# Patient Record
Sex: FEMALE | Race: WHITE | NOT HISPANIC OR LATINO | URBAN - METROPOLITAN AREA
[De-identification: names, ages, dates, MRNs, and addresses within clinical notes are randomized per-mention and may not be internally consistent; named-entity substitution may affect disease eponyms.]

---

## 2021-10-11 ENCOUNTER — INPATIENT (INPATIENT)
Facility: HOSPITAL | Age: 86
LOS: 7 days | Discharge: HOME | DRG: 299 | End: 2021-10-19
Attending: INTERNAL MEDICINE | Admitting: INTERNAL MEDICINE
Payer: MEDICARE

## 2021-10-11 VITALS
TEMPERATURE: 99 F | DIASTOLIC BLOOD PRESSURE: 72 MMHG | RESPIRATION RATE: 16 BRPM | WEIGHT: 154.98 LBS | HEART RATE: 92 BPM | OXYGEN SATURATION: 96 % | SYSTOLIC BLOOD PRESSURE: 153 MMHG

## 2021-10-11 DIAGNOSIS — L03.90 CELLULITIS, UNSPECIFIED: ICD-10-CM

## 2021-10-11 DIAGNOSIS — E03.9 HYPOTHYROIDISM, UNSPECIFIED: ICD-10-CM

## 2021-10-11 DIAGNOSIS — E87.6 HYPOKALEMIA: ICD-10-CM

## 2021-10-11 DIAGNOSIS — R60.0 LOCALIZED EDEMA: ICD-10-CM

## 2021-10-11 DIAGNOSIS — Z29.9 ENCOUNTER FOR PROPHYLACTIC MEASURES, UNSPECIFIED: ICD-10-CM

## 2021-10-11 DIAGNOSIS — I10 ESSENTIAL (PRIMARY) HYPERTENSION: ICD-10-CM

## 2021-10-11 LAB
ALBUMIN SERPL ELPH-MCNC: 2.8 G/DL — LOW (ref 3.3–5)
ALP SERPL-CCNC: 105 U/L — SIGNIFICANT CHANGE UP (ref 40–120)
ALT FLD-CCNC: 37 U/L — SIGNIFICANT CHANGE UP (ref 12–78)
ANION GAP SERPL CALC-SCNC: 3 MMOL/L — LOW (ref 5–17)
APTT BLD: 48 SEC — HIGH (ref 27.5–35.5)
AST SERPL-CCNC: 57 U/L — HIGH (ref 15–37)
BASOPHILS # BLD AUTO: 0.02 K/UL — SIGNIFICANT CHANGE UP (ref 0–0.2)
BASOPHILS NFR BLD AUTO: 0.4 % — SIGNIFICANT CHANGE UP (ref 0–2)
BILIRUB SERPL-MCNC: 0.9 MG/DL — SIGNIFICANT CHANGE UP (ref 0.2–1.2)
BUN SERPL-MCNC: 11 MG/DL — SIGNIFICANT CHANGE UP (ref 7–23)
CALCIUM SERPL-MCNC: 8.3 MG/DL — LOW (ref 8.5–10.1)
CHLORIDE SERPL-SCNC: 101 MMOL/L — SIGNIFICANT CHANGE UP (ref 96–108)
CO2 SERPL-SCNC: 32 MMOL/L — HIGH (ref 22–31)
CREAT SERPL-MCNC: 1.1 MG/DL — SIGNIFICANT CHANGE UP (ref 0.5–1.3)
EOSINOPHIL # BLD AUTO: 0.08 K/UL — SIGNIFICANT CHANGE UP (ref 0–0.5)
EOSINOPHIL NFR BLD AUTO: 1.4 % — SIGNIFICANT CHANGE UP (ref 0–6)
GLUCOSE SERPL-MCNC: 94 MG/DL — SIGNIFICANT CHANGE UP (ref 70–99)
HCT VFR BLD CALC: 35.9 % — SIGNIFICANT CHANGE UP (ref 34.5–45)
HGB BLD-MCNC: 11.6 G/DL — SIGNIFICANT CHANGE UP (ref 11.5–15.5)
IMM GRANULOCYTES NFR BLD AUTO: 0.4 % — SIGNIFICANT CHANGE UP (ref 0–1.5)
INR BLD: 2.87 RATIO — HIGH (ref 0.88–1.16)
LACTATE SERPL-SCNC: 1.5 MMOL/L — SIGNIFICANT CHANGE UP (ref 0.7–2)
LYMPHOCYTES # BLD AUTO: 1.55 K/UL — SIGNIFICANT CHANGE UP (ref 1–3.3)
LYMPHOCYTES # BLD AUTO: 27.4 % — SIGNIFICANT CHANGE UP (ref 13–44)
MCHC RBC-ENTMCNC: 28.2 PG — SIGNIFICANT CHANGE UP (ref 27–34)
MCHC RBC-ENTMCNC: 32.3 GM/DL — SIGNIFICANT CHANGE UP (ref 32–36)
MCV RBC AUTO: 87.3 FL — SIGNIFICANT CHANGE UP (ref 80–100)
MONOCYTES # BLD AUTO: 0.6 K/UL — SIGNIFICANT CHANGE UP (ref 0–0.9)
MONOCYTES NFR BLD AUTO: 10.6 % — SIGNIFICANT CHANGE UP (ref 2–14)
NEUTROPHILS # BLD AUTO: 3.39 K/UL — SIGNIFICANT CHANGE UP (ref 1.8–7.4)
NEUTROPHILS NFR BLD AUTO: 59.8 % — SIGNIFICANT CHANGE UP (ref 43–77)
NRBC # BLD: 0 /100 WBCS — SIGNIFICANT CHANGE UP (ref 0–0)
NT-PROBNP SERPL-SCNC: 2458 PG/ML — HIGH (ref 0–450)
PLATELET # BLD AUTO: 266 K/UL — SIGNIFICANT CHANGE UP (ref 150–400)
POTASSIUM SERPL-MCNC: 3.4 MMOL/L — LOW (ref 3.5–5.3)
POTASSIUM SERPL-SCNC: 3.4 MMOL/L — LOW (ref 3.5–5.3)
PROT SERPL-MCNC: 8.1 G/DL — SIGNIFICANT CHANGE UP (ref 6–8.3)
PROTHROM AB SERPL-ACNC: 31.9 SEC — HIGH (ref 10.6–13.6)
RBC # BLD: 4.11 M/UL — SIGNIFICANT CHANGE UP (ref 3.8–5.2)
RBC # FLD: 17.3 % — HIGH (ref 10.3–14.5)
SARS-COV-2 RNA SPEC QL NAA+PROBE: SIGNIFICANT CHANGE UP
SODIUM SERPL-SCNC: 136 MMOL/L — SIGNIFICANT CHANGE UP (ref 135–145)
WBC # BLD: 5.66 K/UL — SIGNIFICANT CHANGE UP (ref 3.8–10.5)
WBC # FLD AUTO: 5.66 K/UL — SIGNIFICANT CHANGE UP (ref 3.8–10.5)

## 2021-10-11 PROCEDURE — 71045 X-RAY EXAM CHEST 1 VIEW: CPT | Mod: 26

## 2021-10-11 PROCEDURE — 99285 EMERGENCY DEPT VISIT HI MDM: CPT

## 2021-10-11 PROCEDURE — 93306 TTE W/DOPPLER COMPLETE: CPT | Mod: 26

## 2021-10-11 PROCEDURE — 93010 ELECTROCARDIOGRAM REPORT: CPT

## 2021-10-11 PROCEDURE — 93970 EXTREMITY STUDY: CPT | Mod: 26

## 2021-10-11 RX ORDER — PIPERACILLIN AND TAZOBACTAM 4; .5 G/20ML; G/20ML
3.38 INJECTION, POWDER, LYOPHILIZED, FOR SOLUTION INTRAVENOUS EVERY 8 HOURS
Refills: 0 | Status: DISCONTINUED | OUTPATIENT
Start: 2021-10-12 | End: 2021-10-12

## 2021-10-11 RX ORDER — LEVOTHYROXINE SODIUM 125 MCG
50 TABLET ORAL DAILY
Refills: 0 | Status: DISCONTINUED | OUTPATIENT
Start: 2021-10-11 | End: 2021-10-19

## 2021-10-11 RX ORDER — FUROSEMIDE 40 MG
20 TABLET ORAL
Refills: 0 | Status: DISCONTINUED | OUTPATIENT
Start: 2021-10-12 | End: 2021-10-14

## 2021-10-11 RX ORDER — WARFARIN SODIUM 2.5 MG/1
4 TABLET ORAL ONCE
Refills: 0 | Status: COMPLETED | OUTPATIENT
Start: 2021-10-11 | End: 2021-10-11

## 2021-10-11 RX ORDER — METOPROLOL TARTRATE 50 MG
50 TABLET ORAL DAILY
Refills: 0 | Status: DISCONTINUED | OUTPATIENT
Start: 2021-10-11 | End: 2021-10-19

## 2021-10-11 RX ORDER — PIPERACILLIN AND TAZOBACTAM 4; .5 G/20ML; G/20ML
3.38 INJECTION, POWDER, LYOPHILIZED, FOR SOLUTION INTRAVENOUS ONCE
Refills: 0 | Status: COMPLETED | OUTPATIENT
Start: 2021-10-11 | End: 2021-10-11

## 2021-10-11 RX ORDER — POTASSIUM CHLORIDE 20 MEQ
20 PACKET (EA) ORAL DAILY
Refills: 0 | Status: DISCONTINUED | OUTPATIENT
Start: 2021-10-12 | End: 2021-10-19

## 2021-10-11 RX ORDER — SODIUM CHLORIDE 9 MG/ML
2200 INJECTION INTRAMUSCULAR; INTRAVENOUS; SUBCUTANEOUS ONCE
Refills: 0 | Status: DISCONTINUED | OUTPATIENT
Start: 2021-10-11 | End: 2021-10-11

## 2021-10-11 RX ORDER — GABAPENTIN 400 MG/1
100 CAPSULE ORAL THREE TIMES A DAY
Refills: 0 | Status: DISCONTINUED | OUTPATIENT
Start: 2021-10-11 | End: 2021-10-19

## 2021-10-11 RX ORDER — VANCOMYCIN HCL 1 G
1000 VIAL (EA) INTRAVENOUS ONCE
Refills: 0 | Status: COMPLETED | OUTPATIENT
Start: 2021-10-11 | End: 2021-10-11

## 2021-10-11 RX ORDER — FUROSEMIDE 40 MG
40 TABLET ORAL ONCE
Refills: 0 | Status: COMPLETED | OUTPATIENT
Start: 2021-10-11 | End: 2021-10-11

## 2021-10-11 RX ORDER — METOPROLOL TARTRATE 50 MG
50 TABLET ORAL DAILY
Refills: 0 | Status: DISCONTINUED | OUTPATIENT
Start: 2021-10-11 | End: 2021-10-11

## 2021-10-11 RX ORDER — POTASSIUM CHLORIDE 20 MEQ
40 PACKET (EA) ORAL EVERY 4 HOURS
Refills: 0 | Status: COMPLETED | OUTPATIENT
Start: 2021-10-11 | End: 2021-10-12

## 2021-10-11 RX ADMIN — Medication 40 MILLIGRAM(S): at 17:25

## 2021-10-11 RX ADMIN — Medication 1000 MILLIGRAM(S): at 18:25

## 2021-10-11 RX ADMIN — PIPERACILLIN AND TAZOBACTAM 3.38 GRAM(S): 4; .5 INJECTION, POWDER, LYOPHILIZED, FOR SOLUTION INTRAVENOUS at 17:18

## 2021-10-11 RX ADMIN — GABAPENTIN 100 MILLIGRAM(S): 400 CAPSULE ORAL at 23:27

## 2021-10-11 RX ADMIN — Medication 40 MILLIEQUIVALENT(S): at 20:51

## 2021-10-11 RX ADMIN — Medication 250 MILLIGRAM(S): at 17:25

## 2021-10-11 RX ADMIN — PIPERACILLIN AND TAZOBACTAM 200 GRAM(S): 4; .5 INJECTION, POWDER, LYOPHILIZED, FOR SOLUTION INTRAVENOUS at 16:58

## 2021-10-11 NOTE — H&P ADULT - PROBLEM SELECTOR PLAN 3
- continue home medications  - f/u TSH - /72 on admission  - continue home medications - /72 on admission  - continue home Lopressor 50mg qd - K 3.4 on admission  - replete with potassium chloride 40 mg po x 2 doses  - AM BMP, replete prn

## 2021-10-11 NOTE — H&P ADULT - NEUROLOGICAL DETAILS
alert and oriented x 3/sensation intact/normal strength alert and oriented x 3/sensation intact/cranial nerves intact/normal strength

## 2021-10-11 NOTE — H&P ADULT - GASTROINTESTINAL DETAILS
normal/soft/nontender/no distention normal/soft/nontender/no distention/no masses palpable/bowel sounds normal/no bruit/no rebound tenderness/no guarding/no rigidity/no organomegaly

## 2021-10-11 NOTE — CONSULT NOTE ADULT - ASSESSMENT
[facility]  Chart Reviewed-Full Consult to follow for any immediate concerns please fell free to contact us directly at  [phone] and have us paged or text my cell # [phone]  Diony Larios MD PhD

## 2021-10-11 NOTE — H&P ADULT - PROBLEM SELECTOR PLAN 2
- /72 on admission  - continue home medications - K 3.4 on admission  - replete with potassium chloride 40 mg po x 2 doses - K 3.4 on admission  - replete with potassium chloride 40 mg po x 2 doses  - AM BMP, replete prn - hx of LE edema, off Lasix for past 2 weeks - Volume overload  2/2 non compliance with meds , Non compliance   - s/p Zosyn 3.375g x1 and Vanco 1g in ED. s/p Lasix 40mg IV x1 in ER  - continue Lasix 20 mg IV bid   - blood cultures, f/u  - Doppler Venous - to follow

## 2021-10-11 NOTE — H&P ADULT - PROBLEM SELECTOR PLAN 5
- continue jantoven (warfarin) - continue warfarin - continue synthroid 50mg qd - /72 on admission  - continue home Lopressor 50mg qd  Lasix  20 mg q 12 hrs

## 2021-10-11 NOTE — H&P ADULT - EXTREMITIES COMMENTS
b/l LE edema and erythema, painful to palpation, b/l LE edema and erythema extending from mid foot to below the knees, nontender to palpation, excoriations present, no drainage

## 2021-10-11 NOTE — H&P ADULT - NSHPREVIEWOFSYSTEMS_GEN_ALL_CORE
REVIEW OF SYSTEMS:  CONSTITUTIONAL: No fever, No chills, No fatigue, No myalgia, No body ache, No weakness  EYES: No eye pain,  No visual disturbances, No discharge, No Redness.  ENMT:  No ear pain, No nose bleed, No vertigo; No sinus pain, No throat pain, No congestion.  NECK: No pain, No stiffness.  RESPIRATORY: No cough, No wheezing, No  hemoptysis, No shortness of breath.  CARDIOVASCULAR: No chest pain, palpitations.  GASTROINTESTINAL: No abdominal pain, No epigastric pain. No nausea, No vomiting; No diarrhea, No constipation. [  ] BM  GENITOURINARY: No dysuria, No frequency, No urgency, No hematuria, No incontinence  NEUROLOGICAL: No headaches, No dizziness, No numbness, No tingling, No tremors, No weakness  EXT: ENDORSES Swelling, ENDORSES pain, ENDORSES b/l edema, erythema.  SKIN:  [  ] No itching, burning, rashes, or lesions [ X ] ENDORSES b/l erythema with edema in LE  MUSCULOSKELETAL: No joint pain or swelling; No muscle pain, No back pain, ENDORSES extremity pain.  PSYCHIATRIC: No depression,  No anxiety,  No mood swings, No difficulty sleeping at night.  PAIN SCALE: [  ] None  [ X ] Other- REVIEW OF SYSTEMS:  CONSTITUTIONAL: No fever, No chills, No fatigue, No myalgia, No body ache, No weakness  EYES: No eye pain,  No visual disturbances, No discharge, No Redness.  ENMT:  No ear pain, No nose bleed, No vertigo; No sinus pain, No throat pain, No congestion.  NECK: No pain, No stiffness.  RESPIRATORY: No cough, No wheezing, No  hemoptysis, No shortness of breath.  CARDIOVASCULAR: No chest pain, palpitations.  GASTROINTESTINAL: No abdominal pain, No epigastric pain. No nausea, No vomiting; No diarrhea, No constipation. [  ] BM  GENITOURINARY: No dysuria, No frequency, No urgency, No hematuria, No incontinence  NEUROLOGICAL: No headaches, No dizziness, No numbness, No tingling, No tremors, No weakness  EXT: ENDORSES Swelling, denies pain, ENDORSES b/l edema, erythema.  SKIN:  [  ] No itching, burning, rashes, or lesions [ X ] ENDORSES b/l erythema with edema in LE  MUSCULOSKELETAL: No joint pain or swelling; No muscle pain, No back pain, ENDORSES extremity pain.  PSYCHIATRIC: No depression,  No anxiety,  No mood swings, No difficulty sleeping at night.  PAIN SCALE: [  ] None  [ X ] Other- REVIEW OF SYSTEMS:  CONSTITUTIONAL: No fever, No chills, No fatigue, No myalgia, No body ache, No weakness  EYES: No eye pain,  No visual disturbances, No discharge, No Redness.  ENMT:  No ear pain, No nose bleed, No vertigo; No sinus pain, No throat pain, No congestion.  NECK: No pain, No stiffness.  RESPIRATORY: No cough, No wheezing, No  hemoptysis, No shortness of breath.  CARDIOVASCULAR: No chest pain, palpitations.  GASTROINTESTINAL: No abdominal pain, No epigastric pain. No nausea, No vomiting; No diarrhea, No constipation. [  ] BM  GENITOURINARY: No dysuria, No frequency, No urgency, No hematuria, No incontinence  NEUROLOGICAL: No headaches, No dizziness, No numbness, No tingling, No tremors, No weakness  EXT: ENDORSES Swelling, denies pain, ENDORSES b/l edema, erythema.  SKIN:  [  ] No itching, burning, rashes, or lesions [ X ] ENDORSES b/l erythema with edema in LE  MUSCULOSKELETAL: No joint pain or swelling; No muscle pain, No back pain, ENDORSES extremity pain.  PSYCHIATRIC: No depression,  No anxiety,  No mood swings, No difficulty sleeping at night.  PAIN SCALE: [ X ] None  [  ] Other-

## 2021-10-11 NOTE — H&P ADULT - NSHPPHYSICALEXAM_GEN_ALL_CORE
T(C): 37.1 (10-11-21 @ 14:45), Max: 37.1 (10-11-21 @ 14:45)  HR: 92 (10-11-21 @ 14:45) (92 - 92)  BP: 153/72 (10-11-21 @ 14:45) (153/72 - 153/72)  RR: 16 (10-11-21 @ 14:45) (16 - 16)  SpO2: 96% (10-11-21 @ 14:45) (96% - 96%)  Wt(kg): --    Physical Exam:   GENERAL: well-groomed, well-developed, NAD  HEENT: head NC/AT; EOM intact, PERRLA, conjunctiva & sclera clear; hearing grossly intact, moist mucous membranes  NECK: supple, no JVD  RESPIRATORY: CTA B/L, no wheezing, rales, rhonchi or rubs  CARDIOVASCULAR: S1&S2, RRR, no murmurs or gallops  ABDOMEN: soft, non-tender, non-distended, + Bowel sounds x4 quadrants, no guarding, rebound or rigidity  MUSCULOSKELETAL:  b/l LE edema, erythema extending up to  LYMPH: no cervical lymphadenopathy  VASCULAR: Radial pulses 2+ bilaterally, no varicose veins   SKIN:    NEUROLOGIC: AA&O X3, CN2-12 intact w/ no focal deficits, no sensory loss, motor Strength 5/5 in UE & LE B/L  Psych: Normal mood and affect, normal behavior

## 2021-10-11 NOTE — H&P ADULT - ASSESSMENT
Patient is a 89 y/o F with a pmhx b/l LE edema, HTN, hypothyroidism, who presents with 1mo hx of worsening b/l LE edema. Endorses pain, numbness, erythema and swelling b/l.  Patient is a 89 y/o F with a pmhx b/l LE edema, HTN, hypothyroidism, who presents with 1mo hx of worsening b/l LE edema. Endorses erythema and swelling of b/l LE from below the knee down to midfeet. Patient is a 89 y/o F with a pmhx b/l LE edema, HTN, hypothyroidism, who presents with 1mo hx of worsening b/l LE edema. Endorses erythema and swelling of b/l LE  edema ,oozing wound with Volume overload, with possible cellulitis.

## 2021-10-11 NOTE — H&P ADULT - NSHPSOCIALHISTORY_GEN_ALL_CORE
Patient lives ... Denies tobacco use. Patient lives alone. Denies tobacco use. Patient lives alone. Denies tobacco use or etoh use.

## 2021-10-11 NOTE — H&P ADULT - HISTORY OF PRESENT ILLNESS
Patient is a 89 y/o F with a pmhx b/l LE edema, HTN, hypothyroidism, who presents with 1mo hx of worsening b/l LE edema. Endorses pain, numbness, erythema and swelling b/l. Per patient, her PCP told her to stop Lasix recently so pt has not been taking. States pain patches, water pills and antibiotics as an outpatient were unhelpful in relieving these symptoms. Denies chest pain, shortness of breath, palpitations, fevers, chills, nausea, vomiting. Patient is a 89 y/o F with a pmhx b/l LE edema, HTN, hypothyroidism, who presents with 1mo hx of worsening b/l LE edema. Endorses pain, numbness, erythema and swelling b/l. Per patient, her PCP told her to stop Lasix recently so pt has not been taking. States pain patches, water pills and antibiotics as an outpatient were unhelpful in relieving these symptoms. Denies chest pain, shortness of breath, palpitations, fevers, chills, nausea, vomiting.    IN THE ED:  Temp 98.8 F, HR 92, /72, RR 16, SpO2 96%  S/P Zosyn 3.375g x1, Vanco 1g x1   EKG: left axis deviation, LBBB, ?unchanged from prior?  Labs significant for  Imaging:   Patient is a 89 y/o F with a pmhx b/l LE edema, HTN, hypothyroidism, who presents with 1mo hx of worsening b/l LE edema. Endorses pain, numbness, erythema and swelling b/l. Per patient, her PCP told her to stop Lasix recently so pt has not been taking. States pain patches, water pills and antibiotics as an outpatient were unhelpful in relieving these symptoms. Denies chest pain, shortness of breath, palpitations, fevers, chills, nausea, vomiting.    IN THE ED:  Temp 98.8 F, HR 92, /72, RR 16, SpO2 96%  S/P Zosyn 3.375g x1, Vanco 1g x1, lasix 40 mg IV x1   EKG: left axis deviation, LBBB, ?unchanged from prior?  Labs significant for WBC 5.66, BNP 2458, PT 31.9, INR 2.87, PTT 48, K 3.4, CO 32, anion gap 3, albumin 2.8, AST 57, eGFR 43  Imaging: CXR with minimal blunting of R base laterally    Patient is a 89 y/o F with a pmhx b/l LE edema, HTN, hypothyroidism, who presents with 1mo hx of worsening b/l LE edema. Endorses erythema and swelling b/l. Per patient, her PCP told her to stop Lasix recently so pt has not been taking. States pain patches, water pills and antibiotics as an outpatient were unhelpful in relieving these symptoms. States she has had symptoms for 6 months but recently worsened. Denies chest pain, shortness of breath, palpitations, fevers, chills, nausea, vomiting. Denies having in past.    IN THE ED:  Temp 98.8 F, HR 92, /72, RR 16, SpO2 96%  S/P Zosyn 3.375g x1, Vanco 1g x1, lasix 40 mg IV x1   EKG: left axis deviation, LBBB   Labs significant for WBC 5.66, BNP 2458, PT 31.9, INR 2.87, PTT 48, K 3.4, CO 32, anion gap 3, albumin 2.8, AST 57, eGFR 43  Imaging: CXR with minimal blunting of R base laterally    Patient is a 89 y/o F with a pmhx b/l LE edema, HTN, hypothyroidism, who presents with 1mo hx of worsening b/l LE edema. Endorses erythema and swelling b/l. Per patient, her doctor ?neurologist, told her to stop Lasix recently so pt has not been taking. States pain patches, water pills and antibiotics as an outpatient were unhelpful in relieving these symptoms. States she has had symptoms for 6 months but recently worsened. Denies chest pain, shortness of breath, palpitations, fevers, chills, nausea, vomiting. Denies having in past.    IN THE ED:  Temp 98.8 F, HR 92, /72, RR 16, SpO2 96%  S/P Zosyn 3.375g x1, Vanco 1g x1, lasix 40 mg IV x1   EKG: left axis deviation, LBBB   Labs significant for WBC 5.66, BNP 2458, PT 31.9, INR 2.87, PTT 48, K 3.4, CO 32, anion gap 3, albumin 2.8, AST 57, eGFR 43  Imaging: CXR with minimal blunting of R base laterally    Patient is a 89 y/o F with a pmhx b/l LE edema, HTN,P Afib on AC , hypothyroidism, who presents with 1mo hx of worsening b/l LE edema. Endorses erythema and swelling b/l. Per patient, her doctor ?neurologist, told her to stop Lasix recently so pt has not been taking. States pain patches, water pills and antibiotics as an outpatient were unhelpful in relieving these symptoms. States she has had symptoms for 6 months but recently worsened. Denies chest pain, shortness of breath, palpitations, fevers, chills, nausea, vomiting. Denies having in past.    IN THE ED:  Temp 98.8 F, HR 92, /72, RR 16, SpO2 96%  S/P Zosyn 3.375g x1, Vanco 1g x1, lasix 40 mg IV x1   EKG: left axis deviation, LBBB   Labs significant for WBC 5.66, BNP 2458, PT 31.9, INR 2.87, PTT 48, K 3.4, CO 32, anion gap 3, albumin 2.8, AST 57, eGFR 43  Imaging: CXR with minimal blunting of R base laterally

## 2021-10-11 NOTE — H&P ADULT - SWELLING LOCATION
lower extremities lower extremities, severe erythema B/L lower Ext with blister ,oozing & draining serous fluids , warm, redness, edema

## 2021-10-11 NOTE — ED PROVIDER NOTE - CLINICAL SUMMARY MEDICAL DECISION MAKING FREE TEXT BOX
b/l LE edema/erythema x months worse over the past 2 weeks off of her lasix.  septic workup, BNP, Lasix

## 2021-10-11 NOTE — ED PROVIDER NOTE - CARE PLAN
1 Principal Discharge DX:	Leg edema  Secondary Diagnosis:	Fluid overload  Secondary Diagnosis:	Cellulitis

## 2021-10-11 NOTE — H&P ADULT - PROBLEM SELECTOR PLAN 1
- hx of LE edema, off Lasix for past 2 weeks   - WBC 5.66, afebrile, does not meet sepsis criteria, b/l ?cellulitis   - s/p Zosyn 3.375g x1 and Vanco 1g in ED   - blood cultures, f/u  - ID consult, f/u recs - hx of LE edema, off Lasix for past 2 weeks   - WBC 5.66, afebrile, does not meet sepsis criteria, b/l ?cellulitis   - s/p Zosyn 3.375g x1 and Vanco 1g in ED. s/p Lasix 40mg IV x1  - continue Lasix 20 mg IV bid  - blood cultures, f/u  - ID consult, f/u recs - hx of LE edema, off Lasix for past 2 weeks   - WBC 5.66, afebrile, does not meet sepsis criteria, b/l ?cellulitis   - s/p Zosyn 3.375g x1 and Vanco 1g in ED. s/p Lasix 40mg IV x1  - continue Lasix 20 mg IV bid and gabapentin 100mg tid  - blood cultures, f/u  - ID consult, f/u recs -Chronic Venous stasis dermatitis now with blisters oozing & draining with likely infection  - WBC 5.66, afebrile, does not meet sepsis criteria, b/l ?cellulitis  -IV Diuresis Lasix 20 mg IV q 12 hrs   -IV Zosyn q 8 hrs   Blood c/s x 2   -ID DR Larios d/w  -On  gabapentin 100mg tid - Likely Chronic Venous stasis dermatitis now with blisters oozing & draining with likely infection  - WBC 5.66, afebrile, does not meet sepsis criteria, b/l ?cellulitis  -IV Diuresis Lasix 20 mg IV q 12 hrs   -IV Zosyn q 8 hrs   Blood c/s x 2   -ID DR Larios d/w  -On  gabapentin 100mg tid

## 2021-10-11 NOTE — H&P ADULT - PROBLEM SELECTOR PLAN 4
- continue home medications  - f/u TSH - continue synthyroid 50mg qd  - f/u TSH - continue synthyroid 50mg qd - /72 on admission  - continue home Lopressor 50mg qd H/O P A Fib HR stable,   On Coumadin daily 4 mg, INR in AM   On Metroprolol T 50 mg 1 tab daily

## 2021-10-11 NOTE — ED PROVIDER NOTE - OBJECTIVE STATEMENT
89 y/o F sent in by PCP for suspected b/l LE cellulitis.  pt c/o numbness, swelling and redness to b/l LE x few months, now worse over the past 2 weeks.  pt has had pain patches, water pills and abx prescribed with little to no relief of symptoms.  Pt was seen by PCP and was told to go to the ER for IV antibiotics.    PCP: Dr. PRATEEK Mccord

## 2021-10-11 NOTE — H&P ADULT - RS GEN PE MLT RESP DETAILS PC
normal/clear to auscultation bilaterally/no rales/no rhonchi/no wheezes normal/breath sounds equal/good air movement/respirations non-labored/clear to auscultation bilaterally/no rales/no rhonchi/no wheezes/diminished breath sounds, L/diminished breath sounds, R

## 2021-10-11 NOTE — H&P ADULT - ATTENDING COMMENTS
Patient is a 89 y/o F with a pmhx b/l LE edema, HTN, hypothyroidism, who presents with 1mo hx of worsening b/l LE edema. Endorses erythema and swelling of b/l LE  edema ,oozing wound with Volume overload, with possible cellulitis.   Pt seen, examined, case & care plan d/w pt, residents at detail.  D/W ID-Dr Larios -Abx   D/W PMD- DR Barbara Mccord at detail.  AM Labs   PO diet   PT Eval

## 2021-10-12 DIAGNOSIS — I48.0 PAROXYSMAL ATRIAL FIBRILLATION: ICD-10-CM

## 2021-10-12 DIAGNOSIS — L03.90 CELLULITIS, UNSPECIFIED: ICD-10-CM

## 2021-10-12 LAB
ALBUMIN SERPL ELPH-MCNC: 2.4 G/DL — LOW (ref 3.3–5)
ALP SERPL-CCNC: 86 U/L — SIGNIFICANT CHANGE UP (ref 40–120)
ALT FLD-CCNC: 31 U/L — SIGNIFICANT CHANGE UP (ref 12–78)
ANION GAP SERPL CALC-SCNC: 4 MMOL/L — LOW (ref 5–17)
AST SERPL-CCNC: 47 U/L — HIGH (ref 15–37)
BILIRUB SERPL-MCNC: 1.4 MG/DL — HIGH (ref 0.2–1.2)
BUN SERPL-MCNC: 11 MG/DL — SIGNIFICANT CHANGE UP (ref 7–23)
CALCIUM SERPL-MCNC: 7.9 MG/DL — LOW (ref 8.5–10.1)
CHLORIDE SERPL-SCNC: 102 MMOL/L — SIGNIFICANT CHANGE UP (ref 96–108)
CO2 SERPL-SCNC: 32 MMOL/L — HIGH (ref 22–31)
COVID-19 SPIKE DOMAIN AB INTERP: NEGATIVE — SIGNIFICANT CHANGE UP
COVID-19 SPIKE DOMAIN ANTIBODY RESULT: 0.4 U/ML — SIGNIFICANT CHANGE UP
CREAT SERPL-MCNC: 1.1 MG/DL — SIGNIFICANT CHANGE UP (ref 0.5–1.3)
GLUCOSE SERPL-MCNC: 77 MG/DL — SIGNIFICANT CHANGE UP (ref 70–99)
HCT VFR BLD CALC: 33.9 % — LOW (ref 34.5–45)
HGB BLD-MCNC: 10.8 G/DL — LOW (ref 11.5–15.5)
INR BLD: 3.46 RATIO — HIGH (ref 0.88–1.16)
MCHC RBC-ENTMCNC: 27.9 PG — SIGNIFICANT CHANGE UP (ref 27–34)
MCHC RBC-ENTMCNC: 31.9 GM/DL — LOW (ref 32–36)
MCV RBC AUTO: 87.6 FL — SIGNIFICANT CHANGE UP (ref 80–100)
NRBC # BLD: 0 /100 WBCS — SIGNIFICANT CHANGE UP (ref 0–0)
PLATELET # BLD AUTO: 192 K/UL — SIGNIFICANT CHANGE UP (ref 150–400)
POTASSIUM SERPL-MCNC: 3.1 MMOL/L — LOW (ref 3.5–5.3)
POTASSIUM SERPL-SCNC: 3.1 MMOL/L — LOW (ref 3.5–5.3)
PROT SERPL-MCNC: 7 G/DL — SIGNIFICANT CHANGE UP (ref 6–8.3)
PROTHROM AB SERPL-ACNC: 38.2 SEC — HIGH (ref 10.6–13.6)
RBC # BLD: 3.87 M/UL — SIGNIFICANT CHANGE UP (ref 3.8–5.2)
RBC # FLD: 17.5 % — HIGH (ref 10.3–14.5)
SARS-COV-2 IGG+IGM SERPL QL IA: 0.4 U/ML — SIGNIFICANT CHANGE UP
SARS-COV-2 IGG+IGM SERPL QL IA: NEGATIVE — SIGNIFICANT CHANGE UP
SODIUM SERPL-SCNC: 138 MMOL/L — SIGNIFICANT CHANGE UP (ref 135–145)
WBC # BLD: 4.09 K/UL — SIGNIFICANT CHANGE UP (ref 3.8–10.5)
WBC # FLD AUTO: 4.09 K/UL — SIGNIFICANT CHANGE UP (ref 3.8–10.5)

## 2021-10-12 PROCEDURE — 99497 ADVNCD CARE PLAN 30 MIN: CPT

## 2021-10-12 RX ORDER — POTASSIUM CHLORIDE 20 MEQ
40 PACKET (EA) ORAL ONCE
Refills: 0 | Status: COMPLETED | OUTPATIENT
Start: 2021-10-12 | End: 2021-10-12

## 2021-10-12 RX ADMIN — GABAPENTIN 100 MILLIGRAM(S): 400 CAPSULE ORAL at 21:38

## 2021-10-12 RX ADMIN — PIPERACILLIN AND TAZOBACTAM 25 GRAM(S): 4; .5 INJECTION, POWDER, LYOPHILIZED, FOR SOLUTION INTRAVENOUS at 00:46

## 2021-10-12 RX ADMIN — Medication 40 MILLIEQUIVALENT(S): at 11:06

## 2021-10-12 RX ADMIN — PIPERACILLIN AND TAZOBACTAM 25 GRAM(S): 4; .5 INJECTION, POWDER, LYOPHILIZED, FOR SOLUTION INTRAVENOUS at 09:25

## 2021-10-12 RX ADMIN — GABAPENTIN 100 MILLIGRAM(S): 400 CAPSULE ORAL at 13:19

## 2021-10-12 RX ADMIN — Medication 40 MILLIEQUIVALENT(S): at 00:46

## 2021-10-12 RX ADMIN — Medication 50 MILLIGRAM(S): at 05:31

## 2021-10-12 RX ADMIN — Medication 50 MICROGRAM(S): at 05:31

## 2021-10-12 RX ADMIN — Medication 20 MILLIEQUIVALENT(S): at 13:19

## 2021-10-12 RX ADMIN — Medication 20 MILLIGRAM(S): at 05:31

## 2021-10-12 RX ADMIN — GABAPENTIN 100 MILLIGRAM(S): 400 CAPSULE ORAL at 05:31

## 2021-10-12 RX ADMIN — Medication 20 MILLIGRAM(S): at 17:13

## 2021-10-12 RX ADMIN — Medication 40 MILLIEQUIVALENT(S): at 17:13

## 2021-10-12 NOTE — PROGRESS NOTE ADULT - PROBLEM SELECTOR PLAN 1
- Chronic Venous stasis dermatitis now with blisters oozing likely 2/2 fluid overload. unlikely 2/2 cellulitis at this time   - hx of LE edema, off Lasix for past 2 weeks - Volume overload  2/2 non compliance with meds , Non compliance   - continue Lasix 20 mg IV bid   - blood cultures, f/u -> no Abx at this time  - f/up venous dopplers  - c/w gabapentin 100mg tid  - ID DR Larios d/w  - consult cardiology, will appreciate recs - Chronic Venous stasis dermatitis now with blisters oozing likely 2/2 fluid overload. unlikely 2/2 cellulitis at this time   - hx of LE edema, off Lasix for past 2 weeks - Volume overload  2/2 non compliance with meds , Non compliance   - continue Lasix 20 mg IV bid   - blood cultures, f/u -> no Abx at this time  - f/up venous dopplers-NEG  - c/w gabapentin 100mg tid  - ID DR Larios d/w- observe off IV Abx    -ECHO - Chronic Venous stasis dermatitis now with blisters oozing likely 2/2 fluid overload. unlikely 2/2 cellulitis at this time   - hx of LE edema, off Lasix for past 2 weeks - Volume overload  2/2 non compliance with meds , Non compliance   - continue Lasix 20 mg IV bid   - blood cultures, f/u -> no Abx at this time  - f/up venous dopplers-NEG  - c/w gabapentin 100mg tid  - ID DR Larios d/w- observe off IV Abx    -ECHO done

## 2021-10-12 NOTE — GOALS OF CARE CONVERSATION - ADVANCED CARE PLANNING - CONVERSATION DETAILS
Writer met with pt at bedside. Reviewed patient's medical and social history as well as events leading to patient's hospitalization. Writer discussed patient's current diagnosis (cellulitis,bilateral LE edema,HTN,hypothyroid),  medical condition and management,  prognosis, and life expectancy. Inquired about patient's wishes regarding extent of medical care to be provided including escalation of medical care into the ICU and use of vasopressor support. In addition, the writer inquired about thoughts regarding cardiopulmnary resuscitation, artificial nutrition and hydration including use of feeding tubes and IVF, antibiotics, and further investigative studies such as blood draws and radiology. Pt. showed good  insight into medical condition. All questions answered. Writer recommended pt review her wishes with her dtr/HCP. Patient wants resuscitation at this time. Psychosocial support provided.

## 2021-10-12 NOTE — PROGRESS NOTE ADULT - ATTENDING COMMENTS
Patient is a 89 y/o F with a pmhx b/l LE edema, HTN, hypothyroidism, who presents with 1mo hx of worsening b/l LE edema. Endorses erythema and swelling of b/l LE  edema ,oozing wound with Volume overload, with possible cellulitis.   Pt seen, examined, case & care plan d/w pt, residents at detail.  D/W ID-Dr Larios  observe off Abx   D/W PMD- DR Barbara Mccord at detail.  AM Labs   PO diet   PT Eval. --->SINAN Patient is a 89 y/o F with a pmhx b/l LE edema, HTN, hypothyroidism, who presents with 1mo hx of worsening b/l LE edema. Endorses erythema and swelling of b/l LE  edema ,oozing wound with Volume overload, with possible cellulitis.   Pt seen, examined, case & care plan d/w pt, residents at detail.  D/W ID-Dr Larios  observe off Abx   D/W PMD- DR Barbara Mccord at detail.  AM Labs   PO diet   PT Eval. --->SINAN.  Total care time 45 minutes.  D/W Social service for SINAN, Unable to reach dtr on phone.

## 2021-10-12 NOTE — PHYSICAL THERAPY INITIAL EVALUATION ADULT - ADDITIONAL COMMENTS
Pt reports that she lives alone in a home in [place]. Is independent will all household ADL's, driving and ambulation. States that her daughter does live close enough to help if needed. Has 2 stairs to get into the home with double railings, no stairs inside. Has flat shower and seat in shower. Wants to go home after hospital care.

## 2021-10-12 NOTE — PROGRESS NOTE ADULT - ASSESSMENT
Patient is a 89 y/o F with a pmhx b/l LE edema, HTN, hypothyroidism, who presents with 1mo hx of worsening b/l LE edema. Endorses erythema and swelling of b/l LE  edema ,oozing wound with Volume overload, with possible cellulitis.

## 2021-10-12 NOTE — PROGRESS NOTE ADULT - PROBLEM SELECTOR PLAN 4
- continue synthroid 50mg qd - chronic, stable  - continue home Lopressor 50mg qd  Lasix 20 mg IV q 12 hrs

## 2021-10-12 NOTE — PROGRESS NOTE ADULT - PROBLEM SELECTOR PLAN 2
- repleted with 40 Meq tab  - start 20 meq K PO qd H/O P A Fib  INR elevated,   HOLD Coumadin   -HR stable-On Metoprolol  T 50 mg once a Day  -Daily INR H/O P A Fib  INR elevated,   HOLD Coumadin as INR > 3  -HR stable-On Metoprolol  T 50 mg once a Day  -Daily INR

## 2021-10-12 NOTE — PROGRESS NOTE ADULT - SUBJECTIVE AND OBJECTIVE BOX
Patient is a 89y old  Female who presents with a chief complaint of b/l LE cellulitis (12 Oct 2021 10:17)    HPI:  Patient is a 89 y/o F with a pmhx b/l LE edema, HTN, hypothyroidism, who presents with 1mo hx of worsening b/l LE edema. Endorses erythema and swelling b/l. Per patient, her doctor ?neurologist, told her to stop Lasix recently so pt has not been taking. States pain patches, water pills and antibiotics as an outpatient were unhelpful in relieving these symptoms. States she has had symptoms for 6 months but recently worsened. Denies chest pain, shortness of breath, palpitations, fevers, chills, nausea, vomiting. Denies having in past.    IN THE ED:  Temp 98.8 F, HR 92, /72, RR 16, SpO2 96%  S/P Zosyn 3.375g x1, Vanco 1g x1, lasix 40 mg IV x1   EKG: left axis deviation, LBBB   Labs significant for WBC 5.66, BNP 2458, PT 31.9, INR 2.87, PTT 48, K 3.4, CO 32, anion gap 3, albumin 2.8, AST 57, eGFR 43  Imaging: CXR with minimal blunting of R base laterally    (11 Oct 2021 18:04)    INTERVAL HPI:  10/12/21 - Patient was seen and examined at bedside. No acute events overnight. Patient endorses improvement in the rubor/erythema of her LE b/l but endorses continued pain in LE b/l. Patient endorses 1 year hx of weight loss (35 pounds) due to decreased appetite. Patient denies headache, fever, blurry/double vision, tinnitus, dysphagia, cough, SoB, abdominal pain, CP, palpitations, n/v/c/d.    OVERNIGHT EVENTS:    Home Medications:  gabapentin 100 mg oral capsule: 1 cap(s) orally 3 times a day (11 Oct 2021 19:58)  Jantoven 1 mg oral tablet: 1 tab(s) orally once a day (11 Oct 2021 19:58)  Jantoven 1 mg oral tablet: 2 tab(s) orally Monday, Wednesday, and Friday (11 Oct 2021 19:58)  Jantoven 4 mg oral tablet: 1 tab(s) orally once a day (11 Oct 2021 19:58)  Klor-Con 10 mEq oral tablet, extended release: 1 tab(s) orally once a day (11 Oct 2021 19:58)  Lasix 40 mg oral tablet: 1 tab(s) orally once a day (11 Oct 2021 19:58)  lidocaine 5% patch: Apply to affected areas (11 Oct 2021 19:58)  Lopressor 50 mg oral tablet: 1 tab(s) orally once a day (11 Oct 2021 19:58)  Synthroid 50 mcg (0.05 mg) oral tablet: 1 tab(s) orally once a day (11 Oct 2021 19:58)  Vitamin B6 50 mg oral tablet: 1 tab(s) orally once a day (11 Oct 2021 19:58)      MEDICATIONS  (STANDING):  furosemide   Injectable 20 milliGRAM(s) IV Push two times a day  gabapentin 100 milliGRAM(s) Oral three times a day  levothyroxine 50 MICROGram(s) Oral daily  metoprolol tartrate 50 milliGRAM(s) Oral daily  potassium chloride    Tablet ER 20 milliEquivalent(s) Oral daily    MEDICATIONS  (PRN):      No Known Allergies      Social History:  Patient lives alone. Denies tobacco use or etoh use. (11 Oct 2021 18:04)      REVIEW OF SYSTEMS:  CONSTITUTIONAL: No fever, No chills, No fatigue  EYES:  No visual disturbances, No discharge  ENMT: No tinnitus, no blurry/double vision  RESPIRATORY: No cough, No wheezing, No hemoptysis, No shortness of breath  CARDIOVASCULAR: No chest pain, No palpitations  GASTROINTESTINAL: No abdominal pain, No epigastric pain. No nausea, No vomiting, No diarrhea, No constipation; [ x ] BM  GENITOURINARY: No dysuria, Endorses increased frequency, No urgency, No hematuria, No incontinence  NEUROLOGICAL: No headaches, No dizziness, No numbness, No tingling, No tremors, No weakness  EXTREMITIES: Endorses b/l LE Swelling, Endorses Pain, Endorses b/l LE Edema  SKIN: b/l sloughing of skin of LE with erythema/rubor on shins  MUSCULOSKELETAL: No joint pain, No joint swelling; No muscle pain, No back pain, Endorses extremity pain  PSYCHIATRIC: No depression, No anxiety, No mood swings, No difficulty sleeping at night  PAIN SCALE: [ x ] None  [  ] Other-  ROS Unable to obtain due to: [  ] Dementia  [  ] Lethargy  [  ] Sedated  [  ] Non verbal  REST OF REVIEW OF SYSTEMS: [ x ] Normal     Vital Signs Last 24 Hrs  T(C): 36.6 (12 Oct 2021 05:10), Max: 37.1 (11 Oct 2021 14:45)  T(F): 97.9 (12 Oct 2021 05:10), Max: 98.8 (11 Oct 2021 14:45)  HR: 56 (12 Oct 2021 11:48) (56 - 92)  BP: 143/76 (12 Oct 2021 11:48) (119/63 - 159/72)  BP(mean): --  RR: 18 (12 Oct 2021 05:10) (16 - 18)  SpO2: 92% (12 Oct 2021 11:48) (91% - 96%)    CAPILLARY BLOOD GLUCOSE    I&O's Summary    11 Oct 2021 07:01  -  12 Oct 2021 07:00  --------------------------------------------------------  IN: 0 mL / OUT: 2200 mL / NET: -2200 mL      PHYSICAL EXAM:  GENERAL:  [ x ] NAD, [ x ] Well appearing, [  ] Agitated, [  ] Drowsy, [  ] Lethargy, [  ] Confused   HEAD:  [ x ] Normal, [  ] Other  EYES:  [ x ] EOMI, [  ] PERRLA, [ x ] Conjunctiva and sclera clear normal, [  ] Other, [  ] Pallor, [  ] Discharge  ENMT:  [ x ] Normal, [  ] Moist mucous membranes, [ x] Good dentition, [  ] No thrush  NECK:  [ x ] Supple, [  ] No JVD, [  ] Normal thyroid, [  ] Lymphadenopathy, [  ] Other  CHEST/LUNG:  [  ] Clear to auscultation bilaterally, [ x ] Breath Sounds equal B/L, [  ] Poor effort, [ x ] No rales, [ x ] No rhonchi, [ x ] No wheezing  HEART:  [ x ] Regular rate and rhythm, [  ] Tachycardia, [  ] Bradycardia, [  ] Irregular, [ x ] No murmurs, No rubs, No gallops, [  ] PPM in place (Mfr:  )  ABDOMEN:  [ x ] Soft, [ x ] Nontender, [ x ] Nondistended, [  ] No mass, [ x ] Bowel sounds present, [  ] Obese  NERVOUS SYSTEM:  [ x ] Alert & Oriented x3, [  ] Nonfocal, [  ] Confusion, [  ] Encephalopathic, [  ] Sedated, [  ] Unable to assess, [  ] Dementia, [  ] Other-  EXTREMITIES:  [ x ] 2+ Peripheral Pulses, No clubbing, No cyanosis,  [ x ] Edema B/L lower EXT, [ x ] PVD stasis skin changes B/L lower EXT, [  ] Wound  LYMPH:  No lymphadenopathy noted  SKIN:  b/l sloughing of skin of LE with erythema/rubor on shins    DIET: Diet, Regular:   DASH/TLC Sodium & Cholesterol Restricted  1200mL Fluid Restriction ([id number]) (10-11-21 @ 19:28)      LABS:                        10.8   4.09  )-----------( 192      ( 12 Oct 2021 09:17 )             33.9     12 Oct 2021 09:17    138    |  102    |  11     ----------------------------<  77     3.1     |  32     |  1.10     Ca    7.9        12 Oct 2021 09:17    TPro  7.0    /  Alb  2.4    /  TBili  1.4    /  DBili  x      /  AST  47     /  ALT  31     /  AlkPhos  86     12 Oct 2021 09:17    PT/INR - ( 12 Oct 2021 09:17 )   PT: 38.2 sec;   INR: 3.46 ratio         PTT - ( 11 Oct 2021 16:40 )  PTT:48.0 sec    Anemia Panel:      Thyroid Panel:      Serum Pro-Brain Natriuretic Peptide: 2458 pg/mL (10-11-21 @ 16:40)      RADIOLOGY & ADDITIONAL TESTS:  < from: US Duplex Venous Lower Ext Complete, Bilateral (10.11.21 @ 19:00) >  No evidence of deep venous thrombosis in either lower extremity.    < end of copied text >      HEALTH ISSUES - PROBLEM Dx:  Lower extremity edema    Hypertension    Hypothyroid    Hypokalemia    DVT prophylaxis    Cellulitis          Consultant(s) Notes Reviewed:  [ x ] YES     Care Discussed with [ x ] Consultants, [ x ] Patient, [  ] Family, [  ] HCP, [  ] , [  ] Social Service, [  ] RN, [  ] Physical Therapy, [  ] Palliative Care Team  DVT PPX: [  ] Lovenox, [  ] SC Heparin, [ x ] Coumadin, [  ] Xarelto, [  ] Eliquis, [  ] Pradaxa, [  ] IV Heparin drip, [  ] SCD, [  ] Ambulation, [  ] Contraindicated 2/2 GI Bleed, [  ] Contraindicated 2/2  Bleed, [  ] Contraindicated 2/2 Brain Bleed  Advanced Directive: [ x ] None, [  ] DNR/DNI Patient is a 89y old  Female who presents with a chief complaint of b/l LE cellulitis (12 Oct 2021 10:17)    HPI:  Patient is a 89 y/o F with a pmhx b/l LE edema, HTN, hypothyroidism, who presents with 1mo hx of worsening b/l LE edema. Endorses erythema and swelling b/l. Per patient, her doctor ?neurologist, told her to stop Lasix recently so pt has not been taking. States pain patches, water pills and antibiotics as an outpatient were unhelpful in relieving these symptoms. States she has had symptoms for 6 months but recently worsened. Denies chest pain, shortness of breath, palpitations, fevers, chills, nausea, vomiting. Denies having in past.    IN THE ED:  Temp 98.8 F, HR 92, /72, RR 16, SpO2 96%  S/P Zosyn 3.375g x1, Vanco 1g x1, lasix 40 mg IV x1   EKG: left axis deviation, LBBB   Labs significant for WBC 5.66, BNP 2458, PT 31.9, INR 2.87, PTT 48, K 3.4, CO 32, anion gap 3, albumin 2.8, AST 57, eGFR 43  Imaging: CXR with minimal blunting of R base laterally    (11 Oct 2021 18:04)    INTERVAL HPI:  10/12/21 - Patient was seen and examined at bedside. No acute events overnight. Patient endorses improvement in the rubor/erythema of her LE b/l but endorses continued pain in LE b/l. Patient endorses 1 year hx of weight loss (35 pounds) due to decreased appetite. Patient denies headache, fever, blurry/double vision, tinnitus, dysphagia, cough, SoB, abdominal pain, CP, palpitations, n/v/c/d. Off Abx as per ID    OVERNIGHT EVENTS: none    Home Medications:  gabapentin 100 mg oral capsule: 1 cap(s) orally 3 times a day (11 Oct 2021 19:58)  Jantoven 1 mg oral tablet: 1 tab(s) orally once a day (11 Oct 2021 19:58)  Jantoven 1 mg oral tablet: 2 tab(s) orally Monday, Wednesday, and Friday (11 Oct 2021 19:58)  Jantoven 4 mg oral tablet: 1 tab(s) orally once a day (11 Oct 2021 19:58)  Klor-Con 10 mEq oral tablet, extended release: 1 tab(s) orally once a day (11 Oct 2021 19:58)  Lasix 40 mg oral tablet: 1 tab(s) orally once a day (11 Oct 2021 19:58)  lidocaine 5% patch: Apply to affected areas (11 Oct 2021 19:58)  Lopressor 50 mg oral tablet: 1 tab(s) orally once a day (11 Oct 2021 19:58)  Synthroid 50 mcg (0.05 mg) oral tablet: 1 tab(s) orally once a day (11 Oct 2021 19:58)  Vitamin B6 50 mg oral tablet: 1 tab(s) orally once a day (11 Oct 2021 19:58)      MEDICATIONS  (STANDING):  furosemide   Injectable 20 milliGRAM(s) IV Push two times a day  gabapentin 100 milliGRAM(s) Oral three times a day  levothyroxine 50 MICROGram(s) Oral daily  metoprolol tartrate 50 milliGRAM(s) Oral daily  potassium chloride    Tablet ER 20 milliEquivalent(s) Oral daily    MEDICATIONS  (PRN):      No Known Allergies      Social History:  Patient lives alone. Denies tobacco use or etoh use. (11 Oct 2021 18:04)      REVIEW OF SYSTEMS: i am ok  CONSTITUTIONAL: No fever, No chills, No fatigue  EYES:  No visual disturbances, No discharge  ENMT: No tinnitus, no blurry/double vision  RESPIRATORY: No cough, No wheezing, No hemoptysis, No shortness of breath  CARDIOVASCULAR: No chest pain, No palpitations  GASTROINTESTINAL: No abdominal pain, No epigastric pain. No nausea, No vomiting, No diarrhea, No constipation; [ x ] BM  GENITOURINARY: No dysuria, Endorses increased frequency, No urgency, No hematuria, No incontinence  NEUROLOGICAL: No headaches, No dizziness, No numbness, No tingling, No tremors, No weakness  EXTREMITIES: Endorses b/l LE Swelling, Endorses Pain, Endorses b/l LE Edema  SKIN: b/l sloughing of skin of LE with erythema/rubor on shins  MUSCULOSKELETAL: No joint pain, No joint swelling; No muscle pain, No back pain, Endorses extremity pain  PSYCHIATRIC: No depression, No anxiety, No mood swings, No difficulty sleeping at night  PAIN SCALE: [ x ] None  [  ] Other-  ROS Unable to obtain due to: [  ] Dementia  [  ] Lethargy  [  ] Sedated  [  ] Non verbal  REST OF REVIEW OF SYSTEMS: [ x ] Normal     Vital Signs Last 24 Hrs  T(C): 36.6 (12 Oct 2021 05:10), Max: 37.1 (11 Oct 2021 14:45)  T(F): 97.9 (12 Oct 2021 05:10), Max: 98.8 (11 Oct 2021 14:45)  HR: 56 (12 Oct 2021 11:48) (56 - 92)  BP: 143/76 (12 Oct 2021 11:48) (119/63 - 159/72)  BP(mean): --  RR: 18 (12 Oct 2021 05:10) (16 - 18)  SpO2: 92% (12 Oct 2021 11:48) (91% - 96%)    CAPILLARY BLOOD GLUCOSE    I&O's Summary    11 Oct 2021 07:01  -  12 Oct 2021 07:00  --------------------------------------------------------  IN: 0 mL / OUT: 2200 mL / NET: -2200 mL      PHYSICAL EXAM:  GENERAL:  [ x ] NAD, [ x ] Well appearing, [  ] Agitated, [  ] Drowsy, [  ] Lethargy, [  ] Confused   HEAD:  [ x ] Normal, [  ] Other  EYES:  [ x ] EOMI, [  ] PERRLA, [ x ] Conjunctiva and sclera clear normal, [  ] Other, [  ] Pallor, [  ] Discharge  ENMT:  [ x ] Normal, [  ] Moist mucous membranes, [ x] Good dentition, [x  ] No thrush  NECK:  [ x ] Supple, [  ] No JVD, [  ] Normal thyroid, [  ] Lymphadenopathy, [  ] Other  CHEST/LUNG:  [  ] Clear to auscultation bilaterally, [ x ] Breath Sounds equal B/L, [  ] Poor effort, [ x ] No rales, [ x ] No rhonchi, [ x ] No wheezing  HEART:  [ x ] Regular rate and rhythm, [  ] Tachycardia, [  ] Bradycardia, [  ] Irregular, [ x ] No murmurs, No rubs, No gallops, [  ] PPM in place (Mfr:  )  ABDOMEN:  [ x ] Soft, [ x ] Nontender, [ x ] Nondistended, [  ] No mass, [ x ] Bowel sounds present, [  ] Obese  NERVOUS SYSTEM:  [ x ] Alert & Oriented x3, [  x] Nonfocal, [  ] Confusion, [  ] Encephalopathic, [  ] Sedated, [  ] Unable to assess, [  ] Dementia, [  ] Other-  EXTREMITIES:  [ x ] 2+ Peripheral Pulses, No clubbing, No cyanosis,  [ x ] 2+ Edema B/L lower EXT, [ x ] severe PVD stasis skin changes B/L lower EXT, severe erythema B/L Lower Ext , Oozing blister [  ] Wound  LYMPH:  No lymphadenopathy noted  SKIN:  b/l sloughing of skin of LE with erythema/rubor on shins    DIET: Diet, Regular:   DASH/TLC Sodium & Cholesterol Restricted  1200mL Fluid Restriction ([id number]) (10-11-21 @ 19:28)      LABS:                        10.8   4.09  )-----------( 192      ( 12 Oct 2021 09:17 )             33.9     12 Oct 2021 09:17    138    |  102    |  11     ----------------------------<  77     3.1     |  32     |  1.10     Ca    7.9        12 Oct 2021 09:17    TPro  7.0    /  Alb  2.4    /  TBili  1.4    /  DBili  x      /  AST  47     /  ALT  31     /  AlkPhos  86     12 Oct 2021 09:17    PT/INR - ( 12 Oct 2021 09:17 )   PT: 38.2 sec;   INR: 3.46 ratio         PTT - ( 11 Oct 2021 16:40 )  PTT:48.0 sec    Anemia Panel:      Thyroid Panel:      Serum Pro-Brain Natriuretic Peptide: 2458 pg/mL (10-11-21 @ 16:40)      RADIOLOGY & ADDITIONAL TESTS:  < from: US Duplex Venous Lower Ext Complete, Bilateral (10.11.21 @ 19:00) >  No evidence of deep venous thrombosis in either lower extremity.    < end of copied text >      HEALTH ISSUES - PROBLEM Dx:  Lower extremity edema    Hypertension    Hypothyroid    Hypokalemia    DVT prophylaxis    Cellulitis          Consultant(s) Notes Reviewed:  [ x ] YES     Care Discussed with [ x ] Consultants, [ x ] Patient, [  ] Family, [  ] HCP, [  ] , [  ] Social Service, [x  ] RN, [  ] Physical Therapy, [  ] Palliative Care Team  DVT PPX: [  ] Lovenox, [  ] SC Heparin, [ x ] Coumadin, [  ] Xarelto, [  ] Eliquis, [  ] Pradaxa, [  ] IV Heparin drip, [  ] SCD, [  ] Ambulation, [  ] Contraindicated 2/2 GI Bleed, [  ] Contraindicated 2/2  Bleed, [  ] Contraindicated 2/2 Brain Bleed  Advanced Directive: [ x ] None, [  ] DNR/DNI Patient is a 89y old  Female who presents with a chief complaint of b/l LE cellulitis (12 Oct 2021 10:17)    HPI:  Patient is a 89 y/o F with a pmhx b/l LE edema, HTN, hypothyroidism, who presents with 1mo hx of worsening b/l LE edema. Endorses erythema and swelling b/l. Per patient, her doctor ?neurologist, told her to stop Lasix recently so pt has not been taking. States pain patches, water pills and antibiotics as an outpatient were unhelpful in relieving these symptoms. States she has had symptoms for 6 months but recently worsened. Denies chest pain, shortness of breath, palpitations, fevers, chills, nausea, vomiting. Denies having in past.    IN THE ED:  Temp 98.8 F, HR 92, /72, RR 16, SpO2 96%  S/P Zosyn 3.375g x1, Vanco 1g x1, lasix 40 mg IV x1   EKG: left axis deviation, LBBB   Labs significant for WBC 5.66, BNP 2458, PT 31.9, INR 2.87, PTT 48, K 3.4, CO 32, anion gap 3, albumin 2.8, AST 57, eGFR 43  Imaging: CXR with minimal blunting of R base laterally    (11 Oct 2021 18:04)    INTERVAL HPI:  10/12/21 - Patient was seen and examined at bedside. No acute events overnight. Patient endorses improvement in the rubor/erythema of her LE b/l but endorses continued pain in LE b/l. Patient endorses 1 year hx of weight loss (35 pounds) due to decreased appetite. Patient denies headache, fever, blurry/double vision, tinnitus, dysphagia, cough, SoB, abdominal pain, CP, palpitations, n/v/c/d. Off Abx as per ID, INR -elevated     OVERNIGHT EVENTS: none    Home Medications:  gabapentin 100 mg oral capsule: 1 cap(s) orally 3 times a day (11 Oct 2021 19:58)  Jantoven 1 mg oral tablet: 1 tab(s) orally once a day (11 Oct 2021 19:58)  Jantoven 1 mg oral tablet: 2 tab(s) orally Monday, Wednesday, and Friday (11 Oct 2021 19:58)  Jantoven 4 mg oral tablet: 1 tab(s) orally once a day (11 Oct 2021 19:58)  Klor-Con 10 mEq oral tablet, extended release: 1 tab(s) orally once a day (11 Oct 2021 19:58)  Lasix 40 mg oral tablet: 1 tab(s) orally once a day (11 Oct 2021 19:58)  lidocaine 5% patch: Apply to affected areas (11 Oct 2021 19:58)  Lopressor 50 mg oral tablet: 1 tab(s) orally once a day (11 Oct 2021 19:58)  Synthroid 50 mcg (0.05 mg) oral tablet: 1 tab(s) orally once a day (11 Oct 2021 19:58)  Vitamin B6 50 mg oral tablet: 1 tab(s) orally once a day (11 Oct 2021 19:58)      MEDICATIONS  (STANDING):  furosemide   Injectable 20 milliGRAM(s) IV Push two times a day  gabapentin 100 milliGRAM(s) Oral three times a day  levothyroxine 50 MICROGram(s) Oral daily  metoprolol tartrate 50 milliGRAM(s) Oral daily  potassium chloride    Tablet ER 20 milliEquivalent(s) Oral daily    MEDICATIONS  (PRN):      No Known Allergies      Social History:  Patient lives alone. Denies tobacco use or etoh use. (11 Oct 2021 18:04)      REVIEW OF SYSTEMS: i am ok  CONSTITUTIONAL: No fever, No chills, No fatigue  EYES:  No visual disturbances, No discharge  ENMT: No tinnitus, no blurry/double vision  RESPIRATORY: No cough, No wheezing, No hemoptysis, No shortness of breath  CARDIOVASCULAR: No chest pain, No palpitations  GASTROINTESTINAL: No abdominal pain, No epigastric pain. No nausea, No vomiting, No diarrhea, No constipation; [ x ] BM  GENITOURINARY: No dysuria, Endorses increased frequency, No urgency, No hematuria, No incontinence  NEUROLOGICAL: No headaches, No dizziness, No numbness, No tingling, No tremors, No weakness  EXTREMITIES: Endorses b/l LE Swelling, Endorses Pain, Endorses b/l LE Edema  SKIN: b/l sloughing of skin of LE with erythema/rubor on shins  MUSCULOSKELETAL: No joint pain, No joint swelling; No muscle pain, No back pain, Endorses extremity pain  PSYCHIATRIC: No depression, No anxiety, No mood swings, No difficulty sleeping at night  PAIN SCALE: [ x ] None  [  ] Other-  ROS Unable to obtain due to: [  ] Dementia  [  ] Lethargy  [  ] Sedated  [  ] Non verbal  REST OF REVIEW OF SYSTEMS: [ x ] Normal     Vital Signs Last 24 Hrs  T(C): 36.6 (12 Oct 2021 05:10), Max: 37.1 (11 Oct 2021 14:45)  T(F): 97.9 (12 Oct 2021 05:10), Max: 98.8 (11 Oct 2021 14:45)  HR: 56 (12 Oct 2021 11:48) (56 - 92)  BP: 143/76 (12 Oct 2021 11:48) (119/63 - 159/72)  BP(mean): --  RR: 18 (12 Oct 2021 05:10) (16 - 18)  SpO2: 92% (12 Oct 2021 11:48) (91% - 96%)    CAPILLARY BLOOD GLUCOSE    I&O's Summary    11 Oct 2021 07:01  -  12 Oct 2021 07:00  --------------------------------------------------------  IN: 0 mL / OUT: 2200 mL / NET: -2200 mL      PHYSICAL EXAM: OOB to chair   GENERAL:  [ x ] NAD, [ x ] Well appearing, [  ] Agitated, [  ] Drowsy, [  ] Lethargy, [  ] Confused   HEAD:  [ x ] Normal, [  ] Other  EYES:  [ x ] EOMI, [  ] PERRLA, [ x ] Conjunctiva and sclera clear normal, [  ] Other, [  ] Pallor, [  ] Discharge  ENMT:  [ x ] Normal, [x  ] Moist mucous membranes, [ x] Good dentition, [x  ] No thrush  NECK:  [ x ] Supple, [ x ] No JVD, [  ] Normal thyroid, [  ] Lymphadenopathy, [  ] Other  CHEST/LUNG:  [x  ] Clear to auscultation bilaterally, [ x ] Breath Sounds equal B/L, [  ] Poor effort, [ x ] No rales, [ x ] No rhonchi, [ x ] No wheezing  HEART:  [ x ] Regular rate and rhythm, [  ] Tachycardia, [  ] Bradycardia, [  ] Irregular, [ x ] No murmurs, No rubs, No gallops, [  ] PPM in place (Mfr:  )  ABDOMEN:  [ x ] Soft, [ x ] Nontender, [ x ] Nondistended, [ x ] No mass, [ x ] Bowel sounds present, [  ] Obese  NERVOUS SYSTEM:  [ x ] Alert & Oriented x3, [  x] Nonfocal, [  ] Confusion, [  ] Encephalopathic, [  ] Sedated, [  ] Unable to assess, [  ] Dementia, [  ] Other-  EXTREMITIES:  [ x ] 2+ Peripheral Pulses, No clubbing, No cyanosis,  [ x ] 2+ Edema B/L lower EXT, [ x ] severe PVD stasis skin changes B/L lower EXT, severe erythema B/L Lower Ext , Oozing blister improved , Dry skin [  ] Wound  LYMPH:  No lymphadenopathy noted  SKIN:  b/l sloughing of skin of LE with erythema/rubor on shins    DIET: Diet, Regular:   DASH/TLC Sodium & Cholesterol Restricted  1200mL Fluid Restriction ([id number]) (10-11-21 @ 19:28)      LABS:                        10.8   4.09  )-----------( 192      ( 12 Oct 2021 09:17 )             33.9     12 Oct 2021 09:17    138    |  102    |  11     ----------------------------<  77     3.1     |  32     |  1.10     Ca    7.9        12 Oct 2021 09:17    TPro  7.0    /  Alb  2.4    /  TBili  1.4    /  DBili  x      /  AST  47     /  ALT  31     /  AlkPhos  86     12 Oct 2021 09:17    PT/INR - ( 12 Oct 2021 09:17 )   PT: 38.2 sec;   INR: 3.46 ratio         PTT - ( 11 Oct 2021 16:40 )  PTT:48.0 sec    Anemia Panel:      Thyroid Panel:      Serum Pro-Brain Natriuretic Peptide: 2458 pg/mL (10-11-21 @ 16:40)      RADIOLOGY & ADDITIONAL TESTS:  < from: US Duplex Venous Lower Ext Complete, Bilateral (10.11.21 @ 19:00) >  No evidence of deep venous thrombosis in either lower extremity.    < end of copied text >      HEALTH ISSUES - PROBLEM Dx:  Lower extremity edema    Hypertension    Hypothyroid    Hypokalemia    DVT prophylaxis    Cellulitis      Consultant(s) Notes Reviewed:  [ x ] YES     Care Discussed with [ x ] Consultants, [ x ] Patient, [  ] Family, [  ] HCP, [  ] , [ x ] Social Service, [x  ] RN, [ x ] Physical Therapy, [  ] Palliative Care Team  DVT PPX: [  ] Lovenox, [  ] SC Heparin, [ x ] Coumadin, [  ] Xarelto, [  ] Eliquis, [  ] Pradaxa, [  ] IV Heparin drip, [  ] SCD, [  ] Ambulation, [  ] Contraindicated 2/2 GI Bleed, [  ] Contraindicated 2/2  Bleed, [  ] Contraindicated 2/2 Brain Bleed  Advanced Directive: [ x ] None, [  ] DNR/DNI Patient is a 89y old  Female who presents with a chief complaint of b/l LE cellulitis (12 Oct 2021 10:17)    HPI:  Patient is a 89 y/o F with a pmhx b/l LE edema, HTN,  P. A Fib on AC ,hypothyroidism, who presents with 1mo hx of worsening b/l LE edema. Endorses erythema and swelling b/l. Per patient, her doctor ?neurologist, told her to stop Lasix recently so pt has not been taking. States pain patches, water pills and antibiotics as an outpatient were unhelpful in relieving these symptoms. States she has had symptoms for 6 months but recently worsened. Denies chest pain, shortness of breath, palpitations, fevers, chills, nausea, vomiting. Denies having in past.    IN THE ED:  Temp 98.8 F, HR 92, /72, RR 16, SpO2 96%  S/P Zosyn 3.375g x1, Vanco 1g x1, lasix 40 mg IV x1   EKG: left axis deviation, LBBB   Labs significant for WBC 5.66, BNP 2458, PT 31.9, INR 2.87, PTT 48, K 3.4, CO 32, anion gap 3, albumin 2.8, AST 57, eGFR 43  Imaging: CXR with minimal blunting of R base laterally    (11 Oct 2021 18:04)    INTERVAL HPI:  10/12/21 - Patient was seen and examined at bedside. No acute events overnight. Patient endorses improvement in the rubor/erythema of her LE b/l but endorses continued pain in LE b/l. Patient endorses 1 year hx of weight loss (35 pounds) due to decreased appetite. Patient denies headache, fever, blurry/double vision, tinnitus, dysphagia, cough, SoB, abdominal pain, CP, palpitations, n/v/c/d. Off Abx as per ID, INR -elevated     OVERNIGHT EVENTS: none    Home Medications:  gabapentin 100 mg oral capsule: 1 cap(s) orally 3 times a day (11 Oct 2021 19:58)  Jantoven 1 mg oral tablet: 1 tab(s) orally once a day (11 Oct 2021 19:58)  Jantoven 1 mg oral tablet: 2 tab(s) orally Monday, Wednesday, and Friday (11 Oct 2021 19:58)  Jantoven 4 mg oral tablet: 1 tab(s) orally once a day (11 Oct 2021 19:58)  Klor-Con 10 mEq oral tablet, extended release: 1 tab(s) orally once a day (11 Oct 2021 19:58)  Lasix 40 mg oral tablet: 1 tab(s) orally once a day (11 Oct 2021 19:58)  lidocaine 5% patch: Apply to affected areas (11 Oct 2021 19:58)  Lopressor 50 mg oral tablet: 1 tab(s) orally once a day (11 Oct 2021 19:58)  Synthroid 50 mcg (0.05 mg) oral tablet: 1 tab(s) orally once a day (11 Oct 2021 19:58)  Vitamin B6 50 mg oral tablet: 1 tab(s) orally once a day (11 Oct 2021 19:58)      MEDICATIONS  (STANDING):  furosemide   Injectable 20 milliGRAM(s) IV Push two times a day  gabapentin 100 milliGRAM(s) Oral three times a day  levothyroxine 50 MICROGram(s) Oral daily  metoprolol tartrate 50 milliGRAM(s) Oral daily  potassium chloride    Tablet ER 20 milliEquivalent(s) Oral daily    MEDICATIONS  (PRN):      No Known Allergies      Social History:  Patient lives alone. Denies tobacco use or etoh use. (11 Oct 2021 18:04)      REVIEW OF SYSTEMS: i am ok  CONSTITUTIONAL: No fever, No chills, No fatigue  EYES:  No visual disturbances, No discharge  ENMT: No tinnitus, no blurry/double vision  RESPIRATORY: No cough, No wheezing, No hemoptysis, No shortness of breath  CARDIOVASCULAR: No chest pain, No palpitations  GASTROINTESTINAL: No abdominal pain, No epigastric pain. No nausea, No vomiting, No diarrhea, No constipation; [ x ] BM  GENITOURINARY: No dysuria, Endorses increased frequency, No urgency, No hematuria, No incontinence  NEUROLOGICAL: No headaches, No dizziness, No numbness, No tingling, No tremors, No weakness  EXTREMITIES: Endorses b/l LE Swelling, Endorses Pain, Endorses b/l LE Edema  SKIN: b/l sloughing of skin of LE with erythema/rubor on shins  MUSCULOSKELETAL: No joint pain, No joint swelling; No muscle pain, No back pain, Endorses extremity pain  PSYCHIATRIC: No depression, No anxiety, No mood swings, No difficulty sleeping at night  PAIN SCALE: [ x ] None  [  ] Other-  ROS Unable to obtain due to: [  ] Dementia  [  ] Lethargy  [  ] Sedated  [  ] Non verbal  REST OF REVIEW OF SYSTEMS: [ x ] Normal     Vital Signs Last 24 Hrs  T(C): 36.6 (12 Oct 2021 05:10), Max: 37.1 (11 Oct 2021 14:45)  T(F): 97.9 (12 Oct 2021 05:10), Max: 98.8 (11 Oct 2021 14:45)  HR: 56 (12 Oct 2021 11:48) (56 - 92)  BP: 143/76 (12 Oct 2021 11:48) (119/63 - 159/72)  BP(mean): --  RR: 18 (12 Oct 2021 05:10) (16 - 18)  SpO2: 92% (12 Oct 2021 11:48) (91% - 96%)    CAPILLARY BLOOD GLUCOSE    I&O's Summary    11 Oct 2021 07:01  -  12 Oct 2021 07:00  --------------------------------------------------------  IN: 0 mL / OUT: 2200 mL / NET: -2200 mL      PHYSICAL EXAM: OOB to chair   GENERAL:  [ x ] NAD, [ x ] Well appearing, [  ] Agitated, [  ] Drowsy, [  ] Lethargy, [  ] Confused   HEAD:  [ x ] Normal, [  ] Other  EYES:  [ x ] EOMI, [  ] PERRLA, [ x ] Conjunctiva and sclera clear normal, [  ] Other, [  ] Pallor, [  ] Discharge  ENMT:  [ x ] Normal, [x  ] Moist mucous membranes, [ x] Good dentition, [x  ] No thrush  NECK:  [ x ] Supple, [ x ] No JVD, [  ] Normal thyroid, [  ] Lymphadenopathy, [  ] Other  CHEST/LUNG:  [x  ] Clear to auscultation bilaterally, [ x ] Breath Sounds equal B/L, [  ] Poor effort, [ x ] No rales, [ x ] No rhonchi, [ x ] No wheezing  HEART:  [ x ] Regular rate and rhythm, [  ] Tachycardia, [  ] Bradycardia, [  ] Irregular, [ x ] No murmurs, No rubs, No gallops, [  ] PPM in place (Mfr:  )  ABDOMEN:  [ x ] Soft, [ x ] Nontender, [ x ] Nondistended, [ x ] No mass, [ x ] Bowel sounds present, [  ] Obese  NERVOUS SYSTEM:  [ x ] Alert & Oriented x3, [  x] Nonfocal, [  ] Confusion, [  ] Encephalopathic, [  ] Sedated, [  ] Unable to assess, [  ] Dementia, [  ] Other-  EXTREMITIES:  [ x ] 2+ Peripheral Pulses, No clubbing, No cyanosis,  [ x ] 2+ Edema B/L lower EXT, [ x ] severe PVD stasis skin changes B/L lower EXT, severe erythema B/L Lower Ext , Oozing blister improved , Dry skin [  ] Wound  LYMPH:  No lymphadenopathy noted  SKIN:  b/l sloughing of skin of LE with erythema/rubor on shins    DIET: Diet, Regular:   DASH/TLC Sodium & Cholesterol Restricted  1200mL Fluid Restriction ([id number]) (10-11-21 @ 19:28)      LABS:                        10.8   4.09  )-----------( 192      ( 12 Oct 2021 09:17 )             33.9     12 Oct 2021 09:17    138    |  102    |  11     ----------------------------<  77     3.1     |  32     |  1.10     Ca    7.9        12 Oct 2021 09:17    TPro  7.0    /  Alb  2.4    /  TBili  1.4    /  DBili  x      /  AST  47     /  ALT  31     /  AlkPhos  86     12 Oct 2021 09:17    PT/INR - ( 12 Oct 2021 09:17 )   PT: 38.2 sec;   INR: 3.46 ratio         PTT - ( 11 Oct 2021 16:40 )  PTT:48.0 sec    Anemia Panel:      Thyroid Panel:      Serum Pro-Brain Natriuretic Peptide: 2458 pg/mL (10-11-21 @ 16:40)      RADIOLOGY & ADDITIONAL TESTS:  < from: US Duplex Venous Lower Ext Complete, Bilateral (10.11.21 @ 19:00) >  No evidence of deep venous thrombosis in either lower extremity.    < end of copied text >      HEALTH ISSUES - PROBLEM Dx:  Lower extremity edema    Hypertension    Hypothyroid    Hypokalemia    DVT prophylaxis    Cellulitis      Consultant(s) Notes Reviewed:  [ x ] YES     Care Discussed with [ x ] Consultants, [ x ] Patient, [  ] Family, [  ] HCP, [  ] , [ x ] Social Service, [x  ] RN, [ x ] Physical Therapy, [  ] Palliative Care Team  DVT PPX: [  ] Lovenox, [  ] SC Heparin, [ x ] Coumadin, [  ] Xarelto, [  ] Eliquis, [  ] Pradaxa, [  ] IV Heparin drip, [  ] SCD, [  ] Ambulation, [  ] Contraindicated 2/2 GI Bleed, [  ] Contraindicated 2/2  Bleed, [  ] Contraindicated 2/2 Brain Bleed  Advanced Directive: [ x ] None, [  ] DNR/DNI

## 2021-10-12 NOTE — CONSULT NOTE ADULT - ASSESSMENT
89 y/o F with a pmhx b/l LE edema, HTN, hypothyroidism, who presents with 1mo hx of worsening b/l LE edema, erythema and swelling b/l.     RECOMMENDATIONS    No fever, no leukocytosis, 1 mo chronicity with symmetrical nature is very atypical for an infectious process. Low suspicion for this being due to an infectious process so will c abx at this point and recommend observation off abx. Would involve cardiology for fluid management and agree with skin care.    Thank you for consulting us and involving us in the management of this most interesting and challenging case.  We will follow along in the care of this patient. Please call us at [phone] or text me directly on my cell# at [phone] with any concerns.     89 y/o F with a pmhx b/l LE edema, HTN, hypothyroidism, who presents with 1mo hx of worsening b/l LE edema, erythema and swelling b/l.     RECOMMENDATIONS    No fever, no leukocytosis, 1 mo chronicity with symmetrical nature is very atypical for an infectious process. Low suspicion for this being due to an infectious process so will dc abx at this point and recommend observation off abx. Would involve cardiology for fluid management and agree with skin care.    Thank you for consulting us and involving us in the management of this most interesting and challenging case.  We will follow along in the care of this patient. Please call us at [phone] or text me directly on my cell# at [phone] with any concerns.

## 2021-10-12 NOTE — PHYSICAL THERAPY INITIAL EVALUATION ADULT - GENERAL OBSERVATIONS, REHAB EVAL
Pt lying in bed with external cath in place and feet elevated with pillows, High swelling and redness in legs

## 2021-10-12 NOTE — CONSULT NOTE ADULT - SUBJECTIVE AND OBJECTIVE BOX
[facility] DIVISION of INFECTIOUS DISEASE  Diony Larios MD PhD, Tricia Cornell MD, Delmy Villegas MD, Juan Alberto Mccord MD, Juan Vieyra MD  and providing coverage with An Mckay MD and Gabriella Bell MD  Providing Infectious Disease Consultations at [facility], [facility]'s    Office# [phone] to schedule follow up appointments  Answering Service for urgent calls or New Consults [phone]  Cell# to text for urgent issues Diony Larios [phone]     HPI:  Patient is a 89 y/o F with a pmhx b/l LE edema, HTN, hypothyroidism, who presents with 1mo hx of worsening b/l LE edema. Endorses erythema and swelling b/l. Per patient, her doctor , told her to stop Lasix recently so pt has not been taking. States pain patches, water pills and antibiotics as an outpatient were unhelpful in relieving these symptoms. States she has had symptoms for 6 months but recently worsened. Denies chest pain, shortness of breath, palpitations, fevers, chills, nausea, vomiting. Denies having in past.    IN THE ED:  Temp 98.8 F, HR 92, /72, RR 16, SpO2 96%  S/P Zosyn 3.375g x1, Vanco 1g x1, lasix 40 mg IV x1   EKG: left axis deviation, LBBB   Labs significant for WBC 5.66, BNP 2458, PT 31.9, INR 2.87, PTT 48, K 3.4, CO 32, anion gap 3, albumin 2.8, AST 57, eGFR 43  Imaging: CXR with minimal blunting of R base laterally    (11 Oct 2021 18:04)      PAST MEDICAL & SURGICAL HISTORY:  Hypothyroid  Hypertension  Lower extremity edema    Antimicrobials  piperacillin/tazobactam IVPB.. 3.375 Gram(s) IV Intermittent every 8 hours      Immunological      Other  furosemide   Injectable 20 milliGRAM(s) IV Push two times a day  gabapentin 100 milliGRAM(s) Oral three times a day  levothyroxine 50 MICROGram(s) Oral daily  metoprolol tartrate 50 milliGRAM(s) Oral daily  potassium chloride    Tablet ER 40 milliEquivalent(s) Oral once  potassium chloride    Tablet ER 20 milliEquivalent(s) Oral daily      Allergies    No Known Allergies    Intolerances    SOCIAL HISTORY:  Patient lives alone. Denies tobacco use or etoh use. (11 Oct 2021 18:04)      FAMILY HISTORY:      ROS:    EYES:  Negative  blurry vision or double vision  GASTROINTESTINAL:  Negative for nausea, vomiting, diarrhea  -otherwise negative except for subjective    Vital Signs Last 24 Hrs  T(C): 36.6 (12 Oct 2021 05:10), Max: 37.1 (11 Oct 2021 14:45)  T(F): 97.9 (12 Oct 2021 05:10), Max: 98.8 (11 Oct 2021 14:45)  HR: 69 (12 Oct 2021 05:10) (69 - 92)  BP: 119/63 (12 Oct 2021 05:10) (119/63 - 159/72)  BP(mean): --  RR: 18 (12 Oct 2021 05:10) (16 - 18)  SpO2: 92% (12 Oct 2021 05:10) (91% - 96%)    PE:  WDWN in no distress  HEENT:  NC, PERRL, sclerae anicteric, conjunctivae clear, EOMI.  Sinuses nontender, no nasal exudate.  No buccal or pharyngeal lesions, erythema or exudate  Neck:  Supple, no adenopathy, noted JVD and HJReflux  Lungs:  No accessory muscle use, bilaterally clear to auscultation  Cor:  murmur appreciated  Abd:  Symmetric, normoactive BS.  Soft, nontender, no masses, guarding or rebound.  Liver and spleen not enlarged  Extrem:  No cyanosis, bilateral LE erythema, swelling, very much symmetrical, no sig increase in temp  Neuro: grossly intact  Musc: moving all limbs freely, no focal deficits        LABS:                        10.8   4.09  )-----------( 192      ( 12 Oct 2021 09:17 )             33.9       WBC Count: 4.09 K/uL (10-12-21 @ 09:17)  WBC Count: 5.66 K/uL (10-11-21 @ 16:40)      10-12    138  |  102  |  11  ----------------------------<  77  3.1<L>   |  32<H>  |  1.10    Ca    7.9<L>      12 Oct 2021 09:17    TPro  7.0  /  Alb  2.4<L>  /  TBili  1.4<H>  /  DBili  x   /  AST  47<H>  /  ALT  31  /  AlkPhos  86  10-12      Creatinine, Serum: 1.10 mg/dL (10-12-21 @ 09:17)  Creatinine, Serum: 1.10 mg/dL (10-11-21 @ 16:40)      MICROBIOLOGY:      RADIOLOGY & ADDITIONAL STUDIES:    --< from: US Duplex Venous Lower Ext Complete, Bilateral (10.11.21 @ 19:00) >    EXAM:  US DPLX LWR EXT VEINS COMPL BI                            PROCEDURE DATE:  10/11/2021          INTERPRETATION:  CLINICAL INFORMATION: Bilateral lower extremity edema and pain    COMPARISON: None available.    TECHNIQUE: Duplex sonography of the BILATERAL LOWER extremity veins with color and spectral Doppler, with and without compression.    FINDINGS:    RIGHT:  Normal compressibility of the RIGHT common femoral, femoral and popliteal veins.  Doppler examination shows normal spontaneous and phasic flow.  No RIGHT calf vein thrombosis is detected.    LEFT:  Normal compressibility of the LEFT common femoral, femoral and popliteal veins.  Doppler examination shows normal spontaneous and phasic flow.  No LEFT calf vein thrombosis is detected.    IMPRESSION:  No evidence of deep venous thrombosis in either lower extremity.    < from: Xray Chest 1 View- PORTABLE-Urgent (10.11.21 @ 16:19) >  EXAM:  XR CHEST PORTABLE URGENT 1V                            PROCEDURE DATE:  10/11/2021          INTERPRETATION:  AP semierect chest on October 11, 2021 at 4:09 PM. Patient has sepsis.    Heart magnified by technique and elevated right hemidiaphragm again noted.    Above findings are similar to December 11, 2012.    Present film shows minimal blunting of right base laterally.    IMPRESSION: Minimal blunting of right base laterally at this time. Otherwise stable findings as above.    < end of copied text >

## 2021-10-12 NOTE — PROGRESS NOTE ADULT - PROBLEM SELECTOR PLAN 3
- chronic, stable  - continue home Lopressor 50mg qd - repleted with 40 Meq tab  - start 20 meq K PO qd

## 2021-10-13 ENCOUNTER — TRANSCRIPTION ENCOUNTER (OUTPATIENT)
Age: 86
End: 2021-10-13

## 2021-10-13 DIAGNOSIS — I87.2 VENOUS INSUFFICIENCY (CHRONIC) (PERIPHERAL): ICD-10-CM

## 2021-10-13 PROBLEM — Z00.00 ENCOUNTER FOR PREVENTIVE HEALTH EXAMINATION: Status: ACTIVE | Noted: 2021-10-13

## 2021-10-13 LAB
ALBUMIN SERPL ELPH-MCNC: 2.2 G/DL — LOW (ref 3.3–5)
ALP SERPL-CCNC: 77 U/L — SIGNIFICANT CHANGE UP (ref 40–120)
ALT FLD-CCNC: 29 U/L — SIGNIFICANT CHANGE UP (ref 12–78)
ANION GAP SERPL CALC-SCNC: 3 MMOL/L — LOW (ref 5–17)
APTT BLD: 45.4 SEC — HIGH (ref 27.5–35.5)
AST SERPL-CCNC: 47 U/L — HIGH (ref 15–37)
BASOPHILS # BLD AUTO: 0.02 K/UL — SIGNIFICANT CHANGE UP (ref 0–0.2)
BASOPHILS NFR BLD AUTO: 0.4 % — SIGNIFICANT CHANGE UP (ref 0–2)
BILIRUB SERPL-MCNC: 0.8 MG/DL — SIGNIFICANT CHANGE UP (ref 0.2–1.2)
BUN SERPL-MCNC: 17 MG/DL — SIGNIFICANT CHANGE UP (ref 7–23)
CALCIUM SERPL-MCNC: 7.8 MG/DL — LOW (ref 8.5–10.1)
CHLORIDE SERPL-SCNC: 105 MMOL/L — SIGNIFICANT CHANGE UP (ref 96–108)
CO2 SERPL-SCNC: 31 MMOL/L — SIGNIFICANT CHANGE UP (ref 22–31)
CREAT SERPL-MCNC: 1.2 MG/DL — SIGNIFICANT CHANGE UP (ref 0.5–1.3)
EOSINOPHIL # BLD AUTO: 0.18 K/UL — SIGNIFICANT CHANGE UP (ref 0–0.5)
EOSINOPHIL NFR BLD AUTO: 3.9 % — SIGNIFICANT CHANGE UP (ref 0–6)
GLUCOSE SERPL-MCNC: 79 MG/DL — SIGNIFICANT CHANGE UP (ref 70–99)
HCT VFR BLD CALC: 30.7 % — LOW (ref 34.5–45)
HCT VFR BLD CALC: 33.6 % — LOW (ref 34.5–45)
HGB BLD-MCNC: 10.6 G/DL — LOW (ref 11.5–15.5)
HGB BLD-MCNC: 9.7 G/DL — LOW (ref 11.5–15.5)
IMM GRANULOCYTES NFR BLD AUTO: 0.2 % — SIGNIFICANT CHANGE UP (ref 0–1.5)
INR BLD: 3.03 RATIO — HIGH (ref 0.88–1.16)
LYMPHOCYTES # BLD AUTO: 1.69 K/UL — SIGNIFICANT CHANGE UP (ref 1–3.3)
LYMPHOCYTES # BLD AUTO: 36.9 % — SIGNIFICANT CHANGE UP (ref 13–44)
MCHC RBC-ENTMCNC: 27.7 PG — SIGNIFICANT CHANGE UP (ref 27–34)
MCHC RBC-ENTMCNC: 31.6 GM/DL — LOW (ref 32–36)
MCV RBC AUTO: 87.7 FL — SIGNIFICANT CHANGE UP (ref 80–100)
MONOCYTES # BLD AUTO: 0.53 K/UL — SIGNIFICANT CHANGE UP (ref 0–0.9)
MONOCYTES NFR BLD AUTO: 11.6 % — SIGNIFICANT CHANGE UP (ref 2–14)
NEUTROPHILS # BLD AUTO: 2.15 K/UL — SIGNIFICANT CHANGE UP (ref 1.8–7.4)
NEUTROPHILS NFR BLD AUTO: 47 % — SIGNIFICANT CHANGE UP (ref 43–77)
NRBC # BLD: 0 /100 WBCS — SIGNIFICANT CHANGE UP (ref 0–0)
PLATELET # BLD AUTO: 192 K/UL — SIGNIFICANT CHANGE UP (ref 150–400)
POTASSIUM SERPL-MCNC: 3.7 MMOL/L — SIGNIFICANT CHANGE UP (ref 3.5–5.3)
POTASSIUM SERPL-SCNC: 3.7 MMOL/L — SIGNIFICANT CHANGE UP (ref 3.5–5.3)
PROT SERPL-MCNC: 6.6 G/DL — SIGNIFICANT CHANGE UP (ref 6–8.3)
PROTHROM AB SERPL-ACNC: 33.6 SEC — HIGH (ref 10.6–13.6)
RBC # BLD: 3.5 M/UL — LOW (ref 3.8–5.2)
RBC # FLD: 17.6 % — HIGH (ref 10.3–14.5)
SODIUM SERPL-SCNC: 139 MMOL/L — SIGNIFICANT CHANGE UP (ref 135–145)
WBC # BLD: 4.58 K/UL — SIGNIFICANT CHANGE UP (ref 3.8–10.5)
WBC # FLD AUTO: 4.58 K/UL — SIGNIFICANT CHANGE UP (ref 3.8–10.5)

## 2021-10-13 PROCEDURE — 99221 1ST HOSP IP/OBS SF/LOW 40: CPT

## 2021-10-13 PROCEDURE — 99223 1ST HOSP IP/OBS HIGH 75: CPT

## 2021-10-13 RX ORDER — FAMOTIDINE 10 MG/ML
20 INJECTION INTRAVENOUS DAILY
Refills: 0 | Status: DISCONTINUED | OUTPATIENT
Start: 2021-10-13 | End: 2021-10-19

## 2021-10-13 RX ORDER — CEFTRIAXONE 500 MG/1
1000 INJECTION, POWDER, FOR SOLUTION INTRAMUSCULAR; INTRAVENOUS EVERY 24 HOURS
Refills: 0 | Status: DISCONTINUED | OUTPATIENT
Start: 2021-10-13 | End: 2021-10-17

## 2021-10-13 RX ORDER — WARFARIN SODIUM 2.5 MG/1
1 TABLET ORAL
Qty: 0 | Refills: 0 | DISCHARGE

## 2021-10-13 RX ADMIN — CEFTRIAXONE 100 MILLIGRAM(S): 500 INJECTION, POWDER, FOR SOLUTION INTRAMUSCULAR; INTRAVENOUS at 12:17

## 2021-10-13 RX ADMIN — GABAPENTIN 100 MILLIGRAM(S): 400 CAPSULE ORAL at 21:49

## 2021-10-13 RX ADMIN — Medication 20 MILLIGRAM(S): at 18:13

## 2021-10-13 RX ADMIN — GABAPENTIN 100 MILLIGRAM(S): 400 CAPSULE ORAL at 06:04

## 2021-10-13 RX ADMIN — Medication 50 MICROGRAM(S): at 06:05

## 2021-10-13 RX ADMIN — Medication 20 MILLIGRAM(S): at 12:17

## 2021-10-13 RX ADMIN — GABAPENTIN 100 MILLIGRAM(S): 400 CAPSULE ORAL at 13:50

## 2021-10-13 RX ADMIN — Medication 20 MILLIGRAM(S): at 06:05

## 2021-10-13 RX ADMIN — FAMOTIDINE 20 MILLIGRAM(S): 10 INJECTION INTRAVENOUS at 18:13

## 2021-10-13 RX ADMIN — Medication 20 MILLIEQUIVALENT(S): at 12:17

## 2021-10-13 RX ADMIN — Medication 50 MILLIGRAM(S): at 06:05

## 2021-10-13 NOTE — PROGRESS NOTE ADULT - PROBLEM SELECTOR PLAN 5
- continue synthroid 50mg qd - chronic, stable  - continue home Lopressor 50mg qd  - c/w Lasix 20 mg IV q 12 hrs

## 2021-10-13 NOTE — DISCHARGE NOTE PROVIDER - HOSPITAL COURSE
HPI:  Patient is a 89 y/o F with a pmhx b/l LE edema, HTN,P Afib on AC , hypothyroidism, who presents with 1mo hx of worsening b/l LE edema. Endorses erythema and swelling b/l. Per patient, her doctor ?neurologist, told her to stop Lasix recently so pt has not been taking. States pain patches, water pills and antibiotics as an outpatient were unhelpful in relieving these symptoms. States she has had symptoms for 6 months but recently worsened. Denies chest pain, shortness of breath, palpitations, fevers, chills, nausea, vomiting. Denies having in past.    IN THE ED:  Temp 98.8 F, HR 92, /72, RR 16, SpO2 96%  S/P Zosyn 3.375g x1, Vanco 1g x1, lasix 40 mg IV x1   EKG: left axis deviation, LBBB   Labs significant for WBC 5.66, BNP 2458, PT 31.9, INR 2.87, PTT 48, K 3.4, CO 32, anion gap 3, albumin 2.8, AST 57, eGFR 43  Imaging: CXR with minimal blunting of R base laterally    (11 Oct 2021 18:04)      ---  HOSPITAL COURSE:   Patient was admitted for b/l LE swelling and r/o of cellulitis. In ED, VS stable with normal WBC count and elevated BNP with therapeutic INR. CXR showed minimal blunting of the R base laterally. EKG showed L axis deviation with LBBB. Patient was given IV vanc/zosyn in ED and was continued on IV zosyn for 2 days total. Per ID, b/l presentation unlikely to be cellulitis and abx were discontinued. US doppler performed showing no evidence of DVT and TTE showed LVEF 55-60% with moderate MR/TR and RV/RA enlargement. PT reccomended placement to rehab. Patient was stable and examined at bedside on day of discharge.    ---  CONSULTANTS:   ID - Ric Marquez - [facility]  PT  Wound care      ---  TIME SPENT:  I, the attending physician, was physically present for the key portions of the evaluation and management (E/M) service provided. The total amount of time spent reviewing the hospital notes, laboratory values, imaging findings, assessing/counseling the patient, discussing with consultant physicians, social work, nursing staff was -- minutes    ---  Primary care provider was made aware of plan for discharge:      [  ] NO     [  ] YES   HPI:  Patient is a 89 y/o F with a pmhx b/l LE edema, HTN,P Afib on AC , hypothyroidism, who presents with 1mo hx of worsening b/l LE edema. Endorses erythema and swelling b/l. Per patient, her doctor ?neurologist, told her to stop Lasix recently so pt has not been taking. States pain patches, water pills and antibiotics as an outpatient were unhelpful in relieving these symptoms. States she has had symptoms for 6 months but recently worsened. Denies chest pain, shortness of breath, palpitations, fevers, chills, nausea, vomiting. Denies having in past.    IN THE ED:  Temp 98.8 F, HR 92, /72, RR 16, SpO2 96%  S/P Zosyn 3.375g x1, Vanco 1g x1, lasix 40 mg IV x1   EKG: left axis deviation, LBBB   Labs significant for WBC 5.66, BNP 2458, PT 31.9, INR 2.87, PTT 48, K 3.4, CO 32, anion gap 3, albumin 2.8, AST 57, eGFR 43  Imaging: CXR with minimal blunting of R base laterally    (11 Oct 2021 18:04)      ---  HOSPITAL COURSE:   Patient was admitted for b/l LE swelling and r/o of cellulitis. In ED, VS stable with normal WBC count and elevated BNP with therapeutic INR. CXR showed minimal blunting of the R base laterally. EKG showed L axis deviation with LBBB. Patient was given IV vanc/zosyn in ED and was continued on IV zosyn for 2 days total. Per ID, b/l presentation unlikely to be cellulitis and abx were discontinued, but later started on prednisone and ceftriaxone. Patient was started on IV lasix 20 BID which was later increased to IV lasix 40 BID per cardio rec. US doppler performed showing no evidence of DVT and TTE showed LVEF 55-60% with moderate MR/TR and RV/RA enlargement. Patient's b/l LE swelling and erythema improved throughout hospital course. While at hospital, patient was exposed to an individual who tested positive for COVID-19 and patient received one dose of regeneron ppx and tested negative via PCR after exposure event. Patient's warfarin was held in early hospital course for having supra-therapeutic INR which was resumed once therapeutic. PT recommended placement to rehab. Patient was stable and examined at bedside on day of discharge.    ---  CONSULTANTS:   PINEDA Larios  Cardio - [facility]  PT  Wound care      ---  TIME SPENT:  I, the attending physician, was physically present for the key portions of the evaluation and management (E/M) service provided. The total amount of time spent reviewing the hospital notes, laboratory values, imaging findings, assessing/counseling the patient, discussing with consultant physicians, social work, nursing staff was -- minutes    ---  Primary care provider was made aware of plan for discharge:      [  ] NO     [  ] YES   HPI:  Patient is a 89 y/o F with a pmhx b/l LE edema, HTN,P Afib on AC , hypothyroidism, who presents with 1mo hx of worsening b/l LE edema. Endorses erythema and swelling b/l. Per patient, her doctor ?neurologist, told her to stop Lasix recently so pt has not been taking. States pain patches, water pills and antibiotics as an outpatient were unhelpful in relieving these symptoms. States she has had symptoms for 6 months but recently worsened. Denies chest pain, shortness of breath, palpitations, fevers, chills, nausea, vomiting. Denies having in past.    IN THE ED:  Temp 98.8 F, HR 92, /72, RR 16, SpO2 96%  S/P Zosyn 3.375g x1, Vanco 1g x1, lasix 40 mg IV x1   EKG: left axis deviation, LBBB   Labs significant for WBC 5.66, BNP 2458, PT 31.9, INR 2.87, PTT 48, K 3.4, CO 32, anion gap 3, albumin 2.8, AST 57, eGFR 43  Imaging: CXR with minimal blunting of R base laterally    (11 Oct 2021 18:04)      ---  HOSPITAL COURSE:   Patient was admitted for b/l LE swelling and r/o of cellulitis. In ED, VS stable with normal WBC count and elevated BNP with therapeutic INR. CXR showed minimal blunting of the R base laterally. EKG showed L axis deviation with LBBB. Patient was given IV vanc/zosyn in ED and was continued on IV zosyn for 2 days total. Per ID, b/l presentation unlikely to be cellulitis and abx were discontinued, but later started on prednisone and ceftriaxone. Patient was started on IV lasix 20 BID which was later increased to IV lasix 40 BID per cardio rec. US doppler performed showing no evidence of DVT and TTE showed LVEF 55-60% with moderate MR/TR and RV/RA enlargement. Patient's b/l LE swelling and erythema improved throughout hospital course. While at hospital, patient was exposed to an individual who tested positive for COVID-19 and patient received one dose of regeneron ppx and tested negative via PCR after exposure event. Patient's warfarin was held in early hospital course for having supra-therapeutic INR which was resumed once therapeutic. PT recommended home with home PT. Patient was stable and examined at bedside on day of discharge.    ---  CONSULTANTS:   ID - Ric  Cardio - Norah  PT  Wound care      ---        T(C): 36.4 (10-19-21 @ 03:52), Max: 36.8 (10-18-21 @ 20:15)  HR: 83 (10-19-21 @ 03:52) (74 - 83)  BP: 152/74 (10-19-21 @ 03:52) (131/61 - 153/82)  RR: 17 (10-19-21 @ 03:52) (17 - 17)  SpO2: 93% (10-19-21 @ 03:52) (93% - 96%)    PHYSICAL EXAM:  GENERAL:  [ x ] NAD , [ x ] well appearing, [  ] Agitated, [  ] Drowsy,  [  ] Lethargy, [  ] confused   HEAD:  [ x ] Normal, [  ] Other  EYES:  [ x ] EOMI, [  ] PERRLA, [ x ] conjunctiva and sclera clear normal, [  ] Other,  [  ] Pallor,[  ] Discharge  ENMT:  [ x ] Normal, [ x ] Moist mucous membranes, [ x ] Good dentition, [ x ] No Thrush  NECK:  [ x ] Supple, [x ] No JVD, [ x  ] Normal thyroid, [  ] Lymphadenopathy [  ] Other  CHEST/LUNG:  [x  ] Clear to auscultation bilaterally, [x  ] Breath Sounds equal B/L, [  ] poor effort  [x  ] No rales, [x  ] No rhonchi  [ x ]  No wheezing,   HEART:  [ x  ] Regular rate [  ] tachycardia, [  ] Bradycardia,  [ x ] irregular  [ x ] 2/6 systolic murmer appreciated at upper sternal border [  ] PPM in place (Mfr:  )  ABDOMEN:  [ x ] Soft, [ x ] Nontender, [ x ] Nondistended, [ x ] No mass, [ x ] Bowel sounds present, [  ] obese  NERVOUS SYSTEM:  [ x] Alert & Oriented X3, [ x ] Nonfocal  [  ] Confusion  [  ] Encephalopathic [  ] Sedated [  ] Unable to assess, [  ] Dementia [  ] Other-  EXTREMITIES: [x  ] 2+ Peripheral Pulses, No clubbing, No cyanosis,  [ x ] 2 +pitting  edema B/L lower EXT. [ x ] no erythema/rubor in legs b/l  Ext  [  ] wound, stasis skin   LYMPH: No lymphadenopathy noted  SKIN:  [ x ] No rashes or lesions, [  ] Pressure Ulcers, [ x ] ecchymosis, [  ] Skin Tears, [ x ] Other- dry skin lower Ext        TIME SPENT:  I, the attending physician, was physically present for the key portions of the evaluation and management (E/M) service provided. The total amount of time spent reviewing the hospital notes, laboratory values, imaging findings, assessing/counseling the patient, discussing with consultant physicians, social work, nursing staff was -- minutes    ---  Primary care provider was made aware of plan for discharge:      [  ] NO     [  ] YES   HPI:  Patient is a 89 y/o F with a pmhx b/l LE edema, HTN,P Afib on AC , hypothyroidism, who presents with 1mo hx of worsening b/l LE edema. Endorses erythema and swelling b/l. Per patient, her doctor ?neurologist, told her to stop Lasix recently so pt has not been taking. States pain patches, water pills and antibiotics as an outpatient were unhelpful in relieving these symptoms. States she has had symptoms for 6 months but recently worsened. Denies chest pain, shortness of breath, palpitations, fevers, chills, nausea, vomiting. Denies having in past.    IN THE ED:  Temp 98.8 F, HR 92, /72, RR 16, SpO2 96%  S/P Zosyn 3.375g x1, Vanco 1g x1, lasix 40 mg IV x1   EKG: left axis deviation, LBBB   Labs significant for WBC 5.66, BNP 2458, PT 31.9, INR 2.87, PTT 48, K 3.4, CO 32, anion gap 3, albumin 2.8, AST 57, eGFR 43  Imaging: CXR with minimal blunting of R base laterally    (11 Oct 2021 18:04)      ---  HOSPITAL COURSE:   Patient was admitted for b/l LE swelling and r/o of cellulitis. In ED, VS stable with normal WBC count and elevated BNP with therapeutic INR. CXR showed minimal blunting of the R base laterally. EKG showed L axis deviation with LBBB. Patient was given IV vanc/zosyn in ED and was continued on IV zosyn for 2 days total. Per ID, b/l presentation unlikely to be cellulitis and abx were discontinued, but later started on prednisone and ceftriaxone. Patient was started on IV lasix 20 BID which was later increased to IV lasix 40 BID per cardio rec. US doppler performed showing no evidence of DVT and TTE showed LVEF 55-60% with moderate MR/TR and RV/RA enlargement. Patient's b/l LE swelling and erythema improved throughout hospital course. While at hospital, patient was exposed to an individual who tested positive for COVID-19 and patient received one dose of regeneron ppx and tested negative via PCR after exposure event. Patient's warfarin was held in early hospital course for having supra-therapeutic INR which was resumed once therapeutic. PT recommended home with home PT. Patient was stable and examined at bedside on day of discharge.    T(C): 36.8 (10-19-21 @ 13:00), Max: 36.8 (10-18-21 @ 20:15)  HR: 77 (10-19-21 @ 13:00) (75 - 83)  BP: 136/68 (10-19-21 @ 13:00) (136/68 - 153/82)  RR: 18 (10-19-21 @ 13:00) (17 - 18)  SpO2: 92% (10-19-21 @ 13:00) (92% - 96%)  PHYSICAL EXAM:  GENERAL:  [ x ] NAD , [ x ] well appearing, [  ] Agitated, [  ] Drowsy,  [  ] Lethargy, [  ] confused   HEAD:  [ x ] Normal, [  ] Other  EYES:  [ x ] EOMI, [  ] PERRLA, [ x ] conjunctiva and sclera clear normal, [  ] Other,  [  ] Pallor,[  ] Discharge  ENMT:  [ x ] Normal, [ x ] Moist mucous membranes, [ x ] Good dentition, [ x ] No Thrush  NECK:  [ x ] Supple, [x ] No JVD, [ x  ] Normal thyroid, [  ] Lymphadenopathy [  ] Other  CHEST/LUNG:  [x  ] Clear to auscultation bilaterally, [x  ] Breath Sounds equal B/L, [  ] poor effort  [x  ] No rales, [x  ] No rhonchi  [ x ]  No wheezing,   HEART:  [ x  ] Regular rate [  ] tachycardia, [  ] Bradycardia,  [ x ] irregular  [ x ] 2/6 systolic murmer appreciated at upper sternal border [  ] PPM in place (Mfr:  )  ABDOMEN:  [ x ] Soft, [ x ] Nontender, [ x ] Nondistended, [ x ] No mass, [ x ] Bowel sounds present, [  ] obese  NERVOUS SYSTEM:  [ x] Alert & Oriented X3, [ x ] Nonfocal  [  ] Confusion  [  ] Encephalopathic [  ] Sedated [  ] Unable to assess, [  ] Dementia [  ] Other-  EXTREMITIES: [x  ] 2+ Peripheral Pulses, No clubbing, No cyanosis,  [ x ] 2 +pitting  edema B/L lower EXT. [ x ] no erythema/rubor in legs b/l  Ext  [  ] wound, stasis skin   LYMPH: No lymphadenopathy noted  SKIN:  [ x ] No rashes or lesions, [  ] Pressure Ulcers, [ x ] ecchymosis, [  ] Skin Tears, [ x ] Other- dry skin lower Ext  ---  CONSULTANTS:   PINEDA Marquez - Norah  PT  Wound care      ---        T(C): 36.4 (10-19-21 @ 03:52), Max: 36.8 (10-18-21 @ 20:15)  HR: 83 (10-19-21 @ 03:52) (74 - 83)  BP: 152/74 (10-19-21 @ 03:52) (131/61 - 153/82)  RR: 17 (10-19-21 @ 03:52) (17 - 17)  SpO2: 93% (10-19-21 @ 03:52) (93% - 96%)    PHYSICAL EXAM:  GENERAL:  [ x ] NAD , [ x ] well appearing, [  ] Agitated, [  ] Drowsy,  [  ] Lethargy, [  ] confused   HEAD:  [ x ] Normal, [  ] Other  EYES:  [ x ] EOMI, [  ] PERRLA, [ x ] conjunctiva and sclera clear normal, [  ] Other,  [  ] Pallor,[  ] Discharge  ENMT:  [ x ] Normal, [ x ] Moist mucous membranes, [ x ] Good dentition, [ x ] No Thrush  NECK:  [ x ] Supple, [x ] No JVD, [ x  ] Normal thyroid, [  ] Lymphadenopathy [  ] Other  CHEST/LUNG:  [x  ] Clear to auscultation bilaterally, [x  ] Breath Sounds equal B/L, [  ] poor effort  [x  ] No rales, [x  ] No rhonchi  [ x ]  No wheezing,   HEART:  [ x  ] Regular rate [  ] tachycardia, [  ] Bradycardia,  [ x ] irregular  [ x ] 2/6 systolic murmer appreciated at upper sternal border [  ] PPM in place (Mfr:  )  ABDOMEN:  [ x ] Soft, [ x ] Nontender, [ x ] Nondistended, [ x ] No mass, [ x ] Bowel sounds present, [  ] obese  NERVOUS SYSTEM:  [ x] Alert & Oriented X3, [ x ] Nonfocal  [  ] Confusion  [  ] Encephalopathic [  ] Sedated [  ] Unable to assess, [  ] Dementia [  ] Other-  EXTREMITIES: [x  ] 2+ Peripheral Pulses, No clubbing, No cyanosis,  [ x ] 2 +pitting  edema B/L lower EXT. [ x ] no erythema/rubor in legs b/l  Ext  [  ] wound, stasis skin   LYMPH: No lymphadenopathy noted  SKIN:  [ x ] No rashes or lesions, [  ] Pressure Ulcers, [ x ] ecchymosis, [  ] Skin Tears, [ x ] Other- dry skin lower Ext        TIME SPENT:  I, the attending physician, was physically present for the key portions of the evaluation and management (E/M) service provided. The total amount of time spent reviewing the hospital notes, laboratory values, imaging findings, assessing/counseling the patient, discussing with consultant physicians, social work, nursing staff was -- minutes    ---  Primary care provider was made aware of plan for discharge:      [  ] NO     [  ] YES   HPI:  Patient is a 89 y/o F with a pmhx b/l LE edema, HTN,P Afib on AC , hypothyroidism, who presents with 1mo hx of worsening b/l LE edema. Endorses erythema and swelling b/l. Per patient, her doctor ?neurologist, told her to stop Lasix recently so pt has not been taking. States pain patches, water pills and antibiotics as an outpatient were unhelpful in relieving these symptoms. States she has had symptoms for 6 months but recently worsened. Denies chest pain, shortness of breath, palpitations, fevers, chills, nausea, vomiting. Denies having in past.    IN THE ED:  Temp 98.8 F, HR 92, /72, RR 16, SpO2 96%  S/P Zosyn 3.375g x1, Vanco 1g x1, lasix 40 mg IV x1   EKG: left axis deviation, LBBB   Labs significant for WBC 5.66, BNP 2458, PT 31.9, INR 2.87, PTT 48, K 3.4, CO 32, anion gap 3, albumin 2.8, AST 57, eGFR 43  Imaging: CXR with minimal blunting of R base laterally    (11 Oct 2021 18:04)      ---  HOSPITAL COURSE:   Patient was admitted for b/l LE swelling , erythema & oozing Blisters with r/o of cellulitis. In ED, VS stable with normal WBC count and elevated BNP with therapeutic INR. CXR showed minimal blunting of the R base laterally. EKG showed L axis deviation with LBBB. Patient was given IV vanc/zosyn in ED and was continued on IV zosyn for 2 days total. Per ID-Dr Larios- b/l presentation unlikely to be cellulitis and abx were discontinued, but later started on Oral prednisone and IV ceftriaxone for inflammatory Dermatitis along with B/L Lower ext edema . Patient was started on IV lasix 20 BID which was later increased to IV lasix 40 BID per cardio rec. US doppler performed showing no evidence of DVT and TTE showed LVEF 55-60% with moderate MR/TR and RV/RA enlargement. Patient's b/l LE swelling and erythema improved throughout hospital course. While at hospital, patient was exposed to an individual who tested positive for COVID-19 and patient received one dose of regeneron ppx and tested negative via PCR  x 2 after exposure event. Patient's warfarin was held in early hospital course for having supra-therapeutic INR which was resumed once therapeutic INR range  PT recommended home with home  Care PT. Patient was stable and examined at bedside on day of discharge. Case has been d/w Family Dtr & Son at detail. Pt to continue Lasix 40 mg 1 tab daily at home along with Fluid restriction 1 Lit/ 24 hrs. pt has been educated well about it..     ---  CONSULTANTS:   ID - Ric Marquez - [facility]  PT  Wound care      --  TIME SPENT:  I, the attending physician, was physically present for the key portions of the evaluation and management (E/M) service provided. The total amount of time spent reviewing the hospital notes, laboratory values, imaging findings, assessing/counseling the patient, discussing with consultant physicians, social work, nursing staff was  minutes    ---  Primary care provider was made aware of plan for discharge:      [  ] NO     [  x] YES   HPI:  Patient is a 89 y/o F with a pmhx b/l LE edema, HTN,P Afib on AC , hypothyroidism, who presents with 1mo hx of worsening b/l LE edema. Endorses erythema and swelling b/l. Per patient, her doctor ?neurologist, told her to stop Lasix recently so pt has not been taking. States pain patches, water pills and antibiotics as an outpatient were unhelpful in relieving these symptoms. States she has had symptoms for 6 months but recently worsened. Denies chest pain, shortness of breath, palpitations, fevers, chills, nausea, vomiting. Denies having in past.    IN THE ED:  Temp 98.8 F, HR 92, /72, RR 16, SpO2 96%  S/P Zosyn 3.375g x1, Vanco 1g x1, lasix 40 mg IV x1   EKG: left axis deviation, LBBB   Labs significant for WBC 5.66, BNP 2458, PT 31.9, INR 2.87, PTT 48, K 3.4, CO 32, anion gap 3, albumin 2.8, AST 57, eGFR 43  Imaging: CXR with minimal blunting of R base laterally    (11 Oct 2021 18:04)      ---  HOSPITAL COURSE:   Patient was admitted for b/l LE swelling , erythema & oozing Blisters with r/o of cellulitis. In ED, VS stable with normal WBC count and elevated BNP with therapeutic INR. CXR showed minimal blunting of the R base laterally. EKG showed L axis deviation with LBBB. Patient was given IV vanc/zosyn in ED and was continued on IV zosyn for 2 days total. Per ID-Dr Larios- b/l presentation unlikely to be cellulitis and abx were discontinued, but later started on Oral prednisone and IV ceftriaxone for inflammatory Dermatitis along with B/L Lower ext edema . Patient was started on IV lasix 20 BID which was later increased to IV lasix 40 BID per cardio rec. US doppler performed showing no evidence of DVT and TTE showed LVEF 55-60% with moderate MR/TR and RV/RA enlargement. Patient's b/l LE swelling and erythema improved throughout hospital course. While at hospital, patient was exposed to an individual who tested positive for COVID-19 and patient received one dose of regeneron ppx and tested negative via PCR  x 2 after exposure event. Patient's warfarin was held in early hospital course for having supra-therapeutic INR which was resumed once therapeutic INR range  PT recommended home with home  Care PT. Patient was stable and examined at bedside on day of discharge. Case has been d/w Family Dtr & Son at detail. Pt to continue Lasix 40 mg 1 tab daily at home along with Fluid restriction 1 Lit/ 24 hrs. pt has been educated well about it..     ---  CONSULTANTS:   ID - Ric Marquez - Norah  PT  Wound care      --  TIME SPENT:  I, the attending physician, was physically present for the key portions of the evaluation and management (E/M) service provided. The total amount of time spent reviewing the hospital notes, laboratory values, imaging findings, assessing/counseling the patient, discussing with consultant physicians, social work, nursing staff was 60 minutes    ---  Primary care provider was made aware of plan for discharge:      [  ] NO     [  x] YES

## 2021-10-13 NOTE — CONSULT NOTE ADULT - SUBJECTIVE AND OBJECTIVE BOX
Patient is a 89y old  Female who presents with a chief complaint of b/l LE cellulitis (13 Oct 2021 08:54)      HPI:  Patient is a 89 y/o F with a pmhx b/l LE edema, HTN,P Afib on AC , hypothyroidism, who presents with 1mo hx of worsening b/l LE edema. Endorses erythema and swelling b/l. Per patient, her doctor ?neurologist, told her to stop Lasix recently so pt has not been taking. States pain patches, water pills and antibiotics as an outpatient were unhelpful in relieving these symptoms. States she has had symptoms for 6 months but recently worsened. Denies chest pain, shortness of breath, palpitations, fevers, chills, nausea, vomiting. Denies having in past.    IN THE ED:  Temp 98.8 F, HR 92, /72, RR 16, SpO2 96%  S/P Zosyn 3.375g x1, Vanco 1g x1, lasix 40 mg IV x1   EKG: left axis deviation, LBBB   Labs significant for WBC 5.66, BNP 2458, PT 31.9, INR 2.87, PTT 48, K 3.4, CO 32, anion gap 3, albumin 2.8, AST 57, eGFR 43  Imaging: CXR with minimal blunting of R base laterally    (11 Oct 2021 18:04)      PAST MEDICAL & SURGICAL HISTORY:  Hypothyroid    Hypertension    Lower extremity edema        ECHO FINDINGS:    MEDICATIONS  (STANDING):  furosemide   Injectable 20 milliGRAM(s) IV Push two times a day  gabapentin 100 milliGRAM(s) Oral three times a day  levothyroxine 50 MICROGram(s) Oral daily  metoprolol tartrate 50 milliGRAM(s) Oral daily  potassium chloride    Tablet ER 20 milliEquivalent(s) Oral daily    MEDICATIONS  (PRN):      FAMILY HISTORY:  Denies Family history of CAD or early MI    Constitutional: denies fever, chills  HEENT: denies blurry vision, difficulty hearing  Respiratory: denies SOB, MAZARIEGOS, cough  Cardiovascular: denies CP, palpitations, orthopnea, PND, LE edema  Gastrointestinal: denies nausea, vomiting, abdominal pain  Genitourinary: denies urinary changes  Skin: Denies rashes, itching  Neurologic: denies headache, weakness, dizziness  Hematology/Oncology: denies bleeding, easy bruising    SOCIAL HISTORY: No tobacco, Alcohol or Drug use    Vital Signs Last 24 Hrs  T(C): 36.7 (13 Oct 2021 05:30), Max: 36.8 (12 Oct 2021 21:40)  T(F): 98 (13 Oct 2021 05:30), Max: 98.3 (12 Oct 2021 21:40)  HR: 72 (13 Oct 2021 05:30) (56 - 73)  BP: 118/64 (13 Oct 2021 05:30) (110/58 - 143/76)  BP(mean): --  RR: 17 (13 Oct 2021 05:30) (17 - 18)  SpO2: 93% (13 Oct 2021 05:30) (91% - 93%)    Physical Exam:  General: Well developed, well nourished, NAD  HEENT: NCAT, EOMI bl, moist mucous membranes   Neck: Supple, nontender, no mass  Neurology: A&Ox3, nonfocal, sensation intact   Respiratory: CTA B/L, No W/R/R  CV: RRR, +S1/S2, no murmurs, rubs or gallops  Abdominal: Soft, NT, ND +BSx4, no palpable masses  Extremities: +2 edema below knees, erythema in symmetrical pattern from b/l dorsal feet to below knees, warm, yellow crusting above ankles, non-weeping  MSK: Normal ROM, no joint erythema or warmth, no joint swelling   Heme: No obvious ecchymosis or petechiae   Skin: warm, dry, normal color    ECG:     I&O's Detail    12 Oct 2021 07:01  -  13 Oct 2021 07:00  --------------------------------------------------------  IN:    Oral Fluid: 120 mL  Total IN: 120 mL    OUT:    Voided (mL): 650 mL  Total OUT: 650 mL    Total NET: -530 mL          LABS:                        9.7    4.58  )-----------( 192      ( 13 Oct 2021 09:21 )             30.7     10-13    139  |  105  |  17  ----------------------------<  79  3.7   |  31  |  1.20    Ca    7.8<L>      13 Oct 2021 09:21    TPro  6.6  /  Alb  2.2<L>  /  TBili  0.8  /  DBili  x   /  AST  47<H>  /  ALT  29  /  AlkPhos  77  10-13        PT/INR - ( 13 Oct 2021 09:21 )   PT: 33.6 sec;   INR: 3.03 ratio         PTT - ( 13 Oct 2021 09:21 )  PTT:45.4 sec    I&O's Summary    12 Oct 2021 07:01  -  13 Oct 2021 07:00  --------------------------------------------------------  IN: 120 mL / OUT: 650 mL / NET: -530 mL      BNP  RADIOLOGY & ADDITIONAL STUDIES: Patient is a 89y old  Female who presents with a chief complaint of b/l LE cellulitis (13 Oct 2021 08:54)      HPI:  Patient is a 89 y/o F with a pmhx b/l LE edema, HTN,P Afib on AC , hypothyroidism, who presents with 1mo hx of worsening b/l LE edema. Endorses erythema and swelling b/l. Per patient, her doctor ?neurologist, told her to stop Lasix recently so pt has not been taking. States pain patches, water pills and antibiotics as an outpatient were unhelpful in relieving these symptoms. States she has had symptoms for 6 months but recently worsened. Denies chest pain, shortness of breath, palpitations, fevers, chills, nausea, vomiting. Denies having in past.    IN THE ED:  Temp 98.8 F, HR 92, /72, RR 16, SpO2 96%  S/P Zosyn 3.375g x1, Vanco 1g x1, lasix 40 mg IV x1   EKG: left axis deviation, LBBB   Labs significant for WBC 5.66, BNP 2458, PT 31.9, INR 2.87, PTT 48, K 3.4, CO 32, anion gap 3, albumin 2.8, AST 57, eGFR 43  Imaging: CXR with minimal blunting of R base laterally    (11 Oct 2021 18:04)      PAST MEDICAL & SURGICAL HISTORY:  Hypothyroid    Hypertension    Lower extremity edema        ECHO FINDINGS: < from: TTE Echo Complete w/o Contrast w/ Doppler (10.11.21 @ 22:40) >  Normal left ventricular internal dimensions and systolic function, estimated LVEF of 55-60%.  Right ventricular enlargement with grossly normal systolic function  Calcified trileaflet aortic valve with moderate aortic stenosis, without AI.  Moderate MR and TR.  Right atrial enlargement  No significant pericardial effusion.    < end of copied text >      MEDICATIONS  (STANDING):  furosemide   Injectable 20 milliGRAM(s) IV Push two times a day  gabapentin 100 milliGRAM(s) Oral three times a day  levothyroxine 50 MICROGram(s) Oral daily  metoprolol tartrate 50 milliGRAM(s) Oral daily  potassium chloride    Tablet ER 20 milliEquivalent(s) Oral daily    MEDICATIONS  (PRN):      FAMILY HISTORY:  Denies Family history of CAD or early MI    Constitutional: denies fever, chills  HEENT: denies blurry vision, difficulty hearing  Respiratory: denies SOB, MAZARIEGOS, cough  Cardiovascular: denies CP, palpitations, orthopnea, PND, LE edema  Gastrointestinal: denies nausea, vomiting, abdominal pain  Genitourinary: denies urinary changes  Skin: Denies rashes, itching  Neurologic: denies headache, weakness, dizziness  Hematology/Oncology: denies bleeding, easy bruising    SOCIAL HISTORY: No tobacco, Alcohol or Drug use    Vital Signs Last 24 Hrs  T(C): 36.7 (13 Oct 2021 05:30), Max: 36.8 (12 Oct 2021 21:40)  T(F): 98 (13 Oct 2021 05:30), Max: 98.3 (12 Oct 2021 21:40)  HR: 72 (13 Oct 2021 05:30) (56 - 73)  BP: 118/64 (13 Oct 2021 05:30) (110/58 - 143/76)  BP(mean): --  RR: 17 (13 Oct 2021 05:30) (17 - 18)  SpO2: 93% (13 Oct 2021 05:30) (91% - 93%)    Physical Exam:  General: Well developed, well nourished, NAD  HEENT: NCAT, EOMI bl, moist mucous membranes   Neck: Supple, nontender, no mass  Neurology: A&Ox3, nonfocal, sensation intact   Respiratory: CTA B/L, No W/R/R  CV: RRR, +S1/S2, no murmurs, rubs or gallops  Abdominal: Soft, NT, ND +BSx4, no palpable masses  Extremities: +2 edema below knees, erythema in symmetrical pattern from b/l dorsal feet to below knees, warm, yellow crusting above ankles, non-weeping  MSK: Normal ROM, no joint erythema or warmth, no joint swelling   Heme: No obvious ecchymosis or petechiae   Skin: warm, dry, normal color    ECG: Afib, LBBB    I&O's Detail    12 Oct 2021 07:01  -  13 Oct 2021 07:00  --------------------------------------------------------  IN:    Oral Fluid: 120 mL  Total IN: 120 mL    OUT:    Voided (mL): 650 mL  Total OUT: 650 mL    Total NET: -530 mL          LABS:                        9.7    4.58  )-----------( 192      ( 13 Oct 2021 09:21 )             30.7     10-13    139  |  105  |  17  ----------------------------<  79  3.7   |  31  |  1.20    Ca    7.8<L>      13 Oct 2021 09:21    TPro  6.6  /  Alb  2.2<L>  /  TBili  0.8  /  DBili  x   /  AST  47<H>  /  ALT  29  /  AlkPhos  77  10-13        PT/INR - ( 13 Oct 2021 09:21 )   PT: 33.6 sec;   INR: 3.03 ratio         PTT - ( 13 Oct 2021 09:21 )  PTT:45.4 sec    I&O's Summary    12 Oct 2021 07:01  -  13 Oct 2021 07:00  --------------------------------------------------------  IN: 120 mL / OUT: 650 mL / NET: -530 mL      BNP  RADIOLOGY & ADDITIONAL STUDIES:

## 2021-10-13 NOTE — CONSULT NOTE ADULT - ASSESSMENT
Patient presents with bilateral cellulitis: no blistering skin integrity intact    Recommendation: Continue prescribed abx                              Continue with moisturizing lotion to skin    Continue  Nutrition (as tolerated)  Continue  Offloading   Continue Pericare  Care as per medicine will follow w/ you  Thank you for this consult  Blanca Barrera NP, [facility] [phone]

## 2021-10-13 NOTE — PROGRESS NOTE ADULT - PROBLEM SELECTOR PLAN 2
H/O P A Fib, stable  INR elevated -> HOLD Coumadin as INR > 3  - c/w Metoprolol  T 50 mg qd  - Daily INR Chronic Venous stasis dermatitis b/.l lower EXT.  -Worse 2/2 leg edema -On IV Lasix 2x day  - ID DR Larios d/w- being due to an infectious process and suspect inflammatory stasis dermatitis. .but with chronicity and failure to improve pt started on combination of Ceftriaxone/steroid to see if this impacts LE  -

## 2021-10-13 NOTE — PROGRESS NOTE ADULT - PROBLEM SELECTOR PLAN 3
- c/w 20 meq K PO qd  - trend in AM CMP H/O P A Fib, stable  INR elevated -> HOLD Coumadin as INR > 3  - c/w Metoprolol  T 50 mg qd  - Daily INR

## 2021-10-13 NOTE — DISCHARGE NOTE PROVIDER - NSDCACTIVITY_GEN_ALL_CORE
No heavy lifting/straining Bathing allowed/Do not drive or operate machinery/Showering allowed/Walking - Indoors allowed/No heavy lifting/straining

## 2021-10-13 NOTE — DISCHARGE NOTE PROVIDER - DETAILS OF MALNUTRITION DIAGNOSIS/DIAGNOSES
This patient has been assessed with a concern for Malnutrition and was treated during this hospitalization for the following Nutrition diagnosis/diagnoses:     -  10/18/2021: Severe protein-calorie malnutrition

## 2021-10-13 NOTE — PROGRESS NOTE ADULT - SUBJECTIVE AND OBJECTIVE BOX
Patient is a 89y old  Female who presents with a chief complaint of b/l LE cellulitis (13 Oct 2021 08:54)      HPI:  Patient is a 89 y/o F with a pmhx b/l LE edema, HTN,P Afib on AC , hypothyroidism, who presents with 1mo hx of worsening b/l LE edema. Endorses erythema and swelling b/l. Per patient, her doctor ?neurologist, told her to stop Lasix recently so pt has not been taking. States pain patches, water pills and antibiotics as an outpatient were unhelpful in relieving these symptoms. States she has had symptoms for 6 months but recently worsened. Denies chest pain, shortness of breath, palpitations, fevers, chills, nausea, vomiting. Denies having in past.    IN THE ED:  Temp 98.8 F, HR 92, /72, RR 16, SpO2 96%  S/P Zosyn 3.375g x1, Vanco 1g x1, lasix 40 mg IV x1   EKG: left axis deviation, LBBB   Labs significant for WBC 5.66, BNP 2458, PT 31.9, INR 2.87, PTT 48, K 3.4, CO 32, anion gap 3, albumin 2.8, AST 57, eGFR 43  Imaging: CXR with minimal blunting of R base laterally    (11 Oct 2021 18:04)      INTERVAL HPI:  10/12/21 - Patient was seen and examined at bedside. No acute events overnight. Patient endorses improvement in the rubor/erythema of her LE b/l but endorses continued pain in LE b/l. Patient endorses 1 year hx of weight loss (35 pounds) due to decreased appetite. Patient denies headache, fever, blurry/double vision, tinnitus, dysphagia, cough, SoB, abdominal pain, CP, palpitations, n/v/c/d. Off Abx as per ID, INR -elevated   10/13/21 - Patient was seen and examined overnight. Patient endorses no improvement in the swelling and erythema of LE b/l. Patient endorses a pressure sensation in both LE and has an "odd" sensation in her left heel. Patient endorses eating well without dysphagia. Patient denies BM the past 2 days and declines bowel regimen for now. Patient producing clear urine in vac. Endorses worsening of hearing in her left ear, worse since yesterday. Denies headache, fever, blurry/double vision, tinnitus, cough, SoB, abdominal pain, CP, palpitations, n/v/d.    OVERNIGHT EVENTS:  none    Home Medications:  gabapentin 100 mg oral capsule: 1 cap(s) orally 3 times a day (11 Oct 2021 19:58)  Jantoven 1 mg oral tablet: 1 tab(s) orally once a day (11 Oct 2021 19:58)  Jantoven 1 mg oral tablet: 2 tab(s) orally Monday, Wednesday, and Friday (11 Oct 2021 19:58)  Jantoven 4 mg oral tablet: 1 tab(s) orally once a day (11 Oct 2021 19:58)  Klor-Con 10 mEq oral tablet, extended release: 1 tab(s) orally once a day (11 Oct 2021 19:58)  Lasix 40 mg oral tablet: 1 tab(s) orally once a day (11 Oct 2021 19:58)  lidocaine 5% patch: Apply to affected areas (11 Oct 2021 19:58)  Lopressor 50 mg oral tablet: 1 tab(s) orally once a day (11 Oct 2021 19:58)  Synthroid 50 mcg (0.05 mg) oral tablet: 1 tab(s) orally once a day (11 Oct 2021 19:58)  Vitamin B6 50 mg oral tablet: 1 tab(s) orally once a day (11 Oct 2021 19:58)      MEDICATIONS  (STANDING):  furosemide   Injectable 20 milliGRAM(s) IV Push two times a day  gabapentin 100 milliGRAM(s) Oral three times a day  levothyroxine 50 MICROGram(s) Oral daily  metoprolol tartrate 50 milliGRAM(s) Oral daily  potassium chloride    Tablet ER 20 milliEquivalent(s) Oral daily    MEDICATIONS  (PRN):      No Known Allergies      Social History:  Patient lives alone. Denies tobacco use or etoh use. (11 Oct 2021 18:04)      REVIEW OF SYSTEMS:  CONSTITUTIONAL: No fever, No chills, No fatigue  EYES:  No blurry vision/double vision  ENMT: No tinnitus, diminished hearing in L ear changed from yesterday  RESPIRATORY: No cough, No wheezing, No hemoptysis, No shortness of breath  CARDIOVASCULAR: No chest pain, No palpitations  GASTROINTESTINAL: No abdominal pain, No epigastric pain. No nausea, No vomiting, No diarrhea, Endorses constipation; [  ] BM  GENITOURINARY: No dysuria, Endorses increased frequency, No urgency, No hematuria, No incontinence  NEUROLOGICAL: No headaches, No dizziness, No numbness, No tingling, No tremors, No weakness  EXTREMITIES: Endorses b/l LE Swelling, Endorses pressure in b/l LE with an "odd" sensation in L heel, Endorses b/l LE Edema  SKIN: erythema/rubor on shins b/l  MUSCULOSKELETAL: No joint pain, No joint swelling; No muscle pain, No back pain, Endorses extremity discomfort  PSYCHIATRIC: No depression, No anxiety, No mood swings, No difficulty sleeping at night  PAIN SCALE: [ x ] None  [  ] Other-  ROS Unable to obtain due to: [  ] Dementia  [  ] Lethargy  [  ] Sedated  [  ] Non verbal  REST OF REVIEW OF SYSTEMS: [ x ] Normal     Vital Signs Last 24 Hrs  T(C): 36.7 (13 Oct 2021 05:30), Max: 36.8 (12 Oct 2021 21:40)  T(F): 98 (13 Oct 2021 05:30), Max: 98.3 (12 Oct 2021 21:40)  HR: 72 (13 Oct 2021 05:30) (56 - 73)  BP: 118/64 (13 Oct 2021 05:30) (110/58 - 143/76)  BP(mean): --  RR: 17 (13 Oct 2021 05:30) (17 - 18)  SpO2: 93% (13 Oct 2021 05:30) (91% - 93%)    CAPILLARY BLOOD GLUCOSE          I&O's Summary    12 Oct 2021 07:01  -  13 Oct 2021 07:00  --------------------------------------------------------  IN: 120 mL / OUT: 650 mL / NET: -530 mL      PHYSICAL EXAM:  GENERAL:  [ x ] NAD, [ x ] Well appearing, [  ] Agitated, [  ] Drowsy, [  ] Lethargy, [  ] Confused   HEAD:  [ x ] Normal, [  ] Other  EYES:  [ x ] EOMI, [  ] PERRLA, [ x ] Conjunctiva and sclera clear normal, [  ] Other, [  ] Pallor, [  ] Discharge  ENMT:  [ x ] Normal, [ x ] Moist mucous membranes, [ x ] Good dentition, [ x ] No thrush  NECK:  [ x ] Supple, [ x ] No JVD, [  ] Normal thyroid, [  ] Lymphadenopathy, [  ] Other  CHEST/LUNG:  [ x ] Clear to auscultation bilaterally, [ x ] Breath Sounds equal B/L, [  ] Poor effort, [ x ] No rales, [ x ] No rhonchi, [ x ] No wheezing  HEART:  [ x ] Regular rate, [  ] Tachycardia, [  ] Bradycardia, [ x ] Irregular, [ x ] systolic murmur [  ] PPM in place (Mfr:  )  ABDOMEN:  [ x ] Soft, [ x ] Nontender, [ x ] Nondistended, [ x ] No mass, [ x ] Bowel sounds present, [  ] Obese  NERVOUS SYSTEM:  [ x ] Alert & Oriented x3, [ x ] Nonfocal, [  ] Confusion, [  ] Encephalopathic, [  ] Sedated, [  ] Unable to assess, [  ] Dementia, [  ] Other-  EXTREMITIES:  [ x ] 2+ Peripheral Pulses, No clubbing, No cyanosis,  [ x ] 2+ Edema B/L lower EXT, [ x ] severe PVD stasis skin changes B/L lower EXT, severe erythema B/L Lower Ext edema worsening with improvement in Oozing blister, Dry skin [  ] Wound  LYMPH:  No lymphadenopathy noted  SKIN: b/l LE with erythema/rubor/edema on shins. No wound/ulcer on L heel    DIET: Diet, Regular:   DASH/TLC Sodium & Cholesterol Restricted  1200mL Fluid Restriction ([id number]) (10-11-21 @ 19:28)      LABS:      Ca    7.9        12 Oct 2021 09:17      PT/INR - ( 12 Oct 2021 09:17 )   PT: 38.2 sec;   INR: 3.46 ratio         PTT - ( 11 Oct 2021 16:40 )  PTT:48.0 sec    Culture Results:   No growth to date. (10-11 @ 21:52)  Culture Results:   No growth to date. (10-11 @ 21:52)    culture blood  -- .Blood Blood-Peripheral 10-11 @ 21:52    culture urine  --  10-11 @ 21:52          Culture - Blood (collected 11 Oct 2021 21:52)  Source: .Blood Blood-Peripheral  Preliminary Report (12 Oct 2021 22:01):    No growth to date.    Culture - Blood (collected 11 Oct 2021 21:52)  Source: .Blood Blood-Peripheral  Preliminary Report (12 Oct 2021 22:01):    No growth to date.       Anemia Panel:      Thyroid Panel:            Serum Pro-Brain Natriuretic Peptide: 2458 pg/mL (10-11-21 @ 16:40)      RADIOLOGY & ADDITIONAL TESTS:      HEALTH ISSUES - PROBLEM Dx:  Lower extremity edema    Hypokalemia    Hypertension    Hypothyroid    DVT prophylaxis    Paroxysmal atrial fibrillation    Cellulitis          Consultant(s) Notes Reviewed:  [ x ] YES     Care Discussed with [ x ] Consultants, [ x ] Patient, [  ] Family, [  ] HCP, [  ] , [  ] Social Service, [ x ] RN, [  ] Physical Therapy, [  ] Palliative Care Team  DVT PPX: [  ] Lovenox, [  ] SC Heparin, [ x ] Coumadin, [  ] Xarelto, [  ] Eliquis, [  ] Pradaxa, [  ] IV Heparin drip, [  ] SCD, [  ] Ambulation, [  ] Contraindicated 2/2 GI Bleed, [  ] Contraindicated 2/2  Bleed, [  ] Contraindicated 2/2 Brain Bleed  Advanced Directive: [ x ] None, [  ] DNR/DNI Patient is a 89y old  Female who presents with a chief complaint of b/l LE cellulitis (13 Oct 2021 08:54)      HPI:  Patient is a 89 y/o F with a pmhx b/l LE edema, HTN,P Afib on AC , hypothyroidism, who presents with 1mo hx of worsening b/l LE edema. Endorses erythema and swelling b/l. Per patient, her doctor ?neurologist, told her to stop Lasix recently so pt has not been taking. States pain patches, water pills and antibiotics as an outpatient were unhelpful in relieving these symptoms. States she has had symptoms for 6 months but recently worsened. Denies chest pain, shortness of breath, palpitations, fevers, chills, nausea, vomiting. Denies having in past.    IN THE ED:  Temp 98.8 F, HR 92, /72, RR 16, SpO2 96%  S/P Zosyn 3.375g x1, Vanco 1g x1, lasix 40 mg IV x1   EKG: left axis deviation, LBBB   Labs significant for WBC 5.66, BNP 2458, PT 31.9, INR 2.87, PTT 48, K 3.4, CO 32, anion gap 3, albumin 2.8, AST 57, eGFR 43  Imaging: CXR with minimal blunting of R base laterally    (11 Oct 2021 18:04)      INTERVAL HPI:  10/12/21 - Patient was seen and examined at bedside. No acute events overnight. Patient endorses improvement in the rubor/erythema of her LE b/l but endorses continued pain in LE b/l. Patient endorses 1 year hx of weight loss (35 pounds) due to decreased appetite. Patient denies headache, fever, blurry/double vision, tinnitus, dysphagia, cough, SoB, abdominal pain, CP, palpitations, n/v/c/d. Off Abx as per ID, INR -elevated   10/13/21 - Patient was seen and examined overnight. Patient endorses no improvement in the swelling and erythema of LE b/l. Patient endorses a pressure sensation in both LE and has an "odd" sensation in her left heel. Patient endorses eating well without dysphagia. Patient denies BM the past 2 days and declines bowel regimen for now. Patient producing clear urine in vac. Endorses worsening of hearing in her left ear, worse since yesterday. Denies headache, fever, blurry/double vision, tinnitus, cough, SoB, abdominal pain, CP, palpitations, n/v/d.  Dtr Cell- [phone], On IV Lasix & IV Rocephin with Po steroids.    OVERNIGHT EVENTS:  none    Home Medications:  gabapentin 100 mg oral capsule: 1 cap(s) orally 3 times a day (11 Oct 2021 19:58)  Jantoven 1 mg oral tablet: 1 tab(s) orally once a day (11 Oct 2021 19:58)  Jantoven 1 mg oral tablet: 2 tab(s) orally Monday, Wednesday, and Friday (11 Oct 2021 19:58)  Jantoven 4 mg oral tablet: 1 tab(s) orally once a day (11 Oct 2021 19:58)  Klor-Con 10 mEq oral tablet, extended release: 1 tab(s) orally once a day (11 Oct 2021 19:58)  Lasix 40 mg oral tablet: 1 tab(s) orally once a day (11 Oct 2021 19:58)  lidocaine 5% patch: Apply to affected areas (11 Oct 2021 19:58)  Lopressor 50 mg oral tablet: 1 tab(s) orally once a day (11 Oct 2021 19:58)  Synthroid 50 mcg (0.05 mg) oral tablet: 1 tab(s) orally once a day (11 Oct 2021 19:58)  Vitamin B6 50 mg oral tablet: 1 tab(s) orally once a day (11 Oct 2021 19:58)      MEDICATIONS  (STANDING):  furosemide   Injectable 20 milliGRAM(s) IV Push two times a day  gabapentin 100 milliGRAM(s) Oral three times a day  levothyroxine 50 MICROGram(s) Oral daily  metoprolol tartrate 50 milliGRAM(s) Oral daily  potassium chloride    Tablet ER 20 milliEquivalent(s) Oral daily    MEDICATIONS  (PRN):      No Known Allergies      Social History:  Patient lives alone. Denies tobacco use or etoh use. (11 Oct 2021 18:04)      REVIEW OF SYSTEMS: i am OK, c/o tightness of legs.  CONSTITUTIONAL: No fever, No chills, No fatigue  EYES:  No blurry vision/double vision  ENMT: No tinnitus, diminished hearing in L ear changed from yesterday  RESPIRATORY: No cough, No wheezing, No hemoptysis, No shortness of breath  CARDIOVASCULAR: No chest pain, No palpitations  GASTROINTESTINAL: No abdominal pain, No epigastric pain. No nausea, No vomiting, No diarrhea, Endorses constipation; [  ] BM  GENITOURINARY: No dysuria, Endorses increased frequency, No urgency, No hematuria, No incontinence  NEUROLOGICAL: No headaches, No dizziness, No numbness, No tingling, No tremors, No weakness  EXTREMITIES: Endorses b/l LE Swelling, Endorses pressure in b/l LE with an "odd" sensation in L heel, Endorses b/l LE Edema  SKIN: erythema/rubor on shins b/l  MUSCULOSKELETAL: No joint pain, No joint swelling; No muscle pain, No back pain, Endorses extremity discomfort  PSYCHIATRIC: No depression, No anxiety, No mood swings, No difficulty sleeping at night  PAIN SCALE: [ x ] None  [  ] Other-  ROS Unable to obtain due to: [  ] Dementia  [  ] Lethargy  [  ] Sedated  [  ] Non verbal  REST OF REVIEW OF SYSTEMS: [ x ] Normal     Vital Signs Last 24 Hrs  T(C): 36.7 (13 Oct 2021 05:30), Max: 36.8 (12 Oct 2021 21:40)  T(F): 98 (13 Oct 2021 05:30), Max: 98.3 (12 Oct 2021 21:40)  HR: 72 (13 Oct 2021 05:30) (56 - 73)  BP: 118/64 (13 Oct 2021 05:30) (110/58 - 143/76)  BP(mean): --  RR: 17 (13 Oct 2021 05:30) (17 - 18)  SpO2: 93% (13 Oct 2021 05:30) (91% - 93%)    CAPILLARY BLOOD GLUCOSE          I&O's Summary    12 Oct 2021 07:01  -  13 Oct 2021 07:00  --------------------------------------------------------  IN: 120 mL / OUT: 650 mL / NET: -530 mL      PHYSICAL EXAM: OOB to chair  GENERAL:  [ x ] NAD, [ x ] Well appearing, [  ] Agitated, [  ] Drowsy, [  ] Lethargy, [  ] Confused   HEAD:  [ x ] Normal, [  ] Other  EYES:  [ x ] EOMI, [  ] PERRLA, [ x ] Conjunctiva and sclera clear normal, [  ] Other, [  ] Pallor, [  ] Discharge  ENMT:  [ x ] Normal, [ x ] Moist mucous membranes, [ x ] Good dentition, [ x ] No thrush  NECK:  [ x ] Supple, [ x ] No JVD, [ x ] Normal thyroid, [  ] Lymphadenopathy, [  ] Other  CHEST/LUNG:  [ x ] Clear to auscultation bilaterally, [ x ] Breath Sounds equal B/L, [  ] Poor effort, [ x ] No rales, [ x ] No rhonchi, [ x ] No wheezing  HEART:  [ x ] Regular rate, [  ] Tachycardia, [  ] Bradycardia, [ x ] Irregular, [ x ] systolic murmur [  ] PPM in place (Mfr:  )  ABDOMEN:  [ x ] Soft, [ x ] Nontender, [ x ] Nondistended, [ x ] No mass, [ x ] Bowel sounds present, [  ] Obese  NERVOUS SYSTEM:  [ x ] Alert & Oriented x3, [ x ] Nonfocal, [  ] Confusion, [  ] Encephalopathic, [  ] Sedated, [  ] Unable to assess, [  ] Dementia, [  ] Other-  EXTREMITIES:  [ x ] 2+ Peripheral Pulses, No clubbing, No cyanosis,  [ x ] 2+ Edema B/L lower EXT, [ x ] severe PVD stasis skin changes B/L lower EXT, severe erythema B/L Lower Ext edema worsening with improvement in Oozing blister, Dry skin [  ] Wound  LYMPH:  No lymphadenopathy noted  SKIN: b/l LE with erythema/rubor/edema on shins. No wound/ulcer on L heel    DIET: Diet, Regular:   DASH/TLC Sodium & Cholesterol Restricted  1200mL Fluid Restriction ([id number]) (10-11-21 @ 19:28)      LABS:      Ca    7.9        12 Oct 2021 09:17      PT/INR - ( 12 Oct 2021 09:17 )   PT: 38.2 sec;   INR: 3.46 ratio         PTT - ( 11 Oct 2021 16:40 )  PTT:48.0 sec    Culture Results:   No growth to date. (10-11 @ 21:52)  Culture Results:   No growth to date. (10-11 @ 21:52)    culture blood  -- .Blood Blood-Peripheral 10-11 @ 21:52    culture urine  --  10-11 @ 21:52          Culture - Blood (collected 11 Oct 2021 21:52)  Source: .Blood Blood-Peripheral  Preliminary Report (12 Oct 2021 22:01):    No growth to date.    Culture - Blood (collected 11 Oct 2021 21:52)  Source: .Blood Blood-Peripheral  Preliminary Report (12 Oct 2021 22:01):    No growth to date.           Serum Pro-Brain Natriuretic Peptide: 2458 pg/mL (10-11-21 @ 16:40)      RADIOLOGY & ADDITIONAL TESTS: NONE  < from: TTE Echo Complete w/o Contrast w/ Doppler (10.11.21 @ 22:40) >     EXAM:  ECHO TTE WO CON COMP W DOPP         PROCEDURE DATE:  10/11/2021        INTERPRETATION:  INDICATION: Edema  Sonographer AS    Blood Pressure 153/72    Height unavailable     Weight 70.3 kg    Dimensions:  LA 3.6       Normal Values: 2.0 - 4.0 cm  Ao 3.4        Normal Values: 2.0 - 3.8 cm  SEPTUM 1.3       Normal Values: 0.6 - 1.2 cm  PWT 1.1       Normal Values: 0.6 - 1.1 cm  LVIDd 4.3         Normal Values: 3.0 - 5.6 cm  LVIDs 2.9         Normal Values: 1.8 - 4.0 cm      OBSERVATIONS:  Technically difficult study  Mitral Valve: Mitral annular calcification, moderate MR.  Aortic Valve/Aorta: Calcified trileaflet aortic valve with decreased opening. Peak transaortic valve gradient 26.5 mmHg with a mean transaortic valve gradient 17.8 mmHg. The aortic valve area is calculated to be 1.34 sq cm by continuity equation. This is consistent with moderate aortic stenosis.  Tricuspid Valve: Moderate TR.  Pulmonic Valve: Trace PI  Left Atrium: normal  Right Atrium: Enlarged  Left Ventricle:normal LV size and systolic function, estimated LVEF of 55-60%.  Right Ventricle: Right ventricular enlargement with grossly normal systolic function  Pericardium: no significant pericardial effusion.  Pulmonary/RV Pressure: estimated PA systolic pressure of 46.3 mmHg assuming an RA pressure of 3 mmHg.  IVC measures 1.3 cm          IMPRESSION:  Normal left ventricular internal dimensions and systolic function, estimated LVEF of 55-60%.  Right ventricular enlargement with grossly normal systolic function  Calcified trileaflet aortic valve with moderate aortic stenosis, without AI.  Moderate MR and TR.  Right atrial enlargement  No significant pericardial effusion.    < end of copied text >        HEALTH ISSUES - PROBLEM Dx:  Lower extremity edema    Hypokalemia    Hypertension    Hypothyroid    DVT prophylaxis    Paroxysmal atrial fibrillation    Cellulitis          Consultant(s) Notes Reviewed:  [ x ] YES     Care Discussed with [ x ] Consultants, [ x ] Patient, [ x ] Family- , [  ] HCP, [  ] , [  ] Social Service, [ x ] RN, [  ] Physical Therapy, [  ] Palliative Care Team  DVT PPX: [  ] Lovenox, [  ] SC Heparin, [ x ] Coumadin, [  ] Xarelto, [  ] Eliquis, [  ] Pradaxa, [  ] IV Heparin drip, [  ] SCD, [  ] Ambulation, [  ] Contraindicated 2/2 GI Bleed, [  ] Contraindicated 2/2  Bleed, [  ] Contraindicated 2/2 Brain Bleed  Advanced Directive: [ x ] None, [  ] DNR/DNI

## 2021-10-13 NOTE — PROGRESS NOTE ADULT - ASSESSMENT
Patient is a 89 y/o F with a pmhx b/l LE edema, HTN, hypothyroidism, who presents with 1mo hx of worsening b/l LE edema. Endorses erythema and swelling of b/l LE  edema ,oozing wound with Volume overload.

## 2021-10-13 NOTE — PROGRESS NOTE ADULT - ATTENDING COMMENTS
Patient is a 89 y/o F with a pmhx b/l LE edema, HTN, hypothyroidism, who presents with 1mo hx of worsening b/l LE edema. Endorses erythema and swelling of b/l LE  edema ,oozing wound with Volume overload, with possible cellulitis.   Pt seen, examined, case & care plan d/w pt, residents at detail.  D/W ID-Dr Larios -follow up  D/W PMD- DR Barbara Mccord at detail.  AM Labs   PO diet   PT Eval. --->SINAN., D/W Social service , -pt does NOT want SINAN  -D/W Dtr at detail, about care plan.  Total care time 45 minutes.

## 2021-10-13 NOTE — PROGRESS NOTE ADULT - PROBLEM SELECTOR PLAN 4
- chronic, stable  - continue home Lopressor 50mg qd  - c/w Lasix 20 mg IV q 12 hrs - c/w 20 meq K PO qd  - trend in AM CMP

## 2021-10-13 NOTE — DISCHARGE NOTE PROVIDER - NSDCMRMEDTOKEN_GEN_ALL_CORE_FT
gabapentin 100 mg oral capsule: 1 cap(s) orally 3 times a day  Jantoven 1 mg oral tablet: 1 tab(s) orally once a day  Jantoven 1 mg oral tablet: 2 tab(s) orally Monday, Wednesday, and Friday Jantoven 4 mg oral tablet: 1 tab(s) orally once a day  Klor-Con 10 mEq oral tablet, extended release: 1 tab(s) orally once a day  Lasix 40 mg oral tablet: 1 tab(s) orally once a day  lidocaine 5% patch: Apply to affected areas  Lopressor 50 mg oral tablet: 1 tab(s) orally once a day  Synthroid 50 mcg (0.05 mg) oral tablet: 1 tab(s) orally once a day  Vitamin B6 50 mg oral tablet: 1 tab(s) orally once a day   gabapentin 100 mg oral capsule: 1 cap(s) orally 3 times a day  Jantoven 1 mg oral tablet: 2 tab(s) orally Monday, Wednesday, and Friday  Jantoven 4 mg oral tablet: 1 tab(s) orally once a day  Klor-Con 10 mEq oral tablet, extended release: 1 tab(s) orally once a day  Lasix 40 mg oral tablet: 1 tab(s) orally once a day  lidocaine 5% patch: Apply to affected areas  Lopressor 50 mg oral tablet: 1 tab(s) orally once a day  Synthroid 50 mcg (0.05 mg) oral tablet: 1 tab(s) orally once a day  Vitamin B6 50 mg oral tablet: 1 tab(s) orally once a day   gabapentin 100 mg oral capsule: 1 cap(s) orally 3 times a day  Jantoven 1 mg oral tablet: 2 tab(s) orally Monday, Wednesday, and Friday  Jantoven 4 mg oral tablet: 1 tab(s) orally once a day  Klor-Con 10 mEq oral tablet, extended release: 1 tab(s) orally once a day  Lasix 40 mg oral tablet: 1 tab(s) orally once a day  lidocaine 5% patch: Apply to affected areas  Lopressor 50 mg oral tablet: 1 tab(s) orally once a day  Rolling Walker :   Synthroid 50 mcg (0.05 mg) oral tablet: 1 tab(s) orally once a day  Vitamin B6 50 mg oral tablet: 1 tab(s) orally once a day   gabapentin 100 mg oral capsule: 1 cap(s) orally 3 times a day  Jantoven 1 mg oral tablet: 2 tab(s) orally Monday, Wednesday, and Friday  Jantoven 4 mg oral tablet: 1 tab(s) orally once a day  Klor-Con 10 mEq oral tablet, extended release: 1 tab(s) orally once a day  Lasix 40 mg oral tablet: 1 tab(s) orally once a day  lidocaine 5% patch: Apply to affected areas  Lopressor 50 mg oral tablet: 1 tab(s) orally once a day  Rolling Walker :   rolling walker:   Synthroid 50 mcg (0.05 mg) oral tablet: 1 tab(s) orally once a day  Vitamin B6 50 mg oral tablet: 1 tab(s) orally once a day   ammonium lactate 12% topical lotion: 1 application topically 2 times a day  gabapentin 100 mg oral capsule: 1 cap(s) orally 3 times a day  Jantoven 1 mg oral tablet: 2 tab(s) orally Monday, Wednesday, and Friday Jantoven 4 mg oral tablet: 1 tab(s) orally once a day  Lasix 40 mg oral tablet: 1 tab(s) orally once a day  lidocaine 5% patch: Apply to affected areas  Lopressor 50 mg oral tablet: 1 tab(s) orally once a day  predniSONE 20 mg oral tablet: 1 tab(s) orally once a day  Rolling Walker :   Synthroid 50 mcg (0.05 mg) oral tablet: 1 tab(s) orally once a day  Vitamin B6 50 mg oral tablet: 1 tab(s) orally once a day   ammonium lactate 12% topical lotion: 1 application topically 2 times a day  gabapentin 100 mg oral capsule: 1 cap(s) orally 3 times a day  Jantoven 1 mg oral tablet: 1 tab(s)  EXTRA dose along with  4 mg daily orally Monday, Wednesday, and Friday Jantoven 4 mg oral tablet: 1 tab(s) orally once a day  Klor-Con 10 oral tablet, extended release: 2 tab(s) orally once a day  Lasix 40 mg oral tablet: 1 tab(s) orally once a day  lidocaine 5% patch: Apply to affected areas  Lopressor 50 mg oral tablet: 1 tab(s) orally once a day  predniSONE 20 mg oral tablet: 1 tab(s) orally once a day  Rolling Walker :   senna oral tablet: 2 tab(s) orally once a day (at bedtime)  Synthroid 50 mcg (0.05 mg) oral tablet: 1 tab(s) orally once a day  Vitamin B6 50 mg oral tablet: 1 tab(s) orally once a day

## 2021-10-13 NOTE — CONSULT NOTE ADULT - SUBJECTIVE AND OBJECTIVE BOX
HPI:  Patient is a 89 y/o F with a pmhx b/l LE edema, HTN,P Afib on AC , hypothyroidism, who presents with 1mo hx of worsening b/l LE edema. Endorses erythema and swelling b/l. Per patient, her doctor ?neurologist, told her to stop Lasix recently so pt has not been taking. States pain patches, water pills and antibiotics as an outpatient were unhelpful in relieving these symptoms. States she has had symptoms for 6 months but recently worsened. Denies chest pain, shortness of breath, palpitations, fevers, chills, nausea, vomiting. Denies having in past.      PAST MEDICAL & SURGICAL HISTORY:  Hypothyroid    Hypertension    Lower extremity edema      MEDICATIONS  (STANDING):  furosemide   Injectable 20 milliGRAM(s) IV Push two times a day  gabapentin 100 milliGRAM(s) Oral three times a day  levothyroxine 50 MICROGram(s) Oral daily  metoprolol tartrate 50 milliGRAM(s) Oral daily  potassium chloride    Tablet ER 20 milliEquivalent(s) Oral daily    MEDICATIONS  (PRN):      Allergies    No Known Allergies    Intolerances        SOCIAL HISTORY:      FAMILY HISTORY:      Vital Signs Last 24 Hrs  T(C): 36.7 (13 Oct 2021 05:30), Max: 36.8 (12 Oct 2021 21:40)  T(F): 98 (13 Oct 2021 05:30), Max: 98.3 (12 Oct 2021 21:40)  HR: 72 (13 Oct 2021 05:30) (56 - 73)  BP: 118/64 (13 Oct 2021 05:30) (110/58 - 143/76)  BP(mean): --  RR: 17 (13 Oct 2021 05:30) (17 - 18)  SpO2: 93% (13 Oct 2021 05:30) (91% - 93%)    NAD /   A&Ox3/ Alert  WD/ WN/ WG  Total Care Sport/ Versa Care P500 bed                            10.8   4.09  )-----------( 192      ( 12 Oct 2021 09:17 )             33.9     10-12    138  |  102  |  11  ----------------------------<  77  3.1<L>   |  32<H>  |  1.10    Ca    7.9<L>      12 Oct 2021 09:17    TPro  7.0  /  Alb  2.4<L>  /  TBili  1.4<H>  /  DBili  x   /  AST  47<H>  /  ALT  31  /  AlkPhos  86  10-12      Neurology  weakened strength & sensation grossly intact  verbal, follows commands    Musculoskeletal/Vascular:  ROM intact  edema  DP/PT obsured with edema  + popliteal cap refill <3    no deformities/ contractures    Skin: frail,

## 2021-10-13 NOTE — PROGRESS NOTE ADULT - ASSESSMENT
89 y/o F with a pmhx b/l LE edema, HTN, hypothyroidism, who presents with 1mo hx of worsening b/l LE edema, erythema and swelling b/l.     RECOMMENDATIONS    No fever, no leukocytosis, 1 mo chronicity with symmetrical nature is very atypical for an infectious process. Low suspicion for this being due to an infectious process and suspect inflammatory stasis dermatitis..but with chronicity and failure to improve  will try combination of Ceftriaxone/steroid to see if this impacts LE    We will follow along in the care of this patient. Please call us at [phone] or text me directly on my cell# at [phone] with any concerns.

## 2021-10-13 NOTE — CONSULT NOTE ADULT - ATTENDING COMMENTS
Chart reviewed    Patient seen and examined    Agree with plan as outlined above    Patient is a 89 y/o F with a pmhx b/l LE edema, HTN,P Afib on AC , hypothyroidism, who presents with 1mo hx of worsening b/l LE edema. Cardiology consulted for LE edema b/l.     Edema  - Past month of b/l LE edema, worsening past 2 weeks. Unlikely infectious process at this time, monitoring off abx. ID on board.   - Cardiac etiology unlikely given echo on admission with normal LV function: EF 55-60%, RV and atrial enlargement, moderate AS, MR and TR.   - Continue with lasix 20 IVP BID however heart function not contributing to fluid overload   - Patient appears to have chronic venous stasis changes, would likely benefit from compression stockings    AFib  - rate controlled at present  - Supratheraputic INR today, would restart coumadin when appropriate   - Goal INR 2-3

## 2021-10-13 NOTE — DISCHARGE NOTE PROVIDER - NSDCCPCAREPLAN_GEN_ALL_CORE_FT
PRINCIPAL DISCHARGE DIAGNOSIS  Diagnosis: Leg edema  Assessment and Plan of Treatment: You were admitted for worsening lower extremity edema. You were given antibiotics which were discontinued per ID believing the redness was due to cellulitis. An echocardiogram was performed showing effective systolic function with some mitral and tricuspid regurgitation (leaky valve) with enlarged right heart. You were given a water pill called Lasix to remove water form your body and your swelling in your legs improved through hospital stay. Cardiology followed reccomending___.      SECONDARY DISCHARGE DIAGNOSES  Diagnosis: Fluid overload  Assessment and Plan of Treatment:     Diagnosis: Hypokalemia  Assessment and Plan of Treatment: You had low potassium while in the hospital which was subsequently repleted. Please continue with good dieting and follow up with PCP for further reccomendations.    Diagnosis: Paroxysmal atrial fibrillation  Assessment and Plan of Treatment: Atrial fibrillation is a condition where the different parts of the heart do not beat in time with each other. These changes can lead to serious issues, including clots that form in the heart and changes in the heart rate and rhythm where your heart might beat too fast. It is important that you take your prescribed medications as instructed to avoid clots forming and to make sure your heart does not beat too fast. It is also very important that you follow up with your outpatient cardiologist after discharge within a week to make sure your medications are working effectively.    Diagnosis: Hypertension  Assessment and Plan of Treatment: Hypertension, or high blood pressure, is a condition where your blood pressures are too high. Over time, this can lead to problems with your heart, kidney, blood vessels, and other organs. Uncontrolled hypertension can also lead to major adverse events like strokes and bleeds. During your hospitalization, your blood pressures were constantly monitored and treated with medication. It is very important that you continue your medications as prescribed and make a follow up appointment with your primary care physician to address changes made to your regimen as soon as you leave the hospital.    Diagnosis: Hypothyroid  Assessment and Plan of Treatment: You were previously diagnosed with hypothyroidism. Please continue to take your synthroid as prescribed and follow up with your PCP for further monitoring of your thyroid levels.     PRINCIPAL DISCHARGE DIAGNOSIS  Diagnosis: Leg edema  Assessment and Plan of Treatment: You were admitted for worsening lower extremity edema. You were given antibiotics which were discontinued per ID believing the redness was due to cellulitis. An echocardiogram was performed showing effective systolic function with some mitral and tricuspid regurgitation (leaky valve) with enlarged right heart. You were given a water pill called Lasix to remove water form your body and your swelling in your legs improved through hospital stay. Cardiology followed reccomending___.      SECONDARY DISCHARGE DIAGNOSES  Diagnosis: Fluid overload  Assessment and Plan of Treatment:     Diagnosis: Hypertension  Assessment and Plan of Treatment: Hypertension, or high blood pressure, is a condition where your blood pressures are too high. Over time, this can lead to problems with your heart, kidney, blood vessels, and other organs. Uncontrolled hypertension can also lead to major adverse events like strokes and bleeds. During your hospitalization, your blood pressures were constantly monitored and treated with medication. It is very important that you continue your medications as prescribed and make a follow up appointment with your primary care physician to address changes made to your regimen as soon as you leave the hospital.    Diagnosis: Hypothyroid  Assessment and Plan of Treatment: You were previously diagnosed with hypothyroidism. Please continue to take your synthroid as prescribed and follow up with your PCP for further monitoring of your thyroid levels.    Diagnosis: Paroxysmal atrial fibrillation  Assessment and Plan of Treatment: Atrial fibrillation is a condition where the different parts of the heart do not beat in time with each other. These changes can lead to serious issues, including clots that form in the heart and changes in the heart rate and rhythm where your heart might beat too fast. It is important that you take your prescribed medications as instructed to avoid clots forming and to make sure your heart does not beat too fast. It is also very important that you follow up with your outpatient cardiologist after discharge within a week to make sure your medications are working effectively.    Diagnosis: Hypokalemia  Assessment and Plan of Treatment: You had low potassium while in the hospital which was subsequently repleted. Please continue with good dieting and follow up with PCP for further reccomendations.     PRINCIPAL DISCHARGE DIAGNOSIS  Diagnosis: Leg edema  Assessment and Plan of Treatment: You were admitted for worsening lower extremity edema. You were given antibiotics which were discontinued per ID believing the redness was due to cellulitis. An echocardiogram was performed showing effective systolic function with some mitral and tricuspid regurgitation (leaky valve) with enlarged right heart. You were given a water pill called Lasix to remove water form your body and your swelling in your legs improved through hospital stay. Cardiology followed reccomending lasix 40 once per day by mouth. Please continue taking medication as prescribed and follow up with PCP for further reccomendations.      SECONDARY DISCHARGE DIAGNOSES  Diagnosis: Hypertension  Assessment and Plan of Treatment: Hypertension, or high blood pressure, is a condition where your blood pressures are too high. Over time, this can lead to problems with your heart, kidney, blood vessels, and other organs. Uncontrolled hypertension can also lead to major adverse events like strokes and bleeds. During your hospitalization, your blood pressures were constantly monitored and treated with medication. It is very important that you continue your medications as prescribed and make a follow up appointment with your primary care physician to address changes made to your regimen as soon as you leave the hospital.    Diagnosis: Hypothyroid  Assessment and Plan of Treatment: You were previously diagnosed with hypothyroidism. Please continue to take your synthroid as prescribed and follow up with your PCP for further monitoring of your thyroid levels.    Diagnosis: Paroxysmal atrial fibrillation  Assessment and Plan of Treatment: Atrial fibrillation is a condition where the different parts of the heart do not beat in time with each other. These changes can lead to serious issues, including clots that form in the heart and changes in the heart rate and rhythm where your heart might beat too fast. It is important that you take your prescribed medications as instructed to avoid clots forming and to make sure your heart does not beat too fast. It is also very important that you follow up with your outpatient cardiologist after discharge within a week to make sure your medications are working effectively.    Diagnosis: Hypokalemia  Assessment and Plan of Treatment: You had low potassium while in the hospital which was subsequently repleted. Please continue with good dieting and follow up with PCP for further reccomendations.    Diagnosis: Constipation  Assessment and Plan of Treatment: You were constipated while in the hospital and were started on miralax and senna which assisted with you having bowel movements.     PRINCIPAL DISCHARGE DIAGNOSIS  Diagnosis: Leg edema  Assessment and Plan of Treatment: You were admitted for worsening lower extremity edema with redness & oozing blisters  2/2 NON compliance with medications.  - You were given  IV Lasix & IV antibiotics which helped improve redness & swelling  , Completed IV antibiotics,    -Continue Steroids -Prednisone 1 tab daily x 3 more days   -An echocardiogram was performed showing effective systolic function with some mitral and tricuspid regurgitation (leaky valve) with enlarged right heart.  - You were given a IV  Lasix to remove water form your body and your swelling in your legs improved through hospital stay.  - Cardiology followed you in hospital   -Continue  lasix 40 once per day by mouth., Keep your leg elevated when you are sitting down.  -Follow up with Fluid restriction 1 lit/24 hrs. DO NOT drink Extra water/ Liquids to avoid leg swelling.  - Please continue taking medication as prescribed and follow up with PCP for further reccomendations.  -On Gabapentin 100 mg 1 tab 3x day      SECONDARY DISCHARGE DIAGNOSES  Diagnosis: Hypertension  Assessment and Plan of Treatment: -Continue Metoprolol 50 mg 1 tab daily-  Continue Lasix 40 mg 1 tab daily    Diagnosis: Paroxysmal atrial fibrillation  Assessment and Plan of Treatment: Atrial fibrillation   -HR stable on Metoprolol 50 mg daily  -On Coumadin daily 4 mg , Take Extra 1 mg coumadin with 4 mg on Monday/Wednesday/Friday to  Keep INR 2-3   -Follow up with Flex LUCAS in 1 week.    Diagnosis: Hypothyroid  Assessment and Plan of Treatment: -. Please continue to take your synthroid as prescribed and follow up with your PCP for further monitoring of your thyroid levels.    Diagnosis: Venous stasis dermatitis of lower extremity  Assessment and Plan of Treatment: Continue Lasix 40 mg daily  -Keep lower Ext elevated when sitting down  -Apply LAC- Hydrine lotions to both lower Ext 2x day    Diagnosis: Hypokalemia  Assessment and Plan of Treatment: You had low potassium 2/2  Lasix .   Please continue  Klor Con 10 meq 2 tab daily with Lasix   -  follow up with PCP for further reccomendations.    Diagnosis: Constipation  Assessment and Plan of Treatment: -Continue senna at bed time.

## 2021-10-13 NOTE — CONSULT NOTE ADULT - ASSESSMENT
Patient is a 89 y/o F with a pmhx b/l LE edema, HTN,P Afib on AC , hypothyroidism, who presents with 1mo hx of worsening b/l LE edema. Cardiology consulted for LE edema b/l.     Edema  - Past month of b/l LE edema, worsening past 2 weeks.  - Echo on admission: EF 55-60%, RV and atrial enlargement, moderate AS, MR and TR.   - Continue with lasix 20 IVP BID   -  Patient is a 89 y/o F with a pmhx b/l LE edema, HTN,P Afib on AC , hypothyroidism, who presents with 1mo hx of worsening b/l LE edema. Cardiology consulted for LE edema b/l.       *****CHARTING IN PROGRESS*******       Edema  - Past month of b/l LE edema, worsening past 2 weeks. Unlikely infectious process at this time, monitoring off abx. ID on board.   - Echo on admission: EF 55-60%, RV and atrial enlargement, moderate AS, MR and TR.   - Continue with lasix 20 IVP BID   -     AFib  - rate controlled at present  - Supratheraputic INR today, would restart coumadin when appropriate   - Goal INR 2-3     Patient is a 89 y/o F with a pmhx b/l LE edema, HTN,P Afib on AC , hypothyroidism, who presents with 1mo hx of worsening b/l LE edema. Cardiology consulted for LE edema b/l.     Edema  - Past month of b/l LE edema, worsening past 2 weeks. Unlikely infectious process at this time, monitoring off abx. ID on board.   - Cardiac etiology unlikely given echo on admission with normal LV function: EF 55-60%, RV and atrial enlargement, moderate AS, MR and TR.   - Continue with lasix 20 IVP BID however heart function not contributing to fluid overload   - Patient appears to have chronic venous stasis changes, would likely benefit from compression stockings    AFib  - rate controlled at present  - Supratheraputic INR today, would restart coumadin when appropriate   - Goal INR 2-3    Aortic Stenosis  - appears stable given echo report, pt denies symptoms of heart failure, syncope, angina.   - Would recommend outpatient followup for further management  - No acute cardiac intervention warranted at this time

## 2021-10-13 NOTE — PROGRESS NOTE ADULT - PROBLEM SELECTOR PLAN 1
- Chronic Venous stasis dermatitis now with blisters oozing likely 2/2 fluid overload from med non-compliance. unlikely 2/2 cellulitis at this time   - continue Lasix 20 mg IV bid   - blood cultures NGTD -> no Abx at this time  - US doppler (-)  - TTE: LVEF 55-60 | moderate TR/MR | RV/RA enlargement  - c/w gabapentin 100mg tid  - ID DR Larios d/w- observe off IV Abx   - Cardiology consulted, will appreciate recs - Chronic Venous stasis dermatitis now with blisters oozing likely 2/2 fluid overload from med non-compliance. unlikely 2/2 cellulitis at this time   - continue Lasix 20 mg IV bid   - blood cultures NGTD -> no Abx at this time  - US doppler (-)  - TTE: LVEF 55-60 | moderate TR/MR | RV/RA enlargement  - c/w gabapentin 100mg tid  - ID DR Larios d/w- -being due to an infectious process and suspect inflammatory stasis dermatitis. .but with chronicity and failure to improve will try combination of Ceftriaxone/steroid to see if this impacts LE    - Cardiology consulted, will appreciate recs

## 2021-10-14 DIAGNOSIS — K59.00 CONSTIPATION, UNSPECIFIED: ICD-10-CM

## 2021-10-14 DIAGNOSIS — Z20.822 CONTACT WITH AND (SUSPECTED) EXPOSURE TO COVID-19: ICD-10-CM

## 2021-10-14 LAB
ANION GAP SERPL CALC-SCNC: 5 MMOL/L — SIGNIFICANT CHANGE UP (ref 5–17)
APTT BLD: 41.1 SEC — HIGH (ref 27.5–35.5)
BASOPHILS # BLD AUTO: 0 K/UL — SIGNIFICANT CHANGE UP (ref 0–0.2)
BASOPHILS NFR BLD AUTO: 0 % — SIGNIFICANT CHANGE UP (ref 0–2)
BUN SERPL-MCNC: 19 MG/DL — SIGNIFICANT CHANGE UP (ref 7–23)
CALCIUM SERPL-MCNC: 8.1 MG/DL — LOW (ref 8.5–10.1)
CHLORIDE SERPL-SCNC: 104 MMOL/L — SIGNIFICANT CHANGE UP (ref 96–108)
CO2 SERPL-SCNC: 30 MMOL/L — SIGNIFICANT CHANGE UP (ref 22–31)
CREAT SERPL-MCNC: 1.1 MG/DL — SIGNIFICANT CHANGE UP (ref 0.5–1.3)
EOSINOPHIL # BLD AUTO: 0 K/UL — SIGNIFICANT CHANGE UP (ref 0–0.5)
EOSINOPHIL NFR BLD AUTO: 0 % — SIGNIFICANT CHANGE UP (ref 0–6)
GLUCOSE SERPL-MCNC: 110 MG/DL — HIGH (ref 70–99)
HCT VFR BLD CALC: 33 % — LOW (ref 34.5–45)
HGB BLD-MCNC: 10.3 G/DL — LOW (ref 11.5–15.5)
IMM GRANULOCYTES NFR BLD AUTO: 0.5 % — SIGNIFICANT CHANGE UP (ref 0–1.5)
INR BLD: 2.25 RATIO — HIGH (ref 0.88–1.16)
LYMPHOCYTES # BLD AUTO: 0.84 K/UL — LOW (ref 1–3.3)
LYMPHOCYTES # BLD AUTO: 19.4 % — SIGNIFICANT CHANGE UP (ref 13–44)
MCHC RBC-ENTMCNC: 27.7 PG — SIGNIFICANT CHANGE UP (ref 27–34)
MCHC RBC-ENTMCNC: 31.2 GM/DL — LOW (ref 32–36)
MCV RBC AUTO: 88.7 FL — SIGNIFICANT CHANGE UP (ref 80–100)
MONOCYTES # BLD AUTO: 0.41 K/UL — SIGNIFICANT CHANGE UP (ref 0–0.9)
MONOCYTES NFR BLD AUTO: 9.5 % — SIGNIFICANT CHANGE UP (ref 2–14)
NEUTROPHILS # BLD AUTO: 3.06 K/UL — SIGNIFICANT CHANGE UP (ref 1.8–7.4)
NEUTROPHILS NFR BLD AUTO: 70.6 % — SIGNIFICANT CHANGE UP (ref 43–77)
NRBC # BLD: 0 /100 WBCS — SIGNIFICANT CHANGE UP (ref 0–0)
PLATELET # BLD AUTO: 203 K/UL — SIGNIFICANT CHANGE UP (ref 150–400)
POTASSIUM SERPL-MCNC: 3.5 MMOL/L — SIGNIFICANT CHANGE UP (ref 3.5–5.3)
POTASSIUM SERPL-SCNC: 3.5 MMOL/L — SIGNIFICANT CHANGE UP (ref 3.5–5.3)
PROTHROM AB SERPL-ACNC: 25.3 SEC — HIGH (ref 10.6–13.6)
RBC # BLD: 3.72 M/UL — LOW (ref 3.8–5.2)
RBC # FLD: 17.4 % — HIGH (ref 10.3–14.5)
SARS-COV-2 RNA SPEC QL NAA+PROBE: SIGNIFICANT CHANGE UP
SODIUM SERPL-SCNC: 139 MMOL/L — SIGNIFICANT CHANGE UP (ref 135–145)
WBC # BLD: 4.33 K/UL — SIGNIFICANT CHANGE UP (ref 3.8–10.5)
WBC # FLD AUTO: 4.33 K/UL — SIGNIFICANT CHANGE UP (ref 3.8–10.5)

## 2021-10-14 PROCEDURE — 99232 SBSQ HOSP IP/OBS MODERATE 35: CPT

## 2021-10-14 RX ORDER — POLYETHYLENE GLYCOL 3350 17 G/17G
17 POWDER, FOR SOLUTION ORAL
Refills: 0 | Status: DISCONTINUED | OUTPATIENT
Start: 2021-10-14 | End: 2021-10-16

## 2021-10-14 RX ORDER — SENNA PLUS 8.6 MG/1
2 TABLET ORAL AT BEDTIME
Refills: 0 | Status: DISCONTINUED | OUTPATIENT
Start: 2021-10-14 | End: 2021-10-19

## 2021-10-14 RX ORDER — WARFARIN SODIUM 2.5 MG/1
3 TABLET ORAL ONCE
Refills: 0 | Status: COMPLETED | OUTPATIENT
Start: 2021-10-14 | End: 2021-10-14

## 2021-10-14 RX ORDER — FUROSEMIDE 40 MG
20 TABLET ORAL ONCE
Refills: 0 | Status: COMPLETED | OUTPATIENT
Start: 2021-10-14 | End: 2021-10-14

## 2021-10-14 RX ORDER — FUROSEMIDE 40 MG
40 TABLET ORAL
Refills: 0 | Status: DISCONTINUED | OUTPATIENT
Start: 2021-10-15 | End: 2021-10-15

## 2021-10-14 RX ORDER — WARFARIN SODIUM 2.5 MG/1
4 TABLET ORAL ONCE
Refills: 0 | Status: DISCONTINUED | OUTPATIENT
Start: 2021-10-14 | End: 2021-10-14

## 2021-10-14 RX ORDER — SODIUM CHLORIDE 9 MG/ML
250 INJECTION INTRAMUSCULAR; INTRAVENOUS; SUBCUTANEOUS
Refills: 0 | Status: DISCONTINUED | OUTPATIENT
Start: 2021-10-14 | End: 2021-10-14

## 2021-10-14 RX ADMIN — GABAPENTIN 100 MILLIGRAM(S): 400 CAPSULE ORAL at 05:23

## 2021-10-14 RX ADMIN — FAMOTIDINE 20 MILLIGRAM(S): 10 INJECTION INTRAVENOUS at 12:08

## 2021-10-14 RX ADMIN — SENNA PLUS 2 TABLET(S): 8.6 TABLET ORAL at 21:34

## 2021-10-14 RX ADMIN — Medication 20 MILLIGRAM(S): at 05:23

## 2021-10-14 RX ADMIN — Medication 20 MILLIGRAM(S): at 17:42

## 2021-10-14 RX ADMIN — WARFARIN SODIUM 3 MILLIGRAM(S): 2.5 TABLET ORAL at 21:34

## 2021-10-14 RX ADMIN — CEFTRIAXONE 100 MILLIGRAM(S): 500 INJECTION, POWDER, FOR SOLUTION INTRAMUSCULAR; INTRAVENOUS at 10:50

## 2021-10-14 RX ADMIN — Medication 20 MILLIGRAM(S): at 05:24

## 2021-10-14 RX ADMIN — Medication 20 MILLIEQUIVALENT(S): at 12:07

## 2021-10-14 RX ADMIN — GABAPENTIN 100 MILLIGRAM(S): 400 CAPSULE ORAL at 21:38

## 2021-10-14 RX ADMIN — Medication 50 MILLIGRAM(S): at 05:23

## 2021-10-14 RX ADMIN — POLYETHYLENE GLYCOL 3350 17 GRAM(S): 17 POWDER, FOR SOLUTION ORAL at 17:41

## 2021-10-14 RX ADMIN — GABAPENTIN 100 MILLIGRAM(S): 400 CAPSULE ORAL at 14:56

## 2021-10-14 RX ADMIN — Medication 50 MICROGRAM(S): at 05:23

## 2021-10-14 NOTE — PROGRESS NOTE ADULT - PROBLEM SELECTOR PLAN 1
- Chronic Venous stasis dermatitis now with blisters oozing likely 2/2 fluid overload from med non-compliance. unlikely 2/2 cellulitis at this time   - continue Lasix 20 mg IV bid   - blood cultures NGTD -> no Abx at this time  - US doppler (-)  - TTE: LVEF 55-60 | moderate TR/MR | RV/RA enlargement  - c/w gabapentin 100mg tid  - c/w ceftriaxone/prednisone for now, improved over last 24 hrs -> can transition to Cefuroxime 500mg PO BID with last day 10/17 and prednisone 20mg PO daily with last day 10/22  - ID DR Larios, recs appreciated  - Cardiology following, recs appreciated - Chronic Venous stasis dermatitis now with blisters oozing likely 2/2 fluid overload from med non-compliance. unlikely 2/2 cellulitis at this time   - continue Lasix 20 mg IV bid today -> transition to Lasix 40 mg PO BID tomorrow (10/15)  - blood cultures NGTD -> no Abx at this time  - US doppler (-)  - TTE: LVEF 55-60 | moderate TR/MR | RV/RA enlargement  - c/w gabapentin 100mg tid  - c/w ceftriaxone/prednisone for now, improved over last 24 hrs -> can transition to Cefuroxime 500mg PO BID with last day 10/17 and prednisone 20mg PO daily with last day 10/22  - ID DR Larios, recs appreciated  - Cardiology following, recs appreciated - Chronic Venous stasis dermatitis now with blisters oozing likely 2/2 fluid overload from med non-compliance. unlikely 2/2 cellulitis at this time   - continue Lasix 20 mg IV bid today -> transition to Lasix 40 mg PO BID tomorrow (10/15)  - blood cultures NGTD -> no Abx at this time  - US doppler (-)  - TTE: LVEF 55-60 | moderate TR/MR | RV/RA enlargement  - c/w gabapentin 100mg tid  - c/w ceftriaxone/prednisone for now, improved over last 24 hrs -> can transition to Cefuroxime 500mg PO BID with last day 10/17 and prednisone 20mg PO daily with last day 10/22  - ID DR Larios, recs appreciated  - Cardiology following, recs appreciated- Can change to PO Lasix on D/C

## 2021-10-14 NOTE — PHARMACOTHERAPY INTERVENTION NOTE - COMMENTS
Patient is taking warfarin for afib and upon admission INR was supratherapeutic (INR: 3.36 & 3.03) and dose was held.  Discussed with Dr. Keagan Villegas that due to age and previous INR, would like to restart with lower dose of 2.5-3 mg.  MD agrees and would like to restart on 3 mg x1 tonight.

## 2021-10-14 NOTE — PROGRESS NOTE ADULT - ASSESSMENT
89 y/o F with a pmhx b/l LE edema, HTN, hypothyroidism, who presents with 1mo hx of worsening b/l LE edema, erythema and swelling b/l.     RECOMMENDATIONS    No fever, no leukocytosis, 1 mo chronicity with symmetrical nature is very atypical for an infectious process. Low suspicion for this being due to an infectious process and suspect inflammatory stasis dermatitis..but with chronicity and failure to improve  started abx/steroids with rapid response   for now rec continued Ceftriaxone/steroid   but anticipate we will be able to transition to finish course with  Cefuroxime 500mg PO BID with last day 10/17  prednisone 20mg PO daily with last day 10/22    We will follow along in the care of this patient. Please call us at [phone] or text me directly on my cell# at [phone] with any concerns.

## 2021-10-14 NOTE — PROGRESS NOTE ADULT - PROBLEM SELECTOR PLAN 3
H/O P A Fib, stable  INR therapeutic 2-3 -> c/w warfarin  - c/w Metoprolol  T 50 mg qd  - Daily INR H/O P A Fib, stable  INR therapeutic 2-3 -> c/w warfarin  - c/w Metoprolol XL  T 50 mg qd  - Daily INR H/O P A Fib, stable  INR therapeutic 2-3 -> c/w warfarin  - c/w Metoprolol   T 50 mg qd  - Daily INR

## 2021-10-14 NOTE — PROGRESS NOTE ADULT - SUBJECTIVE AND OBJECTIVE BOX
[facility] DIVISION of INFECTIOUS DISEASE  Diony Larios MD PhD, Tricia Cornell MD, Delmy Villegas MD, Juan Alberto Mccord MD, Juan Vieyra MD  and providing coverage with An Mckay MD and Gabriella Bell MD  Providing Infectious Disease Consultations at [facility], [facility]'s    Office# [phone] to schedule follow up appointments  Answering Service for urgent calls or New Consults [phone]  Cell# to text for urgent issues Diony Larios [phone]     infectious diseases progress note:    KENIA REGALADO is a 89y y. o. Female patient    No concerning overnight events, pt reports legs are much better    Allergies    No Known Allergies    Intolerances        ANTIBIOTICS/RELEVANT:  antimicrobials  cefTRIAXone   IVPB 1000 milliGRAM(s) IV Intermittent every 24 hours    immunologic:    OTHER:  famotidine    Tablet 20 milliGRAM(s) Oral daily  furosemide   Injectable 20 milliGRAM(s) IV Push two times a day  gabapentin 100 milliGRAM(s) Oral three times a day  levothyroxine 50 MICROGram(s) Oral daily  metoprolol tartrate 50 milliGRAM(s) Oral daily  potassium chloride    Tablet ER 20 milliEquivalent(s) Oral daily  predniSONE   Tablet 20 milliGRAM(s) Oral daily      Objective:  Vital Signs Last 24 Hrs  T(C): 36.3 (14 Oct 2021 05:00), Max: 36.7 (13 Oct 2021 13:18)  T(F): 97.4 (14 Oct 2021 05:00), Max: 98.1 (13 Oct 2021 13:18)  HR: 73 (14 Oct 2021 05:00) (70 - 73)  BP: 163/66 (14 Oct 2021 05:00) (117/61 - 163/66)  BP(mean): --  RR: 17 (14 Oct 2021 05:00) (17 - 17)  SpO2: 92% (14 Oct 2021 05:00) (92% - 93%)    T(C): 36.3 (10-14-21 @ 05:00), Max: 36.8 (10-12-21 @ 21:40)  T(C): 36.3 (10-14-21 @ 05:00), Max: 37.1 (10-11-21 @ 14:45)  T(C): 36.3 (10-14-21 @ 05:00), Max: 37.1 (10-11-21 @ 14:45)    PHYSICAL EXAM:  HEENT: NC atraumatic  Neck: supple  Respiratory: no accessory muscle use, breathing comfortably  Cardiovascular: distant  Gastrointestinal: normal appearing, nondistended  Extremities: no clubbing, no cyanosis, bilateral reduction in erythema and etnderness        LABS:                          10.3   4.33  )-----------( 203      ( 14 Oct 2021 09:06 )             33.0       4.33 10-14 @ 09:06  4.58 10-13 @ 09:21  4.09 10-12 @ 09:17  5.66 10-11 @ 16:40      10-13    139  |  105  |  17  ----------------------------<  79  3.7   |  31  |  1.20    Ca    7.8<L>      13 Oct 2021 09:21    TPro  6.6  /  Alb  2.2<L>  /  TBili  0.8  /  DBili  x   /  AST  47<H>  /  ALT  29  /  AlkPhos  77  10-13      Creatinine, Serum: 1.20 mg/dL (10-13-21 @ 09:21)  Creatinine, Serum: 1.10 mg/dL (10-12-21 @ 09:17)  Creatinine, Serum: 1.10 mg/dL (10-11-21 @ 16:40)      PT/INR - ( 14 Oct 2021 09:06 )   PT: 25.3 sec;   INR: 2.25 ratio         PTT - ( 14 Oct 2021 09:06 )  PTT:41.1 sec          INFLAMMATORY MARKERS  Auto Neutrophil #: 3.06 K/uL (10-14-21 @ 09:06)  Auto Lymphocyte #: 0.84 K/uL (10-14-21 @ 09:06)  Auto Neutrophil #: 2.15 K/uL (10-13-21 @ 09:21)  Auto Lymphocyte #: 1.69 K/uL (10-13-21 @ 09:21)  Auto Neutrophil #: 3.39 K/uL (10-11-21 @ 16:40)  Auto Lymphocyte #: 1.55 K/uL (10-11-21 @ 16:40)    Lactate, Blood: 1.5 mmol/L (10-11-21 @ 16:40)    Auto Eosinophil #: 0.00 K/uL (10-14-21 @ 09:06)  Auto Eosinophil #: 0.18 K/uL (10-13-21 @ 09:21)  Auto Eosinophil #: 0.08 K/uL (10-11-21 @ 16:40)                        INR: 2.25 ratio (10-14-21 @ 09:06)  Activated Partial Thromboplastin Time: 41.1 sec (10-14-21 @ 09:06)  INR: 3.03 ratio (10-13-21 @ 09:21)  Activated Partial Thromboplastin Time: 45.4 sec (10-13-21 @ 09:21)  INR: 3.46 ratio (10-12-21 @ 09:17)  INR: 2.87 ratio (10-11-21 @ 16:40)  Activated Partial Thromboplastin Time: 48.0 sec (10-11-21 @ 16:40)          MICROBIOLOGY:              RADIOLOGY & ADDITIONAL STUDIES:

## 2021-10-14 NOTE — PROGRESS NOTE ADULT - ASSESSMENT
Patient is a 89 y/o F with a pmhx b/l LE edema, HTN,P Afib on AC , hypothyroidism, who presents with 1mo hx of worsening b/l LE edema. Cardiology consulted for LE edema b/l.     Edema  - Past month of b/l LE edema, worsening past 2 weeks. Unlikely infectious process at this time, monitoring off abx. ID on board.   - Cardiac etiology unlikely given echo on admission with normal LV function: EF 55-60%, RV and atrial enlargement, moderate AS, MR and TR.   - Continue with lasix 20 IVP BID however heart function not contributing to fluid overload,   - Patient appears to have chronic venous stasis changes, would likely benefit from compression stockings    AFib  - rate controlled at present  - Supratheraputic INR today, would restart coumadin when appropriate   - Goal INR 2-3    Aortic Stenosis  - appears stable given echo report, pt denies symptoms of heart failure, syncope, angina.   - Would recommend outpatient followup for further management  - No acute cardiac intervention warranted at this time     Patient is a 89 y/o F with a pmhx b/l LE edema, HTN,P Afib on AC , hypothyroidism, who presents with 1mo hx of worsening b/l LE edema. Cardiology consulted for LE edema b/l.     Edema  - Past month of b/l LE edema, worsening past 2 weeks. Unlikely infectious process at this time, monitoring off abx. ID on board.   - Cardiac etiology unlikely given echo on admission with normal LV function: EF 55-60%, RV and atrial enlargement, moderate AS, MR and TR.   - Continue with lasix 20 IVP BID however heart function not contributing to fluid overload, can switch to PO   - appears euvolemic on exam, currently net neg 500cc   - Patient appears to have chronic venous stasis changes, would likely benefit from compression stockings    AFib  - rate controlled at present  - Supratheraputic INR today, would restart coumadin when appropriate   - Goal INR 2-3  - Monitor and replete Lytes. Keep K > 4 and Mg > 2    Aortic Stenosis  - appears stable given echo report, pt denies symptoms of heart failure, syncope, angina.   - Would recommend outpatient followup for further management  - No acute cardiac intervention warranted at this time    - All other medical needs as per primary team.  - Other cardiovascular workup will depend on clinical course.  - Will continue to follow.    Melany Howe [facility]  Nurse Pracitioner - Cardiology   Spectra #[phone]/ [phone]

## 2021-10-14 NOTE — PROGRESS NOTE ADULT - PROBLEM SELECTOR PLAN 2
Chronic Venous stasis dermatitis b/.l lower EXT, likely non-cardiogenic  - improvement in b/l LE swelling today -> c/w Lasix BID, ceftriaxone, and prednisone  - c/w leg elevation, would benefit from copmression stockings  - ID DR Larios following, recs appreciated  - Cardiology following, recs appreciated Chronic Venous stasis dermatitis b/.l lower EXT, likely non-cardiogenic  - improvement in b/l LE swelling today -> c/w Lasix BID, ceftriaxone, and prednisone  - c/w leg elevation, would benefit from compression stockings  - ID DR Larios following, recs appreciated  - Cardiology following, recs appreciated Chronic Venous stasis dermatitis b/.l lower EXT, likely non-cardiogenic  - improvement in b/l LE swelling today -> c/w Lasix BID, ceftriaxone, and prednisone  - c/w leg elevation, would benefit from compression stockings  - ID DR Larios D/W OK to continue steroids & Abx

## 2021-10-14 NOTE — PROGRESS NOTE ADULT - ATTENDING COMMENTS
Improving LE edema on IV Lasix.  Change to PO today.  Continue to monitor I, O, K, creatinine.  To follow.

## 2021-10-14 NOTE — PROGRESS NOTE ADULT - SUBJECTIVE AND OBJECTIVE BOX
[facility] -- Reid Astorga, Norah, Raheem, Flex Calvert, Jacob Blair: Office # [phone]    Follow Up:  B/L LE edema,    Subjective/Observations: Patient seen and examined, awake, alert, resting comfortably in bed, no complaints of chest pain, dyspnea, palpitations or dizziness, orthopnea and PND. Tolerating room air.     REVIEW OF SYSTEMS: All review of systems is negative for eye, ENT, GI, , allergic, dermatologic, musculoskeletal and neurologic except as described above    PAST MEDICAL & SURGICAL HISTORY:  Hypothyroid  Hypertension  Lower extremity edema      MEDICATIONS  (STANDING):  cefTRIAXone   IVPB 1000 milliGRAM(s) IV Intermittent every 24 hours  famotidine    Tablet 20 milliGRAM(s) Oral daily  furosemide   Injectable 20 milliGRAM(s) IV Push two times a day  gabapentin 100 milliGRAM(s) Oral three times a day  levothyroxine 50 MICROGram(s) Oral daily  metoprolol tartrate 50 milliGRAM(s) Oral daily  potassium chloride    Tablet ER 20 milliEquivalent(s) Oral daily  predniSONE   Tablet 20 milliGRAM(s) Oral daily    MEDICATIONS  (PRN):    Allergies    No Known Allergies    Intolerances      Vital Signs Last 24 Hrs  T(C): 36.3 (14 Oct 2021 05:00), Max: 36.7 (13 Oct 2021 13:18)  T(F): 97.4 (14 Oct 2021 05:00), Max: 98.1 (13 Oct 2021 13:18)  HR: 73 (14 Oct 2021 05:00) (70 - 73)  BP: 163/66 (14 Oct 2021 05:00) (117/61 - 163/66)  RR: 17 (14 Oct 2021 05:00) (17 - 17)  SpO2: 92% (14 Oct 2021 05:00) (92% - 93%)  I&O's Summary    13 Oct 2021 07:01  -  14 Oct 2021 07:00  --------------------------------------------------------  IN: 290 mL / OUT: 850 mL / NET: -560 mL       TELE: Not on telemetry   PHYSICAL EXAM:  Appearance: NAD, no distress, alert,  HEENT: Moist Mucous Membranes, Anicteric  Cardiovascular: Regular rate and rhythm, Normal S1 S2, No JVD, + murmurs, No rubs, gallops or clicks  Respiratory: Non-labored, Clear to auscultation, No rales, No rhonchi, No wheezing.   Gastrointestinal:  Soft, Non-tender, + BS  Neurologic: Non-focal  Skin: Warm and dry, No visible rashes or ulcers, No ecchymosis, No cyanosis  Musculoskeletal: No clubbing, No cyanosis, No joint swelling/tenderness  Psychiatry: Mood & affect appropriate  Lymph: +1 B/L LE  edema.     LABS: All Labs Reviewed:                        10.6   x     )-----------( x        ( 13 Oct 2021 16:20 )             33.6                         9.7    4.58  )-----------( 192      ( 13 Oct 2021 09:21 )             30.7                         10.8   4.09  )-----------( 192      ( 12 Oct 2021 09:17 )             33.9     13 Oct 2021 09:21    139    |  105    |  17     ----------------------------<  79     3.7     |  31     |  1.20   12 Oct 2021 09:17    138    |  102    |  11     ----------------------------<  77     3.1     |  32     |  1.10   11 Oct 2021 16:40    136    |  101    |  11     ----------------------------<  94     3.4     |  32     |  1.10     Ca    7.8        13 Oct 2021 09:21  Ca    7.9        12 Oct 2021 09:17  Ca    8.3        11 Oct 2021 16:40    TPro  6.6    /  Alb  2.2    /  TBili  0.8    /  DBili  x      /  AST  47     /  ALT  29     /  AlkPhos  77     13 Oct 2021 09:21  TPro  7.0    /  Alb  2.4    /  TBili  1.4    /  DBili  x      /  AST  47     /  ALT  31     /  AlkPhos  86     12 Oct 2021 09:17  TPro  8.1    /  Alb  2.8    /  TBili  0.9    /  DBili  x      /  AST  57     /  ALT  37     /  AlkPhos  105    11 Oct 2021 16:40    PT/INR - ( 13 Oct 2021 09:21 )   PT: 33.6 sec;   INR: 3.03 ratio         PTT - ( 13 Oct 2021 09:21 )  PTT:45.4 sec    Lactate, Blood: 1.5 mmol/L (10-11-21 @ 16:40)    12 Lead ECG:   Ventricular Rate 76 BPM  Atrial Rate 55 BPM  QRS Duration 158 ms  Q-T Interval 442 ms  QTC Calculation(Bazett) 497 ms  R Axis -49 degrees  T Axis 138 degrees    Diagnosis Line Atrial fibrillation  Left axis deviation  Left bundle branchblock  Abnormal ECG  When compared with ECG of 11-DEC-2012 14:13,  Current undetermined rhythm precludes rhythm comparison, needs review  Left bundle branch block is now present  Criteria for Anteroseptal infarct are no longer present  Confirmed by Diony Maciel MD ([id number]) on 10/12/2021 1:51:19 PM (10-11-21 @ 17:07)    rad< from: US Duplex Venous Lower Ext Complete, Bilateral (10.11.21 @ 19:00) >    EXAM:  US DPLX LWR EXT VEINS COMPL BI                            PROCEDURE DATE:  10/11/2021          INTERPRETATION:  CLINICAL INFORMATION: Bilateral lower extremity edema and pain    COMPARISON: None available.    TECHNIQUE: Duplex sonography of the BILATERAL LOWER extremity veins with color and spectral Doppler, with and without compression.    FINDINGS:    RIGHT:  Normal compressibility of the RIGHT common femoral, femoral and popliteal veins.  Doppler examination shows normal spontaneous and phasic flow.  No RIGHT calf vein thrombosis is detected.    LEFT:  Normal compressibility of the LEFT common femoral, femoral and popliteal veins.  Doppler examination shows normal spontaneous and phasic flow.  No LEFT calf vein thrombosis is detected.    IMPRESSION:  No evidence of deep venous thrombosis in either lower extremity.    --- End of Report ---            JOSAFAT SOL MD; Attending Radiologist  This document has been electronically signed. Oct 11 2021  7:10PM    < end of copied text >  < from: TTE Echo Complete w/o Contrast w/ Doppler (10.11.21 @ 22:40) >     EXAM:  ECHO TTE WO CON COMP W DOPP         PROCEDURE DATE:  10/11/2021        INTERPRETATION:  INDICATION: Edema  Sonographer AS    Blood Pressure 153/72    Height unavailable     Weight 70.3 kg    Dimensions:  LA 3.6       Normal Values: 2.0 - 4.0 cm  Ao 3.4        Normal Values: 2.0 - 3.8 cm  SEPTUM 1.3       Normal Values: 0.6 - 1.2 cm  PWT 1.1       Normal Values: 0.6 - 1.1 cm  LVIDd 4.3         Normal Values: 3.0 - 5.6 cm  LVIDs 2.9         Normal Values: 1.8 - 4.0 cm      OBSERVATIONS:  Technically difficult study  Mitral Valve: Mitral annular calcification, moderate MR.  Aortic Valve/Aorta: Calcified trileaflet aortic valve with decreased opening. Peak transaortic valve gradient 26.5 mmHg with a mean transaortic valve gradient 17.8 mmHg. The aortic valve area is calculated to be 1.34 sq cm by continuity equation. This is consistent with moderate aortic stenosis.  Tricuspid Valve: Moderate TR.  Pulmonic Valve: Trace PI  Left Atrium: normal  Right Atrium: Enlarged  Left Ventricle:normal LV size and systolic function, estimated LVEF of 55-60%.  Right Ventricle: Right ventricular enlargement with grossly normal systolic function  Pericardium: no significant pericardial effusion.  Pulmonary/RV Pressure: estimated PA systolic pressure of 46.3 mmHg assuming an RA pressure of 3 mmHg.  IVC measures 1.3 cm          IMPRESSION:  Normal left ventricular internal dimensions and systolic function, estimated LVEF of 55-60%.  Right ventricular enlargement with grossly normal systolic function  Calcified trileaflet aortic valve with moderate aortic stenosis, without AI.  Moderate MR and TR.  Right atrial enlargement  No significant pericardial effusion.    --- End of Report ---              JEAN CARLOS HENRY MD; Attending Cardiologist  This document has been electronically signed. Oct 12 2021  5:46PM    < end of copied text >

## 2021-10-14 NOTE — PROGRESS NOTE ADULT - ATTENDING COMMENTS
Patient is a 89 y/o F with a pmhx b/l LE edema, HTN, hypothyroidism, who presents with 1mo hx of worsening b/l LE edema. Endorses erythema and swelling of b/l LE  edema ,oozing wound with Volume overload. Patient is a 89 y/o F with a pmhx b/l LE edema, HTN, hypothyroidism, who presents with 1mo hx of worsening b/l LE edema. Endorses erythema and swelling of b/l LE  edema ,oozing wound with Volume overload.  Pt seen, examined, case & care plan d/w pt, residents at detail.  D/W ID-Dr Larios -Rx for MAB infusion Rx x 1 dose   AM Labs   PO diet   PT Eval. --->SINAN., D/W Social service , -pt does NOT want SINAN  -D/W Dtr at detail, about care plan.  Total care time 45 minutes.

## 2021-10-14 NOTE — PROGRESS NOTE ADULT - SUBJECTIVE AND OBJECTIVE BOX
Patient is a 89y old  Female who presents with a chief complaint of b/l LE cellulitis (14 Oct 2021 09:38)      HPI:  Patient is a 89 y/o F with a pmhx b/l LE edema, HTN,P Afib on AC , hypothyroidism, who presents with 1mo hx of worsening b/l LE edema. Endorses erythema and swelling b/l. Per patient, her doctor ?neurologist, told her to stop Lasix recently so pt has not been taking. States pain patches, water pills and antibiotics as an outpatient were unhelpful in relieving these symptoms. States she has had symptoms for 6 months but recently worsened. Denies chest pain, shortness of breath, palpitations, fevers, chills, nausea, vomiting. Denies having in past.    IN THE ED:  Temp 98.8 F, HR 92, /72, RR 16, SpO2 96%  S/P Zosyn 3.375g x1, Vanco 1g x1, lasix 40 mg IV x1   EKG: left axis deviation, LBBB   Labs significant for WBC 5.66, BNP 2458, PT 31.9, INR 2.87, PTT 48, K 3.4, CO 32, anion gap 3, albumin 2.8, AST 57, eGFR 43  Imaging: CXR with minimal blunting of R base laterally    (11 Oct 2021 18:04)      INTERVAL HPI:  10/12/21 - Patient was seen and examined at bedside. No acute events overnight. Patient endorses improvement in the rubor/erythema of her LE b/l but endorses continued pain in LE b/l. Patient endorses 1 year hx of weight loss (35 pounds) due to decreased appetite. Patient denies headache, fever, blurry/double vision, tinnitus, dysphagia, cough, SoB, abdominal pain, CP, palpitations, n/v/c/d. Off Abx as per ID, INR -elevated   10/13/21 - Patient was seen and examined at bedside. Patient endorses no improvement in the swelling and erythema of LE b/l. Patient endorses a pressure sensation in both LE and has an "odd" sensation in her left heel. Patient endorses eating well without dysphagia. Patient denies BM the past 2 days and declines bowel regimen for now. Patient producing clear urine in vac. Endorses worsening of hearing in her left ear, worse since yesterday. Denies headache, fever, blurry/double vision, tinnitus, cough, SoB, abdominal pain, CP, palpitations, n/v/d.  Dtr Cell- [phone], On IV Lasix & IV Rocephin with Po steroids.  10/14/21 - Patient was seen and examined at bedside. Patient endorses significant improvement in swelling and erythema of LE b/l. Patient endorses b/l tingling sensation in LE. Patient denies BM last 3 days and is urinating well in vac. Endorses continued difficulty hearing in left ear. Patient endorses desire to receive vaccine for COVID-19. Discussed with patient to f/up hx of weight loss and difficulty hearing in L ear with PCP outpatient. Patient denies headache, fever, blurry/double vision, tinnitus, cough, dysphagia, SoB, CP, palpitations, abdominal pain, n/v/c/d, melena/hematochezia, dysuria, pyuria/hematuria, change in urinary frequency, confusion, dizziness, or weakness.     OVERNIGHT EVENTS:    Home Medications:  gabapentin 100 mg oral capsule: 1 cap(s) orally 3 times a day (13 Oct 2021 11:58)  Jantoven 1 mg oral tablet: 2 tab(s) orally Monday, Wednesday, and Friday (13 Oct 2021 11:58)  Jantoven 4 mg oral tablet: 1 tab(s) orally once a day (13 Oct 2021 11:58)  Klor-Con 10 mEq oral tablet, extended release: 1 tab(s) orally once a day (13 Oct 2021 11:58)  Lasix 40 mg oral tablet: 1 tab(s) orally once a day (13 Oct 2021 11:58)  lidocaine 5% patch: Apply to affected areas (13 Oct 2021 11:58)  Lopressor 50 mg oral tablet: 1 tab(s) orally once a day (13 Oct 2021 11:58)  Synthroid 50 mcg (0.05 mg) oral tablet: 1 tab(s) orally once a day (13 Oct 2021 11:58)  Vitamin B6 50 mg oral tablet: 1 tab(s) orally once a day (13 Oct 2021 11:58)      MEDICATIONS  (STANDING):  cefTRIAXone   IVPB 1000 milliGRAM(s) IV Intermittent every 24 hours  famotidine    Tablet 20 milliGRAM(s) Oral daily  furosemide   Injectable 20 milliGRAM(s) IV Push two times a day  gabapentin 100 milliGRAM(s) Oral three times a day  levothyroxine 50 MICROGram(s) Oral daily  metoprolol tartrate 50 milliGRAM(s) Oral daily  potassium chloride    Tablet ER 20 milliEquivalent(s) Oral daily  predniSONE   Tablet 20 milliGRAM(s) Oral daily  warfarin 4 milliGRAM(s) Oral once    MEDICATIONS  (PRN):      No Known Allergies      Social History:  Patient lives alone. Denies tobacco use or etoh use. (11 Oct 2021 18:04)      REVIEW OF SYSTEMS:   CONSTITUTIONAL: No fever, No chills, No fatigue  EYES:  No blurry vision/double vision  ENMT: No tinnitus, diminished hearing in L ear unchanged from yesterday  RESPIRATORY: No cough, No wheezing, No hemoptysis, No shortness of breath  CARDIOVASCULAR: No chest pain, No palpitations  GASTROINTESTINAL: No abdominal pain, No epigastric pain. No nausea, No vomiting, No diarrhea, Endorses constipation; No BM last 3 days  GENITOURINARY: No dysuria, Endorses increased frequency, No urgency, No hematuria, No incontinence  NEUROLOGICAL: No headaches, No dizziness, No numbness, No tingling, No tremors, No weakness  EXTREMITIES: Endorses improvement in b/l LE Swelling, Endorses pressure in b/l LE, but improved, Endorses b/l LE Edema, but improved  SKIN: erythema/rubor on shins b/l  MUSCULOSKELETAL: No joint pain, No joint swelling; No muscle pain, No back pain, Endorses extremity discomfort  PSYCHIATRIC: No depression, No anxiety, No mood swings, No difficulty sleeping at night  PAIN SCALE: [ x ] None  [  ] Other-  ROS Unable to obtain due to: [  ] Dementia  [  ] Lethargy  [  ] Sedated  [  ] Non verbal  REST OF REVIEW OF SYSTEMS: [ x ] Normal     Vital Signs Last 24 Hrs  T(C): 36.3 (14 Oct 2021 05:00), Max: 36.7 (13 Oct 2021 13:18)  T(F): 97.4 (14 Oct 2021 05:00), Max: 98.1 (13 Oct 2021 13:18)  HR: 73 (14 Oct 2021 05:00) (70 - 73)  BP: 163/66 (14 Oct 2021 05:00) (117/61 - 163/66)  BP(mean): --  RR: 17 (14 Oct 2021 05:00) (17 - 17)  SpO2: 92% (14 Oct 2021 05:00) (92% - 93%)    CAPILLARY BLOOD GLUCOSE          I&O's Summary    13 Oct 2021 07:01  -  14 Oct 2021 07:00  --------------------------------------------------------  IN: 290 mL / OUT: 850 mL / NET: -560 mL    14 Oct 2021 07:01  -  14 Oct 2021 10:53  --------------------------------------------------------  IN: 50 mL / OUT: 0 mL / NET: 50 mL      PHYSICAL EXAM: OOB to chair  GENERAL:  [ x ] NAD, [ x ] Well appearing, [  ] Agitated, [  ] Drowsy, [  ] Lethargy, [  ] Confused   HEAD:  [ x ] Normal, [  ] Other  EYES:  [ x ] EOMI, [  ] PERRLA, [ x ] Conjunctiva and sclera clear normal, [  ] Other, [  ] Pallor, [  ] Discharge  ENMT:  [ x ] Normal, [ x ] Moist mucous membranes, [ x ] Good dentition, [ x ] No thrush  NECK:  [ x ] Supple, [ x ] No JVD, [ x ] Normal thyroid, [  ] Lymphadenopathy, [  ] Other  CHEST/LUNG:  [ x ] Clear to auscultation bilaterally, [ x ] Breath Sounds equal B/L, [  ] Poor effort, [ x ] No rales, [ x ] No rhonchi, [ x ] No wheezing  HEART:  [ x ] Regular rate, [  ] Tachycardia, [  ] Bradycardia, [ x ] Irregular, [ x ] systolic murmur [  ] PPM in place (Mfr:  )  ABDOMEN:  [ x ] Soft, [ x ] Nontender, [ x ] Nondistended, [ x ] No mass, [ x ] Bowel sounds present, [  ] Obese  NERVOUS SYSTEM:  [ x ] Alert & Oriented x3, [ x ] Nonfocal, [  ] Confusion, [  ] Encephalopathic, [  ] Sedated, [  ] Unable to assess, [  ] Dementia, [  ] Other-  EXTREMITIES:  [ x ] 2+ Peripheral Pulses, No clubbing, No cyanosis,  [ x ] 2+ Edema B/L lower EXT, [ x ] severe PVD stasis skin changes B/L lower EXT, severe erythema B/L Lower Ext edema worsening with improvement in Oozing blister, Dry skin [  ] Wound  LYMPH:  No lymphadenopathy noted  SKIN: b/l LE with erythema/rubor/edema on shins.    DIET: Diet, Regular:   DASH/TLC Sodium & Cholesterol Restricted  1200mL Fluid Restriction ([id number]) (10-11-21 @ 19:28)      LABS:                        10.3   4.33  )-----------( 203      ( 14 Oct 2021 09:06 )             33.0     14 Oct 2021 09:06    139    |  104    |  19     ----------------------------<  110    3.5     |  30     |  1.10     Ca    8.1        14 Oct 2021 09:06      PT/INR - ( 14 Oct 2021 09:06 )   PT: 25.3 sec;   INR: 2.25 ratio         PTT - ( 14 Oct 2021 09:06 )  PTT:41.1 sec    Culture Results:   No growth to date. (10-11 @ 21:52)  Culture Results:   No growth to date. (10-11 @ 21:52)            Culture - Blood (collected 11 Oct 2021 21:52)  Source: .Blood Blood-Peripheral  Preliminary Report (12 Oct 2021 22:01):    No growth to date.    Culture - Blood (collected 11 Oct 2021 21:52)  Source: .Blood Blood-Peripheral  Preliminary Report (12 Oct 2021 22:01):    No growth to date.       Anemia Panel:      Thyroid Panel:            Serum Pro-Brain Natriuretic Peptide: 2458 pg/mL (10-11-21 @ 16:40)      RADIOLOGY & ADDITIONAL TESTS:      HEALTH ISSUES - PROBLEM Dx:  Lower extremity edema    DVT prophylaxis    Cellulitis    Hypertension    Hypothyroid    Paroxysmal atrial fibrillation    Hypokalemia    Venous stasis dermatitis of lower extremity          Consultant(s) Notes Reviewed:  [ x ] YES     Care Discussed with [ x ] Consultants, [ x ] Patient, [  ] Family, [  ] HCP, [  ] , [  ] Social Service, [  ] RN, [  ] Physical Therapy, [  ] Palliative Care Team  DVT PPX: [  ] Lovenox, [  ] SC Heparin, [ x ] Coumadin, [  ] Xarelto, [  ] Eliquis, [  ] Pradaxa, [  ] IV Heparin drip, [  ] SCD, [  ] Ambulation, [  ] Contraindicated 2/2 GI Bleed, [  ] Contraindicated 2/2  Bleed, [  ] Contraindicated 2/2 Brain Bleed  Advanced Directive: [ x ] None, [  ] DNR/DNI Patient is a 89y old  Female who presents with a chief complaint of b/l LE cellulitis (14 Oct 2021 09:38)      HPI:  Patient is a 89 y/o F with a pmhx b/l LE edema, HTN,P Afib on AC , hypothyroidism, who presents with 1mo hx of worsening b/l LE edema. Endorses erythema and swelling b/l. Per patient, her doctor ?neurologist, told her to stop Lasix recently so pt has not been taking. States pain patches, water pills and antibiotics as an outpatient were unhelpful in relieving these symptoms. States she has had symptoms for 6 months but recently worsened. Denies chest pain, shortness of breath, palpitations, fevers, chills, nausea, vomiting. Denies having in past.    IN THE ED:  Temp 98.8 F, HR 92, /72, RR 16, SpO2 96%  S/P Zosyn 3.375g x1, Vanco 1g x1, lasix 40 mg IV x1   EKG: left axis deviation, LBBB   Labs significant for WBC 5.66, BNP 2458, PT 31.9, INR 2.87, PTT 48, K 3.4, CO 32, anion gap 3, albumin 2.8, AST 57, eGFR 43  Imaging: CXR with minimal blunting of R base laterally    (11 Oct 2021 18:04)      INTERVAL HPI:  10/12/21 - Patient was seen and examined at bedside. No acute events overnight. Patient endorses improvement in the rubor/erythema of her LE b/l but endorses continued pain in LE b/l. Patient endorses 1 year hx of weight loss (35 pounds) due to decreased appetite. Patient denies headache, fever, blurry/double vision, tinnitus, dysphagia, cough, SoB, abdominal pain, CP, palpitations, n/v/c/d. Off Abx as per ID, INR -elevated   10/13/21 - Patient was seen and examined at bedside. Patient endorses no improvement in the swelling and erythema of LE b/l. Patient endorses a pressure sensation in both LE and has an "odd" sensation in her left heel. Patient endorses eating well without dysphagia. Patient denies BM the past 2 days and declines bowel regimen for now. Patient producing clear urine in vac. Endorses worsening of hearing in her left ear, worse since yesterday. Denies headache, fever, blurry/double vision, tinnitus, cough, SoB, abdominal pain, CP, palpitations, n/v/d.  Dtr Cell- [phone], On IV Lasix & IV Rocephin with Po steroids.  10/14/21 - Patient was seen and examined at bedside. Patient endorses significant improvement in swelling and erythema of LE b/l. Patient endorses b/l tingling sensation in LE. Patient denies BM last 3 days and is urinating well in vac. Endorses continued difficulty hearing in left ear. Patient endorses desire to receive vaccine for COVID-19. Discussed with patient to f/up hx of weight loss and difficulty hearing in L ear with PCP outpatient. Patient denies headache, fever, blurry/double vision, tinnitus, cough, dysphagia, SoB, CP, palpitations, abdominal pain, n/v/c/d, melena/hematochezia, dysuria, pyuria/hematuria, change in urinary frequency, confusion, dizziness, or weakness. On IV Lasix  Pt had exposure to  COVID + person at hospital, d/w Dr Larios , pt & Dtr, plan for MAB infusions.    OVERNIGHT EVENTS: NONE    Home Medications:  gabapentin 100 mg oral capsule: 1 cap(s) orally 3 times a day (13 Oct 2021 11:58)  Jantoven 1 mg oral tablet: 2 tab(s) orally Monday, Wednesday, and Friday (13 Oct 2021 11:58)  Jantoven 4 mg oral tablet: 1 tab(s) orally once a day (13 Oct 2021 11:58)  Klor-Con 10 mEq oral tablet, extended release: 1 tab(s) orally once a day (13 Oct 2021 11:58)  Lasix 40 mg oral tablet: 1 tab(s) orally once a day (13 Oct 2021 11:58)  lidocaine 5% patch: Apply to affected areas (13 Oct 2021 11:58)  Lopressor 50 mg oral tablet: 1 tab(s) orally once a day (13 Oct 2021 11:58)  Synthroid 50 mcg (0.05 mg) oral tablet: 1 tab(s) orally once a day (13 Oct 2021 11:58)  Vitamin B6 50 mg oral tablet: 1 tab(s) orally once a day (13 Oct 2021 11:58)      MEDICATIONS  (STANDING):  cefTRIAXone   IVPB 1000 milliGRAM(s) IV Intermittent every 24 hours  famotidine    Tablet 20 milliGRAM(s) Oral daily  furosemide   Injectable 20 milliGRAM(s) IV Push two times a day  gabapentin 100 milliGRAM(s) Oral three times a day  levothyroxine 50 MICROGram(s) Oral daily  metoprolol tartrate 50 milliGRAM(s) Oral daily  potassium chloride    Tablet ER 20 milliEquivalent(s) Oral daily  predniSONE   Tablet 20 milliGRAM(s) Oral daily  warfarin 4 milliGRAM(s) Oral once    MEDICATIONS  (PRN):      No Known Allergies      Social History:  Patient lives alone. Denies tobacco use or etoh use. (11 Oct 2021 18:04)      REVIEW OF SYSTEMS:  i am OK   CONSTITUTIONAL: No fever, No chills, No fatigue  EYES:  No blurry vision/double vision  ENMT: No tinnitus, diminished hearing in L ear unchanged from yesterday  RESPIRATORY: No cough, No wheezing, No hemoptysis, No shortness of breath  CARDIOVASCULAR: No chest pain, No palpitations  GASTROINTESTINAL: No abdominal pain, No epigastric pain. No nausea, No vomiting, No diarrhea, Endorses constipation; No BM last 3 days  GENITOURINARY: No dysuria, Endorses increased frequency, No urgency, No hematuria, No incontinence  NEUROLOGICAL: No headaches, No dizziness, No numbness, No tingling, No tremors, No weakness  EXTREMITIES: Endorses improvement in b/l LE Swelling, Endorses pressure in b/l LE, but improved, Endorses b/l LE Edema, & swelling  but improved  SKIN: erythema/rubor on shins b/l  MUSCULOSKELETAL: No joint pain, No joint swelling; No muscle pain, No back pain, Endorses extremity discomfort  PSYCHIATRIC: No depression, No anxiety, No mood swings, No difficulty sleeping at night  PAIN SCALE: [ x ] None  [ x ] Other- feet & legs tightness  ROS Unable to obtain due to: [  ] Dementia  [  ] Lethargy  [  ] Sedated  [  ] Non verbal  REST OF REVIEW OF SYSTEMS: [ x ] Normal     Vital Signs Last 24 Hrs  T(C): 36.3 (14 Oct 2021 05:00), Max: 36.7 (13 Oct 2021 13:18)  T(F): 97.4 (14 Oct 2021 05:00), Max: 98.1 (13 Oct 2021 13:18)  HR: 73 (14 Oct 2021 05:00) (70 - 73)  BP: 163/66 (14 Oct 2021 05:00) (117/61 - 163/66)  BP(mean): --  RR: 17 (14 Oct 2021 05:00) (17 - 17)  SpO2: 92% (14 Oct 2021 05:00) (92% - 93%)    CAPILLARY BLOOD GLUCOSE          I&O's Summary    13 Oct 2021 07:01  -  14 Oct 2021 07:00  --------------------------------------------------------  IN: 290 mL / OUT: 850 mL / NET: -560 mL    14 Oct 2021 07:01  -  14 Oct 2021 10:53  --------------------------------------------------------  IN: 50 mL / OUT: 0 mL / NET: 50 mL      PHYSICAL EXAM: OOB to chair  GENERAL:  [ x ] NAD, [ x ] Well appearing, [  ] Agitated, [  ] Drowsy, [  ] Lethargy, [  ] Confused   HEAD:  [ x ] Normal, [  ] Other  EYES:  [ x ] EOMI, [  ] PERRLA, [ x ] Conjunctiva and sclera clear normal, [  ] Other, [  ] Pallor, [  ] Discharge  ENMT:  [ x ] Normal, [ x ] Moist mucous membranes, [ x ] Good dentition, [ x ] No thrush  NECK:  [ x ] Supple, [ x ] No JVD, [ x ] Normal thyroid, [  ] Lymphadenopathy, [  ] Other  CHEST/LUNG:  [ x ] Clear to auscultation bilaterally, [ x ] Breath Sounds equal B/L, [  ] Poor effort, [ x ] No rales, [ x ] No rhonchi, [ x ] No wheezing  HEART:  [ x ] Regular rate, [  ] Tachycardia, [  ] Bradycardia, [ x ] Irregular, [ x ] systolic murmur [  ] PPM in place (Mfr:  )  ABDOMEN:  [ x ] Soft, [ x ] Nontender, [ x ] Nondistended, [ x ] No mass, [ x ] Bowel sounds present, [  ] Obese  NERVOUS SYSTEM:  [ x ] Alert & Oriented x3, [ x ] Nonfocal, [  ] Confusion, [  ] Encephalopathic, [  ] Sedated, [  ] Unable to assess, [  ] Dementia, [  ] Other-  EXTREMITIES:  [ x ] 2+ Peripheral Pulses, No clubbing, No cyanosis,  [ x ] 2+ pitting Edema B/L lower EXT, [ x ] severe PVD stasis skin changes B/L lower EXT, severe erythema B/L Lower Ext edema worsening with improvement in Oozing blister, Dry skin [  ] Wound  LYMPH:  No lymphadenopathy noted  SKIN: b/l LE with erythema/rubor/edema on shins.    DIET: Diet, Regular:   DASH/TLC Sodium & Cholesterol Restricted  1200mL Fluid Restriction ([id number]) (10-11-21 @ 19:28)      LABS:                        10.3   4.33  )-----------( 203      ( 14 Oct 2021 09:06 )             33.0     14 Oct 2021 09:06    139    |  104    |  19     ----------------------------<  110    3.5     |  30     |  1.10     Ca    8.1        14 Oct 2021 09:06      PT/INR - ( 14 Oct 2021 09:06 )   PT: 25.3 sec;   INR: 2.25 ratio         PTT - ( 14 Oct 2021 09:06 )  PTT:41.1 sec    Culture Results:   No growth to date. (10-11 @ 21:52)  Culture Results:   No growth to date. (10-11 @ 21:52)        Culture - Blood (collected 11 Oct 2021 21:52)  Source: .Blood Blood-Peripheral  Preliminary Report (12 Oct 2021 22:01):    No growth to date.    Culture - Blood (collected 11 Oct 2021 21:52)  Source: .Blood Blood-Peripheral  Preliminary Report (12 Oct 2021 22:01):    No growth to date.      Serum Pro-Brain Natriuretic Peptide: 2458 pg/mL (10-11-21 @ 16:40)      RADIOLOGY & ADDITIONAL TESTS: NONE      HEALTH ISSUES - PROBLEM Dx:  Lower extremity edema    DVT prophylaxis    Cellulitis    Hypertension    Hypothyroid    Paroxysmal atrial fibrillation    Hypokalemia    Venous stasis dermatitis of lower extremity      Consultant(s) Notes Reviewed:  [ x ] YES     Care Discussed with [ x ] Consultants, [ x ] Patient, [ x ] Family,- Dtr [  ] HCP, [  ] , [  ] Social Service, [ x ] RN, [  ] Physical Therapy, [  ] Palliative Care Team  DVT PPX: [  ] Lovenox, [  ] SC Heparin, [ x ] Coumadin, [  ] Xarelto, [  ] Eliquis, [  ] Pradaxa, [  ] IV Heparin drip, [  ] SCD, [  ] Ambulation, [  ] Contraindicated 2/2 GI Bleed, [  ] Contraindicated 2/2  Bleed, [  ] Contraindicated 2/2 Brain Bleed  Advanced Directive: [ x ] None, [  ] DNR/DNI

## 2021-10-14 NOTE — PROGRESS NOTE ADULT - PROBLEM SELECTOR PLAN 4
- c/w 20 meq K PO qd  - trend in AM CMP D/W Floor RN Manager of floor & Director of Nursing   -Repeat COVID PCR  -D/W ID Dr Larios at detail D/W pt & Dtr , they both decided to go for post COVID Exposure  MAB infusion today  On Isolation

## 2021-10-14 NOTE — PROGRESS NOTE ADULT - PROBLEM SELECTOR PLAN 7
- continue warfarin, Daily PT/INR - No BM in 3 days - no abdominal pain/tenderness at this time  - start miralax and senna - No BM in 3 days - no abdominal pain/tenderness at this time  - start Miralax and senna

## 2021-10-15 LAB
ANION GAP SERPL CALC-SCNC: 5 MMOL/L — SIGNIFICANT CHANGE UP (ref 5–17)
APTT BLD: 36.8 SEC — HIGH (ref 27.5–35.5)
BUN SERPL-MCNC: 23 MG/DL — SIGNIFICANT CHANGE UP (ref 7–23)
CALCIUM SERPL-MCNC: 8.3 MG/DL — LOW (ref 8.5–10.1)
CHLORIDE SERPL-SCNC: 107 MMOL/L — SIGNIFICANT CHANGE UP (ref 96–108)
CO2 SERPL-SCNC: 30 MMOL/L — SIGNIFICANT CHANGE UP (ref 22–31)
CREAT SERPL-MCNC: 0.95 MG/DL — SIGNIFICANT CHANGE UP (ref 0.5–1.3)
GLUCOSE SERPL-MCNC: 80 MG/DL — SIGNIFICANT CHANGE UP (ref 70–99)
HCT VFR BLD CALC: 29.1 % — LOW (ref 34.5–45)
HGB BLD-MCNC: 9.4 G/DL — LOW (ref 11.5–15.5)
INR BLD: 1.73 RATIO — HIGH (ref 0.88–1.16)
MAGNESIUM SERPL-MCNC: 2.3 MG/DL — SIGNIFICANT CHANGE UP (ref 1.6–2.6)
MCHC RBC-ENTMCNC: 28.2 PG — SIGNIFICANT CHANGE UP (ref 27–34)
MCHC RBC-ENTMCNC: 32.3 GM/DL — SIGNIFICANT CHANGE UP (ref 32–36)
MCV RBC AUTO: 87.4 FL — SIGNIFICANT CHANGE UP (ref 80–100)
NRBC # BLD: 0 /100 WBCS — SIGNIFICANT CHANGE UP (ref 0–0)
PLATELET # BLD AUTO: 205 K/UL — SIGNIFICANT CHANGE UP (ref 150–400)
POTASSIUM SERPL-MCNC: 3.3 MMOL/L — LOW (ref 3.5–5.3)
POTASSIUM SERPL-SCNC: 3.3 MMOL/L — LOW (ref 3.5–5.3)
PROTHROM AB SERPL-ACNC: 19.7 SEC — HIGH (ref 10.6–13.6)
RBC # BLD: 3.33 M/UL — LOW (ref 3.8–5.2)
RBC # FLD: 17.5 % — HIGH (ref 10.3–14.5)
SODIUM SERPL-SCNC: 142 MMOL/L — SIGNIFICANT CHANGE UP (ref 135–145)
WBC # BLD: 4.86 K/UL — SIGNIFICANT CHANGE UP (ref 3.8–10.5)
WBC # FLD AUTO: 4.86 K/UL — SIGNIFICANT CHANGE UP (ref 3.8–10.5)

## 2021-10-15 PROCEDURE — 99232 SBSQ HOSP IP/OBS MODERATE 35: CPT

## 2021-10-15 RX ORDER — WARFARIN SODIUM 2.5 MG/1
4 TABLET ORAL ONCE
Refills: 0 | Status: COMPLETED | OUTPATIENT
Start: 2021-10-15 | End: 2021-10-15

## 2021-10-15 RX ORDER — FUROSEMIDE 40 MG
20 TABLET ORAL ONCE
Refills: 0 | Status: COMPLETED | OUTPATIENT
Start: 2021-10-15 | End: 2021-10-15

## 2021-10-15 RX ORDER — POTASSIUM CHLORIDE 20 MEQ
40 PACKET (EA) ORAL ONCE
Refills: 0 | Status: COMPLETED | OUTPATIENT
Start: 2021-10-15 | End: 2021-10-15

## 2021-10-15 RX ORDER — POTASSIUM CHLORIDE 20 MEQ
40 PACKET (EA) ORAL ONCE
Refills: 0 | Status: DISCONTINUED | OUTPATIENT
Start: 2021-10-15 | End: 2021-10-15

## 2021-10-15 RX ORDER — FUROSEMIDE 40 MG
40 TABLET ORAL EVERY 12 HOURS
Refills: 0 | Status: DISCONTINUED | OUTPATIENT
Start: 2021-10-15 | End: 2021-10-18

## 2021-10-15 RX ADMIN — Medication 40 MILLIEQUIVALENT(S): at 14:18

## 2021-10-15 RX ADMIN — Medication 20 MILLIEQUIVALENT(S): at 12:35

## 2021-10-15 RX ADMIN — WARFARIN SODIUM 4 MILLIGRAM(S): 2.5 TABLET ORAL at 22:21

## 2021-10-15 RX ADMIN — SENNA PLUS 2 TABLET(S): 8.6 TABLET ORAL at 22:21

## 2021-10-15 RX ADMIN — Medication 40 MILLIGRAM(S): at 05:40

## 2021-10-15 RX ADMIN — CEFTRIAXONE 100 MILLIGRAM(S): 500 INJECTION, POWDER, FOR SOLUTION INTRAMUSCULAR; INTRAVENOUS at 10:55

## 2021-10-15 RX ADMIN — Medication 40 MILLIEQUIVALENT(S): at 10:55

## 2021-10-15 RX ADMIN — Medication 20 MILLIGRAM(S): at 12:40

## 2021-10-15 RX ADMIN — Medication 20 MILLIGRAM(S): at 05:40

## 2021-10-15 RX ADMIN — POLYETHYLENE GLYCOL 3350 17 GRAM(S): 17 POWDER, FOR SOLUTION ORAL at 05:40

## 2021-10-15 RX ADMIN — GABAPENTIN 100 MILLIGRAM(S): 400 CAPSULE ORAL at 22:21

## 2021-10-15 RX ADMIN — GABAPENTIN 100 MILLIGRAM(S): 400 CAPSULE ORAL at 05:40

## 2021-10-15 RX ADMIN — Medication 50 MICROGRAM(S): at 05:40

## 2021-10-15 RX ADMIN — Medication 40 MILLIEQUIVALENT(S): at 18:44

## 2021-10-15 RX ADMIN — FAMOTIDINE 20 MILLIGRAM(S): 10 INJECTION INTRAVENOUS at 12:35

## 2021-10-15 RX ADMIN — Medication 40 MILLIGRAM(S): at 18:44

## 2021-10-15 RX ADMIN — GABAPENTIN 100 MILLIGRAM(S): 400 CAPSULE ORAL at 14:18

## 2021-10-15 RX ADMIN — Medication 50 MILLIGRAM(S): at 05:40

## 2021-10-15 NOTE — PROGRESS NOTE ADULT - PROBLEM SELECTOR PLAN 3
H/O P A Fib, stable  INR therapeutic 2-3 -> c/w warfarin  - c/w Metoprolol   T 50 mg qd  - Daily INR

## 2021-10-15 NOTE — PROGRESS NOTE ADULT - PROBLEM SELECTOR PLAN 2
Chronic Venous stasis dermatitis b/.l lower EXT, likely non-cardiogenic  - improvement in b/l LE swelling today -> c/w Lasix BID, ceftriaxone, and prednisone  - c/w leg elevation, would benefit from compression stockings  - ID DR Larios D/W OK to continue steroids & Abx

## 2021-10-15 NOTE — PROGRESS NOTE ADULT - PROBLEM SELECTOR PLAN 1
- Chronic Venous stasis dermatitis now with blisters oozing likely 2/2 fluid overload from med non-compliance. unlikely 2/2 cellulitis at this time   - continue Lasix 20 mg IV bid today -> transition to Lasix 40 mg PO BID tomorrow (10/15)  - blood cultures NGTD -> no Abx at this time  - US doppler (-)  - TTE: LVEF 55-60 | moderate TR/MR | RV/RA enlargement  - c/w gabapentin 100mg tid  - c/w ceftriaxone/prednisone for now, improved over last 24 hrs -> can transition to Cefuroxime 500mg PO BID with last day 10/17 and prednisone 20mg PO daily with last day 10/22  - ID DR Larios, recs appreciated  - Cardiology following, recs appreciated- Can change to PO Lasix on D/C - Chronic Venous stasis dermatitis now with blisters oozing likely 2/2 fluid overload from med non-compliance. unlikely 2/2 cellulitis at this time   -  will give additional lasix 20 IVP today and switch to Lasix 40 mg IVP BID  - blood cultures NGTD -> no Abx at this time  - US doppler (-)  - TTE: LVEF 55-60 | moderate TR/MR | RV/RA enlargement  - c/w gabapentin 100mg tid  - c/w ceftriaxone/prednisone for now, improved over last 24 hrs -> upon dc can transition to Cefuroxime 500mg PO BID with last day 10/17 and prednisone 20mg PO daily with last day 10/22  - ID DR Larios, recs appreciated  - Cardiology following, recs appreciated- Would diurese with Lasix 40 mg IVP BID for now given LE edema. - Chronic Venous stasis dermatitis now with blisters oozing likely 2/2 fluid overload from med non-compliance. unlikely 2/2 cellulitis at this time   -  will give additional lasix 20 IVP today and switch to Lasix 40 mg IVP BID  - blood cultures NGTD -> no Abx at this time  - US doppler (-)  - TTE: LVEF 55-60 | moderate TR/MR | RV/RA enlargement  - c/w gabapentin 100mg tid  - c/w ceftriaxone/prednisone for now, improved over last 24 hrs -> upon dc can transition to Cefuroxime 500mg PO BID with last day 10/17 and prednisone 20mg PO daily with last day 10/22  - ID DR Larios, d/w -stable, improving  - Cardiology following, recs appreciated- Would increase with Lasix 40 mg IVP BID for now given LE edema.

## 2021-10-15 NOTE — PROGRESS NOTE ADULT - SUBJECTIVE AND OBJECTIVE BOX
Patient is a 89y old  Female who presents with a chief complaint of b/l LE cellulitis (15 Oct 2021 11:52)    HPI:  Patient is a 89 y/o F with a pmhx b/l LE edema, HTN,P Afib on AC , hypothyroidism, who presents with 1mo hx of worsening b/l LE edema. Endorses erythema and swelling b/l. Per patient, her doctor ?neurologist, told her to stop Lasix recently so pt has not been taking. States pain patches, water pills and antibiotics as an outpatient were unhelpful in relieving these symptoms. States she has had symptoms for 6 months but recently worsened. Denies chest pain, shortness of breath, palpitations, fevers, chills, nausea, vomiting. Denies having in past.    IN THE ED:  Temp 98.8 F, HR 92, /72, RR 16, SpO2 96%  S/P Zosyn 3.375g x1, Vanco 1g x1, lasix 40 mg IV x1   EKG: left axis deviation, LBBB   Labs significant for WBC 5.66, BNP 2458, PT 31.9, INR 2.87, PTT 48, K 3.4, CO 32, anion gap 3, albumin 2.8, AST 57, eGFR 43  Imaging: CXR with minimal blunting of R base laterally    (11 Oct 2021 18:04)    INTERVAL HPI:  10/12/21 - Patient was seen and examined at bedside. No acute events overnight. Patient endorses improvement in the rubor/erythema of her LE b/l but endorses continued pain in LE b/l. Patient endorses 1 year hx of weight loss (35 pounds) due to decreased appetite. Patient denies headache, fever, blurry/double vision, tinnitus, dysphagia, cough, SoB, abdominal pain, CP, palpitations, n/v/c/d. Off Abx as per ID, INR -elevated   10/13/21 - Patient was seen and examined at bedside. Patient endorses no improvement in the swelling and erythema of LE b/l. Patient endorses a pressure sensation in both LE and has an "odd" sensation in her left heel. Patient endorses eating well without dysphagia. Patient denies BM the past 2 days and declines bowel regimen for now. Patient producing clear urine in vac. Endorses worsening of hearing in her left ear, worse since yesterday. Denies headache, fever, blurry/double vision, tinnitus, cough, SoB, abdominal pain, CP, palpitations, n/v/d.  Dtr Cell- [phone], On IV Lasix & IV Rocephin with Po steroids.  10/14/21 - Patient was seen and examined at bedside. Patient endorses significant improvement in swelling and erythema of LE b/l. Patient endorses b/l tingling sensation in LE. Patient denies BM last 3 days and is urinating well in vac. Endorses continued difficulty hearing in left ear. Patient endorses desire to receive vaccine for COVID-19. Discussed with patient to f/up hx of weight loss and difficulty hearing in L ear with PCP outpatient. Patient denies headache, fever, blurry/double vision, tinnitus, cough, dysphagia, SoB, CP, palpitations, abdominal pain, n/v/c/d, melena/hematochezia, dysuria, pyuria/hematuria, change in urinary frequency, confusion, dizziness, or weakness. On IV Lasix  Pt had exposure to  COVID + person at hospital, d/w Dr Larios , pt & Dtr, plan for MAB infusions.    OVERNIGHT EVENTS:    Home Medications:  gabapentin 100 mg oral capsule: 1 cap(s) orally 3 times a day (13 Oct 2021 11:58)  Jantoven 1 mg oral tablet: 2 tab(s) orally Monday, Wednesday, and Friday (13 Oct 2021 11:58)  Jantoven 4 mg oral tablet: 1 tab(s) orally once a day (13 Oct 2021 11:58)  Klor-Con 10 mEq oral tablet, extended release: 1 tab(s) orally once a day (13 Oct 2021 11:58)  Lasix 40 mg oral tablet: 1 tab(s) orally once a day (13 Oct 2021 11:58)  lidocaine 5% patch: Apply to affected areas (13 Oct 2021 11:58)  Lopressor 50 mg oral tablet: 1 tab(s) orally once a day (13 Oct 2021 11:58)  Synthroid 50 mcg (0.05 mg) oral tablet: 1 tab(s) orally once a day (13 Oct 2021 11:58)  Vitamin B6 50 mg oral tablet: 1 tab(s) orally once a day (13 Oct 2021 11:58)      MEDICATIONS  (STANDING):  cefTRIAXone   IVPB 1000 milliGRAM(s) IV Intermittent every 24 hours  famotidine    Tablet 20 milliGRAM(s) Oral daily  furosemide    Tablet 40 milliGRAM(s) Oral two times a day  furosemide   Injectable 40 milliGRAM(s) IV Push every 12 hours  gabapentin 100 milliGRAM(s) Oral three times a day  levothyroxine 50 MICROGram(s) Oral daily  metoprolol tartrate 50 milliGRAM(s) Oral daily  polyethylene glycol 3350 17 Gram(s) Oral two times a day  potassium chloride    Tablet ER 20 milliEquivalent(s) Oral daily  predniSONE   Tablet 20 milliGRAM(s) Oral daily  senna 2 Tablet(s) Oral at bedtime  warfarin 4 milliGRAM(s) Oral once    MEDICATIONS  (PRN):      Allergies    No Known Allergies    Intolerances        Social History:  Patient lives alone. Denies tobacco use or etoh use. (11 Oct 2021 18:04)      REVIEW OF SYSTEMS:  CONSTITUTIONAL: No fever, No chills, No fatigue, No myalgia, No Body ache, No Weakness  EYES: No eye pain,  No visual disturbances, No discharge, NO Redness  ENMT:  No ear pain, No nose bleed, No vertigo; No sinus pain, NO throat pain, No Congestion  NECK: No pain, No stiffness  RESPIRATORY: No cough, NO wheezing, No  hemoptysis, NO  shortness of breath  CARDIOVASCULAR: No chest pain, palpitations  GASTROINTESTINAL: No abdominal pain, NO epigastric pain. No nausea, No vomiting; No diarrhea, No constipation. [  ] BM  GENITOURINARY: No dysuria, No frequency, No urgency, No hematuria, NO incontinence  NEUROLOGICAL: No headaches, No dizziness, No numbness, No tingling, No tremors, No weakness  EXT: No Swelling, No Pain, No Edema  SKIN:  [  ] No itching, burning, rashes, or lesions   MUSCULOSKELETAL: No joint pain ,No Jt swelling; No muscle pain, No back pain, No extremity pain  PSYCHIATRIC: No depression,  No anxiety,  No mood swings ,No difficulty sleeping at night  PAIN SCALE: [  ] None  [  ] Other-  ROS Unable to obtain due to - [  ] Dementia  [  ] Lethargy [  ] Drowsy [  ] Sedated [  ] non verbal  REST OF REVIEW Of SYSTEM - [  ] Normal     Vital Signs Last 24 Hrs  T(C): 36.7 (15 Oct 2021 12:33), Max: 37 (14 Oct 2021 20:58)  T(F): 98 (15 Oct 2021 12:33), Max: 98.6 (14 Oct 2021 20:58)  HR: 65 (15 Oct 2021 12:33) (65 - 95)  BP: 129/65 (15 Oct 2021 12:33) (129/65 - 163/80)  BP(mean): --  RR: 18 (15 Oct 2021 12:33) (17 - 18)  SpO2: 92% (15 Oct 2021 12:33) (92% - 96%)  Finger Stick        10-14 @ 07:01  -  10-15 @ 07:00  --------------------------------------------------------  IN: 50 mL / OUT: 0 mL / NET: 50 mL        PHYSICAL EXAM:  GENERAL:  [  ] NAD , [  ] well appearing, [  ] Agitated, [  ] Drowsy,  [  ] Lethargy, [  ] confused   HEAD:  [  ] Normal, [  ] Other  EYES:  [  ] EOMI, [  ] PERRLA, [  ] conjunctiva and sclera clear normal, [  ] Other,  [  ] Pallor,[  ] Discharge  ENMT:  [  ] Normal, [  ] Moist mucous membranes, [  ] Good dentition, [  ] No Thrush  NECK:  [  ] Supple, [  ] No JVD, [  ] Normal thyroid, [  ] Lymphadenopathy [  ] Other  CHEST/LUNG:  [  ] Clear to auscultation bilaterally, [  ] Breath Sounds equal B/L / Decrease, [  ] poor effort  [  ] No rales, [  ] No rhonchi  [  ]  No wheezing,   HEART:  [  ] Regular rate and rhythm, [  ] tachycardia, [  ] Bradycardia,  [  ] irregular  [  ] No murmurs, No rubs, No gallops, [  ] PPM in place (Mfr:  )  ABDOMEN:  [  ] Soft, [  ] Nontender, [  ] Nondistended, [  ] No mass, [  ] Bowel sounds present, [  ] obese  NERVOUS SYSTEM:  [  ] Alert & Oriented X3, [  ] Nonfocal  [  ] Confusion  [  ] Encephalopathic [  ] Sedated [  ] Unable to assess, [  ] Dementia [  ] Other-  EXTREMITIES: [  ] 2+ Peripheral Pulses, No clubbing, No cyanosis,  [  ] edema B/L lower EXT. [  ] PVD stasis skin changes B/L Lower EXT, [  ] wound  LYMPH: No lymphadenopathy noted  SKIN:  [  ] No rashes or lesions, [  ] Pressure Ulcers, [  ] ecchymosis, [  ] Skin Tears, [  ] Other    DIET: Diet, Regular:   DASH/TLC Sodium & Cholesterol Restricted  1200mL Fluid Restriction ([id number]) (10-11-21 @ 19:28)      LABS:                        9.4    4.86  )-----------( 205      ( 15 Oct 2021 09:04 )             29.1     15 Oct 2021 09:04    142    |  107    |  23     ----------------------------<  80     3.3     |  30     |  0.95     Ca    8.3        15 Oct 2021 09:04  Mg     2.3       15 Oct 2021 14:01      PT/INR - ( 15 Oct 2021 09:04 )   PT: 19.7 sec;   INR: 1.73 ratio         PTT - ( 15 Oct 2021 09:04 )  PTT:36.8 sec      Culture Results:   No growth to date. (10-11 @ 21:52)  Culture Results:   No growth to date. (10-11 @ 21:52)                  Culture - Blood (collected 11 Oct 2021 21:52)  Source: .Blood Blood-Peripheral  Preliminary Report (12 Oct 2021 22:01):    No growth to date.    Culture - Blood (collected 11 Oct 2021 21:52)  Source: .Blood Blood-Peripheral  Preliminary Report (12 Oct 2021 22:01):    No growth to date.         Anemia Panel:      Thyroid Panel:            Serum Pro-Brain Natriuretic Peptide: 2458 pg/mL (10-11-21 @ 16:40)      RADIOLOGY & ADDITIONAL TESTS:      HEALTH ISSUES - PROBLEM Dx:  Lower extremity edema    Hypertension    Hypothyroid    DVT prophylaxis    Paroxysmal atrial fibrillation    Cellulitis    Venous stasis dermatitis of lower extremity    Constipation    Exposure to confirmed case of COVID-19            Consultant(s) Notes Reviewed:  [  ] YES     Care Discussed with [X] Consultants  [  ] Patient  [  ] Family [  ] HCP [  ]   [  ] Social Service  [  ] RN, [  ] Physical Therapy,[  ] Palliative care team  DVT PPX: [  ] Lovenox, [  ] S C Heparin, [  ] Coumadin, [  ] Xarelto, [  ] Eliquis, [  ] Pradaxa, [  ] IV Heparin drip, [  ] SCD [  ] Contraindication 2 to GI Bleed,[  ] Ambulation [  ] Contraindicated 2 to  bleed [  ] Contraindicated 2 to Brain Bleed  Advanced directive: [  ] None, [  ] DNR/DNI Patient is a 89y old  Female who presents with a chief complaint of b/l LE cellulitis (15 Oct 2021 11:52)    HPI:  Patient is a 89 y/o F with a pmhx b/l LE edema, HTN,P Afib on AC , hypothyroidism, who presents with 1mo hx of worsening b/l LE edema. Endorses erythema and swelling b/l. Per patient, her doctor ?neurologist, told her to stop Lasix recently so pt has not been taking. States pain patches, water pills and antibiotics as an outpatient were unhelpful in relieving these symptoms. States she has had symptoms for 6 months but recently worsened. Denies chest pain, shortness of breath, palpitations, fevers, chills, nausea, vomiting. Denies having in past.    IN THE ED:  Temp 98.8 F, HR 92, /72, RR 16, SpO2 96%  S/P Zosyn 3.375g x1, Vanco 1g x1, lasix 40 mg IV x1   EKG: left axis deviation, LBBB   Labs significant for WBC 5.66, BNP 2458, PT 31.9, INR 2.87, PTT 48, K 3.4, CO 32, anion gap 3, albumin 2.8, AST 57, eGFR 43  Imaging: CXR with minimal blunting of R base laterally    (11 Oct 2021 18:04)    INTERVAL HPI:  10/12/21 - Patient was seen and examined at bedside. No acute events overnight. Patient endorses improvement in the rubor/erythema of her LE b/l but endorses continued pain in LE b/l. Patient endorses 1 year hx of weight loss (35 pounds) due to decreased appetite. Patient denies headache, fever, blurry/double vision, tinnitus, dysphagia, cough, SoB, abdominal pain, CP, palpitations, n/v/c/d. Off Abx as per ID, INR -elevated   10/13/21 - Patient was seen and examined at bedside. Patient endorses no improvement in the swelling and erythema of LE b/l. Patient endorses a pressure sensation in both LE and has an "odd" sensation in her left heel. Patient endorses eating well without dysphagia. Patient denies BM the past 2 days and declines bowel regimen for now. Patient producing clear urine in vac. Endorses worsening of hearing in her left ear, worse since yesterday. Denies headache, fever, blurry/double vision, tinnitus, cough, SoB, abdominal pain, CP, palpitations, n/v/d.  Dtr Cell- [phone], On IV Lasix & IV Rocephin with Po steroids.  10/14/21 - Patient was seen and examined at bedside. Patient endorses significant improvement in swelling and erythema of LE b/l. Patient endorses b/l tingling sensation in LE. Patient denies BM last 3 days and is urinating well in vac. Endorses continued difficulty hearing in left ear. Patient endorses desire to receive vaccine for COVID-19. Discussed with patient to f/up hx of weight loss and difficulty hearing in L ear with PCP outpatient. Patient denies headache, fever, blurry/double vision, tinnitus, cough, dysphagia, SoB, CP, palpitations, abdominal pain, n/v/c/d, melena/hematochezia, dysuria, pyuria/hematuria, change in urinary frequency, confusion, dizziness, or weakness. On IV Lasix  Pt had exposure to  COVID + person at hospital, d/w Dr Larios , pt & Dtr, plan for MAB infusions.  10/15/21 - Patient seen and examined at bedside. Patient still with persistent +2 pitting edema and erythema, although overall improving. Additional lasix 20 IVP ordered. Will increase to lasix 40mg IV BID starting later today. Denies any fevers, chills, cp, sob, abdominal pain, diarrhea, constipation     OVERNIGHT EVENTS: No overnight events.     Home Medications:  gabapentin 100 mg oral capsule: 1 cap(s) orally 3 times a day (13 Oct 2021 11:58)  Jantoven 1 mg oral tablet: 2 tab(s) orally Monday, Wednesday, and Friday (13 Oct 2021 11:58)  Jantoven 4 mg oral tablet: 1 tab(s) orally once a day (13 Oct 2021 11:58)  Klor-Con 10 mEq oral tablet, extended release: 1 tab(s) orally once a day (13 Oct 2021 11:58)  Lasix 40 mg oral tablet: 1 tab(s) orally once a day (13 Oct 2021 11:58)  lidocaine 5% patch: Apply to affected areas (13 Oct 2021 11:58)  Lopressor 50 mg oral tablet: 1 tab(s) orally once a day (13 Oct 2021 11:58)  Synthroid 50 mcg (0.05 mg) oral tablet: 1 tab(s) orally once a day (13 Oct 2021 11:58)  Vitamin B6 50 mg oral tablet: 1 tab(s) orally once a day (13 Oct 2021 11:58)      MEDICATIONS  (STANDING):  cefTRIAXone   IVPB 1000 milliGRAM(s) IV Intermittent every 24 hours  famotidine    Tablet 20 milliGRAM(s) Oral daily  furosemide    Tablet 40 milliGRAM(s) Oral two times a day  furosemide   Injectable 40 milliGRAM(s) IV Push every 12 hours  gabapentin 100 milliGRAM(s) Oral three times a day  levothyroxine 50 MICROGram(s) Oral daily  metoprolol tartrate 50 milliGRAM(s) Oral daily  polyethylene glycol 3350 17 Gram(s) Oral two times a day  potassium chloride    Tablet ER 20 milliEquivalent(s) Oral daily  predniSONE   Tablet 20 milliGRAM(s) Oral daily  senna 2 Tablet(s) Oral at bedtime  warfarin 4 milliGRAM(s) Oral once    MEDICATIONS  (PRN):      Allergies    No Known Allergies    Intolerances        Social History:  Patient lives alone. Denies tobacco use or etoh use. (11 Oct 2021 18:04)      REVIEW OF SYSTEMS:  CONSTITUTIONAL: No fever, No chills, No fatigue, No myalgia, No Body ache, No Weakness  EYES: No eye pain,  No visual disturbances, No discharge, NO Redness  ENMT:  No ear pain, No nose bleed, No vertigo; No sinus pain, NO throat pain, No Congestion  NECK: No pain, No stiffness  RESPIRATORY: No cough, NO wheezing, No  hemoptysis, NO  shortness of breath  CARDIOVASCULAR: No chest pain, palpitations  GASTROINTESTINAL: No abdominal pain, NO epigastric pain. No nausea, No vomiting; No diarrhea, No constipation. [  ] BM  GENITOURINARY: No dysuria, No frequency, No urgency, No hematuria, NO incontinence  NEUROLOGICAL: No headaches, No dizziness, No numbness, No tingling, No tremors, No weakness  EXT: No Swelling, No Pain, No Edema  SKIN:  [  ] No itching, burning, rashes, or lesions   MUSCULOSKELETAL: No joint pain ,No Jt swelling; No muscle pain, No back pain, No extremity pain  PSYCHIATRIC: No depression,  No anxiety,  No mood swings ,No difficulty sleeping at night  PAIN SCALE: [  ] None  [  ] Other-  ROS Unable to obtain due to - [  ] Dementia  [  ] Lethargy [  ] Drowsy [  ] Sedated [  ] non verbal  REST OF REVIEW Of SYSTEM - [  ] Normal     Vital Signs Last 24 Hrs  T(C): 36.7 (15 Oct 2021 12:33), Max: 37 (14 Oct 2021 20:58)  T(F): 98 (15 Oct 2021 12:33), Max: 98.6 (14 Oct 2021 20:58)  HR: 65 (15 Oct 2021 12:33) (65 - 95)  BP: 129/65 (15 Oct 2021 12:33) (129/65 - 163/80)  BP(mean): --  RR: 18 (15 Oct 2021 12:33) (17 - 18)  SpO2: 92% (15 Oct 2021 12:33) (92% - 96%)  Finger Stick        10-14 @ 07:01  -  10-15 @ 07:00  --------------------------------------------------------  IN: 50 mL / OUT: 0 mL / NET: 50 mL        PHYSICAL EXAM:  GENERAL:  [  ] NAD , [  ] well appearing, [  ] Agitated, [  ] Drowsy,  [  ] Lethargy, [  ] confused   HEAD:  [  ] Normal, [  ] Other  EYES:  [  ] EOMI, [  ] PERRLA, [  ] conjunctiva and sclera clear normal, [  ] Other,  [  ] Pallor,[  ] Discharge  ENMT:  [  ] Normal, [  ] Moist mucous membranes, [  ] Good dentition, [  ] No Thrush  NECK:  [  ] Supple, [  ] No JVD, [  ] Normal thyroid, [  ] Lymphadenopathy [  ] Other  CHEST/LUNG:  [  ] Clear to auscultation bilaterally, [  ] Breath Sounds equal B/L / Decrease, [  ] poor effort  [  ] No rales, [  ] No rhonchi  [  ]  No wheezing,   HEART:  [  ] Regular rate and rhythm, [  ] tachycardia, [  ] Bradycardia,  [  ] irregular  [  ] No murmurs, No rubs, No gallops, [  ] PPM in place (Mfr:  )  ABDOMEN:  [  ] Soft, [  ] Nontender, [  ] Nondistended, [  ] No mass, [  ] Bowel sounds present, [  ] obese  NERVOUS SYSTEM:  [  ] Alert & Oriented X3, [  ] Nonfocal  [  ] Confusion  [  ] Encephalopathic [  ] Sedated [  ] Unable to assess, [  ] Dementia [  ] Other-  EXTREMITIES: [  ] 2+ Peripheral Pulses, No clubbing, No cyanosis,  [  ] edema B/L lower EXT. [  ] PVD stasis skin changes B/L Lower EXT, [  ] wound  LYMPH: No lymphadenopathy noted  SKIN:  [  ] No rashes or lesions, [  ] Pressure Ulcers, [  ] ecchymosis, [  ] Skin Tears, [  ] Other        DIET: Diet, Regular:   DASH/TLC Sodium & Cholesterol Restricted  1200mL Fluid Restriction ([id number]) (10-11-21 @ 19:28)      LABS:                        9.4    4.86  )-----------( 205      ( 15 Oct 2021 09:04 )             29.1     15 Oct 2021 09:04    142    |  107    |  23     ----------------------------<  80     3.3     |  30     |  0.95     Ca    8.3        15 Oct 2021 09:04  Mg     2.3       15 Oct 2021 14:01      PT/INR - ( 15 Oct 2021 09:04 )   PT: 19.7 sec;   INR: 1.73 ratio         PTT - ( 15 Oct 2021 09:04 )  PTT:36.8 sec      Culture Results:   No growth to date. (10-11 @ 21:52)  Culture Results:   No growth to date. (10-11 @ 21:52)                  Culture - Blood (collected 11 Oct 2021 21:52)  Source: .Blood Blood-Peripheral  Preliminary Report (12 Oct 2021 22:01):    No growth to date.    Culture - Blood (collected 11 Oct 2021 21:52)  Source: .Blood Blood-Peripheral  Preliminary Report (12 Oct 2021 22:01):    No growth to date.         Anemia Panel:      Thyroid Panel:            Serum Pro-Brain Natriuretic Peptide: 2458 pg/mL (10-11-21 @ 16:40)      RADIOLOGY & ADDITIONAL TESTS:      HEALTH ISSUES - PROBLEM Dx:  Lower extremity edema    Hypertension    Hypothyroid    DVT prophylaxis    Paroxysmal atrial fibrillation    Cellulitis    Venous stasis dermatitis of lower extremity    Constipation    Exposure to confirmed case of COVID-19            Consultant(s) Notes Reviewed:  [ x ] YES     Care Discussed with [X] Consultants  [x  ] Patient  [  ] Family [  ] HCP [  ]   [  ] Social Service  [  ] RN, [  ] Physical Therapy,[  ] Palliative care team  DVT PPX: [  ] Lovenox, [  ] S C Heparin, [x  ] Coumadin, [  ] Xarelto, [  ] Eliquis, [  ] Pradaxa, [  ] IV Heparin drip, [  ] SCD [  ] Contraindication 2 to GI Bleed,[  ] Ambulation [  ] Contraindicated 2 to  bleed [  ] Contraindicated 2 to Brain Bleed  Advanced directive: [  ] None, [  ] DNR/DNI Patient is a 89y old  Female who presents with a chief complaint of b/l LE cellulitis (15 Oct 2021 11:52)    HPI:  Patient is a 89 y/o F with a pmhx b/l LE edema, HTN,P Afib on AC , hypothyroidism, who presents with 1mo hx of worsening b/l LE edema. Endorses erythema and swelling b/l. Per patient, her doctor ?neurologist, told her to stop Lasix recently so pt has not been taking. States pain patches, water pills and antibiotics as an outpatient were unhelpful in relieving these symptoms. States she has had symptoms for 6 months but recently worsened. Denies chest pain, shortness of breath, palpitations, fevers, chills, nausea, vomiting. Denies having in past.    IN THE ED:  Temp 98.8 F, HR 92, /72, RR 16, SpO2 96%  S/P Zosyn 3.375g x1, Vanco 1g x1, lasix 40 mg IV x1   EKG: left axis deviation, LBBB   Labs significant for WBC 5.66, BNP 2458, PT 31.9, INR 2.87, PTT 48, K 3.4, CO 32, anion gap 3, albumin 2.8, AST 57, eGFR 43  Imaging: CXR with minimal blunting of R base laterally    (11 Oct 2021 18:04)    INTERVAL HPI:  10/12/21 - Patient was seen and examined at bedside. No acute events overnight. Patient endorses improvement in the rubor/erythema of her LE b/l but endorses continued pain in LE b/l. Patient endorses 1 year hx of weight loss (35 pounds) due to decreased appetite. Patient denies headache, fever, blurry/double vision, tinnitus, dysphagia, cough, SoB, abdominal pain, CP, palpitations, n/v/c/d. Off Abx as per ID, INR -elevated   10/13/21 - Patient was seen and examined at bedside. Patient endorses no improvement in the swelling and erythema of LE b/l. Patient endorses a pressure sensation in both LE and has an "odd" sensation in her left heel. Patient endorses eating well without dysphagia. Patient denies BM the past 2 days and declines bowel regimen for now. Patient producing clear urine in vac. Endorses worsening of hearing in her left ear, worse since yesterday. Denies headache, fever, blurry/double vision, tinnitus, cough, SoB, abdominal pain, CP, palpitations, n/v/d.  Dtr Cell- [phone], On IV Lasix & IV Rocephin with Po steroids.  10/14/21 - Patient was seen and examined at bedside. Patient endorses significant improvement in swelling and erythema of LE b/l. Patient endorses b/l tingling sensation in LE. Patient denies BM last 3 days and is urinating well in vac. Endorses continued difficulty hearing in left ear. Patient endorses desire to receive vaccine for COVID-19. Discussed with patient to f/up hx of weight loss and difficulty hearing in L ear with PCP outpatient. Patient denies headache, fever, blurry/double vision, tinnitus, cough, dysphagia, SoB, CP, palpitations, abdominal pain, n/v/c/d, melena/hematochezia, dysuria, pyuria/hematuria, change in urinary frequency, confusion, dizziness, or weakness. On IV Lasix  Pt had exposure to  COVID + person at hospital, d/w Dr Larios , pt & Dtr, plan for MAB infusions.  10/15/21 - Patient seen and examined at bedside, S/P MAB Rx for COVID Exposure  over all improving. Additional Lasix 20 IVP ordered for lower Ext Edema . Will increase to Lasix 40mg IV BID starting later today. Denies any fevers, chills, cp, sob, abdominal pain, diarrhea, constipation     OVERNIGHT EVENTS: No overnight events.     Home Medications:  gabapentin 100 mg oral capsule: 1 cap(s) orally 3 times a day (13 Oct 2021 11:58)  Jantoven 1 mg oral tablet: 2 tab(s) orally Monday, Wednesday, and Friday (13 Oct 2021 11:58)  Jantoven 4 mg oral tablet: 1 tab(s) orally once a day (13 Oct 2021 11:58)  Klor-Con 10 mEq oral tablet, extended release: 1 tab(s) orally once a day (13 Oct 2021 11:58)  Lasix 40 mg oral tablet: 1 tab(s) orally once a day (13 Oct 2021 11:58)  lidocaine 5% patch: Apply to affected areas (13 Oct 2021 11:58)  Lopressor 50 mg oral tablet: 1 tab(s) orally once a day (13 Oct 2021 11:58)  Synthroid 50 mcg (0.05 mg) oral tablet: 1 tab(s) orally once a day (13 Oct 2021 11:58)  Vitamin B6 50 mg oral tablet: 1 tab(s) orally once a day (13 Oct 2021 11:58)      MEDICATIONS  (STANDING):  cefTRIAXone   IVPB 1000 milliGRAM(s) IV Intermittent every 24 hours  famotidine    Tablet 20 milliGRAM(s) Oral daily  furosemide    Tablet 40 milliGRAM(s) Oral two times a day  furosemide   Injectable 40 milliGRAM(s) IV Push every 12 hours  gabapentin 100 milliGRAM(s) Oral three times a day  levothyroxine 50 MICROGram(s) Oral daily  metoprolol tartrate 50 milliGRAM(s) Oral daily  polyethylene glycol 3350 17 Gram(s) Oral two times a day  potassium chloride    Tablet ER 20 milliEquivalent(s) Oral daily  predniSONE   Tablet 20 milliGRAM(s) Oral daily  senna 2 Tablet(s) Oral at bedtime  warfarin 4 milliGRAM(s) Oral once    MEDICATIONS  (PRN):      Allergies    No Known Allergies    Intolerances        Social History:  Patient lives alone. Denies tobacco use or etoh use. (11 Oct 2021 18:04)      REVIEW OF SYSTEMS: i am OK  CONSTITUTIONAL: No fever, No chills, No fatigue, No myalgia, No Body ache, No Weakness  EYES: No eye pain,  No visual disturbances, No discharge, NO Redness  ENMT:  No ear pain, No nose bleed, No vertigo; No sinus pain, NO throat pain, No Congestion  NECK: No pain, No stiffness  RESPIRATORY: No cough, NO wheezing, No  hemoptysis, NO  shortness of breath  CARDIOVASCULAR: No chest pain, palpitations  GASTROINTESTINAL: No abdominal pain, NO epigastric pain. No nausea, No vomiting; No diarrhea, No constipation. [  ] BM  GENITOURINARY: No dysuria, No frequency, No urgency, No hematuria, NO incontinence  NEUROLOGICAL: No headaches, No dizziness, No numbness, No tingling, No tremors, No weakness  EXT: No Swelling, No Pain, No Edema  SKIN:  [x  ] No itching, burning, rashes, or lesions   MUSCULOSKELETAL: No joint pain ,No Jt swelling; No muscle pain, No back pain, No extremity pain  PSYCHIATRIC: No depression,  No anxiety,  No mood swings ,No difficulty sleeping at night  PAIN SCALE: [ x ] None  [  ] Other-  ROS Unable to obtain due to - [  ] Dementia  [  ] Lethargy [  ] Drowsy [  ] Sedated [  ] non verbal  REST OF REVIEW Of SYSTEM - [x  ] Normal     Vital Signs Last 24 Hrs  T(C): 36.7 (15 Oct 2021 12:33), Max: 37 (14 Oct 2021 20:58)  T(F): 98 (15 Oct 2021 12:33), Max: 98.6 (14 Oct 2021 20:58)  HR: 65 (15 Oct 2021 12:33) (65 - 95)  BP: 129/65 (15 Oct 2021 12:33) (129/65 - 163/80)  BP(mean): --  RR: 18 (15 Oct 2021 12:33) (17 - 18)  SpO2: 92% (15 Oct 2021 12:33) (92% - 96%)  Finger Stick        10-14 @ 07:01  -  10-15 @ 07:00  --------------------------------------------------------  IN: 50 mL / OUT: 0 mL / NET: 50 mL        PHYSICAL EXAM: [facility]  GENERAL:  [ x ] NAD , [ x ] well appearing, [  ] Agitated, [  ] Drowsy,  [  ] Lethargy, [  ] confused   HEAD:  [ x ] Normal, [  ] Other  EYES:  [ x ] EOMI, [ x ] PERRLA, [ x ] conjunctiva and sclera clear normal, [  ] Other,  [  ] Pallor,[  ] Discharge  ENMT:  [ x ] Normal, [ x ] Moist mucous membranes, [ x ] Good dentition, [ x ] No Thrush  NECK:  [ x ] Supple, [x  ] No JVD, [x  ] Normal thyroid, [  ] Lymphadenopathy [  ] Other  CHEST/LUNG:  [x  ] Clear to auscultation bilaterally, [x  ] Breath Sounds equal B/L / Decrease, [  ] poor effort  [x  ] No rales, [x  ] No rhonchi  [ x ]  No wheezing,   HEART:  [  ] Regular rate and rhythm, [  ] tachycardia, [  ] Bradycardia,  [ x ] irregular  [ x ] No murmurs, No rubs, No gallops, [  ] PPM in place (Mfr:  )  ABDOMEN:  [ x ] Soft, [ x ] Nontender, [ x ] Nondistended, [ x ] No mass, [ x ] Bowel sounds present, [  ] obese  NERVOUS SYSTEM:  [ x] Alert & Oriented X3, [ x ] Nonfocal  [  ] Confusion  [  ] Encephalopathic [  ] Sedated [  ] Unable to assess, [  ] Dementia [  ] Other-  EXTREMITIES: [x  ] 2+ Peripheral Pulses, No clubbing, No cyanosis,  [ x ] 2 +pitting  edema B/L lower EXT. [x  ] severe PVD stasis skin changes B/L Lower EXT, Much improved erythema B/L lower Ext  [  ] wound, stasis skin   LYMPH: No lymphadenopathy noted  SKIN:  [ x ] No rashes or lesions, [  ] Pressure Ulcers, [  ] ecchymosis, [  ] Skin Tears, [  ] Other        DIET: Diet, Regular:   DASH/TLC Sodium & Cholesterol Restricted  1200mL Fluid Restriction ([id number]) (10-11-21 @ 19:28)      LABS:                        9.4    4.86  )-----------( 205      ( 15 Oct 2021 09:04 )             29.1     15 Oct 2021 09:04    142    |  107    |  23     ----------------------------<  80     3.3     |  30     |  0.95     Ca    8.3        15 Oct 2021 09:04  Mg     2.3       15 Oct 2021 14:01      PT/INR - ( 15 Oct 2021 09:04 )   PT: 19.7 sec;   INR: 1.73 ratio         PTT - ( 15 Oct 2021 09:04 )  PTT:36.8 sec      Culture Results:   No growth to date. (10-11 @ 21:52)  Culture Results:   No growth to date. (10-11 @ 21:52)    Culture - Blood (collected 11 Oct 2021 21:52)  Source: .Blood Blood-Peripheral  Preliminary Report (12 Oct 2021 22:01):    No growth to date.    Culture - Blood (collected 11 Oct 2021 21:52)  Source: .Blood Blood-Peripheral  Preliminary Report (12 Oct 2021 22:01):    No growth to date.      Serum Pro-Brain Natriuretic Peptide: 2458 pg/mL (10-11-21 @ 16:40)      RADIOLOGY & ADDITIONAL TESTS: NONE      HEALTH ISSUES - PROBLEM Dx:  Lower extremity edema    Hypertension    Hypothyroid    DVT prophylaxis    Paroxysmal atrial fibrillation    Cellulitis    Venous stasis dermatitis of lower extremity    Constipation    Exposure to confirmed case of COVID-19    Consultant(s) Notes Reviewed:  [ x ] YES     Care Discussed with [X] Consultants  [x  ] Patient  [ x ] Family- Dtr  [  ] HCP [ x ]   [  ] Social Service  [ x ] RN, [  ] Physical Therapy,[  ] Palliative care team  DVT PPX: [  ] Lovenox, [  ] S C Heparin, [x  ] Coumadin, [  ] Xarelto, [  ] Eliquis, [  ] Pradaxa, [  ] IV Heparin drip, [  ] SCD [  ] Contraindication 2 to GI Bleed,[  ] Ambulation [  ] Contraindicated 2 to  bleed [  ] Contraindicated 2 to Brain Bleed  Advanced directive: [x  ] None, [  ] DNR/DNI

## 2021-10-15 NOTE — PROGRESS NOTE ADULT - PROBLEM SELECTOR PLAN 4
D/W Floor RN Manager of floor & Director of Nursing   -Repeat COVID PCR  -D/W ID Dr Larios at detail D/W pt & Dtr , they both decided to go for post COVID Exposure  MAB infusion today  On Isolation D/W Floor RN Manager of floor & Director of Nursing   -Repeat COVID PCR negative  -s/p MAB infusion  - On Isolation D/W Floor RN Manager of floor & Director of Nursing   -Repeat COVID PCR negative  -s/p MAB infusion x 1 given on 10/14/21  - On Isolation

## 2021-10-15 NOTE — PROGRESS NOTE ADULT - PROBLEM SELECTOR PLAN 2
Chronic Venous stasis dermatitis b/.l lower EXT, likely non-cardiogenic  - improvement in b/l LE swelling today -> c/w Lasix BID, ceftriaxone, and prednisone  - c/w leg elevation, would benefit from compression stockings  - ID DR Larios D/W OK to continue steroids & Abx Chronic Venous stasis dermatitis b/.l lower EXT, likely non-cardiogenic  - overall there is improvement in b/l LE swelling -> start Lasix 40mg IVP BID, ceftriaxone, and prednisone  - c/w leg elevation, would benefit from compression stockings  - ID DR Larios D/W OK to continue steroids & Abx Chronic Venous stasis dermatitis b/.l lower EXT, likely non-cardiogenic  - overall there is improvement in b/l LE swelling -> start Lasix 40mg IVP BID,  IV ceftriaxone, and prednisone for inflammatory stasis dermatitis.  - c/w leg elevation, would benefit from compression stockings  - ID DR Larios D/W OK to continue steroids & Abx

## 2021-10-15 NOTE — PROGRESS NOTE ADULT - SUBJECTIVE AND OBJECTIVE BOX
[facility] -- Reid Astorga, Norah, Raheem, Flex Calvert, Jacob Blair: Office # [phone]    Follow Up:  LE edema, HTN     Subjective/Observations: Patient seen and examined. Patient awake, alert, resting in bed. No complaints of chest pain, dyspnea, palpitations or dizziness. No signs of orthopnea or PND. Continues with LE edema 2-3+ with redness.    REVIEW OF SYSTEMS: All review of systems is negative for eye, ENT, GI, , allergic, dermatologic, musculoskeletal and neurologic except as described above    PAST MEDICAL & SURGICAL HISTORY:  Hypothyroid  Hypertension  Hypertension  Lower extremity edema    MEDICATIONS  (STANDING):  cefTRIAXone   IVPB 1000 milliGRAM(s) IV Intermittent every 24 hours  famotidine    Tablet 20 milliGRAM(s) Oral daily  furosemide    Tablet 40 milliGRAM(s) Oral two times a day  furosemide   Injectable 40 milliGRAM(s) IV Push every 12 hours  furosemide   Injectable 20 milliGRAM(s) IV Push once  gabapentin 100 milliGRAM(s) Oral three times a day  levothyroxine 50 MICROGram(s) Oral daily  metoprolol tartrate 50 milliGRAM(s) Oral daily  polyethylene glycol 3350 17 Gram(s) Oral two times a day  potassium chloride    Tablet ER 40 milliEquivalent(s) Oral once  potassium chloride    Tablet ER 20 milliEquivalent(s) Oral daily  predniSONE   Tablet 20 milliGRAM(s) Oral daily  senna 2 Tablet(s) Oral at bedtime    MEDICATIONS  (PRN):    Allergies  No Known Allergies    Vital Signs Last 24 Hrs  T(C): 36.6 (15 Oct 2021 05:13), Max: 37 (14 Oct 2021 20:58)  T(F): 97.9 (15 Oct 2021 05:13), Max: 98.6 (14 Oct 2021 20:58)  HR: 75 (15 Oct 2021 05:13) (65 - 95)  BP: 142/79 (15 Oct 2021 05:13) (129/55 - 163/80)  BP(mean): --  RR: 18 (15 Oct 2021 05:13) (17 - 18)  SpO2: 96% (15 Oct 2021 05:13) (91% - 96%)  I&O's Summary    14 Oct 2021 07:01  -  15 Oct 2021 07:00  --------------------------------------------------------  IN: 50 mL / OUT: 0 mL / NET: 50 mL    TELE: Not on telemetry   PHYSICAL EXAM:  Appearance: NAD, no distress, alert, Well developed   HEENT: Moist Mucous Membranes, Anicteric  Cardiovascular: Regular rate and rhythm, Normal S1 S2, No JVD, + murmurs, No rubs, gallops or clicks  Respiratory: Non-labored, Clear to auscultation, No rales, No rhonchi, No wheezing.   Gastrointestinal:  Soft, Non-tender, + BS  Neurologic: Non-focal  Skin: Warm and dry, No visible rashes or ulcers, No ecchymosis, No cyanosis  Musculoskeletal: No clubbing, No cyanosis, No joint swelling/tenderness  Psychiatry: Mood & affect appropriate  Lymph: +2 peripheral edema with B/L LE redness     LABS: All Labs Reviewed:                        9.4    4.86  )-----------( 205      ( 15 Oct 2021 09:04 )             29.1                         10.3   4.33  )-----------( 203      ( 14 Oct 2021 09:06 )             33.0                         10.6   x     )-----------( x        ( 13 Oct 2021 16:20 )             33.6     15 Oct 2021 09:04    142    |  107    |  23     ----------------------------<  80     3.3     |  30     |  0.95   14 Oct 2021 09:06    139    |  104    |  19     ----------------------------<  110    3.5     |  30     |  1.10   13 Oct 2021 09:21    139    |  105    |  17     ----------------------------<  79     3.7     |  31     |  1.20     Ca    8.3        15 Oct 2021 09:04  Ca    8.1        14 Oct 2021 09:06  Ca    7.8        13 Oct 2021 09:21    TPro  6.6    /  Alb  2.2    /  TBili  0.8    /  DBili  x      /  AST  47     /  ALT  29     /  AlkPhos  77     13 Oct 2021 09:21    PT/INR - ( 15 Oct 2021 09:04 )   PT: 19.7 sec;   INR: 1.73 ratio         PTT - ( 15 Oct 2021 09:04 )  PTT:36.8 sec12 Lead ECG:   Ventricular Rate 76 BPM    Atrial Rate 55 BPM    QRS Duration 158 ms    Q-T Interval 442 ms    QTC Calculation(Bazett) 497 ms    R Axis -49 degrees    T Axis 138 degrees    Diagnosis Line Atrial fibrillation  Left axis deviation  Left bundle branchblock  Abnormal ECG  When compared with ECG of 11-DEC-2012 14:13,  Current undetermined rhythm precludes rhythm comparison, needs review  Left bundle branch block is now present  Criteria for Anteroseptal infarct are no longer present  Confirmed by Diony Maciel MD ([id number]) on 10/12/2021 1:51:19 PM (10-11-21 @ 17:07)     EXAM:  ECHO TTE WO CON COMP W DOPP    PROCEDURE DATE:  10/11/2021   INTERPRETATION:  INDICATION: Edema  Sonographer AS  Blood Pressure 153/72    Height unavailable     Weight 70.3 kg    Dimensions:  LA 3.6       Normal Values: 2.0 - 4.0 cm  Ao 3.4        Normal Values: 2.0 - 3.8 cm  SEPTUM 1.3       Normal Values: 0.6 - 1.2 cm  PWT 1.1       Normal Values: 0.6 - 1.1 cm  LVIDd 4.3         Normal Values: 3.0 - 5.6 cm  LVIDs 2.9         Normal Values: 1.8 - 4.0 cm      OBSERVATIONS:  Technically difficult study  Mitral Valve: Mitral annular calcification, moderate MR.  Aortic Valve/Aorta: Calcified trileaflet aortic valve with decreased opening. Peak transaortic valve gradient 26.5 mmHg with a mean transaortic valve gradient 17.8 mmHg. The aortic valve area is calculated to be 1.34 sq cm by continuity equation. This is consistent with moderate aortic stenosis.  Tricuspid Valve: Moderate TR.  Pulmonic Valve: Trace PI  Left Atrium: normal  Right Atrium: Enlarged  Left Ventricle:normal LV size and systolic function, estimated LVEF of 55-60%.  Right Ventricle: Right ventricular enlargement with grossly normal systolic function  Pericardium: no significant pericardial effusion.  Pulmonary/RV Pressure: estimated PA systolic pressure of 46.3 mmHg assuming an RA pressure of 3 mmHg.  IVC measures 1.3 cm    IMPRESSION:  Normal left ventricular internal dimensions and systolic function, estimated LVEF of 55-60%.  Right ventricular enlargement with grossly normal systolic function  Calcified trileaflet aortic valve with moderate aortic stenosis, without AI.  Moderate MR and TR.  Right atrial enlargement  No significant pericardial effusion.  --- End of Report ---   JEAN CARLOS HENRY MD; Attending Cardiologist  This document has been electronically signed. Oct 12 2021  5:46PM    Xray Chest 1 View- PORTABLE-Urgent:   EXAM:  XR CHEST PORTABLE URGENT 1V                        PROCEDURE DATE:  10/11/2021    INTERPRETATION:  AP semierect chest on October 11, 2021 at 4:09 PM. Patient has sepsis.  Heart magnified by technique and elevated right hemidiaphragm again noted.  Above findings are similar to December 11, 2012.  Present film shows minimal blunting of right base laterally.    IMPRESSION: Minimal blunting of right base laterally at this time. Otherwise stable findings as above.  --- End of Report ---  RUSSELL MONTES MD; Attending Radiologist  This document has been electronically signed. Oct 11 2021  5:15PM (10-11-21 @ 16:19)

## 2021-10-15 NOTE — PHARMACY COMMUNICATION NOTE - COMMENTS
s/w  and received telephone order to d/c one time stat dose, pt already received one time dose at 10:55, and re-order one dose at 15:00.  Selected Dr.R. Mi (previous telephone order selected by default) in error to disontinue one time stat order instead of .

## 2021-10-15 NOTE — PROGRESS NOTE ADULT - ASSESSMENT
89 y/o F with a PMH b/l LE edema, HTN, P Afib on AC , hypothyroidism, who presents with 1mo hx of worsening b/l LE edema. Cardiology consulted for LE edema b/l.     Edema/ Acute Diastolic CHF/ Valvular heart disease   - ECHO 10/11/21 showed mod MR, mod AS, mod TR, EF 55-60%, RV enlargement, MARY, PA systolic pressure of 46.3 mmHg   - Serum Pro-Brain Natriuretic Peptide: 2458 pg/mL (10-11-21 @ 16:40)  - Would diurese with Lasix 40 mg IVP BID for now given LE edema.   - Deescalate therapy if creatinine up trends   - Unlikely infectious process at this time, monitoring off abx. ID on board.   - Elevated LE and compression stockings if possible.   - Monitor strict I/Os, daily weight.     AFib  - HR: 75 (10-15 @ 05:13) (65 - 95)  - INR: 1.73 (10-15-21 @ 09:04). Dose coumadin for goal INR 2-3  - Monitor and replete Lytes. Keep K > 4 and Mg > 2    Hypertension  - BP: 142/79 (10-15-21 @ 05:13) (129/55 - 163/80)  - Continue BB   - Monitor routine hemodynamics     - Monitor and replete lytes, keep K>4, Mg>2.  - All other medical needs as per primary team.  - Other cardiovascular workup will depend on clinical course.  - Will continue to follow.    Raul Nelson, MS FNP, [facility]  Nurse Practitioner- Cardiology   Spectra #[phone]

## 2021-10-15 NOTE — PROGRESS NOTE ADULT - PROBLEM SELECTOR PLAN 4
D/W Floor RN Manager of floor & Director of Nursing   -Repeat COVID PCR  -D/W ID Dr Larios at detail D/W pt & Dtr , they both decided to go for post COVID Exposure  MAB infusion today  On Isolation

## 2021-10-15 NOTE — PROGRESS NOTE ADULT - ATTENDING COMMENTS
at this point appears more vol ol and likely with more of a component of ADHF. will give lasix 40mg IV q12. Please continue to maintain strict I/Os, monitor daily weights, Cr, and K. Further cardiac workup will depend on clinical course.

## 2021-10-15 NOTE — PROGRESS NOTE ADULT - ASSESSMENT
89 y/o F with a pmhx b/l LE edema, HTN, hypothyroidism, who presents with 1mo hx of worsening b/l LE edema, erythema and swelling b/l.     RECOMMENDATIONS    LE swelling/erythema - No fever, no leukocytosis, 1 mo chronicity with symmetrical nature is very atypical for an infectious process. Low suspicion for this being due to an infectious process and suspect inflammatory stasis dermatitis..but with chronicity and failure to improve  started abx/steroids with rapid response   rec as pt improves anticipate transition to finish course with  Cefuroxime 500mg PO BID with last day 10/17  prednisone 20mg PO daily with last day 10/22    COVID exposure - sp REGEN-COV Mab Rx 10/14    We will follow along in the care of this patient. Please call us at [phone] or text me directly on my cell# at [phone] with any concerns.

## 2021-10-15 NOTE — PROGRESS NOTE ADULT - PROBLEM SELECTOR PLAN 7
- No BM in 3 days - no abdominal pain/tenderness at this time  - start Miralax and senna - No BM in 3 days - no abdominal pain/tenderness at this time  - continue Miralax and senna

## 2021-10-15 NOTE — PROGRESS NOTE ADULT - SUBJECTIVE AND OBJECTIVE BOX
Patient is a 89y old  Female who presents with a chief complaint of b/l LE cellulitis (15 Oct 2021 10:19)    HPI:  Patient is a 89 y/o F with a pmhx b/l LE edema, HTN,P Afib on AC , hypothyroidism, who presents with 1mo hx of worsening b/l LE edema. Endorses erythema and swelling b/l. Per patient, her doctor ?neurologist, told her to stop Lasix recently so pt has not been taking. States pain patches, water pills and antibiotics as an outpatient were unhelpful in relieving these symptoms. States she has had symptoms for 6 months but recently worsened. Denies chest pain, shortness of breath, palpitations, fevers, chills, nausea, vomiting. Denies having in past.    IN THE ED:  Temp 98.8 F, HR 92, /72, RR 16, SpO2 96%  S/P Zosyn 3.375g x1, Vanco 1g x1, lasix 40 mg IV x1   EKG: left axis deviation, LBBB   Labs significant for WBC 5.66, BNP 2458, PT 31.9, INR 2.87, PTT 48, K 3.4, CO 32, anion gap 3, albumin 2.8, AST 57, eGFR 43  Imaging: CXR with minimal blunting of R base laterally    (11 Oct 2021 18:04)    INTERVAL HPI:  10/12/21 - Patient was seen and examined at bedside. No acute events overnight. Patient endorses improvement in the rubor/erythema of her LE b/l but endorses continued pain in LE b/l. Patient endorses 1 year hx of weight loss (35 pounds) due to decreased appetite. Patient denies headache, fever, blurry/double vision, tinnitus, dysphagia, cough, SoB, abdominal pain, CP, palpitations, n/v/c/d. Off Abx as per ID, INR -elevated   10/13/21 - Patient was seen and examined at bedside. Patient endorses no improvement in the swelling and erythema of LE b/l. Patient endorses a pressure sensation in both LE and has an "odd" sensation in her left heel. Patient endorses eating well without dysphagia. Patient denies BM the past 2 days and declines bowel regimen for now. Patient producing clear urine in vac. Endorses worsening of hearing in her left ear, worse since yesterday. Denies headache, fever, blurry/double vision, tinnitus, cough, SoB, abdominal pain, CP, palpitations, n/v/d.  Dtr Cell- [phone], On IV Lasix & IV Rocephin with Po steroids.  10/14/21 - Patient was seen and examined at bedside. Patient endorses significant improvement in swelling and erythema of LE b/l. Patient endorses b/l tingling sensation in LE. Patient denies BM last 3 days and is urinating well in vac. Endorses continued difficulty hearing in left ear. Patient endorses desire to receive vaccine for COVID-19. Discussed with patient to f/up hx of weight loss and difficulty hearing in L ear with PCP outpatient. Patient denies headache, fever, blurry/double vision, tinnitus, cough, dysphagia, SoB, CP, palpitations, abdominal pain, n/v/c/d, melena/hematochezia, dysuria, pyuria/hematuria, change in urinary frequency, confusion, dizziness, or weakness. On IV Lasix  Pt had exposure to  COVID + person at hospital, d/w Dr Larios , pt & Dtr, plan for MAB infusions.  10/15/21 -          OVERNIGHT EVENTS: No acute events    Home Medications:  gabapentin 100 mg oral capsule: 1 cap(s) orally 3 times a day (13 Oct 2021 11:58)  Jantoven 1 mg oral tablet: 2 tab(s) orally Monday, Wednesday, and Friday (13 Oct 2021 11:58)  Jantoven 4 mg oral tablet: 1 tab(s) orally once a day (13 Oct 2021 11:58)  Klor-Con 10 mEq oral tablet, extended release: 1 tab(s) orally once a day (13 Oct 2021 11:58)  Lasix 40 mg oral tablet: 1 tab(s) orally once a day (13 Oct 2021 11:58)  lidocaine 5% patch: Apply to affected areas (13 Oct 2021 11:58)  Lopressor 50 mg oral tablet: 1 tab(s) orally once a day (13 Oct 2021 11:58)  Synthroid 50 mcg (0.05 mg) oral tablet: 1 tab(s) orally once a day (13 Oct 2021 11:58)  Vitamin B6 50 mg oral tablet: 1 tab(s) orally once a day (13 Oct 2021 11:58)      MEDICATIONS  (STANDING):  cefTRIAXone   IVPB 1000 milliGRAM(s) IV Intermittent every 24 hours  famotidine    Tablet 20 milliGRAM(s) Oral daily  furosemide    Tablet 40 milliGRAM(s) Oral two times a day  gabapentin 100 milliGRAM(s) Oral three times a day  levothyroxine 50 MICROGram(s) Oral daily  metoprolol tartrate 50 milliGRAM(s) Oral daily  polyethylene glycol 3350 17 Gram(s) Oral two times a day  potassium chloride    Tablet ER 20 milliEquivalent(s) Oral daily  predniSONE   Tablet 20 milliGRAM(s) Oral daily  senna 2 Tablet(s) Oral at bedtime    MEDICATIONS  (PRN):      Allergies    No Known Allergies    Intolerances        Social History:  Patient lives alone. Denies tobacco use or etoh use. (11 Oct 2021 18:04)      REVIEW OF SYSTEMS:  CONSTITUTIONAL: No fever, No chills, No fatigue, No myalgia, No Body ache, No Weakness  EYES: No eye pain,  No visual disturbances, No discharge, NO Redness  ENMT:  No ear pain, No nose bleed, No vertigo; No sinus pain, NO throat pain, No Congestion  NECK: No pain, No stiffness  RESPIRATORY: No cough, NO wheezing, No  hemoptysis, NO  shortness of breath  CARDIOVASCULAR: No chest pain, palpitations  GASTROINTESTINAL: No abdominal pain, NO epigastric pain. No nausea, No vomiting; No diarrhea, No constipation. [  ] BM  GENITOURINARY: No dysuria, No frequency, No urgency, No hematuria, NO incontinence  NEUROLOGICAL: No headaches, No dizziness, No numbness, No tingling, No tremors, No weakness  EXT: No Swelling, No Pain, No Edema  SKIN:  [  ] No itching, burning, rashes, or lesions   MUSCULOSKELETAL: No joint pain ,No Jt swelling; No muscle pain, No back pain, No extremity pain  PSYCHIATRIC: No depression,  No anxiety,  No mood swings ,No difficulty sleeping at night  PAIN SCALE: [  ] None  [  ] Other-  ROS Unable to obtain due to - [  ] Dementia  [  ] Lethargy [  ] Drowsy [  ] Sedated [  ] non verbal  REST OF REVIEW Of SYSTEM - [  ] Normal     Vital Signs Last 24 Hrs  T(C): 36.6 (15 Oct 2021 05:13), Max: 37 (14 Oct 2021 20:58)  T(F): 97.9 (15 Oct 2021 05:13), Max: 98.6 (14 Oct 2021 20:58)  HR: 75 (15 Oct 2021 05:13) (65 - 95)  BP: 142/79 (15 Oct 2021 05:13) (129/55 - 163/80)  BP(mean): --  RR: 18 (15 Oct 2021 05:13) (17 - 18)  SpO2: 96% (15 Oct 2021 05:13) (91% - 96%)  Finger Stick        10-14 @ 07:01  -  10-15 @ 07:00  --------------------------------------------------------  IN: 50 mL / OUT: 0 mL / NET: 50 mL        PHYSICAL EXAM:  GENERAL:  [  ] NAD , [  ] well appearing, [  ] Agitated, [  ] Drowsy,  [  ] Lethargy, [  ] confused   HEAD:  [  ] Normal, [  ] Other  EYES:  [  ] EOMI, [  ] PERRLA, [  ] conjunctiva and sclera clear normal, [  ] Other,  [  ] Pallor,[  ] Discharge  ENMT:  [  ] Normal, [  ] Moist mucous membranes, [  ] Good dentition, [  ] No Thrush  NECK:  [  ] Supple, [  ] No JVD, [  ] Normal thyroid, [  ] Lymphadenopathy [  ] Other  CHEST/LUNG:  [  ] Clear to auscultation bilaterally, [  ] Breath Sounds equal B/L / Decrease, [  ] poor effort  [  ] No rales, [  ] No rhonchi  [  ]  No wheezing,   HEART:  [  ] Regular rate and rhythm, [  ] tachycardia, [  ] Bradycardia,  [  ] irregular  [  ] No murmurs, No rubs, No gallops, [  ] PPM in place (Mfr:  )  ABDOMEN:  [  ] Soft, [  ] Nontender, [  ] Nondistended, [  ] No mass, [  ] Bowel sounds present, [  ] obese  NERVOUS SYSTEM:  [  ] Alert & Oriented X3, [  ] Nonfocal  [  ] Confusion  [  ] Encephalopathic [  ] Sedated [  ] Unable to assess, [  ] Dementia [  ] Other-  EXTREMITIES: [  ] 2+ Peripheral Pulses, No clubbing, No cyanosis,  [  ] edema B/L lower EXT. [  ] PVD stasis skin changes B/L Lower EXT, [  ] wound  LYMPH: No lymphadenopathy noted  SKIN:  [  ] No rashes or lesions, [  ] Pressure Ulcers, [  ] ecchymosis, [  ] Skin Tears, [  ] Other    DIET: Diet, Regular:   DASH/TLC Sodium & Cholesterol Restricted  1200mL Fluid Restriction ([id number]) (10-11-21 @ 19:28)      LABS:                        9.4    4.86  )-----------( 205      ( 15 Oct 2021 09:04 )             29.1     15 Oct 2021 09:04    142    |  107    |  23     ----------------------------<  80     3.3     |  30     |  0.95     Ca    8.3        15 Oct 2021 09:04      PT/INR - ( 15 Oct 2021 09:04 )   PT: 19.7 sec;   INR: 1.73 ratio         PTT - ( 15 Oct 2021 09:04 )  PTT:36.8 sec      Culture Results:   No growth to date. (10-11 @ 21:52)  Culture Results:   No growth to date. (10-11 @ 21:52)                  Culture - Blood (collected 11 Oct 2021 21:52)  Source: .Blood Blood-Peripheral  Preliminary Report (12 Oct 2021 22:01):    No growth to date.    Culture - Blood (collected 11 Oct 2021 21:52)  Source: .Blood Blood-Peripheral  Preliminary Report (12 Oct 2021 22:01):    No growth to date.         Anemia Panel:      Thyroid Panel:            Serum Pro-Brain Natriuretic Peptide: 2458 pg/mL (10-11-21 @ 16:40)      RADIOLOGY & ADDITIONAL TESTS:      HEALTH ISSUES - PROBLEM Dx:  Lower extremity edema    Hypertension    Hypothyroid    DVT prophylaxis    Paroxysmal atrial fibrillation    Cellulitis    Venous stasis dermatitis of lower extremity    Constipation    Exposure to confirmed case of COVID-19            Consultant(s) Notes Reviewed:  [  ] YES     Care Discussed with [X] Consultants  [  ] Patient  [  ] Family [  ] HCP [  ]   [  ] Social Service  [  ] RN, [  ] Physical Therapy,[  ] Palliative care team  DVT PPX: [  ] Lovenox, [  ] S C Heparin, [  ] Coumadin, [  ] Xarelto, [  ] Eliquis, [  ] Pradaxa, [  ] IV Heparin drip, [  ] SCD [  ] Contraindication 2 to GI Bleed,[  ] Ambulation [  ] Contraindicated 2 to  bleed [  ] Contraindicated 2 to Brain Bleed  Advanced directive: [  ] None, [  ] DNR/DNI

## 2021-10-15 NOTE — PROGRESS NOTE ADULT - SUBJECTIVE AND OBJECTIVE BOX
[facility] DIVISION of INFECTIOUS DISEASE  Diony Larios MD PhD, Tricia Cornell MD, Delmy Villegas MD, Juan Alberto Mccord MD, Juan Vieyra MD  and providing coverage with An Mckay MD and Gabriella Bell MD  Providing Infectious Disease Consultations at [facility], [facility]'s    Office# [phone] to schedule follow up appointments  Answering Service for urgent calls or New Consults [phone]  Cell# to text for urgent issues Diony Larios [phone]     infectious diseases progress note:    KENIA REGALADO is a 89y y. o. Female patient    No concerning overnight events    Allergies    No Known Allergies    Intolerances        ANTIBIOTICS/RELEVANT:  antimicrobials  cefTRIAXone   IVPB 1000 milliGRAM(s) IV Intermittent every 24 hours    immunologic:    OTHER:  famotidine    Tablet 20 milliGRAM(s) Oral daily  furosemide    Tablet 40 milliGRAM(s) Oral two times a day  gabapentin 100 milliGRAM(s) Oral three times a day  levothyroxine 50 MICROGram(s) Oral daily  metoprolol tartrate 50 milliGRAM(s) Oral daily  polyethylene glycol 3350 17 Gram(s) Oral two times a day  potassium chloride    Tablet ER 40 milliEquivalent(s) Oral once  potassium chloride    Tablet ER 20 milliEquivalent(s) Oral daily  predniSONE   Tablet 20 milliGRAM(s) Oral daily  senna 2 Tablet(s) Oral at bedtime      Objective:  Vital Signs Last 24 Hrs  T(C): 36.6 (15 Oct 2021 05:13), Max: 37 (14 Oct 2021 20:58)  T(F): 97.9 (15 Oct 2021 05:13), Max: 98.6 (14 Oct 2021 20:58)  HR: 75 (15 Oct 2021 05:13) (65 - 95)  BP: 142/79 (15 Oct 2021 05:13) (129/55 - 163/80)  BP(mean): --  RR: 18 (15 Oct 2021 05:13) (17 - 18)  SpO2: 96% (15 Oct 2021 05:13) (91% - 96%)    T(C): 36.6 (10-15-21 @ 05:13), Max: 37 (10-14-21 @ 20:58)  T(C): 36.6 (10-15-21 @ 05:13), Max: 37 (10-14-21 @ 20:58)  T(C): 36.6 (10-15-21 @ 05:13), Max: 37.1 (10-11-21 @ 14:45)    PHYSICAL EXAM:  HEENT: NC atraumatic  Neck: supple  Respiratory: no accessory muscle use, breathing comfortably  Cardiovascular: distant  Gastrointestinal: normal appearing, nondistended  Extremities: no clubbing, no cyanosis, bilat LE erythema and swelling        LABS:                          9.4    4.86  )-----------( 205      ( 15 Oct 2021 09:04 )             29.1       4.86 10-15 @ 09:04  4.33 10-14 @ 09:06  4.58 10-13 @ 09:21  4.09 10-12 @ 09:17  5.66 10-11 @ 16:40      10-15    142  |  107  |  23  ----------------------------<  80  3.3<L>   |  30  |  0.95    Ca    8.3<L>      15 Oct 2021 09:04        Creatinine, Serum: 0.95 mg/dL (10-15-21 @ 09:04)  Creatinine, Serum: 1.10 mg/dL (10-14-21 @ 09:06)  Creatinine, Serum: 1.20 mg/dL (10-13-21 @ 09:21)  Creatinine, Serum: 1.10 mg/dL (10-12-21 @ 09:17)  Creatinine, Serum: 1.10 mg/dL (10-11-21 @ 16:40)      PT/INR - ( 15 Oct 2021 09:04 )   PT: 19.7 sec;   INR: 1.73 ratio         PTT - ( 15 Oct 2021 09:04 )  PTT:36.8 sec          INFLAMMATORY MARKERS  Auto Neutrophil #: 3.06 K/uL (10-14-21 @ 09:06)  Auto Lymphocyte #: 0.84 K/uL (10-14-21 @ 09:06)  Auto Neutrophil #: 2.15 K/uL (10-13-21 @ 09:21)  Auto Lymphocyte #: 1.69 K/uL (10-13-21 @ 09:21)  Auto Neutrophil #: 3.39 K/uL (10-11-21 @ 16:40)  Auto Lymphocyte #: 1.55 K/uL (10-11-21 @ 16:40)    Lactate, Blood: 1.5 mmol/L (10-11-21 @ 16:40)    Auto Eosinophil #: 0.00 K/uL (10-14-21 @ 09:06)  Auto Eosinophil #: 0.18 K/uL (10-13-21 @ 09:21)  Auto Eosinophil #: 0.08 K/uL (10-11-21 @ 16:40)        INR: 1.73 ratio (10-15-21 @ 09:04)  Activated Partial Thromboplastin Time: 36.8 sec (10-15-21 @ 09:04)  INR: 2.25 ratio (10-14-21 @ 09:06)  Activated Partial Thromboplastin Time: 41.1 sec (10-14-21 @ 09:06)  INR: 3.03 ratio (10-13-21 @ 09:21)  Activated Partial Thromboplastin Time: 45.4 sec (10-13-21 @ 09:21)  INR: 3.46 ratio (10-12-21 @ 09:17)  INR: 2.87 ratio (10-11-21 @ 16:40)  Activated Partial Thromboplastin Time: 48.0 sec (10-11-21 @ 16:40)          MICROBIOLOGY:    COVID-19 PCR: NotDetec (14 Oct 2021 14:24)  COVID-19 PCR: NotDetec (11 Oct 2021 16:40)      RADIOLOGY & ADDITIONAL STUDIES:

## 2021-10-15 NOTE — PROGRESS NOTE ADULT - ATTENDING COMMENTS
Patient is a 89 y/o F with a pmhx b/l LE edema, HTN, hypothyroidism, who presents with 1mo hx of worsening b/l LE edema. Endorses erythema and swelling of b/l LE  edema ,oozing wound with Volume overload, improving with IV diuresis.  Pt seen, examined, case & care plan d/w pt, residents at detail.  D/W ID-Dr Larios -continue current care , On Isolation.  AM Labs   PO diet   PT Eval. --->HCPT, D/W Case management.  -D/W Dtr at detail, about  D/C  plan.  Total care time 45 minutes.

## 2021-10-15 NOTE — PROGRESS NOTE ADULT - ASSESSMENT
Patient is a 89 y/o F with a pmhx b/l LE edema, HTN, hypothyroidism, who presents with 1mo hx of worsening b/l LE edema. Endorses erythema and swelling of b/l LE  edema ,oozing wound with Volume overload.  Patient is a 89 y/o F with a pmhx b/l LE edema, HTN, hypothyroidism, who presents with 1mo hx of worsening b/l LE edema. Endorses erythema and swelling of b/l LE  edema ,oozing wound with Volume overload, improving with IV diuresis.

## 2021-10-15 NOTE — PROGRESS NOTE ADULT - PROBLEM SELECTOR PLAN 1
- Chronic Venous stasis dermatitis now with blisters oozing likely 2/2 fluid overload from med non-compliance. unlikely 2/2 cellulitis at this time   - continue Lasix 20 mg IV bid today -> transition to Lasix 40 mg PO BID tomorrow (10/15)  - blood cultures NGTD -> no Abx at this time  - US doppler (-)  - TTE: LVEF 55-60 | moderate TR/MR | RV/RA enlargement  - c/w gabapentin 100mg tid  - c/w ceftriaxone/prednisone for now, improved over last 24 hrs -> can transition to Cefuroxime 500mg PO BID with last day 10/17 and prednisone 20mg PO daily with last day 10/22  - ID DR Larios, recs appreciated  - Cardiology following, recs appreciated- Can change to PO Lasix on D/C

## 2021-10-16 DIAGNOSIS — D64.9 ANEMIA, UNSPECIFIED: ICD-10-CM

## 2021-10-16 LAB
ALBUMIN SERPL ELPH-MCNC: 2.4 G/DL — LOW (ref 3.3–5)
ALP SERPL-CCNC: 82 U/L — SIGNIFICANT CHANGE UP (ref 40–120)
ALT FLD-CCNC: 34 U/L — SIGNIFICANT CHANGE UP (ref 12–78)
ANION GAP SERPL CALC-SCNC: 4 MMOL/L — LOW (ref 5–17)
AST SERPL-CCNC: 38 U/L — HIGH (ref 15–37)
BASOPHILS # BLD AUTO: 0.02 K/UL — SIGNIFICANT CHANGE UP (ref 0–0.2)
BASOPHILS NFR BLD AUTO: 0.4 % — SIGNIFICANT CHANGE UP (ref 0–2)
BILIRUB SERPL-MCNC: 0.5 MG/DL — SIGNIFICANT CHANGE UP (ref 0.2–1.2)
BUN SERPL-MCNC: 27 MG/DL — HIGH (ref 7–23)
CALCIUM SERPL-MCNC: 8.3 MG/DL — LOW (ref 8.5–10.1)
CHLORIDE SERPL-SCNC: 106 MMOL/L — SIGNIFICANT CHANGE UP (ref 96–108)
CO2 SERPL-SCNC: 32 MMOL/L — HIGH (ref 22–31)
CREAT SERPL-MCNC: 1.1 MG/DL — SIGNIFICANT CHANGE UP (ref 0.5–1.3)
CULTURE RESULTS: SIGNIFICANT CHANGE UP
CULTURE RESULTS: SIGNIFICANT CHANGE UP
EOSINOPHIL # BLD AUTO: 0.12 K/UL — SIGNIFICANT CHANGE UP (ref 0–0.5)
EOSINOPHIL NFR BLD AUTO: 2.1 % — SIGNIFICANT CHANGE UP (ref 0–6)
GLUCOSE SERPL-MCNC: 95 MG/DL — SIGNIFICANT CHANGE UP (ref 70–99)
HCT VFR BLD CALC: 34.1 % — LOW (ref 34.5–45)
HGB BLD-MCNC: 10.8 G/DL — LOW (ref 11.5–15.5)
IMM GRANULOCYTES NFR BLD AUTO: 0.2 % — SIGNIFICANT CHANGE UP (ref 0–1.5)
INR BLD: 1.56 RATIO — HIGH (ref 0.88–1.16)
LYMPHOCYTES # BLD AUTO: 1.24 K/UL — SIGNIFICANT CHANGE UP (ref 1–3.3)
LYMPHOCYTES # BLD AUTO: 22.2 % — SIGNIFICANT CHANGE UP (ref 13–44)
MCHC RBC-ENTMCNC: 28.1 PG — SIGNIFICANT CHANGE UP (ref 27–34)
MCHC RBC-ENTMCNC: 31.7 GM/DL — LOW (ref 32–36)
MCV RBC AUTO: 88.6 FL — SIGNIFICANT CHANGE UP (ref 80–100)
MONOCYTES # BLD AUTO: 0.71 K/UL — SIGNIFICANT CHANGE UP (ref 0–0.9)
MONOCYTES NFR BLD AUTO: 12.7 % — SIGNIFICANT CHANGE UP (ref 2–14)
NEUTROPHILS # BLD AUTO: 3.49 K/UL — SIGNIFICANT CHANGE UP (ref 1.8–7.4)
NEUTROPHILS NFR BLD AUTO: 62.4 % — SIGNIFICANT CHANGE UP (ref 43–77)
NRBC # BLD: 0 /100 WBCS — SIGNIFICANT CHANGE UP (ref 0–0)
PLATELET # BLD AUTO: 248 K/UL — SIGNIFICANT CHANGE UP (ref 150–400)
POTASSIUM SERPL-MCNC: 3.6 MMOL/L — SIGNIFICANT CHANGE UP (ref 3.5–5.3)
POTASSIUM SERPL-SCNC: 3.6 MMOL/L — SIGNIFICANT CHANGE UP (ref 3.5–5.3)
PROT SERPL-MCNC: 7.4 G/DL — SIGNIFICANT CHANGE UP (ref 6–8.3)
PROTHROM AB SERPL-ACNC: 17.9 SEC — HIGH (ref 10.6–13.6)
RBC # BLD: 3.85 M/UL — SIGNIFICANT CHANGE UP (ref 3.8–5.2)
RBC # FLD: 18.2 % — HIGH (ref 10.3–14.5)
SODIUM SERPL-SCNC: 142 MMOL/L — SIGNIFICANT CHANGE UP (ref 135–145)
SPECIMEN SOURCE: SIGNIFICANT CHANGE UP
SPECIMEN SOURCE: SIGNIFICANT CHANGE UP
WBC # BLD: 5.59 K/UL — SIGNIFICANT CHANGE UP (ref 3.8–10.5)
WBC # FLD AUTO: 5.59 K/UL — SIGNIFICANT CHANGE UP (ref 3.8–10.5)

## 2021-10-16 PROCEDURE — 99232 SBSQ HOSP IP/OBS MODERATE 35: CPT

## 2021-10-16 RX ORDER — SENNA PLUS 8.6 MG/1
2 TABLET ORAL AT BEDTIME
Refills: 0 | Status: DISCONTINUED | OUTPATIENT
Start: 2021-10-16 | End: 2021-10-16

## 2021-10-16 RX ORDER — POTASSIUM CHLORIDE 20 MEQ
40 PACKET (EA) ORAL EVERY 4 HOURS
Refills: 0 | Status: COMPLETED | OUTPATIENT
Start: 2021-10-16 | End: 2021-10-16

## 2021-10-16 RX ORDER — POLYETHYLENE GLYCOL 3350 17 G/17G
17 POWDER, FOR SOLUTION ORAL
Refills: 0 | Status: DISCONTINUED | OUTPATIENT
Start: 2021-10-16 | End: 2021-10-19

## 2021-10-16 RX ORDER — WARFARIN SODIUM 2.5 MG/1
4 TABLET ORAL ONCE
Refills: 0 | Status: COMPLETED | OUTPATIENT
Start: 2021-10-16 | End: 2021-10-16

## 2021-10-16 RX ADMIN — Medication 40 MILLIEQUIVALENT(S): at 13:08

## 2021-10-16 RX ADMIN — Medication 40 MILLIEQUIVALENT(S): at 17:59

## 2021-10-16 RX ADMIN — FAMOTIDINE 20 MILLIGRAM(S): 10 INJECTION INTRAVENOUS at 13:08

## 2021-10-16 RX ADMIN — POLYETHYLENE GLYCOL 3350 17 GRAM(S): 17 POWDER, FOR SOLUTION ORAL at 05:53

## 2021-10-16 RX ADMIN — Medication 50 MILLIGRAM(S): at 05:52

## 2021-10-16 RX ADMIN — GABAPENTIN 100 MILLIGRAM(S): 400 CAPSULE ORAL at 13:08

## 2021-10-16 RX ADMIN — WARFARIN SODIUM 4 MILLIGRAM(S): 2.5 TABLET ORAL at 21:29

## 2021-10-16 RX ADMIN — Medication 40 MILLIGRAM(S): at 05:53

## 2021-10-16 RX ADMIN — CEFTRIAXONE 100 MILLIGRAM(S): 500 INJECTION, POWDER, FOR SOLUTION INTRAMUSCULAR; INTRAVENOUS at 11:08

## 2021-10-16 RX ADMIN — Medication 40 MILLIGRAM(S): at 17:59

## 2021-10-16 RX ADMIN — Medication 50 MICROGRAM(S): at 05:52

## 2021-10-16 RX ADMIN — SENNA PLUS 2 TABLET(S): 8.6 TABLET ORAL at 21:29

## 2021-10-16 RX ADMIN — POLYETHYLENE GLYCOL 3350 17 GRAM(S): 17 POWDER, FOR SOLUTION ORAL at 17:59

## 2021-10-16 RX ADMIN — Medication 20 MILLIEQUIVALENT(S): at 13:08

## 2021-10-16 RX ADMIN — GABAPENTIN 100 MILLIGRAM(S): 400 CAPSULE ORAL at 05:52

## 2021-10-16 RX ADMIN — GABAPENTIN 100 MILLIGRAM(S): 400 CAPSULE ORAL at 21:29

## 2021-10-16 RX ADMIN — Medication 20 MILLIGRAM(S): at 05:52

## 2021-10-16 NOTE — PROGRESS NOTE ADULT - PROBLEM SELECTOR PLAN 7
- No BM in 3 days - no abdominal pain/tenderness at this time  - continue Miralax and senna - continue Miralax and senna - continue synthroid 50mg qd

## 2021-10-16 NOTE — PROGRESS NOTE ADULT - ATTENDING COMMENTS
Patient is a 89 y/o F with a pmhx b/l LE edema, HTN, hypothyroidism, who presents with 1mo hx of worsening b/l LE edema. Endorses erythema and swelling of b/l LE  edema ,oozing wound with Volume overload, improving with IV diuresis.  Pt seen, examined, case & care plan d/w pt, residents at detail.   ID-Dr Larios -continue current care , On Isolation.  AM Labs   PO diet   PT Eval. --->HCPT, D/W Case management.  -D/W Dtr at detail, about  D/C  plan.  Total care time 45 minutes.

## 2021-10-16 NOTE — PROGRESS NOTE ADULT - PROBLEM SELECTOR PLAN 6
- continue synthroid 50mg qd - chronic, stable  - continue home Lopressor 50mg qd  - c/w Lasix 40mg IVP BID

## 2021-10-16 NOTE — PROGRESS NOTE ADULT - PROBLEM SELECTOR PLAN 5
- chronic, stable  - continue home Lopressor 50mg qd  - c/w Lasix 40mg IVP BID D/W Floor RN Manager of floor & Director of Nursing   - Repeat COVID PCR negative  - s/p MAB infusion x 1 given on 10/14/21  - On Isolation

## 2021-10-16 NOTE — PROGRESS NOTE ADULT - ASSESSMENT
Patient is a 89 y/o F with a pmhx b/l LE edema, HTN, hypothyroidism, who presents with 1mo hx of worsening b/l LE edema. Endorses erythema and swelling of b/l LE  edema ,oozing wound with Volume overload, improving with IV diuresis.

## 2021-10-16 NOTE — PROGRESS NOTE ADULT - SUBJECTIVE AND OBJECTIVE BOX
[facility] -- Reid Astorga, Norah, Raheem, Flex Calvert Savella  Office # [phone]      Follow Up:    LE edema, HTN  Subjective/Observations:   No events overnight resting comfortably in bed.  No complaints of chest pain, dyspnea, or palpitations reported. No signs of orthopnea or PND.     REVIEW OF SYSTEMS: All other review of systems is negative unless indicated above    PAST MEDICAL & SURGICAL HISTORY:  Hypothyroid    Hypertension    Lower extremity edema        MEDICATIONS  (STANDING):  cefTRIAXone   IVPB 1000 milliGRAM(s) IV Intermittent every 24 hours  famotidine    Tablet 20 milliGRAM(s) Oral daily  furosemide   Injectable 40 milliGRAM(s) IV Push every 12 hours  gabapentin 100 milliGRAM(s) Oral three times a day  levothyroxine 50 MICROGram(s) Oral daily  metoprolol tartrate 50 milliGRAM(s) Oral daily  polyethylene glycol 3350 17 Gram(s) Oral two times a day  potassium chloride    Tablet ER 40 milliEquivalent(s) Oral every 4 hours  potassium chloride    Tablet ER 20 milliEquivalent(s) Oral daily  predniSONE   Tablet 20 milliGRAM(s) Oral daily  senna 2 Tablet(s) Oral at bedtime  warfarin 4 milliGRAM(s) Oral once    MEDICATIONS  (PRN):      Allergies    No Known Allergies    Intolerances        Vital Signs Last 24 Hrs  T(C): 36.6 (16 Oct 2021 05:39), Max: 36.8 (15 Oct 2021 21:24)  T(F): 97.8 (16 Oct 2021 05:39), Max: 98.3 (15 Oct 2021 21:24)  HR: 74 (16 Oct 2021 05:39) (71 - 74)  BP: 133/69 (16 Oct 2021 05:39) (128/56 - 133/69)  BP(mean): --  RR: 18 (16 Oct 2021 05:39) (18 - 18)  SpO2: 91% (16 Oct 2021 05:39) (91% - 92%)    I&O's Summary    15 Oct 2021 07:01  -  16 Oct 2021 07:00  --------------------------------------------------------  IN: 0 mL / OUT: 1750 mL / NET: -1750 mL          PHYSICAL EXAM:  TELE: Off tele   Constitutional: NAD, awake and alert, well-developed  HEENT: Moist Mucous Membranes, Anicteric  Pulmonary: Non-labored, breath sounds are clear bilaterally, No wheezing, crackles or rhonchi  Cardiovascular: Regular, S1 and S2 nl, + murmur No rubs, gallops or clicks   Gastrointestinal: Bowel Sounds present, soft, nontender.   Lymph: No lymphadenopathy. + Bilat LE peripheral edema shin high  Skin: No visible rashes or ulcers.  Psych:  Mood & affect appropriate    LABS: All Labs Reviewed:                        10.8   5.59  )-----------( 248      ( 16 Oct 2021 09:18 )             34.1                         9.4    4.86  )-----------( 205      ( 15 Oct 2021 09:04 )             29.1                         10.3   4.33  )-----------( 203      ( 14 Oct 2021 09:06 )             33.0     16 Oct 2021 09:18    142    |  106    |  27     ----------------------------<  95     3.6     |  32     |  1.10   15 Oct 2021 09:04    142    |  107    |  23     ----------------------------<  80     3.3     |  30     |  0.95   14 Oct 2021 09:06    139    |  104    |  19     ----------------------------<  110    3.5     |  30     |  1.10     Ca    8.3        16 Oct 2021 09:18  Ca    8.3        15 Oct 2021 09:04  Ca    8.1        14 Oct 2021 09:06  Mg     2.3       15 Oct 2021 14:01    TPro  7.4    /  Alb  2.4    /  TBili  0.5    /  DBili  x      /  AST  38     /  ALT  34     /  AlkPhos  82     16 Oct 2021 09:18    PT/INR - ( 16 Oct 2021 09:18 )   PT: 17.9 sec;   INR: 1.56 ratio         PTT - ( 15 Oct 2021 09:04 )  PTT:36.8 sec    < from: TTE Echo Complete w/o Contrast w/ Doppler (10.11.21 @ 22:40) >  EXAM:  ECHO TTE WO CON COMP W DOPP         PROCEDURE DATE:  10/11/2021        INTERPRETATION:  INDICATION: Edema  Sonographer AS    Blood Pressure 153/72    Height unavailable     Weight 70.3 kg    Dimensions:  LA 3.6       Normal Values: 2.0 - 4.0 cm  Ao 3.4        Normal Values: 2.0 - 3.8 cm  SEPTUM 1.3       Normal Values: 0.6 - 1.2 cm  PWT 1.1       Normal Values: 0.6 - 1.1 cm  LVIDd 4.3         Normal Values: 3.0 - 5.6 cm  LVIDs 2.9         Normal Values: 1.8 - 4.0 cm      OBSERVATIONS:  Technically difficult study  Mitral Valve: Mitral annular calcification, moderate MR.  Aortic Valve/Aorta: Calcified trileaflet aortic valve with decreased opening. Peak transaortic valve gradient 26.5 mmHg with a mean transaortic valve gradient 17.8 mmHg. The aortic valve area is calculated to be 1.34 sq cm by continuity equation. This is consistent with moderate aortic stenosis.  Tricuspid Valve: Moderate TR.  Pulmonic Valve: Trace PI  Left Atrium: normal  Right Atrium: Enlarged  Left Ventricle:normal LV size and systolic function, estimated LVEF of 55-60%.  Right Ventricle: Right ventricular enlargement with grossly normal systolic function  Pericardium: no significant pericardial effusion.  Pulmonary/RV Pressure: estimated PA systolic pressure of 46.3 mmHg assuming an RA pressure of 3 mmHg.  IVC measures 1.3 cm          IMPRESSION:  Normal left ventricular internal dimensions and systolic function, estimated LVEF of 55-60%.  Right ventricular enlargement with grossly normal systolic function  Calcified trileaflet aortic valve with moderate aortic stenosis, without AI.  Moderate MR and TR.  Right atrial enlargement  No significant pericardial effusion.    --- End of Report ---              JEAN CARLOS HENRY MD; Attending Cardiologist  This document has been electronically signed. Oct 12 2021  5:46PM    < end of copied text >

## 2021-10-16 NOTE — PROGRESS NOTE ADULT - PROBLEM SELECTOR PLAN 3
H/O P A Fib, stable  INR subtherapeutic -> increase dose of warfarin today to 8 mg?. goal INR therapeutic 2-3  - c/w Metoprolol T 50 mg qd  - Daily INR H/O P A Fib, stable HR stable  INR subtherapeutic -> warfarin 4 mg daily . goal INR therapeutic 2-3  - c/w Metoprolol T 50 mg qd  - Daily INR

## 2021-10-16 NOTE — PROGRESS NOTE ADULT - PROBLEM SELECTOR PLAN 1
- Chronic Venous stasis dermatitis now with blisters oozing likely 2/2 fluid overload from med non-compliance. unlikely 2/2 cellulitis at this time   - TTE: LVEF 55-60 | moderate TR/MR | RV/RA enlargement  - c/w Lasix 40 mg IVP BID  - blood cultures NGTD  - US doppler (-)  - c/w gabapentin 100mg tid  - c/w ceftriaxone/prednisone for now, improved over last 48 hrs -> upon dc can transition to Cefuroxime 500mg PO BID with last day 10/17 and prednisone 20mg PO daily with last day 10/22  - consider compression stockings in future  - ID DR Larios, d/w -stable, improving  - Cardiology following, recs appreciated - Chronic Venous stasis dermatitis now with blisters oozing likely 2/2 fluid overload from med non-compliance. unlikely 2/2 cellulitis at this time   -IMPROVING erythema & swelling  - TTE: LVEF 55-60 | moderate TR/MR | RV/RA enlargement  - c/w Lasix 40 mg IVP BID  - blood cultures NGTD  - US doppler (-)  - c/w gabapentin 100mg tid  - c/w ceftriaxone/prednisone for now, improved over last 48 hrs -> upon dc can transition to Cefuroxime 500mg PO BID with last day 10/17 and prednisone 20mg PO daily with last day 10/22  - consider compression stockings in future  - ID DR Larios, d/w -stable, improving  - Cardiology following, recs appreciated

## 2021-10-16 NOTE — PROGRESS NOTE ADULT - PROBLEM SELECTOR PLAN 2
Chronic Venous stasis dermatitis b/.l lower EXT, likely non-cardiogenic  - c/w Lasix 40mg IVP BID,  IV ceftriaxone, and prednisone for inflammatory stasis dermatitis.  - c/w leg elevation, would benefit from compression stockings  - ID DR Larios D/W OK to continue steroids & Abx

## 2021-10-16 NOTE — PROGRESS NOTE ADULT - ASSESSMENT
89 y/o F with a PMH b/l LE edema, HTN, P Afib on AC , hypothyroidism, who presents with 1mo hx of worsening b/l LE edema. Cardiology consulted for LE edema b/l.     Edema/ Acute Diastolic CHF/ Valvular heart disease   - ECHO 10/11/21 showed mod MR, mod AS, mod TR, EF 55-60%, RV enlargement, MARY, PA systolic pressure of 46.3 mmHg   - Serum Pro-Brain Natriuretic Peptide: 2458 pg/mL (10-11-21 @ 16:40)  - CW Lasix 40 mg IVP BID for now given LE edema.   - Deescalate therapy if creatinine up trends   -  ID on board.   - Elevated LE and compression stockings if possible.   - Monitor strict I/Os, daily weight.     AFib  - HR: 74 (10-16-21 @ 05:39) (71 - 74)  - INR: 1.56 ratio (10-16-21 @ 09:18)  - Dose coumadin for goal INR 2-3  - Monitor and replete Lytes. Keep K > 4 and Mg > 2    Hypertension  - BP: 133/69 (10-16-21 @ 05:39) (128/56 - 133/69)  - Continue BB   - Monitor routine hemodynamics     - Monitor and replete lytes, keep K>4, Mg>2.  - All other medical needs as per primary team.  - Other cardiovascular workup will depend on clinical course.  - Will continue to follow.    Jhonny Mann DNP, ANP-c  Cardiology   Spectra #[id number]  [phone]

## 2021-10-16 NOTE — PROGRESS NOTE ADULT - ATTENDING COMMENTS
89 y/o F with a PMH b/l LE edema, HTN, P Afib on AC , hypothyroidism, who presents with 1mo hx of worsening b/l LE edema. Cardiology consulted for LE edema b/l.     Edema/ Acute Diastolic CHF/ Valvular heart disease   - ECHO 10/11/21 showed mod MR, mod AS, mod TR, EF 55-60%, RV enlargement, MARY, PA systolic pressure of 46.3 mmHg   - CW Lasix 40 mg IVP BID for now given LE edema.     AFib  - HR: 74 (10-16-21 @ 05:39) (71 - 74)  - Dose coumadin for goal INR 2-3  - Monitor and replete Lytes. Keep K > 4 and Mg > 2    Hypertension  - BP: 133/69 (10-16-21 @ 05:39) (128/56 - 133/69)  - Continue BB

## 2021-10-17 LAB
ANION GAP SERPL CALC-SCNC: 4 MMOL/L — LOW (ref 5–17)
APTT BLD: 33.6 SEC — SIGNIFICANT CHANGE UP (ref 27.5–35.5)
BUN SERPL-MCNC: 29 MG/DL — HIGH (ref 7–23)
CALCIUM SERPL-MCNC: 8.5 MG/DL — SIGNIFICANT CHANGE UP (ref 8.5–10.1)
CHLORIDE SERPL-SCNC: 105 MMOL/L — SIGNIFICANT CHANGE UP (ref 96–108)
CO2 SERPL-SCNC: 34 MMOL/L — HIGH (ref 22–31)
CREAT SERPL-MCNC: 1.1 MG/DL — SIGNIFICANT CHANGE UP (ref 0.5–1.3)
FERRITIN SERPL-MCNC: 25 NG/ML — SIGNIFICANT CHANGE UP (ref 15–150)
FOLATE SERPL-MCNC: 10.1 NG/ML — SIGNIFICANT CHANGE UP
GLUCOSE SERPL-MCNC: 88 MG/DL — SIGNIFICANT CHANGE UP (ref 70–99)
HCT VFR BLD CALC: 34.5 % — SIGNIFICANT CHANGE UP (ref 34.5–45)
HGB BLD-MCNC: 11 G/DL — LOW (ref 11.5–15.5)
INR BLD: 1.59 RATIO — HIGH (ref 0.88–1.16)
IRON SATN MFR SERPL: 27 UG/DL — LOW (ref 30–160)
IRON SATN MFR SERPL: 7 % — LOW (ref 14–50)
MCHC RBC-ENTMCNC: 27.8 PG — SIGNIFICANT CHANGE UP (ref 27–34)
MCHC RBC-ENTMCNC: 31.9 GM/DL — LOW (ref 32–36)
MCV RBC AUTO: 87.3 FL — SIGNIFICANT CHANGE UP (ref 80–100)
NRBC # BLD: 0 /100 WBCS — SIGNIFICANT CHANGE UP (ref 0–0)
PLATELET # BLD AUTO: 247 K/UL — SIGNIFICANT CHANGE UP (ref 150–400)
POTASSIUM SERPL-MCNC: 3.6 MMOL/L — SIGNIFICANT CHANGE UP (ref 3.5–5.3)
POTASSIUM SERPL-SCNC: 3.6 MMOL/L — SIGNIFICANT CHANGE UP (ref 3.5–5.3)
PROTHROM AB SERPL-ACNC: 18.2 SEC — HIGH (ref 10.6–13.6)
RBC # BLD: 3.95 M/UL — SIGNIFICANT CHANGE UP (ref 3.8–5.2)
RBC # FLD: 17.8 % — HIGH (ref 10.3–14.5)
SARS-COV-2 RNA SPEC QL NAA+PROBE: SIGNIFICANT CHANGE UP
SODIUM SERPL-SCNC: 143 MMOL/L — SIGNIFICANT CHANGE UP (ref 135–145)
TIBC SERPL-MCNC: 400 UG/DL — SIGNIFICANT CHANGE UP (ref 220–430)
UIBC SERPL-MCNC: 372 UG/DL — HIGH (ref 110–370)
VIT B12 SERPL-MCNC: 509 PG/ML — SIGNIFICANT CHANGE UP (ref 232–1245)
WBC # BLD: 5.49 K/UL — SIGNIFICANT CHANGE UP (ref 3.8–10.5)
WBC # FLD AUTO: 5.49 K/UL — SIGNIFICANT CHANGE UP (ref 3.8–10.5)

## 2021-10-17 PROCEDURE — 99232 SBSQ HOSP IP/OBS MODERATE 35: CPT

## 2021-10-17 RX ORDER — WARFARIN SODIUM 2.5 MG/1
5 TABLET ORAL ONCE
Refills: 0 | Status: COMPLETED | OUTPATIENT
Start: 2021-10-17 | End: 2021-10-17

## 2021-10-17 RX ORDER — POTASSIUM CHLORIDE 20 MEQ
40 PACKET (EA) ORAL EVERY 4 HOURS
Refills: 0 | Status: COMPLETED | OUTPATIENT
Start: 2021-10-17 | End: 2021-10-17

## 2021-10-17 RX ADMIN — GABAPENTIN 100 MILLIGRAM(S): 400 CAPSULE ORAL at 21:55

## 2021-10-17 RX ADMIN — CEFTRIAXONE 100 MILLIGRAM(S): 500 INJECTION, POWDER, FOR SOLUTION INTRAMUSCULAR; INTRAVENOUS at 10:40

## 2021-10-17 RX ADMIN — Medication 50 MILLIGRAM(S): at 06:12

## 2021-10-17 RX ADMIN — Medication 40 MILLIEQUIVALENT(S): at 13:11

## 2021-10-17 RX ADMIN — WARFARIN SODIUM 5 MILLIGRAM(S): 2.5 TABLET ORAL at 11:44

## 2021-10-17 RX ADMIN — GABAPENTIN 100 MILLIGRAM(S): 400 CAPSULE ORAL at 06:12

## 2021-10-17 RX ADMIN — Medication 20 MILLIGRAM(S): at 06:12

## 2021-10-17 RX ADMIN — GABAPENTIN 100 MILLIGRAM(S): 400 CAPSULE ORAL at 13:11

## 2021-10-17 RX ADMIN — Medication 40 MILLIEQUIVALENT(S): at 18:26

## 2021-10-17 RX ADMIN — FAMOTIDINE 20 MILLIGRAM(S): 10 INJECTION INTRAVENOUS at 11:44

## 2021-10-17 RX ADMIN — Medication 40 MILLIGRAM(S): at 06:12

## 2021-10-17 RX ADMIN — POLYETHYLENE GLYCOL 3350 17 GRAM(S): 17 POWDER, FOR SOLUTION ORAL at 06:11

## 2021-10-17 RX ADMIN — SENNA PLUS 2 TABLET(S): 8.6 TABLET ORAL at 21:55

## 2021-10-17 RX ADMIN — Medication 40 MILLIGRAM(S): at 18:26

## 2021-10-17 RX ADMIN — Medication 20 MILLIEQUIVALENT(S): at 11:44

## 2021-10-17 RX ADMIN — Medication 50 MICROGRAM(S): at 06:12

## 2021-10-17 NOTE — PROGRESS NOTE ADULT - ASSESSMENT
89 y/o F with a PMH b/l LE edema, HTN, P Afib on AC , hypothyroidism, who presents with 1mo hx of worsening b/l LE edema. Cardiology consulted for LE edema b/l.     Edema/ Valvular heart disease   - ECHO 10/11/21 showed mod MR, mod AS, mod TR, EF 55-60%, RV enlargement, MARY, PA systolic pressure of 46.3 mmHg   - Serum Pro-Brain Natriuretic Peptide: 2458 pg/mL (10-11-21 @ 16:40)  -le edema appears to be more from cellulites rather than heart failure, now with slight rise in bicard sp iv lasix reassess in am anticipate dcing lasix IV  -  ID on board.   - Elevated LE and compression stockings if possible.   - Monitor strict I/Os, daily weight.     AFib  -continue bb   - Dose coumadin for goal INR 2-3  - Monitor and replete Lytes. Keep K > 4 and Mg > 2    Hypertension    - Continue BB     Tiffany Whitfield FNP-C  Cardiology NP  [phone]

## 2021-10-17 NOTE — PROGRESS NOTE ADULT - PROBLEM SELECTOR PLAN 3
H/O P A Fib, stable HR stable  INR subtherapeutic -> warfarin 4 mg daily . goal INR therapeutic 2-3  - c/w Metoprolol T 50 mg qd  - Daily INR

## 2021-10-17 NOTE — PROGRESS NOTE ADULT - ATTENDING COMMENTS
Patient is a 89 y/o F with a pmhx b/l LE edema, HTN, hypothyroidism, who presents with 1mo hx of worsening b/l LE edema. Endorses erythema and swelling of b/l LE  edema ,oozing wound with Volume overload, improving with IV diuresis.  Pt seen, examined, case & care plan d/w pt, residents at detail.   ID-Dr Larios -continue current care, On Isolation.  AM Labs   PO diet   PT Eval. --->HCPT, D/W Case management.  -D/W Son at detail, about  D/C  plan.  Total care time 45 minutes.

## 2021-10-17 NOTE — PROGRESS NOTE ADULT - PROBLEM SELECTOR PLAN 4
Likely 2/2 Ac Illness , Daily lab draws & possible fluid overload   Stable H/H , Hemodynamically stable   CBC daily,

## 2021-10-17 NOTE — PROGRESS NOTE ADULT - SUBJECTIVE AND OBJECTIVE BOX
[facility] -- Reid Astorga, Norah, Raheem, Flex Calvert Savella  Office # [phone]      Follow Up:    LE edema   Subjective/Observations:     No events overnight resting comfortably in bed.  No complaints of chest pain, dyspnea, or palpitations reported. No signs of orthopnea or PND.    REVIEW OF SYSTEMS: All other review of systems is negative unless indicated above    PAST MEDICAL & SURGICAL HISTORY:  Hypothyroid    Hypertension    Lower extremity edema        MEDICATIONS  (STANDING):  famotidine    Tablet 20 milliGRAM(s) Oral daily  furosemide   Injectable 40 milliGRAM(s) IV Push every 12 hours  gabapentin 100 milliGRAM(s) Oral three times a day  levothyroxine 50 MICROGram(s) Oral daily  metoprolol tartrate 50 milliGRAM(s) Oral daily  polyethylene glycol 3350 17 Gram(s) Oral two times a day  potassium chloride    Tablet ER 20 milliEquivalent(s) Oral daily  predniSONE   Tablet 20 milliGRAM(s) Oral daily  senna 2 Tablet(s) Oral at bedtime    MEDICATIONS  (PRN):      Allergies    No Known Allergies    Intolerances        Vital Signs Last 24 Hrs  T(C): 37 (17 Oct 2021 05:26), Max: 37 (17 Oct 2021 05:26)  T(F): 98.6 (17 Oct 2021 05:26), Max: 98.6 (17 Oct 2021 05:26)  HR: 75 (17 Oct 2021 05:26) (74 - 78)  BP: 146/71 (17 Oct 2021 05:26) (144/74 - 149/72)  BP(mean): --  RR: 18 (17 Oct 2021 05:26) (17 - 18)  SpO2: 91% (17 Oct 2021 05:26) (90% - 91%)    I&O's Summary    16 Oct 2021 07:01  -  17 Oct 2021 07:00  --------------------------------------------------------  IN: 50 mL / OUT: 3250 mL / NET: -3200 mL          PHYSICAL EXAM:  TELE: not on tele   Constitutional: NAD, awake and alert, well-developed  HEENT: Moist Mucous Membranes, Anicteric  Pulmonary: Non-labored, breath sounds are clear bilaterally, No wheezing, crackles or rhonchi  Cardiovascular: Regular, S1 and S2 nl, No murmurs, rubs, gallops or clicks  Gastrointestinal: Bowel Sounds present, soft, nontender.   Lymph: + peripheral edema.  Skin: No visible rashes or ulcers.  Psych:  Mood & affect appropriate    LABS: All Labs Reviewed:                        11.0   5.49  )-----------( 247      ( 17 Oct 2021 09:14 )             34.5                         10.8   5.59  )-----------( 248      ( 16 Oct 2021 09:18 )             34.1                         9.4    4.86  )-----------( 205      ( 15 Oct 2021 09:04 )             29.1     17 Oct 2021 09:14    143    |  105    |  29     ----------------------------<  88     3.6     |  34     |  1.10   16 Oct 2021 09:18    142    |  106    |  27     ----------------------------<  95     3.6     |  32     |  1.10   15 Oct 2021 09:04    142    |  107    |  23     ----------------------------<  80     3.3     |  30     |  0.95     Ca    8.5        17 Oct 2021 09:14  Ca    8.3        16 Oct 2021 09:18  Ca    8.3        15 Oct 2021 09:04  Mg     2.3       15 Oct 2021 14:01    TPro  7.4    /  Alb  2.4    /  TBili  0.5    /  DBili  x      /  AST  38     /  ALT  34     /  AlkPhos  82     16 Oct 2021 09:18    PT/INR - ( 17 Oct 2021 09:14 )   PT: 18.2 sec;   INR: 1.59 ratio         PTT - ( 17 Oct 2021 09:14 )  PTT:33.6 sec

## 2021-10-17 NOTE — PROGRESS NOTE ADULT - PROBLEM SELECTOR PLAN 1
- Chronic Venous stasis dermatitis with blisters oozing on admit likely 2/2 fluid overload from med non-compliance.   - Erythema/rubor resolved, swelling improved  - TTE: LVEF 55-60 | moderate TR/MR | RV/RA enlargement  - c/w Lasix 40 mg IVP BID  - blood cultures NGTD  - US doppler (-)  - c/w gabapentin 100mg tid  - c/w ceftriaxone/prednisone for now -> upon dc can transition to Cefuroxime 500mg PO BID with last day 10/17 and prednisone 20mg PO daily with last day 10/22  - consider compression stockings in future  - ID DR Larios, d/w -stable, improving  - Cardiology following, recs appreciated - Chronic Venous stasis dermatitis with blisters oozing on admit likely 2/2 fluid overload from med non-compliance.   - Erythema/rubor resolved, swelling improved  - TTE: LVEF 55-60 | moderate TR/MR | RV/RA enlargement  - c/w Lasix 40 mg IVP BID  - blood cultures NGTD  - US doppler (-)  - c/w gabapentin 100mg tid  - c/w ceftriaxone/prednisone for now -> upon dc can transition to Cefuroxime 500mg PO BID with last day 10/17 and prednisone 20mg PO daily with last day 10/22  - consider compression stockings in future  - ID DR Larios, d/w -stable, improving, Complete Steroids & IV Rocephin   - Cardiology following, recs appreciated

## 2021-10-17 NOTE — PROGRESS NOTE ADULT - ATTENDING COMMENTS
89 y/o F with a PMH b/l LE edema, HTN, P Afib on AC , hypothyroidism, who presents with 1mo hx of worsening b/l LE edema.     Edema/ Valvular heart disease   -le edema appears to be more from cellulites rather than heart failure, now with slight rise in bicard sp iv lasix reassess in am anticipate dcing lasix IV  - Elevated LE and compression stockings if possible.     AFib  -continue bb   - Dose coumadin for goal INR 2-3

## 2021-10-17 NOTE — PROGRESS NOTE ADULT - SUBJECTIVE AND OBJECTIVE BOX
Patient is a 89y old  Female who presents with a chief complaint of b/l LE cellulitis (16 Oct 2021 12:46)      HPI:  Patient is a 89 y/o F with a pmhx b/l LE edema, HTN,P Afib on AC , hypothyroidism, who presents with 1mo hx of worsening b/l LE edema. Endorses erythema and swelling b/l. Per patient, her doctor ?neurologist, told her to stop Lasix recently so pt has not been taking. States pain patches, water pills and antibiotics as an outpatient were unhelpful in relieving these symptoms. States she has had symptoms for 6 months but recently worsened. Denies chest pain, shortness of breath, palpitations, fevers, chills, nausea, vomiting. Denies having in past.    IN THE ED:  Temp 98.8 F, HR 92, /72, RR 16, SpO2 96%  S/P Zosyn 3.375g x1, Vanco 1g x1, lasix 40 mg IV x1   EKG: left axis deviation, LBBB   Labs significant for WBC 5.66, BNP 2458, PT 31.9, INR 2.87, PTT 48, K 3.4, CO 32, anion gap 3, albumin 2.8, AST 57, eGFR 43  Imaging: CXR with minimal blunting of R base laterally    (11 Oct 2021 18:04)      INTERVAL HPI:  10/12/21 - Patient was seen and examined at bedside. No acute events overnight. Patient endorses improvement in the rubor/erythema of her LE b/l but endorses continued pain in LE b/l. Patient endorses 1 year hx of weight loss (35 pounds) due to decreased appetite. Patient denies headache, fever, blurry/double vision, tinnitus, dysphagia, cough, SoB, abdominal pain, CP, palpitations, n/v/c/d. Off Abx as per ID, INR -elevated   10/13/21 - Patient was seen and examined at bedside. Patient endorses no improvement in the swelling and erythema of LE b/l. Patient endorses a pressure sensation in both LE and has an "odd" sensation in her left heel. Patient endorses eating well without dysphagia. Patient denies BM the past 2 days and declines bowel regimen for now. Patient producing clear urine in vac. Endorses worsening of hearing in her left ear, worse since yesterday. Denies headache, fever, blurry/double vision, tinnitus, cough, SoB, abdominal pain, CP, palpitations, n/v/d.  Dtr Cell- [phone], On IV Lasix & IV Rocephin with Po steroids.  10/14/21 - Patient was seen and examined at bedside. Patient endorses significant improvement in swelling and erythema of LE b/l. Patient endorses b/l tingling sensation in LE. Patient denies BM last 3 days and is urinating well in vac. Endorses continued difficulty hearing in left ear. Patient endorses desire to receive vaccine for COVID-19. Discussed with patient to f/up hx of weight loss and difficulty hearing in L ear with PCP outpatient. Patient denies headache, fever, blurry/double vision, tinnitus, cough, dysphagia, SoB, CP, palpitations, abdominal pain, n/v/c/d, melena/hematochezia, dysuria, pyuria/hematuria, change in urinary frequency, confusion, dizziness, or weakness. On IV Lasix  Pt had exposure to  COVID + person at hospital, d/w Dr Larios , pt & Dtr, plan for MAB infusions.  10/15/21 - Patient seen and examined at bedside, S/P MAB Rx for COVID Exposure  over all improving. Additional Lasix 20 IVP ordered for lower Ext Edema . Will increase to Lasix 40mg IV BID starting later today. Denies any fevers, chills, cp, sob, abdominal pain, diarrhea, constipation  10/16/21 - Patient seen and examined at bedside. Patient reports improvement in leg swelling b/l with less redness. Patient endorses constipation w/o BM in 3 days open to starting miralax/senna. Endorses reduced hearing in ears b/l. Patient denies headache, fever, chills, blurry/double vision, tinnitus, cough, dysphagia, SoB, CP, palpitations, abdominal pain, n/v/d, dysuria, pyuria/hematuria, change in urinary frequency, dizziness, or weakness.   10/17/21 - Patient is seen and examined at bedside. Patient endorses significant improvement in LE swelling and erythema stating she has been urinating well. Patient denies new symptoms. Patient endorses BM this AM that was normal without hematochezia or melena. Patient denies headache, fever, chills, blurry/double vision, tinnitus, cough, dysphagia, SoB, CP, palpitations, abdominal pain, n/v/c/d, melena/hematochezia, dysuria, pyuria/hematuria, change in urinary frequency, confusion, dizziness, or weakness.     OVERNIGHT EVENTS:  none    Home Medications:  gabapentin 100 mg oral capsule: 1 cap(s) orally 3 times a day (13 Oct 2021 11:58)  Jantoven 1 mg oral tablet: 2 tab(s) orally Monday, Wednesday, and Friday (13 Oct 2021 11:58)  Jantoven 4 mg oral tablet: 1 tab(s) orally once a day (13 Oct 2021 11:58)  Klor-Con 10 mEq oral tablet, extended release: 1 tab(s) orally once a day (13 Oct 2021 11:58)  Lasix 40 mg oral tablet: 1 tab(s) orally once a day (13 Oct 2021 11:58)  lidocaine 5% patch: Apply to affected areas (13 Oct 2021 11:58)  Lopressor 50 mg oral tablet: 1 tab(s) orally once a day (13 Oct 2021 11:58)  Synthroid 50 mcg (0.05 mg) oral tablet: 1 tab(s) orally once a day (13 Oct 2021 11:58)  Vitamin B6 50 mg oral tablet: 1 tab(s) orally once a day (13 Oct 2021 11:58)      MEDICATIONS  (STANDING):  cefTRIAXone   IVPB 1000 milliGRAM(s) IV Intermittent every 24 hours  famotidine    Tablet 20 milliGRAM(s) Oral daily  furosemide   Injectable 40 milliGRAM(s) IV Push every 12 hours  gabapentin 100 milliGRAM(s) Oral three times a day  levothyroxine 50 MICROGram(s) Oral daily  metoprolol tartrate 50 milliGRAM(s) Oral daily  polyethylene glycol 3350 17 Gram(s) Oral two times a day  potassium chloride    Tablet ER 20 milliEquivalent(s) Oral daily  predniSONE   Tablet 20 milliGRAM(s) Oral daily  senna 2 Tablet(s) Oral at bedtime    MEDICATIONS  (PRN):      No Known Allergies      Social History:  Patient lives alone. Denies tobacco use or etoh use. (11 Oct 2021 18:04)      REVIEW OF SYSTEMS:  CONSTITUTIONAL: No fever, No chills, No fatigue, No myalgia, No Body ache, No Weakness  EYES: No eye pain,  No visual disturbances, No discharge, NO Redness  ENMT: [ x ] reduced hearing in left ear. No ear pain, No nose bleed, No vertigo; No sinus pain, NO throat pain, No Congestion  RESPIRATORY: No cough, NO wheezing, No  hemoptysis, NO  shortness of breath  CARDIOVASCULAR: No chest pain, palpitations endorses LE edema b/l  GASTROINTESTINAL: No abdominal pain, NO epigastric pain. No nausea, No vomiting; No diarrhea, no constipation. [ x ] BM  GENITOURINARY: No dysuria, No frequency, No urgency, No hematuria, NO incontinence  NEUROLOGICAL: No headaches, No dizziness, No numbness, No tingling, No tremors, No weakness  EXT: No Swelling, No Pain, endorses edema b/l LE  SKIN: no erythema/rash  MUSCULOSKELETAL: No joint pain ,No Jt swelling; No muscle pain, No back pain, No extremity pain  PSYCHIATRIC: No depression,  No anxiety,  No mood swings ,No difficulty sleeping at night  PAIN SCALE: [ x ] None  [  ] Other-  ROS Unable to obtain due to - [  ] Dementia  [  ] Lethargy [  ] Drowsy [  ] Sedated [  ] non verbal  REST OF REVIEW Of SYSTEM - [x  ] Normal     Vital Signs Last 24 Hrs  T(C): 37 (17 Oct 2021 05:26), Max: 37 (17 Oct 2021 05:26)  T(F): 98.6 (17 Oct 2021 05:26), Max: 98.6 (17 Oct 2021 05:26)  HR: 75 (17 Oct 2021 05:26) (74 - 78)  BP: 146/71 (17 Oct 2021 05:26) (144/74 - 149/72)  BP(mean): --  RR: 18 (17 Oct 2021 05:26) (17 - 18)  SpO2: 91% (17 Oct 2021 05:26) (90% - 91%)    CAPILLARY BLOOD GLUCOSE          I&O's Summary    16 Oct 2021 07:01  -  17 Oct 2021 07:00  --------------------------------------------------------  IN: 50 mL / OUT: 3250 mL / NET: -3200 mL      PHYSICAL EXAM:  GENERAL:  [ x ] NAD , [ x ] well appearing, [  ] Agitated, [  ] Drowsy,  [  ] Lethargy, [  ] confused   HEAD:  [ x ] Normal, [  ] Other  EYES:  [ x ] EOMI, [  ] PERRLA, [ x ] conjunctiva and sclera clear normal, [  ] Other,  [  ] Pallor,[  ] Discharge  ENMT:  [ x ] Normal, [ x ] Moist mucous membranes, [ x ] Good dentition, [ x ] No Thrush  NECK:  [ x ] Supple, [x  ] No JVD, [x  ] Normal thyroid, [  ] Lymphadenopathy [  ] Other  CHEST/LUNG:  [x  ] Clear to auscultation bilaterally, [x  ] Breath Sounds equal B/L, [  ] poor effort  [x  ] No rales, [x  ] No rhonchi  [ x ]  No wheezing,   HEART:  [ x  ] Regular rate [  ] tachycardia, [  ] Bradycardia,  [ x ] irregular  [ x ] 2/6 systolic murmer appreciated at upper sternal border [  ] PPM in place (Mfr:  )  ABDOMEN:  [ x ] Soft, [ x ] Nontender, [ x ] Nondistended, [ x ] No mass, [ x ] Bowel sounds present, [  ] obese  NERVOUS SYSTEM:  [ x] Alert & Oriented X3, [ x ] Nonfocal  [  ] Confusion  [  ] Encephalopathic [  ] Sedated [  ] Unable to assess, [  ] Dementia [  ] Other-  EXTREMITIES: [x  ] 2+ Peripheral Pulses, No clubbing, No cyanosis,  [ x ] 2 +pitting  edema B/L lower EXT. [  ] resolution of erythema/rubor in b/l LE.  Ext  [  ] wound, stasis skin   LYMPH: No lymphadenopathy noted  SKIN:  [ x ] No rashes or lesions, [  ] Pressure Ulcers, [ x ] ecchymosis, [  ] Skin Tears, [  ] Other    DIET: Diet, Regular:   DASH/TLC Sodium & Cholesterol Restricted  1200mL Fluid Restriction ([id number]) (10-11-21 @ 19:28)      LABS:                        10.8   5.59  )-----------( 248      ( 16 Oct 2021 09:18 )             34.1     16 Oct 2021 09:18    142    |  106    |  27     ----------------------------<  95     3.6     |  32     |  1.10     Ca    8.3        16 Oct 2021 09:18    TPro  7.4    /  Alb  2.4    /  TBili  0.5    /  DBili  x      /  AST  38     /  ALT  34     /  AlkPhos  82     16 Oct 2021 09:18    PT/INR - ( 16 Oct 2021 09:18 )   PT: 17.9 sec;   INR: 1.56 ratio             Culture Results:   No Growth Final (10-11 @ 21:52)  Culture Results:   No Growth Final (10-11 @ 21:52)            Culture - Blood (collected 11 Oct 2021 21:52)  Source: .Blood Blood-Peripheral  Final Report (16 Oct 2021 22:00):    No Growth Final    Culture - Blood (collected 11 Oct 2021 21:52)  Source: .Blood Blood-Peripheral  Final Report (16 Oct 2021 22:00):    No Growth Final       Anemia Panel:      Thyroid Panel:            Serum Pro-Brain Natriuretic Peptide: 2458 pg/mL (10-11-21 @ 16:40)      RADIOLOGY & ADDITIONAL TESTS:      HEALTH ISSUES - PROBLEM Dx:  Lower extremity edema    Venous stasis dermatitis of lower extremity    Paroxysmal atrial fibrillation    Hypertension    Hypothyroid    Constipation    DVT prophylaxis    Cellulitis    Exposure to confirmed case of COVID-19    Mild anemia          Consultant(s) Notes Reviewed:  [ x ] YES     Care Discussed with [ x ] Consultants, [ x ] Patient, [  ] Family, [  ] HCP, [  ] , [  ] Social Service, [  ] RN, [  ] Physical Therapy, [  ] Palliative Care Team  DVT PPX: [  ] Lovenox, [  ] SC Heparin, [ x ] Coumadin, [  ] Xarelto, [  ] Eliquis, [  ] Pradaxa, [  ] IV Heparin drip, [  ] SCD, [  ] Ambulation, [  ] Contraindicated 2/2 GI Bleed, [  ] Contraindicated 2/2  Bleed, [  ] Contraindicated 2/2 Brain Bleed  Advanced Directive: [ x ] None, [  ] DNR/DNI Patient is a 89y old  Female who presents with a chief complaint of b/l LE cellulitis (16 Oct 2021 12:46)      HPI:  Patient is a 89 y/o F with a pmhx b/l LE edema, HTN,P Afib on AC , hypothyroidism, who presents with 1mo hx of worsening b/l LE edema. Endorses erythema and swelling b/l. Per patient, her doctor ?neurologist, told her to stop Lasix recently so pt has not been taking. States pain patches, water pills and antibiotics as an outpatient were unhelpful in relieving these symptoms. States she has had symptoms for 6 months but recently worsened. Denies chest pain, shortness of breath, palpitations, fevers, chills, nausea, vomiting. Denies having in past.    IN THE ED:  Temp 98.8 F, HR 92, /72, RR 16, SpO2 96%  S/P Zosyn 3.375g x1, Vanco 1g x1, lasix 40 mg IV x1   EKG: left axis deviation, LBBB   Labs significant for WBC 5.66, BNP 2458, PT 31.9, INR 2.87, PTT 48, K 3.4, CO 32, anion gap 3, albumin 2.8, AST 57, eGFR 43  Imaging: CXR with minimal blunting of R base laterally    (11 Oct 2021 18:04)      INTERVAL HPI:  10/12/21 - Patient was seen and examined at bedside. No acute events overnight. Patient endorses improvement in the rubor/erythema of her LE b/l but endorses continued pain in LE b/l. Patient endorses 1 year hx of weight loss (35 pounds) due to decreased appetite. Patient denies headache, fever, blurry/double vision, tinnitus, dysphagia, cough, SoB, abdominal pain, CP, palpitations, n/v/c/d. Off Abx as per ID, INR -elevated   10/13/21 - Patient was seen and examined at bedside. Patient endorses no improvement in the swelling and erythema of LE b/l. Patient endorses a pressure sensation in both LE and has an "odd" sensation in her left heel. Patient endorses eating well without dysphagia. Patient denies BM the past 2 days and declines bowel regimen for now. Patient producing clear urine in vac. Endorses worsening of hearing in her left ear, worse since yesterday. Denies headache, fever, blurry/double vision, tinnitus, cough, SoB, abdominal pain, CP, palpitations, n/v/d.  Dtr Cell- [phone], On IV Lasix & IV Rocephin with Po steroids.  10/14/21 - Patient was seen and examined at bedside. Patient endorses significant improvement in swelling and erythema of LE b/l. Patient endorses b/l tingling sensation in LE. Patient denies BM last 3 days and is urinating well in vac. Endorses continued difficulty hearing in left ear. Patient endorses desire to receive vaccine for COVID-19. Discussed with patient to f/up hx of weight loss and difficulty hearing in L ear with PCP outpatient. Patient denies headache, fever, blurry/double vision, tinnitus, cough, dysphagia, SoB, CP, palpitations, abdominal pain, n/v/c/d, melena/hematochezia, dysuria, pyuria/hematuria, change in urinary frequency, confusion, dizziness, or weakness. On IV Lasix  Pt had exposure to  COVID + person at hospital, d/w Dr Larios , pt & Dtr, plan for MAB infusions.  10/15/21 - Patient seen and examined at bedside, S/P MAB Rx for COVID Exposure  over all improving. Additional Lasix 20 IVP ordered for lower Ext Edema . Will increase to Lasix 40mg IV BID starting later today. Denies any fevers, chills, cp, sob, abdominal pain, diarrhea, constipation  10/16/21 - Patient seen and examined at bedside. Patient reports improvement in leg swelling b/l with less redness. Patient endorses constipation w/o BM in 3 days open to starting miralax/senna. Endorses reduced hearing in ears b/l. Patient denies headache, fever, chills, blurry/double vision, tinnitus, cough, dysphagia, SoB, CP, palpitations, abdominal pain, n/v/d, dysuria, pyuria/hematuria, change in urinary frequency, dizziness, or weakness.   10/17/21 - Patient is seen and examined at bedside. Patient endorses significant improvement in LE swelling and erythema stating she has been urinating well. Patient denies new symptoms. Patient endorses BM this AM that was normal without hematochezia or melena. Patient denies headache, fever, chills, blurry/double vision, tinnitus, cough, dysphagia, SoB, CP, palpitations, abdominal pain, n/v/c/d, melena/hematochezia, dysuria, pyuria/hematuria, change in urinary frequency, confusion, dizziness, or weakness.  INR subtherapeutic.     OVERNIGHT EVENTS:  none    Home Medications:  gabapentin 100 mg oral capsule: 1 cap(s) orally 3 times a day (13 Oct 2021 11:58)  Jantoven 1 mg oral tablet: 2 tab(s) orally Monday, Wednesday, and Friday (13 Oct 2021 11:58)  Jantoven 4 mg oral tablet: 1 tab(s) orally once a day (13 Oct 2021 11:58)  Klor-Con 10 mEq oral tablet, extended release: 1 tab(s) orally once a day (13 Oct 2021 11:58)  Lasix 40 mg oral tablet: 1 tab(s) orally once a day (13 Oct 2021 11:58)  lidocaine 5% patch: Apply to affected areas (13 Oct 2021 11:58)  Lopressor 50 mg oral tablet: 1 tab(s) orally once a day (13 Oct 2021 11:58)  Synthroid 50 mcg (0.05 mg) oral tablet: 1 tab(s) orally once a day (13 Oct 2021 11:58)  Vitamin B6 50 mg oral tablet: 1 tab(s) orally once a day (13 Oct 2021 11:58)      MEDICATIONS  (STANDING):  cefTRIAXone   IVPB 1000 milliGRAM(s) IV Intermittent every 24 hours  famotidine    Tablet 20 milliGRAM(s) Oral daily  furosemide   Injectable 40 milliGRAM(s) IV Push every 12 hours  gabapentin 100 milliGRAM(s) Oral three times a day  levothyroxine 50 MICROGram(s) Oral daily  metoprolol tartrate 50 milliGRAM(s) Oral daily  polyethylene glycol 3350 17 Gram(s) Oral two times a day  potassium chloride    Tablet ER 20 milliEquivalent(s) Oral daily  predniSONE   Tablet 20 milliGRAM(s) Oral daily  senna 2 Tablet(s) Oral at bedtime    MEDICATIONS  (PRN):      No Known Allergies      Social History:  Patient lives alone. Denies tobacco use or etoh use. (11 Oct 2021 18:04)      REVIEW OF SYSTEMS: i am OK  CONSTITUTIONAL: No fever, No chills, No fatigue, No myalgia, No Body ache, No Weakness  EYES: No eye pain,  No visual disturbances, No discharge, NO Redness  ENMT: [ x ] reduced hearing in left ear. No ear pain, No nose bleed, No vertigo; No sinus pain, NO throat pain, No Congestion  RESPIRATORY: No cough, NO wheezing, No  hemoptysis, NO  shortness of breath  CARDIOVASCULAR: No chest pain, palpitations endorses LE edema b/l  GASTROINTESTINAL: No abdominal pain, NO epigastric pain. No nausea, No vomiting; No diarrhea, no constipation. [ x ] BM  GENITOURINARY: No dysuria, No frequency, No urgency, No hematuria, NO incontinence  NEUROLOGICAL: No headaches, No dizziness, No numbness, No tingling, No tremors, No weakness  EXT: No Swelling, No Pain, endorses edema b/l LE  SKIN: no erythema/rash  MUSCULOSKELETAL: No joint pain ,No Jt swelling; No muscle pain, No back pain, No extremity pain  PSYCHIATRIC: No depression,  No anxiety,  No mood swings ,No difficulty sleeping at night  PAIN SCALE: [ x ] None  [  ] Other-  ROS Unable to obtain due to - [  ] Dementia  [  ] Lethargy [  ] Drowsy [  ] Sedated [  ] non verbal  REST OF REVIEW Of SYSTEM - [x  ] Normal     Vital Signs Last 24 Hrs  T(C): 37 (17 Oct 2021 05:26), Max: 37 (17 Oct 2021 05:26)  T(F): 98.6 (17 Oct 2021 05:26), Max: 98.6 (17 Oct 2021 05:26)  HR: 75 (17 Oct 2021 05:26) (74 - 78)  BP: 146/71 (17 Oct 2021 05:26) (144/74 - 149/72)  BP(mean): --  RR: 18 (17 Oct 2021 05:26) (17 - 18)  SpO2: 91% (17 Oct 2021 05:26) (90% - 91%)    CAPILLARY BLOOD GLUCOSE          I&O's Summary    16 Oct 2021 07:01  -  17 Oct 2021 07:00  --------------------------------------------------------  IN: 50 mL / OUT: 3250 mL / NET: -3200 mL      PHYSICAL EXAM:  GENERAL:  [ x ] NAD , [ x ] well appearing, [  ] Agitated, [  ] Drowsy,  [  ] Lethargy, [  ] confused   HEAD:  [ x ] Normal, [  ] Other  EYES:  [ x ] EOMI, [  ] PERRLA, [ x ] conjunctiva and sclera clear normal, [  ] Other,  [  ] Pallor,[  ] Discharge  ENMT:  [ x ] Normal, [ x ] Moist mucous membranes, [ x ] Good dentition, [ x ] No Thrush  NECK:  [ x ] Supple, [x  ] No JVD, [x  ] Normal thyroid, [  ] Lymphadenopathy [  ] Other  CHEST/LUNG:  [x  ] Clear to auscultation bilaterally, [x  ] Breath Sounds equal B/L, [  ] poor effort  [x  ] No rales, [x  ] No rhonchi  [ x ]  No wheezing,   HEART:  [ x  ] Regular rate [  ] tachycardia, [  ] Bradycardia,  [ x ] irregular  [ x ] 2/6 systolic murmer appreciated at upper sternal border [  ] PPM in place (Mfr:  )  ABDOMEN:  [ x ] Soft, [ x ] Nontender, [ x ] Nondistended, [ x ] No mass, [ x ] Bowel sounds present, [  ] obese  NERVOUS SYSTEM:  [ x] Alert & Oriented X3, [ x ] Nonfocal  [  ] Confusion  [  ] Encephalopathic [  ] Sedated [  ] Unable to assess, [  ] Dementia [  ] Other-  EXTREMITIES: [x  ] 2+ Peripheral Pulses, No clubbing, No cyanosis,  [ x ] 2 +pitting  edema B/L lower EXT. [ x ] improvement  of erythema/rubor in b/l LE.  Ext  [  ] wound, stasis skin   LYMPH: No lymphadenopathy noted  SKIN:  [ x ] No rashes or lesions, [  ] Pressure Ulcers, [ x ] ecchymosis, [  ] Skin Tears, [  ] Other    DIET: Diet, Regular:   DASH/TLC Sodium & Cholesterol Restricted  1200mL Fluid Restriction ([id number]) (10-11-21 @ 19:28)      LABS:                          11.0   5.49  )-----------( 247      ( 17 Oct 2021 09:14 )             34.5     17 Oct 2021 09:14      143    |  105    |  29     ----------------------------<  88     3.6     |  34     |  1.10     Ca    8.5        17 Oct 2021 09:14                            10.8   5.59  )-----------( 248      ( 16 Oct 2021 09:18 )             34.1     16 Oct 2021 09:18    142    |  106    |  27     ----------------------------<  95     3.6     |  32     |  1.10     Ca    8.3        16 Oct 2021 09:18    TPro  7.4    /  Alb  2.4    /  TBili  0.5    /  DBili  x      /  AST  38     /  ALT  34     /  AlkPhos  82     16 Oct 2021 09:18    PT/INR - ( 16 Oct 2021 09:18 )   PT: 17.9 sec;   INR: 1.56 ratio        Culture Results:   No Growth Final (10-11 @ 21:52)  Culture Results:   No Growth Final (10-11 @ 21:52)      Culture - Blood (collected 11 Oct 2021 21:52)  Source: .Blood Blood-Peripheral  Final Report (16 Oct 2021 22:00):    No Growth Final    Culture - Blood (collected 11 Oct 2021 21:52)  Source: .Blood Blood-Peripheral  Final Report (16 Oct 2021 22:00):    No Growth Final    Serum Pro-Brain Natriuretic Peptide: 2458 pg/mL (10-11-21 @ 16:40)      RADIOLOGY & ADDITIONAL TESTS:  NONE    HEALTH ISSUES - PROBLEM Dx:  Lower extremity edema    Venous stasis dermatitis of lower extremity    Paroxysmal atrial fibrillation    Hypertension    Hypothyroid    Constipation    DVT prophylaxis    Cellulitis    Exposure to confirmed case of COVID-19    Mild anemia          Consultant(s) Notes Reviewed:  [ x ] YES     Care Discussed with [ x ] Consultants, [ x ] Patient, [ x ] Family,-son  [  ] HCP, [  ] , [  ] Social Service, [ x ] RN, [  ] Physical Therapy, [  ] Palliative Care Team  DVT PPX: [  ] Lovenox, [  ] SC Heparin, [ x ] Coumadin, [  ] Xarelto, [  ] Eliquis, [  ] Pradaxa, [  ] IV Heparin drip, [  ] SCD, [  ] Ambulation, [  ] Contraindicated 2/2 GI Bleed, [  ] Contraindicated 2/2  Bleed, [  ] Contraindicated 2/2 Brain Bleed  Advanced Directive: [ x ] None, [  ] DNR/DNI

## 2021-10-17 NOTE — PROGRESS NOTE ADULT - PROBLEM SELECTOR PLAN 5
D/W Floor RN Manager of floor & Director of Nursing   - Repeat COVID PCR negative  - s/p MAB infusion x 1 given on 10/14/21  - On Isolation D/W Floor RN Manager of floor & Director of Nursing   - Repeat COVID PCR negative  - s/p MAB infusion x 1 given on 10/14/21  - On Isolation as per Hospital protocol   -Repeat COVID PCR-Neg

## 2021-10-18 LAB
ANION GAP SERPL CALC-SCNC: 4 MMOL/L — LOW (ref 5–17)
APTT BLD: 35.5 SEC — SIGNIFICANT CHANGE UP (ref 27.5–35.5)
BUN SERPL-MCNC: 30 MG/DL — HIGH (ref 7–23)
CALCIUM SERPL-MCNC: 9.4 MG/DL — SIGNIFICANT CHANGE UP (ref 8.5–10.1)
CHLORIDE SERPL-SCNC: 103 MMOL/L — SIGNIFICANT CHANGE UP (ref 96–108)
CO2 SERPL-SCNC: 34 MMOL/L — HIGH (ref 22–31)
CREAT SERPL-MCNC: 1 MG/DL — SIGNIFICANT CHANGE UP (ref 0.5–1.3)
GLUCOSE SERPL-MCNC: 84 MG/DL — SIGNIFICANT CHANGE UP (ref 70–99)
HCT VFR BLD CALC: 41.4 % — SIGNIFICANT CHANGE UP (ref 34.5–45)
HGB BLD-MCNC: 13.1 G/DL — SIGNIFICANT CHANGE UP (ref 11.5–15.5)
INR BLD: 1.62 RATIO — HIGH (ref 0.88–1.16)
MCHC RBC-ENTMCNC: 27.9 PG — SIGNIFICANT CHANGE UP (ref 27–34)
MCHC RBC-ENTMCNC: 31.6 GM/DL — LOW (ref 32–36)
MCV RBC AUTO: 88.3 FL — SIGNIFICANT CHANGE UP (ref 80–100)
NRBC # BLD: 0 /100 WBCS — SIGNIFICANT CHANGE UP (ref 0–0)
PLATELET # BLD AUTO: 289 K/UL — SIGNIFICANT CHANGE UP (ref 150–400)
POTASSIUM SERPL-MCNC: 3.9 MMOL/L — SIGNIFICANT CHANGE UP (ref 3.5–5.3)
POTASSIUM SERPL-SCNC: 3.9 MMOL/L — SIGNIFICANT CHANGE UP (ref 3.5–5.3)
PROTHROM AB SERPL-ACNC: 18.5 SEC — HIGH (ref 10.6–13.6)
RBC # BLD: 4.69 M/UL — SIGNIFICANT CHANGE UP (ref 3.8–5.2)
RBC # FLD: 17.7 % — HIGH (ref 10.3–14.5)
SODIUM SERPL-SCNC: 141 MMOL/L — SIGNIFICANT CHANGE UP (ref 135–145)
WBC # BLD: 6.37 K/UL — SIGNIFICANT CHANGE UP (ref 3.8–10.5)
WBC # FLD AUTO: 6.37 K/UL — SIGNIFICANT CHANGE UP (ref 3.8–10.5)

## 2021-10-18 PROCEDURE — 99232 SBSQ HOSP IP/OBS MODERATE 35: CPT

## 2021-10-18 RX ORDER — SOD,AMMONIUM,POTASSIUM LACTATE
1 CREAM (GRAM) TOPICAL
Refills: 0 | Status: DISCONTINUED | OUTPATIENT
Start: 2021-10-18 | End: 2021-10-19

## 2021-10-18 RX ORDER — FUROSEMIDE 40 MG
40 TABLET ORAL DAILY
Refills: 0 | Status: DISCONTINUED | OUTPATIENT
Start: 2021-10-19 | End: 2021-10-19

## 2021-10-18 RX ORDER — WARFARIN SODIUM 2.5 MG/1
5 TABLET ORAL ONCE
Refills: 0 | Status: COMPLETED | OUTPATIENT
Start: 2021-10-18 | End: 2021-10-18

## 2021-10-18 RX ADMIN — POLYETHYLENE GLYCOL 3350 17 GRAM(S): 17 POWDER, FOR SOLUTION ORAL at 06:01

## 2021-10-18 RX ADMIN — FAMOTIDINE 20 MILLIGRAM(S): 10 INJECTION INTRAVENOUS at 12:35

## 2021-10-18 RX ADMIN — GABAPENTIN 100 MILLIGRAM(S): 400 CAPSULE ORAL at 06:01

## 2021-10-18 RX ADMIN — Medication 1 APPLICATION(S): at 17:54

## 2021-10-18 RX ADMIN — Medication 50 MICROGRAM(S): at 06:01

## 2021-10-18 RX ADMIN — Medication 50 MILLIGRAM(S): at 06:01

## 2021-10-18 RX ADMIN — WARFARIN SODIUM 5 MILLIGRAM(S): 2.5 TABLET ORAL at 22:02

## 2021-10-18 RX ADMIN — POLYETHYLENE GLYCOL 3350 17 GRAM(S): 17 POWDER, FOR SOLUTION ORAL at 17:54

## 2021-10-18 RX ADMIN — Medication 20 MILLIEQUIVALENT(S): at 12:34

## 2021-10-18 RX ADMIN — GABAPENTIN 100 MILLIGRAM(S): 400 CAPSULE ORAL at 13:30

## 2021-10-18 RX ADMIN — SENNA PLUS 2 TABLET(S): 8.6 TABLET ORAL at 22:02

## 2021-10-18 RX ADMIN — GABAPENTIN 100 MILLIGRAM(S): 400 CAPSULE ORAL at 22:02

## 2021-10-18 RX ADMIN — Medication 20 MILLIGRAM(S): at 06:01

## 2021-10-18 RX ADMIN — Medication 40 MILLIGRAM(S): at 06:00

## 2021-10-18 NOTE — DIETITIAN INITIAL EVALUATION ADULT. - SIGNS/SYMPTOMS
as evidenced by 35lb(18.42%) wt loss x 6mos, consuming <75% est energy needs > 1 mo, mod fluid accum

## 2021-10-18 NOTE — PROGRESS NOTE ADULT - SUBJECTIVE AND OBJECTIVE BOX
Patient is a 89y old  Female who presents with a chief complaint of b/l LE cellulitis (18 Oct 2021 11:30)      HPI:  Patient is a 89 y/o F with a pmhx b/l LE edema, HTN,P Afib on AC , hypothyroidism, who presents with 1mo hx of worsening b/l LE edema. Endorses erythema and swelling b/l. Per patient, her doctor ?neurologist, told her to stop Lasix recently so pt has not been taking. States pain patches, water pills and antibiotics as an outpatient were unhelpful in relieving these symptoms. States she has had symptoms for 6 months but recently worsened. Denies chest pain, shortness of breath, palpitations, fevers, chills, nausea, vomiting. Denies having in past.    IN THE ED:  Temp 98.8 F, HR 92, /72, RR 16, SpO2 96%  S/P Zosyn 3.375g x1, Vanco 1g x1, lasix 40 mg IV x1   EKG: left axis deviation, LBBB   Labs significant for WBC 5.66, BNP 2458, PT 31.9, INR 2.87, PTT 48, K 3.4, CO 32, anion gap 3, albumin 2.8, AST 57, eGFR 43  Imaging: CXR with minimal blunting of R base laterally    (11 Oct 2021 18:04)      INTERVAL HPI:  10/12/21 - Patient was seen and examined at bedside. No acute events overnight. Patient endorses improvement in the rubor/erythema of her LE b/l but endorses continued pain in LE b/l. Patient endorses 1 year hx of weight loss (35 pounds) due to decreased appetite. Patient denies headache, fever, blurry/double vision, tinnitus, dysphagia, cough, SoB, abdominal pain, CP, palpitations, n/v/c/d. Off Abx as per ID, INR -elevated   10/13/21 - Patient was seen and examined at bedside. Patient endorses no improvement in the swelling and erythema of LE b/l. Patient endorses a pressure sensation in both LE and has an "odd" sensation in her left heel. Patient endorses eating well without dysphagia. Patient denies BM the past 2 days and declines bowel regimen for now. Patient producing clear urine in vac. Endorses worsening of hearing in her left ear, worse since yesterday. Denies headache, fever, blurry/double vision, tinnitus, cough, SoB, abdominal pain, CP, palpitations, n/v/d.  Dtr Cell- [phone], On IV Lasix & IV Rocephin with Po steroids.  10/14/21 - Patient was seen and examined at bedside. Patient endorses significant improvement in swelling and erythema of LE b/l. Patient endorses b/l tingling sensation in LE. Patient denies BM last 3 days and is urinating well in vac. Endorses continued difficulty hearing in left ear. Patient endorses desire to receive vaccine for COVID-19. Discussed with patient to f/up hx of weight loss and difficulty hearing in L ear with PCP outpatient. Patient denies headache, fever, blurry/double vision, tinnitus, cough, dysphagia, SoB, CP, palpitations, abdominal pain, n/v/c/d, melena/hematochezia, dysuria, pyuria/hematuria, change in urinary frequency, confusion, dizziness, or weakness. On IV Lasix  Pt had exposure to  COVID + person at hospital, d/w Dr Larios , pt & Dtr, plan for MAB infusions.  10/15/21 - Patient seen and examined at bedside, S/P MAB Rx for COVID Exposure  over all improving. Additional Lasix 20 IVP ordered for lower Ext Edema . Will increase to Lasix 40mg IV BID starting later today. Denies any fevers, chills, cp, sob, abdominal pain, diarrhea, constipation  10/16/21 - Patient seen and examined at bedside. Patient reports improvement in leg swelling b/l with less redness. Patient endorses constipation w/o BM in 3 days open to starting miralax/senna. Endorses reduced hearing in ears b/l. Patient denies headache, fever, chills, blurry/double vision, tinnitus, cough, dysphagia, SoB, CP, palpitations, abdominal pain, n/v/d, dysuria, pyuria/hematuria, change in urinary frequency, dizziness, or weakness.   10/17/21 - Patient is seen and examined at bedside. Patient endorses significant improvement in LE swelling and erythema stating she has been urinating well. Patient denies new symptoms. Patient endorses BM this AM that was normal without hematochezia or melena. Patient denies headache, fever, chills, blurry/double vision, tinnitus, cough, dysphagia, SoB, CP, palpitations, abdominal pain, n/v/c/d, melena/hematochezia, dysuria, pyuria/hematuria, change in urinary frequency, confusion, dizziness, or weakness.  INR subtherapeutic.   10/18/21 - Patient seen and examined at bedside. No acute events overnight. Patient states that her LE edema and erythema are greatly improved. Patient is urinating well on vac without dysuria, changes in urinary frequency or hematuria/pyuria. Patient states that her mouth feels very dry and endorses a stiffness/discomfort in her groins of her legs b/l. Patient endorses BM this AM that was regular without hematochezia/melena. Patient is eating well denying dysphagia or cough while eating. Patient states that she has been limiting her PO fluid intake. Patient denies headache, fever, chills, blurry/double vision, tinnitus, cough, SoB, CP, palpitations, abdominal pain, n/v/c/d, confusion, dizziness, or weakness. INR subtherapeutic -> given 5mg warfarin today.    OVERNIGHT EVENTS:  none    Home Medications:  gabapentin 100 mg oral capsule: 1 cap(s) orally 3 times a day (13 Oct 2021 11:58)  Jantoven 1 mg oral tablet: 2 tab(s) orally Monday, Wednesday, and Friday (13 Oct 2021 11:58)  Jantoven 4 mg oral tablet: 1 tab(s) orally once a day (13 Oct 2021 11:58)  Klor-Con 10 mEq oral tablet, extended release: 1 tab(s) orally once a day (13 Oct 2021 11:58)  Lasix 40 mg oral tablet: 1 tab(s) orally once a day (13 Oct 2021 11:58)  lidocaine 5% patch: Apply to affected areas (13 Oct 2021 11:58)  Lopressor 50 mg oral tablet: 1 tab(s) orally once a day (13 Oct 2021 11:58)  Synthroid 50 mcg (0.05 mg) oral tablet: 1 tab(s) orally once a day (13 Oct 2021 11:58)  Vitamin B6 50 mg oral tablet: 1 tab(s) orally once a day (13 Oct 2021 11:58)      MEDICATIONS  (STANDING):  famotidine    Tablet 20 milliGRAM(s) Oral daily  furosemide   Injectable 40 milliGRAM(s) IV Push every 12 hours  gabapentin 100 milliGRAM(s) Oral three times a day  levothyroxine 50 MICROGram(s) Oral daily  metoprolol tartrate 50 milliGRAM(s) Oral daily  polyethylene glycol 3350 17 Gram(s) Oral two times a day  potassium chloride    Tablet ER 20 milliEquivalent(s) Oral daily  predniSONE   Tablet 20 milliGRAM(s) Oral daily  senna 2 Tablet(s) Oral at bedtime  warfarin 5 milliGRAM(s) Oral once    MEDICATIONS  (PRN):      No Known Allergies      Social History:  Patient lives alone. Denies tobacco use or etoh use. (11 Oct 2021 18:04)      REVIEW OF SYSTEMS:  CONSTITUTIONAL: No fever, No chills, No fatigue, No myalgia, No Body ache, No Weakness  EYES: No eye pain,  No visual disturbances, No discharge, NO Redness  ENMT: [ x ] reduced hearing in left ear. No ear pain, No nose bleed, No vertigo; No sinus pain, NO throat pain, No Congestion  RESPIRATORY: No cough, NO wheezing, No  hemoptysis, NO  shortness of breath  CARDIOVASCULAR: No chest pain, palpitations endorses LE edema b/l  GASTROINTESTINAL: No abdominal pain, NO epigastric pain. No nausea, No vomiting; No diarrhea, no constipation. [ x ] BM  GENITOURINARY: No dysuria, No frequency, No urgency, No hematuria, NO incontinence  NEUROLOGICAL: No headaches, No dizziness, No numbness, No tingling, No tremors, No weakness  EXT: No Swelling, No Pain, endorses edema b/l LE  SKIN: no erythema/rash  MUSCULOSKELETAL: Endorses msk discomfort in groin. No joint pain ,No Jt swelling; No muscle pain, No back pain, No extremity pain  PSYCHIATRIC: No depression,  No anxiety,  No mood swings ,No difficulty sleeping at night  PAIN SCALE: [ x ] None  [  ] Other-  ROS Unable to obtain due to - [  ] Dementia  [  ] Lethargy [  ] Drowsy [  ] Sedated [  ] non verbal  REST OF REVIEW Of SYSTEM - [x  ] Normal     Vital Signs Last 24 Hrs  T(C): 36.3 (18 Oct 2021 05:34), Max: 36.8 (17 Oct 2021 20:41)  T(F): 97.3 (18 Oct 2021 05:34), Max: 98.3 (17 Oct 2021 20:41)  HR: 65 (18 Oct 2021 05:34) (65 - 82)  BP: 174/63 (18 Oct 2021 05:34) (117/60 - 174/63)  BP(mean): --  RR: 18 (18 Oct 2021 05:34) (17 - 20)  SpO2: 92% (18 Oct 2021 05:34) (91% - 93%)    CAPILLARY BLOOD GLUCOSE          I&O's Summary    17 Oct 2021 07:01  -  18 Oct 2021 07:00  --------------------------------------------------------  IN: 400 mL / OUT: 900 mL / NET: -500 mL      PHYSICAL EXAM:  GENERAL:  [ x ] NAD , [ x ] well appearing, [  ] Agitated, [  ] Drowsy,  [  ] Lethargy, [  ] confused   HEAD:  [ x ] Normal, [  ] Other  EYES:  [ x ] EOMI, [  ] PERRLA, [ x ] conjunctiva and sclera clear normal, [  ] Other,  [  ] Pallor,[  ] Discharge  ENMT:  [ x ] Normal, [ x ] Moist mucous membranes, [ x ] Good dentition, [ x ] No Thrush  NECK:  [ x ] Supple, [x ] No JVD, [x  ] Normal thyroid, [  ] Lymphadenopathy [  ] Other  CHEST/LUNG:  [x  ] Clear to auscultation bilaterally, [x  ] Breath Sounds equal B/L, [  ] poor effort  [x  ] No rales, [x  ] No rhonchi  [ x ]  No wheezing,   HEART:  [ x  ] Regular rate [  ] tachycardia, [  ] Bradycardia,  [ x ] irregular  [ x ] 2/6 systolic murmer appreciated at upper sternal border [  ] PPM in place (Mfr:  )  ABDOMEN:  [ x ] Soft, [ x ] Nontender, [ x ] Nondistended, [ x ] No mass, [ x ] Bowel sounds present, [  ] obese  NERVOUS SYSTEM:  [ x] Alert & Oriented X3, [ x ] Nonfocal  [  ] Confusion  [  ] Encephalopathic [  ] Sedated [  ] Unable to assess, [  ] Dementia [  ] Other-  EXTREMITIES: [x  ] 2+ Peripheral Pulses, No clubbing, No cyanosis,  [ x ] 2 +pitting  edema B/L lower EXT. [ x ] no erythema/rubor in legs b/l  Ext  [  ] wound, stasis skin   LYMPH: No lymphadenopathy noted  SKIN:  [ x ] No rashes or lesions, [  ] Pressure Ulcers, [ x ] ecchymosis, [  ] Skin Tears, [  ] Other    DIET: Diet, Regular:   DASH/TLC Sodium & Cholesterol Restricted  1200mL Fluid Restriction ([id number]) (10-11-21 @ 19:28)      LABS:                        13.1   6.37  )-----------( 289      ( 18 Oct 2021 09:02 )             41.4     18 Oct 2021 09:02    141    |  103    |  30     ----------------------------<  84     3.9     |  34     |  1.00     Ca    9.4        18 Oct 2021 09:02      PT/INR - ( 18 Oct 2021 09:02 )   PT: 18.5 sec;   INR: 1.62 ratio         PTT - ( 18 Oct 2021 09:02 )  PTT:35.5 sec    Culture Results:   No Growth Final (10-11 @ 21:52)  Culture Results:   No Growth Final (10-11 @ 21:52)            Culture - Blood (collected 11 Oct 2021 21:52)  Source: .Blood Blood-Peripheral  Final Report (16 Oct 2021 22:00):    No Growth Final    Culture - Blood (collected 11 Oct 2021 21:52)  Source: .Blood Blood-Peripheral  Final Report (16 Oct 2021 22:00):    No Growth Final       Anemia Panel:  Iron Total, Serum: 27 ug/dL (10-17-21 @ 14:02)  Iron - Total Binding Capacity.: 400 ug/dL (10-17-21 @ 14:02)  Vitamin B12, Serum: 509 pg/mL (10-17-21 @ 14:01)  Folate, Serum: 10.1 ng/mL (10-17-21 @ 14:01)  Ferritin, Serum: 25 ng/mL (10-17-21 @ 14:01)      Thyroid Panel:            Serum Pro-Brain Natriuretic Peptide: 2458 pg/mL (10-11-21 @ 16:40)      RADIOLOGY & ADDITIONAL TESTS:      HEALTH ISSUES - PROBLEM Dx:  Lower extremity edema    Venous stasis dermatitis of lower extremity    Paroxysmal atrial fibrillation    Hypertension    Hypothyroid    Constipation    DVT prophylaxis    Cellulitis    Exposure to confirmed case of COVID-19    Mild anemia          Consultant(s) Notes Reviewed:  [  ] YES     Care Discussed with [ x ] Consultants, [ x ] Patient, [  ] Family, [  ] HCP, [  ] , [  ] Social Service, [ x ] RN, [  ] Physical Therapy, [  ] Palliative Care Team  DVT PPX: [  ] Lovenox, [  ] SC Heparin, [ x ] Coumadin, [  ] Xarelto, [  ] Eliquis, [  ] Pradaxa, [  ] IV Heparin drip, [  ] SCD, [  ] Ambulation, [  ] Contraindicated 2/2 GI Bleed, [  ] Contraindicated 2/2  Bleed, [  ] Contraindicated 2/2 Brain Bleed  Advanced Directive: [ x ] None, [  ] DNR/DNI Patient is a 89y old  Female who presents with a chief complaint of b/l LE cellulitis (18 Oct 2021 11:30)      HPI:  Patient is a 89 y/o F with a pmhx b/l LE edema, HTN,P Afib on AC , hypothyroidism, who presents with 1mo hx of worsening b/l LE edema. Endorses erythema and swelling b/l. Per patient, her doctor ?neurologist, told her to stop Lasix recently so pt has not been taking. States pain patches, water pills and antibiotics as an outpatient were unhelpful in relieving these symptoms. States she has had symptoms for 6 months but recently worsened. Denies chest pain, shortness of breath, palpitations, fevers, chills, nausea, vomiting. Denies having in past.    IN THE ED:  Temp 98.8 F, HR 92, /72, RR 16, SpO2 96%  S/P Zosyn 3.375g x1, Vanco 1g x1, lasix 40 mg IV x1   EKG: left axis deviation, LBBB   Labs significant for WBC 5.66, BNP 2458, PT 31.9, INR 2.87, PTT 48, K 3.4, CO 32, anion gap 3, albumin 2.8, AST 57, eGFR 43  Imaging: CXR with minimal blunting of R base laterally    (11 Oct 2021 18:04)      INTERVAL HPI:  10/12/21 - Patient was seen and examined at bedside. No acute events overnight. Patient endorses improvement in the rubor/erythema of her LE b/l but endorses continued pain in LE b/l. Patient endorses 1 year hx of weight loss (35 pounds) due to decreased appetite. Patient denies headache, fever, blurry/double vision, tinnitus, dysphagia, cough, SoB, abdominal pain, CP, palpitations, n/v/c/d. Off Abx as per ID, INR -elevated   10/13/21 - Patient was seen and examined at bedside. Patient endorses no improvement in the swelling and erythema of LE b/l. Patient endorses a pressure sensation in both LE and has an "odd" sensation in her left heel. Patient endorses eating well without dysphagia. Patient denies BM the past 2 days and declines bowel regimen for now. Patient producing clear urine in vac. Endorses worsening of hearing in her left ear, worse since yesterday. Denies headache, fever, blurry/double vision, tinnitus, cough, SoB, abdominal pain, CP, palpitations, n/v/d.  Dtr Cell- [phone], On IV Lasix & IV Rocephin with Po steroids.  10/14/21 - Patient was seen and examined at bedside. Patient endorses significant improvement in swelling and erythema of LE b/l. Patient endorses b/l tingling sensation in LE. Patient denies BM last 3 days and is urinating well in vac. Endorses continued difficulty hearing in left ear. Patient endorses desire to receive vaccine for COVID-19. Discussed with patient to f/up hx of weight loss and difficulty hearing in L ear with PCP outpatient. Patient denies headache, fever, blurry/double vision, tinnitus, cough, dysphagia, SoB, CP, palpitations, abdominal pain, n/v/c/d, melena/hematochezia, dysuria, pyuria/hematuria, change in urinary frequency, confusion, dizziness, or weakness. On IV Lasix  Pt had exposure to  COVID + person at hospital, d/w Dr Larios , pt & Dtr, plan for MAB infusions.  10/15/21 - Patient seen and examined at bedside, S/P MAB Rx for COVID Exposure  over all improving. Additional Lasix 20 IVP ordered for lower Ext Edema . Will increase to Lasix 40mg IV BID starting later today. Denies any fevers, chills, cp, sob, abdominal pain, diarrhea, constipation  10/16/21 - Patient seen and examined at bedside. Patient reports improvement in leg swelling b/l with less redness. Patient endorses constipation w/o BM in 3 days open to starting miralax/senna. Endorses reduced hearing in ears b/l. Patient denies headache, fever, chills, blurry/double vision, tinnitus, cough, dysphagia, SoB, CP, palpitations, abdominal pain, n/v/d, dysuria, pyuria/hematuria, change in urinary frequency, dizziness, or weakness.   10/17/21 - Patient is seen and examined at bedside. Patient endorses significant improvement in LE swelling and erythema stating she has been urinating well. Patient denies new symptoms. Patient endorses BM this AM that was normal without hematochezia or melena. Patient denies headache, fever, chills, blurry/double vision, tinnitus, cough, dysphagia, SoB, CP, palpitations, abdominal pain, n/v/c/d, melena/hematochezia, dysuria, pyuria/hematuria, change in urinary frequency, confusion, dizziness, or weakness.  INR subtherapeutic.   10/18/21 - Patient seen and examined at bedside. No acute events overnight. Patient states that her LE edema and erythema are greatly improved. Patient is urinating well on vac without dysuria, changes in urinary frequency or hematuria/pyuria. Patient states that her mouth feels very dry and endorses a stiffness/discomfort in her groins of her legs b/l. Patient endorses BM this AM that was regular without hematochezia/melena. Patient is eating well denying dysphagia or cough while eating. Patient states that she has been limiting her PO fluid intake. Patient denies headache, fever, chills, blurry/double vision, tinnitus, cough, SoB, CP, palpitations, abdominal pain, n/v/c/d, confusion, dizziness, or weakness. INR subtherapeutic -> given 5mg warfarin today.    OVERNIGHT EVENTS:  none    Home Medications:  gabapentin 100 mg oral capsule: 1 cap(s) orally 3 times a day (13 Oct 2021 11:58)  Jantoven 1 mg oral tablet: 2 tab(s) orally Monday, Wednesday, and Friday (13 Oct 2021 11:58)  Jantoven 4 mg oral tablet: 1 tab(s) orally once a day (13 Oct 2021 11:58)  Klor-Con 10 mEq oral tablet, extended release: 1 tab(s) orally once a day (13 Oct 2021 11:58)  Lasix 40 mg oral tablet: 1 tab(s) orally once a day (13 Oct 2021 11:58)  lidocaine 5% patch: Apply to affected areas (13 Oct 2021 11:58)  Lopressor 50 mg oral tablet: 1 tab(s) orally once a day (13 Oct 2021 11:58)  Synthroid 50 mcg (0.05 mg) oral tablet: 1 tab(s) orally once a day (13 Oct 2021 11:58)  Vitamin B6 50 mg oral tablet: 1 tab(s) orally once a day (13 Oct 2021 11:58)      MEDICATIONS  (STANDING):  famotidine    Tablet 20 milliGRAM(s) Oral daily  furosemide   Injectable 40 milliGRAM(s) IV Push every 12 hours  gabapentin 100 milliGRAM(s) Oral three times a day  levothyroxine 50 MICROGram(s) Oral daily  metoprolol tartrate 50 milliGRAM(s) Oral daily  polyethylene glycol 3350 17 Gram(s) Oral two times a day  potassium chloride    Tablet ER 20 milliEquivalent(s) Oral daily  predniSONE   Tablet 20 milliGRAM(s) Oral daily  senna 2 Tablet(s) Oral at bedtime  warfarin 5 milliGRAM(s) Oral once    MEDICATIONS  (PRN):      No Known Allergies      Social History:  Patient lives alone. Denies tobacco use or etoh use. (11 Oct 2021 18:04)      REVIEW OF SYSTEMS:  CONSTITUTIONAL: No fever, No chills, No fatigue, No myalgia, No Body ache, No Weakness  EYES: No eye pain,  No visual disturbances, No discharge, NO Redness  ENMT: [ x ] reduced hearing in left ear. No ear pain, No nose bleed, No vertigo; No sinus pain, NO throat pain, No Congestion  RESPIRATORY: No cough, NO wheezing, No  hemoptysis, NO  shortness of breath  CARDIOVASCULAR: No chest pain, palpitations endorses LE edema b/l  GASTROINTESTINAL: No abdominal pain, NO epigastric pain. No nausea, No vomiting; No diarrhea, no constipation. [ x ] BM  GENITOURINARY: No dysuria, No frequency, No urgency, No hematuria, NO incontinence  NEUROLOGICAL: No headaches, No dizziness, No numbness, No tingling, No tremors, No weakness  EXT: No Swelling, No Pain, endorses edema b/l LE  SKIN: no erythema/rash  MUSCULOSKELETAL: Endorses msk discomfort in groin. No joint pain ,No Jt swelling; No muscle pain, No back pain, No extremity pain  PSYCHIATRIC: No depression,  No anxiety,  No mood swings ,No difficulty sleeping at night  PAIN SCALE: [ x ] None  [  ] Other-  ROS Unable to obtain due to - [  ] Dementia  [  ] Lethargy [  ] Drowsy [  ] Sedated [  ] non verbal  REST OF REVIEW Of SYSTEM - [x  ] Normal     Vital Signs Last 24 Hrs  T(C): 36.3 (18 Oct 2021 05:34), Max: 36.8 (17 Oct 2021 20:41)  T(F): 97.3 (18 Oct 2021 05:34), Max: 98.3 (17 Oct 2021 20:41)  HR: 65 (18 Oct 2021 05:34) (65 - 82)  BP: 174/63 (18 Oct 2021 05:34) (117/60 - 174/63)  BP(mean): --  RR: 18 (18 Oct 2021 05:34) (17 - 20)  SpO2: 92% (18 Oct 2021 05:34) (91% - 93%)    CAPILLARY BLOOD GLUCOSE          I&O's Summary    17 Oct 2021 07:01  -  18 Oct 2021 07:00  --------------------------------------------------------  IN: 400 mL / OUT: 900 mL / NET: -500 mL      PHYSICAL EXAM:  GENERAL:  [ x ] NAD , [ x ] well appearing, [  ] Agitated, [  ] Drowsy,  [  ] Lethargy, [  ] confused   HEAD:  [ x ] Normal, [  ] Other  EYES:  [ x ] EOMI, [  ] PERRLA, [ x ] conjunctiva and sclera clear normal, [  ] Other,  [  ] Pallor,[  ] Discharge  ENMT:  [ x ] Normal, [ x ] Moist mucous membranes, [ x ] Good dentition, [ x ] No Thrush  NECK:  [ x ] Supple, [x ] No JVD, [x  ] Normal thyroid, [  ] Lymphadenopathy [  ] Other  CHEST/LUNG:  [x  ] Clear to auscultation bilaterally, [x  ] Breath Sounds equal B/L, [  ] poor effort  [x  ] No rales, [x  ] No rhonchi  [ x ]  No wheezing,   HEART:  [ x  ] Regular rate [  ] tachycardia, [  ] Bradycardia,  [ x ] irregular  [ x ] 2/6 systolic murmer appreciated at upper sternal border [  ] PPM in place (Mfr:  )  ABDOMEN:  [ x ] Soft, [ x ] Nontender, [ x ] Nondistended, [ x ] No mass, [ x ] Bowel sounds present, [  ] obese  NERVOUS SYSTEM:  [ x] Alert & Oriented X3, [ x ] Nonfocal  [  ] Confusion  [  ] Encephalopathic [  ] Sedated [  ] Unable to assess, [  ] Dementia [  ] Other-  EXTREMITIES: [x  ] 2+ Peripheral Pulses, No clubbing, No cyanosis,  [ x ] 2 +pitting  edema B/L lower EXT. [ x ] no erythema/rubor in legs b/l  Ext  [  ] wound, stasis skin   LYMPH: No lymphadenopathy noted  SKIN:  [ x ] No rashes or lesions, [  ] Pressure Ulcers, [ x ] ecchymosis, [  ] Skin Tears, [ x ] Other- dry skin lower Ext    DIET: Diet, Regular:   DASH/TLC Sodium & Cholesterol Restricted  1200mL Fluid Restriction ([id number]) (10-11-21 @ 19:28)      LABS:                        13.1   6.37  )-----------( 289      ( 18 Oct 2021 09:02 )             41.4     18 Oct 2021 09:02    141    |  103    |  30     ----------------------------<  84     3.9     |  34     |  1.00     Ca    9.4        18 Oct 2021 09:02      PT/INR - ( 18 Oct 2021 09:02 )   PT: 18.5 sec;   INR: 1.62 ratio         PTT - ( 18 Oct 2021 09:02 )  PTT:35.5 sec    Culture Results:   No Growth Final (10-11 @ 21:52)  Culture Results:   No Growth Final (10-11 @ 21:52)      Culture - Blood (collected 11 Oct 2021 21:52)  Source: .Blood Blood-Peripheral  Final Report (16 Oct 2021 22:00):    No Growth Final    Culture - Blood (collected 11 Oct 2021 21:52)  Source: .Blood Blood-Peripheral  Final Report (16 Oct 2021 22:00):    No Growth Final       Anemia Panel:  Iron Total, Serum: 27 ug/dL (10-17-21 @ 14:02)  Iron - Total Binding Capacity.: 400 ug/dL (10-17-21 @ 14:02)  Vitamin B12, Serum: 509 pg/mL (10-17-21 @ 14:01)  Folate, Serum: 10.1 ng/mL (10-17-21 @ 14:01)  Ferritin, Serum: 25 ng/mL (10-17-21 @ 14:01)      Thyroid Panel:    Serum Pro-Brain Natriuretic Peptide: 2458 pg/mL (10-11-21 @ 16:40)      RADIOLOGY & ADDITIONAL TESTS: none      HEALTH ISSUES - PROBLEM Dx:  Lower extremity edema    Venous stasis dermatitis of lower extremity    Paroxysmal atrial fibrillation    Hypertension    Hypothyroid    Constipation    DVT prophylaxis    Cellulitis    Exposure to confirmed case of COVID-19    Mild anemia      Consultant(s) Notes Reviewed:  [ x ] YES     Care Discussed with [ x ] Consultants, [ x ] Patient, [ x ] Family- dtr, [  ] HCP, [x  ] , [  ] Social Service, [ x ] RN, [  ] Physical Therapy, [  ] Palliative Care Team  DVT PPX: [  ] Lovenox, [  ] SC Heparin, [ x ] Coumadin, [  ] Xarelto, [  ] Eliquis, [  ] Pradaxa, [  ] IV Heparin drip, [  ] SCD, [  ] Ambulation, [  ] Contraindicated 2/2 GI Bleed, [  ] Contraindicated 2/2  Bleed, [  ] Contraindicated 2/2 Brain Bleed  Advanced Directive: [ x ] None, [  ] DNR/DNI

## 2021-10-18 NOTE — PROGRESS NOTE ADULT - PROBLEM SELECTOR PLAN 1
- Chronic Venous stasis dermatitis with blisters oozing on admit likely 2/2 fluid overload from med non-compliance.   - Erythema/rubor resolved, swelling improved  - TTE: LVEF 55-60 | moderate TR/MR | RV/RA enlargement  - consider d/c Lasix 40 mg IVP BID in setting of elevated bicarb and dry mouth  - blood cultures NGTD  - US doppler (-)  - c/w gabapentin 100mg tid  - s/p ceftriaxone IV 5 days  - c/w prednisone for now -> upon dc can transition to  prednisone 20mg PO daily with last day 10/22  - consider compression stockings in future  - ID DR Larios, d/w -stable, improving, Complete Steroids & IV Rocephin   - Cardiology following, recs appreciated - Chronic Venous stasis dermatitis with blisters oozing on admit likely 2/2 fluid overload from med non-compliance.   - Erythema/rubor resolved, swelling improved  - TTE: LVEF 55-60 | moderate TR/MR | RV/RA enlargement  - consider d/c Lasix 40 mg IVP BID in setting of elevated bicarb and dry mouth  - blood cultures NGTD  - US doppler (-)  - c/w gabapentin 100mg tid  - s/p ceftriaxone IV 5 days  - c/w prednisone for now -> upon dc can transition to  prednisone 20mg PO daily with last day 10/22  - consider compression stockings in future  - ID DR Larios, d/w -stable, improving, Complete Steroids & IV Rocephin today.  - Cardiology following, recs appreciated

## 2021-10-18 NOTE — PROGRESS NOTE ADULT - ASSESSMENT
89 y/o F with a pmhx b/l LE edema, HTN, hypothyroidism, who presents with 1mo hx of worsening b/l LE edema, erythema and swelling b/l.     RECOMMENDATIONS    LE swelling/erythema - No fever, no leukocytosis, 1 mo chronicity with symmetrical nature is very atypical for an infectious process. Low suspicion for this being due to an infectious process and suspect inflammatory stasis dermatitis..but with chronicity and failure to improve  started abx/steroids with rapid response   rec as pt improves anticipate transition to finish course with  sp Cefuroxime 500mg PO BID with last day 10/17  continue prednisone 20mg PO daily with last day 10/22    COVID exposure - sp REGEN-COV Mab Rx 10/14, will need to follow institutional isolation protocols based on exposure    From an ID standpoint no further requirement for inpatient status for the management of ID issues. Fine with discharge from ID standpoint when other medical issues no longer require inpatient care and social issues allow for a safe discharge plan. To schedule an outpatient ID follow up appointment please call our office at [phone]      We will follow along in the care of this patient. Please call us at [phone] or text me directly on my cell# at [phone] with any concerns.

## 2021-10-18 NOTE — PROGRESS NOTE ADULT - PROBLEM SELECTOR PLAN 4
Likely 2/2 Ac Illness , Daily lab draws & possible fluid overload   Stable H/H , Hemodynamically stable  Iron studies Iron 27 | TIBC 400 | %sat 7 | ferritin 25  Folate and B12 wnl  CBC daily

## 2021-10-18 NOTE — PROGRESS NOTE ADULT - PROBLEM SELECTOR PLAN 2
Chronic Venous stasis dermatitis b/.l lower EXT, likely non-cardiogenic  - consider d/c Lasix 40mg IVP BID  - s/p ceftriaxone IV 5 days  - c/w 20 mg prednisone qd for inflammatory stasis dermatitis.  - c/w leg elevation, would benefit from compression stockings  - ID DR Larios D/W OK to continue steroids Chronic Venous stasis dermatitis b/.l lower EXT, likely non-cardiogenic  - consider d/c Lasix 40mg IVP BID  - s/p ceftriaxone IV 5 days  - c/w 20 mg prednisone qd for inflammatory stasis dermatitis.  - c/w leg elevation, would benefit from compression stockings  - start [place] Hydran BID  - ID DR Larios D/W OK to continue steroids

## 2021-10-18 NOTE — PROGRESS NOTE ADULT - PROBLEM SELECTOR PLAN 5
D/W Floor RN Manager of floor & Director of Nursing   - Repeat COVID PCR negative x2  - s/p MAB infusion x 1 given on 10/14/21 w/ michellein D/W Floor RN Manager of floor & Director of Nursing   - Repeat COVID PCR negative x2  - s/p MAB infusion x 1 given on 10/14/21 w/ isolation  D/C Isolation

## 2021-10-18 NOTE — PROGRESS NOTE ADULT - SUBJECTIVE AND OBJECTIVE BOX
[facility] -- Reid Astorga, Norah, Raheem, Flex Calvert, Jacob Blair: Office # [phone]    Follow Up:  LE edema, HTN     Subjective/Observations: Patient seen and examined, awake, alert, resting comfortably in bed, no complaints of chest pain, dyspnea, palpitations or dizziness, orthopnea and PND. Tolerating room air. B/L LE cellulitis     REVIEW OF SYSTEMS: All review of systems is negative for eye, ENT, GI, , allergic, dermatologic, musculoskeletal and neurologic except as described above    PAST MEDICAL & SURGICAL HISTORY:  Hypothyroid  Hypertension  Lower extremity edema    MEDICATIONS  (STANDING):  famotidine    Tablet 20 milliGRAM(s) Oral daily  furosemide   Injectable 40 milliGRAM(s) IV Push every 12 hours  gabapentin 100 milliGRAM(s) Oral three times a day  levothyroxine 50 MICROGram(s) Oral daily  metoprolol tartrate 50 milliGRAM(s) Oral daily  polyethylene glycol 3350 17 Gram(s) Oral two times a day  potassium chloride    Tablet ER 20 milliEquivalent(s) Oral daily  predniSONE   Tablet 20 milliGRAM(s) Oral daily  senna 2 Tablet(s) Oral at bedtime    MEDICATIONS  (PRN):    Allergies    No Known Allergies    Intolerances      Vital Signs Last 24 Hrs  T(C): 36.3 (18 Oct 2021 05:34), Max: 36.8 (17 Oct 2021 20:41)  T(F): 97.3 (18 Oct 2021 05:34), Max: 98.3 (17 Oct 2021 20:41)  HR: 65 (18 Oct 2021 05:34) (65 - 82)  BP: 174/63 (18 Oct 2021 05:34) (117/60 - 174/63)  RR: 18 (18 Oct 2021 05:34) (17 - 20)  SpO2: 92% (18 Oct 2021 05:34) (91% - 93%)  I&O's Summary    17 Oct 2021 07:01  -  18 Oct 2021 07:00  --------------------------------------------------------  IN: 400 mL / OUT: 900 mL / NET: -500 mL    TELE: Not on telemetry   PHYSICAL EXAM:  Appearance: NAD, no distress, alert,  HEENT: Moist Mucous Membranes, Anicteric  Cardiovascular: Regular rate and rhythm, Normal S1 S2, No JVD, No murmurs, No rubs, gallops or clicks  Respiratory: Non-labored, Clear to auscultation, No rales, No rhonchi, No wheezing.   Gastrointestinal:  Soft, Non-tender, + BS  Neurologic: Non-focal  Skin: Warm and dry, No visible rashes or ulcers, No ecchymosis, No cyanosis, b/l LE cellulitis   Musculoskeletal: No clubbing, No cyanosis, No joint swelling/tenderness  Psychiatry: Mood & affect appropriate  Lymph: +2 b/l LE edema    LABS: All Labs Reviewed:                        13.1   6.37  )-----------( 289      ( 18 Oct 2021 09:02 )             41.4                         11.0   5.49  )-----------( 247      ( 17 Oct 2021 09:14 )             34.5                         10.8   5.59  )-----------( 248      ( 16 Oct 2021 09:18 )             34.1     18 Oct 2021 09:02    141    |  103    |  30     ----------------------------<  84     3.9     |  34     |  1.00   17 Oct 2021 09:14    143    |  105    |  29     ----------------------------<  88     3.6     |  34     |  1.10   16 Oct 2021 09:18    142    |  106    |  27     ----------------------------<  95     3.6     |  32     |  1.10     Ca    9.4        18 Oct 2021 09:02  Ca    8.5        17 Oct 2021 09:14  Ca    8.3        16 Oct 2021 09:18  Mg     2.3       15 Oct 2021 14:01    TPro  7.4    /  Alb  2.4    /  TBili  0.5    /  DBili  x      /  AST  38     /  ALT  34     /  AlkPhos  82     16 Oct 2021 09:18    PT/INR - ( 18 Oct 2021 09:02 )   PT: 18.5 sec;   INR: 1.62 ratio      PTT - ( 18 Oct 2021 09:02 )  PTT:35.5 sec    12 Lead ECG:   Ventricular Rate 76 BPM  Atrial Rate 55 BPM  QRS Duration 158 ms  Q-T Interval 442 ms  QTC Calculation(Bazett) 497 ms  R Axis -49 degrees  T Axis 138 degrees    Diagnosis Line Atrial fibrillation  Left axis deviation  Left bundle branchblock  Abnormal ECG  When compared with ECG of 11-DEC-2012 14:13,  Current undetermined rhythm precludes rhythm comparison, needs review  Left bundle branch block is now present  Criteria for Anteroseptal infarct are no longer present  Confirmed by Diony Maciel MD ([id number]) on 10/12/2021 1:51:19 PM (10-11-21 @ 17:07)    r< from: TTE Echo Complete w/o Contrast w/ Doppler (10.11.21 @ 22:40) >     EXAM:  ECHO TTE WO CON COMP W DOPP         PROCEDURE DATE:  10/11/2021        INTERPRETATION:  INDICATION: Edema  Sonographer AS    Blood Pressure 153/72    Height unavailable     Weight 70.3 kg    Dimensions:  LA 3.6       Normal Values: 2.0 - 4.0 cm  Ao 3.4        Normal Values: 2.0 - 3.8 cm  SEPTUM 1.3       Normal Values: 0.6 - 1.2 cm  PWT 1.1       Normal Values: 0.6 - 1.1 cm  LVIDd 4.3         Normal Values: 3.0 - 5.6 cm  LVIDs 2.9         Normal Values: 1.8 - 4.0 cm      OBSERVATIONS:  Technically difficult study  Mitral Valve: Mitral annular calcification, moderate MR.  Aortic Valve/Aorta: Calcified trileaflet aortic valve with decreased opening. Peak transaortic valve gradient 26.5 mmHg with a mean transaortic valve gradient 17.8 mmHg. The aortic valve area is calculated to be 1.34 sq cm by continuity equation. This is consistent with moderate aortic stenosis.  Tricuspid Valve: Moderate TR.  Pulmonic Valve: Trace PI  Left Atrium: normal  Right Atrium: Enlarged  Left Ventricle:normal LV size and systolic function, estimated LVEF of 55-60%.  Right Ventricle: Right ventricular enlargement with grossly normal systolic function  Pericardium: no significant pericardial effusion.  Pulmonary/RV Pressure: estimated PA systolic pressure of 46.3 mmHg assuming an RA pressure of 3 mmHg.  IVC measures 1.3 cm          IMPRESSION:  Normal left ventricular internal dimensions and systolic function, estimated LVEF of 55-60%.  Right ventricular enlargement with grossly normal systolic function  Calcified trileaflet aortic valve with moderate aortic stenosis, without AI.  Moderate MR and TR.  Right atrial enlargement  No significant pericardial effusion.    --- End of Report ---              JEAN CARLOS HENRY MD; Attending Cardiologist  This document has been electronically signed. Oct 12 2021  5:46PM    < end of copied text >

## 2021-10-18 NOTE — PROGRESS NOTE ADULT - SUBJECTIVE AND OBJECTIVE BOX
[facility] DIVISION of INFECTIOUS DISEASE  Diony Larios MD PhD, Tricia Cornell MD, Delmy Villegas MD, Juan Alberto Mccord MD, Juan Vieyra MD  and providing coverage with An Mckay MD and Gabriella Bell MD  Providing Infectious Disease Consultations at [facility], [facility]'s    Office# [phone] to schedule follow up appointments  Answering Service for urgent calls or New Consults [phone]  Cell# to text for urgent issues Diony Larios [phone]     infectious diseases progress note:    KENIA REGALADO is a 89y y. o. Female patient    No concerning overnight events    Allergies    No Known Allergies    Intolerances        ANTIBIOTICS/RELEVANT:  antimicrobials    immunologic:    OTHER:  famotidine    Tablet 20 milliGRAM(s) Oral daily  furosemide   Injectable 40 milliGRAM(s) IV Push every 12 hours  gabapentin 100 milliGRAM(s) Oral three times a day  levothyroxine 50 MICROGram(s) Oral daily  metoprolol tartrate 50 milliGRAM(s) Oral daily  polyethylene glycol 3350 17 Gram(s) Oral two times a day  potassium chloride    Tablet ER 20 milliEquivalent(s) Oral daily  predniSONE   Tablet 20 milliGRAM(s) Oral daily  senna 2 Tablet(s) Oral at bedtime  warfarin 5 milliGRAM(s) Oral once      Objective:  Vital Signs Last 24 Hrs  T(C): 36.3 (18 Oct 2021 05:34), Max: 36.8 (17 Oct 2021 20:41)  T(F): 97.3 (18 Oct 2021 05:34), Max: 98.3 (17 Oct 2021 20:41)  HR: 65 (18 Oct 2021 05:34) (65 - 82)  BP: 174/63 (18 Oct 2021 05:34) (117/60 - 174/63)  BP(mean): --  RR: 18 (18 Oct 2021 05:34) (17 - 20)  SpO2: 92% (18 Oct 2021 05:34) (91% - 93%)    T(C): 36.3 (10-18-21 @ 05:34), Max: 37 (10-17-21 @ 05:26)  T(C): 36.3 (10-18-21 @ 05:34), Max: 37 (10-17-21 @ 05:26)  T(C): 36.3 (10-18-21 @ 05:34), Max: 37 (10-14-21 @ 20:58)    PHYSICAL EXAM:  HEENT: NC atraumatic  Neck: supple  Respiratory: no accessory muscle use, breathing comfortably  Cardiovascular: distant  Gastrointestinal: normal appearing, nondistended  Extremities: no clubbing, no cyanosis, decreased erythema but still present, decreased pitting edema but still present        LABS:                          13.1   6.37  )-----------( 289      ( 18 Oct 2021 09:02 )             41.4       6.37 10-18 @ 09:02  5.49 10-17 @ 09:14  5.59 10-16 @ 09:18  4.86 10-15 @ 09:04  4.33 10-14 @ 09:06  4.58 10-13 @ 09:21  4.09 10-12 @ 09:17  5.66 10-11 @ 16:40      10-18    141  |  103  |  30<H>  ----------------------------<  84  3.9   |  34<H>  |  1.00    Ca    9.4      18 Oct 2021 09:02        Creatinine, Serum: 1.00 mg/dL (10-18-21 @ 09:02)  Creatinine, Serum: 1.10 mg/dL (10-17-21 @ 09:14)  Creatinine, Serum: 1.10 mg/dL (10-16-21 @ 09:18)  Creatinine, Serum: 0.95 mg/dL (10-15-21 @ 09:04)  Creatinine, Serum: 1.10 mg/dL (10-14-21 @ 09:06)  Creatinine, Serum: 1.20 mg/dL (10-13-21 @ 09:21)  Creatinine, Serum: 1.10 mg/dL (10-12-21 @ 09:17)  Creatinine, Serum: 1.10 mg/dL (10-11-21 @ 16:40)      PT/INR - ( 18 Oct 2021 09:02 )   PT: 18.5 sec;   INR: 1.62 ratio         PTT - ( 18 Oct 2021 09:02 )  PTT:35.5 sec          INFLAMMATORY MARKERS  Auto Neutrophil #: 3.49 K/uL (10-16-21 @ 09:18)  Auto Lymphocyte #: 1.24 K/uL (10-16-21 @ 09:18)  Auto Neutrophil #: 3.06 K/uL (10-14-21 @ 09:06)  Auto Lymphocyte #: 0.84 K/uL (10-14-21 @ 09:06)  Auto Neutrophil #: 2.15 K/uL (10-13-21 @ 09:21)  Auto Lymphocyte #: 1.69 K/uL (10-13-21 @ 09:21)  Auto Neutrophil #: 3.39 K/uL (10-11-21 @ 16:40)  Auto Lymphocyte #: 1.55 K/uL (10-11-21 @ 16:40)    Lactate, Blood: 1.5 mmol/L (10-11-21 @ 16:40)    Auto Eosinophil #: 0.12 K/uL (10-16-21 @ 09:18)  Auto Eosinophil #: 0.00 K/uL (10-14-21 @ 09:06)  Auto Eosinophil #: 0.18 K/uL (10-13-21 @ 09:21)  Auto Eosinophil #: 0.08 K/uL (10-11-21 @ 16:40)                  Ferritin, Serum: 25 ng/mL (10-17-21 @ 14:01)        INR: 1.62 ratio (10-18-21 @ 09:02)  Activated Partial Thromboplastin Time: 35.5 sec (10-18-21 @ 09:02)  INR: 1.59 ratio (10-17-21 @ 09:14)  Activated Partial Thromboplastin Time: 33.6 sec (10-17-21 @ 09:14)  INR: 1.56 ratio (10-16-21 @ 09:18)  INR: 1.73 ratio (10-15-21 @ 09:04)  Activated Partial Thromboplastin Time: 36.8 sec (10-15-21 @ 09:04)  INR: 2.25 ratio (10-14-21 @ 09:06)  Activated Partial Thromboplastin Time: 41.1 sec (10-14-21 @ 09:06)  INR: 3.03 ratio (10-13-21 @ 09:21)  Activated Partial Thromboplastin Time: 45.4 sec (10-13-21 @ 09:21)  INR: 3.46 ratio (10-12-21 @ 09:17)  INR: 2.87 ratio (10-11-21 @ 16:40)  Activated Partial Thromboplastin Time: 48.0 sec (10-11-21 @ 16:40)          MICROBIOLOGY:              RADIOLOGY & ADDITIONAL STUDIES:

## 2021-10-18 NOTE — DIETITIAN INITIAL EVALUATION ADULT. - PROBLEM SELECTOR PLAN 1
- Likely Chronic Venous stasis dermatitis now with blisters oozing & draining with likely infection  - WBC 5.66, afebrile, does not meet sepsis criteria, b/l ?cellulitis  -IV Diuresis Lasix 20 mg IV q 12 hrs   -IV Zosyn q 8 hrs   Blood c/s x 2   -ID DR Larios d/w  -On  gabapentin 100mg tid

## 2021-10-18 NOTE — PROGRESS NOTE ADULT - PROBLEM SELECTOR PLAN 6
- chronic, stable  - continue home Lopressor 50mg qd  - consider d/c Lasix 40mg IVP BID - chronic, stable  - continue home Lopressor  T50mg qd  - consider d/c Lasix 40mg IVP BID----> change to Lasix 40 mg IV Daily

## 2021-10-18 NOTE — DIETITIAN NUTRITION RISK NOTIFICATION - TREATMENT: THE FOLLOWING DIET HAS BEEN RECOMMENDED
Diet, Regular:   DASH/TLC {Sodium & Cholesterol Restricted}  1200mL Fluid Restriction ([id number]) (10-11-21 @ 19:28) [Active]

## 2021-10-18 NOTE — DIETITIAN INITIAL EVALUATION ADULT. - PROBLEM SELECTOR PLAN 2
- hx of LE edema, off Lasix for past 2 weeks - Volume overload  2/2 non compliance with meds , Non compliance   - s/p Zosyn 3.375g x1 and Vanco 1g in ED. s/p Lasix 40mg IV x1 in ER  - continue Lasix 20 mg IV bid   - blood cultures, f/u  - Doppler Venous - to follow

## 2021-10-18 NOTE — PROGRESS NOTE ADULT - ATTENDING COMMENTS
Chart reviewed    Patient seen and examined    Agree with plan as outlined    89 y/o F with a PMH b/l LE edema, HTN, P Afib on AC , hypothyroidism, who presents with 1mo hx of worsening b/l LE edema. Cardiology consulted for LE edema b/l.     Edema/ Acute Diastolic CHF/ Valvular heart disease   - ECHO 10/11/21 showed mod MR, mod AS, mod TR, EF 55-60%, RV enlargement, MARY, PA systolic pressure of 46.3 mmHg   - Serum Pro-Brain Natriuretic Peptide: 2458 pg/mL (10-11-21 @ 16:40)  - LE edema appear to be more cellulitic than heart failure, euvolemic on exam, currently net neg 500 cc    - s/p lasix IV, now on PO, HCO3 34, would consider dc lasix   - ID following, unlikely infectious process at this time, monitoring off abx.   - Elevated LE and compression stockings if possible.   - Monitor strict I/Os, daily weight.     AFib  - rate controlled: HR: 65 (10-18 @ 05:34) (65 - 82)  - INR: 1.62, daily dosing coumadin for goal INR 2-3  - Monitor and replete Lytes. Keep K > 4 and Mg > 2

## 2021-10-18 NOTE — PROGRESS NOTE ADULT - ASSESSMENT
89 y/o F with a PMH b/l LE edema, HTN, P Afib on AC , hypothyroidism, who presents with 1mo hx of worsening b/l LE edema. Cardiology consulted for LE edema b/l.     Edema/ Acute Diastolic CHF/ Valvular heart disease   - ECHO 10/11/21 showed mod MR, mod AS, mod TR, EF 55-60%, RV enlargement, MARY, PA systolic pressure of 46.3 mmHg   - Serum Pro-Brain Natriuretic Peptide: 2458 pg/mL (10-11-21 @ 16:40)  - LE edema appear to be more cellulitic than heart failure, euvolemic on exam, currently net neg 500 cc    - s/p lasix IV, now on PO, HCO3 34, would consider dc lasix   - ID following, unlikely infectious process at this time, monitoring off abx.   - Elevated LE and compression stockings if possible.   - Monitor strict I/Os, daily weight.     AFib  - rate controlled: HR: 65 (10-18 @ 05:34) (65 - 82)  - INR: 1.62, daily dosing coumadin for goal INR 2-3  - Monitor and replete Lytes. Keep K > 4 and Mg > 2    Hypertension  - BP: 174/63 (10-18-21 @ 05:34) (117/60 - 174/63)  - Continue metoprolol  - consider starting losartan 25 daily for better BP control   - continue routine hemodynamics monitoring     - All other medical needs as per primary team.  - Other cardiovascular workup will depend on clinical course.  - Will continue to follow.    Melany [facility]  Nurse Practitioner - Cardiology   Spectra #[phone]/ [phone]

## 2021-10-18 NOTE — PROGRESS NOTE ADULT - ATTENDING COMMENTS
Patient is a 89 y/o F with a pmhx b/l LE edema, HTN, hypothyroidism, who presents with 1mo hx of worsening b/l LE edema. Endorses erythema and swelling of b/l LE  edema ,oozing wound with Volume overload, improving with IV diuresis.  Pt seen, examined, case & care plan d/w pt, residents at detail.   ID-Dr Larios -continue current care, On Isolation.  AM Labs   PO diet   PT Eval. --->HCPT, D/W Case management.  -D/W Son at detail, about  D/C  plan.  Total care time 45 minutes.

## 2021-10-19 ENCOUNTER — TRANSCRIPTION ENCOUNTER (OUTPATIENT)
Age: 86
End: 2021-10-19

## 2021-10-19 VITALS
OXYGEN SATURATION: 92 % | RESPIRATION RATE: 18 BRPM | HEART RATE: 77 BPM | DIASTOLIC BLOOD PRESSURE: 68 MMHG | SYSTOLIC BLOOD PRESSURE: 136 MMHG | TEMPERATURE: 98 F

## 2021-10-19 LAB
ANION GAP SERPL CALC-SCNC: 4 MMOL/L — LOW (ref 5–17)
APTT BLD: 40.6 SEC — HIGH (ref 27.5–35.5)
BUN SERPL-MCNC: 32 MG/DL — HIGH (ref 7–23)
CALCIUM SERPL-MCNC: 8.8 MG/DL — SIGNIFICANT CHANGE UP (ref 8.5–10.1)
CHLORIDE SERPL-SCNC: 105 MMOL/L — SIGNIFICANT CHANGE UP (ref 96–108)
CO2 SERPL-SCNC: 35 MMOL/L — HIGH (ref 22–31)
CREAT SERPL-MCNC: 0.94 MG/DL — SIGNIFICANT CHANGE UP (ref 0.5–1.3)
GLUCOSE SERPL-MCNC: 101 MG/DL — HIGH (ref 70–99)
HCT VFR BLD CALC: 39.2 % — SIGNIFICANT CHANGE UP (ref 34.5–45)
HCT VFR BLD CALC: 41.1 % — SIGNIFICANT CHANGE UP (ref 34.5–45)
HGB BLD-MCNC: 12.3 G/DL — SIGNIFICANT CHANGE UP (ref 11.5–15.5)
HGB BLD-MCNC: 13 G/DL — SIGNIFICANT CHANGE UP (ref 11.5–15.5)
INR BLD: 1.71 RATIO — HIGH (ref 0.88–1.16)
INR BLD: 1.78 RATIO — HIGH (ref 0.88–1.16)
MCHC RBC-ENTMCNC: 27.8 PG — SIGNIFICANT CHANGE UP (ref 27–34)
MCHC RBC-ENTMCNC: 28 PG — SIGNIFICANT CHANGE UP (ref 27–34)
MCHC RBC-ENTMCNC: 31.4 GM/DL — LOW (ref 32–36)
MCHC RBC-ENTMCNC: 31.6 GM/DL — LOW (ref 32–36)
MCV RBC AUTO: 88 FL — SIGNIFICANT CHANGE UP (ref 80–100)
MCV RBC AUTO: 89.3 FL — SIGNIFICANT CHANGE UP (ref 80–100)
NRBC # BLD: 0 /100 WBCS — SIGNIFICANT CHANGE UP (ref 0–0)
NRBC # BLD: 0 /100 WBCS — SIGNIFICANT CHANGE UP (ref 0–0)
PLATELET # BLD AUTO: 268 K/UL — SIGNIFICANT CHANGE UP (ref 150–400)
PLATELET # BLD AUTO: SIGNIFICANT CHANGE UP (ref 150–400)
POTASSIUM SERPL-MCNC: 3.6 MMOL/L — SIGNIFICANT CHANGE UP (ref 3.5–5.3)
POTASSIUM SERPL-SCNC: 3.6 MMOL/L — SIGNIFICANT CHANGE UP (ref 3.5–5.3)
PROTHROM AB SERPL-ACNC: 19.5 SEC — HIGH (ref 10.6–13.6)
PROTHROM AB SERPL-ACNC: 20.3 SEC — HIGH (ref 10.6–13.6)
RBC # BLD: 4.39 M/UL — SIGNIFICANT CHANGE UP (ref 3.8–5.2)
RBC # BLD: 4.67 M/UL — SIGNIFICANT CHANGE UP (ref 3.8–5.2)
RBC # FLD: 17.5 % — HIGH (ref 10.3–14.5)
RBC # FLD: 17.7 % — HIGH (ref 10.3–14.5)
SODIUM SERPL-SCNC: 144 MMOL/L — SIGNIFICANT CHANGE UP (ref 135–145)
WBC # BLD: 6.42 K/UL — SIGNIFICANT CHANGE UP (ref 3.8–10.5)
WBC # BLD: 6.42 K/UL — SIGNIFICANT CHANGE UP (ref 3.8–10.5)
WBC # FLD AUTO: 6.42 K/UL — SIGNIFICANT CHANGE UP (ref 3.8–10.5)
WBC # FLD AUTO: 6.42 K/UL — SIGNIFICANT CHANGE UP (ref 3.8–10.5)

## 2021-10-19 PROCEDURE — 86850 RBC ANTIBODY SCREEN: CPT

## 2021-10-19 PROCEDURE — 86769 SARS-COV-2 COVID-19 ANTIBODY: CPT

## 2021-10-19 PROCEDURE — 83550 IRON BINDING TEST: CPT

## 2021-10-19 PROCEDURE — 85027 COMPLETE CBC AUTOMATED: CPT

## 2021-10-19 PROCEDURE — 85610 PROTHROMBIN TIME: CPT

## 2021-10-19 PROCEDURE — 85730 THROMBOPLASTIN TIME PARTIAL: CPT

## 2021-10-19 PROCEDURE — 36415 COLL VENOUS BLD VENIPUNCTURE: CPT

## 2021-10-19 PROCEDURE — 85025 COMPLETE CBC W/AUTO DIFF WBC: CPT

## 2021-10-19 PROCEDURE — 82746 ASSAY OF FOLIC ACID SERUM: CPT

## 2021-10-19 PROCEDURE — 83880 ASSAY OF NATRIURETIC PEPTIDE: CPT

## 2021-10-19 PROCEDURE — 85014 HEMATOCRIT: CPT

## 2021-10-19 PROCEDURE — 80048 BASIC METABOLIC PNL TOTAL CA: CPT

## 2021-10-19 PROCEDURE — 87635 SARS-COV-2 COVID-19 AMP PRB: CPT

## 2021-10-19 PROCEDURE — 80053 COMPREHEN METABOLIC PANEL: CPT

## 2021-10-19 PROCEDURE — 97162 PT EVAL MOD COMPLEX 30 MIN: CPT

## 2021-10-19 PROCEDURE — 86900 BLOOD TYPING SEROLOGIC ABO: CPT

## 2021-10-19 PROCEDURE — 85018 HEMOGLOBIN: CPT

## 2021-10-19 PROCEDURE — 71045 X-RAY EXAM CHEST 1 VIEW: CPT

## 2021-10-19 PROCEDURE — 99285 EMERGENCY DEPT VISIT HI MDM: CPT

## 2021-10-19 PROCEDURE — 97110 THERAPEUTIC EXERCISES: CPT

## 2021-10-19 PROCEDURE — 96367 TX/PROPH/DG ADDL SEQ IV INF: CPT

## 2021-10-19 PROCEDURE — U0005: CPT

## 2021-10-19 PROCEDURE — 96365 THER/PROPH/DIAG IV INF INIT: CPT

## 2021-10-19 PROCEDURE — 93005 ELECTROCARDIOGRAM TRACING: CPT

## 2021-10-19 PROCEDURE — 93306 TTE W/DOPPLER COMPLETE: CPT

## 2021-10-19 PROCEDURE — 82607 VITAMIN B-12: CPT

## 2021-10-19 PROCEDURE — 93970 EXTREMITY STUDY: CPT

## 2021-10-19 PROCEDURE — 97530 THERAPEUTIC ACTIVITIES: CPT

## 2021-10-19 PROCEDURE — 83540 ASSAY OF IRON: CPT

## 2021-10-19 PROCEDURE — 86901 BLOOD TYPING SEROLOGIC RH(D): CPT

## 2021-10-19 PROCEDURE — 87040 BLOOD CULTURE FOR BACTERIA: CPT

## 2021-10-19 PROCEDURE — 82728 ASSAY OF FERRITIN: CPT

## 2021-10-19 PROCEDURE — 96375 TX/PRO/DX INJ NEW DRUG ADDON: CPT

## 2021-10-19 PROCEDURE — U0003: CPT

## 2021-10-19 PROCEDURE — 97116 GAIT TRAINING THERAPY: CPT

## 2021-10-19 PROCEDURE — 83605 ASSAY OF LACTIC ACID: CPT

## 2021-10-19 PROCEDURE — 99233 SBSQ HOSP IP/OBS HIGH 50: CPT

## 2021-10-19 PROCEDURE — 83735 ASSAY OF MAGNESIUM: CPT

## 2021-10-19 PROCEDURE — 97112 NEUROMUSCULAR REEDUCATION: CPT

## 2021-10-19 RX ORDER — FUROSEMIDE 40 MG
1 TABLET ORAL
Qty: 0 | Refills: 0 | DISCHARGE

## 2021-10-19 RX ORDER — SOD,AMMONIUM,POTASSIUM LACTATE
1 CREAM (GRAM) TOPICAL
Qty: 0 | Refills: 0 | DISCHARGE
Start: 2021-10-19

## 2021-10-19 RX ORDER — WARFARIN SODIUM 2.5 MG/1
2 TABLET ORAL
Qty: 0 | Refills: 0 | DISCHARGE

## 2021-10-19 RX ORDER — FUROSEMIDE 40 MG
1 TABLET ORAL
Qty: 0 | Refills: 0 | DISCHARGE
Start: 2021-10-19 | End: 2021-11-17

## 2021-10-19 RX ORDER — POTASSIUM CHLORIDE 20 MEQ
40 PACKET (EA) ORAL ONCE
Refills: 0 | Status: COMPLETED | OUTPATIENT
Start: 2021-10-19 | End: 2021-10-19

## 2021-10-19 RX ORDER — SENNA PLUS 8.6 MG/1
2 TABLET ORAL
Qty: 0 | Refills: 0 | DISCHARGE
Start: 2021-10-19

## 2021-10-19 RX ORDER — WARFARIN SODIUM 2.5 MG/1
5 TABLET ORAL ONCE
Refills: 0 | Status: COMPLETED | OUTPATIENT
Start: 2021-10-19 | End: 2021-10-19

## 2021-10-19 RX ORDER — POTASSIUM CHLORIDE 20 MEQ
1 PACKET (EA) ORAL
Qty: 0 | Refills: 0 | DISCHARGE

## 2021-10-19 RX ORDER — FUROSEMIDE 40 MG
1 TABLET ORAL
Qty: 30 | Refills: 0
Start: 2021-10-19 | End: 2021-11-17

## 2021-10-19 RX ORDER — POTASSIUM CHLORIDE 20 MEQ
2 PACKET (EA) ORAL
Qty: 60 | Refills: 0
Start: 2021-10-19 | End: 2021-11-17

## 2021-10-19 RX ADMIN — Medication 20 MILLIEQUIVALENT(S): at 12:40

## 2021-10-19 RX ADMIN — Medication 1 APPLICATION(S): at 18:02

## 2021-10-19 RX ADMIN — Medication 1 APPLICATION(S): at 05:28

## 2021-10-19 RX ADMIN — Medication 40 MILLIGRAM(S): at 05:29

## 2021-10-19 RX ADMIN — FAMOTIDINE 20 MILLIGRAM(S): 10 INJECTION INTRAVENOUS at 12:40

## 2021-10-19 RX ADMIN — Medication 40 MILLIEQUIVALENT(S): at 14:51

## 2021-10-19 RX ADMIN — Medication 20 MILLIGRAM(S): at 05:29

## 2021-10-19 RX ADMIN — Medication 50 MICROGRAM(S): at 05:29

## 2021-10-19 RX ADMIN — Medication 50 MILLIGRAM(S): at 05:29

## 2021-10-19 RX ADMIN — GABAPENTIN 100 MILLIGRAM(S): 400 CAPSULE ORAL at 13:23

## 2021-10-19 RX ADMIN — WARFARIN SODIUM 5 MILLIGRAM(S): 2.5 TABLET ORAL at 18:01

## 2021-10-19 RX ADMIN — POLYETHYLENE GLYCOL 3350 17 GRAM(S): 17 POWDER, FOR SOLUTION ORAL at 05:29

## 2021-10-19 RX ADMIN — GABAPENTIN 100 MILLIGRAM(S): 400 CAPSULE ORAL at 05:29

## 2021-10-19 NOTE — DISCHARGE NOTE NURSING/CASE MANAGEMENT/SOCIAL WORK - PATIENT PORTAL LINK FT
You can access the FollowMyHealth Patient Portal offered by [facility] by registering at the following website: [email or web address]. By joining [facility]’s FollowMyHealth portal, you will also be able to view your health information using other applications (apps) compatible with our system.

## 2021-10-19 NOTE — PROGRESS NOTE ADULT - REASON FOR ADMISSION
b/l LE cellulitis

## 2021-10-19 NOTE — PROGRESS NOTE ADULT - PROBLEM SELECTOR PLAN 3
H/O P A Fib, stable HR stable  INR subtherapeutic -> warfarin 5 mg daily . goal INR therapeutic 2-3  - c/w Metoprolol T 50 mg qd  - Daily INR

## 2021-10-19 NOTE — PROGRESS NOTE ADULT - SUBJECTIVE AND OBJECTIVE BOX
Patient is a 89y old  Female who presents with a chief complaint of b/l LE cellulitis (18 Oct 2021 13:14)      HPI:  Patient is a 89 y/o F with a pmhx b/l LE edema, HTN,P Afib on AC , hypothyroidism, who presents with 1mo hx of worsening b/l LE edema. Endorses erythema and swelling b/l. Per patient, her doctor ?neurologist, told her to stop Lasix recently so pt has not been taking. States pain patches, water pills and antibiotics as an outpatient were unhelpful in relieving these symptoms. States she has had symptoms for 6 months but recently worsened. Denies chest pain, shortness of breath, palpitations, fevers, chills, nausea, vomiting. Denies having in past.    IN THE ED:  Temp 98.8 F, HR 92, /72, RR 16, SpO2 96%  S/P Zosyn 3.375g x1, Vanco 1g x1, lasix 40 mg IV x1   EKG: left axis deviation, LBBB   Labs significant for WBC 5.66, BNP 2458, PT 31.9, INR 2.87, PTT 48, K 3.4, CO 32, anion gap 3, albumin 2.8, AST 57, eGFR 43  Imaging: CXR with minimal blunting of R base laterally    (11 Oct 2021 18:04)      INTERVAL HPI:  10/12/21 - Patient was seen and examined at bedside. No acute events overnight. Patient endorses improvement in the rubor/erythema of her LE b/l but endorses continued pain in LE b/l. Patient endorses 1 year hx of weight loss (35 pounds) due to decreased appetite. Patient denies headache, fever, blurry/double vision, tinnitus, dysphagia, cough, SoB, abdominal pain, CP, palpitations, n/v/c/d. Off Abx as per ID, INR -elevated   10/13/21 - Patient was seen and examined at bedside. Patient endorses no improvement in the swelling and erythema of LE b/l. Patient endorses a pressure sensation in both LE and has an "odd" sensation in her left heel. Patient endorses eating well without dysphagia. Patient denies BM the past 2 days and declines bowel regimen for now. Patient producing clear urine in vac. Endorses worsening of hearing in her left ear, worse since yesterday. Denies headache, fever, blurry/double vision, tinnitus, cough, SoB, abdominal pain, CP, palpitations, n/v/d.  Dtr Cell- [phone], On IV Lasix & IV Rocephin with Po steroids.  10/14/21 - Patient was seen and examined at bedside. Patient endorses significant improvement in swelling and erythema of LE b/l. Patient endorses b/l tingling sensation in LE. Patient denies BM last 3 days and is urinating well in vac. Endorses continued difficulty hearing in left ear. Patient endorses desire to receive vaccine for COVID-19. Discussed with patient to f/up hx of weight loss and difficulty hearing in L ear with PCP outpatient. Patient denies headache, fever, blurry/double vision, tinnitus, cough, dysphagia, SoB, CP, palpitations, abdominal pain, n/v/c/d, melena/hematochezia, dysuria, pyuria/hematuria, change in urinary frequency, confusion, dizziness, or weakness. On IV Lasix  Pt had exposure to  COVID + person at hospital, d/w Dr Larios , pt & Dtr, plan for MAB infusions.  10/15/21 - Patient seen and examined at bedside, S/P MAB Rx for COVID Exposure  over all improving. Additional Lasix 20 IVP ordered for lower Ext Edema . Will increase to Lasix 40mg IV BID starting later today. Denies any fevers, chills, cp, sob, abdominal pain, diarrhea, constipation  10/16/21 - Patient seen and examined at bedside. Patient reports improvement in leg swelling b/l with less redness. Patient endorses constipation w/o BM in 3 days open to starting miralax/senna. Endorses reduced hearing in ears b/l. Patient denies headache, fever, chills, blurry/double vision, tinnitus, cough, dysphagia, SoB, CP, palpitations, abdominal pain, n/v/d, dysuria, pyuria/hematuria, change in urinary frequency, dizziness, or weakness.   10/17/21 - Patient is seen and examined at bedside. Patient endorses significant improvement in LE swelling and erythema stating she has been urinating well. Patient denies new symptoms. Patient endorses BM this AM that was normal without hematochezia or melena. Patient denies headache, fever, chills, blurry/double vision, tinnitus, cough, dysphagia, SoB, CP, palpitations, abdominal pain, n/v/c/d, melena/hematochezia, dysuria, pyuria/hematuria, change in urinary frequency, confusion, dizziness, or weakness.  INR subtherapeutic.   10/18/21 - Patient seen and examined at bedside. No acute events overnight. Patient states that her LE edema and erythema are greatly improved. Patient is urinating well on vac without dysuria, changes in urinary frequency or hematuria/pyuria. Patient states that her mouth feels very dry and endorses a stiffness/discomfort in her groins of her legs b/l. Patient endorses BM this AM that was regular without hematochezia/melena. Patient is eating well denying dysphagia or cough while eating. Patient states that she has been limiting her PO fluid intake. Patient denies headache, fever, chills, blurry/double vision, tinnitus, cough, SoB, CP, palpitations, abdominal pain, n/v/c/d, confusion, dizziness, or weakness. INR subtherapeutic -> given 5mg warfarin today.  10/19/21 - Patient seen and examined at bedside. No acute events overnight. Patient reports improvement in her LE edema and erythema stating that she the lac-hydran has been working well. Patient states that she feels tingling sensations down LE b/l. Patient endorses 3 BM this yesterday that were regular without hematochezia/melena. Patient is urinating well in vac without dysuria, changes in urinary frequency or hematuria/pyuria. Patient is eating well denying dysphagia or cough while eating. Patient denies headache, fever, chills, blurry/double vision, tinnitus, cough, SoB, CP, palpitations, abdominal pain, n/v/c/d, confusion, dizziness, or weakness.     OVERNIGHT EVENTS:  none    Home Medications:  gabapentin 100 mg oral capsule: 1 cap(s) orally 3 times a day (13 Oct 2021 11:58)  Jantoven 1 mg oral tablet: 2 tab(s) orally Monday, Wednesday, and Friday (13 Oct 2021 11:58)  Jantoven 4 mg oral tablet: 1 tab(s) orally once a day (13 Oct 2021 11:58)  Klor-Con 10 mEq oral tablet, extended release: 1 tab(s) orally once a day (13 Oct 2021 11:58)  Lasix 40 mg oral tablet: 1 tab(s) orally once a day (13 Oct 2021 11:58)  lidocaine 5% patch: Apply to affected areas (13 Oct 2021 11:58)  Lopressor 50 mg oral tablet: 1 tab(s) orally once a day (13 Oct 2021 11:58)  Synthroid 50 mcg (0.05 mg) oral tablet: 1 tab(s) orally once a day (13 Oct 2021 11:58)  Vitamin B6 50 mg oral tablet: 1 tab(s) orally once a day (13 Oct 2021 11:58)      MEDICATIONS  (STANDING):  ammonium lactate 12% Lotion 1 Application(s) Topical two times a day  famotidine    Tablet 20 milliGRAM(s) Oral daily  furosemide   Injectable 40 milliGRAM(s) IV Push daily  gabapentin 100 milliGRAM(s) Oral three times a day  levothyroxine 50 MICROGram(s) Oral daily  metoprolol tartrate 50 milliGRAM(s) Oral daily  polyethylene glycol 3350 17 Gram(s) Oral two times a day  potassium chloride    Tablet ER 20 milliEquivalent(s) Oral daily  predniSONE   Tablet 20 milliGRAM(s) Oral daily  senna 2 Tablet(s) Oral at bedtime    MEDICATIONS  (PRN):      No Known Allergies      Social History:  Patient lives alone. Denies tobacco use or etoh use. (11 Oct 2021 18:04)      REVIEW OF SYSTEMS:  CONSTITUTIONAL: No fever, No chills, No fatigue, No myalgia, No Body ache, No Weakness  EYES: No eye pain,  No visual disturbances, No discharge, NO Redness  ENMT: [ x ] reduced hearing in left ear. No ear pain, No nose bleed, No vertigo; No sinus pain, NO throat pain, No Congestion  RESPIRATORY: No cough, NO wheezing, No  hemoptysis, NO  shortness of breath  CARDIOVASCULAR: No chest pain, palpitations endorses LE edema b/l  GASTROINTESTINAL: No abdominal pain, NO epigastric pain. No nausea, No vomiting; No diarrhea, no constipation. [ x ] BMx3 regular  GENITOURINARY: No dysuria, No frequency, No urgency, No hematuria, NO incontinence  NEUROLOGICAL: No headaches, No dizziness, No numbness, Endorses tingling in LE b/l, No tremors, No weakness  EXT: No Swelling, No Pain, endorses edema b/l LE  SKIN: no erythema/rash  MUSCULOSKELETAL: No joint pain ,No Jt swelling; No muscle pain, No back pain, No extremity pain  PSYCHIATRIC: No depression,  No anxiety,  No mood swings ,No difficulty sleeping at night  PAIN SCALE: [ x ] None  [  ] Other-  ROS Unable to obtain due to - [  ] Dementia  [  ] Lethargy [  ] Drowsy [  ] Sedated [  ] non verbal  REST OF REVIEW Of SYSTEM - [x  ] Normal     Vital Signs Last 24 Hrs  T(C): 36.4 (19 Oct 2021 03:52), Max: 36.8 (18 Oct 2021 20:15)  T(F): 97.5 (19 Oct 2021 03:52), Max: 98.2 (18 Oct 2021 20:15)  HR: 83 (19 Oct 2021 03:52) (74 - 83)  BP: 152/74 (19 Oct 2021 03:52) (131/61 - 153/82)  BP(mean): --  RR: 17 (19 Oct 2021 03:52) (17 - 17)  SpO2: 93% (19 Oct 2021 03:52) (93% - 96%)    CAPILLARY BLOOD GLUCOSE          I&O's Summary    18 Oct 2021 07:01  -  19 Oct 2021 07:00  --------------------------------------------------------  IN: 0 mL / OUT: 600 mL / NET: -600 mL      PHYSICAL EXAM:  GENERAL:  [ x ] NAD , [ x ] well appearing, [  ] Agitated, [  ] Drowsy,  [  ] Lethargy, [  ] confused   HEAD:  [ x ] Normal, [  ] Other  EYES:  [ x ] EOMI, [  ] PERRLA, [ x ] conjunctiva and sclera clear normal, [  ] Other,  [  ] Pallor,[  ] Discharge  ENMT:  [ x ] Normal, [ x ] Moist mucous membranes, [ x ] Good dentition, [ x ] No Thrush  NECK:  [ x ] Supple, [x ] No JVD, [ x  ] Normal thyroid, [  ] Lymphadenopathy [  ] Other  CHEST/LUNG:  [x  ] Clear to auscultation bilaterally, [x  ] Breath Sounds equal B/L, [  ] poor effort  [x  ] No rales, [x  ] No rhonchi  [ x ]  No wheezing,   HEART:  [ x  ] Regular rate [  ] tachycardia, [  ] Bradycardia,  [ x ] irregular  [ x ] 2/6 systolic murmer appreciated at upper sternal border [  ] PPM in place (Mfr:  )  ABDOMEN:  [ x ] Soft, [ x ] Nontender, [ x ] Nondistended, [ x ] No mass, [ x ] Bowel sounds present, [  ] obese  NERVOUS SYSTEM:  [ x] Alert & Oriented X3, [ x ] Nonfocal  [  ] Confusion  [  ] Encephalopathic [  ] Sedated [  ] Unable to assess, [  ] Dementia [  ] Other-  EXTREMITIES: [x  ] 2+ Peripheral Pulses, No clubbing, No cyanosis,  [ x ] 2 +pitting  edema B/L lower EXT. [ x ] no erythema/rubor in legs b/l  Ext  [  ] wound, stasis skin   LYMPH: No lymphadenopathy noted  SKIN:  [ x ] No rashes or lesions, [  ] Pressure Ulcers, [ x ] ecchymosis, [  ] Skin Tears, [ x ] Other- dry skin lower Ext    DIET: Diet, Regular:   DASH/TLC Sodium & Cholesterol Restricted  1200mL Fluid Restriction ([id number]) (10-11-21 @ 19:28)      LABS:                        13.1   6.37  )-----------( 289      ( 18 Oct 2021 09:02 )             41.4     18 Oct 2021 09:02    141    |  103    |  30     ----------------------------<  84     3.9     |  34     |  1.00     Ca    9.4        18 Oct 2021 09:02      PT/INR - ( 18 Oct 2021 09:02 )   PT: 18.5 sec;   INR: 1.62 ratio         PTT - ( 18 Oct 2021 09:02 )  PTT:35.5 sec               Anemia Panel:  Iron Total, Serum: 27 ug/dL (10-17-21 @ 14:02)  Iron - Total Binding Capacity.: 400 ug/dL (10-17-21 @ 14:02)  Vitamin B12, Serum: 509 pg/mL (10-17-21 @ 14:01)  Folate, Serum: 10.1 ng/mL (10-17-21 @ 14:01)  Ferritin, Serum: 25 ng/mL (10-17-21 @ 14:01)      Thyroid Panel:                RADIOLOGY & ADDITIONAL TESTS:      HEALTH ISSUES - PROBLEM Dx:  Lower extremity edema    Venous stasis dermatitis of lower extremity    Paroxysmal atrial fibrillation    Hypertension    Hypothyroid    Constipation    DVT prophylaxis    Cellulitis    Exposure to confirmed case of COVID-19    Mild anemia          Consultant(s) Notes Reviewed:  [ x ] YES     Care Discussed with [ x ] Consultants, [ x ] Patient, [  ] Family, [  ] HCP, [  ] , [  ] Social Service, [ x ] RN, [  ] Physical Therapy, [  ] Palliative Care Team  DVT PPX: [  ] Lovenox, [  ] SC Heparin, [ x ] Coumadin, [  ] Xarelto, [  ] Eliquis, [  ] Pradaxa, [  ] IV Heparin drip, [  ] SCD, [  ] Ambulation, [  ] Contraindicated 2/2 GI Bleed, [  ] Contraindicated 2/2  Bleed, [  ] Contraindicated 2/2 Brain Bleed  Advanced Directive: [ x ] None, [  ] DNR/DNI Patient is a 89y old  Female who presents with a chief complaint of b/l LE cellulitis (18 Oct 2021 13:14)      HPI:  Patient is a 89 y/o F with a pmhx b/l LE edema, HTN,P Afib on AC , hypothyroidism, who presents with 1mo hx of worsening b/l LE edema. Endorses erythema and swelling b/l. Per patient, her doctor ?neurologist, told her to stop Lasix recently so pt has not been taking. States pain patches, water pills and antibiotics as an outpatient were unhelpful in relieving these symptoms. States she has had symptoms for 6 months but recently worsened. Denies chest pain, shortness of breath, palpitations, fevers, chills, nausea, vomiting. Denies having in past.    IN THE ED:  Temp 98.8 F, HR 92, /72, RR 16, SpO2 96%  S/P Zosyn 3.375g x1, Vanco 1g x1, lasix 40 mg IV x1   EKG: left axis deviation, LBBB   Labs significant for WBC 5.66, BNP 2458, PT 31.9, INR 2.87, PTT 48, K 3.4, CO 32, anion gap 3, albumin 2.8, AST 57, eGFR 43  Imaging: CXR with minimal blunting of R base laterally    (11 Oct 2021 18:04)      INTERVAL HPI:  10/12/21 - Patient was seen and examined at bedside. No acute events overnight. Patient endorses improvement in the rubor/erythema of her LE b/l but endorses continued pain in LE b/l. Patient endorses 1 year hx of weight loss (35 pounds) due to decreased appetite. Patient denies headache, fever, blurry/double vision, tinnitus, dysphagia, cough, SoB, abdominal pain, CP, palpitations, n/v/c/d. Off Abx as per ID, INR -elevated   10/13/21 - Patient was seen and examined at bedside. Patient endorses no improvement in the swelling and erythema of LE b/l. Patient endorses a pressure sensation in both LE and has an "odd" sensation in her left heel. Patient endorses eating well without dysphagia. Patient denies BM the past 2 days and declines bowel regimen for now. Patient producing clear urine in vac. Endorses worsening of hearing in her left ear, worse since yesterday. Denies headache, fever, blurry/double vision, tinnitus, cough, SoB, abdominal pain, CP, palpitations, n/v/d.  Dtr Cell- [phone], On IV Lasix & IV Rocephin with Po steroids.  10/14/21 - Patient was seen and examined at bedside. Patient endorses significant improvement in swelling and erythema of LE b/l. Patient endorses b/l tingling sensation in LE. Patient denies BM last 3 days and is urinating well in vac. Endorses continued difficulty hearing in left ear. Patient endorses desire to receive vaccine for COVID-19. Discussed with patient to f/up hx of weight loss and difficulty hearing in L ear with PCP outpatient. Patient denies headache, fever, blurry/double vision, tinnitus, cough, dysphagia, SoB, CP, palpitations, abdominal pain, n/v/c/d, melena/hematochezia, dysuria, pyuria/hematuria, change in urinary frequency, confusion, dizziness, or weakness. On IV Lasix  Pt had exposure to  COVID + person at hospital, d/w Dr Larios , pt & Dtr, plan for MAB infusions.  10/15/21 - Patient seen and examined at bedside, S/P MAB Rx for COVID Exposure  over all improving. Additional Lasix 20 IVP ordered for lower Ext Edema . Will increase to Lasix 40mg IV BID starting later today. Denies any fevers, chills, cp, sob, abdominal pain, diarrhea, constipation  10/16/21 - Patient seen and examined at bedside. Patient reports improvement in leg swelling b/l with less redness. Patient endorses constipation w/o BM in 3 days open to starting miralax/senna. Endorses reduced hearing in ears b/l. Patient denies headache, fever, chills, blurry/double vision, tinnitus, cough, dysphagia, SoB, CP, palpitations, abdominal pain, n/v/d, dysuria, pyuria/hematuria, change in urinary frequency, dizziness, or weakness.   10/17/21 - Patient is seen and examined at bedside. Patient endorses significant improvement in LE swelling and erythema stating she has been urinating well. Patient denies new symptoms. Patient endorses BM this AM that was normal without hematochezia or melena. Patient denies headache, fever, chills, blurry/double vision, tinnitus, cough, dysphagia, SoB, CP, palpitations, abdominal pain, n/v/c/d, melena/hematochezia, dysuria, pyuria/hematuria, change in urinary frequency, confusion, dizziness, or weakness.  INR subtherapeutic.   10/18/21 - Patient seen and examined at bedside. No acute events overnight. Patient states that her LE edema and erythema are greatly improved. Patient is urinating well on vac without dysuria, changes in urinary frequency or hematuria/pyuria. Patient states that her mouth feels very dry and endorses a stiffness/discomfort in her groins of her legs b/l. Patient endorses BM this AM that was regular without hematochezia/melena. Patient is eating well denying dysphagia or cough while eating. Patient states that she has been limiting her PO fluid intake. Patient denies headache, fever, chills, blurry/double vision, tinnitus, cough, SoB, CP, palpitations, abdominal pain, n/v/c/d, confusion, dizziness, or weakness. INR subtherapeutic -> given 5mg warfarin today.  10/19/21 - Patient seen and examined at bedside. No acute events overnight. Patient reports improvement in her LE edema and erythema stating that she the lac-hydran has been working well. Patient states that she feels tingling sensations down LE b/l. Patient endorses 3 BM this yesterday that were regular without hematochezia/melena. Patient is urinating well in vac without dysuria, changes in urinary frequency or hematuria/pyuria. Patient is eating well denying dysphagia or cough while eating. Patient denies headache, fever, chills, blurry/double vision, tinnitus, cough, SoB, CP, palpitations, abdominal pain, n/v/c/d, confusion, dizziness, or weakness.     OVERNIGHT EVENTS:  none    Home Medications:  gabapentin 100 mg oral capsule: 1 cap(s) orally 3 times a day (13 Oct 2021 11:58)  Jantoven 1 mg oral tablet: 2 tab(s) orally Monday, Wednesday, and Friday (13 Oct 2021 11:58)  Jantoven 4 mg oral tablet: 1 tab(s) orally once a day (13 Oct 2021 11:58)  Klor-Con 10 mEq oral tablet, extended release: 1 tab(s) orally once a day (13 Oct 2021 11:58)  Lasix 40 mg oral tablet: 1 tab(s) orally once a day (13 Oct 2021 11:58)  lidocaine 5% patch: Apply to affected areas (13 Oct 2021 11:58)  Lopressor 50 mg oral tablet: 1 tab(s) orally once a day (13 Oct 2021 11:58)  Synthroid 50 mcg (0.05 mg) oral tablet: 1 tab(s) orally once a day (13 Oct 2021 11:58)  Vitamin B6 50 mg oral tablet: 1 tab(s) orally once a day (13 Oct 2021 11:58)      MEDICATIONS  (STANDING):  ammonium lactate 12% Lotion 1 Application(s) Topical two times a day  famotidine    Tablet 20 milliGRAM(s) Oral daily  furosemide   Injectable 40 milliGRAM(s) IV Push daily  gabapentin 100 milliGRAM(s) Oral three times a day  levothyroxine 50 MICROGram(s) Oral daily  metoprolol tartrate 50 milliGRAM(s) Oral daily  polyethylene glycol 3350 17 Gram(s) Oral two times a day  potassium chloride    Tablet ER 20 milliEquivalent(s) Oral daily  predniSONE   Tablet 20 milliGRAM(s) Oral daily  senna 2 Tablet(s) Oral at bedtime    MEDICATIONS  (PRN):      No Known Allergies      Social History:  Patient lives alone. Denies tobacco use or etoh use. (11 Oct 2021 18:04)      REVIEW OF SYSTEMS:  CONSTITUTIONAL: No fever, No chills, No fatigue, No myalgia, No Body ache, No Weakness  EYES: No eye pain,  No visual disturbances, No discharge, NO Redness  ENMT: [ x ] reduced hearing in left ear. No ear pain, No nose bleed, No vertigo; No sinus pain, NO throat pain, No Congestion  RESPIRATORY: No cough, NO wheezing, No  hemoptysis, NO  shortness of breath  CARDIOVASCULAR: No chest pain, palpitations endorses LE edema b/l  GASTROINTESTINAL: No abdominal pain, NO epigastric pain. No nausea, No vomiting; No diarrhea, no constipation. [ x ] BMx3 regular  GENITOURINARY: No dysuria, No frequency, No urgency, No hematuria, NO incontinence  NEUROLOGICAL: No headaches, No dizziness, No numbness, Endorses tingling in LE b/l, No tremors, No weakness  EXT: No Swelling, No Pain, endorses edema b/l LE  SKIN: no erythema/rash  MUSCULOSKELETAL: No joint pain ,No Jt swelling; No muscle pain, No back pain, No extremity pain  PSYCHIATRIC: No depression,  No anxiety,  No mood swings ,No difficulty sleeping at night  PAIN SCALE: [ x ] None  [  ] Other-  ROS Unable to obtain due to - [  ] Dementia  [  ] Lethargy [  ] Drowsy [  ] Sedated [  ] non verbal  REST OF REVIEW Of SYSTEM - [x  ] Normal     Vital Signs Last 24 Hrs  T(C): 36.4 (19 Oct 2021 03:52), Max: 36.8 (18 Oct 2021 20:15)  T(F): 97.5 (19 Oct 2021 03:52), Max: 98.2 (18 Oct 2021 20:15)  HR: 83 (19 Oct 2021 03:52) (74 - 83)  BP: 152/74 (19 Oct 2021 03:52) (131/61 - 153/82)  BP(mean): --  RR: 17 (19 Oct 2021 03:52) (17 - 17)  SpO2: 93% (19 Oct 2021 03:52) (93% - 96%)    CAPILLARY BLOOD GLUCOSE          I&O's Summary    18 Oct 2021 07:01  -  19 Oct 2021 07:00  --------------------------------------------------------  IN: 0 mL / OUT: 600 mL / NET: -600 mL      PHYSICAL EXAM:  GENERAL:  [ x ] NAD , [ x ] well appearing, [  ] Agitated, [  ] Drowsy,  [  ] Lethargy, [  ] confused   HEAD:  [ x ] Normal, [  ] Other  EYES:  [ x ] EOMI, [  ] PERRLA, [ x ] conjunctiva and sclera clear normal, [  ] Other,  [  ] Pallor,[  ] Discharge  ENMT:  [ x ] Normal, [ x ] Moist mucous membranes, [ x ] Good dentition, [ x ] No Thrush  NECK:  [ x ] Supple, [x ] No JVD, [ x  ] Normal thyroid, [  ] Lymphadenopathy [  ] Other  CHEST/LUNG:  [x  ] Clear to auscultation bilaterally, [x  ] Breath Sounds equal B/L, [  ] poor effort  [x  ] No rales, [x  ] No rhonchi  [ x ]  No wheezing,   HEART:  [ x  ] Regular rate [  ] tachycardia, [  ] Bradycardia,  [ x ] irregular  [ x ] 2/6 systolic murmer appreciated at upper sternal border [  ] PPM in place (Mfr:  )  ABDOMEN:  [ x ] Soft, [ x ] Nontender, [ x ] Nondistended, [ x ] No mass, [ x ] Bowel sounds present, [  ] obese  NERVOUS SYSTEM:  [ x] Alert & Oriented X3, [ x ] Nonfocal  [  ] Confusion  [  ] Encephalopathic [  ] Sedated [  ] Unable to assess, [  ] Dementia [  ] Other-  EXTREMITIES: [x  ] 2+ Peripheral Pulses, No clubbing, No cyanosis,  [ x ] 2 +pitting  edema B/L lower EXT. [ x ] no erythema/rubor in legs b/l  Ext  [  ] wound, stasis skin   LYMPH: No lymphadenopathy noted  SKIN:  [ x ] No rashes or lesions, [  ] Pressure Ulcers, [ x ] ecchymosis, [  ] Skin Tears, [ x ] Other- dry skin lower Ext    DIET: Diet, Regular:   DASH/TLC Sodium & Cholesterol Restricted  1200mL Fluid Restriction ([id number]) (10-11-21 @ 19:28)      LABS:                        13.1   6.37  )-----------( 289      ( 18 Oct 2021 09:02 )             41.4     18 Oct 2021 09:02    141    |  103    |  30     ----------------------------<  84     3.9     |  34     |  1.00     Ca    9.4        18 Oct 2021 09:02      PT/INR - ( 18 Oct 2021 09:02 )   PT: 18.5 sec;   INR: 1.62 ratio         PTT - ( 18 Oct 2021 09:02 )  PTT:35.5 sec         Anemia Panel:  Iron Total, Serum: 27 ug/dL (10-17-21 @ 14:02)  Iron - Total Binding Capacity.: 400 ug/dL (10-17-21 @ 14:02)  Vitamin B12, Serum: 509 pg/mL (10-17-21 @ 14:01)  Folate, Serum: 10.1 ng/mL (10-17-21 @ 14:01)  Ferritin, Serum: 25 ng/mL (10-17-21 @ 14:01)      Thyroid Panel:        RADIOLOGY & ADDITIONAL TESTS: none      HEALTH ISSUES - PROBLEM Dx:  Lower extremity edema    Venous stasis dermatitis of lower extremity    Paroxysmal atrial fibrillation    Hypertension    Hypothyroid    Constipation    DVT prophylaxis    Cellulitis    Exposure to confirmed case of COVID-19    Mild anemia      Consultant(s) Notes Reviewed:  [ x ] YES     Care Discussed with [ x ] Consultants, [ x ] Patient, [  ] Family, [  ] HCP, [  ] , [  ] Social Service, [ x ] RN, [  ] Physical Therapy, [  ] Palliative Care Team  DVT PPX: [  ] Lovenox, [  ] SC Heparin, [ x ] Coumadin, [  ] Xarelto, [  ] Eliquis, [  ] Pradaxa, [  ] IV Heparin drip, [  ] SCD, [  ] Ambulation, [  ] Contraindicated 2/2 GI Bleed, [  ] Contraindicated 2/2  Bleed, [  ] Contraindicated 2/2 Brain Bleed  Advanced Directive: [ x ] None, [  ] DNR/DNI

## 2021-10-19 NOTE — PROGRESS NOTE ADULT - PROBLEM SELECTOR PLAN 5
D/W Floor RN Manager of floor & Director of Nursing   - Repeat COVID PCR negative x2  - s/p MAB infusion x 1 given on 10/14/21 w/ isolation

## 2021-10-19 NOTE — PROGRESS NOTE ADULT - PROBLEM SELECTOR PLAN 1
- Chronic Venous stasis dermatitis with blisters oozing on admit likely 2/2 fluid overload from med non-compliance.   - Erythema/rubor resolved, swelling improved  - TTE: LVEF 55-60 | moderate TR/MR | RV/RA enlargement  - decrease dose to Lasix 40 mg IVP qd  - blood cultures NGTD  - US doppler (-)  - c/w gabapentin 100mg tid  - s/p ceftriaxone IV 5 days  - c/w prednisone for now -> upon dc can transition to prednisone 20mg PO daily with last day 10/22  - consider compression stockings in future  - c/w Lac-hydran BID on LE  - ID DR Larios, d/w -stable, improving, c/w prednisone  - Cardiology following, recs appreciated - Chronic Venous stasis dermatitis with blisters oozing on admit likely 2/2 fluid overload from med non-compliance.   - Erythema/rubor resolved, swelling improved  - TTE: LVEF 55-60 | moderate TR/MR | RV/RA enlargement  - decrease dose to Lasix 40 mg IVP qd  - blood cultures NGTD  - US doppler (-)  - c/w gabapentin 100mg tid  - s/p ceftriaxone IV 5 days completed   - c/w prednisone for now -> upon dc can transition to prednisone 20mg PO daily with last day 10/22  - consider compression stockings in future  - c/w Lac-hydran BID on LE for scab & Dry skin  - ID DR Larios, d/w -stable, improving, c/w prednisone  - Cardiology following, recs appreciated

## 2021-10-19 NOTE — PROGRESS NOTE ADULT - PROVIDER SPECIALTY LIST ADULT
Infectious Disease
Infectious Disease
Cardiology
Infectious Disease
Infectious Disease
Internal Medicine
Internal Medicine
Cardiology
Cardiology
Infectious Disease
Internal Medicine

## 2021-10-19 NOTE — PROGRESS NOTE ADULT - PROBLEM SELECTOR PLAN 6
- chronic, stable  - continue home Lopressor  T50mg qd  - Lasix 40mg IVP BID ----> Lasix 40 mg IV qd

## 2021-10-19 NOTE — PROGRESS NOTE ADULT - SUBJECTIVE AND OBJECTIVE BOX
[facility] DIVISION of INFECTIOUS DISEASE  Diony Larios MD PhD, Tricia Cornell MD, Delmy Villegas MD, Juan Alberto Mccord MD, Juan Vieyra MD  and providing coverage with An Mckay MD and Gabriella Bell MD  Providing Infectious Disease Consultations at [facility], [facility]    Office# [phone] to schedule follow up appointments  Answering Service for urgent calls or New Consults [phone]  Cell# to text for urgent issues Diony Larios [phone]     infectious diseases progress note:    KENIA REGALADO is a 89y y. o. Female patient    No concerning overnight events    Allergies    No Known Allergies    Intolerances        ANTIBIOTICS/RELEVANT:  antimicrobials    immunologic:    OTHER:  ammonium lactate 12% Lotion 1 Application(s) Topical two times a day  famotidine    Tablet 20 milliGRAM(s) Oral daily  furosemide   Injectable 40 milliGRAM(s) IV Push daily  gabapentin 100 milliGRAM(s) Oral three times a day  levothyroxine 50 MICROGram(s) Oral daily  metoprolol tartrate 50 milliGRAM(s) Oral daily  polyethylene glycol 3350 17 Gram(s) Oral two times a day  potassium chloride    Tablet ER 20 milliEquivalent(s) Oral daily  predniSONE   Tablet 20 milliGRAM(s) Oral daily  senna 2 Tablet(s) Oral at bedtime      Objective:  Vital Signs Last 24 Hrs  T(C): 36.4 (19 Oct 2021 03:52), Max: 36.8 (18 Oct 2021 20:15)  T(F): 97.5 (19 Oct 2021 03:52), Max: 98.2 (18 Oct 2021 20:15)  HR: 83 (19 Oct 2021 03:52) (74 - 83)  BP: 152/74 (19 Oct 2021 03:52) (131/61 - 153/82)  BP(mean): --  RR: 17 (19 Oct 2021 03:52) (17 - 17)  SpO2: 93% (19 Oct 2021 03:52) (93% - 96%)    T(C): 36.4 (10-19-21 @ 03:52), Max: 36.8 (10-17-21 @ 20:41)  T(C): 36.4 (10-19-21 @ 03:52), Max: 37 (10-17-21 @ 05:26)  T(C): 36.4 (10-19-21 @ 03:52), Max: 37 (10-17-21 @ 05:26)    PHYSICAL EXAM:  HEENT: NC atraumatic  Neck: supple  Respiratory: no accessory muscle use, breathing comfortably  Cardiovascular: distant  Gastrointestinal: normal appearing, nondistended  Extremities: no clubbing, no cyanosis, legs mostly normal        LABS:                          See note  See Note )-----------( x        ( 19 Oct 2021 09:11 )             See note      See Note 10-19 @ 09:11  6.37 10-18 @ 09:02  5.49 10-17 @ 09:14  5.59 10-16 @ 09:18  4.86 10-15 @ 09:04  4.33 10-14 @ 09:06  4.58 10-13 @ 09:21      10-19    144  |  105  |  32<H>  ----------------------------<  101<H>  3.6   |  35<H>  |  0.94    Ca    8.8      19 Oct 2021 09:12        Creatinine, Serum: 0.94 mg/dL (10-19-21 @ 09:12)  Creatinine, Serum: 1.00 mg/dL (10-18-21 @ 09:02)  Creatinine, Serum: 1.10 mg/dL (10-17-21 @ 09:14)  Creatinine, Serum: 1.10 mg/dL (10-16-21 @ 09:18)  Creatinine, Serum: 0.95 mg/dL (10-15-21 @ 09:04)  Creatinine, Serum: 1.10 mg/dL (10-14-21 @ 09:06)  Creatinine, Serum: 1.20 mg/dL (10-13-21 @ 09:21)      PT/INR - ( 18 Oct 2021 09:02 )   PT: 18.5 sec;   INR: 1.62 ratio         PTT - ( 18 Oct 2021 09:02 )  PTT:35.5 sec          INFLAMMATORY MARKERS  Auto Neutrophil #: 3.49 K/uL (10-16-21 @ 09:18)  Auto Lymphocyte #: 1.24 K/uL (10-16-21 @ 09:18)  Auto Neutrophil #: 3.06 K/uL (10-14-21 @ 09:06)  Auto Lymphocyte #: 0.84 K/uL (10-14-21 @ 09:06)  Auto Neutrophil #: 2.15 K/uL (10-13-21 @ 09:21)  Auto Lymphocyte #: 1.69 K/uL (10-13-21 @ 09:21)  Auto Neutrophil #: 3.39 K/uL (10-11-21 @ 16:40)  Auto Lymphocyte #: 1.55 K/uL (10-11-21 @ 16:40)    Lactate, Blood: 1.5 mmol/L (10-11-21 @ 16:40)    Auto Eosinophil #: 0.12 K/uL (10-16-21 @ 09:18)  Auto Eosinophil #: 0.00 K/uL (10-14-21 @ 09:06)  Auto Eosinophil #: 0.18 K/uL (10-13-21 @ 09:21)  Auto Eosinophil #: 0.08 K/uL (10-11-21 @ 16:40)                  Ferritin, Serum: 25 ng/mL (10-17-21 @ 14:01)        INR: 1.62 ratio (10-18-21 @ 09:02)  Activated Partial Thromboplastin Time: 35.5 sec (10-18-21 @ 09:02)  INR: 1.59 ratio (10-17-21 @ 09:14)  Activated Partial Thromboplastin Time: 33.6 sec (10-17-21 @ 09:14)  INR: 1.56 ratio (10-16-21 @ 09:18)  INR: 1.73 ratio (10-15-21 @ 09:04)  Activated Partial Thromboplastin Time: 36.8 sec (10-15-21 @ 09:04)  INR: 2.25 ratio (10-14-21 @ 09:06)  Activated Partial Thromboplastin Time: 41.1 sec (10-14-21 @ 09:06)  INR: 3.03 ratio (10-13-21 @ 09:21)  Activated Partial Thromboplastin Time: 45.4 sec (10-13-21 @ 09:21)  INR: 3.46 ratio (10-12-21 @ 09:17)  INR: 2.87 ratio (10-11-21 @ 16:40)  Activated Partial Thromboplastin Time: 48.0 sec (10-11-21 @ 16:40)          MICROBIOLOGY:              RADIOLOGY & ADDITIONAL STUDIES:

## 2021-10-19 NOTE — PROGRESS NOTE ADULT - ASSESSMENT
89 y/o F with a PMH b/l LE edema, HTN, P Afib on AC , hypothyroidism, who presents with 1mo hx of worsening b/l LE edema. Cardiology consulted for LE edema b/l.     Edema/ Acute Diastolic CHF/ Valvular heart disease   - ECHO 10/11/21 showed mod MR, mod AS, mod TR, EF 55-60%, RV enlargement, MARY, PA systolic pressure of 46.3 mmHg   - Serum Pro-Brain Natriuretic Peptide: 2458 pg/mL (10-11-21 @ 16:40)  - LE edema appear to be more cellulitic than heart failure, improving  - euvolemic on exam, currently net neg 600 cc    - s/p lasix IV, now on PO, HCO3 32->35, would consider dc lasix   - ID following, unlikely infectious process at this time, monitoring off abx, on steroid   - Elevated LE and compression stockings if possible.   - Monitor strict I/Os, daily weight.     AFib  - rate controlled: HR: 83 (10-19 @ 03:52) (74 - 83)  - INR: 1.62, daily dosing coumadin for goal INR 2-3, pending level today   - Monitor and replete Lytes. Keep K > 4 and Mg > 2    Hypertension  - BP: 152/74 (10-19-21 @ 03:52) (131/61 - 153/82)  - Continue metoprolol with hold parameters   - consider starting losartan 25 daily for better BP control   - continue routine hemodynamics monitoring     - All other medical needs as per primary team.  - Other cardiovascular workup will depend on clinical course.  - Will continue to follow.    Melany Howe, [facility]  Nurse Practitioner - Cardiology   Spectra #[phone]/ [phone]     89 y/o F with a PMH b/l LE edema, HTN, P Afib on AC , hypothyroidism, who presents with 1mo hx of worsening b/l LE edema. Cardiology consulted for LE edema b/l.     Edema/ Acute Diastolic CHF/ Valvular heart disease   - ECHO 10/11/21 showed mod MR, mod AS, mod TR, EF 55-60%, RV enlargement, MARY, PA systolic pressure of 46.3 mmHg   - Serum Pro-Brain Natriuretic Peptide: 2458 pg/mL (10-11-21 @ 16:40)  - LE edema appear to be more cellulitic than heart failure, improving  - euvolemic on exam, currently net neg 600 cc    - s/p lasix IV, now on PO, HCO3 32->35, would consider changing lasix to po  - ID following, unlikely infectious process at this time, monitoring off abx, on steroid   - Elevated LE and compression stockings if possible.   - Monitor strict I/Os, daily weight.     AFib  - rate controlled: HR: 83 (10-19 @ 03:52) (74 - 83)  - INR: 1.62, daily dosing coumadin for goal INR 2-3, pending level today   - Monitor and replete Lytes. Keep K > 4 and Mg > 2    Hypertension  - BP: 152/74 (10-19-21 @ 03:52) (131/61 - 153/82)  - Continue metoprolol with hold parameters   - consider starting losartan 25 daily for better BP control   - continue routine hemodynamics monitoring     - All other medical needs as per primary team.  - Other cardiovascular workup will depend on clinical course.  - Will continue to follow.    Melany Howe, [facility]  Nurse Practitioner - Cardiology   Spectra #[phone]/ [phone]

## 2021-10-19 NOTE — PROGRESS NOTE ADULT - ASSESSMENT
89 y/o F with a pmhx b/l LE edema, HTN, hypothyroidism, who presents with 1mo hx of worsening b/l LE edema, erythema and swelling b/l.     RECOMMENDATIONS    LE swelling/erythema - No fever, no leukocytosis, 1 mo chronicity with symmetrical nature is very atypical for an infectious process. Low suspicion for this being due to an infectious process and suspect inflammatory stasis dermatitis..but with chronicity and failure to improve  started abx/steroids with rapid response   rec as pt improves anticipate transition to finish course with  sp Cefuroxime 500mg PO BID with last day 10/17  continue prednisone 20mg PO daily with last day 10/22  -recommend outpt rheum and derm eval    COVID exposure - sp REGEN-COV Mab Rx 10/14, will need to follow institutional isolation protocols based on exposure    From an ID standpoint no further requirement for inpatient status for the management of ID issues. Fine with discharge from ID standpoint when other medical issues no longer require inpatient care and social issues allow for a safe discharge plan. To schedule an outpatient ID follow up appointment please call our office at [phone]    Thank you for consulting us and involving us in the management of this most interesting and challenging case.  Please call us at [phone] or text me directly on my cell#[phone] with any concerns or further questions.

## 2021-10-19 NOTE — PROGRESS NOTE ADULT - SUBJECTIVE AND OBJECTIVE BOX
[facility] -- Reid Astorga, Norah, Raheem, Flex Calvert, Jacob Blair: Office # [phone]    Follow Up:  LE edema, HTN     Subjective/Observations: Patient seen and examined, awake, alert, resting comfortably in bed, no complaints of chest pain, dyspnea, palpitations or dizziness, orthopnea and PND. She endorses that her legs are feeling better today. Tolerating room air.     REVIEW OF SYSTEMS: All review of systems is negative for eye, ENT, GI, , allergic, dermatologic, musculoskeletal and neurologic except as described above    PAST MEDICAL & SURGICAL HISTORY:  Hypothyroid  Hypertension  Lower extremity edema    MEDICATIONS  (STANDING):  ammonium lactate 12% Lotion 1 Application(s) Topical two times a day  famotidine    Tablet 20 milliGRAM(s) Oral daily  furosemide   Injectable 40 milliGRAM(s) IV Push daily  gabapentin 100 milliGRAM(s) Oral three times a day  levothyroxine 50 MICROGram(s) Oral daily  metoprolol tartrate 50 milliGRAM(s) Oral daily  polyethylene glycol 3350 17 Gram(s) Oral two times a day  potassium chloride    Tablet ER 20 milliEquivalent(s) Oral daily  predniSONE   Tablet 20 milliGRAM(s) Oral daily  senna 2 Tablet(s) Oral at bedtime    MEDICATIONS  (PRN):    Allergies    No Known Allergies    Intolerances      Vital Signs Last 24 Hrs  T(C): 36.4 (19 Oct 2021 03:52), Max: 36.8 (18 Oct 2021 20:15)  T(F): 97.5 (19 Oct 2021 03:52), Max: 98.2 (18 Oct 2021 20:15)  HR: 83 (19 Oct 2021 03:52) (74 - 83)  BP: 152/74 (19 Oct 2021 03:52) (131/61 - 153/82)  RR: 17 (19 Oct 2021 03:52) (17 - 17)  SpO2: 93% (19 Oct 2021 03:52) (93% - 96%)  I&O's Summary    18 Oct 2021 07:01  -  19 Oct 2021 07:00  --------------------------------------------------------  IN: 0 mL / OUT: 600 mL / NET: -600 mL    Weight (kg): 70.3 (10-18 @ 15:17)    TELE: Not on telemetry   PHYSICAL EXAM:  Appearance: NAD, no distress, alert,  HEENT: Moist Mucous Membranes, Anicteric  Cardiovascular: Regular rate and rhythm, Normal S1 S2, No JVD, No murmurs, No rubs, gallops or clicks  Respiratory: Non-labored, Clear to auscultation, No rales, No rhonchi, No wheezing.   Gastrointestinal:  Soft, Non-tender, + BS  Neurologic: Non-focal  Skin: Warm and dry, pale red,  No visible rashes or ulcers, No ecchymosis, No cyanosis  Musculoskeletal: No clubbing, No cyanosis, No joint swelling/tenderness  Psychiatry: Mood & affect appropriate  Lymph: +1 B/L LE edema.     LABS: All Labs Reviewed:                        See note  See Note )-----------( x        ( 19 Oct 2021 09:11 )             See note                        13.1   6.37  )-----------( 289      ( 18 Oct 2021 09:02 )             41.4                         11.0   5.49  )-----------( 247      ( 17 Oct 2021 09:14 )             34.5     19 Oct 2021 09:12    144    |  105    |  32     ----------------------------<  101    3.6     |  35     |  0.94   18 Oct 2021 09:02    141    |  103    |  30     ----------------------------<  84     3.9     |  34     |  1.00   17 Oct 2021 09:14    143    |  105    |  29     ----------------------------<  88     3.6     |  34     |  1.10     Ca    8.8        19 Oct 2021 09:12  Ca    9.4        18 Oct 2021 09:02  Ca    8.5        17 Oct 2021 09:14      PT/INR - ( 18 Oct 2021 09:02 )   PT: 18.5 sec;   INR: 1.62 ratio         PTT - ( 18 Oct 2021 09:02 )  PTT:35.5 sec    12 Lead ECG:   Ventricular Rate 76 BPM  Atrial Rate 55 BPM  QRS Duration 158 ms  Q-T Interval 442 ms  QTC Calculation(Bazett) 497 ms  R Axis -49 degrees  T axis 138 degrees    Diagnosis Line Atrial fibrillation  Left axis deviation  Left bundle branchblock  Abnormal ECG  When compared with ECG of 11-DEC-2012 14:13,  Current undetermined rhythm precludes rhythm comparison, needs review  Left bundle branch block is now present  Criteria for Anteroseptal infarct are no longer present  Confirmed by Diony Maciel MD ([id number]) on 10/12/2021 1:51:19 PM (10-11-21 @ 17:07)    < from: US Duplex Venous Lower Ext Complete, Bilateral (10.11.21 @ 19:00) >  EXAM:  US DPLX LWR EXT VEINS COMPL BI                            PROCEDURE DATE:  10/11/2021          INTERPRETATION:  CLINICAL INFORMATION: Bilateral lower extremity edema and pain    COMPARISON: None available.    TECHNIQUE: Duplex sonography of the BILATERAL LOWER extremity veins with color and spectral Doppler, with and without compression.    FINDINGS:    RIGHT:  Normal compressibility of the RIGHT common femoral, femoral and popliteal veins.  Doppler examination shows normal spontaneous and phasic flow.  No RIGHT calf vein thrombosis is detected.    LEFT:  Normal compressibility of the LEFT common femoral, femoral and popliteal veins.  Doppler examination shows normal spontaneous and phasic flow.  No LEFT calf vein thrombosis is detected.    IMPRESSION:  No evidence of deep venous thrombosis in either lower extremity.    --- End of Report ---            JOSAFAT SOL MD; Attending Radiologist  This document has been electronically signed. Oct 11 2021  7:10PM    < end of copied text >  < from: Xray Chest 1 View- PORTABLE-Urgent (10.11.21 @ 16:19) >    EXAM:  XR CHEST PORTABLE URGENT 1V                            PROCEDURE DATE:  10/11/2021          INTERPRETATION:  AP semierect chest on October 11, 2021 at 4:09 PM. Patient has sepsis.    Heart magnified by technique and elevated right hemidiaphragm again noted.    Above findings are similar to December 11, 2012.    Present film shows minimal blunting of right base laterally.    IMPRESSION: Minimal blunting of right base laterally at this time. Otherwise stable findings as above.    --- End of Report ---            RUSSELL MONTES MD; Attending Radiologist  This document has been electronically signed. Oct 11 2021  5:15PM    < end of copied text >  < from: TTE Echo Complete w/o Contrast w/ Doppler (10.11.21 @ 22:40) >     EXAM:  ECHO TTE WO CON COMP W DOPP         PROCEDURE DATE:  10/11/2021        INTERPRETATION:  INDICATION: Edema  Sonographer AS    Blood Pressure 153/72    Height unavailable     Weight 70.3 kg    Dimensions:  LA 3.6       Normal Values: 2.0 - 4.0 cm  Ao 3.4        Normal Values: 2.0 - 3.8 cm  SEPTUM 1.3       Normal Values: 0.6 - 1.2 cm  PWT 1.1       Normal Values: 0.6 - 1.1 cm  LVIDd 4.3         Normal Values: 3.0 - 5.6 cm  LVIDs 2.9         Normal Values: 1.8 - 4.0 cm      OBSERVATIONS:  Technically difficult study  Mitral Valve: Mitral annular calcification, moderate MR.  Aortic Valve/Aorta: Calcified trileaflet aortic valve with decreased opening. Peak transaortic valve gradient 26.5 mmHg with a mean transaortic valve gradient 17.8 mmHg. The aortic valve area is calculated to be 1.34 sq cm by continuity equation. This is consistent with moderate aortic stenosis.  Tricuspid Valve: Moderate TR.  Pulmonic Valve: Trace PI  Left Atrium: normal  Right Atrium: Enlarged  Left Ventricle:normal LV size and systolic function, estimated LVEF of 55-60%.  Right Ventricle: Right ventricular enlargement with grossly normal systolic function  Pericardium: no significant pericardial effusion.  Pulmonary/RV Pressure: estimated PA systolic pressure of 46.3 mmHg assuming an RA pressure of 3 mmHg.  IVC measures 1.3 cm          IMPRESSION:  Normal left ventricular internal dimensions and systolic function, estimated LVEF of 55-60%.  Right ventricular enlargement with grossly normal systolic function  Calcified trileaflet aortic valve with moderate aortic stenosis, without AI.  Moderate MR and TR.  Right atrial enlargement  No significant pericardial effusion.    --- End of Report ---              JEAN CARLOS HENRY MD; Attending Cardiologist  This document has been electronically signed. Oct 12 2021  5:46PM    < end of copied text >

## 2021-10-19 NOTE — PROGRESS NOTE ADULT - PROBLEM SELECTOR PROBLEM 1
Lower extremity edema

## 2021-10-19 NOTE — PROGRESS NOTE ADULT - PROBLEM SELECTOR PLAN 9
- continue warfarin, Daily PT/INR

## 2021-10-19 NOTE — PROGRESS NOTE ADULT - ATTENDING COMMENTS
Patient is a 89 y/o F with a pmhx b/l LE edema, HTN, hypothyroidism, who presents with 1mo hx of worsening b/l LE edema. Endorses erythema and swelling of b/l LE  edema ,oozing wound with Volume overload, improving with IV diuresis.  Pt seen, examined, case & care plan d/w pt, residents at detail.  PO diet   PT Eval. --->HCPT, D/W Case management RW given  -D/W Dtr  at detail, about  D/C  plan.  -D/C Home today.  Total d/c care time is 60 minutes  D/W PMD DR Barbara Mccord at detail.

## 2021-10-19 NOTE — PROGRESS NOTE ADULT - ATTENDING SUPERVISION STATEMENT
ACP
ACP/Resident
ACP
ACP/Resident

## 2021-10-19 NOTE — PROGRESS NOTE ADULT - ATTENDING COMMENTS
af rate controlled  edema improved, would change lasix to po  bp elevated often, and would consider adding losartan tomorrow

## 2021-10-19 NOTE — PROGRESS NOTE ADULT - NUTRITIONAL ASSESSMENT
This patient has been assessed with a concern for Malnutrition and has been determined to have a diagnosis/diagnoses of Severe protein-calorie malnutrition.    This patient is being managed with:   Diet Regular-  DASH/TLC {Sodium & Cholesterol Restricted}  1200mL Fluid Restriction ([id number])  Entered: Oct 11 2021  7:28PM    
This patient has been assessed with a concern for Malnutrition and has been determined to have a diagnosis/diagnoses of Severe protein-calorie malnutrition.    This patient is being managed with:   Diet Regular-  DASH/TLC {Sodium & Cholesterol Restricted}  1200mL Fluid Restriction ([id number])  Entered: Oct 11 2021  7:28PM

## 2021-10-19 NOTE — PROGRESS NOTE ADULT - PROBLEM SELECTOR PLAN 2
Chronic Venous stasis dermatitis b/.l lower EXT, likely non-cardiogenic  - decrease dose to Lasix 40mg IVP qd  - s/p ceftriaxone IV 5 days  - c/w 20 mg prednisone qd for inflammatory stasis dermatitis.  - c/w leg elevation, would benefit from compression stockings  - start Lac Hydran BID  - ID DR Larios D/W OK to continue steroids

## 2021-10-19 NOTE — PROGRESS NOTE ADULT - ASSESSMENT
Patient is a 89 y/o F with a pmhx b/l LE edema, HTN, hypothyroidism, who presents with 1mo hx of worsening b/l LE edema. Endorses erythema and swelling of b/l LE  edema ,oozing wound with Volume overload, improving with IV diuresis.

## 2021-10-20 ENCOUNTER — TRANSCRIPTION ENCOUNTER (OUTPATIENT)
Age: 86
End: 2021-10-20

## 2021-10-21 ENCOUNTER — TRANSCRIPTION ENCOUNTER (OUTPATIENT)
Age: 86
End: 2021-10-21

## 2021-10-22 ENCOUNTER — TRANSCRIPTION ENCOUNTER (OUTPATIENT)
Age: 86
End: 2021-10-22

## 2022-01-31 NOTE — PROGRESS NOTE ADULT - SUBJECTIVE AND OBJECTIVE BOX
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 vitals stable.  She went to go home.  She will follow up with primary care physician next week.  Return provided.   All questions answered.  She should continue and complete the course of her antibiotics.  She already has steroids and breathing treatments that she can use.    Clinical Impression     ED Diagnosis   1. Cough     2. Shortness of breath         Disposition        Discharge 1/31/2022 12:51 AM  Mica Massey discharge to home/self care.                         Adolph Valentino,   01/31/22 0149     Patient is a 89y old  Female who presents with a chief complaint of b/l LE cellulitis (15 Oct 2021 14:51)      HPI:  Patient is a 89 y/o F with a pmhx b/l LE edema, HTN,P Afib on AC , hypothyroidism, who presents with 1mo hx of worsening b/l LE edema. Endorses erythema and swelling b/l. Per patient, her doctor ?neurologist, told her to stop Lasix recently so pt has not been taking. States pain patches, water pills and antibiotics as an outpatient were unhelpful in relieving these symptoms. States she has had symptoms for 6 months but recently worsened. Denies chest pain, shortness of breath, palpitations, fevers, chills, nausea, vomiting. Denies having in past.    IN THE ED:  Temp 98.8 F, HR 92, /72, RR 16, SpO2 96%  S/P Zosyn 3.375g x1, Vanco 1g x1, lasix 40 mg IV x1   EKG: left axis deviation, LBBB   Labs significant for WBC 5.66, BNP 2458, PT 31.9, INR 2.87, PTT 48, K 3.4, CO 32, anion gap 3, albumin 2.8, AST 57, eGFR 43  Imaging: CXR with minimal blunting of R base laterally    (11 Oct 2021 18:04)      INTERVAL HPI:  10/12/21 - Patient was seen and examined at bedside. No acute events overnight. Patient endorses improvement in the rubor/erythema of her LE b/l but endorses continued pain in LE b/l. Patient endorses 1 year hx of weight loss (35 pounds) due to decreased appetite. Patient denies headache, fever, blurry/double vision, tinnitus, dysphagia, cough, SoB, abdominal pain, CP, palpitations, n/v/c/d. Off Abx as per ID, INR -elevated   10/13/21 - Patient was seen and examined at bedside. Patient endorses no improvement in the swelling and erythema of LE b/l. Patient endorses a pressure sensation in both LE and has an "odd" sensation in her left heel. Patient endorses eating well without dysphagia. Patient denies BM the past 2 days and declines bowel regimen for now. Patient producing clear urine in vac. Endorses worsening of hearing in her left ear, worse since yesterday. Denies headache, fever, blurry/double vision, tinnitus, cough, SoB, abdominal pain, CP, palpitations, n/v/d.  Dtr Cell- [phone], On IV Lasix & IV Rocephin with Po steroids.  10/14/21 - Patient was seen and examined at bedside. Patient endorses significant improvement in swelling and erythema of LE b/l. Patient endorses b/l tingling sensation in LE. Patient denies BM last 3 days and is urinating well in vac. Endorses continued difficulty hearing in left ear. Patient endorses desire to receive vaccine for COVID-19. Discussed with patient to f/up hx of weight loss and difficulty hearing in L ear with PCP outpatient. Patient denies headache, fever, blurry/double vision, tinnitus, cough, dysphagia, SoB, CP, palpitations, abdominal pain, n/v/c/d, melena/hematochezia, dysuria, pyuria/hematuria, change in urinary frequency, confusion, dizziness, or weakness. On IV Lasix  Pt had exposure to  COVID + person at hospital, d/w Dr Larios , pt & Dtr, plan for MAB infusions.  10/15/21 - Patient seen and examined at bedside, S/P MAB Rx for COVID Exposure  over all improving. Additional Lasix 20 IVP ordered for lower Ext Edema . Will increase to Lasix 40mg IV BID starting later today. Denies any fevers, chills, cp, sob, abdominal pain, diarrhea, constipation  10/16/21 - Patient seen and examined at bedside. Patient reports improvement in leg swelling b/l with less redness. Patient endorses constipation w/o BM in 3 days open to starting miralax/senna. Endorses reduced hearing in ears b/l. Patient denies headache, fever, chills, blurry/double vision, tinnitus, cough, dysphagia, SoB, CP, palpitations, abdominal pain, n/v/d, dysuria, pyuria/hematuria, change in urinary frequency, dizziness, or weakness.     OVERNIGHT EVENTS:  none    Home Medications:  gabapentin 100 mg oral capsule: 1 cap(s) orally 3 times a day (13 Oct 2021 11:58)  Jantoven 1 mg oral tablet: 2 tab(s) orally Monday, Wednesday, and Friday (13 Oct 2021 11:58)  Jantoven 4 mg oral tablet: 1 tab(s) orally once a day (13 Oct 2021 11:58)  Klor-Con 10 mEq oral tablet, extended release: 1 tab(s) orally once a day (13 Oct 2021 11:58)  Lasix 40 mg oral tablet: 1 tab(s) orally once a day (13 Oct 2021 11:58)  lidocaine 5% patch: Apply to affected areas (13 Oct 2021 11:58)  Lopressor 50 mg oral tablet: 1 tab(s) orally once a day (13 Oct 2021 11:58)  Synthroid 50 mcg (0.05 mg) oral tablet: 1 tab(s) orally once a day (13 Oct 2021 11:58)  Vitamin B6 50 mg oral tablet: 1 tab(s) orally once a day (13 Oct 2021 11:58)      MEDICATIONS  (STANDING):  cefTRIAXone   IVPB 1000 milliGRAM(s) IV Intermittent every 24 hours  famotidine    Tablet 20 milliGRAM(s) Oral daily  furosemide   Injectable 40 milliGRAM(s) IV Push every 12 hours  gabapentin 100 milliGRAM(s) Oral three times a day  levothyroxine 50 MICROGram(s) Oral daily  metoprolol tartrate 50 milliGRAM(s) Oral daily  polyethylene glycol 3350 17 Gram(s) Oral two times a day  polyethylene glycol 3350 17 Gram(s) Oral two times a day  potassium chloride    Tablet ER 40 milliEquivalent(s) Oral every 4 hours  potassium chloride    Tablet ER 20 milliEquivalent(s) Oral daily  predniSONE   Tablet 20 milliGRAM(s) Oral daily  senna 2 Tablet(s) Oral at bedtime  senna 2 Tablet(s) Oral at bedtime    MEDICATIONS  (PRN):      No Known Allergies      Social History:  Patient lives alone. Denies tobacco use or etoh use. (11 Oct 2021 18:04)      REVIEW OF SYSTEMS:  CONSTITUTIONAL: No fever, No chills, No fatigue, No myalgia, No Body ache, No Weakness  EYES: No eye pain,  No visual disturbances, No discharge, NO Redness  ENMT: [ x ] reduced hearing in left ear. No ear pain, No nose bleed, No vertigo; No sinus pain, NO throat pain, No Congestion  RESPIRATORY: No cough, NO wheezing, No  hemoptysis, NO  shortness of breath  CARDIOVASCULAR: No chest pain, palpitations endorses LE александрing b/l  GASTROINTESTINAL: No abdominal pain, NO epigastric pain. No nausea, No vomiting; No diarrhea, endorses constipation. [  ] BM  GENITOURINARY: No dysuria, No frequency, No urgency, No hematuria, NO incontinence  NEUROLOGICAL: No headaches, No dizziness, No numbness, No tingling, No tremors, No weakness  EXT: No Swelling, No Pain, No Edema  SKIN: improvement in rubor of shins of LE b/l  MUSCULOSKELETAL: No joint pain ,No Jt swelling; No muscle pain, No back pain, No extremity pain  PSYCHIATRIC: No depression,  No anxiety,  No mood swings ,No difficulty sleeping at night  PAIN SCALE: [ x ] None  [  ] Other-  ROS Unable to obtain due to - [  ] Dementia  [  ] Lethargy [  ] Drowsy [  ] Sedated [  ] non verbal  REST OF REVIEW Of SYSTEM - [x  ] Normal     Vital Signs Last 24 Hrs  T(C): 36.6 (16 Oct 2021 05:39), Max: 36.8 (15 Oct 2021 21:24)  T(F): 97.8 (16 Oct 2021 05:39), Max: 98.3 (15 Oct 2021 21:24)  HR: 74 (16 Oct 2021 05:39) (65 - 74)  BP: 133/69 (16 Oct 2021 05:39) (128/56 - 133/69)  BP(mean): --  RR: 18 (16 Oct 2021 05:39) (18 - 18)  SpO2: 91% (16 Oct 2021 05:39) (91% - 92%)    CAPILLARY BLOOD GLUCOSE          I&O's Summary    15 Oct 2021 07:01  -  16 Oct 2021 07:00  --------------------------------------------------------  IN: 0 mL / OUT: 1750 mL / NET: -1750 mL      PHYSICAL EXAM:  GENERAL:  [ x ] NAD , [ x ] well appearing, [  ] Agitated, [  ] Drowsy,  [  ] Lethargy, [  ] confused   HEAD:  [ x ] Normal, [  ] Other  EYES:  [ x ] EOMI, [ x ] PERRLA, [ x ] conjunctiva and sclera clear normal, [  ] Other,  [  ] Pallor,[  ] Discharge  ENMT:  [ x ] Normal, [ x ] Moist mucous membranes, [ x ] Good dentition, [ x ] No Thrush  NECK:  [ x ] Supple, [x  ] No JVD, [x  ] Normal thyroid, [  ] Lymphadenopathy [  ] Other  CHEST/LUNG:  [x  ] Clear to auscultation bilaterally, [x  ] Breath Sounds equal B/L, [  ] poor effort  [x  ] No rales, [x  ] No rhonchi  [ x ]  No wheezing,   HEART:  [ x  ] Regular rate [  ] tachycardia, [  ] Bradycardia,  [ x ] irregular  [ x ] 2/6 systolic murmer appreciated at upper sternal border [  ] PPM in place (Mfr:  )  ABDOMEN:  [ x ] Soft, [ x ] Nontender, [ x ] Nondistended, [ x ] No mass, [ x ] Bowel sounds present, [  ] obese  NERVOUS SYSTEM:  [ x] Alert & Oriented X3, [ x ] Nonfocal  [  ] Confusion  [  ] Encephalopathic [  ] Sedated [  ] Unable to assess, [  ] Dementia [  ] Other-  EXTREMITIES: [x  ] 2+ Peripheral Pulses, No clubbing, No cyanosis,  [ x ] 2 +pitting  edema B/L lower EXT. [x  ] severe PVD stasis skin changes B/L Lower EXT, Much improved erythema B/L lower Ext  [  ] wound, stasis skin   LYMPH: No lymphadenopathy noted  SKIN:  [  ] No rashes or lesions, [  ] Pressure Ulcers, [ x ] ecchymosis, [  ] Skin Tears, [  ] Other    DIET: Diet, Regular:   DASH/TLC Sodium & Cholesterol Restricted  1200mL Fluid Restriction ([id number]) (10-11-21 @ 19:28)      LABS:                        10.8   5.59  )-----------( 248      ( 16 Oct 2021 09:18 )             34.1     16 Oct 2021 09:18    142    |  106    |  27     ----------------------------<  95     3.6     |  32     |  1.10     Ca    8.3        16 Oct 2021 09:18  Mg     2.3       15 Oct 2021 14:01    TPro  7.4    /  Alb  2.4    /  TBili  0.5    /  DBili  x      /  AST  38     /  ALT  34     /  AlkPhos  82     16 Oct 2021 09:18    PT/INR - ( 16 Oct 2021 09:18 )   PT: 17.9 sec;   INR: 1.56 ratio         PTT - ( 15 Oct 2021 09:04 )  PTT:36.8 sec    Culture Results:   No growth to date. (10-11 @ 21:52)  Culture Results:   No growth to date. (10-11 @ 21:52)    Culture - Blood (collected 11 Oct 2021 21:52)  Source: .Blood Blood-Peripheral  Preliminary Report (12 Oct 2021 22:01):    No growth to date.    Culture - Blood (collected 11 Oct 2021 21:52)  Source: .Blood Blood-Peripheral  Preliminary Report (12 Oct 2021 22:01):    No growth to date.    Serum Pro-Brain Natriuretic Peptide: 2458 pg/mL (10-11-21 @ 16:40)      RADIOLOGY & ADDITIONAL TESTS: NONE      HEALTH ISSUES - PROBLEM Dx:  Lower extremity edema    Venous stasis dermatitis of lower extremity    Paroxysmal atrial fibrillation    Hypertension    Hypothyroid    Constipation    DVT prophylaxis    Cellulitis    Exposure to confirmed case of COVID-19      Consultant(s) Notes Reviewed:  [ x ] YES     Care Discussed with [ x ] Consultants, [ x ] Patient, [  ] Family, [  ] HCP, [  ] , [  ] Social Service, [ x ] RN, [  ] Physical Therapy, [  ] Palliative Care Team  DVT PPX: [  ] Lovenox, [  ] SC Heparin, [x  ] Coumadin, [  ] Xarelto, [  ] Eliquis, [  ] Pradaxa, [  ] IV Heparin drip, [  ] SCD, [  ] Ambulation, [  ] Contraindicated 2/2 GI Bleed, [  ] Contraindicated 2/2  Bleed, [  ] Contraindicated 2/2 Brain Bleed  Advanced Directive: [x  ] None, [  ] DNR/DNI

## 2022-06-17 ENCOUNTER — INPATIENT (INPATIENT)
Facility: HOSPITAL | Age: 87
LOS: 12 days | Discharge: OTHER INSTITUTION | DRG: 280 | End: 2022-06-30
Attending: INTERNAL MEDICINE | Admitting: INTERNAL MEDICINE
Payer: MEDICARE

## 2022-06-17 VITALS
SYSTOLIC BLOOD PRESSURE: 135 MMHG | DIASTOLIC BLOOD PRESSURE: 74 MMHG | WEIGHT: 160.06 LBS | OXYGEN SATURATION: 93 % | RESPIRATION RATE: 15 BRPM | HEIGHT: 65 IN | HEART RATE: 67 BPM | TEMPERATURE: 97 F

## 2022-06-17 DIAGNOSIS — D64.9 ANEMIA, UNSPECIFIED: ICD-10-CM

## 2022-06-17 DIAGNOSIS — R07.9 CHEST PAIN, UNSPECIFIED: ICD-10-CM

## 2022-06-17 DIAGNOSIS — R29.898 OTHER SYMPTOMS AND SIGNS INVOLVING THE MUSCULOSKELETAL SYSTEM: ICD-10-CM

## 2022-06-17 DIAGNOSIS — Z29.9 ENCOUNTER FOR PROPHYLACTIC MEASURES, UNSPECIFIED: ICD-10-CM

## 2022-06-17 DIAGNOSIS — I48.0 PAROXYSMAL ATRIAL FIBRILLATION: ICD-10-CM

## 2022-06-17 DIAGNOSIS — R60.0 LOCALIZED EDEMA: ICD-10-CM

## 2022-06-17 DIAGNOSIS — I10 ESSENTIAL (PRIMARY) HYPERTENSION: ICD-10-CM

## 2022-06-17 DIAGNOSIS — E03.9 HYPOTHYROIDISM, UNSPECIFIED: ICD-10-CM

## 2022-06-17 DIAGNOSIS — E87.8 OTHER DISORDERS OF ELECTROLYTE AND FLUID BALANCE, NOT ELSEWHERE CLASSIFIED: ICD-10-CM

## 2022-06-17 LAB
ALBUMIN SERPL ELPH-MCNC: 2.9 G/DL — LOW (ref 3.3–5)
ALP SERPL-CCNC: 99 U/L — SIGNIFICANT CHANGE UP (ref 40–120)
ALT FLD-CCNC: 21 U/L — SIGNIFICANT CHANGE UP (ref 12–78)
ANION GAP SERPL CALC-SCNC: 7 MMOL/L — SIGNIFICANT CHANGE UP (ref 5–17)
APTT BLD: 38.3 SEC — HIGH (ref 27.5–35.5)
AST SERPL-CCNC: 37 U/L — SIGNIFICANT CHANGE UP (ref 15–37)
BASOPHILS # BLD AUTO: 0.01 K/UL — SIGNIFICANT CHANGE UP (ref 0–0.2)
BASOPHILS NFR BLD AUTO: 0.2 % — SIGNIFICANT CHANGE UP (ref 0–2)
BILIRUB SERPL-MCNC: 1.2 MG/DL — SIGNIFICANT CHANGE UP (ref 0.2–1.2)
BUN SERPL-MCNC: 12 MG/DL — SIGNIFICANT CHANGE UP (ref 7–23)
CALCIUM SERPL-MCNC: 8.6 MG/DL — SIGNIFICANT CHANGE UP (ref 8.5–10.1)
CHLORIDE SERPL-SCNC: 105 MMOL/L — SIGNIFICANT CHANGE UP (ref 96–108)
CK MB BLD-MCNC: 13.4 % — HIGH (ref 0–3.5)
CK MB BLD-MCNC: 2.4 % — SIGNIFICANT CHANGE UP (ref 0–3.5)
CK MB CFR SERPL CALC: 3.2 NG/ML — SIGNIFICANT CHANGE UP (ref 0–3.6)
CK MB CFR SERPL CALC: 70.3 NG/ML — HIGH (ref 0–3.6)
CK SERPL-CCNC: 135 U/L — SIGNIFICANT CHANGE UP (ref 26–192)
CK SERPL-CCNC: 523 U/L — HIGH (ref 26–192)
CO2 SERPL-SCNC: 30 MMOL/L — SIGNIFICANT CHANGE UP (ref 22–31)
CREAT SERPL-MCNC: 1.2 MG/DL — SIGNIFICANT CHANGE UP (ref 0.5–1.3)
EGFR: 42 ML/MIN/1.73M2 — LOW
EOSINOPHIL # BLD AUTO: 0.07 K/UL — SIGNIFICANT CHANGE UP (ref 0–0.5)
EOSINOPHIL NFR BLD AUTO: 1.4 % — SIGNIFICANT CHANGE UP (ref 0–6)
GLUCOSE SERPL-MCNC: 114 MG/DL — HIGH (ref 70–99)
HCT VFR BLD CALC: 34.9 % — SIGNIFICANT CHANGE UP (ref 34.5–45)
HCT VFR BLD CALC: 35.5 % — SIGNIFICANT CHANGE UP (ref 34.5–45)
HGB BLD-MCNC: 11.2 G/DL — LOW (ref 11.5–15.5)
HGB BLD-MCNC: 11.6 G/DL — SIGNIFICANT CHANGE UP (ref 11.5–15.5)
IMM GRANULOCYTES NFR BLD AUTO: 0.6 % — SIGNIFICANT CHANGE UP (ref 0–1.5)
INR BLD: 1.85 RATIO — HIGH (ref 0.88–1.16)
LIDOCAIN IGE QN: 157 U/L — SIGNIFICANT CHANGE UP (ref 73–393)
LYMPHOCYTES # BLD AUTO: 1.07 K/UL — SIGNIFICANT CHANGE UP (ref 1–3.3)
LYMPHOCYTES # BLD AUTO: 22.1 % — SIGNIFICANT CHANGE UP (ref 13–44)
MCHC RBC-ENTMCNC: 29.8 PG — SIGNIFICANT CHANGE UP (ref 27–34)
MCHC RBC-ENTMCNC: 30.5 PG — SIGNIFICANT CHANGE UP (ref 27–34)
MCHC RBC-ENTMCNC: 32.1 GM/DL — SIGNIFICANT CHANGE UP (ref 32–36)
MCHC RBC-ENTMCNC: 32.7 GM/DL — SIGNIFICANT CHANGE UP (ref 32–36)
MCV RBC AUTO: 92.8 FL — SIGNIFICANT CHANGE UP (ref 80–100)
MCV RBC AUTO: 93.4 FL — SIGNIFICANT CHANGE UP (ref 80–100)
MONOCYTES # BLD AUTO: 0.64 K/UL — SIGNIFICANT CHANGE UP (ref 0–0.9)
MONOCYTES NFR BLD AUTO: 13.2 % — SIGNIFICANT CHANGE UP (ref 2–14)
NEUTROPHILS # BLD AUTO: 3.03 K/UL — SIGNIFICANT CHANGE UP (ref 1.8–7.4)
NEUTROPHILS NFR BLD AUTO: 62.5 % — SIGNIFICANT CHANGE UP (ref 43–77)
NRBC # BLD: 0 /100 WBCS — SIGNIFICANT CHANGE UP (ref 0–0)
NRBC # BLD: 0 /100 WBCS — SIGNIFICANT CHANGE UP (ref 0–0)
NT-PROBNP SERPL-SCNC: 2396 PG/ML — HIGH (ref 0–450)
PLATELET # BLD AUTO: 194 K/UL — SIGNIFICANT CHANGE UP (ref 150–400)
PLATELET # BLD AUTO: 232 K/UL — SIGNIFICANT CHANGE UP (ref 150–400)
POTASSIUM SERPL-MCNC: 3.3 MMOL/L — LOW (ref 3.5–5.3)
POTASSIUM SERPL-SCNC: 3.3 MMOL/L — LOW (ref 3.5–5.3)
PROT SERPL-MCNC: 7.3 G/DL — SIGNIFICANT CHANGE UP (ref 6–8.3)
PROTHROM AB SERPL-ACNC: 21.8 SEC — HIGH (ref 10.5–13.4)
RBC # BLD: 3.76 M/UL — LOW (ref 3.8–5.2)
RBC # BLD: 3.8 M/UL — SIGNIFICANT CHANGE UP (ref 3.8–5.2)
RBC # FLD: 19 % — HIGH (ref 10.3–14.5)
RBC # FLD: 19.1 % — HIGH (ref 10.3–14.5)
SARS-COV-2 RNA SPEC QL NAA+PROBE: SIGNIFICANT CHANGE UP
SODIUM SERPL-SCNC: 142 MMOL/L — SIGNIFICANT CHANGE UP (ref 135–145)
TROPONIN I, HIGH SENSITIVITY RESULT: 342.6 NG/L — HIGH
TROPONIN I, HIGH SENSITIVITY RESULT: HIGH NG/L
WBC # BLD: 4.85 K/UL — SIGNIFICANT CHANGE UP (ref 3.8–10.5)
WBC # BLD: 6.1 K/UL — SIGNIFICANT CHANGE UP (ref 3.8–10.5)
WBC # FLD AUTO: 4.85 K/UL — SIGNIFICANT CHANGE UP (ref 3.8–10.5)
WBC # FLD AUTO: 6.1 K/UL — SIGNIFICANT CHANGE UP (ref 3.8–10.5)

## 2022-06-17 PROCEDURE — 93010 ELECTROCARDIOGRAM REPORT: CPT

## 2022-06-17 PROCEDURE — 99285 EMERGENCY DEPT VISIT HI MDM: CPT

## 2022-06-17 PROCEDURE — 70450 CT HEAD/BRAIN W/O DYE: CPT | Mod: 26,MA

## 2022-06-17 PROCEDURE — 99223 1ST HOSP IP/OBS HIGH 75: CPT

## 2022-06-17 PROCEDURE — 93010 ELECTROCARDIOGRAM REPORT: CPT | Mod: 76,77

## 2022-06-17 PROCEDURE — 71045 X-RAY EXAM CHEST 1 VIEW: CPT | Mod: 26

## 2022-06-17 PROCEDURE — 70551 MRI BRAIN STEM W/O DYE: CPT | Mod: 26

## 2022-06-17 RX ORDER — WARFARIN SODIUM 2.5 MG/1
1 TABLET ORAL
Qty: 0 | Refills: 0 | DISCHARGE

## 2022-06-17 RX ORDER — METOPROLOL TARTRATE 50 MG
25 TABLET ORAL ONCE
Refills: 0 | Status: COMPLETED | OUTPATIENT
Start: 2022-06-17 | End: 2022-06-17

## 2022-06-17 RX ORDER — ATORVASTATIN CALCIUM 80 MG/1
80 TABLET, FILM COATED ORAL AT BEDTIME
Refills: 0 | Status: DISCONTINUED | OUTPATIENT
Start: 2022-06-17 | End: 2022-06-28

## 2022-06-17 RX ORDER — PANTOPRAZOLE SODIUM 20 MG/1
40 TABLET, DELAYED RELEASE ORAL DAILY
Refills: 0 | Status: DISCONTINUED | OUTPATIENT
Start: 2022-06-17 | End: 2022-06-30

## 2022-06-17 RX ORDER — METOPROLOL TARTRATE 50 MG
1 TABLET ORAL
Qty: 0 | Refills: 0 | DISCHARGE

## 2022-06-17 RX ORDER — ASPIRIN/CALCIUM CARB/MAGNESIUM 324 MG
324 TABLET ORAL ONCE
Refills: 0 | Status: COMPLETED | OUTPATIENT
Start: 2022-06-17 | End: 2022-06-17

## 2022-06-17 RX ORDER — ASPIRIN/CALCIUM CARB/MAGNESIUM 324 MG
81 TABLET ORAL DAILY
Refills: 0 | Status: DISCONTINUED | OUTPATIENT
Start: 2022-06-17 | End: 2022-06-30

## 2022-06-17 RX ORDER — PYRIDOXINE HCL (VITAMIN B6) 100 MG
1 TABLET ORAL
Qty: 0 | Refills: 0 | DISCHARGE

## 2022-06-17 RX ORDER — WARFARIN SODIUM 2.5 MG/1
0 TABLET ORAL
Qty: 0 | Refills: 0 | DISCHARGE

## 2022-06-17 RX ORDER — ASPIRIN/CALCIUM CARB/MAGNESIUM 324 MG
325 TABLET ORAL ONCE
Refills: 0 | Status: COMPLETED | OUTPATIENT
Start: 2022-06-17 | End: 2022-06-17

## 2022-06-17 RX ORDER — WARFARIN SODIUM 2.5 MG/1
4 TABLET ORAL ONCE
Refills: 0 | Status: COMPLETED | OUTPATIENT
Start: 2022-06-17 | End: 2022-06-17

## 2022-06-17 RX ORDER — LIDOCAINE 4 G/100G
0 CREAM TOPICAL
Qty: 0 | Refills: 0 | DISCHARGE

## 2022-06-17 RX ORDER — METOPROLOL TARTRATE 50 MG
25 TABLET ORAL DAILY
Refills: 0 | Status: DISCONTINUED | OUTPATIENT
Start: 2022-06-17 | End: 2022-06-18

## 2022-06-17 RX ORDER — POTASSIUM CHLORIDE 20 MEQ
40 PACKET (EA) ORAL ONCE
Refills: 0 | Status: COMPLETED | OUTPATIENT
Start: 2022-06-17 | End: 2022-06-17

## 2022-06-17 RX ORDER — FUROSEMIDE 40 MG
40 TABLET ORAL ONCE
Refills: 0 | Status: COMPLETED | OUTPATIENT
Start: 2022-06-17 | End: 2022-06-17

## 2022-06-17 RX ORDER — LEVOTHYROXINE SODIUM 125 MCG
75 TABLET ORAL DAILY
Refills: 0 | Status: DISCONTINUED | OUTPATIENT
Start: 2022-06-17 | End: 2022-06-30

## 2022-06-17 RX ORDER — LEVOTHYROXINE SODIUM 125 MCG
1 TABLET ORAL
Qty: 0 | Refills: 0 | DISCHARGE

## 2022-06-17 RX ORDER — ATORVASTATIN CALCIUM 80 MG/1
40 TABLET, FILM COATED ORAL AT BEDTIME
Refills: 0 | Status: DISCONTINUED | OUTPATIENT
Start: 2022-06-17 | End: 2022-06-17

## 2022-06-17 RX ORDER — FUROSEMIDE 40 MG
40 TABLET ORAL
Refills: 0 | Status: DISCONTINUED | OUTPATIENT
Start: 2022-06-17 | End: 2022-06-19

## 2022-06-17 RX ORDER — GABAPENTIN 400 MG/1
1 CAPSULE ORAL
Qty: 0 | Refills: 0 | DISCHARGE

## 2022-06-17 RX ADMIN — Medication 40 MILLIEQUIVALENT(S): at 12:12

## 2022-06-17 RX ADMIN — ATORVASTATIN CALCIUM 80 MILLIGRAM(S): 80 TABLET, FILM COATED ORAL at 22:19

## 2022-06-17 RX ADMIN — PANTOPRAZOLE SODIUM 40 MILLIGRAM(S): 20 TABLET, DELAYED RELEASE ORAL at 17:39

## 2022-06-17 RX ADMIN — Medication 40 MILLIGRAM(S): at 14:13

## 2022-06-17 RX ADMIN — Medication 325 MILLIGRAM(S): at 22:18

## 2022-06-17 RX ADMIN — Medication 25 MILLIGRAM(S): at 22:19

## 2022-06-17 RX ADMIN — Medication 324 MILLIGRAM(S): at 09:03

## 2022-06-17 RX ADMIN — WARFARIN SODIUM 4 MILLIGRAM(S): 2.5 TABLET ORAL at 22:18

## 2022-06-17 NOTE — H&P ADULT - PROBLEM SELECTOR PLAN 8
chronic  - c/w home synthroid  - f/up TFT's chronic  - c/w home synthroid 50 mcg qd  - f/up TFT's chronic  - c/w home synthroid 75 mcg qd  - f/up TFT's

## 2022-06-17 NOTE — ED PROVIDER NOTE - NSICDXPASTMEDICALHX_GEN_ALL_CORE_FT
PAST MEDICAL HISTORY:  Hypertension     Hypothyroid     Lower extremity edema     Paroxysmal atrial fibrillation

## 2022-06-17 NOTE — ED ADULT NURSE NOTE - OBJECTIVE STATEMENT
the patient presents to the er because she states at 4am, she woke up with this "funny feeling in chest" and weakness in my left arm.  she has use of the arm, able to move against gravity.  no drift noted.  good / equal strength.  no headache.  no n/v.  no facial droop or slurred speech.  she also mentions that she had an episode of diarrhea this am the patient presents to the er because she states at 4am, she woke up with this "funny feeling in chest" and weakness in my left arm.  she has use of the arm, able to move against gravity.  no drift noted.  good / equal strength.  no headache.  no n/v.  no facial droop or slurred speech. noted.  she has good strength in both upper and lower extremities.  swelling noted in both lower extremities.  sensation intact.  she also mentions that she had an episode of diarrhea this am

## 2022-06-17 NOTE — ED ADULT NURSE REASSESSMENT NOTE - NS ED NURSE REASSESS COMMENT FT1
1255:  patient left for mri.  screening was completed and faxed.  all metal removed from body.  carrie maya.

## 2022-06-17 NOTE — H&P ADULT - NEUROLOGICAL
details… normal/cranial nerves II-XII intact/sensation intact/responds to verbal commands normal/cranial nerves II-XII intact/sensation intact/responds to verbal commands/cranial nerves intact

## 2022-06-17 NOTE — ED ADULT NURSE REASSESSMENT NOTE - NS ED NURSE REASSESS COMMENT FT1
1328:  patient back from MRI.  tolerated well.  no distress noted.  vitals recorded.  awaiting echo.  carrie maya.

## 2022-06-17 NOTE — H&P ADULT - ATTENDING COMMENTS
Patient is a 89 y/o F with a pmhx b/l LE edema, HTN, P Afib (on coumadin), and hypothyroidism presenting with diarrhea and mid-sternal CP admitted for CP w/ elevated trops, NSTEMI.  Pt seen, examined, case & care plan d/w pt, residents at detail.  -Cardiology Dr STEVE Almazan d/w  -Palliative care Eval  AM labs   PO diet  Serial Labs with Cardiac enzyme   D/W Dtr at Detail.   D/W PMD- Dr Barbara Mccord at detail.     D/W Dr Almazan-Elevated CK/MB/Troponin  with NSTEMI  On tele  S/P ASA, statin, BB  D/W DR Almazan -Continue Current meds, No need to start IV Heparin drip, No plavix  Serial CE's Patient is a 89 y/o F with a pmhx b/l LE edema, HTN, P Afib (on coumadin), and hypothyroidism presenting with diarrhea and mid-sternal CP admitted for CP w/ elevated trops, NSTEMI.  Pt seen, examined, case & care plan d/w pt, residents at detail.  -Cardiology Dr STEVE Almazan d/w  -Palliative care Eval  AM labs   PO diet  Serial Labs with Cardiac enzyme   D/W Dtr at Detail.   D/W PMD- Dr Barbara Mccord at detail.     D/W Dr Almazan-Elevated CK/MB/Troponin  with NSTEMI  On tele  S/P ASA, statin, BB  D/W DR Almazan -Continue Current meds, No need to start IV Heparin drip, No plavix  Serial CE's

## 2022-06-17 NOTE — H&P ADULT - NSHPSOCIALHISTORY_GEN_ALL_CORE
Home:  Smoke: none  Alcohol: none  Recreational drug use: none  Ambulate: independent  Activities of daily living: independent  Job: retired Home: lives alone  Smoke: none  Alcohol: none  Recreational drug use: none  Ambulate: independent, uses support from furniture and cane occasionally  Activities of daily living: independent  Job: retired Home: lives alone  Smoke: none  Alcohol: none  Recreational drug use: none  Ambulate: independent, uses support from furniture and cane occasionally  Activities of daily living: independent  Job: retired  COVID - Vaccine

## 2022-06-17 NOTE — ED ADULT NURSE REASSESSMENT NOTE - NS ED NURSE REASSESS COMMENT FT1
0852:  patient placed on 2L nc for 90% room air.  no complaints of sob at this time.  pain in chest improved.  son in law remains at bedside.  carrie maya.

## 2022-06-17 NOTE — H&P ADULT - ASSESSMENT
Patient is a 89 y/o F with a pmhx b/l LE edema, HTN, P Afib (on coumadin), and hypothyroidism presenting with diarrhea and mid-sternal CP admitted for CP w/ elevated trops.     Patient is a 89 y/o F with a pmhx b/l LE edema, HTN, P Afib (on coumadin), and hypothyroidism presenting with diarrhea and mid-sternal CP admitted for CP w/ elevated trops, NSTEMI.     Patient is a 89 y/o F with a pmhx b/l LE edema, HTN, P Afib (on coumadin), and hypothyroidism presenting with diarrhea and mid-sternal CP admitted for CP w/ elevated trops, NSTEMI.

## 2022-06-17 NOTE — H&P ADULT - PROBLEM SELECTOR PLAN 9
DVT -> DVT -> c/w home coumadin - dose qd per daily INR DVT -> c/w home coumadin - dose qd per daily INR    Diarrhea - resolved -> hold home senna for now

## 2022-06-17 NOTE — H&P ADULT - GASTROINTESTINAL
details… normal/soft/nontender/nondistended/normal active bowel sounds normal/soft/nontender/nondistended/normal active bowel sounds/no guarding/no organomegaly/no palpable saul/no masses palpable

## 2022-06-17 NOTE — CHART NOTE - NSCHARTNOTEFT_GEN_A_CORE
Called by RN for Pt with elevation in troponin from 342.6 to >88690. Pt seen and examined at bedside. Pt does not have any chest pain at this time.      T(C): 36.6 (06-17-22 @ 17:27), Max: 36.7 (06-17-22 @ 15:22)  HR: 87 (06-17-22 @ 17:27) (67 - 87)  BP: 134/69 (06-17-22 @ 17:27) (134/69 - 160/70)  RR: 18 (06-17-22 @ 17:27) (15 - 18)  SpO2: 96% (06-17-22 @ 17:27) (93% - 99%)  Wt(kg): --      LABS:                        11.2   4.85  )-----------( 194      ( 17 Jun 2022 07:28 )             34.9     06-17    142  |  105  |  12  ----------------------------<  114<H>  3.3<L>   |  30  |  1.20    Ca    8.6      17 Jun 2022 07:28    TPro  7.3  /  Alb  2.9<L>  /  TBili  1.2  /  DBili  x   /  AST  37  /  ALT  21  /  AlkPhos  99  06-17    PT/INR - ( 17 Jun 2022 07:28 )   PT: 21.8 sec;   INR: 1.85 ratio         PTT - ( 17 Jun 2022 07:28 )  PTT:38.3 sec      CARDIAC MARKERS ( 17 Jun 2022 14:55 )  x     / x     / 523 U/L / x     / 70.3 ng/mL  CARDIAC MARKERS ( 17 Jun 2022 08:40 )  x     / x     / 135 U/L / x     / 3.2 ng/mL        Assessment/Plan  89yFemale with PMHX b/l LE edema, HTN, P Afib (on coumadin), and hypothyroidism presenting with sudden onset mid-sternal CP admitted for chest pain with elevated troponin. Pt now with elevation in troponin and all cardiac enzymes.    - STAT EKG ordered  - Elevation in all cardiac enzymes noted  - Cardiology, Dr. Almazan contacted and aware of elevation in cardiac enzymes  - No plans for transfer at this time   - No heparin drip at this time  - Will continue coumadin, INR subtherapeutic at this time  - Continue to trend cardiac enzymes  - Dr. Keagan Villegas aware of above plan   - RN to call if any changes    Dr. Currie  PGY2  [id number] Called by RN for Pt with elevation in troponin from 342.6 to >79524. Pt seen and examined at bedside. Pt does not have any chest pain at this time.      T(C): 36.6 (06-17-22 @ 17:27), Max: 36.7 (06-17-22 @ 15:22)  HR: 87 (06-17-22 @ 17:27) (67 - 87)  BP: 134/69 (06-17-22 @ 17:27) (134/69 - 160/70)  RR: 18 (06-17-22 @ 17:27) (15 - 18)  SpO2: 96% (06-17-22 @ 17:27) (93% - 99%)  Wt(kg): --      LABS:                        11.2   4.85  )-----------( 194      ( 17 Jun 2022 07:28 )             34.9     06-17    142  |  105  |  12  ----------------------------<  114<H>  3.3<L>   |  30  |  1.20    Ca    8.6      17 Jun 2022 07:28    TPro  7.3  /  Alb  2.9<L>  /  TBili  1.2  /  DBili  x   /  AST  37  /  ALT  21  /  AlkPhos  99  06-17    PT/INR - ( 17 Jun 2022 07:28 )   PT: 21.8 sec;   INR: 1.85 ratio         PTT - ( 17 Jun 2022 07:28 )  PTT:38.3 sec      CARDIAC MARKERS ( 17 Jun 2022 14:55 )  x     / x     / 523 U/L / x     / 70.3 ng/mL  CARDIAC MARKERS ( 17 Jun 2022 08:40 )  x     / x     / 135 U/L / x     / 3.2 ng/mL        Assessment/Plan  89yFemale with PMHX b/l LE edema, HTN, P Afib (on coumadin), and hypothyroidism presenting with sudden onset mid-sternal CP admitted for chest pain with elevated troponin. Pt now with elevation in troponin and all cardiac enzymes.    - STAT EKG ordered  - Elevation in all cardiac enzymes noted  - Cardiology, Dr. Almazan contacted and aware of elevation in cardiac enzymes  - No plans for transfer at this time   - No heparin drip at this time  - Will continue coumadin, INR subtherapeutic at this time  - Continue to trend cardiac enzymes  - Dr. Keagan Villegas aware of above plan   - RN to call if any changes    Dr. Currie  PGY2  [id number]  ____________________________________________________  ADDENDUM 7:54PM  -EKG showing T wave inversions in lateral leads V4-V6  -Patient seen and examined at bedside. Patient denies chest pain, chest pressure, palpitations, dyspnea. Patient continues to endorse LUE weakness but no pain. She is comfortably resting in bed in NAD.   -Will pursue medical management as per Cardio Dr Almazan  -Patient already anticoagulated on warfarin  -Increase dose of statin to 80mg qhs  -Already on BB w/ metoprolol  -f/u repeat Matheus at 11pm  -D/w Dr Villegas, agrees w/ plan above  -Will continue to follow, RN to notify for clinical changes Called by RN for Pt with elevation in troponin from 342.6 to >26985. Pt seen and examined at bedside. Pt does not have any chest pain at this time.      T(C): 36.6 (06-17-22 @ 17:27), Max: 36.7 (06-17-22 @ 15:22)  HR: 87 (06-17-22 @ 17:27) (67 - 87)  BP: 134/69 (06-17-22 @ 17:27) (134/69 - 160/70)  RR: 18 (06-17-22 @ 17:27) (15 - 18)  SpO2: 96% (06-17-22 @ 17:27) (93% - 99%)  Wt(kg): --      LABS:                        11.2   4.85  )-----------( 194      ( 17 Jun 2022 07:28 )             34.9     06-17    142  |  105  |  12  ----------------------------<  114<H>  3.3<L>   |  30  |  1.20    Ca    8.6      17 Jun 2022 07:28    TPro  7.3  /  Alb  2.9<L>  /  TBili  1.2  /  DBili  x   /  AST  37  /  ALT  21  /  AlkPhos  99  06-17    PT/INR - ( 17 Jun 2022 07:28 )   PT: 21.8 sec;   INR: 1.85 ratio         PTT - ( 17 Jun 2022 07:28 )  PTT:38.3 sec      CARDIAC MARKERS ( 17 Jun 2022 14:55 )  x     / x     / 523 U/L / x     / 70.3 ng/mL  CARDIAC MARKERS ( 17 Jun 2022 08:40 )  x     / x     / 135 U/L / x     / 3.2 ng/mL        Assessment/Plan  89yFemale with PMHX b/l LE edema, HTN, P Afib (on coumadin), and hypothyroidism presenting with sudden onset mid-sternal CP admitted for chest pain with elevated troponin. Pt now with elevation in troponin and all cardiac enzymes.    - STAT EKG ordered  - Elevation in all cardiac enzymes noted  - Cardiology, Dr. Almazan contacted and aware of elevation in cardiac enzymes  - No plans for transfer at this time   - No heparin drip at this time  - Will continue coumadin, INR subtherapeutic at this time  - Continue to trend cardiac enzymes  - Dr. Keagan Villegas aware of above plan   - RN to call if any changes    Dr. Currie  PGY2  [id number]  ____________________________________________________  ADDENDUM 7:54PM  -EKG showing T wave inversions in lateral leads V4-V6  -Patient seen and examined at bedside. Patient denies chest pain, chest pressure, palpitations, dyspnea. Patient continues to endorse LUE weakness but no pain. She is comfortably resting in bed in NAD.   -Will pursue medical management as per Cardio Dr Almazan  -STAT , metoprolol 25mg  -Patient already anticoagulated on warfarin  -Increase dose of statin to 80mg qhs  -Already on BB w/ metoprolol  -f/u repeat Matheus at 11pm  -D/w Dr Villegas, agrees w/ plan above  -Will continue to follow, RN to notify for clinical changes Called by RN for Pt with elevation in troponin from 342.6 to >85738. Pt seen and examined at bedside. Pt does not have any chest pain at this time.      T(C): 36.6 (06-17-22 @ 17:27), Max: 36.7 (06-17-22 @ 15:22)  HR: 87 (06-17-22 @ 17:27) (67 - 87)  BP: 134/69 (06-17-22 @ 17:27) (134/69 - 160/70)  RR: 18 (06-17-22 @ 17:27) (15 - 18)  SpO2: 96% (06-17-22 @ 17:27) (93% - 99%)  Wt(kg): --      LABS:                        11.2   4.85  )-----------( 194      ( 17 Jun 2022 07:28 )             34.9     06-17    142  |  105  |  12  ----------------------------<  114<H>  3.3<L>   |  30  |  1.20    Ca    8.6      17 Jun 2022 07:28    TPro  7.3  /  Alb  2.9<L>  /  TBili  1.2  /  DBili  x   /  AST  37  /  ALT  21  /  AlkPhos  99  06-17    PT/INR - ( 17 Jun 2022 07:28 )   PT: 21.8 sec;   INR: 1.85 ratio         PTT - ( 17 Jun 2022 07:28 )  PTT:38.3 sec      CARDIAC MARKERS ( 17 Jun 2022 14:55 )  x     / x     / 523 U/L / x     / 70.3 ng/mL  CARDIAC MARKERS ( 17 Jun 2022 08:40 )  x     / x     / 135 U/L / x     / 3.2 ng/mL        Assessment/Plan  89yFemale with PMHX b/l LE edema, HTN, P Afib (on coumadin), and hypothyroidism presenting with sudden onset mid-sternal CP admitted for chest pain with elevated troponin. Pt now with elevation in troponin and all cardiac enzymes.    - STAT EKG ordered  - Elevation in all cardiac enzymes noted  - Cardiology, Dr. Almazan contacted and aware of elevation in cardiac enzymes  - No plans for transfer at this time   - No heparin drip at this time  - Will continue coumadin, INR subtherapeutic at this time  - Continue to trend cardiac enzymes  - Dr. Keagan Villegas aware of above plan   - RN to call if any changes    Dr. Currie  PGY2  [id number]  ____________________________________________________  ADDENDUM 7:54PM  -EKG showing T wave inversions in lateral leads V4-V6  -Patient seen and examined at bedside. Patient denies chest pain, chest pressure, palpitations, dyspnea. Patient continues to endorse LUE weakness but no pain. She is comfortably resting in bed in NAD.   -Will pursue medical management as per Cardio Dr Almazan  -STAT , metoprolol 25mg  -Patient already anticoagulated on warfarin  -Increase dose of statin to 80mg qhs  -Already on BB w/ metoprolol  -f/u repeat Matheus at 11pm  -D/w Dr Villegas, agrees w/ plan above  -Will continue to follow, RN to notify for clinical changes  ______________________________________________________  ADDENDUM 11:30PM  -Called by RN as pt endorsing dyspnea and palpitations  -Patient seen and examined at bedside. Endorsing palpitations and "wheezing"  -Vitals wnl: 98.2, 142/69, 99, 18, 92% 2L NC  -On exam, patient comfortably speaking, no accessory muscle use. HR irregular, no crackles, wheezing, appreciated.  -STAT EKG grossly unchanged from 3 hours prior  -Cardiac enzymes downtrending  -Will give IV lasix 40mg IV  -D/w Dr Villegas, agrees w/ plan above  -Will continue to follow, RN to notify for clinical changes

## 2022-06-17 NOTE — H&P ADULT - NEGATIVE NEUROLOGICAL SYMPTOMS
no transient paralysis/no paresthesias/no generalized seizures/no focal seizures/no syncope/no vertigo/no loss of sensation/no difficulty walking/no loss of consciousness/no hemiparesis

## 2022-06-17 NOTE — H&P ADULT - NSHPREVIEWOFSYSTEMS_GEN_ALL_CORE
REVIEW OF SYSTEMS:  CONSTITUTIONAL: No fever, No chills, No fatigue, No myalgia, No body ache, No weakness  EYES: No eye pain,  No visual disturbances, No discharge, No Redness.  ENMT:  No ear pain, No nose bleed, No vertigo; No sinus pain, No throat pain, No congestion.  NECK: No pain, No stiffness.  RESPIRATORY: No cough, No wheezing, No  hemoptysis, No shortness of breath.  CARDIOVASCULAR: No chest pain, palpitations.  GASTROINTESTINAL: No abdominal pain, No epigastric pain. No nausea, No vomiting; No diarrhea, No constipation. [  ] BM  GENITOURINARY: No dysuria, No frequency, No urgency, No hematuria, No incontinence  NEUROLOGICAL: No headaches, No dizziness, No numbness, No tingling, No tremors, No weakness  EXT: No Swelling, No pain., No edema.  SKIN:  [  ] No itching, burning, rashes, or lesions   MUSCULOSKELETAL: No joint pain or swelling; No muscle pain, No back pain, No extremity pain.  PSYCHIATRIC: No depression,  No anxiety,  No mood swings, No difficulty sleeping at night.  PAIN SCALE: [  ] None  [  ] Other-  ROS Unable to obtain due to - [  ] Dementia  [  ] Lethargy [  ] Drowsy [  ] Sedated [  ] non verbal  REST OF REVIEW Of SYSTEM - [  ] Normal

## 2022-06-17 NOTE — H&P ADULT - PROBLEM SELECTOR PLAN 6
chronic, elevated on admit  - routine hemodynamic monitoring chronic, elevated on admit  - c/w lasix 40 mg qd and lopressor 50 mg qd w/ hold parameters  - routine hemodynamic monitoring chronic, elevated on admit - likely 2/2 pain  - c/w lasix 40 mg qd and lopressor 50 mg qd w/ hold parameters  - routine hemodynamic monitoring chronic, elevated on admit - likely 2/2 pain  - c/w lasix 40 mg qd and lopressor 25 mg qd w/ hold parameters  - routine hemodynamic monitoring hypokalemic - likely 2/2 GI losses in setting of diarrhea  - repleted, monitor in AM BMP, Mag level

## 2022-06-17 NOTE — H&P ADULT - PROBLEM SELECTOR PLAN 5
hypokalemic - likely 2/2 GI losses in setting of diarrhea  - replete prn, monitor in AM BMP chronic, elevated on admit - likely 2/2 pain  - c/w Lasix 40 mg qd and lopressor  XL 25 mg qd w/ hold parameters  - routine hemodynamic monitoring

## 2022-06-17 NOTE — H&P ADULT - PROBLEM SELECTOR PLAN 4
normocytic anemia on admit - baseline ~10-11 - HDS  - f/up iron studies, TFT's, B12, folate  - monitor for VS and s/s of worsening anemia and trend HgB in AM CBC normocytic anemia on admit - baseline ~10-11 - unclear etiology - HDS  - f/up iron studies, TFT's, B12, folate  - monitor for VS and s/s of worsening anemia and trend HgB in AM CBC L U Ext pain -likely 2/2 pain in setting of CP - CVA unlikely at this time, likjely 2/2 NSTEMI  - c/w plan as above  - CTH NC - No acute intracranial hemorrhage. Lacunar infarct R inferior cerebellum  neuro consulted - Xavi - will appreciate recs- doubt new CVA, on ASA & Coumadin-INR mildly sub therapeutic  MRI Brain,

## 2022-06-17 NOTE — H&P ADULT - CARDIOVASCULAR
details… aortic stenosis, crescendo decrescendo murmur; 3+ pitting LE edema, chronic/normal/regular rate and rhythm/S1 S2 present/no gallops/no rub/murmur/peripheral edema aortic stenosis, crescendo decrescendo murmur; 3+ pitting LE edema, chronic/normal/regular rate and rhythm/S1 S2 present/no gallops/no rub/no JVD/murmur/pedal edema/vascular

## 2022-06-17 NOTE — H&P ADULT - PROBLEM SELECTOR PLAN 7
chronic, swelling LE b/l on admit  - chronic, swelling LE b/l on admit  - c/w lasix 40 mg qd w/ hold parameters  - f/up TTE normocytic anemia on admit - baseline ~10-11 - unclear etiology - likely 2/2 fluid overload  - f/up iron studies, TFT's, B12, folate  - monitor for VS and s/s of worsening anemia and trend HgB in AM CBC

## 2022-06-17 NOTE — ED PROVIDER NOTE - OBJECTIVE STATEMENT
89 y/o F with a pmhx b/l LE edema, HTN,P Afib on coumadin , hypothyroidism, pw midsternal chest discomfort and left arm discomfort since waking up this morning at  4am, describes it as "creepy feeling" assoc with left arm weakness, general weakness and not feeling well. no cough, no fevers, + chronic le edema, better than usual today, no sob, no vomiting  did have diarrhea this morning, but that resolved, no abd pain,  pcp nely

## 2022-06-17 NOTE — PROGRESS NOTE ADULT - SUBJECTIVE AND OBJECTIVE BOX
neuro cons dict  seen for left arm weakness  tia; r/o cva  brain mri without  continue ac  echo  carotid doppler  lipid panel/hb1c

## 2022-06-17 NOTE — H&P ADULT - PROBLEM SELECTOR PLAN 2
likely 2/2 pain in setting of CP - CVA unlikely at this time  - CTH NC - No acute intracranial hemorrhage. Lacunar infarct R inferior cerebellum  neuro consulted - Xavi - will appreciate recs likely 2/2 pain in setting of CP - CVA unlikely at this time  - c/w plan as above  - CTH NC - No acute intracranial hemorrhage. Lacunar infarct R inferior cerebellum  neuro consulted - Xavi - will appreciate recs chronic, swelling LE b/l on admit  - c/w Lasix 40 mg qd w/ hold parameters  - f/up TTE

## 2022-06-17 NOTE — H&P ADULT - NSHPPHYSICALEXAM_GEN_ALL_CORE
T(C): 36.2 (06-17-22 @ 09:54), Max: 36.4 (06-17-22 @ 08:04)  HR: 70 (06-17-22 @ 09:54) (67 - 71)  BP: 154/67 (06-17-22 @ 09:54) (135/74 - 154/67)  RR: 17 (06-17-22 @ 09:54) (15 - 18)  SpO2: 98% (06-17-22 @ 09:54) (93% - 98%)  PHYSICAL EXAM:  GENERAL: NAD, lying in bed comfortably  HEAD:  Atraumatic, Normocephalic  EYES: EOMI, PERRLA, conjunctiva and sclera clear  ENT: Moist mucous membranes  NECK: Supple, No JVD  CHEST/LUNG: Clear to auscultation bilaterally; No rales, rhonchi, wheezing, or rubs. Unlabored respirations  HEART: Regular rate and rhythm; No murmurs, rubs, or gallops  ABDOMEN: Bowel sounds present; Soft, Nontender, Nondistended. No hepatomegally  EXTREMITIES:  2+ Peripheral Pulses, brisk capillary refill. No clubbing, cyanosis, or edema  NERVOUS SYSTEM:  Alert & Oriented X3, speech clear. No deficits   MSK: FROM all 4 extremities, full and equal strength  SKIN: No rashes or lesions

## 2022-06-17 NOTE — ED ADULT NURSE REASSESSMENT NOTE - NS ED NURSE REASSESS COMMENT FT1
0835:  lab had called with a critical value (troponin:  342.6).  MD made aware.  cardiology called.  Per MD, patient to be given Asa.  cxr in progress.  carrie maya.

## 2022-06-17 NOTE — ED ADULT NURSE REASSESSMENT NOTE - NS ED NURSE REASSESS COMMENT FT1
1538:  bed assignment received.  called unit, report given to Marta.  awaiting transport on monitor.  carrie maya.

## 2022-06-17 NOTE — ED PROVIDER NOTE - CLINICAL SUMMARY MEDICAL DECISION MAKING FREE TEXT BOX
89yo F with chest discomfort x 3 hours with left arm discomfort, ?Weakness, neuro intact, will ro acs

## 2022-06-17 NOTE — ED PROVIDER NOTE - PHYSICAL EXAMINATION
Gen: Well appearing in NAD  Head: NC/AT  Neck: trachea midline  cv: irrgeular, + murmur pitting edema bl le   Resp:  No distress, lungs clear  abd nontender   Ext: no deformities  Neuro:  A&O appears non focal, no motor or sensory deficit   Skin:  Warm and dry as visualized  Psych:  Normal affect and mood

## 2022-06-17 NOTE — H&P ADULT - PROBLEM SELECTOR PLAN 1
presents with acute CP pain w radiations down L arm - trops elevated w/ no acute EKG changes   - ADMIT to tele  - trop 342.6 | CKMB 3.2 | BNP 2396 | INR 1.85 (subtherapeutic)  - EKG ->  - s/p  in ED  - c/w home ASA, statin  - f/up TTE  - f/up CE q8h x2, lipids, a1c  - continuous tele monitoring  cardio consulted - Gauri - recs appreciated presents with acute CP pain w radiations down L arm - trops elevated w/ no acute EKG changes   - ADMIT to tele  - trop 342.6 | CKMB 3.2 | BNP 2396 | INR 1.85 (subtherapeutic)  - EKG ->  - s/p  in ED  - c/w home ASA, statin  - c/w home coumadin for now - dose per daily INR w/ goal INR 2-3  - f/up TTE  - f/up CE q8h x2, lipids, a1c  - continuous tele monitoring  cardio consulted - Gauri - recs appreciated presents with acute CP pain w radiations down L arm - trops elevated w/ no acute EKG changes   - ADMIT to tele  - trop 342.6 | CKMB 3.2 | BNP 2396 | INR 1.85 (subtherapeutic)  - EKG ->vent rate 66, QTc 501ms, sinus rhythm, old LBBB, no ischemic changes on personal read  - s/p  in ED  - Started ASA, statin  - c/w home coumadin for now - dose per daily INR w/ goal INR 2-3  - f/up TTE  - f/up CE q8h x2, lipids, a1c  - continuous tele monitoring  cardio consulted - Gauri - recs appreciated presents with acute CP pain w radiations down L arm - trops elevated w/ no acute EKG changes   - ADMIT to tele for NSTEMI   - trop 342.6 | CKMB 3.2 | BNP 2396 | INR 1.85 (subtherapeutic)  - EKG ->vent rate 66, QTc 501ms, sinus rhythm, old LBBB, no ischemic changes on personal read  - s/p  in ED  - Started ASA, statin daily , BB , o2 NC as needed , PPI  - c/w home coumadin for now - dose per daily INR w/ goal INR 2-3  - f/up TTE  - f/up CE q8h x2, lipids, a1c  - continuous tele monitoring  cardio consulted - Gauri - leny appreciated- D/W Dr Almazan

## 2022-06-17 NOTE — CONSULT NOTE ADULT - SUBJECTIVE AND OBJECTIVE BOX
[facility]         Reid Astorga, Norah, Raheem, Enrike, Flex, Rossy        [phone] (office)    Reason for Consult:    Interval HPI: Patient seen and examined at bedside. No acute events overnight.     HPI:  89 y/o F with a pmhx b/l LE edema, HTN,P Afib on coumadin , hypothyroidism, pw midsternal chest discomfort and left arm discomfort since waking up this morning at  4am, describes it as "creepy feeling" assoc with left arm weakness, general weakness and not feeling well. no cough, no fevers, + chronic le edema, better than usual today, no sob, no vomiting did have diarrhea this morning, but that resolved, no abd pain,    In ED:  VS - HR 71 | /65 | RR 18 | sO2 94 | T 97.6  Labs - HgB 11.2 | INR 1.85 | K 3.3 | Glu 114 | trop 342.6 | CKMB 3.2 | BNP 2396  CTH NC - No acute intracranial hemorrhage. Lacunar infarct R inferior cerebellum  EKG -   Given -  mg qd (17 Jun 2022 09:52)      PAST MEDICAL & SURGICAL HISTORY:  Hypothyroid      Hypertension      Lower extremity edema      Paroxysmal atrial fibrillation          SOCIAL HISTORY: No active tobacco, alcohol or illicit drug use    FAMILY HISTORY:      Home Medications:  Lopressor 50 mg oral tablet: 1 tab(s) orally once a day (17 Jun 2022 07:45)  senna oral tablet: 2 tab(s) orally once a day (at bedtime) (17 Jun 2022 07:45)  Synthroid 50 mcg (0.05 mg) oral tablet: 1 tab(s) orally once a day (17 Jun 2022 07:45)  Vitamin B6 50 mg oral tablet: 1 tab(s) orally once a day (17 Jun 2022 07:45)  warfarin:  (17 Jun 2022 09:41)      MEDICATIONS  (STANDING):  potassium chloride    Tablet ER 40 milliEquivalent(s) Oral once    MEDICATIONS  (PRN):      Allergies    No Known Allergies    Intolerances        REVIEW OF SYSTEMS: Negative except as per HPI.    VITAL SIGNS:   Vital Signs Last 24 Hrs  T(C): 36.2 (17 Jun 2022 09:54), Max: 36.4 (17 Jun 2022 08:04)  T(F): 97.2 (17 Jun 2022 09:54), Max: 97.6 (17 Jun 2022 08:04)  HR: 70 (17 Jun 2022 09:54) (67 - 71)  BP: 154/67 (17 Jun 2022 09:54) (135/74 - 154/67)  BP(mean): --  RR: 17 (17 Jun 2022 09:54) (15 - 18)  SpO2: 98% (17 Jun 2022 09:54) (93% - 98%)    I&O's Summary      PHYSICAL EXAM:  Constitutional: NAD, well-developed  HEENT NC/AT, moist mucous membranes  Pulmonary: Non-labored, breath sounds are clear bilaterally, no wheezing, rales or rhonchi  Cardiovascular: +S1, S2, RRR, mild to moderate AS  Gastrointestinal: Soft, nontender, nondistended, normoactive bowel sounds  Extremities: No peripheral edema   Neurological: Alert, strength and sensitivity are grossly intact  Skin: No obvious lesions/rashes  Psych: Mood & affect appropriate    LABS: All Labs Reviewed:                        11.2   4.85  )-----------( 194      ( 17 Jun 2022 07:28 )             34.9     17 Jun 2022 07:28    142    |  105    |  12     ----------------------------<  114    3.3     |  30     |  1.20     Ca    8.6        17 Jun 2022 07:28    TPro  7.3    /  Alb  2.9    /  TBili  1.2    /  DBili  x      /  AST  37     /  ALT  21     /  AlkPhos  99     17 Jun 2022 07:28    PT/INR - ( 17 Jun 2022 07:28 )   PT: 21.8 sec;   INR: 1.85 ratio         PTT - ( 17 Jun 2022 07:28 )  PTT:38.3 sec  CARDIAC MARKERS ( 17 Jun 2022 08:40 )  x     / x     / 135 U/L / x     / 3.2 ng/mL      Blood Culture:   06-17 @ 07:33  Pro Bnp 1756

## 2022-06-17 NOTE — H&P ADULT - HISTORY OF PRESENT ILLNESS
Patient is a 89 y/o F with a pmhx b/l LE edema, HTN, P Afib (on coumadin), and hypothyroidism presenting with diarrhea and mid-sternal CP.    89 y/o F with a pmhx b/l LE edema, HTN,P Afib on coumadin , hypothyroidism, pw midsternal chest discomfort and left arm discomfort since waking up this morning at  4am, describes it as "creepy feeling" assoc with left arm weakness, general weakness and not feeling well. no cough, no fevers, + chronic le edema, better than usual today, no sob, no vomiting  	did have diarrhea this morning, but that resolved, no abd pain,    In ED:  VS - HR 71 | /65 | RR 18 | sO2 94 | T 97.6  Labs - HgB 11.2 | INR 1.85 | K 3.3 | Glu 114 | trop 342.6 | CKMB 3.2 | BNP 2396  [facility] NC - No acute intracranial hemorrhage. Lacunar infarct R inferior cerebellum  EKG -   Given -  mg qd Patient is a 89 y/o F with a pmhx b/l LE edema, HTN, P Afib (on coumadin), and hypothyroidism presenting with sudden onset mid-sternal CP. Pt describes pain as a "prickly feeling", rated 4/10, constant in nature since 4am on 6/17, resolved in the ED. Pain was associated with L arm weakness, now resolved. Pt states the pain woke her up, and she was scared to walk and was unable to use her L arm for support to get out of bed. Pt was also nauseous at this time. No associated diaphoresis, palpitations, chest pressure. No amaurosis fugax, facial droop, garbled speech, hemiparesis, LOC, falls reported. No vomiting, fevers, chills, cough, sore throat, SOB, abd pain, constipation, dysuria. Pt had one loose BM this AM at home, denied hematochezia or melena.       In ED:  VS - HR 71 | /65 | RR 18 | sO2 94 | T 97.6  Labs - HgB 11.2 | INR 1.85 | K 3.3 | Glu 114 | trop 342.6 | CKMB 3.2 | BNP 2396  [facility] NC - No acute intracranial hemorrhage. Lacunar infarct R inferior cerebellum  EKG - vent rate 66, QTc 501ms, sinus rhythm, old LBBB, no ischemic changes on personal read  Given -  mg PO Patient is a 89 y/o F with a pmhx b/l LE edema, HTN, P Afib (on coumadin), and hypothyroidism presenting with sudden onset mid-sternal CP. Pt describes pain as a "prickly feeling", rated 4/10, constant in nature since 4am on 6/17, resolved in the ED. Pain was associated with L arm weakness, now resolved. Pt states the pain woke her up, and she was scared to walk and was unable to use her L arm for support to get out of bed. Pt was also nauseous at this time. No associated diaphoresis, palpitations, chest pressure. No amaurosis fugax, facial droop, garbled speech, hemiparesis, LOC, falls reported. No vomiting, fevers, chills, cough, sore throat, SOB, abd pain, constipation, dysuria. Pt had one loose BM this AM at home, denied hematochezia or melena.       In ED:  VS - HR 71 | /65 | RR 18 | sO2 94 | T 97.6  Labs - HgB 11.2 | INR 1.85 | K 3.3 | Glu 114 | trop 342.6 | CKMB 3.2 | BNP 2396  [facility] NC - No acute intracranial hemorrhage. Lacunar infarct R inferior cerebellum  EKG - vent rate 66, QTc 501ms, sinus rhythm, old LBBB, no ischemic changes on personal read  Given -  mg PO. pt seen by cardiology Dr Almazan , pt admitted to [facility] for NSTEMI. Patient is a 89 y/o F with a pmhx b/l LE edema, HTN, P Afib (on coumadin), and hypothyroidism presenting with sudden onset mid-sternal CP. Pt describes pain as a "prickly feeling", rated 4/10, constant in nature since 4am on 6/17, resolved in the ED. Pain was associated with L arm weakness, now resolved. Pt states the pain woke her up, and she was scared to walk and was unable to use her L arm for support to get out of bed. Pt was also nauseous at this time. No associated diaphoresis, palpitations, chest pressure. No amaurosis fugax, facial droop, garbled speech, hemiparesis, LOC, falls reported. No vomiting, fevers, chills, cough, sore throat, SOB, abd pain, constipation, dysuria. Pt had one loose BM this AM at home, denied hematochezia or melena.       In ED:  VS - HR 71 | /65 | RR 18 | sO2 94 | T 97.6  Labs - HgB 11.2 | INR 1.85 | K 3.3 | Glu 114 | trop 342.6 | CKMB 3.2 | BNP 2396  [facility] NC - No acute intracranial hemorrhage. Lacunar infarct R inferior cerebellum  EKG - vent rate 66, QTc 501ms, sinus rhythm, old LBBB, no ischemic changes on personal read  Given -  mg PO. pt seen by cardiology Dr Almazan , pt admitted to [facility] for NSTEMI.

## 2022-06-17 NOTE — ED ADULT NURSE REASSESSMENT NOTE - NS ED NURSE REASSESS COMMENT FT1
0805:  patient back from ct scan.  vitals recorded.  no new / worsening complaints.  patient unsure of why she takes Warfarin, or how much for that matter, but states she believes she takes 3mg m/w/f and then 4 on the alternate days.  she is alert.  son in law at bedside.  carrie maya.

## 2022-06-17 NOTE — H&P ADULT - PROBLEM SELECTOR PLAN 3
chronic, stable - rate-controlled - on coumadin  - INR subtherapeutic on admit ->  - continuous tele monitoring  cardio consulted - Gauri - leny appreciated chronic, stable - rate-controlled - on coumadin  - INR subtherapeutic on admit  - hold home coumadin for now -> start  - c/w home lopressor 50 mg qd w/ hold parameters  - continuous tele monitoring  cardio consulted - Gauri - leny leong chronic, stable - rate-controlled - on coumadin  - INR subtherapeutic on admit - on coumadin 3 mg qd (except Wed - 4 mg)  - c/w home coumadin for now - dose per daily INR w/ goal INR 2-3  - c/w home lopressor 50 mg qd w/ hold parameters  - continuous tele monitoring  cardio consulted - Gauri - recs appreciated chronic, stable - rate-controlled - on coumadin  - INR subtherapeutic on admit - on coumadin 3 mg qd (except Wed - 4 mg)  - c/w home coumadin for now - dose per daily INR w/ goal INR 2-3  - c/w home lopressor 50 mg qd w/ hold parameters  - f/up TFT's  - f/up TTE  - continuous tele monitoring  cardio consulted - Gauri - recs appreciated chronic, stable - rate-controlled - on coumadin  - INR subtherapeutic on admit - on coumadin 3 mg qd (except Wed - 4 mg)  - c/w home coumadin for now - dose per daily INR w/ goal INR 2-3  - c/w home lopressor 25 mg PO qd w/ hold parameters  - f/up TFT's  - f/up TTE  - continuous tele monitoring  cardio consulted - Gauri - recs appreciated chronic, stable - rate-controlled - on coumadin  - INR subtherapeutic on admit - on coumadin 3 mg qd (except Wed - 4 mg)  - c/w home coumadin for now - dose per daily INR w/ goal INR 2-3  - c/w home lopressor  XL 25 mg PO qd w/ hold parameters  - f/up TFT's  - f/up TTE  - continuous tele monitoring  cardio consulted - Dr Almazan d/w -continue Coumadin , No need to Bridge with IV Heparin drip

## 2022-06-17 NOTE — H&P ADULT - NEGATIVE CARDIOVASCULAR SYMPTOMS
admits chest "prickly" sensation/no chest pain/no palpitations/no dyspnea on exertion/no orthopnea/no paroxysmal nocturnal dyspnea/no claudication

## 2022-06-18 LAB
A1C WITH ESTIMATED AVERAGE GLUCOSE RESULT: 5.2 % — SIGNIFICANT CHANGE UP (ref 4–5.6)
ALBUMIN SERPL ELPH-MCNC: 2.8 G/DL — LOW (ref 3.3–5)
ALBUMIN SERPL ELPH-MCNC: 3 G/DL — LOW (ref 3.3–5)
ALP SERPL-CCNC: 100 U/L — SIGNIFICANT CHANGE UP (ref 40–120)
ALP SERPL-CCNC: 92 U/L — SIGNIFICANT CHANGE UP (ref 40–120)
ALT FLD-CCNC: 24 U/L — SIGNIFICANT CHANGE UP (ref 12–78)
ALT FLD-CCNC: 26 U/L — SIGNIFICANT CHANGE UP (ref 12–78)
ANION GAP SERPL CALC-SCNC: 6 MMOL/L — SIGNIFICANT CHANGE UP (ref 5–17)
ANION GAP SERPL CALC-SCNC: 7 MMOL/L — SIGNIFICANT CHANGE UP (ref 5–17)
APTT BLD: 38.3 SEC — HIGH (ref 27.5–35.5)
AST SERPL-CCNC: 107 U/L — HIGH (ref 15–37)
AST SERPL-CCNC: 96 U/L — HIGH (ref 15–37)
BILIRUB SERPL-MCNC: 1.4 MG/DL — HIGH (ref 0.2–1.2)
BILIRUB SERPL-MCNC: 1.5 MG/DL — HIGH (ref 0.2–1.2)
BUN SERPL-MCNC: 15 MG/DL — SIGNIFICANT CHANGE UP (ref 7–23)
BUN SERPL-MCNC: 18 MG/DL — SIGNIFICANT CHANGE UP (ref 7–23)
CALCIUM SERPL-MCNC: 8.6 MG/DL — SIGNIFICANT CHANGE UP (ref 8.5–10.1)
CALCIUM SERPL-MCNC: 8.6 MG/DL — SIGNIFICANT CHANGE UP (ref 8.5–10.1)
CHLORIDE SERPL-SCNC: 104 MMOL/L — SIGNIFICANT CHANGE UP (ref 96–108)
CHLORIDE SERPL-SCNC: 104 MMOL/L — SIGNIFICANT CHANGE UP (ref 96–108)
CHOLEST SERPL-MCNC: 144 MG/DL — SIGNIFICANT CHANGE UP
CK MB BLD-MCNC: 10.5 % — HIGH (ref 0–3.5)
CK MB BLD-MCNC: 6.6 % — HIGH (ref 0–3.5)
CK MB CFR SERPL CALC: 24 NG/ML — HIGH (ref 0–3.6)
CK MB CFR SERPL CALC: 52 NG/ML — HIGH (ref 0–3.6)
CK SERPL-CCNC: 364 U/L — HIGH (ref 26–192)
CK SERPL-CCNC: 497 U/L — HIGH (ref 26–192)
CO2 SERPL-SCNC: 30 MMOL/L — SIGNIFICANT CHANGE UP (ref 22–31)
CO2 SERPL-SCNC: 31 MMOL/L — SIGNIFICANT CHANGE UP (ref 22–31)
CREAT SERPL-MCNC: 1.3 MG/DL — SIGNIFICANT CHANGE UP (ref 0.5–1.3)
CREAT SERPL-MCNC: 1.4 MG/DL — HIGH (ref 0.5–1.3)
EGFR: 35 ML/MIN/1.73M2 — LOW
EGFR: 38 ML/MIN/1.73M2 — LOW
ESTIMATED AVERAGE GLUCOSE: 103 MG/DL — SIGNIFICANT CHANGE UP (ref 68–114)
FERRITIN SERPL-MCNC: 40 NG/ML — SIGNIFICANT CHANGE UP (ref 15–150)
FOLATE SERPL-MCNC: 14.9 NG/ML — SIGNIFICANT CHANGE UP
GLUCOSE SERPL-MCNC: 124 MG/DL — HIGH (ref 70–99)
GLUCOSE SERPL-MCNC: 95 MG/DL — SIGNIFICANT CHANGE UP (ref 70–99)
HCT VFR BLD CALC: 34.1 % — LOW (ref 34.5–45)
HDLC SERPL-MCNC: 44 MG/DL — LOW
HGB BLD-MCNC: 10.9 G/DL — LOW (ref 11.5–15.5)
INR BLD: 2.01 RATIO — HIGH (ref 0.88–1.16)
IRON SATN MFR SERPL: 25 % — SIGNIFICANT CHANGE UP (ref 14–50)
IRON SATN MFR SERPL: 88 UG/DL — SIGNIFICANT CHANGE UP (ref 30–160)
LIPID PNL WITH DIRECT LDL SERPL: 86 MG/DL — SIGNIFICANT CHANGE UP
MAGNESIUM SERPL-MCNC: 2.1 MG/DL — SIGNIFICANT CHANGE UP (ref 1.6–2.6)
MAGNESIUM SERPL-MCNC: 2.3 MG/DL — SIGNIFICANT CHANGE UP (ref 1.6–2.6)
MCHC RBC-ENTMCNC: 30 PG — SIGNIFICANT CHANGE UP (ref 27–34)
MCHC RBC-ENTMCNC: 32 GM/DL — SIGNIFICANT CHANGE UP (ref 32–36)
MCV RBC AUTO: 93.9 FL — SIGNIFICANT CHANGE UP (ref 80–100)
NON HDL CHOLESTEROL: 99 MG/DL — SIGNIFICANT CHANGE UP
NRBC # BLD: 0 /100 WBCS — SIGNIFICANT CHANGE UP (ref 0–0)
PHOSPHATE SERPL-MCNC: 3.3 MG/DL — SIGNIFICANT CHANGE UP (ref 2.5–4.5)
PHOSPHATE SERPL-MCNC: 3.4 MG/DL — SIGNIFICANT CHANGE UP (ref 2.5–4.5)
PLATELET # BLD AUTO: 183 K/UL — SIGNIFICANT CHANGE UP (ref 150–400)
POTASSIUM SERPL-MCNC: 3.7 MMOL/L — SIGNIFICANT CHANGE UP (ref 3.5–5.3)
POTASSIUM SERPL-MCNC: 3.9 MMOL/L — SIGNIFICANT CHANGE UP (ref 3.5–5.3)
POTASSIUM SERPL-SCNC: 3.7 MMOL/L — SIGNIFICANT CHANGE UP (ref 3.5–5.3)
POTASSIUM SERPL-SCNC: 3.9 MMOL/L — SIGNIFICANT CHANGE UP (ref 3.5–5.3)
PROT SERPL-MCNC: 6.8 G/DL — SIGNIFICANT CHANGE UP (ref 6–8.3)
PROT SERPL-MCNC: 7.2 G/DL — SIGNIFICANT CHANGE UP (ref 6–8.3)
PROTHROM AB SERPL-ACNC: 23.7 SEC — HIGH (ref 10.5–13.4)
RBC # BLD: 3.63 M/UL — LOW (ref 3.8–5.2)
RBC # FLD: 19.2 % — HIGH (ref 10.3–14.5)
SODIUM SERPL-SCNC: 141 MMOL/L — SIGNIFICANT CHANGE UP (ref 135–145)
SODIUM SERPL-SCNC: 141 MMOL/L — SIGNIFICANT CHANGE UP (ref 135–145)
T3FREE SERPL-MCNC: 1.54 PG/ML — LOW (ref 1.8–4.6)
T4 FREE SERPL-MCNC: 1.6 NG/DL — SIGNIFICANT CHANGE UP (ref 0.9–1.8)
TIBC SERPL-MCNC: 354 UG/DL — SIGNIFICANT CHANGE UP (ref 220–430)
TRANSFERRIN SERPL-MCNC: 313 MG/DL — SIGNIFICANT CHANGE UP (ref 200–360)
TRIGL SERPL-MCNC: 65 MG/DL — SIGNIFICANT CHANGE UP
TROPONIN I, HIGH SENSITIVITY RESULT: HIGH NG/L
TROPONIN I, HIGH SENSITIVITY RESULT: HIGH NG/L
TSH SERPL-MCNC: 0.42 UIU/ML — SIGNIFICANT CHANGE UP (ref 0.36–3.74)
UIBC SERPL-MCNC: 266 UG/DL — SIGNIFICANT CHANGE UP (ref 110–370)
VIT B12 SERPL-MCNC: 619 PG/ML — SIGNIFICANT CHANGE UP (ref 232–1245)
WBC # BLD: 5.7 K/UL — SIGNIFICANT CHANGE UP (ref 3.8–10.5)
WBC # FLD AUTO: 5.7 K/UL — SIGNIFICANT CHANGE UP (ref 3.8–10.5)

## 2022-06-18 PROCEDURE — 99232 SBSQ HOSP IP/OBS MODERATE 35: CPT

## 2022-06-18 PROCEDURE — 93880 EXTRACRANIAL BILAT STUDY: CPT | Mod: 26

## 2022-06-18 RX ORDER — METOPROLOL TARTRATE 50 MG
50 TABLET ORAL DAILY
Refills: 0 | Status: DISCONTINUED | OUTPATIENT
Start: 2022-06-18 | End: 2022-06-30

## 2022-06-18 RX ORDER — CLOPIDOGREL BISULFATE 75 MG/1
75 TABLET, FILM COATED ORAL DAILY
Refills: 0 | Status: DISCONTINUED | OUTPATIENT
Start: 2022-06-18 | End: 2022-06-30

## 2022-06-18 RX ADMIN — Medication 40 MILLIGRAM(S): at 00:16

## 2022-06-18 RX ADMIN — Medication 40 MILLIGRAM(S): at 14:08

## 2022-06-18 RX ADMIN — Medication 81 MILLIGRAM(S): at 14:08

## 2022-06-18 RX ADMIN — ATORVASTATIN CALCIUM 80 MILLIGRAM(S): 80 TABLET, FILM COATED ORAL at 21:11

## 2022-06-18 RX ADMIN — CLOPIDOGREL BISULFATE 75 MILLIGRAM(S): 75 TABLET, FILM COATED ORAL at 14:08

## 2022-06-18 RX ADMIN — Medication 25 MILLIGRAM(S): at 06:09

## 2022-06-18 RX ADMIN — PANTOPRAZOLE SODIUM 40 MILLIGRAM(S): 20 TABLET, DELAYED RELEASE ORAL at 14:08

## 2022-06-18 RX ADMIN — Medication 40 MILLIGRAM(S): at 06:10

## 2022-06-18 RX ADMIN — Medication 75 MICROGRAM(S): at 06:09

## 2022-06-18 NOTE — PROGRESS NOTE ADULT - PROBLEM SELECTOR PLAN 4
L U Ext pain -likely 2/2 pain in setting of CP - CVA unlikely at this time, likjely 2/2 NSTEMI  - c/w plan as above  - [facility] NC - No acute intracranial hemorrhage. Lacunar infarct R inferior cerebellum  neuro consulted - Xavi - will appreciate recs- doubt new CVA, on ASA & Coumadin at home  - MR shows 1.  No acute intracranial major vascular distribution infarction, hemorrhage or mass effect. 2.  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 Additional nonspecific white matter findings, most commonly suggestive of chronic small vessel ischemia and/or prior chronic lacunar infarcts. L U Ext pain -likely 2/2 pain in setting of CP - CVA unlikely at this time, likely 2/2 NSTEMI  - c/w plan as above  - CTH NC - No acute intracranial hemorrhage. Lacunar infarct R inferior cerebellum  neuro consulted - Xavi - will appreciate recs- doubt new CVA, on ASA & Coumadin at home  - MR shows 1.  No acute intracranial major vascular distribution infarction, hemorrhage or mass effect. 2.  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 Additional nonspecific white matter findings, most commonly suggestive of chronic small vessel ischemia and/or prior chronic lacunar infarcts.

## 2022-06-18 NOTE — PROGRESS NOTE ADULT - ATTENDING COMMENTS
Patient is a 89 y/o F with a pmhx b/l LE edema, HTN, P Afib (on coumadin), and hypothyroidism presenting with diarrhea and mid-sternal CP admitted for CP w/ elevated trops, NSTEMI.  Pt seen, examined, case & care plan d/w pt, residents at detail.  -Cardiology Dr Almazan follow up   -AM labs   -PO diet  -Palliative care Eval  D/W Dtr at Detail at bed side. Cardio NP --> option for Cardiac cath. HOLD Coumadin , INR in AM, If INR < 2 start Lovenox full dose.  Total care time is 50 minutes. Patient is a 89 y/o F with a pmhx b/l LE edema, HTN, P Afib (on coumadin), and hypothyroidism presenting with diarrhea and mid-sternal CP admitted for CP w/ elevated trops, NSTEMI.  Pt seen, examined, case & care plan d/w pt, residents at detail.  -Cardiology Dr Almazan follow up   -AM labs   -PO diet  -Palliative care Eval  D/W Dtr at Detail at bed side. Cardio NP --> option for Cardiac cath. HOLD Coumadin , INR in AM, If INR < 2 start Lovenox full dose.  Total care time is 50 minutes.

## 2022-06-18 NOTE — PROGRESS NOTE ADULT - SUBJECTIVE AND OBJECTIVE BOX
Patient is a 89y old  Female who presents with a chief complaint of CP w/ elevated trops (18 Jun 2022 10:44)    HPI:  Patient is a 89 y/o F with a pmhx b/l LE edema, HTN, P Afib (on coumadin), and hypothyroidism presenting with sudden onset mid-sternal CP. Pt describes pain as a "prickly feeling", rated 4/10, constant in nature since 4am on 6/17, resolved in the ED. Pain was associated with L arm weakness, now resolved. Pt states the pain woke her up, and she was scared to walk and was unable to use her L arm for support to get out of bed. Pt was also nauseous at this time. No associated diaphoresis, palpitations, chest pressure. No amaurosis fugax, facial droop, garbled speech, hemiparesis, LOC, falls reported. No vomiting, fevers, chills, cough, sore throat, SOB, abd pain, constipation, dysuria. Pt had one loose BM this AM at home, denied hematochezia or melena.       In ED:  VS - HR 71 | /65 | RR 18 | sO2 94 | T 97.6  Labs - HgB 11.2 | INR 1.85 | K 3.3 | Glu 114 | trop 342.6 | CKMB 3.2 | BNP 2396  [facility] NC - No acute intracranial hemorrhage. Lacunar infarct R inferior cerebellum  EKG - vent rate 66, QTc 501ms, sinus rhythm, old LBBB, no ischemic changes on personal read  Given -  mg PO. pt seen by cardiology Dr Almazan , pt admitted to [facility] for NSTEMI. (17 Jun 2022 09:52)    INTERVAL HPI:  6/18/22: Seen and examined at bedside. No acute complaints. Denies chest pain overnight and this AM. Overnight pt had episode of dyspnea, troponins inc from 300s to >41087. Trops now downtrended. Received 1 x dose 40mg IV lasix for dyspnea. Pt had statin dose inc to 80mg, had 1 x dose lopressor 25mg, and had her scheduled evening coumadin. Had 10 beats of NSVT this AM. Pt was started on standing 50mg toprol xL PO qD. Started on plavix 75mg qD. Extensive counseling done with the patient on [facility] and her current state of health. Patient states she has "forms at home" that her daughter will bring to [facility]. Patient values quality of life and wants to discuss with her daughter and son-julio. Family to discuss potential plan for cardiac cath and will arrive to a decision on 6/19. C/W medical therapy for ACS. HOLD coumadin as pt INR is therapeutic and to facilitate potential for cardiac cath. Will start renal adjusted FD Lovenox on 6/19 if INR is below 2.       OVERNIGHT EVENTS: as above    Home Medications:  Lasix 40 mg oral tablet: 1 tab(s) orally 2 times a day (17 Jun 2022 11:27)  Lopressor: 25  orally once a day (17 Jun 2022 11:27)  potassium chloride 10 mEq oral tablet, extended release: 2 tablets twice a day (takes it with her Lasix)  (17 Jun 2022 11:27)  Synthroid 75 mcg (0.075 mg) oral tablet: 1 tab(s) orally once a day (17 Jun 2022 11:27)  warfarin 3 mg oral tablet: 1 tab(s) orally once a day (M, Tu, Th, F, Sa Su) (17 Jun 2022 11:27)  warfarin 4 mg oral tablet: 1 tab(s) orally Wednesday (17 Jun 2022 11:27)      MEDICATIONS  (STANDING):  aspirin enteric coated 81 milliGRAM(s) Oral daily  atorvastatin 80 milliGRAM(s) Oral at bedtime  clopidogrel Tablet 75 milliGRAM(s) Oral daily  furosemide    Tablet 40 milliGRAM(s) Oral two times a day  levothyroxine 75 MICROGram(s) Oral daily  metoprolol succinate ER 50 milliGRAM(s) Oral daily  pantoprazole  Injectable 40 milliGRAM(s) IV Push daily    MEDICATIONS  (PRN):      Allergies    No Known Allergies    Intolerances        Social History:  Home: lives alone  Smoke: none  Alcohol: none  Recreational drug use: none  Ambulate: independent, uses support from furniture and cane occasionally  Activities of daily living: independent  Job: retired  COVID - Vaccine (17 Jun 2022 09:52)      REVIEW OF SYSTEMS:  CONSTITUTIONAL: No fever, No chills, No fatigue, No myalgia, No Body ache, No Weakness  EYES: No eye pain,  No visual disturbances, No discharge, NO Redness  ENMT:  No ear pain, No nose bleed, No vertigo; No sinus pain, NO throat pain, No Congestion  NECK: No pain, No stiffness  RESPIRATORY: No cough, NO wheezing, No  hemoptysis, NO  shortness of breath  CARDIOVASCULAR: No chest pain, palpitations  GASTROINTESTINAL: No abdominal pain, NO epigastric pain. No nausea, No vomiting; No diarrhea, No constipation. [  ] BM  GENITOURINARY: No dysuria, No frequency, No urgency, No hematuria, NO incontinence  NEUROLOGICAL: No headaches, No dizziness, No numbness, No tingling, No tremors, No weakness  EXT: admits CHRONIC Swelling in LE's;  No Pain, admits LE Edema  SKIN:  [ x ] No itching, burning, rashes, or lesions   MUSCULOSKELETAL: No joint pain ,No Jt swelling; No muscle pain, No back pain, No extremity pain  PSYCHIATRIC: No depression,  No anxiety,  No mood swings ,No difficulty sleeping at night  PAIN SCALE: [ x ] None  [  ] Other-  ROS Unable to obtain due to - [  ] Dementia  [  ] Lethargy [  ] Drowsy [  ] Sedated [  ] non verbal  REST OF REVIEW Of SYSTEM - [ x ] Normal     Vital Signs Last 24 Hrs  T(C): 36.8 (18 Jun 2022 13:19), Max: 36.8 (17 Jun 2022 22:25)  T(F): 98.3 (18 Jun 2022 13:19), Max: 98.3 (18 Jun 2022 00:27)  HR: 77 (18 Jun 2022 13:19) (67 - 99)  BP: 99/61 (18 Jun 2022 13:19) (99/61 - 148/65)  BP(mean): --  RR: 19 (18 Jun 2022 13:19) (16 - 20)  SpO2: 95% (18 Jun 2022 13:19) (92% - 99%)  Finger Stick          PHYSICAL EXAM:  GENERAL:  [ x ] NAD , [ x ] well appearing, [  ] Agitated, [  ] Drowsy,  [  ] Lethargy, [  ] confused   HEAD:  [ x ] Normal, [  ] Other  EYES:  [ x ] EOMI, [ x ] PERRLA, [ x ] conjunctiva and sclera clear normal, [  ] Other,  [  ] Pallor,[  ] Discharge  ENMT:  [ x ] Normal, [  ] Moist mucous membranes, [  ] Good dentition, [  ] No Thrush  NECK:  [ x ] Supple, [x  ] No JVD, [ x ] Normal thyroid, [  ] Lymphadenopathy [  ] Other  CHEST/LUNG:  [ x ] Clear to auscultation bilaterally, [ x ] Breath Sounds equal B/L , [  ] poor effort  [ x ] No rales, [ x ] No rhonchi  [ x ]  No wheezing,   HEART:  [ x ] Regular rate and rhythm, [ x ] tachycardia, [  ] Bradycardia,  [  ] irregular  [ x ] No murmurs, No rubs, No gallops, [  ] PPM in place (Mfr:  )  ABDOMEN:  [ x ] Soft, [ x ] Nontender, [ x ] Nondistended, [ x ] No mass, [  x] Bowel sounds present, [  ] obese  NERVOUS SYSTEM:  [ x ] Alert & Oriented X3, [ x ] Nonfocal  [  ] Confusion  [  ] Encephalopathic [  ] Sedated [  ] Unable to assess, [  ] Dementia [  ] Other-  EXTREMITIES: [ x ] 2+ Peripheral Pulses, No clubbing, No cyanosis,  [  ] edema B/L lower EXT. [  ] PVD stasis skin changes B/L Lower EXT, [  ] wound  LYMPH: No lymphadenopathy noted  SKIN:  [ x ] No rashes or lesions, [  ] Pressure Ulcers, [  ] ecchymosis, [  ] Skin Tears, [  ] Other    DIET: Diet, DASH/TLC:   Sodium & Cholesterol Restricted (06-17-22 @ 10:27)      LABS:                        10.9   5.70  )-----------( 183      ( 18 Jun 2022 08:32 )             34.1     18 Jun 2022 08:32    141    |  104    |  18     ----------------------------<  95     3.7     |  30     |  1.40     Ca    8.6        18 Jun 2022 08:32  Phos  3.3       18 Jun 2022 08:32  Mg     2.3       18 Jun 2022 08:32    TPro  6.8    /  Alb  2.8    /  TBili  1.5    /  DBili  x      /  AST  96     /  ALT  24     /  AlkPhos  92     18 Jun 2022 08:32    PT/INR - ( 18 Jun 2022 08:32 )   PT: 23.7 sec;   INR: 2.01 ratio         PTT - ( 18 Jun 2022 08:32 )  PTT:38.3 sec              CARDIAC MARKERS ( 18 Jun 2022 08:32 )  x     / x     / 364 U/L / x     / 24.0 ng/mL  CARDIAC MARKERS ( 17 Jun 2022 23:34 )  x     / x     / 497 U/L / x     / 52.0 ng/mL  CARDIAC MARKERS ( 17 Jun 2022 14:55 )  x     / x     / 523 U/L / x     / 70.3 ng/mL  CARDIAC MARKERS ( 17 Jun 2022 08:40 )  x     / x     / 135 U/L / x     / 3.2 ng/mL             Anemia Panel:  Iron Total, Serum: 88 ug/dL (06-18-22 @ 10:09)  Iron - Total Binding Capacity.: 354 ug/dL (06-18-22 @ 10:09)  Ferritin, Serum: 40 ng/mL (06-18-22 @ 10:09)  Vitamin B12, Serum: 619 pg/mL (06-18-22 @ 10:09)  Folate, Serum: 14.9 ng/mL (06-18-22 @ 10:09)      Thyroid Panel:  Thyroid Stimulating Hormone, Serum: 0.42 uIU/mL (06-18-22 @ 08:32)        Lipase, Serum: 157 U/L (06-17-22 @ 07:28)      Serum Pro-Brain Natriuretic Peptide: 2396 pg/mL (06-17-22 @ 07:33)      RADIOLOGY & ADDITIONAL TESTS:      HEALTH ISSUES - PROBLEM Dx:  Chest pain    Arm weakness    Hypothyroid    Hypertension    Lower extremity edema    Paroxysmal atrial fibrillation    Need for prophylactic measure    Anemia    Electrolyte abnormality            Consultant(s) Notes Reviewed:  [  ] YES     Care Discussed with [X] Consultants  [  ] Patient  [  ] Family [  ] HCP [  ]   [  ] Social Service  [  ] RN, [  ] Physical Therapy,[  ] Palliative care team  DVT PPX: [  ] Lovenox, [  ] S C Heparin, [  ] Coumadin, [  ] Xarelto, [  ] Eliquis, [  ] Pradaxa, [  ] IV Heparin drip, [  ] SCD [  ] Contraindication 2 to GI Bleed,[  ] Ambulation [  ] Contraindicated 2 to  bleed [  ] Contraindicated 2 to Brain Bleed  Advanced directive: [  ] None, [  ] DNR/DNI Patient is a 89y old  Female who presents with a chief complaint of CP w/ elevated trops (18 Jun 2022 10:44)    HPI:  Patient is a 89 y/o F with a pmhx b/l LE edema, HTN, P Afib (on coumadin), and hypothyroidism presenting with sudden onset mid-sternal CP. Pt describes pain as a "prickly feeling", rated 4/10, constant in nature since 4am on 6/17, resolved in the ED. Pain was associated with L arm weakness, now resolved. Pt states the pain woke her up, and she was scared to walk and was unable to use her L arm for support to get out of bed. Pt was also nauseous at this time. No associated diaphoresis, palpitations, chest pressure. No amaurosis fugax, facial droop, garbled speech, hemiparesis, LOC, falls reported. No vomiting, fevers, chills, cough, sore throat, SOB, abd pain, constipation, dysuria. Pt had one loose BM this AM at home, denied hematochezia or melena.       In ED:  VS - HR 71 | /65 | RR 18 | sO2 94 | T 97.6  Labs - HgB 11.2 | INR 1.85 | K 3.3 | Glu 114 | trop 342.6 | CKMB 3.2 | BNP 2396  [facility] NC - No acute intracranial hemorrhage. Lacunar infarct R inferior cerebellum  EKG - vent rate 66, QTc 501ms, sinus rhythm, old LBBB, no ischemic changes on personal read  Given -  mg PO. pt seen by cardiology Dr Almazan , pt admitted to [facility] for NSTEMI. (17 Jun 2022 09:52)    INTERVAL HPI:  6/18/22: Seen and examined at bedside. No acute complaints. Denies chest pain overnight and this AM. Overnight pt had episode of dyspnea, troponins inc from 300s to >28270. Trops now downtrended. Received 1 x dose 40mg IV lasix for dyspnea. Pt had statin dose inc to 80mg, had 1 x dose lopressor 25mg, and had her scheduled evening coumadin. Had 10 beats of NSVT this AM. Pt was started on standing 50mg toprol xL PO qD. Started on plavix 75mg qD. Extensive counseling done with the patient on [facility] and her current state of health. Patient states she has "forms at home" that her daughter will bring to [facility]. Patient values quality of life and wants to discuss with her daughter and son-julio. Family to discuss potential plan for cardiac cath and will arrive to a decision on 6/19. C/W medical therapy for ACS. HOLD coumadin as pt INR is therapeutic and to facilitate potential for cardiac cath. Will start renal adjusted FD Lovenox on 6/19 if INR is below 2.       OVERNIGHT EVENTS: as above    Home Medications:  Lasix 40 mg oral tablet: 1 tab(s) orally 2 times a day (17 Jun 2022 11:27)  Lopressor: 25  orally once a day (17 Jun 2022 11:27)  potassium chloride 10 mEq oral tablet, extended release: 2 tablets twice a day (takes it with her Lasix)  (17 Jun 2022 11:27)  Synthroid 75 mcg (0.075 mg) oral tablet: 1 tab(s) orally once a day (17 Jun 2022 11:27)  warfarin 3 mg oral tablet: 1 tab(s) orally once a day (M, Tu, Th, F, Sa Su) (17 Jun 2022 11:27)  warfarin 4 mg oral tablet: 1 tab(s) orally Wednesday (17 Jun 2022 11:27)      MEDICATIONS  (STANDING):  aspirin enteric coated 81 milliGRAM(s) Oral daily  atorvastatin 80 milliGRAM(s) Oral at bedtime  clopidogrel Tablet 75 milliGRAM(s) Oral daily  furosemide    Tablet 40 milliGRAM(s) Oral two times a day  levothyroxine 75 MICROGram(s) Oral daily  metoprolol succinate ER 50 milliGRAM(s) Oral daily  pantoprazole  Injectable 40 milliGRAM(s) IV Push daily    MEDICATIONS  (PRN):      Allergies    No Known Allergies    Intolerances        Social History:  Home: lives alone  Smoke: none  Alcohol: none  Recreational drug use: none  Ambulate: independent, uses support from furniture and cane occasionally  Activities of daily living: independent  Job: retired  COVID - Vaccine (17 Jun 2022 09:52)      REVIEW OF SYSTEMS:  CONSTITUTIONAL: No fever, No chills, No fatigue, No myalgia, No Body ache, No Weakness  EYES: No eye pain,  No visual disturbances, No discharge, NO Redness  ENMT:  No ear pain, No nose bleed, No vertigo; No sinus pain, NO throat pain, No Congestion  NECK: No pain, No stiffness  RESPIRATORY: No cough, NO wheezing, No  hemoptysis, ADMITS brief shortness of breath earlier, now resolved  CARDIOVASCULAR: No chest pain, palpitations  GASTROINTESTINAL: No abdominal pain, NO epigastric pain. No nausea, No vomiting; No diarrhea, No constipation. [ x ] BM  GENITOURINARY: No dysuria, No frequency, No urgency, No hematuria, NO incontinence  NEUROLOGICAL: No headaches, No dizziness, No numbness, No tingling, No tremors, No weakness  EXT: admits CHRONIC Swelling in LE's;  No Pain, admits LE Edema  SKIN:  [ x ] No itching, burning, rashes, or lesions   MUSCULOSKELETAL: No joint pain ,No Jt swelling; No muscle pain, No back pain, No extremity pain  PSYCHIATRIC: No depression,  No anxiety,  No mood swings ,No difficulty sleeping at night  PAIN SCALE: [ x ] None  [  ] Other-  ROS Unable to obtain due to - [  ] Dementia  [  ] Lethargy [  ] Drowsy [  ] Sedated [  ] non verbal  REST OF REVIEW Of SYSTEM - [ x ] Normal     Vital Signs Last 24 Hrs  T(C): 36.8 (18 Jun 2022 13:19), Max: 36.8 (17 Jun 2022 22:25)  T(F): 98.3 (18 Jun 2022 13:19), Max: 98.3 (18 Jun 2022 00:27)  HR: 77 (18 Jun 2022 13:19) (67 - 99)  BP: 99/61 (18 Jun 2022 13:19) (99/61 - 148/65)  BP(mean): --  RR: 19 (18 Jun 2022 13:19) (16 - 20)  SpO2: 95% (18 Jun 2022 13:19) (92% - 99%)  Finger Stick          PHYSICAL EXAM:  GENERAL:  [ x ] NAD , [ x ] well appearing, [  ] Agitated, [  ] Drowsy,  [  ] Lethargy, [  ] confused   HEAD:  [ x ] Normal, [  ] Other  EYES:  [ x ] EOMI, [ x ] PERRLA, [ x ] conjunctiva and sclera clear normal, [  ] Other,  [  ] Pallor,[  ] Discharge  ENMT:  [ x ] Normal, [  ] Moist mucous membranes, [  ] Good dentition, [  ] No Thrush  NECK:  [ x ] Supple, [x  ] No JVD, [ x ] Normal thyroid, [  ] Lymphadenopathy [  ] Other  CHEST/LUNG:  [ x ] Clear to auscultation bilaterally, [ x ] Breath Sounds equal B/L , [  ] poor effort  [ x ] No rales, [ x ] No rhonchi  [ x ]  No wheezing,   HEART:  [ x ] Regular rate and IRREGULARLY IRREGULAR rhythm, [  ] tachycardia, [  ] Bradycardia,  [  ] irregular  [ x ] No murmurs, No rubs, No gallops, [  ] PPM in place (Mfr:  )  ABDOMEN:  [ x ] Soft, [ x ] Nontender, [ x ] Nondistended, [ x ] No mass, [  x] Bowel sounds present, [  ] obese  NERVOUS SYSTEM:  [ x ] Alert & Oriented X3, [ x ] Nonfocal  [  ] Confusion  [  ] Encephalopathic [  ] Sedated [  ] Unable to assess, [  ] Dementia [  ] Other-  EXTREMITIES: [ x ] 2+ Peripheral Pulses, No clubbing, No cyanosis,  [  ] edema B/L lower EXT. [  ] PVD stasis skin changes B/L Lower EXT, [  ] wound  LYMPH: No lymphadenopathy noted  SKIN:  [ x ] No rashes or lesions, [  ] Pressure Ulcers, [  ] ecchymosis, [  ] Skin Tears, [  ] Other    DIET: Diet, DASH/TLC:   Sodium & Cholesterol Restricted (06-17-22 @ 10:27)      LABS:                        10.9   5.70  )-----------( 183      ( 18 Jun 2022 08:32 )             34.1     18 Jun 2022 08:32    141    |  104    |  18     ----------------------------<  95     3.7     |  30     |  1.40     Ca    8.6        18 Jun 2022 08:32  Phos  3.3       18 Jun 2022 08:32  Mg     2.3       18 Jun 2022 08:32    TPro  6.8    /  Alb  2.8    /  TBili  1.5    /  DBili  x      /  AST  96     /  ALT  24     /  AlkPhos  92     18 Jun 2022 08:32    PT/INR - ( 18 Jun 2022 08:32 )   PT: 23.7 sec;   INR: 2.01 ratio         PTT - ( 18 Jun 2022 08:32 )  PTT:38.3 sec              CARDIAC MARKERS ( 18 Jun 2022 08:32 )  x     / x     / 364 U/L / x     / 24.0 ng/mL  CARDIAC MARKERS ( 17 Jun 2022 23:34 )  x     / x     / 497 U/L / x     / 52.0 ng/mL  CARDIAC MARKERS ( 17 Jun 2022 14:55 )  x     / x     / 523 U/L / x     / 70.3 ng/mL  CARDIAC MARKERS ( 17 Jun 2022 08:40 )  x     / x     / 135 U/L / x     / 3.2 ng/mL             Anemia Panel:  Iron Total, Serum: 88 ug/dL (06-18-22 @ 10:09)  Iron - Total Binding Capacity.: 354 ug/dL (06-18-22 @ 10:09)  Ferritin, Serum: 40 ng/mL (06-18-22 @ 10:09)  Vitamin B12, Serum: 619 pg/mL (06-18-22 @ 10:09)  Folate, Serum: 14.9 ng/mL (06-18-22 @ 10:09)      Thyroid Panel:  Thyroid Stimulating Hormone, Serum: 0.42 uIU/mL (06-18-22 @ 08:32)        Lipase, Serum: 157 U/L (06-17-22 @ 07:28)      Serum Pro-Brain Natriuretic Peptide: 2396 pg/mL (06-17-22 @ 07:33)      RADIOLOGY & ADDITIONAL TESTS:      HEALTH ISSUES - PROBLEM Dx:  Chest pain    Arm weakness    Hypothyroid    Hypertension    Lower extremity edema    Paroxysmal atrial fibrillation    Need for prophylactic measure    Anemia    Electrolyte abnormality            Consultant(s) Notes Reviewed:  [  ] YES     Care Discussed with [X] Consultants  [  ] Patient  [  ] Family [  ] HCP [  ]   [  ] Social Service  [  ] RN, [  ] Physical Therapy,[  ] Palliative care team  DVT PPX: [  ] Lovenox, [  ] S C Heparin, [  ] Coumadin, [  ] Xarelto, [  ] Eliquis, [  ] Pradaxa, [  ] IV Heparin drip, [  ] SCD [  ] Contraindication 2 to GI Bleed,[  ] Ambulation [  ] Contraindicated 2 to  bleed [  ] Contraindicated 2 to Brain Bleed  Advanced directive: [  ] None, [  ] DNR/DNI Patient is a 89y old  Female who presents with a chief complaint of CP w/ elevated trops (18 Jun 2022 10:44)    HPI:  Patient is a 89 y/o F with a pmhx b/l LE edema, HTN, P Afib (on coumadin), and hypothyroidism presenting with sudden onset mid-sternal CP. Pt describes pain as a "prickly feeling", rated 4/10, constant in nature since 4am on 6/17, resolved in the ED. Pain was associated with L arm weakness, now resolved. Pt states the pain woke her up, and she was scared to walk and was unable to use her L arm for support to get out of bed. Pt was also nauseous at this time. No associated diaphoresis, palpitations, chest pressure. No amaurosis fugax, facial droop, garbled speech, hemiparesis, LOC, falls reported. No vomiting, fevers, chills, cough, sore throat, SOB, abd pain, constipation, dysuria. Pt had one loose BM this AM at home, denied hematochezia or melena.       In ED:  VS - HR 71 | /65 | RR 18 | sO2 94 | T 97.6  Labs - HgB 11.2 | INR 1.85 | K 3.3 | Glu 114 | trop 342.6 | CKMB 3.2 | BNP 2396  CT[facility] NC - No acute intracranial hemorrhage. Lacunar infarct R inferior cerebellum  EKG - vent rate 66, QTc 501ms, sinus rhythm, old LBBB, no ischemic changes on personal read  Given -  mg PO. pt seen by cardiology Dr Almazan , pt admitted to [facility] for NSTEMI. (17 Jun 2022 09:52)    INTERVAL HPI:  6/18/22: Seen and examined at bedside. No acute complaints. Denies chest pain overnight and this AM. Overnight pt had episode of dyspnea, troponins inc from 300s to >30455. Trops now downtrended. Received 1 x dose 40mg IV lasix for dyspnea. Pt had statin dose inc to 80mg, had 1 x dose lopressor 25mg, and had her scheduled evening coumadin. Had 10 beats of NSVT this AM. Pt was started on standing 50mg toprol xL PO qD. Started on plavix 75mg qD. Extensive counseling done with the patient on [facility] and her current state of health. Patient states she has "forms at home" that her daughter will bring to [facility]. Patient values quality of life and wants to discuss with her daughter and son-julio. Family to discuss potential plan for cardiac cath and will arrive to a decision on 6/19. C/W medical therapy for ACS. HOLD coumadin as pt INR is therapeutic and to facilitate potential for cardiac cath. Will start renal adjusted FD Lovenox on 6/19 if INR is below 2.     OVERNIGHT EVENTS: as above    Home Medications:  Lasix 40 mg oral tablet: 1 tab(s) orally 2 times a day (17 Jun 2022 11:27)  Lopressor: 25  orally once a day (17 Jun 2022 11:27)  potassium chloride 10 mEq oral tablet, extended release: 2 tablets twice a day (takes it with her Lasix)  (17 Jun 2022 11:27)  Synthroid 75 mcg (0.075 mg) oral tablet: 1 tab(s) orally once a day (17 Jun 2022 11:27)  warfarin 3 mg oral tablet: 1 tab(s) orally once a day (M, Tu, Th, F, Sa Su) (17 Jun 2022 11:27)  warfarin 4 mg oral tablet: 1 tab(s) orally Wednesday (17 Jun 2022 11:27)      MEDICATIONS  (STANDING):  aspirin enteric coated 81 milliGRAM(s) Oral daily  atorvastatin 80 milliGRAM(s) Oral at bedtime  clopidogrel Tablet 75 milliGRAM(s) Oral daily  furosemide    Tablet 40 milliGRAM(s) Oral two times a day  levothyroxine 75 MICROGram(s) Oral daily  metoprolol succinate ER 50 milliGRAM(s) Oral daily  pantoprazole  Injectable 40 milliGRAM(s) IV Push daily    MEDICATIONS  (PRN):      Allergies    No Known Allergies    Intolerances        Social History:  Home: lives alone  Smoke: none  Alcohol: none  Recreational drug use: none  Ambulate: independent, uses support from furniture and cane occasionally  Activities of daily living: independent  Job: retired  COVID - Vaccine (17 Jun 2022 09:52)      REVIEW OF SYSTEMS:  CONSTITUTIONAL: No fever, No chills, No fatigue, No myalgia, No Body ache, No Weakness  EYES: No eye pain,  No visual disturbances, No discharge, NO Redness  ENMT:  No ear pain, No nose bleed, No vertigo; No sinus pain, NO throat pain, No Congestion  NECK: No pain, No stiffness  RESPIRATORY: No cough, NO wheezing, No  hemoptysis, ADMITS brief shortness of breath earlier, now resolved  CARDIOVASCULAR: No chest pain, palpitations  GASTROINTESTINAL: No abdominal pain, NO epigastric pain. No nausea, No vomiting; No diarrhea, No constipation. [ x ] BM  GENITOURINARY: No dysuria, No frequency, No urgency, No hematuria, NO incontinence  NEUROLOGICAL: No headaches, No dizziness, No numbness, No tingling, No tremors, No weakness  EXT: admits CHRONIC Swelling in LE's;  No Pain, admits LE Edema  SKIN:  [ x ] No itching, burning, rashes, or lesions   MUSCULOSKELETAL: No joint pain ,No Jt swelling; No muscle pain, No back pain, No extremity pain  PSYCHIATRIC: No depression,  No anxiety,  No mood swings ,No difficulty sleeping at night  PAIN SCALE: [ x ] None  [  ] Other-  ROS Unable to obtain due to - [  ] Dementia  [  ] Lethargy [  ] Drowsy [  ] Sedated [  ] non verbal  REST OF REVIEW Of SYSTEM - [ x ] Normal     Vital Signs Last 24 Hrs  T(C): 36.8 (18 Jun 2022 13:19), Max: 36.8 (17 Jun 2022 22:25)  T(F): 98.3 (18 Jun 2022 13:19), Max: 98.3 (18 Jun 2022 00:27)  HR: 77 (18 Jun 2022 13:19) (67 - 99)  BP: 99/61 (18 Jun 2022 13:19) (99/61 - 148/65)  BP(mean): --  RR: 19 (18 Jun 2022 13:19) (16 - 20)  SpO2: 95% (18 Jun 2022 13:19) (92% - 99%)  Finger Stick      PHYSICAL EXAM:  GENERAL:  [ x ] NAD , [ x ] well appearing, [  ] Agitated, [  ] Drowsy,  [  ] Lethargy, [  ] confused   HEAD:  [ x ] Normal, [  ] Other  EYES:  [ x ] EOMI, [ x ] PERRLA, [ x ] conjunctiva and sclera clear normal, [  ] Other,  [  ] Pallor,[  ] Discharge  ENMT:  [ x ] Normal, [ x ] Moist mucous membranes, [ x ] Good dentition, [ x ] No Thrush  NECK:  [ x ] Supple, [x  ] No JVD, [ x ] Normal thyroid, [  ] Lymphadenopathy [  ] Other  CHEST/LUNG:  [ x ] Clear to auscultation bilaterally, [ x ] Breath Sounds equal B/L , [  ] poor effort  [ x ] No rales, [ x ] No rhonchi  [ x ]  No wheezing,   HEART:  [ x ] Regular rate and IRREGULARLY IRREGULAR rhythm, [  ] tachycardia, [  ] Bradycardia,  [  ] irregular  [ x ] No murmurs, No rubs, No gallops, [  ] PPM in place (Mfr:  )  ABDOMEN:  [ x ] Soft, [ x ] Nontender, [ x ] Nondistended, [ x ] No mass, [  x] Bowel sounds present, [  ] obese  NERVOUS SYSTEM:  [ x ] Alert & Oriented X3, [ x ] Nonfocal  [  ] Confusion  [  ] Encephalopathic [  ] Sedated [  ] Unable to assess, [  ] Dementia [  ] Other-  EXTREMITIES: [ x ] 2+ Peripheral Pulses, No clubbing, No cyanosis,  [ x ] 2 + pitting edema B/L lower EXT. [ x ] PVD stasis skin changes B/L Lower EXT, [  ] wound  LYMPH: No lymphadenopathy noted  SKIN:  [ x ] No rashes or lesions, [  ] Pressure Ulcers, [  ] ecchymosis, [  ] Skin Tears, [  ] Other    DIET: Diet, DASH/TLC:   Sodium & Cholesterol Restricted (06-17-22 @ 10:27)      LABS:                        10.9   5.70  )-----------( 183      ( 18 Jun 2022 08:32 )             34.1     18 Jun 2022 08:32    141    |  104    |  18     ----------------------------<  95     3.7     |  30     |  1.40     Ca    8.6        18 Jun 2022 08:32  Phos  3.3       18 Jun 2022 08:32  Mg     2.3       18 Jun 2022 08:32    TPro  6.8    /  Alb  2.8    /  TBili  1.5    /  DBili  x      /  AST  96     /  ALT  24     /  AlkPhos  92     18 Jun 2022 08:32    PT/INR - ( 18 Jun 2022 08:32 )   PT: 23.7 sec;   INR: 2.01 ratio         PTT - ( 18 Jun 2022 08:32 )  PTT:38.3 sec    CARDIAC MARKERS ( 18 Jun 2022 08:32 )  x     / x     / 364 U/L / x     / 24.0 ng/mL  CARDIAC MARKERS ( 17 Jun 2022 23:34 )  x     / x     / 497 U/L / x     / 52.0 ng/mL  CARDIAC MARKERS ( 17 Jun 2022 14:55 )  x     / x     / 523 U/L / x     / 70.3 ng/mL  CARDIAC MARKERS ( 17 Jun 2022 08:40 )  x     / x     / 135 U/L / x     / 3.2 ng/mL        Anemia Panel:  Iron Total, Serum: 88 ug/dL (06-18-22 @ 10:09)  Iron - Total Binding Capacity.: 354 ug/dL (06-18-22 @ 10:09)  Ferritin, Serum: 40 ng/mL (06-18-22 @ 10:09)  Vitamin B12, Serum: 619 pg/mL (06-18-22 @ 10:09)  Folate, Serum: 14.9 ng/mL (06-18-22 @ 10:09)      Thyroid Panel:  Thyroid Stimulating Hormone, Serum: 0.42 uIU/mL (06-18-22 @ 08:32)        Lipase, Serum: 157 U/L (06-17-22 @ 07:28)      Serum Pro-Brain Natriuretic Peptide: 2396 pg/mL (06-17-22 @ 07:33)      RADIOLOGY & ADDITIONAL TESTS: NONE      HEALTH ISSUES - PROBLEM Dx:  Chest pain    Arm weakness    Hypothyroid    Hypertension    Lower extremity edema    Paroxysmal atrial fibrillation    Need for prophylactic measure    Anemia    Electrolyte abnormality      Consultant(s) Notes Reviewed:  [x  ] YES     Care Discussed with [X] Consultants  [ x ] Patient  [ x ] Family [  ] HCP [  ]   [  ] Social Service  [ x ] RN, [  ] Physical Therapy,[  ] Palliative care team  DVT PPX: [  ] Lovenox, [  ] S C Heparin, [ x ] Coumadin, [  ] Xarelto, [  ] Eliquis, [  ] Pradaxa, [  ] IV Heparin drip, [  ] SCD [  ] Contraindication 2 to GI Bleed,[  ] Ambulation [  ] Contraindicated 2 to  bleed [  ] Contraindicated 2 to Brain Bleed  Advanced directive: [x  ] None, [  ] DNR/DNI Patient is a 89y old  Female who presents with a chief complaint of CP w/ elevated trops (18 Jun 2022 10:44)    HPI:  Patient is a 89 y/o F with a pmhx b/l LE edema, HTN, P Afib (on coumadin), and hypothyroidism presenting with sudden onset mid-sternal CP. Pt describes pain as a "prickly feeling", rated 4/10, constant in nature since 4am on 6/17, resolved in the ED. Pain was associated with L arm weakness, now resolved. Pt states the pain woke her up, and she was scared to walk and was unable to use her L arm for support to get out of bed. Pt was also nauseous at this time. No associated diaphoresis, palpitations, chest pressure. No amaurosis fugax, facial droop, garbled speech, hemiparesis, LOC, falls reported. No vomiting, fevers, chills, cough, sore throat, SOB, abd pain, constipation, dysuria. Pt had one loose BM this AM at home, denied hematochezia or melena.       In ED:  VS - HR 71 | /65 | RR 18 | sO2 94 | T 97.6  Labs - HgB 11.2 | INR 1.85 | K 3.3 | Glu 114 | trop 342.6 | CKMB 3.2 | BNP 2396  CTH NC - No acute intracranial hemorrhage. Lacunar infarct R inferior cerebellum  EKG - vent rate 66, QTc 501ms, sinus rhythm, old LBBB, no ischemic changes on personal read  Given -  mg PO. pt seen by cardiology Dr Almazan , pt admitted to [facility] for NSTEMI. (17 Jun 2022 09:52)    INTERVAL HPI:  6/18/22: Seen and examined at bedside. No acute complaints. Denies chest pain overnight and this AM. Overnight pt had episode of dyspnea, troponins inc from 300s to >[id number]. Trops now downtrended. Received 1 x dose 40mg IV lasix for dyspnea. Pt had statin dose inc to 80mg, had 1 x dose lopressor 25mg, and had her scheduled evening coumadin. Had 10 beats of NSVT this AM. Pt was started on standing 50mg toprol xL PO qD. Started on plavix 75mg qD. Extensive counseling done with the patient on [facility] and her current state of health. Patient states she has "forms at home" that her daughter will bring to [facility]. Patient values quality of life and wants to discuss with her daughter and son-julio. Family to discuss potential plan for cardiac cath and will arrive to a decision on 6/19. C/W medical therapy for ACS. HOLD coumadin as pt INR is therapeutic and to facilitate potential for cardiac cath. Will start renal adjusted FD Lovenox on 6/19 if INR is below 2.     OVERNIGHT EVENTS: as above    Home Medications:  Lasix 40 mg oral tablet: 1 tab(s) orally 2 times a day (17 Jun 2022 11:27)  Lopressor: 25  orally once a day (17 Jun 2022 11:27)  potassium chloride 10 mEq oral tablet, extended release: 2 tablets twice a day (takes it with her Lasix)  (17 Jun 2022 11:27)  Synthroid 75 mcg (0.075 mg) oral tablet: 1 tab(s) orally once a day (17 Jun 2022 11:27)  warfarin 3 mg oral tablet: 1 tab(s) orally once a day (M, Tu, Th, F, Sa Su) (17 Jun 2022 11:27)  warfarin 4 mg oral tablet: 1 tab(s) orally Wednesday (17 Jun 2022 11:27)      MEDICATIONS  (STANDING):  aspirin enteric coated 81 milliGRAM(s) Oral daily  atorvastatin 80 milliGRAM(s) Oral at bedtime  clopidogrel Tablet 75 milliGRAM(s) Oral daily  furosemide    Tablet 40 milliGRAM(s) Oral two times a day  levothyroxine 75 MICROGram(s) Oral daily  metoprolol succinate ER 50 milliGRAM(s) Oral daily  pantoprazole  Injectable 40 milliGRAM(s) IV Push daily    MEDICATIONS  (PRN):      Allergies    No Known Allergies    Intolerances        Social History:  Home: lives alone  Smoke: none  Alcohol: none  Recreational drug use: none  Ambulate: independent, uses support from furniture and cane occasionally  Activities of daily living: independent  Job: retired  COVID - Vaccine (17 Jun 2022 09:52)      REVIEW OF SYSTEMS:  CONSTITUTIONAL: No fever, No chills, No fatigue, No myalgia, No Body ache, No Weakness  EYES: No eye pain,  No visual disturbances, No discharge, NO Redness  ENMT:  No ear pain, No nose bleed, No vertigo; No sinus pain, NO throat pain, No Congestion  NECK: No pain, No stiffness  RESPIRATORY: No cough, NO wheezing, No  hemoptysis, ADMITS brief shortness of breath earlier, now resolved  CARDIOVASCULAR: No chest pain, palpitations  GASTROINTESTINAL: No abdominal pain, NO epigastric pain. No nausea, No vomiting; No diarrhea, No constipation. [ x ] BM  GENITOURINARY: No dysuria, No frequency, No urgency, No hematuria, NO incontinence  NEUROLOGICAL: No headaches, No dizziness, No numbness, No tingling, No tremors, No weakness  EXT: admits CHRONIC Swelling in LE's;  No Pain, admits LE Edema  SKIN:  [ x ] No itching, burning, rashes, or lesions   MUSCULOSKELETAL: No joint pain ,No Jt swelling; No muscle pain, No back pain, No extremity pain  PSYCHIATRIC: No depression,  No anxiety,  No mood swings ,No difficulty sleeping at night  PAIN SCALE: [ x ] None  [  ] Other-  ROS Unable to obtain due to - [  ] Dementia  [  ] Lethargy [  ] Drowsy [  ] Sedated [  ] non verbal  REST OF REVIEW Of SYSTEM - [ x ] Normal     Vital Signs Last 24 Hrs  T(C): 36.8 (18 Jun 2022 13:19), Max: 36.8 (17 Jun 2022 22:25)  T(F): 98.3 (18 Jun 2022 13:19), Max: 98.3 (18 Jun 2022 00:27)  HR: 77 (18 Jun 2022 13:19) (67 - 99)  BP: 99/61 (18 Jun 2022 13:19) (99/61 - 148/65)  BP(mean): --  RR: 19 (18 Jun 2022 13:19) (16 - 20)  SpO2: 95% (18 Jun 2022 13:19) (92% - 99%)  Finger Stick      PHYSICAL EXAM:  GENERAL:  [ x ] NAD , [ x ] well appearing, [  ] Agitated, [  ] Drowsy,  [  ] Lethargy, [  ] confused   HEAD:  [ x ] Normal, [  ] Other  EYES:  [ x ] EOMI, [ x ] PERRLA, [ x ] conjunctiva and sclera clear normal, [  ] Other,  [  ] Pallor,[  ] Discharge  ENMT:  [ x ] Normal, [ x ] Moist mucous membranes, [ x ] Good dentition, [ x ] No Thrush  NECK:  [ x ] Supple, [x  ] No JVD, [ x ] Normal thyroid, [  ] Lymphadenopathy [  ] Other  CHEST/LUNG:  [ x ] Clear to auscultation bilaterally, [ x ] Breath Sounds equal B/L , [  ] poor effort  [ x ] No rales, [ x ] No rhonchi  [ x ]  No wheezing,   HEART:  [ x ] Regular rate and IRREGULARLY IRREGULAR rhythm, [  ] tachycardia, [  ] Bradycardia,  [  ] irregular  [ x ] No murmurs, No rubs, No gallops, [  ] PPM in place (Mfr:  )  ABDOMEN:  [ x ] Soft, [ x ] Nontender, [ x ] Nondistended, [ x ] No mass, [  x] Bowel sounds present, [  ] obese  NERVOUS SYSTEM:  [ x ] Alert & Oriented X3, [ x ] Nonfocal  [  ] Confusion  [  ] Encephalopathic [  ] Sedated [  ] Unable to assess, [  ] Dementia [  ] Other-  EXTREMITIES: [ x ] 2+ Peripheral Pulses, No clubbing, No cyanosis,  [ x ] 2 + pitting edema B/L lower EXT. [ x ] PVD stasis skin changes B/L Lower EXT, [  ] wound  LYMPH: No lymphadenopathy noted  SKIN:  [ x ] No rashes or lesions, [  ] Pressure Ulcers, [  ] ecchymosis, [  ] Skin Tears, [  ] Other    DIET: Diet, DASH/TLC:   Sodium & Cholesterol Restricted (06-17-22 @ 10:27)      LABS:                        10.9   5.70  )-----------( 183      ( 18 Jun 2022 08:32 )             34.1     18 Jun 2022 08:32    141    |  104    |  18     ----------------------------<  95     3.7     |  30     |  1.40     Ca    8.6        18 Jun 2022 08:32  Phos  3.3       18 Jun 2022 08:32  Mg     2.3       18 Jun 2022 08:32    TPro  6.8    /  Alb  2.8    /  TBili  1.5    /  DBili  x      /  AST  96     /  ALT  24     /  AlkPhos  92     18 Jun 2022 08:32    PT/INR - ( 18 Jun 2022 08:32 )   PT: 23.7 sec;   INR: 2.01 ratio         PTT - ( 18 Jun 2022 08:32 )  PTT:38.3 sec    CARDIAC MARKERS ( 18 Jun 2022 08:32 )  x     / x     / 364 U/L / x     / 24.0 ng/mL  CARDIAC MARKERS ( 17 Jun 2022 23:34 )  x     / x     / 497 U/L / x     / 52.0 ng/mL  CARDIAC MARKERS ( 17 Jun 2022 14:55 )  x     / x     / 523 U/L / x     / 70.3 ng/mL  CARDIAC MARKERS ( 17 Jun 2022 08:40 )  x     / x     / 135 U/L / x     / 3.2 ng/mL        Anemia Panel:  Iron Total, Serum: 88 ug/dL (06-18-22 @ 10:09)  Iron - Total Binding Capacity.: 354 ug/dL (06-18-22 @ 10:09)  Ferritin, Serum: 40 ng/mL (06-18-22 @ 10:09)  Vitamin B12, Serum: 619 pg/mL (06-18-22 @ 10:09)  Folate, Serum: 14.9 ng/mL (06-18-22 @ 10:09)      Thyroid Panel:  Thyroid Stimulating Hormone, Serum: 0.42 uIU/mL (06-18-22 @ 08:32)        Lipase, Serum: 157 U/L (06-17-22 @ 07:28)      Serum Pro-Brain Natriuretic Peptide: 2396 pg/mL (06-17-22 @ 07:33)      RADIOLOGY & ADDITIONAL TESTS: NONE      HEALTH ISSUES - PROBLEM Dx:  Chest pain    Arm weakness    Hypothyroid    Hypertension    Lower extremity edema    Paroxysmal atrial fibrillation    Need for prophylactic measure    Anemia    Electrolyte abnormality      Consultant(s) Notes Reviewed:  [x  ] YES     Care Discussed with [X] Consultants  [ x ] Patient  [ x ] Family [  ] HCP [  ]   [  ] Social Service  [ x ] RN, [  ] Physical Therapy,[  ] Palliative care team  DVT PPX: [  ] Lovenox, [  ] S C Heparin, [ x ] Coumadin, [  ] Xarelto, [  ] Eliquis, [  ] Pradaxa, [  ] IV Heparin drip, [  ] SCD [  ] Contraindication 2 to GI Bleed,[  ] Ambulation [  ] Contraindicated 2 to  bleed [  ] Contraindicated 2 to Brain Bleed  Advanced directive: [x  ] None, [  ] DNR/DNI

## 2022-06-18 NOTE — PROGRESS NOTE ADULT - SUBJECTIVE AND OBJECTIVE BOX
Neurology follow up note  COVERING DR PAM [id number]      Interval History:    Patient feels ok no new complaints.    MEDICATIONS    aspirin enteric coated 81 milliGRAM(s) Oral daily  atorvastatin 80 milliGRAM(s) Oral at bedtime  furosemide    Tablet 40 milliGRAM(s) Oral two times a day  levothyroxine 75 MICROGram(s) Oral daily  metoprolol succinate ER 25 milliGRAM(s) Oral daily  pantoprazole  Injectable 40 milliGRAM(s) IV Push daily      Allergies    No Known Allergies    Intolerances        Height (cm): 165.1 (06-17 @ 06:50)  Weight (kg): 72.6 (06-17 @ 06:50)  BMI (kg/m2): 26.6 (06-17 @ 06:50)    Vital Signs Last 24 Hrs  T(C): 36.6 (18 Jun 2022 04:35), Max: 36.8 (17 Jun 2022 22:25)  T(F): 97.8 (18 Jun 2022 04:35), Max: 98.3 (18 Jun 2022 00:27)  HR: 76 (18 Jun 2022 04:35) (67 - 99)  BP: 118/66 (18 Jun 2022 04:35) (118/66 - 160/70)  BP(mean): --  RR: 19 (18 Jun 2022 04:35) (15 - 20)  SpO2: 95% (18 Jun 2022 04:35) (92% - 99%)      REVIEW OF SYSTEMS: Non Verbal  Limit or unable to obtain secondary to patient's poor mental status.    Constitutional: No fever, chills, fatigue, weakness  Eyes: no eye pain, visual disturbances, or discharge  ENT:  No difficulty hearing, tinnitus, vertigo; No sinus or throat pain  Neck: No pain or stiffness  Respiratory: No cough, dyspnea, wheezing   Cardiovascular: No chest pain, palpitations,   Gastrointestinal: No abdominal or epigastric pain. No nausea, vomiting  No diarrhea or constipation.   Genitourinary: No dysuria, frequency, hematuria or incontinence  Neurological: No headaches, lightheadedness, vertigo, numbness or tremors  Psychiatric: No depression, anxiety, mood swings or difficulty sleeping  Musculoskeletal: No joint pain or swelling; No muscle, back or extremity pain  Skin: No itching, burning, rashes or lesions   Lymph Nodes: No enlarged glands  Endocrine: No heat or cold intolerance; No hair loss   Allergy and Immunologic: No hives or eczema    On Neurological Examination:    Mental Status - Patient is alert, awake, oriented X3. Confused Dementia Lethargic Unresponsive  .       Follow simple commands  Follow complex commands  Does not follow commands    Speech -   Fluent    Dysarthria  Aphasia   Non Verbal                         Cranial Nerves - Pupils 3 mm equal and reactive to light,   extraocular eye movements intact.   smile symmetric  intact bilateral NLF    Motor Exam -   Right upper  Left upper  Right lower  Left lower     With stimuli positive movement of all 4 extremities    Muscle tone - is normal all over.  No asymmetry is seen.      Sensory    Bilateral intact to light touch    Gait -  normal  ataxia     GENERAL Exam: Nontoxic , No Acute Distress   	  HEENT:  normocephalic, atraumatic  		  LUNGS: Clear bilaterally  No Wheeze  Decreased bilaterally  	  HEART: Normal S1S2   No murmur RRR        	  GI/ ABDOMEN:  Soft  Non tender    EXTREMITIES:   No Edema  No Clubbing  No Cyanosis     MUSCULOSKELETAL: Normal Range of Motion  	   SKIN: Normal  No Ecchymosis               LABS:  CBC Full  -  ( 17 Jun 2022 23:34 )  WBC Count : 6.10 K/uL  RBC Count : 3.80 M/uL  Hemoglobin : 11.6 g/dL  Hematocrit : 35.5 %  Platelet Count - Automated : 232 K/uL  Mean Cell Volume : 93.4 fl  Mean Cell Hemoglobin : 30.5 pg  Mean Cell Hemoglobin Concentration : 32.7 gm/dL  Auto Neutrophil # : x  Auto Lymphocyte # : x  Auto Monocyte # : x  Auto Eosinophil # : x  Auto Basophil # : x  Auto Neutrophil % : x  Auto Lymphocyte % : x  Auto Monocyte % : x  Auto Eosinophil % : x  Auto Basophil % : x      06-17    141  |  104  |  15  ----------------------------<  124<H>  3.9   |  31  |  1.30    Ca    8.6      17 Jun 2022 23:34  Phos  3.4     06-17  Mg     2.1     06-17    TPro  7.2  /  Alb  3.0<L>  /  TBili  1.4<H>  /  DBili  x   /  AST  107<H>  /  ALT  26  /  AlkPhos  100  06-17    Hemoglobin A1C:     LIVER FUNCTIONS - ( 17 Jun 2022 23:34 )  Alb: 3.0 g/dL / Pro: 7.2 g/dL / ALK PHOS: 100 U/L / ALT: 26 U/L / AST: 107 U/L / GGT: x           Vitamin B12   PT/INR - ( 17 Jun 2022 07:28 )   PT: 21.8 sec;   INR: 1.85 ratio         PTT - ( 17 Jun 2022 07:28 )  PTT:38.3 sec      RADIOLOGY    ASSESSMENT AND PLAN      Physical therapy evaluationif possible and as tolerated .  OOB to chair/ambulation with assistance only.  Advanced care planning was discussed with family.  Pain is accessed and addressed.  Pt was screened for signs of clinical depression. Appropriate referral made.  Risk of falls accessed. Fall prevention and plan of care was discussed with family.  Pt is screened for tobacco and alcohol use. Counseling was done for smoking and alcohol cessation.  Use of narcotic pain meds was dicussed. Pt is advised to use narcotic meds wisely and to refrain from over using them.  Plan of care was discussed with family. Questions answered.  Would continue to follow.  30 minutes spent on total encounter; more than 50% of the visit was spent counseling and/or coordinating care by the attending physician.      Neurology follow up note  COVERING DR PAM [id number]      Interval History:    Patient feels left arm is better     MEDICATIONS    aspirin enteric coated 81 milliGRAM(s) Oral daily  atorvastatin 80 milliGRAM(s) Oral at bedtime  furosemide    Tablet 40 milliGRAM(s) Oral two times a day  levothyroxine 75 MICROGram(s) Oral daily  metoprolol succinate ER 25 milliGRAM(s) Oral daily  pantoprazole  Injectable 40 milliGRAM(s) IV Push daily      Allergies    No Known Allergies    Intolerances        Height (cm): 165.1 (06-17 @ 06:50)  Weight (kg): 72.6 (06-17 @ 06:50)  BMI (kg/m2): 26.6 (06-17 @ 06:50)    Vital Signs Last 24 Hrs  T(C): 36.6 (18 Jun 2022 04:35), Max: 36.8 (17 Jun 2022 22:25)  T(F): 97.8 (18 Jun 2022 04:35), Max: 98.3 (18 Jun 2022 00:27)  HR: 76 (18 Jun 2022 04:35) (67 - 99)  BP: 118/66 (18 Jun 2022 04:35) (118/66 - 160/70)  BP(mean): --  RR: 19 (18 Jun 2022 04:35) (15 - 20)  SpO2: 95% (18 Jun 2022 04:35) (92% - 99%)      REVIEW OF SYSTEMS:   Constitutional: No fever, chills, fatigue, weakness  Eyes: no eye pain, visual disturbances, or discharge  ENT:  No difficulty hearing, tinnitus, vertigo; No sinus or throat pain  Neck: No pain or stiffness  Respiratory: No cough, dyspnea, wheezing   Cardiovascular: No chest pain, palpitations,   Gastrointestinal: No abdominal or epigastric pain. No nausea, vomiting  No diarrhea or constipation.   Genitourinary: No dysuria, frequency, hematuria or incontinence  Neurological: No headaches, lightheadedness, vertigo, numbness or tremors  Psychiatric: No depression, anxiety, mood swings or difficulty sleeping  Musculoskeletal: No joint pain or swelling; No muscle, back or extremity pain  Skin: No itching, burning, rashes or lesions   Lymph Nodes: No enlarged glands  Endocrine: No heat or cold intolerance; No hair loss   Allergy and Immunologic: No hives or eczema    On Neurological Examination:    Mental Status - Patient is alert, awake, oriented        Follow simple commands    Speech -   Fluent                       Cranial Nerves - Pupils 3 mm equal and reactive to light,   extraocular eye movements intact.   smile symmetric  intact bilateral NLF    Motor Exam -   Right upper 5/5  Left upper5/5  Right lower4+/5  Left lower 4+/5    Muscle tone - is normal all over.  No asymmetry is seen.      Sensory    Bilateral intact to light touch    GENERAL Exam: Nontoxic , No Acute Distress   	  HEENT:  normocephalic, atraumatic  		  LUNGS: Clear bilaterally    	  HEART: Normal S1S2   No murmur RRR        	  GI/ ABDOMEN:  Soft  Non tender    EXTREMITIES:   No Edema  No Clubbing  No Cyanosis   	   SKIN: Normal  No Ecchymosis               LABS:  CBC Full  -  ( 17 Jun 2022 23:34 )  WBC Count : 6.10 K/uL  RBC Count : 3.80 M/uL  Hemoglobin : 11.6 g/dL  Hematocrit : 35.5 %  Platelet Count - Automated : 232 K/uL  Mean Cell Volume : 93.4 fl  Mean Cell Hemoglobin : 30.5 pg  Mean Cell Hemoglobin Concentration : 32.7 gm/dL  Auto Neutrophil # : x  Auto Lymphocyte # : x  Auto Monocyte # : x  Auto Eosinophil # : x  Auto Basophil # : x  Auto Neutrophil % : x  Auto Lymphocyte % : x  Auto Monocyte % : x  Auto Eosinophil % : x  Auto Basophil % : x      06-17    141  |  104  |  15  ----------------------------<  124<H>  3.9   |  31  |  1.30    Ca    8.6      17 Jun 2022 23:34  Phos  3.4     06-17  Mg     2.1     06-17    TPro  7.2  /  Alb  3.0<L>  /  TBili  1.4<H>  /  DBili  x   /  AST  107<H>  /  ALT  26  /  AlkPhos  100  06-17    Hemoglobin A1C:     LIVER FUNCTIONS - ( 17 Jun 2022 23:34 )  Alb: 3.0 g/dL / Pro: 7.2 g/dL / ALK PHOS: 100 U/L / ALT: 26 U/L / AST: 107 U/L / GGT: x           Vitamin B12   PT/INR - ( 17 Jun 2022 07:28 )   PT: 21.8 sec;   INR: 1.85 ratio         PTT - ( 17 Jun 2022 07:28 )  PTT:38.3 sec      RADIOLOGY    ASSESSMENT AND PLAN    Left arm heaviness weakness resolving  underlying cardiac event  mri head normal  cardiology follow up   hypothyroidism synthroid    Advanced care directives were discussed    30 minutes spent on total encounter; more than 50% of the visit was spent counseling and/or coordinating care by the attending physician.

## 2022-06-18 NOTE — PROGRESS NOTE ADULT - PROBLEM SELECTOR PLAN 7
normocytic anemia on admit - baseline ~10-11 - unclear etiology - likely 2/2 fluid overload  -  iron studies, TFT's, B12, folate noted  - monitor for VS and s/s of worsening anemia and trend HgB in AM CBC

## 2022-06-18 NOTE — PROGRESS NOTE ADULT - PROBLEM SELECTOR PLAN 3
chronic, stable - rate-controlled - on coumadin  - INR subtherapeutic on admit - on coumadin 3 mg qd (except Wed - 4 mg)  - HOLD coumadin, INR is therapeutic, START renal adjusted FD lovenox if INR is subtherapeutic as patient may elect for ischemic evaluation  - inc toprol  XL 50mg PO qd w/ hold parameters  - tfts wnl, noted  - f/up TTE  - continuous tele monitoring  cardio consulted - Dr Almazan d/w -continue Coumadin , No need to Bridge with IV Heparin drip chronic, stable - rate-controlled - on coumadin  - INR subtherapeutic on admit - on coumadin 3 mg qd (except Wed - 4 mg)  - HOLD coumadin, INR is therapeutic, START renal adjusted FD Lovenox if INR is subtherapeutic as patient may elect for ischemic evaluation  - inc Toprol  XL 50mg PO qd w/ hold parameters  - tfts wnl, noted  - f/up TTE  - continuous tele monitoring  cardio consulted - Dr Almazan d/w -HOLD  Coumadin, ?Plan for Cardiac cath on Monday,

## 2022-06-18 NOTE — PROGRESS NOTE ADULT - PROBLEM SELECTOR PLAN 1
presents with acute CP pain w radiations down L arm - trops elevated w/ no acute EKG changes   - ADMIT to tele for NSTEMI   - trop 342.6 | CKMB 3.2 | BNP 2396 | INR 1.85 (subtherapeutic)  - EKG ->vent rate 66, QTc 501ms, sinus rhythm, old LBBB, no ischemic changes on personal read  - s/p  in ED  - c/w asa 81mg qD, lipitor 80mg qD, toprol xl 50mg qD , o2 NC as needed , PPI  - HOLD coumadin, INR is therapeutic, START renal adjusted FD lovenox if INR is subtherapeutic as patient may elect for ischemic evaluation  - f/up TTE  - continuous tele monitoring  cardio consulted - Gauri michele appreciated- D/W Dr Almazan presents with acute CP pain w radiations down L arm - trops elevated w/ no acute EKG changes   - ADMIT to tele for NSTEMI , Few beats - NSVT on Tele  - trop 342.6 | CKMB 3.2 | BNP 2396 | INR 1.85 (subtherapeutic)  - EKG ->vent rate 66, QTc 501ms, sinus rhythm, old LBBB, no ischemic changes on personal read  - s/p  in ED  - c/w asa 81mg qD, Lipitor 80mg qD, Toprol xl 50mg qD , o2 NC as needed , PPI  - HOLD coumadin, INR is therapeutic, START renal adjusted FD Lovenox if INR is subtherapeutic as patient may elect for ischemic evaluation  - f/up TTE  - continuous tele monitoring  cardio consulted - Gauri michele appreciated- D/W Dr Almazan  -MARK, mag, Phos level in AM

## 2022-06-18 NOTE — PROGRESS NOTE ADULT - ASSESSMENT
89 y/o F with a pmhx b/l LE edema, HTN,P Afib on coumadin , hypothyroidism, pw midsternal chest discomfort atypical in nature and left arm discomfort since waking up this morning at  4am, describes it as "creepy feeling" assoc with left arm weakness, general weakness and not feeling well. Patient has no underlying coronary disease.     NSTEMI  - EKG showed Afib with underlying LBBB; unchanged from previous  - hsT trended up to >08013, though, remained asymptomatic overnight.  Started to downtrend  - On home Coumadin; Heparin gtt held off for INR 1.85; Today's=2.01  - Continue ASA, BB, and statin.  Start Plavix 75 mg daily  - Follow up TTE  - Monitor for ischemic chest pain.  Ischemic eval discussed with patient.  Unable ot decide; would like to discuss with daughter first    Permanent Afib  - Rates remained controlled  - Continue telemetry monitoring.  Had 10 beats NSVT overnight.  Would increase Toprol XL to 50 mg daily  - Monitor electrolytes, replete to keep K>4 and Mag>2    - Other cardiovascular workup will depend on clinical course.  - Will continue to follow.    Angeles Kim DNP, NP-C  Cardiology   Spectra #[phone]

## 2022-06-18 NOTE — PROGRESS NOTE ADULT - ASSESSMENT
Patient is a 89 y/o F with a pmhx b/l LE edema, HTN, P Afib (on coumadin), and hypothyroidism presenting with diarrhea and mid-sternal CP admitted for CP w/ elevated trops, NSTEMI. On ACS therapy, INR therapeutic this AM, await GOC discussions with family, role for potential cardiac cath on Monday     Patient is a 89 y/o F with a pmhx b/l LE edema, HTN, P Afib (on coumadin), and hypothyroidism presenting with diarrhea and mid-sternal CP admitted for CP w/ elevated trops, NSTEMI. On ACS therapy, INR therapeutic this AM, await GOC discussions with family, role for potential cardiac cath on Monday

## 2022-06-18 NOTE — PROGRESS NOTE ADULT - PROBLEM SELECTOR PLAN 9
DVT -> HOLD coumadin, INR is therapeutic, START renal adjusted FD lovenox if INR is subtherapeutic as patient may elect for ischemic evaluation  Diarrhea - resolved -> hold home senna for now DVT -> HOLD coumadin, INR is therapeutic, START renal adjusted FD Lovenox if INR is subtherapeutic as patient may elect for ischemic evaluation  Diarrhea - resolved -> hold home senna for now

## 2022-06-18 NOTE — PROGRESS NOTE ADULT - PROBLEM SELECTOR PLAN 6
hypokalemic - likely 2/2 GI losses in setting of diarrhea  - repleted, monitor in AM BMP, Mag level hypokalemic - likely 2/2 GI losses in setting of diarrhea  - replated, monitor in AM BMP, Mag level

## 2022-06-18 NOTE — PROGRESS NOTE ADULT - SUBJECTIVE AND OBJECTIVE BOX
[facility] -- Reid Astorga, Norah, Raheem, Enrike, Rossy Mccord Goodger  Office # [phone]    Follow Up:  PAfib, NSTEMI    Subjective/Observations: Denies CP overnight.  Denies palpitations.  Denies SOB, MAZARIEGOS or orthopnea.  Tele events noted, had 10 beats NSVT    REVIEW OF SYSTEMS: All other review of systems is negative unless indicated above  PAST MEDICAL & SURGICAL HISTORY:  Hypothyroid    Hypertension  Lower extremity edema  Paroxysmal atrial fibrillation    MEDICATIONS  (STANDING):  aspirin enteric coated 81 milliGRAM(s) Oral daily  atorvastatin 80 milliGRAM(s) Oral at bedtime  furosemide    Tablet 40 milliGRAM(s) Oral two times a day  levothyroxine 75 MICROGram(s) Oral daily  metoprolol succinate ER 25 milliGRAM(s) Oral daily  pantoprazole  Injectable 40 milliGRAM(s) IV Push daily    MEDICATIONS  (PRN):    Allergies    No Known Allergies    Intolerances    Vital Signs Last 24 Hrs  T(C): 36.6 (18 Jun 2022 04:35), Max: 36.8 (17 Jun 2022 22:25)  T(F): 97.8 (18 Jun 2022 04:35), Max: 98.3 (18 Jun 2022 00:27)  HR: 76 (18 Jun 2022 04:35) (67 - 99)  BP: 118/66 (18 Jun 2022 04:35) (118/66 - 160/70)  BP(mean): --  RR: 19 (18 Jun 2022 04:35) (16 - 20)  SpO2: 95% (18 Jun 2022 04:35) (92% - 99%)  I&O's Summary      PHYSICAL EXAM:  TELE: Afib  Constitutional: NAD, awake and alert, well-developed  HEENT: Moist Mucous Membranes, Anicteric  Pulmonary: Non-labored, breath sounds are clear bilaterally, No wheezing, rales or rhonchi  Cardiovascular: IRRR, S1 and S2, +murmurs, no rubs, gallops or clicks  Gastrointestinal: Bowel Sounds present, soft, nontender.   Lymph: BLE edema. No lymphadenopathy.  Skin: No visible rashes or ulcers.  Psych:  Mood & affect appropriate  LABS: All Labs Reviewed:                        10.9   5.70  )-----------( 183      ( 18 Jun 2022 08:32 )             34.1                         11.6   6.10  )-----------( 232      ( 17 Jun 2022 23:34 )             35.5                         11.2   4.85  )-----------( 194      ( 17 Jun 2022 07:28 )             34.9     18 Jun 2022 08:32    141    |  104    |  18     ----------------------------<  95     3.7     |  30     |  1.40   17 Jun 2022 23:34    141    |  104    |  15     ----------------------------<  124    3.9     |  31     |  1.30   17 Jun 2022 07:28    142    |  105    |  12     ----------------------------<  114    3.3     |  30     |  1.20     Ca    8.6        18 Jun 2022 08:32  Ca    8.6        17 Jun 2022 23:34  Ca    8.6        17 Jun 2022 07:28  Phos  3.3       18 Jun 2022 08:32  Phos  3.4       17 Jun 2022 23:34  Mg     2.3       18 Jun 2022 08:32  Mg     2.1       17 Jun 2022 23:34    TPro  6.8    /  Alb  2.8    /  TBili  1.5    /  DBili  x      /  AST  96     /  ALT  24     /  AlkPhos  92     18 Jun 2022 08:32  TPro  7.2    /  Alb  3.0    /  TBili  1.4    /  DBili  x      /  AST  107    /  ALT  26     /  AlkPhos  100    17 Jun 2022 23:34  TPro  7.3    /  Alb  2.9    /  TBili  1.2    /  DBili  x      /  AST  37     /  ALT  21     /  AlkPhos  99     17 Jun 2022 07:28    PT/INR - ( 18 Jun 2022 08:32 )   PT: 23.7 sec;   INR: 2.01 ratio      PTT - ( 18 Jun 2022 08:32 )  PTT:38.3 sec  CARDIAC MARKERS ( 18 Jun 2022 08:32 )  x     / x     / 364 U/L / x     / 24.0 ng/mL  CARDIAC MARKERS ( 17 Jun 2022 23:34 )  x     / x     / 497 U/L / x     / 52.0 ng/mL  CARDIAC MARKERS ( 17 Jun 2022 14:55 )  x     / x     / 523 U/L / x     / 70.3 ng/mL  CARDIAC MARKERS ( 17 Jun 2022 08:40 )  x     / x     / 135 U/L / x     / 3.2 ng/mL       EXAM:  ECHO TTE WO CON COMP W DOPP         PROCEDURE DATE:  10/11/2021        INTERPRETATION:  INDICATION: Edema  Sonographer AS    Blood Pressure 153/72    Height unavailable     Weight 70.3 kg    Dimensions:  LA 3.6       Normal Values: 2.0 - 4.0 cm  Ao 3.4        Normal Values: 2.0 - 3.8 cm  SEPTUM 1.3       Normal Values: 0.6 - 1.2 cm  PWT 1.1       Normal Values: 0.6 - 1.1 cm  LVIDd 4.3         Normal Values: 3.0 - 5.6 cm  LVIDs 2.9         Normal Values: 1.8 - 4.0 cm    OBSERVATIONS:  Technically difficult study  Mitral Valve: Mitral annular calcification, moderate MR.  Aortic Valve/Aorta: Calcified trileaflet aortic valve with decreased opening. Peak transaortic valve gradient 26.5 mmHg with a mean transaortic valve gradient 17.8 mmHg. The aortic valve area is calculated to be 1.34 sq cm by continuity equation. This is consistent with moderate aortic stenosis.  Tricuspid Valve: Moderate TR.  Pulmonic Valve: Trace PI  Left Atrium: normal  Right Atrium: Enlarged  Left Ventricle:normal LV size and systolic function, estimated LVEF of 55-60%.  Right Ventricle: Right ventricular enlargement with grossly normal systolic function  Pericardium: no significant pericardial effusion.  Pulmonary/RV Pressure: estimated PA systolic pressure of 46.3 mmHg assuming an RA pressure of 3 mmHg.  IVC measures 1.3 cm    IMPRESSION:  Normal left ventricular internal dimensions and systolic function, estimated LVEF of 55-60%.  Right ventricular enlargement with grossly normal systolic function  Calcified trileaflet aortic valve with moderate aortic stenosis, without AI.  Moderate MR and TR.  Right atrial enlargement  No significant pericardial effusion.    --- End of Report ---      JEAN CARLOS HENRY MD; Attending Cardiologist  This document has been electronically signed. Oct 12 2021  5:46PM    Ventricular Rate 66 BPM    Atrial Rate 66 BPM    QRS Duration 152 ms    Q-T Interval 478 ms    QTC Calculation(Bazett) 501 ms    R Axis -51 degrees    T Axis 133 degrees    Diagnosis Line atrial fibrilation  Left axis deviation  Left bundle branch block  Abnormal ECG  Confirmed by Jhonny Almazan MD ([id number]) on 6/17/2022 12:40:02 PM    ACC: [id number] EXAM:  XR CHEST PORTABLE URGENT 1V                          PROCEDURE DATE:  06/17/2022      INTERPRETATION:  AP semierect chest on June 17, 2022 at 8:35 AM. Patient   has chest pain.    Heart magnified by technique.    There is elevation of the right hemidiaphragm again noted.    Mild right base effusion somewhat increased from October 11, 2021.    IMPRESSION: As above.    --- End of Report ---    RUSSELL MONTES MD; Attending Radiologist  This document has been electronically signed. Jun 17 2022 11:18AM

## 2022-06-18 NOTE — PROGRESS NOTE ADULT - PROBLEM SELECTOR PLAN 5
chronic, elevated on admit - likely 2/2 pain  - c/w Lasix 40 mg qd and toprol 50mg qD w/ hold parameters  - routine hemodynamic monitoring chronic, elevated on admit - likely 2/2 pain  - c/w Lasix 40 mg qd and Toprol 50mg qD w/ hold parameters  - routine hemodynamic monitoring

## 2022-06-19 DIAGNOSIS — I21.4 NON-ST ELEVATION (NSTEMI) MYOCARDIAL INFARCTION: ICD-10-CM

## 2022-06-19 DIAGNOSIS — N17.9 ACUTE KIDNEY FAILURE, UNSPECIFIED: ICD-10-CM

## 2022-06-19 DIAGNOSIS — E87.6 HYPOKALEMIA: ICD-10-CM

## 2022-06-19 LAB
ALBUMIN SERPL ELPH-MCNC: 2.4 G/DL — LOW (ref 3.3–5)
ALP SERPL-CCNC: 77 U/L — SIGNIFICANT CHANGE UP (ref 40–120)
ALT FLD-CCNC: 21 U/L — SIGNIFICANT CHANGE UP (ref 12–78)
ANION GAP SERPL CALC-SCNC: 5 MMOL/L — SIGNIFICANT CHANGE UP (ref 5–17)
APTT BLD: 40.3 SEC — HIGH (ref 27.5–35.5)
AST SERPL-CCNC: 65 U/L — HIGH (ref 15–37)
BASOPHILS # BLD AUTO: 0.01 K/UL — SIGNIFICANT CHANGE UP (ref 0–0.2)
BASOPHILS NFR BLD AUTO: 0.2 % — SIGNIFICANT CHANGE UP (ref 0–2)
BILIRUB SERPL-MCNC: 1.2 MG/DL — SIGNIFICANT CHANGE UP (ref 0.2–1.2)
BUN SERPL-MCNC: 22 MG/DL — SIGNIFICANT CHANGE UP (ref 7–23)
CALCIUM SERPL-MCNC: 7.8 MG/DL — LOW (ref 8.5–10.1)
CHLORIDE SERPL-SCNC: 106 MMOL/L — SIGNIFICANT CHANGE UP (ref 96–108)
CO2 SERPL-SCNC: 32 MMOL/L — HIGH (ref 22–31)
CREAT SERPL-MCNC: 1.5 MG/DL — HIGH (ref 0.5–1.3)
EGFR: 32 ML/MIN/1.73M2 — LOW
EOSINOPHIL # BLD AUTO: 0.16 K/UL — SIGNIFICANT CHANGE UP (ref 0–0.5)
EOSINOPHIL NFR BLD AUTO: 2.9 % — SIGNIFICANT CHANGE UP (ref 0–6)
GLUCOSE SERPL-MCNC: 90 MG/DL — SIGNIFICANT CHANGE UP (ref 70–99)
HCT VFR BLD CALC: 30.7 % — LOW (ref 34.5–45)
HGB BLD-MCNC: 10 G/DL — LOW (ref 11.5–15.5)
IMM GRANULOCYTES NFR BLD AUTO: 0.2 % — SIGNIFICANT CHANGE UP (ref 0–1.5)
INR BLD: 2.16 RATIO — HIGH (ref 0.88–1.16)
LYMPHOCYTES # BLD AUTO: 1.7 K/UL — SIGNIFICANT CHANGE UP (ref 1–3.3)
LYMPHOCYTES # BLD AUTO: 31.2 % — SIGNIFICANT CHANGE UP (ref 13–44)
MAGNESIUM SERPL-MCNC: 2.3 MG/DL — SIGNIFICANT CHANGE UP (ref 1.6–2.6)
MCHC RBC-ENTMCNC: 29.9 PG — SIGNIFICANT CHANGE UP (ref 27–34)
MCHC RBC-ENTMCNC: 32.6 GM/DL — SIGNIFICANT CHANGE UP (ref 32–36)
MCV RBC AUTO: 91.9 FL — SIGNIFICANT CHANGE UP (ref 80–100)
MONOCYTES # BLD AUTO: 0.7 K/UL — SIGNIFICANT CHANGE UP (ref 0–0.9)
MONOCYTES NFR BLD AUTO: 12.8 % — SIGNIFICANT CHANGE UP (ref 2–14)
NEUTROPHILS # BLD AUTO: 2.87 K/UL — SIGNIFICANT CHANGE UP (ref 1.8–7.4)
NEUTROPHILS NFR BLD AUTO: 52.7 % — SIGNIFICANT CHANGE UP (ref 43–77)
NRBC # BLD: 0 /100 WBCS — SIGNIFICANT CHANGE UP (ref 0–0)
PHOSPHATE SERPL-MCNC: 2.5 MG/DL — SIGNIFICANT CHANGE UP (ref 2.5–4.5)
PLATELET # BLD AUTO: 159 K/UL — SIGNIFICANT CHANGE UP (ref 150–400)
POTASSIUM SERPL-MCNC: 3.2 MMOL/L — LOW (ref 3.5–5.3)
POTASSIUM SERPL-SCNC: 3.2 MMOL/L — LOW (ref 3.5–5.3)
PROT SERPL-MCNC: 6 G/DL — SIGNIFICANT CHANGE UP (ref 6–8.3)
PROTHROM AB SERPL-ACNC: 25.5 SEC — HIGH (ref 10.5–13.4)
RBC # BLD: 3.34 M/UL — LOW (ref 3.8–5.2)
RBC # FLD: 19.3 % — HIGH (ref 10.3–14.5)
SODIUM SERPL-SCNC: 143 MMOL/L — SIGNIFICANT CHANGE UP (ref 135–145)
WBC # BLD: 5.45 K/UL — SIGNIFICANT CHANGE UP (ref 3.8–10.5)
WBC # FLD AUTO: 5.45 K/UL — SIGNIFICANT CHANGE UP (ref 3.8–10.5)

## 2022-06-19 PROCEDURE — 99232 SBSQ HOSP IP/OBS MODERATE 35: CPT

## 2022-06-19 PROCEDURE — 93306 TTE W/DOPPLER COMPLETE: CPT | Mod: 26

## 2022-06-19 RX ORDER — POTASSIUM CHLORIDE 20 MEQ
10 PACKET (EA) ORAL
Refills: 0 | Status: COMPLETED | OUTPATIENT
Start: 2022-06-19 | End: 2022-06-19

## 2022-06-19 RX ORDER — ACETYLCYSTEINE 200 MG/ML
600 VIAL (ML) MISCELLANEOUS EVERY 12 HOURS
Refills: 0 | Status: COMPLETED | OUTPATIENT
Start: 2022-06-19 | End: 2022-06-21

## 2022-06-19 RX ORDER — SODIUM CHLORIDE 9 MG/ML
1000 INJECTION INTRAMUSCULAR; INTRAVENOUS; SUBCUTANEOUS
Refills: 0 | Status: DISCONTINUED | OUTPATIENT
Start: 2022-06-19 | End: 2022-06-21

## 2022-06-19 RX ORDER — ACETYLCYSTEINE 200 MG/ML
1200 VIAL (ML) MISCELLANEOUS EVERY 12 HOURS
Refills: 0 | Status: DISCONTINUED | OUTPATIENT
Start: 2022-06-19 | End: 2022-06-19

## 2022-06-19 RX ORDER — POTASSIUM CHLORIDE 20 MEQ
40 PACKET (EA) ORAL ONCE
Refills: 0 | Status: COMPLETED | OUTPATIENT
Start: 2022-06-19 | End: 2022-06-19

## 2022-06-19 RX ORDER — POTASSIUM CHLORIDE 20 MEQ
40 PACKET (EA) ORAL EVERY 4 HOURS
Refills: 0 | Status: COMPLETED | OUTPATIENT
Start: 2022-06-19 | End: 2022-06-19

## 2022-06-19 RX ADMIN — CLOPIDOGREL BISULFATE 75 MILLIGRAM(S): 75 TABLET, FILM COATED ORAL at 11:15

## 2022-06-19 RX ADMIN — PANTOPRAZOLE SODIUM 40 MILLIGRAM(S): 20 TABLET, DELAYED RELEASE ORAL at 11:13

## 2022-06-19 RX ADMIN — Medication 40 MILLIGRAM(S): at 06:21

## 2022-06-19 RX ADMIN — Medication 50 MILLIGRAM(S): at 08:44

## 2022-06-19 RX ADMIN — SODIUM CHLORIDE 50 MILLILITER(S): 9 INJECTION INTRAMUSCULAR; INTRAVENOUS; SUBCUTANEOUS at 14:30

## 2022-06-19 RX ADMIN — Medication 40 MILLIEQUIVALENT(S): at 13:54

## 2022-06-19 RX ADMIN — ATORVASTATIN CALCIUM 80 MILLIGRAM(S): 80 TABLET, FILM COATED ORAL at 22:01

## 2022-06-19 RX ADMIN — Medication 40 MILLIGRAM(S): at 13:54

## 2022-06-19 RX ADMIN — Medication 600 MILLIGRAM(S): at 17:08

## 2022-06-19 RX ADMIN — Medication 81 MILLIGRAM(S): at 11:13

## 2022-06-19 RX ADMIN — Medication 100 MILLIEQUIVALENT(S): at 11:13

## 2022-06-19 RX ADMIN — Medication 40 MILLIEQUIVALENT(S): at 17:05

## 2022-06-19 RX ADMIN — Medication 75 MICROGRAM(S): at 06:20

## 2022-06-19 RX ADMIN — Medication 40 MILLIEQUIVALENT(S): at 22:00

## 2022-06-19 NOTE — PROGRESS NOTE ADULT - ASSESSMENT
89 y/o F with a pmhx b/l LE edema, HTN,P Afib on coumadin , hypothyroidism, pw midsternal chest discomfort atypical in nature and left arm discomfort since waking up this morning at  4am, describes it as "creepy feeling" assoc with left arm weakness, general weakness and not feeling well. Patient has no underlying coronary disease.     NSTEMI  - EKG showed Afib with underlying LBBB; unchanged from previous  - hsT trended up to >49261, though, remained asymptomatic overnight.  Downtrended to ~11969  - Remains non-anginal  - Continue ASA, Plavix, BB, and statin.    - Follow up TTE  - Monitor for ischemic chest pain.  Ischemic eval discussed with patient and daughter, agreed to pursue cardiac cath.  Pending renal function and INR, will arrange in am    Permanent Afib  - Rates remained controlled.  Continue telemetry monitoring.  - No further ventricular arrhythmias overnight.  No evidence of Afib  - Continue to hold Coumadin    - Monitor electrolytes, replete to keep K>4 and Mag>2    - Other cardiovascular workup will depend on clinical course.  - Will continue to follow.    Angeles Kim DNP, NP-C  Cardiology   Spectra #[phone]   89 y/o F with a pmhx b/l LE edema, HTN,P Afib on coumadin , hypothyroidism, pw midsternal chest discomfort atypical in nature and left arm discomfort since waking up this morning at  4am, describes it as "creepy feeling" assoc with left arm weakness, general weakness and not feeling well. Patient has no underlying coronary disease.     NSTEMI  - EKG showed Afib with underlying LBBB; unchanged from previous  - hsT trended up to >19110, though, remained asymptomatic overnight.  Downtrended to ~65946  - Remains non-anginal  - Continue ASA, Plavix, BB, and statin.    - Follow up TTE  - Monitor for ischemic chest pain.  Ischemic eval discussed with patient and daughter, agreed to pursue cardiac cath.  Pending renal function and INR, will arrange in am  - Recommend Renal input for optimization.    Permanent Afib  - Rates remained controlled.  Continue telemetry monitoring.  - No further ventricular arrhythmias overnight.  No evidence of Afib  - Continue to hold Coumadin    - Monitor electrolytes, replete to keep K>4 and Mag>2    - Other cardiovascular workup will depend on clinical course.  - Will continue to follow.    Angeles Kim DNP, NP-C  Cardiology   Spectra #[phone]

## 2022-06-19 NOTE — PROGRESS NOTE ADULT - SUBJECTIVE AND OBJECTIVE BOX
[facility] -- Reid Astorga, Raheem Almazan, Flex Calvert Savella, Goodger  Office # [phone]    Follow Up:   PAfib, NSTEMI    Subjective/Observations: Admits to have slept well overnight.  Denies chets pain or palpitations.  Still on NC, though, denies SOB or orthopnea    REVIEW OF SYSTEMS: All other review of systems is negative unless indicated above  PAST MEDICAL & SURGICAL HISTORY:  Hypothyroid    Hypertension    Lower extremity edema    Paroxysmal atrial fibrillation    MEDICATIONS  (STANDING):  aspirin enteric coated 81 milliGRAM(s) Oral daily  atorvastatin 80 milliGRAM(s) Oral at bedtime  clopidogrel Tablet 75 milliGRAM(s) Oral daily  furosemide    Tablet 40 milliGRAM(s) Oral two times a day  levothyroxine 75 MICROGram(s) Oral daily  metoprolol succinate ER 50 milliGRAM(s) Oral daily  pantoprazole  Injectable 40 milliGRAM(s) IV Push daily    MEDICATIONS  (PRN):    Allergies    No Known Allergies    Intolerances    Vital Signs Last 24 Hrs  T(C): 36.8 (19 Jun 2022 05:18), Max: 36.8 (18 Jun 2022 13:19)  T(F): 98.3 (19 Jun 2022 05:18), Max: 98.3 (18 Jun 2022 13:19)  HR: 62 (19 Jun 2022 08:40) (62 - 77)  BP: 104/69 (19 Jun 2022 08:40) (99/61 - 138/76)  BP(mean): --  RR: 20 (19 Jun 2022 08:41) (18 - 20)  SpO2: 90% (19 Jun 2022 08:41) (78% - 95%)  I&O's Summary    18 Jun 2022 07:01  -  19 Jun 2022 07:00  --------------------------------------------------------  IN: 360 mL / OUT: 500 mL / NET: -140 mL    PHYSICAL EXAM:  TELE: Afib  Constitutional: NAD, awake and alert, well-developed  HEENT: Moist Mucous Membranes, Anicteric  Pulmonary: Non-labored, breath sounds are clear bilaterally, No wheezing, rales or rhonchi  Cardiovascular: IRRR, S1 and S2, +murmurs, no rubs, gallops or clicks  Gastrointestinal: Bowel Sounds present, soft, nontender.   Lymph: BLE edema improved. No lymphadenopathy.  Skin: No visible rashes or ulcers.  Psych:  Mood & affect appropriate    LABS: All Labs Reviewed:                        10.0   5.45  )-----------( 159      ( 19 Jun 2022 06:46 )             30.7                         10.9   5.70  )-----------( 183      ( 18 Jun 2022 08:32 )             34.1                         11.6   6.10  )-----------( 232      ( 17 Jun 2022 23:34 )             35.5     19 Jun 2022 06:46    143    |  106    |  22     ----------------------------<  90     3.2     |  32     |  1.50   18 Jun 2022 08:32    141    |  104    |  18     ----------------------------<  95     3.7     |  30     |  1.40   17 Jun 2022 23:34    141    |  104    |  15     ----------------------------<  124    3.9     |  31     |  1.30     Ca    7.8        19 Jun 2022 06:46  Ca    8.6        18 Jun 2022 08:32  Ca    8.6        17 Jun 2022 23:34  Phos  2.5       19 Jun 2022 06:46  Phos  3.3       18 Jun 2022 08:32  Phos  3.4       17 Jun 2022 23:34  Mg     2.3       19 Jun 2022 06:46  Mg     2.3       18 Jun 2022 08:32  Mg     2.1       17 Jun 2022 23:34    TPro  6.0    /  Alb  2.4    /  TBili  1.2    /  DBili  x      /  AST  65     /  ALT  21     /  AlkPhos  77     19 Jun 2022 06:46  TPro  6.8    /  Alb  2.8    /  TBili  1.5    /  DBili  x      /  AST  96     /  ALT  24     /  AlkPhos  92     18 Jun 2022 08:32  TPro  7.2    /  Alb  3.0    /  TBili  1.4    /  DBili  x      /  AST  107    /  ALT  26     /  AlkPhos  100    17 Jun 2022 23:34    PT/INR - ( 19 Jun 2022 06:46 )   PT: 25.5 sec;   INR: 2.16 ratio       PTT - ( 19 Jun 2022 06:46 )  PTT:40.3 sec  CARDIAC MARKERS ( 18 Jun 2022 08:32 )  x     / x     / 364 U/L / x     / 24.0 ng/mL  CARDIAC MARKERS ( 17 Jun 2022 23:34 )  x     / x     / 497 U/L / x     / 52.0 ng/mL  CARDIAC MARKERS ( 17 Jun 2022 14:55 )  x     / x     / 523 U/L / x     / 70.3 ng/mL     EXAM:  ECHO TTE WO CON COMP W DOPP         PROCEDURE DATE:  10/11/2021        INTERPRETATION:  INDICATION: Edema  Sonographer AS    Blood Pressure 153/72    Height unavailable     Weight 70.3 kg    Dimensions:  LA 3.6       Normal Values: 2.0 - 4.0 cm  Ao 3.4        Normal Values: 2.0 - 3.8 cm  SEPTUM 1.3       Normal Values: 0.6 - 1.2 cm  PWT 1.1       Normal Values: 0.6 - 1.1 cm  LVIDd 4.3         Normal Values: 3.0 - 5.6 cm  LVIDs 2.9         Normal Values: 1.8 - 4.0 cm    OBSERVATIONS:  Technically difficult study  Mitral Valve: Mitral annular calcification, moderate MR.  Aortic Valve/Aorta: Calcified trileaflet aortic valve with decreased opening. Peak transaortic valve gradient 26.5 mmHg with a mean transaortic valve gradient 17.8 mmHg. The aortic valve area is calculated to be 1.34 sq cm by continuity equation. This is consistent with moderate aortic stenosis.  Tricuspid Valve: Moderate TR.  Pulmonic Valve: Trace PI  Left Atrium: normal  Right Atrium: Enlarged  Left Ventricle:normal LV size and systolic function, estimated LVEF of 55-60%.  Right Ventricle: Right ventricular enlargement with grossly normal systolic function  Pericardium: no significant pericardial effusion.  Pulmonary/RV Pressure: estimated PA systolic pressure of 46.3 mmHg assuming an RA pressure of 3 mmHg.  IVC measures 1.3 cm    IMPRESSION:  Normal left ventricular internal dimensions and systolic function, estimated LVEF of 55-60%.  Right ventricular enlargement with grossly normal systolic function  Calcified trileaflet aortic valve with moderate aortic stenosis, without AI.  Moderate MR and TR.  Right atrial enlargement  No significant pericardial effusion.    --- End of Report ---      JEAN CARLOS HENRY MD; Attending Cardiologist  This document has been electronically signed. Oct 12 2021  5:46PM    Ventricular Rate 66 BPM    Atrial Rate 66 BPM    QRS Duration 152 ms    Q-T Interval 478 ms    QTC Calculation(Bazett) 501 ms    R Axis -51 degrees    T Axis 133 degrees    Diagnosis Line atrial fibrilation  Left axis deviation  Left bundle branch block  Abnormal ECG  Confirmed by Norah ALVARENGA, Jhonny ([id number]) on 6/17/2022 12:40:02 PM    ACC: [id number] EXAM:  XR CHEST PORTABLE URGENT 1V                          PROCEDURE DATE:  06/17/2022      INTERPRETATION:  AP semierect chest on June 17, 2022 at 8:35 AM. Patient   has chest pain.    Heart magnified by technique.    There is elevation of the right hemidiaphragm again noted.    Mild right base effusion somewhat increased from October 11, 2021.    IMPRESSION: As above.    --- End of Report ---    RUSSELL MONTES MD; Attending Radiologist  This document has been electronically signed. Jun 17 2022 11:18AM

## 2022-06-19 NOTE — PROGRESS NOTE ADULT - SUBJECTIVE AND OBJECTIVE BOX
Neurology follow up note  COVERING DR PAM [id number]      Interval History:    Patient feels ok no new complaints.    Allergies    No Known Allergies    Intolerances        MEDICATIONS    aspirin enteric coated 81 milliGRAM(s) Oral daily  atorvastatin 80 milliGRAM(s) Oral at bedtime  clopidogrel Tablet 75 milliGRAM(s) Oral daily  furosemide    Tablet 40 milliGRAM(s) Oral two times a day  levothyroxine 75 MICROGram(s) Oral daily  metoprolol succinate ER 50 milliGRAM(s) Oral daily  pantoprazole  Injectable 40 milliGRAM(s) IV Push daily              Vital Signs Last 24 Hrs  T(C): 36.8 (19 Jun 2022 05:18), Max: 36.8 (18 Jun 2022 13:19)  T(F): 98.3 (19 Jun 2022 05:18), Max: 98.3 (18 Jun 2022 13:19)  HR: 71 (19 Jun 2022 05:18) (71 - 77)  BP: 114/63 (19 Jun 2022 05:18) (99/61 - 138/76)  BP(mean): --  RR: 18 (19 Jun 2022 05:18) (18 - 19)  SpO2: 94% (19 Jun 2022 05:18) (94% - 95%)      REVIEW OF SYSTEMS:   Constitutional: No fever, chills, fatigue, weakness  Eyes: no eye pain, visual disturbances, or discharge  ENT:  No difficulty hearing, tinnitus, vertigo; No sinus or throat pain  Neck: No pain or stiffness  Respiratory: No cough, dyspnea, wheezing   Cardiovascular: No chest pain, palpitations,   Gastrointestinal: No abdominal or epigastric pain. No nausea, vomiting  No diarrhea or constipation.   Genitourinary: No dysuria, frequency, hematuria or incontinence  Neurological: No headaches, lightheadedness, vertigo, numbness or tremors  Psychiatric: No depression, anxiety, mood swings or difficulty sleeping  Musculoskeletal: No joint pain or swelling; No muscle, back or extremity pain  Skin: No itching, burning, rashes or lesions   Lymph Nodes: No enlarged glands  Endocrine: No heat or cold intolerance; No hair loss   Allergy and Immunologic: No hives or eczema    On Neurological Examination:    Mental Status - Patient is alert, awake, oriented        Follow simple commands    Speech -   Fluent                       Cranial Nerves - Pupils 3 mm equal and reactive to light,   extraocular eye movements intact.   smile symmetric  intact bilateral NLF    Motor Exam -   Right upper 5/5  Left upper5/5  Right lower4+/5  Left lower 4+/5    Muscle tone - is normal all over.  No asymmetry is seen.      Sensory    Bilateral intact to light touch    GENERAL Exam: Nontoxic , No Acute Distress   	  HEENT:  normocephalic, atraumatic  		  LUNGS: Clear bilaterally    	  HEART: Normal S1S2   No murmur RRR        	  GI/ ABDOMEN:  Soft  Non tender    EXTREMITIES:   No Edema  No Clubbing  No Cyanosis   	   SKIN: Normal  No Ecchymosis             LABS:  CBC Full  -  ( 19 Jun 2022 06:46 )  WBC Count : 5.45 K/uL  RBC Count : 3.34 M/uL  Hemoglobin : 10.0 g/dL  Hematocrit : 30.7 %  Platelet Count - Automated : 159 K/uL  Mean Cell Volume : 91.9 fl  Mean Cell Hemoglobin : 29.9 pg  Mean Cell Hemoglobin Concentration : 32.6 gm/dL  Auto Neutrophil # : 2.87 K/uL  Auto Lymphocyte # : 1.70 K/uL  Auto Monocyte # : 0.70 K/uL  Auto Eosinophil # : 0.16 K/uL  Auto Basophil # : 0.01 K/uL  Auto Neutrophil % : 52.7 %  Auto Lymphocyte % : 31.2 %  Auto Monocyte % : 12.8 %  Auto Eosinophil % : 2.9 %  Auto Basophil % : 0.2 %      06-19    143  |  106  |  22  ----------------------------<  90  3.2<L>   |  32<H>  |  1.50<H>    Ca    7.8<L>      19 Jun 2022 06:46  Phos  2.5     06-19  Mg     2.3     06-19    TPro  6.0  /  Alb  2.4<L>  /  TBili  1.2  /  DBili  x   /  AST  65<H>  /  ALT  21  /  AlkPhos  77  06-19    Hemoglobin A1C:   Lipid Panel 06-18 @ 10:09  Total Cholesterol, Serum 144  LDL --  Triglycerides 65    LIVER FUNCTIONS - ( 19 Jun 2022 06:46 )  Alb: 2.4 g/dL / Pro: 6.0 g/dL / ALK PHOS: 77 U/L / ALT: 21 U/L / AST: 65 U/L / GGT: x           Vitamin B12 Vitamin B12, Serum: 619 pg/mL (06-18 @ 10:09)    PT/INR - ( 19 Jun 2022 06:46 )   PT: 25.5 sec;   INR: 2.16 ratio         PTT - ( 19 Jun 2022 06:46 )  PTT:40.3 sec      RADIOLOGY      ASSESSMENT AND PLAN    Left arm heaviness weakness resolving  underlying cardiac event  mri head normal  cardiology follow up   hypothyroidism synthroid    Advanced care directives were discussed    30 minutes spent on total encounter; more than 50% of the visit was spent counseling and/or coordinating care by the attending physician.      Neurology follow up note  COVERING DR PAM [id number]      Interval History:    Patient feels ok no new complaints.    Allergies    No Known Allergies    Intolerances        MEDICATIONS    aspirin enteric coated 81 milliGRAM(s) Oral daily  atorvastatin 80 milliGRAM(s) Oral at bedtime  clopidogrel Tablet 75 milliGRAM(s) Oral daily  furosemide    Tablet 40 milliGRAM(s) Oral two times a day  levothyroxine 75 MICROGram(s) Oral daily  metoprolol succinate ER 50 milliGRAM(s) Oral daily  pantoprazole  Injectable 40 milliGRAM(s) IV Push daily              Vital Signs Last 24 Hrs  T(C): 36.8 (19 Jun 2022 05:18), Max: 36.8 (18 Jun 2022 13:19)  T(F): 98.3 (19 Jun 2022 05:18), Max: 98.3 (18 Jun 2022 13:19)  HR: 71 (19 Jun 2022 05:18) (71 - 77)  BP: 114/63 (19 Jun 2022 05:18) (99/61 - 138/76)  BP(mean): --  RR: 18 (19 Jun 2022 05:18) (18 - 19)  SpO2: 94% (19 Jun 2022 05:18) (94% - 95%)      REVIEW OF SYSTEMS:   Constitutional: No fever, chills, fatigue, weakness  Eyes: no eye pain, visual disturbances, or discharge  ENT:  No difficulty hearing, tinnitus, vertigo; No sinus or throat pain  Neck: No pain or stiffness  Respiratory: No cough, dyspnea, wheezing   Cardiovascular: No chest pain, palpitations,   Gastrointestinal: No abdominal or epigastric pain. No nausea, vomiting  No diarrhea or constipation.   Genitourinary: No dysuria, frequency, hematuria or incontinence  Neurological: No headaches, lightheadedness, vertigo, numbness or tremors  Psychiatric: No depression, anxiety, mood swings or difficulty sleeping  Musculoskeletal: No joint pain or swelling; No muscle, back or extremity pain  Skin: No itching, burning, rashes or lesions   Lymph Nodes: No enlarged glands  Endocrine: No heat or cold intolerance; No hair loss   Allergy and Immunologic: No hives or eczema    On Neurological Examination:    Mental Status - Patient is alert, awake, oriented        Follow simple commands    Speech -   Fluent                       Cranial Nerves - Pupils 3 mm equal and reactive to light,   extraocular eye movements intact.   smile symmetric  intact bilateral NLF    Motor Exam -   Right upper 5/5  Left upper5/5  Right lower4+/5  Left lower 4+/5    Muscle tone - is normal all over.  No asymmetry is seen.      Sensory    Bilateral intact to light touch    GENERAL Exam: Nontoxic , No Acute Distress   	  HEENT:  normocephalic, atraumatic  		  LUNGS: Clear bilaterally    	  HEART: Normal S1S2   No murmur RRR        	  GI/ ABDOMEN:  Soft  Non tender    EXTREMITIES:   No Edema  No Clubbing  No Cyanosis   	   SKIN: Normal  No Ecchymosis             LABS:  CBC Full  -  ( 19 Jun 2022 06:46 )  WBC Count : 5.45 K/uL  RBC Count : 3.34 M/uL  Hemoglobin : 10.0 g/dL  Hematocrit : 30.7 %  Platelet Count - Automated : 159 K/uL  Mean Cell Volume : 91.9 fl  Mean Cell Hemoglobin : 29.9 pg  Mean Cell Hemoglobin Concentration : 32.6 gm/dL  Auto Neutrophil # : 2.87 K/uL  Auto Lymphocyte # : 1.70 K/uL  Auto Monocyte # : 0.70 K/uL  Auto Eosinophil # : 0.16 K/uL  Auto Basophil # : 0.01 K/uL  Auto Neutrophil % : 52.7 %  Auto Lymphocyte % : 31.2 %  Auto Monocyte % : 12.8 %  Auto Eosinophil % : 2.9 %  Auto Basophil % : 0.2 %      06-19    143  |  106  |  22  ----------------------------<  90  3.2<L>   |  32<H>  |  1.50<H>    Ca    7.8<L>      19 Jun 2022 06:46  Phos  2.5     06-19  Mg     2.3     06-19    TPro  6.0  /  Alb  2.4<L>  /  TBili  1.2  /  DBili  x   /  AST  65<H>  /  ALT  21  /  AlkPhos  77  06-19    Hemoglobin A1C:   Lipid Panel 06-18 @ 10:09  Total Cholesterol, Serum 144  LDL --  Triglycerides 65    LIVER FUNCTIONS - ( 19 Jun 2022 06:46 )  Alb: 2.4 g/dL / Pro: 6.0 g/dL / ALK PHOS: 77 U/L / ALT: 21 U/L / AST: 65 U/L / GGT: x           Vitamin B12 Vitamin B12, Serum: 619 pg/mL (06-18 @ 10:09)    PT/INR - ( 19 Jun 2022 06:46 )   PT: 25.5 sec;   INR: 2.16 ratio         PTT - ( 19 Jun 2022 06:46 )  PTT:40.3 sec      RADIOLOGY      ASSESSMENT AND PLAN    Left arm heaviness weakness resolving  underlying cardiac event  mri head normal  cardiology follow up   hypothyroidism synthroid  as per patient arm back to normal   30 minutes spent on total encounter; more than 50% of the visit was spent counseling and/or coordinating care by the attending physician.

## 2022-06-19 NOTE — PROGRESS NOTE ADULT - PROBLEM SELECTOR PLAN 5
chronic, elevated on admit - likely 2/2 pain  - c/w Lasix 40 mg qd and Toprol 50mg qD w/ hold parameters  - routine hemodynamic monitoring chronic, stable - rate-controlled - on coumadin  - HOLD coumadin, goal INR 1.6 in order to be transferred for cath   - Toprol  XL 50mg PO qd w/ hold parameters  - f/up TTE  - continuous tele monitoring  -cardio Dr. James, recs appreciated- D/W cardio NP

## 2022-06-19 NOTE — PROGRESS NOTE ADULT - PROBLEM SELECTOR PLAN 9
DVT -> HOLD coumadin, INR is therapeutic, START renal adjusted FD Lovenox if INR is subtherapeutic as patient may elect for ischemic evaluation  Diarrhea - resolved -> hold home senna for now DVT ppx HOLD coumadin, goal INR for transfer for cath is 1.6

## 2022-06-19 NOTE — PROGRESS NOTE ADULT - PROBLEM SELECTOR PLAN 6
hypokalemic - likely 2/2 GI losses in setting of diarrhea  - replated, monitor in AM BMP, Mag level chronic, elevated on admit - likely 2/2 pain  - continue toprol 50mg qD w/ hold parameters  - routine hemodynamic monitoring

## 2022-06-19 NOTE — PROGRESS NOTE ADULT - ASSESSMENT
Patient is a 89 y/o F with a pmhx b/l LE edema, HTN, P Afib (on coumadin), and hypothyroidism presenting with diarrhea and mid-sternal CP admitted for CP w/ elevated trops, NSTEMI. On ACS therapy, INR therapeutic this AM, await GOC discussions with family, role for potential cardiac cath on Monday     Patient is a 89 y/o F with a pmhx b/l LE edema, HTN, P Afib (on coumadin), and hypothyroidism presenting with diarrhea and mid-sternal CP admitted for CP w/ elevated trops, NSTEMI. On ACS therapy, INR therapeutic, family agreed to cardiac cath, plan for transfer for cath once INR 1.6 and LUIS CARLOS improved.  Patient is a 89 y/o F with a pmhx b/l LE edema, HTN, P Afib (on coumadin), and hypothyroidism presenting with diarrhea and mid-sternal CP admitted for CP w/ elevated trops, NSTEMI. On ACS therapy, INR therapeutic, family agreed to cardiac cath, plan for transfer for cath once INR 1.6 and LUIS CARLOS improved.

## 2022-06-19 NOTE — PROGRESS NOTE ADULT - SUBJECTIVE AND OBJECTIVE BOX
Patient is a 89y old  Female who presents with a chief complaint of CP w/ elevated trops (19 Jun 2022 09:28)      INTERVAL HPI:    OVERNIGHT EVENTS:    Home Medications:  Lasix 40 mg oral tablet: 1 tab(s) orally 2 times a day (17 Jun 2022 11:27)  Lopressor: 25  orally once a day (17 Jun 2022 11:27)  potassium chloride 10 mEq oral tablet, extended release: 2 tablets twice a day (takes it with her Lasix)  (17 Jun 2022 11:27)  Synthroid 75 mcg (0.075 mg) oral tablet: 1 tab(s) orally once a day (17 Jun 2022 11:27)  warfarin 3 mg oral tablet: 1 tab(s) orally once a day (M, Tu, Th, F, Sa Su) (17 Jun 2022 11:27)  warfarin 4 mg oral tablet: 1 tab(s) orally Wednesday (17 Jun 2022 11:27)      MEDICATIONS  (STANDING):  aspirin enteric coated 81 milliGRAM(s) Oral daily  atorvastatin 80 milliGRAM(s) Oral at bedtime  clopidogrel Tablet 75 milliGRAM(s) Oral daily  furosemide    Tablet 40 milliGRAM(s) Oral two times a day  levothyroxine 75 MICROGram(s) Oral daily  metoprolol succinate ER 50 milliGRAM(s) Oral daily  pantoprazole  Injectable 40 milliGRAM(s) IV Push daily  potassium chloride   Powder 40 milliEquivalent(s) Oral every 4 hours  potassium chloride  10 mEq/100 mL IVPB 10 milliEquivalent(s) IV Intermittent every 1 hour    MEDICATIONS  (PRN):      Allergies    No Known Allergies    Intolerances      REVIEW OF SYSTEMS:  CONSTITUTIONAL: No fever, No chills, No fatigue, No myalgia, No Body ache, No Weakness  EYES: No eye pain,  No visual disturbances, No discharge, No Redness  ENMT: No ear pain, No nose bleed, No vertigo; No sinus or throat pain, No Congestion  NECK: No pain, No stiffness  RESPIRATORY: No cough, No wheezing, No hemoptysis, No shortness of breath  CARDIOVASCULAR: No chest pain, palpitations  GASTROINTESTINAL: No abdominal or epigastric pain. No nausea, No vomiting, No diarrhea or constipation; [  ] BM  GENITOURINARY: No dysuria, No frequency, No urgency, No hematuria or incontinence  NEUROLOGICAL: No headaches, No dizziness, No numbness, No tingling, No tremors, No weakness  EXT: No Swelling, No Pain, No Edema  SKIN: [  ] No itching, burning, rashes, or lesions   MUSCULOSKELETAL: No joint pain or swelling; No muscle pain, No back pain, No extremity pain  PSYCHIATRIC: No depression, anxiety, mood swings or difficulty sleeping at night  PAIN SCALE: [  ] None  [  ] Other-  ROS Unable to obtain due to: [  ] Dementia  [  ] Lethargy  [  ] Sedated  [  ]  non verbal  REST OF REVIEW OF SYSTEMS: [ x ] Normal     Vital Signs Last 24 Hrs  T(C): 36.8 (19 Jun 2022 05:18), Max: 36.8 (18 Jun 2022 13:19)  T(F): 98.3 (19 Jun 2022 05:18), Max: 98.3 (18 Jun 2022 13:19)  HR: 62 (19 Jun 2022 08:40) (62 - 77)  BP: 104/69 (19 Jun 2022 08:40) (99/61 - 138/76)  BP(mean): --  RR: 20 (19 Jun 2022 08:41) (18 - 20)  SpO2: 90% (19 Jun 2022 08:41) (78% - 95%)    Finger Stick        06-18 @ 07:01  -  06-19 @ 07:00  --------------------------------------------------------  IN: 360 mL / OUT: 500 mL / NET: -140 mL      PHYSICAL EXAM:  GENERAL:  [  ] NAD, [  ] Well appearing, [  ] Agitated, [  ] Drowsy, [  ] Lethargy, [  ] Confused   HEAD:  [  ] Normal, [  ] Other  EYES:  [  ] EOMI, [  ] PERRLA, [  ] Conjunctiva and sclera clear normal, [  ] Other, [  ] Pallor, [  ] Discharge  ENMT:  [  ] Normal, [  ] Moist mucous membranes, [  ] Good dentition, [  ] No thrush  NECK:  [  ] Supple, [  ] No JVD, [  ] Normal thyroid, [  ] Lymphadenopathy, [  ] Other  CHEST/LUNG:  [  ] Clear to auscultation bilaterally, [  ] Breath Sounds equal OR decreased, [  ] No rales, [  ] No rhonchi, [  ] No wheezing  HEART:  [  ] Regular rate and rhythm, [  ] Tachycardia, [  ] Bradycardia, [  ] Irregular, [  ] No murmurs, No rubs, No gallops, [  ] PPM in place (Mfr:  )  ABDOMEN:  [  ] Soft, [  ] Nontender, [  ] Nondistended, [  ] No mass, [  ] Bowel sounds present, [  ] Obese  NERVOUS SYSTEM:  [  ] Alert & Oriented x3, [  ] Nonfocal, [  ] Confusion, [  ] Encephalopathic, [  ] Sedated, [  ] Unable to assess, [  ] Other-  EXTREMITIES:  [  ] 2+ Peripheral Pulses, No clubbing, No cyanosis,  [  ] edema B/L lower EXT, [  ] PVD stasis skin changes B/L lower EXT  LYMPH:  No lymphadenopathy noted  SKIN:  [  ] No rashes or lesions, [  ] Pressure ulcers, [  ] Ecchymosis, [  ] Skin tears, [  ] Other    DIET:     LABS:                        10.0   5.45  )-----------( 159      ( 19 Jun 2022 06:46 )             30.7     19 Jun 2022 06:46    143    |  106    |  22     ----------------------------<  90     3.2     |  32     |  1.50     Ca    7.8        19 Jun 2022 06:46  Phos  2.5       19 Jun 2022 06:46  Mg     2.3       19 Jun 2022 06:46    TPro  6.0    /  Alb  2.4    /  TBili  1.2    /  DBili  x      /  AST  65     /  ALT  21     /  AlkPhos  77     19 Jun 2022 06:46    PT/INR - ( 19 Jun 2022 06:46 )   PT: 25.5 sec;   INR: 2.16 ratio         PTT - ( 19 Jun 2022 06:46 )  PTT:40.3 sec        CARDIAC MARKERS ( 18 Jun 2022 08:32 )  x     / x     / 364 U/L / x     / 24.0 ng/mL  CARDIAC MARKERS ( 17 Jun 2022 23:34 )  x     / x     / 497 U/L / x     / 52.0 ng/mL  CARDIAC MARKERS ( 17 Jun 2022 14:55 )  x     / x     / 523 U/L / x     / 70.3 ng/mL  CARDIAC MARKERS ( 17 Jun 2022 08:40 )  x     / x     / 135 U/L / x     / 3.2 ng/mL         Anemia Panel:  Iron Total, Serum: 88 ug/dL (06-18-22 @ 10:09)  Iron - Total Binding Capacity.: 354 ug/dL (06-18-22 @ 10:09)  Ferritin, Serum: 40 ng/mL (06-18-22 @ 10:09)  Vitamin B12, Serum: 619 pg/mL (06-18-22 @ 10:09)  Folate, Serum: 14.9 ng/mL (06-18-22 @ 10:09)      Thyroid Panel:  Thyroid Stimulating Hormone, Serum: 0.42 uIU/mL (06-18-22 @ 08:32)        Lipase, Serum: 157 U/L (06-17-22 @ 07:28)      Serum Pro-Brain Natriuretic Peptide: 2396 pg/mL (06-17-22 @ 07:33)      RADIOLOGY & ADDITIONAL TESTS:    HEALTH ISSUES - PROBLEM Dx:  Chest pain    Arm weakness    Hypothyroid    Hypertension    Lower extremity edema    Paroxysmal atrial fibrillation    Need for prophylactic measure    Anemia    Electrolyte abnormality          Consultant(s) Notes Reviewed:  [  ] YES     Care Discussed with [  ] Consultants, [  ] Patient, [  ] Family, [  ] , [  ] Social Service, [  ] RN, [  ] Physical Therapy  DVT PPX: [  ] Lovenox, [  ] SC Heparin, [  ] Coumadin, [  ] Xarelto, [  ] Eliquis, [  ] Pradaxa, [  ] IV Heparin drip, [  ] SCD, [  ] Contraindication secondary to GI Bleed, [  ] Ambulation  Advanced Directive: [  ] None, [  ] DNR/DNI Patient is a 89y old  Female who presents with a chief complaint of CP w/ elevated trops (19 Jun 2022 09:28)        HPI:  Patient is a 89 y/o F with a pmhx b/l LE edema, HTN, P Afib (on coumadin), and hypothyroidism presenting with sudden onset mid-sternal CP. Pt describes pain as a "prickly feeling", rated 4/10, constant in nature since 4am on 6/17, resolved in the ED. Pain was associated with L arm weakness, now resolved. Pt states the pain woke her up, and she was scared to walk and was unable to use her L arm for support to get out of bed. Pt was also nauseous at this time. No associated diaphoresis, palpitations, chest pressure. No amaurosis fugax, facial droop, garbled speech, hemiparesis, LOC, falls reported. No vomiting, fevers, chills, cough, sore throat, SOB, abd pain, constipation, dysuria. Pt had one loose BM this AM at home, denied hematochezia or melena.       In ED:  VS - HR 71 | /65 | RR 18 | sO2 94 | T 97.6  Labs - HgB 11.2 | INR 1.85 | K 3.3 | Glu 114 | trop 342.6 | CKMB 3.2 | BNP 2396  CTH NC - No acute intracranial hemorrhage. Lacunar infarct R inferior cerebellum  EKG - vent rate 66, QTc 501ms, sinus rhythm, old LBBB, no ischemic changes on personal read  Given -  mg PO. pt seen by cardiology Dr Almazan , pt admitted to [facility] for NSTEMI. (17 Jun 2022 09:52)    INTERVAL HPI:  6/18/22: Seen and examined at bedside. No acute complaints. Denies chest pain overnight and this AM. Overnight pt had episode of dyspnea, troponins inc from 300s to >98944. Trops now downtrended. Received 1 x dose 40mg IV lasix for dyspnea. Pt had statin dose inc to 80mg, had 1 x dose lopressor 25mg, and had her scheduled evening coumadin. Had 10 beats of NSVT this AM. Pt was started on standing 50mg toprol xL PO qD. Started on plavix 75mg qD. Extensive counseling done with the patient on [facility] and her current state of health. Patient states she has "forms at home" that her daughter will bring to elucidate [facility]. Patient values quality of life and wants to discuss with her daughter and son-julio. Family to discuss potential plan for cardiac cath and will arrive to a decision on 6/19. C/W medical therapy for ACS. HOLD coumadin as pt INR is therapeutic and to facilitate potential for cardiac cath. Will start renal adjusted FD Lovenox on 6/19 if INR is below 2.   6/19/22 Pt seen and examined at bedside. Pt states she slept well last night. Pt denies SOB, CP, palpitations, dizziness.  Pt states the last time she had chets pain was Friday night. Pt states she did not receive any update   Does not wan tyo   OVERNIGHT EVENTS: No acute events overnight.     Home Medications:  Lasix 40 mg oral tablet: 1 tab(s) orally 2 times a day (17 Jun 2022 11:27)  Lopressor: 25  orally once a day (17 Jun 2022 11:27)  potassium chloride 10 mEq oral tablet, extended release: 2 tablets twice a day (takes it with her Lasix)  (17 Jun 2022 11:27)  Synthroid 75 mcg (0.075 mg) oral tablet: 1 tab(s) orally once a day (17 Jun 2022 11:27)  warfarin 3 mg oral tablet: 1 tab(s) orally once a day (M, Tu, Th, F, Sa Grider) (17 Jun 2022 11:27)  warfarin 4 mg oral tablet: 1 tab(s) orally Wednesday (17 Jun 2022 11:27)      MEDICATIONS  (STANDING):  aspirin enteric coated 81 milliGRAM(s) Oral daily  atorvastatin 80 milliGRAM(s) Oral at bedtime  clopidogrel Tablet 75 milliGRAM(s) Oral daily  furosemide    Tablet 40 milliGRAM(s) Oral two times a day  levothyroxine 75 MICROGram(s) Oral daily  metoprolol succinate ER 50 milliGRAM(s) Oral daily  pantoprazole  Injectable 40 milliGRAM(s) IV Push daily  potassium chloride   Powder 40 milliEquivalent(s) Oral every 4 hours  potassium chloride  10 mEq/100 mL IVPB 10 milliEquivalent(s) IV Intermittent every 1 hour    MEDICATIONS  (PRN):      Allergies    No Known Allergies    Intolerances      REVIEW OF SYSTEMS:  CONSTITUTIONAL: No fever, No chills, No fatigue, No myalgia, No Body ache, No Weakness  EYES: No eye pain,  No visual disturbances, No discharge, No Redness  ENMT: No ear pain, No nose bleed, No vertigo; No sinus or throat pain, No Congestion  NECK: No pain, No stiffness  RESPIRATORY: No cough, No wheezing, No hemoptysis, No shortness of breath  CARDIOVASCULAR: No chest pain, palpitations  GASTROINTESTINAL: No abdominal or epigastric pain. No nausea, No vomiting, No diarrhea or constipation; [  ] BM  GENITOURINARY: No dysuria, No frequency, No urgency, No hematuria or incontinence  NEUROLOGICAL: No headaches, No dizziness, No numbness, No tingling, No tremors, No weakness  EXT: No Swelling, No Pain, No Edema  SKIN: [  ] No itching, burning, rashes, or lesions   MUSCULOSKELETAL: No joint pain or swelling; No muscle pain, No back pain, No extremity pain  PSYCHIATRIC: No depression, anxiety, mood swings or difficulty sleeping at night  PAIN SCALE: [  ] None  [  ] Other-  ROS Unable to obtain due to: [  ] Dementia  [  ] Lethargy  [  ] Sedated  [  ]  non verbal  REST OF REVIEW OF SYSTEMS: [ x ] Normal     Vital Signs Last 24 Hrs  T(C): 36.8 (19 Jun 2022 05:18), Max: 36.8 (18 Jun 2022 13:19)  T(F): 98.3 (19 Jun 2022 05:18), Max: 98.3 (18 Jun 2022 13:19)  HR: 62 (19 Jun 2022 08:40) (62 - 77)  BP: 104/69 (19 Jun 2022 08:40) (99/61 - 138/76)  BP(mean): --  RR: 20 (19 Jun 2022 08:41) (18 - 20)  SpO2: 90% (19 Jun 2022 08:41) (78% - 95%)    Finger Stick        06-18 @ 07:01  -  06-19 @ 07:00  --------------------------------------------------------  IN: 360 mL / OUT: 500 mL / NET: -140 mL      PHYSICAL EXAM:  GENERAL:  [  ] NAD, [  ] Well appearing, [  ] Agitated, [  ] Drowsy, [  ] Lethargy, [  ] Confused   HEAD:  [  ] Normal, [  ] Other  EYES:  [  ] EOMI, [  ] PERRLA, [  ] Conjunctiva and sclera clear normal, [  ] Other, [  ] Pallor, [  ] Discharge  ENMT:  [  ] Normal, [  ] Moist mucous membranes, [  ] Good dentition, [  ] No thrush  NECK:  [  ] Supple, [  ] No JVD, [  ] Normal thyroid, [  ] Lymphadenopathy, [  ] Other  CHEST/LUNG:  [  ] Clear to auscultation bilaterally, [  ] Breath Sounds equal OR decreased, [  ] No rales, [  ] No rhonchi, [  ] No wheezing  HEART:  [  ] Regular rate and rhythm, [  ] Tachycardia, [  ] Bradycardia, [  ] Irregular, [  ] No murmurs, No rubs, No gallops, [  ] PPM in place (Mfr:  )  ABDOMEN:  [  ] Soft, [  ] Nontender, [  ] Nondistended, [  ] No mass, [  ] Bowel sounds present, [  ] Obese  NERVOUS SYSTEM:  [  ] Alert & Oriented x3, [  ] Nonfocal, [  ] Confusion, [  ] Encephalopathic, [  ] Sedated, [  ] Unable to assess, [  ] Other-  EXTREMITIES:  [  ] 2+ Peripheral Pulses, No clubbing, No cyanosis,  [  ] edema B/L lower EXT, [  ] PVD stasis skin changes B/L lower EXT  LYMPH:  No lymphadenopathy noted  SKIN:  [  ] No rashes or lesions, [  ] Pressure ulcers, [  ] Ecchymosis, [  ] Skin tears, [  ] Other    DIET:     LABS:                        10.0   5.45  )-----------( 159      ( 19 Jun 2022 06:46 )             30.7     19 Jun 2022 06:46    143    |  106    |  22     ----------------------------<  90     3.2     |  32     |  1.50     Ca    7.8        19 Jun 2022 06:46  Phos  2.5       19 Jun 2022 06:46  Mg     2.3       19 Jun 2022 06:46    TPro  6.0    /  Alb  2.4    /  TBili  1.2    /  DBili  x      /  AST  65     /  ALT  21     /  AlkPhos  77     19 Jun 2022 06:46    PT/INR - ( 19 Jun 2022 06:46 )   PT: 25.5 sec;   INR: 2.16 ratio         PTT - ( 19 Jun 2022 06:46 )  PTT:40.3 sec        CARDIAC MARKERS ( 18 Jun 2022 08:32 )  x     / x     / 364 U/L / x     / 24.0 ng/mL  CARDIAC MARKERS ( 17 Jun 2022 23:34 )  x     / x     / 497 U/L / x     / 52.0 ng/mL  CARDIAC MARKERS ( 17 Jun 2022 14:55 )  x     / x     / 523 U/L / x     / 70.3 ng/mL  CARDIAC MARKERS ( 17 Jun 2022 08:40 )  x     / x     / 135 U/L / x     / 3.2 ng/mL         Anemia Panel:  Iron Total, Serum: 88 ug/dL (06-18-22 @ 10:09)  Iron - Total Binding Capacity.: 354 ug/dL (06-18-22 @ 10:09)  Ferritin, Serum: 40 ng/mL (06-18-22 @ 10:09)  Vitamin B12, Serum: 619 pg/mL (06-18-22 @ 10:09)  Folate, Serum: 14.9 ng/mL (06-18-22 @ 10:09)      Thyroid Panel:  Thyroid Stimulating Hormone, Serum: 0.42 uIU/mL (06-18-22 @ 08:32)        Lipase, Serum: 157 U/L (06-17-22 @ 07:28)      Serum Pro-Brain Natriuretic Peptide: 2396 pg/mL (06-17-22 @ 07:33)      RADIOLOGY & ADDITIONAL TESTS:    HEALTH ISSUES - PROBLEM Dx:  Chest pain    Arm weakness    Hypothyroid    Hypertension    Lower extremity edema    Paroxysmal atrial fibrillation    Need for prophylactic measure    Anemia    Electrolyte abnormality          Consultant(s) Notes Reviewed:  [  ] YES     Care Discussed with [  ] Consultants, [  ] Patient, [  ] Family, [  ] , [  ] Social Service, [  ] RN, [  ] Physical Therapy  DVT PPX: [  ] Lovenox, [  ] SC Heparin, [  ] Coumadin, [  ] Xarelto, [  ] Eliquis, [  ] Pradaxa, [  ] IV Heparin drip, [  ] SCD, [  ] Contraindication secondary to GI Bleed, [  ] Ambulation  Advanced Directive: [  ] None, [  ] DNR/DNI Patient is a 89y old  Female who presents with a chief complaint of CP w/ elevated trops (19 Jun 2022 09:28)        HPI:  Patient is a 89 y/o F with a pmhx b/l LE edema, HTN, P Afib (on coumadin), and hypothyroidism presenting with sudden onset mid-sternal CP. Pt describes pain as a "prickly feeling", rated 4/10, constant in nature since 4am on 6/17, resolved in the ED. Pain was associated with L arm weakness, now resolved. Pt states the pain woke her up, and she was scared to walk and was unable to use her L arm for support to get out of bed. Pt was also nauseous at this time. No associated diaphoresis, palpitations, chest pressure. No amaurosis fugax, facial droop, garbled speech, hemiparesis, LOC, falls reported. No vomiting, fevers, chills, cough, sore throat, SOB, abd pain, constipation, dysuria. Pt had one loose BM this AM at home, denied hematochezia or melena.       In ED:  VS - HR 71 | /65 | RR 18 | sO2 94 | T 97.6  Labs - HgB 11.2 | INR 1.85 | K 3.3 | Glu 114 | trop 342.6 | CKMB 3.2 | BNP 2396  [facility] NC - No acute intracranial hemorrhage. Lacunar infarct R inferior cerebellum  EKG - vent rate 66, QTc 501ms, sinus rhythm, old LBBB, no ischemic changes on personal read  Given -  mg PO. pt seen by cardiology Dr Almazan , pt admitted to [facility] for NSTEMI. (17 Jun 2022 09:52)    INTERVAL HPI:  6/18/22: Seen and examined at bedside. No acute complaints. Denies chest pain overnight and this AM. Overnight pt had episode of dyspnea, troponins inc from 300s to >28552. Trops now downtrended. Received 1 x dose 40mg IV lasix for dyspnea. Pt had statin dose inc to 80mg, had 1 x dose lopressor 25mg, and had her scheduled evening coumadin. Had 10 beats of NSVT this AM. Pt was started on standing 50mg toprol xL PO qD. Started on plavix 75mg qD. Extensive counseling done with the patient on [facility] and her current state of health. Patient states she has "forms at home" that her daughter will bring to elucidate [facility]. Patient values quality of life and wants to discuss with her daughter and son-julio. Family to discuss potential plan for cardiac cath and will arrive to a decision on 6/19. C/W medical therapy for ACS. HOLD coumadin as pt INR is therapeutic and to facilitate potential for cardiac cath. Will start renal adjusted FD Lovenox on 6/19 if INR is below 2.   6/19/22 Pt seen and examined at bedside. Pt states she slept well last night. Pt denies SOB, CP, palpitations, dizziness.  Pt states the last time she had chest pain was Friday night. Later in the afternoon again patient was seen at bedside with  daughter HCP and son in law at bedside, decision was made for DNR DNI and cardiac cath.    OVERNIGHT EVENTS: No acute events overnight.     Home Medications:  Lasix 40 mg oral tablet: 1 tab(s) orally 2 times a day (17 Jun 2022 11:27)  Lopressor: 25  orally once a day (17 Jun 2022 11:27)  potassium chloride 10 mEq oral tablet, extended release: 2 tablets twice a day (takes it with her Lasix)  (17 Jun 2022 11:27)  Synthroid 75 mcg (0.075 mg) oral tablet: 1 tab(s) orally once a day (17 Jun 2022 11:27)  warfarin 3 mg oral tablet: 1 tab(s) orally once a day (M, Tu, Th, F, Sa Grider) (17 Jun 2022 11:27)  warfarin 4 mg oral tablet: 1 tab(s) orally Wednesday (17 Jun 2022 11:27)      MEDICATIONS  (STANDING):  aspirin enteric coated 81 milliGRAM(s) Oral daily  atorvastatin 80 milliGRAM(s) Oral at bedtime  clopidogrel Tablet 75 milliGRAM(s) Oral daily  furosemide    Tablet 40 milliGRAM(s) Oral two times a day  levothyroxine 75 MICROGram(s) Oral daily  metoprolol succinate ER 50 milliGRAM(s) Oral daily  pantoprazole  Injectable 40 milliGRAM(s) IV Push daily  potassium chloride   Powder 40 milliEquivalent(s) Oral every 4 hours  potassium chloride  10 mEq/100 mL IVPB 10 milliEquivalent(s) IV Intermittent every 1 hour    MEDICATIONS  (PRN):      Allergies    No Known Allergies    Intolerances    REVIEW OF SYSTEMS:  CONSTITUTIONAL: No fever, No chills, No fatigue, No myalgia, No Body ache, No Weakness  EYES: No eye pain,  No visual disturbances, No discharge, NO Redness  ENMT:  No ear pain, No nose bleed, No vertigo; No sinus pain, NO throat pain, No Congestion  NECK: No pain, No stiffness  RESPIRATORY: No cough, NO wheezing, No hemoptysis, ADMITS brief shortness of breath earlier, now resolved  CARDIOVASCULAR: No chest pain, palpitations  GASTROINTESTINAL: No abdominal pain, NO epigastric pain. No nausea, No vomiting; No diarrhea, No constipation. [ x ] BM  GENITOURINARY: No dysuria, No frequency, No urgency, No hematuria, NO incontinence  NEUROLOGICAL: No headaches, No dizziness, No numbness, No tingling, No tremors, No weakness  EXT: admits CHRONIC Swelling in LE's;  No Pain, admits LE Edema  SKIN:  [ x ] No itching, burning, rashes, or lesions   MUSCULOSKELETAL: No joint pain ,No Jt swelling; No muscle pain, No back pain, No extremity pain  PSYCHIATRIC: No depression,  No anxiety,  No mood swings ,No difficulty sleeping at night  PAIN SCALE: [ x ] None  [  ] Other-  ROS Unable to obtain due to - [  ] Dementia  [  ] Lethargy [  ] Drowsy [  ] Sedated [  ] non verbal  REST OF REVIEW Of SYSTEM - [ x ] Normal     Vital Signs Last 24 Hrs  T(C): 36.8 (19 Jun 2022 05:18), Max: 36.8 (18 Jun 2022 13:19)  T(F): 98.3 (19 Jun 2022 05:18), Max: 98.3 (18 Jun 2022 13:19)  HR: 62 (19 Jun 2022 08:40) (62 - 77)  BP: 104/69 (19 Jun 2022 08:40) (99/61 - 138/76)  BP(mean): --  RR: 20 (19 Jun 2022 08:41) (18 - 20)  SpO2: 90% (19 Jun 2022 08:41) (78% - 95%)    Finger Stick        06-18 @ 07:01  -  06-19 @ 07:00  --------------------------------------------------------  IN: 360 mL / OUT: 500 mL / NET: -140 mL    PHYSICAL EXAM:  GENERAL:  [ x ] NAD , [ x ] well appearing, [  ] Agitated, [  ] Drowsy,  [  ] Lethargy, [  ] confused   HEAD:  [ x ] Normal, [  ] Other  EYES:  [ x ] EOMI, [ x ] PERRLA, [ x ] conjunctiva and sclera clear normal, [  ] Other,  [  ] Pallor,[  ] Discharge  ENMT:  [ x ] Normal, [ x ] Moist mucous membranes, [ x ] Good dentition, [ x ] No Thrush  NECK:  [ x ] Supple, [x  ] No JVD, [ x ] Normal thyroid, [  ] Lymphadenopathy [  ] Other  CHEST/LUNG:  [ x ] Clear to auscultation bilaterally, [ x ] Breath Sounds equal B/L , [  ] poor effort  [ x ] No rales, [ x ] No rhonchi  [ x ]  No wheezing,   HEART:  [ x ] Regular rate and IRREGULARLY IRREGULAR rhythm, [  ] tachycardia, [  ] Bradycardia,  [  ] irregular  [ x ] No murmurs, No rubs, No gallops, [  ] PPM in place (Mfr:  )  ABDOMEN:  [ x ] Soft, [ x ] Nontender, [ x ] Nondistended, [ x ] No mass, [  x] Bowel sounds present, [  ] obese  NERVOUS SYSTEM:  [ x ] Alert & Oriented X3, [ x ] Nonfocal  [  ] Confusion  [  ] Encephalopathic [  ] Sedated [  ] Unable to assess, [  ] Dementia [  ] Other-  EXTREMITIES: [ x ] 2+ Peripheral Pulses, No clubbing, No cyanosis,  [ x ] 2 + pitting edema B/L lower EXT. [ x ] PVD stasis skin changes B/L Lower EXT, [  ] wound  LYMPH: No lymphadenopathy noted  SKIN:  [ x ] No rashes or lesions, [  ] Pressure Ulcers, [  ] ecchymosis, [  ] Skin Tears, [  ] Other    DIET: Diet, DASH/TLC:   Sodium & Cholesterol Restricted (06-17-22 @ 10:27)  LABS:                        10.0   5.45  )-----------( 159      ( 19 Jun 2022 06:46 )             30.7     19 Jun 2022 06:46    143    |  106    |  22     ----------------------------<  90     3.2     |  32     |  1.50     Ca    7.8        19 Jun 2022 06:46  Phos  2.5       19 Jun 2022 06:46  Mg     2.3       19 Jun 2022 06:46    TPro  6.0    /  Alb  2.4    /  TBili  1.2    /  DBili  x      /  AST  65     /  ALT  21     /  AlkPhos  77     19 Jun 2022 06:46    PT/INR - ( 19 Jun 2022 06:46 )   PT: 25.5 sec;   INR: 2.16 ratio         PTT - ( 19 Jun 2022 06:46 )  PTT:40.3 sec        CARDIAC MARKERS ( 18 Jun 2022 08:32 )  x     / x     / 364 U/L / x     / 24.0 ng/mL  CARDIAC MARKERS ( 17 Jun 2022 23:34 )  x     / x     / 497 U/L / x     / 52.0 ng/mL  CARDIAC MARKERS ( 17 Jun 2022 14:55 )  x     / x     / 523 U/L / x     / 70.3 ng/mL  CARDIAC MARKERS ( 17 Jun 2022 08:40 )  x     / x     / 135 U/L / x     / 3.2 ng/mL         Anemia Panel:  Iron Total, Serum: 88 ug/dL (06-18-22 @ 10:09)  Iron - Total Binding Capacity.: 354 ug/dL (06-18-22 @ 10:09)  Ferritin, Serum: 40 ng/mL (06-18-22 @ 10:09)  Vitamin B12, Serum: 619 pg/mL (06-18-22 @ 10:09)  Folate, Serum: 14.9 ng/mL (06-18-22 @ 10:09)      Thyroid Panel:  Thyroid Stimulating Hormone, Serum: 0.42 uIU/mL (06-18-22 @ 08:32)        Lipase, Serum: 157 U/L (06-17-22 @ 07:28)      Serum Pro-Brain Natriuretic Peptide: 2396 pg/mL (06-17-22 @ 07:33)      RADIOLOGY & ADDITIONAL TESTS:    HEALTH ISSUES - PROBLEM Dx:  Chest pain    Arm weakness    Hypothyroid    Hypertension    Lower extremity edema    Paroxysmal atrial fibrillation    Need for prophylactic measure    Anemia    Electrolyte abnormality          Consultant(s) Notes Reviewed:  [  ] YES     Care Discussed with [ x ] Consultants cardiology NP , [x  ] Patient, [x  ] Family daughter HCP and son in law, [  ] , [  ] Social Service, [ x ] RN, [  ] Physical Therapy  DVT PPX: [  ] Lovenox, [  ] SC Heparin, [  ] Coumadin, [  ] Xarelto, [  ] Eliquis, [  ] Pradaxa, [  ] IV Heparin drip, [ x ] SCD, [  ] Contraindication secondary to GI Bleed, [  ] Ambulation  Advanced Directive: [  ] None, [  ] DNR/DNI Patient is a 89y old  Female who presents with a chief complaint of CP w/ elevated trops (19 Jun 2022 09:28)        HPI:  Patient is a 89 y/o F with a pmhx b/l LE edema, HTN, P Afib (on coumadin), and hypothyroidism presenting with sudden onset mid-sternal CP. Pt describes pain as a "prickly feeling", rated 4/10, constant in nature since 4am on 6/17, resolved in the ED. Pain was associated with L arm weakness, now resolved. Pt states the pain woke her up, and she was scared to walk and was unable to use her L arm for support to get out of bed. Pt was also nauseous at this time. No associated diaphoresis, palpitations, chest pressure. No amaurosis fugax, facial droop, garbled speech, hemiparesis, LOC, falls reported. No vomiting, fevers, chills, cough, sore throat, SOB, abd pain, constipation, dysuria. Pt had one loose BM this AM at home, denied hematochezia or melena.       In ED:  VS - HR 71 | /65 | RR 18 | sO2 94 | T 97.6  Labs - HgB 11.2 | INR 1.85 | K 3.3 | Glu 114 | trop 342.6 | CKMB 3.2 | BNP 2396  [facility] NC - No acute intracranial hemorrhage. Lacunar infarct R inferior cerebellum  EKG - vent rate 66, QTc 501ms, sinus rhythm, old LBBB, no ischemic changes on personal read  Given -  mg PO. pt seen by cardiology Dr Almazan , pt admitted to [facility] for NSTEMI. (17 Jun 2022 09:52)    INTERVAL HPI:  6/18/22: Seen and examined at bedside. No acute complaints. Denies chest pain overnight and this AM. Overnight pt had episode of dyspnea, troponins inc from 300s to >51223. Trops now downtrended. Received 1 x dose 40mg IV lasix for dyspnea. Pt had statin dose inc to 80mg, had 1 x dose lopressor 25mg, and had her scheduled evening coumadin. Had 10 beats of NSVT this AM. Pt was started on standing 50mg toprol xL PO qD. Started on plavix 75mg qD. Extensive counseling done with the patient on [facility] and her current state of health. Patient states she has "forms at home" that her daughter will bring to elucidate [facility]. Patient values quality of life and wants to discuss with her daughter and son-julio. Family to discuss potential plan for cardiac cath and will arrive to a decision on 6/19. C/W medical therapy for ACS. HOLD coumadin as pt INR is therapeutic and to facilitate potential for cardiac cath. Will start renal adjusted FD Lovenox on 6/19 if INR is below 2.   6/19/22 Pt seen and examined at bedside. Pt states she slept well last night. Pt denies SOB, CP, palpitations, dizziness.  Pt states the last time she had chest pain was Friday night. Later in the afternoon again patient was seen at bedside with  daughter HCP and son in law at bedside, decision was made for DNR DNI and cardiac cath.    OVERNIGHT EVENTS: No acute events overnight.     Home Medications:  Lasix 40 mg oral tablet: 1 tab(s) orally 2 times a day (17 Jun 2022 11:27)  Lopressor: 25  orally once a day (17 Jun 2022 11:27)  potassium chloride 10 mEq oral tablet, extended release: 2 tablets twice a day (takes it with her Lasix)  (17 Jun 2022 11:27)  Synthroid 75 mcg (0.075 mg) oral tablet: 1 tab(s) orally once a day (17 Jun 2022 11:27)  warfarin 3 mg oral tablet: 1 tab(s) orally once a day (M, Tu, Th, F, Sa Grider) (17 Jun 2022 11:27)  warfarin 4 mg oral tablet: 1 tab(s) orally Wednesday (17 Jun 2022 11:27)      MEDICATIONS  (STANDING):  aspirin enteric coated 81 milliGRAM(s) Oral daily  atorvastatin 80 milliGRAM(s) Oral at bedtime  clopidogrel Tablet 75 milliGRAM(s) Oral daily  furosemide    Tablet 40 milliGRAM(s) Oral two times a day  levothyroxine 75 MICROGram(s) Oral daily  metoprolol succinate ER 50 milliGRAM(s) Oral daily  pantoprazole  Injectable 40 milliGRAM(s) IV Push daily  potassium chloride   Powder 40 milliEquivalent(s) Oral every 4 hours  potassium chloride  10 mEq/100 mL IVPB 10 milliEquivalent(s) IV Intermittent every 1 hour    MEDICATIONS  (PRN):      Allergies    No Known Allergies    Intolerances    REVIEW OF SYSTEMS:  CONSTITUTIONAL: No fever, No chills, No fatigue, No myalgia, No Body ache, No Weakness  EYES: No eye pain,  No visual disturbances, No discharge, NO Redness  ENMT:  No ear pain, No nose bleed, No vertigo; No sinus pain, NO throat pain, No Congestion  NECK: No pain, No stiffness  RESPIRATORY: No cough, NO wheezing, No hemoptysis, ADMITS brief shortness of breath earlier, now resolved  CARDIOVASCULAR: No chest pain, palpitations  GASTROINTESTINAL: No abdominal pain, NO epigastric pain. No nausea, No vomiting; No diarrhea, No constipation. [ x ] BM  GENITOURINARY: No dysuria, No frequency, No urgency, No hematuria, NO incontinence  NEUROLOGICAL: No headaches, No dizziness, No numbness, No tingling, No tremors, No weakness  EXT: admits CHRONIC Swelling in LE's;  No Pain, admits LE Edema  SKIN:  [ x ] No itching, burning, rashes, or lesions   MUSCULOSKELETAL: No joint pain ,No Jt swelling; No muscle pain, No back pain, No extremity pain  PSYCHIATRIC: No depression,  No anxiety,  No mood swings ,No difficulty sleeping at night  PAIN SCALE: [ x ] None  [  ] Other-  ROS Unable to obtain due to - [  ] Dementia  [  ] Lethargy [  ] Drowsy [  ] Sedated [  ] non verbal  REST OF REVIEW Of SYSTEM - [ x ] Normal     Vital Signs Last 24 Hrs  T(C): 36.8 (19 Jun 2022 05:18), Max: 36.8 (18 Jun 2022 13:19)  T(F): 98.3 (19 Jun 2022 05:18), Max: 98.3 (18 Jun 2022 13:19)  HR: 62 (19 Jun 2022 08:40) (62 - 77)  BP: 104/69 (19 Jun 2022 08:40) (99/61 - 138/76)  BP(mean): --  RR: 20 (19 Jun 2022 08:41) (18 - 20)  SpO2: 90% (19 Jun 2022 08:41) (78% - 95%)    Finger Stick        06-18 @ 07:01  -  06-19 @ 07:00  --------------------------------------------------------  IN: 360 mL / OUT: 500 mL / NET: -140 mL    PHYSICAL EXAM:  GENERAL:  [ x ] NAD , [ x ] well appearing, [  ] Agitated, [  ] Drowsy,  [  ] Lethargy, [  ] confused   HEAD:  [ x ] Normal, [  ] Other  EYES:  [ x ] EOMI, [ x ] PERRLA, [ x ] conjunctiva and sclera clear normal, [  ] Other,  [  ] Pallor,[  ] Discharge  ENMT:  [ x ] Normal, [ x ] Moist mucous membranes, [ x ] Good dentition, [ x ] No Thrush  NECK:  [ x ] Supple, [x  ] No JVD, [ x ] Normal thyroid, [  ] Lymphadenopathy [  ] Other  CHEST/LUNG:  [ x ] Clear to auscultation bilaterally, [ x ] Breath Sounds equal B/L , [  ] poor effort  [ x ] No rales, [ x ] No rhonchi  [ x ]  No wheezing,   HEART:  [ x ] Regular rate and IRREGULARLY IRREGULAR rhythm, [  ] tachycardia, [  ] Bradycardia,  [  ] irregular  [ x ] No murmurs, No rubs, No gallops, [  ] PPM in place (Mfr:  )  ABDOMEN:  [ x ] Soft, [ x ] Nontender, [ x ] Nondistended, [ x ] No mass, [  x] Bowel sounds present, [  ] obese  NERVOUS SYSTEM:  [ x ] Alert & Oriented X3, [ x ] Nonfocal  [  ] Confusion  [  ] Encephalopathic [  ] Sedated [  ] Unable to assess, [  ] Dementia [  ] Other-  EXTREMITIES: [ x ] 2+ Peripheral Pulses, No clubbing, No cyanosis,  [ x ] 2 + pitting edema B/L lower EXT. [ x ] PVD stasis skin changes B/L Lower EXT, [  ] wound  LYMPH: No lymphadenopathy noted  SKIN:  [ x ] No rashes or lesions, [  ] Pressure Ulcers, [  ] ecchymosis, [  ] Skin Tears, [  ] Other    DIET: Diet, DASH/TLC:   Sodium & Cholesterol Restricted (06-17-22 @ 10:27)  LABS:                        10.0   5.45  )-----------( 159      ( 19 Jun 2022 06:46 )             30.7     19 Jun 2022 06:46    143    |  106    |  22     ----------------------------<  90     3.2     |  32     |  1.50     Ca    7.8        19 Jun 2022 06:46  Phos  2.5       19 Jun 2022 06:46  Mg     2.3       19 Jun 2022 06:46    TPro  6.0    /  Alb  2.4    /  TBili  1.2    /  DBili  x      /  AST  65     /  ALT  21     /  AlkPhos  77     19 Jun 2022 06:46    PT/INR - ( 19 Jun 2022 06:46 )   PT: 25.5 sec;   INR: 2.16 ratio         PTT - ( 19 Jun 2022 06:46 )  PTT:40.3 sec        CARDIAC MARKERS ( 18 Jun 2022 08:32 )  x     / x     / 364 U/L / x     / 24.0 ng/mL  CARDIAC MARKERS ( 17 Jun 2022 23:34 )  x     / x     / 497 U/L / x     / 52.0 ng/mL  CARDIAC MARKERS ( 17 Jun 2022 14:55 )  x     / x     / 523 U/L / x     / 70.3 ng/mL  CARDIAC MARKERS ( 17 Jun 2022 08:40 )  x     / x     / 135 U/L / x     / 3.2 ng/mL         Anemia Panel:  Iron Total, Serum: 88 ug/dL (06-18-22 @ 10:09)  Iron - Total Binding Capacity.: 354 ug/dL (06-18-22 @ 10:09)  Ferritin, Serum: 40 ng/mL (06-18-22 @ 10:09)  Vitamin B12, Serum: 619 pg/mL (06-18-22 @ 10:09)  Folate, Serum: 14.9 ng/mL (06-18-22 @ 10:09)      Thyroid Panel:  Thyroid Stimulating Hormone, Serum: 0.42 uIU/mL (06-18-22 @ 08:32)        Lipase, Serum: 157 U/L (06-17-22 @ 07:28)      Serum Pro-Brain Natriuretic Peptide: 2396 pg/mL (06-17-22 @ 07:33)      RADIOLOGY & ADDITIONAL TESTS:    HEALTH ISSUES - PROBLEM Dx:  Chest pain    Arm weakness    Hypothyroid    Hypertension    Lower extremity edema    Paroxysmal atrial fibrillation    Need for prophylactic measure    Anemia    Electrolyte abnormality          Consultant(s) Notes Reviewed:  [  ] YES     Care Discussed with [ x ] Consultants cardiology NP , [x  ] Patient, [x  ] Family daughter HCP and son in law, [  ] , [  ] Social Service, [ x ] RN, [  ] Physical Therapy  DVT PPX: [  ] Lovenox, [  ] SC Heparin, [  ] Coumadin, [  ] Xarelto, [  ] Eliquis, [  ] Pradaxa, [  ] IV Heparin drip, [ x ] SCD, [  ] Contraindication secondary to GI Bleed, [  ] Ambulation  Advanced Directive: [  ] None, [  ] DNR/DNI                   Patient is a 89y old  Female who presents with a chief complaint of CP w/ elevated trops (19 Jun 2022 09:28)        HPI:  Patient is a 89 y/o F with a pmhx b/l LE edema, HTN, P Afib (on coumadin), and hypothyroidism presenting with sudden onset mid-sternal CP. Pt describes pain as a "prickly feeling", rated 4/10, constant in nature since 4am on 6/17, resolved in the ED. Pain was associated with L arm weakness, now resolved. Pt states the pain woke her up, and she was scared to walk and was unable to use her L arm for support to get out of bed. Pt was also nauseous at this time. No associated diaphoresis, palpitations, chest pressure. No amaurosis fugax, facial droop, garbled speech, hemiparesis, LOC, falls reported. No vomiting, fevers, chills, cough, sore throat, SOB, abd pain, constipation, dysuria. Pt had one loose BM this AM at home, denied hematochezia or melena.       In ED:  VS - HR 71 | /65 | RR 18 | sO2 94 | T 97.6  Labs - HgB 11.2 | INR 1.85 | K 3.3 | Glu 114 | trop 342.6 | CKMB 3.2 | BNP 2396  [facility] NC - No acute intracranial hemorrhage. Lacunar infarct R inferior cerebellum  EKG - vent rate 66, QTc 501ms, sinus rhythm, old LBBB, no ischemic changes on personal read  Given -  mg PO. pt seen by cardiology Dr Almazan , pt admitted to [facility] for NSTEMI. (17 Jun 2022 09:52)    INTERVAL HPI:  6/18/22: Seen and examined at bedside. No acute complaints. Denies chest pain overnight and this AM. Overnight pt had episode of dyspnea, troponins inc from 300s to >25453. Trops now downtrended. Received 1 x dose 40mg IV lasix for dyspnea. Pt had statin dose inc to 80mg, had 1 x dose lopressor 25mg, and had her scheduled evening coumadin. Had 10 beats of NSVT this AM. Pt was started on standing 50mg toprol xL PO qD. Started on plavix 75mg qD. Extensive counseling done with the patient on [facility] and her current state of health. Patient states she has "forms at home" that her daughter will bring to [facility]. Patient values quality of life and wants to discuss with her daughter and son-andrewaw. Family to discuss potential plan for cardiac cath and will arrive to a decision on 6/19. C/W medical therapy for ACS. HOLD coumadin as pt INR is therapeutic and to facilitate potential for cardiac cath. Will start renal adjusted FD Lovenox on 6/19 if INR is below 2.   6/19/22 Pt seen and examined at bedside. Pt states she slept well last night. Pt denies SOB, CP, palpitations, dizziness .Mildly elevated Cr , INR -Theraputic  Pt states the last time she had chest pain was Friday night. Later in the afternoon again patient was seen at bedside with  daughter HCP and son in law at bedside, decision was made for DNR/ DNI and cardiac cath.    OVERNIGHT EVENTS: No acute events overnight.     Home Medications:  Lasix 40 mg oral tablet: 1 tab(s) orally 2 times a day (17 Jun 2022 11:27)  Lopressor: 25  orally once a day (17 Jun 2022 11:27)  potassium chloride 10 mEq oral tablet, extended release: 2 tablets twice a day (takes it with her Lasix)  (17 Jun 2022 11:27)  Synthroid 75 mcg (0.075 mg) oral tablet: 1 tab(s) orally once a day (17 Jun 2022 11:27)  warfarin 3 mg oral tablet: 1 tab(s) orally once a day (M, Tu, Th, F, Sa Grider) (17 Jun 2022 11:27)  warfarin 4 mg oral tablet: 1 tab(s) orally Wednesday (17 Jun 2022 11:27)      MEDICATIONS  (STANDING):  aspirin enteric coated 81 milliGRAM(s) Oral daily  atorvastatin 80 milliGRAM(s) Oral at bedtime  clopidogrel Tablet 75 milliGRAM(s) Oral daily  furosemide    Tablet 40 milliGRAM(s) Oral two times a day  levothyroxine 75 MICROGram(s) Oral daily  metoprolol succinate ER 50 milliGRAM(s) Oral daily  pantoprazole  Injectable 40 milliGRAM(s) IV Push daily  potassium chloride   Powder 40 milliEquivalent(s) Oral every 4 hours  potassium chloride  10 mEq/100 mL IVPB 10 milliEquivalent(s) IV Intermittent every 1 hour    MEDICATIONS  (PRN):      Allergies    No Known Allergies    Intolerances    REVIEW OF SYSTEMS: i feel OK  CONSTITUTIONAL: No fever, No chills, No fatigue, No myalgia, No Body ache, No Weakness  EYES: No eye pain,  No visual disturbances, No discharge, NO Redness  ENMT:  No ear pain, No nose bleed, No vertigo; No sinus pain, NO throat pain, No Congestion  NECK: No pain, No stiffness  RESPIRATORY: No cough, NO wheezing, No hemoptysis, ADMITS brief shortness of breath earlier, now resolved  CARDIOVASCULAR: No chest pain, palpitations  GASTROINTESTINAL: No abdominal pain, NO epigastric pain. No nausea, No vomiting; No diarrhea, No constipation. [ x ] BM  GENITOURINARY: No dysuria, No frequency, No urgency, No hematuria, NO incontinence  NEUROLOGICAL: No headaches, No dizziness, No numbness, No tingling, No tremors, No weakness  EXT: admits CHRONIC Swelling in LE's;  No Pain, admits LE Edema  SKIN:  [ x ] No itching, burning, rashes, or lesions   MUSCULOSKELETAL: No joint pain ,No Jt swelling; No muscle pain, No back pain, No extremity pain  PSYCHIATRIC: No depression,  No anxiety,  No mood swings ,No difficulty sleeping at night  PAIN SCALE: [ x ] None  [  ] Other-  ROS Unable to obtain due to - [  ] Dementia  [  ] Lethargy [  ] Drowsy [  ] Sedated [  ] non verbal  REST OF REVIEW Of SYSTEM - [ x ] Normal     Vital Signs Last 24 Hrs  T(C): 36.8 (19 Jun 2022 05:18), Max: 36.8 (18 Jun 2022 13:19)  T(F): 98.3 (19 Jun 2022 05:18), Max: 98.3 (18 Jun 2022 13:19)  HR: 62 (19 Jun 2022 08:40) (62 - 77)  BP: 104/69 (19 Jun 2022 08:40) (99/61 - 138/76)  BP(mean): --  RR: 20 (19 Jun 2022 08:41) (18 - 20)  SpO2: 90% (19 Jun 2022 08:41) (78% - 95%)    Finger Stick        06-18 @ 07:01  -  06-19 @ 07:00  --------------------------------------------------------  IN: 360 mL / OUT: 500 mL / NET: -140 mL    PHYSICAL EXAM:  GENERAL:  [ x ] NAD , [ x ] well appearing, [  ] Agitated, [  ] Drowsy,  [  ] Lethargy, [  ] confused   HEAD:  [ x ] Normal, [  ] Other  EYES:  [ x ] EOMI, [ x ] PERRLA, [ x ] conjunctiva and sclera clear normal, [  ] Other,  [  ] Pallor,[  ] Discharge  ENMT:  [ x ] Normal, [ x ] Moist mucous membranes, [ x ] Good dentition, [ x ] No Thrush  NECK:  [ x ] Supple, [x  ] No JVD, [ x ] Normal thyroid, [  ] Lymphadenopathy [  ] Other  CHEST/LUNG:  [ x ] Clear to auscultation bilaterally, [ x ] Breath Sounds equal B/L , [  ] poor effort  [ x ] No rales, [ x ] No rhonchi  [ x ]  No wheezing,   HEART:  [ x ] Regular rate and IRREGULARLY IRREGULAR rhythm, [  ] tachycardia, [  ] Bradycardia,  [  ] irregular  [ x ] No murmurs, No rubs, No gallops, [  ] PPM in place (Mfr:  )  ABDOMEN:  [ x ] Soft, [ x ] Nontender, [ x ] Nondistended, [ x ] No mass, [  x] Bowel sounds present, [  ] obese  NERVOUS SYSTEM:  [ x ] Alert & Oriented X3, [ x ] Nonfocal  [  ] Confusion  [  ] Encephalopathic [  ] Sedated [  ] Unable to assess, [  ] Dementia [  ] Other-  EXTREMITIES: [ x ] 2+ Peripheral Pulses, No clubbing, No cyanosis,  [ x ] 2 + pitting edema B/L lower EXT. [ x ] PVD stasis skin changes B/L Lower EXT, [  ] wound  LYMPH: No lymphadenopathy noted  SKIN:  [ x ] No rashes or lesions, [  ] Pressure Ulcers, [  ] ecchymosis, [  ] Skin Tears, [  ] Other    DIET: Diet, DASH/TLC:   Sodium & Cholesterol Restricted (06-17-22 @ 10:27)  LABS:                        10.0   5.45  )-----------( 159      ( 19 Jun 2022 06:46 )             30.7     19 Jun 2022 06:46    143    |  106    |  22     ----------------------------<  90     3.2     |  32     |  1.50     Ca    7.8        19 Jun 2022 06:46  Phos  2.5       19 Jun 2022 06:46  Mg     2.3       19 Jun 2022 06:46    TPro  6.0    /  Alb  2.4    /  TBili  1.2    /  DBili  x      /  AST  65     /  ALT  21     /  AlkPhos  77     19 Jun 2022 06:46    PT/INR - ( 19 Jun 2022 06:46 )   PT: 25.5 sec;   INR: 2.16 ratio         PTT - ( 19 Jun 2022 06:46 )  PTT:40.3 sec        CARDIAC MARKERS ( 18 Jun 2022 08:32 )  x     / x     / 364 U/L / x     / 24.0 ng/mL  CARDIAC MARKERS ( 17 Jun 2022 23:34 )  x     / x     / 497 U/L / x     / 52.0 ng/mL  CARDIAC MARKERS ( 17 Jun 2022 14:55 )  x     / x     / 523 U/L / x     / 70.3 ng/mL  CARDIAC MARKERS ( 17 Jun 2022 08:40 )  x     / x     / 135 U/L / x     / 3.2 ng/mL         Anemia Panel:  Iron Total, Serum: 88 ug/dL (06-18-22 @ 10:09)  Iron - Total Binding Capacity.: 354 ug/dL (06-18-22 @ 10:09)  Ferritin, Serum: 40 ng/mL (06-18-22 @ 10:09)  Vitamin B12, Serum: 619 pg/mL (06-18-22 @ 10:09)  Folate, Serum: 14.9 ng/mL (06-18-22 @ 10:09)      Thyroid Panel:  Thyroid Stimulating Hormone, Serum: 0.42 uIU/mL (06-18-22 @ 08:32)        Lipase, Serum: 157 U/L (06-17-22 @ 07:28)      Serum Pro-Brain Natriuretic Peptide: 2396 pg/mL (06-17-22 @ 07:33)      RADIOLOGY & ADDITIONAL TESTS:      HEALTH ISSUES - PROBLEM Dx:  Chest pain    Arm weakness    Hypothyroid    Hypertension    Lower extremity edema    Paroxysmal atrial fibrillation    Need for prophylactic measure    Anemia    Electrolyte abnormality          Consultant(s) Notes Reviewed:  [x ] YES     Care Discussed with [ x ] Consultants cardiology NP , [x  ] Patient, [x  ] Family daughter HCP and son in law, [  ] , [  ] Social Service, [ x ] RN, [  ] Physical Therapy  DVT PPX: [  ] Lovenox, [  ] SC Heparin, [x  ] Coumadin, [  ] Xarelto, [  ] Eliquis, [  ] Pradaxa, [  ] IV Heparin drip, [ x ] SCD, [  ] Contraindication secondary to GI Bleed, [  ] Ambulation  Advanced Directive: [  ] None, [x  ] DNR/DNI                   Patient is a 89y old  Female who presents with a chief complaint of CP w/ elevated trops (19 Jun 2022 09:28)        HPI:  Patient is a 89 y/o F with a pmhx b/l LE edema, HTN, P Afib (on coumadin), and hypothyroidism presenting with sudden onset mid-sternal CP. Pt describes pain as a "prickly feeling", rated 4/10, constant in nature since 4am on 6/17, resolved in the ED. Pain was associated with L arm weakness, now resolved. Pt states the pain woke her up, and she was scared to walk and was unable to use her L arm for support to get out of bed. Pt was also nauseous at this time. No associated diaphoresis, palpitations, chest pressure. No amaurosis fugax, facial droop, garbled speech, hemiparesis, LOC, falls reported. No vomiting, fevers, chills, cough, sore throat, SOB, abd pain, constipation, dysuria. Pt had one loose BM this AM at home, denied hematochezia or melena.       In ED:  VS - HR 71 | /65 | RR 18 | sO2 94 | T 97.6  Labs - HgB 11.2 | INR 1.85 | K 3.3 | Glu 114 | trop 342.6 | CKMB 3.2 | BNP 2396  [facility] NC - No acute intracranial hemorrhage. Lacunar infarct R inferior cerebellum  EKG - vent rate 66, QTc 501ms, sinus rhythm, old LBBB, no ischemic changes on personal read  Given -  mg PO. pt seen by cardiology Dr Almazan , pt admitted to [facility] for NSTEMI. (17 Jun 2022 09:52)    INTERVAL HPI:  6/18/22: Seen and examined at bedside. No acute complaints. Denies chest pain overnight and this AM. Overnight pt had episode of dyspnea, troponins inc from 300s to >[id number]. Trops now downtrended. Received 1 x dose 40mg IV lasix for dyspnea. Pt had statin dose inc to 80mg, had 1 x dose lopressor 25mg, and had her scheduled evening coumadin. Had 10 beats of NSVT this AM. Pt was started on standing 50mg toprol xL PO qD. Started on plavix 75mg qD. Extensive counseling done with the patient on [facility] and her current state of health. Patient states she has "forms at home" that her daughter will bring to [facility]. Patient values quality of life and wants to discuss with her daughter and son-andrewaw. Family to discuss potential plan for cardiac cath and will arrive to a decision on 6/19. C/W medical therapy for ACS. HOLD coumadin as pt INR is therapeutic and to facilitate potential for cardiac cath. Will start renal adjusted FD Lovenox on 6/19 if INR is below 2.   6/19/22 Pt seen and examined at bedside. Pt states she slept well last night. Pt denies SOB, CP, palpitations, dizziness .Mildly elevated Cr , INR -Theraputic  Pt states the last time she had chest pain was Friday night. Later in the afternoon again patient was seen at bedside with  daughter HCP and son in law at bedside, decision was made for DNR/ DNI and cardiac cath.    OVERNIGHT EVENTS: No acute events overnight.     Home Medications:  Lasix 40 mg oral tablet: 1 tab(s) orally 2 times a day (17 Jun 2022 11:27)  Lopressor: 25  orally once a day (17 Jun 2022 11:27)  potassium chloride 10 mEq oral tablet, extended release: 2 tablets twice a day (takes it with her Lasix)  (17 Jun 2022 11:27)  Synthroid 75 mcg (0.075 mg) oral tablet: 1 tab(s) orally once a day (17 Jun 2022 11:27)  warfarin 3 mg oral tablet: 1 tab(s) orally once a day (M, Tu, Th, F, Sa Grider) (17 Jun 2022 11:27)  warfarin 4 mg oral tablet: 1 tab(s) orally Wednesday (17 Jun 2022 11:27)      MEDICATIONS  (STANDING):  aspirin enteric coated 81 milliGRAM(s) Oral daily  atorvastatin 80 milliGRAM(s) Oral at bedtime  clopidogrel Tablet 75 milliGRAM(s) Oral daily  furosemide    Tablet 40 milliGRAM(s) Oral two times a day  levothyroxine 75 MICROGram(s) Oral daily  metoprolol succinate ER 50 milliGRAM(s) Oral daily  pantoprazole  Injectable 40 milliGRAM(s) IV Push daily  potassium chloride   Powder 40 milliEquivalent(s) Oral every 4 hours  potassium chloride  10 mEq/100 mL IVPB 10 milliEquivalent(s) IV Intermittent every 1 hour    MEDICATIONS  (PRN):      Allergies    No Known Allergies    Intolerances    REVIEW OF SYSTEMS: i feel OK  CONSTITUTIONAL: No fever, No chills, No fatigue, No myalgia, No Body ache, No Weakness  EYES: No eye pain,  No visual disturbances, No discharge, NO Redness  ENMT:  No ear pain, No nose bleed, No vertigo; No sinus pain, NO throat pain, No Congestion  NECK: No pain, No stiffness  RESPIRATORY: No cough, NO wheezing, No hemoptysis, ADMITS brief shortness of breath earlier, now resolved  CARDIOVASCULAR: No chest pain, palpitations  GASTROINTESTINAL: No abdominal pain, NO epigastric pain. No nausea, No vomiting; No diarrhea, No constipation. [ x ] BM  GENITOURINARY: No dysuria, No frequency, No urgency, No hematuria, NO incontinence  NEUROLOGICAL: No headaches, No dizziness, No numbness, No tingling, No tremors, No weakness  EXT: admits CHRONIC Swelling in LE's;  No Pain, admits LE Edema  SKIN:  [ x ] No itching, burning, rashes, or lesions   MUSCULOSKELETAL: No joint pain ,No Jt swelling; No muscle pain, No back pain, No extremity pain  PSYCHIATRIC: No depression,  No anxiety,  No mood swings ,No difficulty sleeping at night  PAIN SCALE: [ x ] None  [  ] Other-  ROS Unable to obtain due to - [  ] Dementia  [  ] Lethargy [  ] Drowsy [  ] Sedated [  ] non verbal  REST OF REVIEW Of SYSTEM - [ x ] Normal     Vital Signs Last 24 Hrs  T(C): 36.8 (19 Jun 2022 05:18), Max: 36.8 (18 Jun 2022 13:19)  T(F): 98.3 (19 Jun 2022 05:18), Max: 98.3 (18 Jun 2022 13:19)  HR: 62 (19 Jun 2022 08:40) (62 - 77)  BP: 104/69 (19 Jun 2022 08:40) (99/61 - 138/76)  BP(mean): --  RR: 20 (19 Jun 2022 08:41) (18 - 20)  SpO2: 90% (19 Jun 2022 08:41) (78% - 95%)    Finger Stick        06-18 @ 07:01  -  06-19 @ 07:00  --------------------------------------------------------  IN: 360 mL / OUT: 500 mL / NET: -140 mL    PHYSICAL EXAM:  GENERAL:  [ x ] NAD , [ x ] well appearing, [  ] Agitated, [  ] Drowsy,  [  ] Lethargy, [  ] confused   HEAD:  [ x ] Normal, [  ] Other  EYES:  [ x ] EOMI, [ x ] PERRLA, [ x ] conjunctiva and sclera clear normal, [  ] Other,  [  ] Pallor,[  ] Discharge  ENMT:  [ x ] Normal, [ x ] Moist mucous membranes, [ x ] Good dentition, [ x ] No Thrush  NECK:  [ x ] Supple, [x  ] No JVD, [ x ] Normal thyroid, [  ] Lymphadenopathy [  ] Other  CHEST/LUNG:  [ x ] Clear to auscultation bilaterally, [ x ] Breath Sounds equal B/L , [  ] poor effort  [ x ] No rales, [ x ] No rhonchi  [ x ]  No wheezing,   HEART:  [ x ] Regular rate and IRREGULARLY IRREGULAR rhythm, [  ] tachycardia, [  ] Bradycardia,  [  ] irregular  [ x ] No murmurs, No rubs, No gallops, [  ] PPM in place (Mfr:  )  ABDOMEN:  [ x ] Soft, [ x ] Nontender, [ x ] Nondistended, [ x ] No mass, [  x] Bowel sounds present, [  ] obese  NERVOUS SYSTEM:  [ x ] Alert & Oriented X3, [ x ] Nonfocal  [  ] Confusion  [  ] Encephalopathic [  ] Sedated [  ] Unable to assess, [  ] Dementia [  ] Other-  EXTREMITIES: [ x ] 2+ Peripheral Pulses, No clubbing, No cyanosis,  [ x ] 2 + pitting edema B/L lower EXT. [ x ] PVD stasis skin changes B/L Lower EXT, [  ] wound  LYMPH: No lymphadenopathy noted  SKIN:  [ x ] No rashes or lesions, [  ] Pressure Ulcers, [  ] ecchymosis, [  ] Skin Tears, [  ] Other    DIET: Diet, DASH/TLC:   Sodium & Cholesterol Restricted (06-17-22 @ 10:27)  LABS:                        10.0   5.45  )-----------( 159      ( 19 Jun 2022 06:46 )             30.7     19 Jun 2022 06:46    143    |  106    |  22     ----------------------------<  90     3.2     |  32     |  1.50     Ca    7.8        19 Jun 2022 06:46  Phos  2.5       19 Jun 2022 06:46  Mg     2.3       19 Jun 2022 06:46    TPro  6.0    /  Alb  2.4    /  TBili  1.2    /  DBili  x      /  AST  65     /  ALT  21     /  AlkPhos  77     19 Jun 2022 06:46    PT/INR - ( 19 Jun 2022 06:46 )   PT: 25.5 sec;   INR: 2.16 ratio         PTT - ( 19 Jun 2022 06:46 )  PTT:40.3 sec        CARDIAC MARKERS ( 18 Jun 2022 08:32 )  x     / x     / 364 U/L / x     / 24.0 ng/mL  CARDIAC MARKERS ( 17 Jun 2022 23:34 )  x     / x     / 497 U/L / x     / 52.0 ng/mL  CARDIAC MARKERS ( 17 Jun 2022 14:55 )  x     / x     / 523 U/L / x     / 70.3 ng/mL  CARDIAC MARKERS ( 17 Jun 2022 08:40 )  x     / x     / 135 U/L / x     / 3.2 ng/mL         Anemia Panel:  Iron Total, Serum: 88 ug/dL (06-18-22 @ 10:09)  Iron - Total Binding Capacity.: 354 ug/dL (06-18-22 @ 10:09)  Ferritin, Serum: 40 ng/mL (06-18-22 @ 10:09)  Vitamin B12, Serum: 619 pg/mL (06-18-22 @ 10:09)  Folate, Serum: 14.9 ng/mL (06-18-22 @ 10:09)      Thyroid Panel:  Thyroid Stimulating Hormone, Serum: 0.42 uIU/mL (06-18-22 @ 08:32)        Lipase, Serum: 157 U/L (06-17-22 @ 07:28)      Serum Pro-Brain Natriuretic Peptide: 2396 pg/mL (06-17-22 @ 07:33)      RADIOLOGY & ADDITIONAL TESTS:      HEALTH ISSUES - PROBLEM Dx:  Chest pain    Arm weakness    Hypothyroid    Hypertension    Lower extremity edema    Paroxysmal atrial fibrillation    Need for prophylactic measure    Anemia    Electrolyte abnormality          Consultant(s) Notes Reviewed:  [x ] YES     Care Discussed with [ x ] Consultants cardiology NP , [x  ] Patient, [x  ] Family daughter HCP and son in law, [  ] , [  ] Social Service, [ x ] RN, [  ] Physical Therapy  DVT PPX: [  ] Lovenox, [  ] SC Heparin, [x  ] Coumadin, [  ] Xarelto, [  ] Eliquis, [  ] Pradaxa, [  ] IV Heparin drip, [ x ] SCD, [  ] Contraindication secondary to GI Bleed, [  ] Ambulation  Advanced Directive: [  ] None, [x  ] DNR/DNI

## 2022-06-19 NOTE — PROGRESS NOTE ADULT - PROBLEM SELECTOR PLAN 4
L U Ext pain -likely 2/2 pain in setting of CP - CVA unlikely at this time, likely 2/2 NSTEMI  - c/w plan as above  - CTH NC - No acute intracranial hemorrhage. Lacunar infarct R inferior cerebellum  neuro consulted - Xavi - will appreciate recs- doubt new CVA, on ASA & Coumadin at home  - MR shows 1.  No acute intracranial major vascular distribution infarction, hemorrhage or mass effect. 2.  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 Additional nonspecific white matter findings, most commonly suggestive of chronic small vessel ischemia and/or prior chronic lacunar infarcts. chronic, swelling LE b/l on admit  -hold Lasix given LUIS CARLOS   - f/up TTE

## 2022-06-19 NOTE — PROGRESS NOTE ADULT - PROBLEM SELECTOR PLAN 3
chronic, stable - rate-controlled - on coumadin  - INR subtherapeutic on admit - on coumadin 3 mg qd (except Wed - 4 mg)  - HOLD coumadin, INR is therapeutic, START renal adjusted FD Lovenox if INR is subtherapeutic as patient may elect for ischemic evaluation  - inc Toprol  XL 50mg PO qd w/ hold parameters  - tfts wnl, noted  - f/up TTE  - continuous tele monitoring  cardio consulted - Dr Almazan d/w -HOLD  Coumadin, ?Plan for Cardiac cath on Monday, -hypokalemic   - repleted with K IV 10 mEq x1, K PO powder 40 mEq x3   -f/u K in AM BMP, Mag level

## 2022-06-19 NOTE — PROGRESS NOTE ADULT - ATTENDING COMMENTS
Patient is a 89 y/o F with a pmhx b/l LE edema, HTN, P Afib (on coumadin), and hypothyroidism presenting with diarrhea and mid-sternal CP admitted for CP w/ elevated trops, NSTEMI.  Pt seen, examined, case & care plan d/w pt, residents at detail.  -Cardiology Dr Almazan follow up   -AM labs   -PO diet  -Palliative care Eval  D/W Dtr at Detail at bed side. Cardio NP --> option for Cardiac cath. HOLD Coumadin , INR in AM, Cr elevated , Renal -DR ALEXANDRU FLORIAN called.  Total care time is 45 minutes. Patient is a 89 y/o F with a pmhx b/l LE edema, HTN, P Afib (on coumadin), and hypothyroidism presenting with diarrhea and mid-sternal CP admitted for CP w/ elevated trops, NSTEMI.  Pt seen, examined, case & care plan d/w pt, residents at detail.  -Cardiology Dr Almazan follow up   -AM labs   -PO diet  -Palliative care Eval  D/W Dtr at [facility]ail at bed side. Cardio NP --> option for Cardiac cath. HOLD Coumadin , INR in AM, Cr elevated , Renal -DR ALEXANDRU FLORIAN called.  Total care time is 45 minutes.

## 2022-06-19 NOTE — CONSULT NOTE ADULT - SUBJECTIVE AND OBJECTIVE BOX
Patient is a 89y old  Female who presents with a chief complaint of CP w/ elevated trops (2022 09:28)       HPI:  Patient is a 89 y/o F with a pmhx b/l LE edema, HTN, P Afib (on coumadin), and hypothyroidism presenting with sudden onset mid-sternal CP. Pt describes pain as a "prickly feeling", rated 4/10, constant in nature since 4am on , resolved in the ED. Pain was associated with L arm weakness, now resolved. Pt states the pain woke her up, and she was scared to walk and was unable to use her L arm for support to get out of bed. Pt was also nauseous at this time. No associated diaphoresis, palpitations, chest pressure. No amaurosis fugax, facial droop, garbled speech, hemiparesis, LOC, falls reported. No vomiting, fevers, chills, cough, sore throat, SOB, abd pain, constipation, dysuria. Pt had one loose BM this AM at home, denied hematochezia or melena.   Patient has not seen nephrologist in the past.  Denies any N/V/Urinary complaints.         PAST MEDICAL & SURGICAL HISTORY:  Hypothyroid      Hypertension      Lower extremity edema      Paroxysmal atrial fibrillation           FAMILY HISTORY:  NC    Social History:Non smoker    MEDICATIONS  (STANDING):  acetylcysteine  Oral Solution 600 milliGRAM(s) Oral every 12 hours  aspirin enteric coated 81 milliGRAM(s) Oral daily  atorvastatin 80 milliGRAM(s) Oral at bedtime  clopidogrel Tablet 75 milliGRAM(s) Oral daily  levothyroxine 75 MICROGram(s) Oral daily  metoprolol succinate ER 50 milliGRAM(s) Oral daily  pantoprazole  Injectable 40 milliGRAM(s) IV Push daily  potassium chloride   Powder 40 milliEquivalent(s) Oral once  sodium chloride 0.9%. 1000 milliLiter(s) (50 mL/Hr) IV Continuous <Continuous>    MEDICATIONS  (PRN):   Meds reviewed    Allergies    No Known Allergies    Intolerances         REVIEW OF SYSTEMS:    Review of Systems:   Constitutional: Denies fatigue  HEENT: Denies headaches and dizziness  Respiratory: denies SOB, cough, or wheezing  Cardiovascular: denies CP, palpitations  Gastrointestinal: Denies nausea, denies vomiting, diarrhea, constipation, abdominal pain, or bloody stools  Genitourinary: denies painful urination, increased frequency, urgency, or bloody urine  Skin: denies rashes or itching  Musculoskeletal: denies muscle aches, joint swelling  Neurologic: Denies generalized weakness, denies loss of sensation, numbness, or tingling      Vital Signs Last 24 Hrs  T(C): 36.7 (2022 11:26), Max: 36.8 (2022 05:18)  T(F): 98 (:), Max: 98.3 (2022 05:18)  HR: 77 (:) (62 - 77)  BP: 113/66 (2022 11:) (104/69 - 114/63)  BP(mean): --  RR: 16 (:) (16 - 20)  SpO2: 95% (:) (78% - 95%)  Daily     Daily Weight in k.7 (2022 05:18)    PHYSICAL EXAM:    GENERAL: NAD  HEAD:  Atraumatic, Normocephalic  EYES: EOMI, conjunctiva and sclera clear  ENMT: No Drainage from nares, No drainage from ears  NECK: Supple, neck  veins full  NERVOUS SYSTEM:  Awake and Alert  CHEST/LUNG: mildly Decreased BS R No rales, rhonchi, wheezing, or rubs  HEART: Regular rate and rhythm; No murmurs, rubs, or gallops  ABDOMEN: Soft, Nontender, Nondistended; Bowel sounds present  EXTREMITIES:  + Edema  SKIN: No rashes No obvious ecchymosis      LABS:                        10.0   5.45  )-----------( 159      ( 2022 06:46 )             30.7     -19    143  |  106  |  22  ----------------------------<  90  3.2<L>   |  32<H>  |  1.50<H>    Ca    7.8<L>      2022 06:46  Phos  2.5       Mg     2.3         TPro  6.0  /  Alb  2.4<L>  /  TBili  1.2  /  DBili  x   /  AST  65<H>  /  ALT  21  /  AlkPhos  77      PT/INR - ( 2022 06:46 )   PT: 25.5 sec;   INR: 2.16 ratio         PTT - ( 2022 06:46 )  PTT:40.3 sec    Magnesium, Serum: 2.3 mg/dL ( @ 06:46)  Phosphorus Level, Serum: 2.5 mg/dL ( @ 06:46)          RADIOLOGY & ADDITIONAL TESTS:

## 2022-06-19 NOTE — PROGRESS NOTE ADULT - PROBLEM SELECTOR PLAN 1
presents with acute CP pain w radiations down L arm - trops elevated w/ no acute EKG changes   - ADMIT to tele for NSTEMI , Few beats - NSVT on Tele  - trop 342.6 | CKMB 3.2 | BNP 2396 | INR 1.85 (subtherapeutic)  - EKG ->vent rate 66, QTc 501ms, sinus rhythm, old LBBB, no ischemic changes on personal read  - s/p  in ED  - c/w asa 81mg qD, Lipitor 80mg qD, Toprol xl 50mg qD , o2 NC as needed , PPI  - HOLD coumadin, INR is therapeutic, START renal adjusted FD Lovenox if INR is subtherapeutic as patient may elect for ischemic evaluation  - f/up TTE  - continuous tele monitoring  cardio consulted - Gauri michele appreciated- D/W Dr Almazan  -MARK, mag, Phos level in AM presents with acute CP pain w radiations down L arm - trops elevated w/ no acute EKG changes   - admitted to to tele for NSTEMI , Few beats - NSVT on Tele  -Family discussion with patient, son in law, and HCP daughter. Decision was made to proceed with cath. Patient was made DNR DNI and MOLST was placed in the chart. As per discussion with cardio, plan to transfer for cath once INR 1.6 and LUIS CARLOS. For LUIS CARLOS will consult nephro Dr FLORIAN, start NS at rate of 5o cc x 10 hours, Mucomyst. For INR will ocnitue to hold coumadin and lovenox.   -trop initially 342.6, uptrended to over 22K, came down to 15 K   -EKG ->vent rate 66, QTc 501ms, sinus rhythm, old LBBB, no ischemic changes  -s/p  in ED  -c/w ASA 81mg qD, Lipitor 80mg qD, Toprol xl 50mg qD, O2 NC as needed, PPI  - HOLD coumadin, INR is therapeutic, START renal adjusted FD Lovenox if INR is subtherapeutic as patient may elect for ischemic evaluation  - f/up TTE  - continuous tele monitoring  cardio consulted - Gauri michele appreciated- D/W Dr Almazan  -MARK, mag, Phos level in AM presents with acute CP pain w radiation down L arm - trops elevated w/ no acute EKG changes   - admitted to to tele for NSTEMI , Few beats - NSVT on Tele. Remains non anginal since Friday.    -Family discussion with patient, son in law, and HCP daughter. Decision was made to proceed with cath. Patient was made DNR DNI and MOLST was placed in the chart.   -As per discussion with cardio, plan to transfer for cath once INR 1.6 and LUIS CARLOS.  For INR will continue to hold coumadin and lovenox.   -trop initially 342.6, uptrended to over 22K, came down to 15 K   - EKG Afib with underlying LBBB; unchanged from previous  -c/w ASA 81mg qD, Lipitor 80mg qD, Toprol xl 50mg qD, O2 NC as needed, PPI  - f/up TTE  - continuous tele monitoring  -K, mag, Phos level in AM  -cardio Dr. James, recs appreciated- D/W cardio NP  -DNR DNI order placed, MOLST in chart

## 2022-06-19 NOTE — PROGRESS NOTE ADULT - PROBLEM SELECTOR PLAN 2
chronic, swelling LE b/l on admit  - c/w Lasix 40 mg qd w/ hold parameters  - f/up TTE LUIS CARLOS on CKD stage 3B?   -Cr 1.5 baseline appears to be 1-1.2  -start NS at rate of 50 cc x 10 hours  -start Mucomyst   -hold lasix, avoid nephrotoxic meds   -consulted Dr. CHIQUI ledbetter LUIS CARLOS on CKD stage 3B?   -Cr 1.5 baseline appears to be 1-1.2  -start NS at rate of 50 cc x 10 hours  -start Mucomyst q 12 hrs x 4 doses for prep for Cardiac Cath   -hold lasix, avoid nephrotoxic meds   -consulted Dr. FLORIAN nephro-BMP in AM

## 2022-06-19 NOTE — PROGRESS NOTE ADULT - PROBLEM SELECTOR PLAN 7
normocytic anemia on admit - baseline ~10-11 - unclear etiology - likely 2/2 fluid overload  -  iron studies, TFT's, B12, folate noted  - monitor for VS and s/s of worsening anemia and trend HgB in AM CBC normocytic anemia on admit - baseline ~10-11 appears near baseline  - unclear etiology - likely 2/2 fluid overload  -  iron studies, TFT's, B12, folate noted  - monitor for VS and s/s of worsening anemia and trend HgB in AM CBC

## 2022-06-20 ENCOUNTER — TRANSCRIPTION ENCOUNTER (OUTPATIENT)
Age: 87
End: 2022-06-20

## 2022-06-20 DIAGNOSIS — I50.21 ACUTE SYSTOLIC (CONGESTIVE) HEART FAILURE: ICD-10-CM

## 2022-06-20 LAB
ALBUMIN SERPL ELPH-MCNC: 2.4 G/DL — LOW (ref 3.3–5)
ALP SERPL-CCNC: 88 U/L — SIGNIFICANT CHANGE UP (ref 40–120)
ALT FLD-CCNC: 22 U/L — SIGNIFICANT CHANGE UP (ref 12–78)
ANION GAP SERPL CALC-SCNC: 5 MMOL/L — SIGNIFICANT CHANGE UP (ref 5–17)
APTT BLD: 37.8 SEC — HIGH (ref 27.5–35.5)
AST SERPL-CCNC: 57 U/L — HIGH (ref 15–37)
BASOPHILS # BLD AUTO: 0.02 K/UL — SIGNIFICANT CHANGE UP (ref 0–0.2)
BASOPHILS NFR BLD AUTO: 0.3 % — SIGNIFICANT CHANGE UP (ref 0–2)
BILIRUB SERPL-MCNC: 1 MG/DL — SIGNIFICANT CHANGE UP (ref 0.2–1.2)
BUN SERPL-MCNC: 25 MG/DL — HIGH (ref 7–23)
CALCIUM SERPL-MCNC: 7.6 MG/DL — LOW (ref 8.5–10.1)
CHLORIDE SERPL-SCNC: 102 MMOL/L — SIGNIFICANT CHANGE UP (ref 96–108)
CO2 SERPL-SCNC: 31 MMOL/L — SIGNIFICANT CHANGE UP (ref 22–31)
CREAT ?TM UR-MCNC: 59 MG/DL — SIGNIFICANT CHANGE UP
CREAT SERPL-MCNC: 1.5 MG/DL — HIGH (ref 0.5–1.3)
EGFR: 32 ML/MIN/1.73M2 — LOW
EOSINOPHIL # BLD AUTO: 0.18 K/UL — SIGNIFICANT CHANGE UP (ref 0–0.5)
EOSINOPHIL NFR BLD AUTO: 3 % — SIGNIFICANT CHANGE UP (ref 0–6)
GLUCOSE SERPL-MCNC: 115 MG/DL — HIGH (ref 70–99)
HCT VFR BLD CALC: 32.3 % — LOW (ref 34.5–45)
HGB BLD-MCNC: 10.5 G/DL — LOW (ref 11.5–15.5)
IMM GRANULOCYTES NFR BLD AUTO: 0.5 % — SIGNIFICANT CHANGE UP (ref 0–1.5)
INR BLD: 1.85 RATIO — HIGH (ref 0.88–1.16)
LYMPHOCYTES # BLD AUTO: 1.54 K/UL — SIGNIFICANT CHANGE UP (ref 1–3.3)
LYMPHOCYTES # BLD AUTO: 25.3 % — SIGNIFICANT CHANGE UP (ref 13–44)
MAGNESIUM SERPL-MCNC: 2.2 MG/DL — SIGNIFICANT CHANGE UP (ref 1.6–2.6)
MCHC RBC-ENTMCNC: 30.5 PG — SIGNIFICANT CHANGE UP (ref 27–34)
MCHC RBC-ENTMCNC: 32.5 GM/DL — SIGNIFICANT CHANGE UP (ref 32–36)
MCV RBC AUTO: 93.9 FL — SIGNIFICANT CHANGE UP (ref 80–100)
MONOCYTES # BLD AUTO: 0.68 K/UL — SIGNIFICANT CHANGE UP (ref 0–0.9)
MONOCYTES NFR BLD AUTO: 11.2 % — SIGNIFICANT CHANGE UP (ref 2–14)
NEUTROPHILS # BLD AUTO: 3.64 K/UL — SIGNIFICANT CHANGE UP (ref 1.8–7.4)
NEUTROPHILS NFR BLD AUTO: 59.7 % — SIGNIFICANT CHANGE UP (ref 43–77)
NRBC # BLD: 0 /100 WBCS — SIGNIFICANT CHANGE UP (ref 0–0)
NT-PROBNP SERPL-SCNC: HIGH PG/ML (ref 0–450)
PHOSPHATE SERPL-MCNC: 2.2 MG/DL — LOW (ref 2.5–4.5)
PLATELET # BLD AUTO: 180 K/UL — SIGNIFICANT CHANGE UP (ref 150–400)
POTASSIUM SERPL-MCNC: 4.2 MMOL/L — SIGNIFICANT CHANGE UP (ref 3.5–5.3)
POTASSIUM SERPL-SCNC: 4.2 MMOL/L — SIGNIFICANT CHANGE UP (ref 3.5–5.3)
PROT SERPL-MCNC: 6.4 G/DL — SIGNIFICANT CHANGE UP (ref 6–8.3)
PROTHROM AB SERPL-ACNC: 21.8 SEC — HIGH (ref 10.5–13.4)
RBC # BLD: 3.44 M/UL — LOW (ref 3.8–5.2)
RBC # FLD: 19.2 % — HIGH (ref 10.3–14.5)
SODIUM SERPL-SCNC: 138 MMOL/L — SIGNIFICANT CHANGE UP (ref 135–145)
SODIUM UR-SCNC: 28 MMOL/L — SIGNIFICANT CHANGE UP
WBC # BLD: 6.09 K/UL — SIGNIFICANT CHANGE UP (ref 3.8–10.5)
WBC # FLD AUTO: 6.09 K/UL — SIGNIFICANT CHANGE UP (ref 3.8–10.5)

## 2022-06-20 PROCEDURE — 99233 SBSQ HOSP IP/OBS HIGH 50: CPT

## 2022-06-20 RX ORDER — SODIUM,POTASSIUM PHOSPHATES 278-250MG
1 POWDER IN PACKET (EA) ORAL ONCE
Refills: 0 | Status: COMPLETED | OUTPATIENT
Start: 2022-06-20 | End: 2022-06-20

## 2022-06-20 RX ORDER — FUROSEMIDE 40 MG
40 TABLET ORAL ONCE
Refills: 0 | Status: COMPLETED | OUTPATIENT
Start: 2022-06-20 | End: 2022-06-20

## 2022-06-20 RX ADMIN — Medication 75 MICROGRAM(S): at 05:13

## 2022-06-20 RX ADMIN — Medication 600 MILLIGRAM(S): at 05:27

## 2022-06-20 RX ADMIN — ATORVASTATIN CALCIUM 80 MILLIGRAM(S): 80 TABLET, FILM COATED ORAL at 21:11

## 2022-06-20 RX ADMIN — CLOPIDOGREL BISULFATE 75 MILLIGRAM(S): 75 TABLET, FILM COATED ORAL at 11:28

## 2022-06-20 RX ADMIN — Medication 40 MILLIGRAM(S): at 11:28

## 2022-06-20 RX ADMIN — Medication 600 MILLIGRAM(S): at 18:40

## 2022-06-20 RX ADMIN — Medication 81 MILLIGRAM(S): at 11:28

## 2022-06-20 RX ADMIN — PANTOPRAZOLE SODIUM 40 MILLIGRAM(S): 20 TABLET, DELAYED RELEASE ORAL at 11:28

## 2022-06-20 RX ADMIN — Medication 1 PACKET(S): at 11:29

## 2022-06-20 RX ADMIN — Medication 50 MILLIGRAM(S): at 05:24

## 2022-06-20 NOTE — PROGRESS NOTE ADULT - PROBLEM SELECTOR PLAN 1
presents with acute CP pain w radiation down L arm - trops elevated w/ no acute EKG changes   - admitted to to tele for NSTEMI , Few beats - NSVT on Tele. Remains non anginal since Friday.    -Family discussion with patient, son in law, and HCP daughter. Decision was made to proceed with cath. Patient was made DNR DNI and MOLST was placed in the chart.   -As per discussion with cardio, plan to transfer for cath once INR 1.6 and LUIS CARLOS.  For INR will continue to hold coumadin and lovenox.   -trop initially 342.6, uptrended to over 22K, came down to 15 K   - EKG Afib with underlying LBBB; unchanged from previous  -c/w ASA 81mg qD, Lipitor 80mg qD, Toprol xl 50mg qD, O2 NC as needed, PPI  - f/up TTE  - continuous tele monitoring  -K, mag, Phos level in AM  -cardio Dr. James, recs appreciated- D/W cardio NP  -DNR DNI order placed, MOLST in chart presents with acute CP pain w radiation down L arm - trops elevated w/ no acute EKG changes   - admitted to to tele for NSTEMI , Few beats - NSVT on Tele. Remains non anginal since Friday.    -Family discussion with patient, son in law, and HCP daughter. Decision was made to proceed with cath. Patient was made DNR DNI and MOLST was placed in the chart.   -As per discussion with cardio, tentative plan to transfer for cath once INR 1.6 and LUIS CARLOS.  For INR will continue to hold coumadin and lovenox.   - Noted to be SOB today with increase in proBNP>13k  - Lasix 40mg IV x1 today  -trop initially 342.6, uptrended to over 22K, came down to 15 K   - EKG Afib with underlying LBBB; unchanged from previous  -c/w ASA 81mg qD, Lipitor 80mg qD, Toprol xl 50mg qD, O2 NC as needed, PPI  - f/up TTE  - continuous tele monitoring  -K, mag, Phos level in AM  -cardio Dr. James, recs appreciated- D/W cardio NP  -DNR DNI order placed, MOLST in chart presents with acute CP pain w radiation down L arm - trops elevated w/ no acute EKG changes   - admitted to to tele for NSTEMI , Few beats - NSVT on Tele. Remains non anginal since Friday.    -Family discussion with patient, son in law, and HCP daughter. Decision was made to proceed with cath. Patient was made DNR DNI and MOLST was placed in the chart.   -As per discussion with cardio, tentative plan to transfer for cath once INR 1.6 and LUIS CARLOS.  For INR will continue to hold coumadin and lovenox.   -trop initially 342.6, uptrended to over 22K, came down to 15 K   - EKG Afib with underlying LBBB; unchanged from previous  -c/w ASA 81mg qD, Lipitor 80mg qD, Toprol xl 50mg qD, O2 NC as needed, PPI  - TTE: EF 35-40%, LV hypokinesis  - continuous tele monitoring  -K, mag, Phos level in AM  -cardio Dr. James, recs appreciated- D/W cardio NP  -DNR DNI order placed, MOLST in chart presents with acute CP pain w radiation down L arm - trops elevated w/ no acute EKG changes   - admitted to to tele for NSTEMI , Few beats - NSVT on Tele. Remains non anginal since Friday.    -Family discussion with patient, son in law, and HCP daughter. Decision was made to proceed with cath. Patient was made DNR DNI and MOLST was placed in the chart.   -As per discussion with cardio, tentative plan to transfer for cath once INR 1.6 and LUIS CARLOS.  For INR will continue to hold coumadin and Lovenox.   -trop initially 342.6, up trended to over 22K, came down to 15 K   - EKG Afib with underlying LBBB; unchanged from previous  -c/w ASA 81mg qD, Lipitor 80mg qD, Toprol xl 50mg qD, O2 NC as needed, PPI  - TTE: EF 35-40%, LV hypokinesis  - continuous tele monitoring  -K, mag, Phos level in AM  -cardio Dr. James, recs appreciated- D/W cardio NP  -DNR DNI order placed, MOLST in chart

## 2022-06-20 NOTE — PROGRESS NOTE ADULT - ATTENDING COMMENTS
Patient is a 89 y/o F with a pmhx b/l LE edema, HTN, P Afib (on coumadin), and hypothyroidism presenting with diarrhea and mid-sternal CP admitted for CP w/ elevated trops, NSTEMI.  Pt seen, examined, case & care plan d/w pt, residents at detail.  -Cardiology Dr Blair  follow up d/w today, Lasix x 1 dose   -AM labs   -PO diet  -Palliative care Eval  D/W Dtr at Detail at bed side. Cardio NP --> option for Cardiac cath. HOLD Coumadin , INR in AM, Cr elevated , Renal follow up   Total care time is 45 minutes. Patient is a 89 y/o F with a pmhx b/l LE edema, HTN, P Afib (on coumadin), and hypothyroidism presenting with diarrhea and mid-sternal CP admitted for CP w/ elevated trops, NSTEMI.  Pt seen, examined, case & care plan d/w pt, residents at [facility].  -Cardiology Dr Blair  follow up d/w today, Lasix x 1 dose   -Renal DR Lara follow up with Cr level  -AM labs   -PO diet  -D/W Son in Law at [facility],  -Palliative care Eval-DNR/DNI  -D/W PMD Dr PRATEEK Mccord at [facility]  D/W Dtr at [facility] at bed side. Cardio NP --> option for Cardiac cath. HOLD Coumadin , INR in AM, Cr elevated , Renal follow up   Total care time is 45 minutes. Patient is a 89 y/o F with a pmhx b/l LE edema, HTN, P Afib (on coumadin), and hypothyroidism presenting with diarrhea and mid-sternal CP admitted for CP w/ elevated trops, NSTEMI.  Pt seen, examined, case & care plan d/w pt, residents at [facility].  -Cardiology Dr Blair  follow up d/w today, Lasix x 1 dose   -Renal DR Lara follow up with Cr level  -AM labs   -PO diet  -D/W Son in Law at [facility],  -Palliative care Eval-DNR/DNI  -D/W PMD Dr PRATEEK Mccord at [facility]  D/W Dtr at [facility] at bed side. Cardio NP --> option for Cardiac cath. HOLD Coumadin , INR in AM, Cr elevated , Renal follow up   Total care time is 45 minutes.

## 2022-06-20 NOTE — PROGRESS NOTE ADULT - PROBLEM SELECTOR PROBLEM 2
Acute kidney injury superimposed on CKD Acute HFrEF (heart failure with reduced ejection fraction) Acute systolic congestive heart failure

## 2022-06-20 NOTE — PROGRESS NOTE ADULT - PROBLEM SELECTOR PLAN 6
chronic, elevated on admit - likely 2/2 pain  - continue toprol 50mg qD w/ hold parameters  - routine hemodynamic monitoring chronic, stable - rate-controlled - on coumadin  - HOLD coumadin, goal INR 1.6 in order to be transferred for cath   - Toprol  XL 50mg PO qd w/ hold parameters  TTE: EF 35-40%, LV hypokinesis  - continuous tele monitoring  -cardio Dr. James, recs appreciated- D/W cardio NP RESOLVED  -hypokalemic   - repleted with K IV 10 mEq x1, K PO powder 40 mEq x3   -f/u K in AM BMP, Mag level

## 2022-06-20 NOTE — PROGRESS NOTE ADULT - PROBLEM SELECTOR PLAN 3
-hypokalemic   - repleted with K IV 10 mEq x1, K PO powder 40 mEq x3   -f/u K in AM BMP, Mag level RESOLVED  -hypokalemic   - repleted with K IV 10 mEq x1, K PO powder 40 mEq x3   -f/u K in AM BMP, Mag level LUIS CARLOS on CKD stage 3B?   -Cr 1.5 baseline appears to be 1-1.2  -start Mucomyst q 12 hrs x 4 doses for prep for Cardiac Cath   -Lasix 40mg IV x1 today for CHF  -consulted Dr. FLORIAN nephro-BMP in AM

## 2022-06-20 NOTE — DISCHARGE NOTE PROVIDER - HOSPITAL COURSE
FROM ADMISSION H+P:   HPI:  Patient is a 89 y/o F with a pmhx b/l LE edema, HTN, P Afib (on coumadin), and hypothyroidism presenting with sudden onset mid-sternal CP. Pt describes pain as a "prickly feeling", rated 4/10, constant in nature since 4am on 6/17, resolved in the ED. Pain was associated with L arm weakness, now resolved. Pt states the pain woke her up, and she was scared to walk and was unable to use her L arm for support to get out of bed. Pt was also nauseous at this time. No associated diaphoresis, palpitations, chest pressure. No amaurosis fugax, facial droop, garbled speech, hemiparesis, LOC, falls reported. No vomiting, fevers, chills, cough, sore throat, SOB, abd pain, constipation, dysuria. Pt had one loose BM this AM at home, denied hematochezia or melena.       In ED:  VS - HR 71 | /65 | RR 18 | sO2 94 | T 97.6  Labs - HgB 11.2 | INR 1.85 | K 3.3 | Glu 114 | trop 342.6 | CKMB 3.2 | BNP 2396  CTH NC - No acute intracranial hemorrhage. Lacunar infarct R inferior cerebellum  EKG - vent rate 66, QTc 501ms, sinus rhythm, old LBBB, no ischemic changes on personal read  Given -  mg PO. pt seen by cardiology Dr Almazan , pt admitted to [facility] for NSTEMI. (17 Jun 2022 09:52)      ---  HOSPITAL COURSE:   Patient admitted for chest pain to tele. Cardio Dr. Almazan was consulted. Shortly after admisison, pt's troponin increased from 300s to 25K, trended down to 15K. No acute EKG changes were seen therefore deemed an NSTEMI. Pt started on ACS therapy, treated with high dose aspirin followed by daily baby aspirin, Lipitor 80mg qD, Toprol xl 50mg qD, O2 NC as needed, PPI. Pt monitored on tele. TTE showed _____. Neurology Dr. Arboleda was consulted for left arm heaviness weakness, MRI head showed no acute findings, deemed in setting of cardiac etiology. Family decision was made to proceed with cath and also to make pt DNR/DNI. As per cardiology, patient's INR goal was 1.6 prior to transfer for cath therefore coumadin was held until INR reached near goal. Cardio also wanted LUIS CARLOS on CKD stage 3 B resolved prior to transfer for cath, therefore nephro was consulted, pt was treated with gentle IVF, lasix stopped, Mucomyst started. When INR reached near goial and  improved, patient was transferred for cath to [place].      Prior  .    ---  cardio Dr Almazan   nephro Jermaine Richardson   neuro Rodrigo Su    ---  TIME SPENT:  I, the attending physician, was physically present for the key portions of the evaluation and management (E/M) service provided. The total amount of time spent reviewing the hospital notes, laboratory values, imaging findings, assessing/counseling the patient, discussing with consultant physicians, social work, nursing staff was -- minutes    ---  Primary care provider was made aware of plan for discharge:      [  ] NO     [  ] YES   FROM ADMISSION H+P:   HPI:  Patient is a 89 y/o F with a pmhx b/l LE edema, HTN, P Afib (on coumadin), and hypothyroidism presenting with sudden onset mid-sternal CP. Pt describes pain as a "prickly feeling", rated 4/10, constant in nature since 4am on 6/17, resolved in the ED. Pain was associated with L arm weakness, now resolved. Pt states the pain woke her up, and she was scared to walk and was unable to use her L arm for support to get out of bed. Pt was also nauseous at this time. No associated diaphoresis, palpitations, chest pressure. No amaurosis fugax, facial droop, garbled speech, hemiparesis, LOC, falls reported. No vomiting, fevers, chills, cough, sore throat, SOB, abd pain, constipation, dysuria. Pt had one loose BM this AM at home, denied hematochezia or melena.       In ED:  VS - HR 71 | /65 | RR 18 | sO2 94 | T 97.6  Labs - HgB 11.2 | INR 1.85 | K 3.3 | Glu 114 | trop 342.6 | CKMB 3.2 | BNP 2396  CTH NC - No acute intracranial hemorrhage. Lacunar infarct R inferior cerebellum  EKG - vent rate 66, QTc 501ms, sinus rhythm, old LBBB, no ischemic changes on personal read  Given -  mg PO. pt seen by cardiology Dr Almazan , pt admitted to [facility] for NSTEMI. (17 Jun 2022 09:52)      ---  HOSPITAL COURSE:   Patient admitted for chest pain to tele. Cardio Dr. Almazan was consulted. Shortly after admisison, pt's troponin increased from 300s to 25K, trended down to 15K. No acute EKG changes were seen therefore deemed an NSTEMI. Pt started on ACS therapy, treated with high dose aspirin followed by daily baby aspirin, Lipitor 80mg qD, Toprol xl 50mg qD, home coumadin to keep INR therapeutic, O2 NC as needed, PPI. Pt monitored on tele. TTE showed _____. Neurology Dr. Arboleda was consulted for left arm heaviness weakness, MRI head showed no acute findings, deemed in setting of cardiac etiology. Family decision was made to proceed with cath and also to make pt DNR/DNI. As per cardiology, patient's INR goal was 1.6 prior to transfer for cath therefore coumadin was held until INR reached near goal. Cardio also wanted LUIS CARLOS on CKD stage 3 B resolved prior to transfer for cath, therefore nephro was consulted, pt was treated with gentle IVF, lasix stopped, Mucomyst started. However  the next day patient was noted to have some SOB and mild HF, was treated with IV lasix and AKI__________________. When INR reached near goal and LUIS CARLOS improved, patient was transferred for cath to [place].      Prior  .    ---  cardio Dr Almazan   nephro Jermaine Richardson   neuro Rodrigo Su    ---  TIME SPENT:  I, the attending physician, was physically present for the key portions of the evaluation and management (E/M) service provided. The total amount of time spent reviewing the hospital notes, laboratory values, imaging findings, assessing/counseling the patient, discussing with consultant physicians, social work, nursing staff was -- minutes    ---  Primary care provider was made aware of plan for discharge:      [  ] NO     [  ] YES   FROM ADMISSION H+P:   HPI:  Patient is a 89 y/o F with a pmhx b/l LE edema, HTN, P Afib (on coumadin), and hypothyroidism presenting with sudden onset mid-sternal CP. Pt describes pain as a "prickly feeling", rated 4/10, constant in nature since 4am on 6/17, resolved in the ED. Pain was associated with L arm weakness, now resolved. Pt states the pain woke her up, and she was scared to walk and was unable to use her L arm for support to get out of bed. Pt was also nauseous at this time. No associated diaphoresis, palpitations, chest pressure. No amaurosis fugax, facial droop, garbled speech, hemiparesis, LOC, falls reported. No vomiting, fevers, chills, cough, sore throat, SOB, abd pain, constipation, dysuria. Pt had one loose BM this AM at home, denied hematochezia or melena.       In ED:  VS - HR 71 | /65 | RR 18 | sO2 94 | T 97.6  Labs - HgB 11.2 | INR 1.85 | K 3.3 | Glu 114 | trop 342.6 | CKMB 3.2 | BNP 2396  CTH NC - No acute intracranial hemorrhage. Lacunar infarct R inferior cerebellum  EKG - vent rate 66, QTc 501ms, sinus rhythm, old LBBB, no ischemic changes on personal read  Given -  mg PO. pt seen by cardiology Dr Almazan , pt admitted to [facility] for NSTEMI. (17 Jun 2022 09:52)      ---  HOSPITAL COURSE:   Patient admitted for chest pain to [facility]. Cardio Dr. Almazan was consulted. Shortly after admisison, pt's troponin increased from 300s to 25K, trended down to 15K. No acute EKG changes were seen therefore deemed an NSTEMI. Pt started on ACS therapy, treated with high dose aspirin followed by daily baby aspirin, Lipitor 80mg qD, Toprol xl 50mg qD, home coumadin to keep INR therapeutic, O2 NC as needed, PPI. Pt monitored on tele. TTE showed Mild to moderate segmental left ventricular systolic dysfunction, estimated LVEF of 35-40%. The apex, mid inferoseptal and mid  anterolateral walls appear severely hypokinetic. Right ventricular enlargement with grossly normal function. Biatrial enlargement. Calcified trileaflet aortic valve with mild to moderate aortic stenosis, without AI. Mild MR. Moderate TR. Neurology Dr. Arboleda was consulted for left arm heaviness weakness, MRI head showed no acute findings, deemed in setting of cardiac etiology. Family decision was made to proceed with cath and also to make pt DNR/DNI. As per cardiology, patient's INR goal was 1.6 prior to transfer for cath therefore coumadin was held until INR reached near goal (INR 1.6 upon transfer). Cardio also wanted LUIS CARLOS on CKD stage 3 B resolved prior to transfer for cath, therefore nephro was consulted, pt was treated with gentle IVF, Mucomyst started. However  the next day patient was noted to have some SOB and mild HF, was treated with IV lasix. Cr improved to 1.3 upon transfer. When INR reached near goal and LUIS CARLOS improved, patient was transferred for cath to [place].      ---  Cardio Dr Almazan   Nephro Jermaine Richardson   Neuro Dr. Arboleda, Rodrigo FLORES    ---  TIME SPENT:  I, the attending physician, was physically present for the key portions of the evaluation and management (E/M) service provided. The total amount of time spent reviewing the hospital notes, laboratory values, imaging findings, assessing/counseling the patient, discussing with consultant physicians, social work, nursing staff was -- minutes    ---  Primary care provider was made aware of plan for discharge:      [  ] NO     [  ] YES   FROM ADMISSION H+P:   HPI:  Patient is a 89 y/o F with a pmhx b/l LE edema, HTN, P Afib (on coumadin), and hypothyroidism presenting with sudden onset mid-sternal CP. Pt describes pain as a "prickly feeling", rated 4/10, constant in nature since 4am on 6/17, resolved in the ED. Pain was associated with L arm weakness, now resolved. Pt states the pain woke her up, and she was scared to walk and was unable to use her L arm for support to get out of bed. Pt was also nauseous at this time. No associated diaphoresis, palpitations, chest pressure. No amaurosis fugax, facial droop, garbled speech, hemiparesis, LOC, falls reported. No vomiting, fevers, chills, cough, sore throat, SOB, abd pain, constipation, dysuria. Pt had one loose BM this AM at home, denied hematochezia or melena.       In ED:  VS - HR 71 | /65 | RR 18 | sO2 94 | T 97.6  Labs - HgB 11.2 | INR 1.85 | K 3.3 | Glu 114 | trop 342.6 | CKMB 3.2 | BNP 2396  CTH NC - No acute intracranial hemorrhage. Lacunar infarct R inferior cerebellum  EKG - vent rate 66, QTc 501ms, sinus rhythm, old LBBB, no ischemic changes on personal read  Given -  mg PO. pt seen by cardiology Dr Almazan , pt admitted to [facility] for NSTEMI. (17 Jun 2022 09:52)      ---  HOSPITAL COURSE:   Patient admitted for chest pain to [facility]. Cardio Dr. Almazan was consulted. Shortly after admisison, pt's troponin increased from 300s to 25K, trended down to 15K. No acute EKG changes were seen therefore deemed an NSTEMI. Pt started on ACS therapy, treated with high dose aspirin followed by daily baby aspirin, Lipitor 80mg qD, Toprol xl 50mg qD, home coumadin to keep INR therapeutic, O2 NC as needed, PPI. Pt monitored on tele. TTE showed Mild to moderate segmental left ventricular systolic dysfunction, estimated LVEF of 35-40%. The apex, mid inferoseptal and mid  anterolateral walls appear severely hypokinetic. Right ventricular enlargement with grossly normal function. Biatrial enlargement. Calcified trileaflet aortic valve with mild to moderate aortic stenosis, without AI. Mild MR. Moderate TR. Neurology Dr. Arboleda was consulted for left arm heaviness weakness, MRI head showed no acute findings, deemed in setting of cardiac etiology. Cardiology deemed cath risk outweighed benefit.      patient's INR goal was 1.6 prior to transfer for cath therefore coumadin was held until INR reached near goal (INR 1.6 upon transfer). Cardio also wanted LUIS CARLOS on CKD stage 3 B resolved prior to transfer for cath, therefore nephro was consulted, pt was treated with gentle IVF, Mucomyst started. However  the next day patient was noted to have some SOB and mild HF, was treated with IV lasix. Cr improved to 1.3 upon transfer. When INR reached near goal and LUIS CARLOS improved, patient was transferred for cath to [place].      ---  Cardio Dr Almazan   Nephro Jermaine Richardson   Neuro Dr. Arboleda, Rodrigo FLORES    ---  TIME SPENT:  I, the attending physician, was physically present for the key portions of the evaluation and management (E/M) service provided. The total amount of time spent reviewing the hospital notes, laboratory values, imaging findings, assessing/counseling the patient, discussing with consultant physicians, social work, nursing staff was -- minutes    ---  Primary care provider was made aware of plan for discharge:      [  ] NO     [  ] YES   FROM ADMISSION H+P:   HPI:  Patient is a 89 y/o F with a pmhx b/l LE edema, HTN, P Afib (on coumadin), and hypothyroidism presenting with sudden onset mid-sternal CP. Pt describes pain as a "prickly feeling", rated 4/10, constant in nature since 4am on 6/17, resolved in the ED. Pain was associated with L arm weakness, now resolved. Pt states the pain woke her up, and she was scared to walk and was unable to use her L arm for support to get out of bed. Pt was also nauseous at this time. No associated diaphoresis, palpitations, chest pressure. No amaurosis fugax, facial droop, garbled speech, hemiparesis, LOC, falls reported. No vomiting, fevers, chills, cough, sore throat, SOB, abd pain, constipation, dysuria. Pt had one loose BM this AM at home, denied hematochezia or melena.       In ED:  VS - HR 71 | /65 | RR 18 | sO2 94 | T 97.6  Labs - HgB 11.2 | INR 1.85 | K 3.3 | Glu 114 | trop 342.6 | CKMB 3.2 | BNP 2396  CTH NC - No acute intracranial hemorrhage. Lacunar infarct R inferior cerebellum  EKG - vent rate 66, QTc 501ms, sinus rhythm, old LBBB, no ischemic changes on personal read  Given -  mg PO. pt seen by cardiology Dr Almazan , pt admitted to [facility] for NSTEMI. (17 Jun 2022 09:52)      ---  HOSPITAL COURSE:   Patient admitted for chest pain to [facility]. Cardio Dr. Almazan was consulted. Shortly after admisison, pt's troponin increased from 300s to 25K, trended down to 15K. No acute EKG changes were seen therefore deemed an NSTEMI. Pt started on ACS therapy, treated with high dose aspirin followed by daily baby aspirin, Lipitor 80mg qD, Toprol xl 50mg qD, home coumadin to keep INR therapeutic, O2 NC as needed, PPI. Pt monitored on tele. TTE showed Mild to moderate segmental left ventricular systolic dysfunction, estimated LVEF of 35-40%. The apex, mid inferoseptal and mid  anterolateral walls appear severely hypokinetic. Right ventricular enlargement with grossly normal function. Biatrial enlargement. Calcified trileaflet aortic valve with mild to moderate aortic stenosis, without AI. Mild MR. Moderate TR. Neurology Dr. Arboleda was consulted for left arm heaviness weakness, MRI head showed no acute findings, deemed in setting of cardiac etiology. Cardiology deemed cath risk outweighed benefit. Pt noted to have LUIS CARLOS after receiving, Dr. Zacarias consulted, treated with Mucomyst. During hospitalization pt also developed SOB, noted to be in HF, acute systolic CHF exacerbation. Pt treated with lasix, slowly improved.       ---  Cardio Dr Almazan   Nephro Dr. Zacarias, Jermaine   Neuro Dr. Arboleda, Rodrigo FLORES    ---  TIME SPENT:  I, the attending physician, was physically present for the key portions of the evaluation and management (E/M) service provided. The total amount of time spent reviewing the hospital notes, laboratory values, imaging findings, assessing/counseling the patient, discussing with consultant physicians, social work, nursing staff was -- minutes    ---  Primary care provider was made aware of plan for discharge:      [  ] NO     [  ] YES   FROM ADMISSION H+P:   HPI:  Patient is a 89 y/o F with a pmhx b/l LE edema, HTN, P Afib (on coumadin), and hypothyroidism presenting with sudden onset mid-sternal CP. Pt describes pain as a "prickly feeling", rated 4/10, constant in nature since 4am on 6/17, resolved in the ED. Pain was associated with L arm weakness, now resolved. Pt states the pain woke her up, and she was scared to walk and was unable to use her L arm for support to get out of bed. Pt was also nauseous at this time. No associated diaphoresis, palpitations, chest pressure. No amaurosis fugax, facial droop, garbled speech, hemiparesis, LOC, falls reported. No vomiting, fevers, chills, cough, sore throat, SOB, abd pain, constipation, dysuria. Pt had one loose BM this AM at home, denied hematochezia or melena.       In ED:  VS - HR 71 | /65 | RR 18 | sO2 94 | T 97.6  Labs - HgB 11.2 | INR 1.85 | K 3.3 | Glu 114 | trop 342.6 | CKMB 3.2 | BNP 2396  CTH NC - No acute intracranial hemorrhage. Lacunar infarct R inferior cerebellum  EKG - vent rate 66, QTc 501ms, sinus rhythm, old LBBB, no ischemic changes on personal read  Given -  mg PO. pt seen by cardiology Dr Almazan , pt admitted to [facility] for NSTEMI. (17 Jun 2022 09:52)      ---  HOSPITAL COURSE:   Patient admitted for chest pain to [facility]. Cardio Dr. Almazan was consulted. Shortly after admisison, pt's troponin increased from 300s to 25K, trended down to 15K. No acute EKG changes were seen therefore deemed an NSTEMI. Pt started on ACS therapy, treated with high dose aspirin followed by daily baby aspirin, Lipitor 80mg qD, Toprol xl 50mg qD, home coumadin to keep INR therapeutic, O2 NC as needed, PPI. Pt monitored on tele. TTE showed Mild to moderate segmental left ventricular systolic dysfunction, estimated LVEF of 35-40%. The apex, mid inferoseptal and mid  anterolateral walls appear severely hypokinetic. Right ventricular enlargement with grossly normal function. Biatrial enlargement. Calcified trileaflet aortic valve with mild to moderate aortic stenosis, without AI. Mild MR. Moderate TR. Neurology Dr. Arboleda was consulted for left arm heaviness weakness, MRI head showed no acute findings, deemed in setting of cardiac etiology. In anticipation for cath with ongoing discussion with family, coumadin initially held to reach INR goal for transfer for cath and LUIS CARLOS treated with gently IVF. Nephro Dr FLORIAN also consulted for LUIS CARLOS, started mucomyst as well. However the next day pt developed acute systolic CHF exacerbation likely brought  on by  gentle IVF. Decision was made by cardio that cath risks outweighed benefit. Therefore pt was bridged back to coumadin. For HF, pt was also treated with lasix as per cardio and slowly improved.  Pt seen by PT who recommended SINAN.     ---  Cardio Dr Almazan   Nephro Jermaine Richardson   Neuro Dr. Arboleda, Rodrigo FLORES    ---  TIME SPENT:  I, the attending physician, was physically present for the key portions of the evaluation and management (E/M) service provided. The total amount of time spent reviewing the hospital notes, laboratory values, imaging findings, assessing/counseling the patient, discussing with consultant physicians, social work, nursing staff was -- minutes    ---  Primary care provider was made aware of plan for discharge:      [  ] NO     [  ] YES   FROM ADMISSION H+P:   HPI:  Patient is a 89 y/o F with a pmhx b/l LE edema, HTN, P Afib (on coumadin), and hypothyroidism presenting with sudden onset mid-sternal CP. Pt describes pain as a "prickly feeling", rated 4/10, constant in nature since 4am on 6/17, resolved in the ED. Pain was associated with L arm weakness, now resolved. Pt states the pain woke her up, and she was scared to walk and was unable to use her L arm for support to get out of bed. Pt was also nauseous at this time. No associated diaphoresis, palpitations, chest pressure. No amaurosis fugax, facial droop, garbled speech, hemiparesis, LOC, falls reported. No vomiting, fevers, chills, cough, sore throat, SOB, abd pain, constipation, dysuria. Pt had one loose BM this AM at home, denied hematochezia or melena.       In ED:  VS - HR 71 | /65 | RR 18 | sO2 94 | T 97.6  Labs - HgB 11.2 | INR 1.85 | K 3.3 | Glu 114 | trop 342.6 | CKMB 3.2 | BNP 2396  CTH NC - No acute intracranial hemorrhage. Lacunar infarct R inferior cerebellum  EKG - vent rate 66, QTc 501ms, sinus rhythm, old LBBB, no ischemic changes on personal read  Given -  mg PO. pt seen by cardiology Dr Almazan , pt admitted to [facility] for NSTEMI. (17 Jun 2022 09:52)      ---  HOSPITAL COURSE:   Patient admitted for chest pain to [facility]. Cardio Dr. Almazan was consulted. Shortly after admisison, pt's troponin increased from 300s to 25K, trended down to 15K. No acute EKG changes were seen therefore deemed an NSTEMI. Pt started on ACS therapy, treated with high dose aspirin followed by daily baby aspirin, Lipitor 80mg qD, Toprol xl 50mg qD, home coumadin to keep INR therapeutic, O2 NC as needed, PPI. Pt monitored on tele. TTE showed Mild to moderate segmental left ventricular systolic dysfunction, estimated LVEF of 35-40%. The apex, mid inferoseptal and mid  anterolateral walls appear severely hypokinetic. Right ventricular enlargement with grossly normal function. Biatrial enlargement. Calcified trileaflet aortic valve with mild to moderate aortic stenosis, without AI. Mild MR. Moderate TR. Neurology Dr. Arboleda was consulted for left arm heaviness weakness, MRI head showed no acute findings, deemed in setting of cardiac etiology. In anticipation for cath with ongoing discussion with family, coumadin initially held to reach INR goal for transfer for cath and LUIS CARLOS treated with gently IVF. Nephro Dr FLORIAN also consulted for LUIS CARLOS, started mucomyst as well. However the next day pt developed acute systolic CHF exacerbation likely brought  on by  gentle IVF. Decision was made by cardio that the risks of catheterization outweighed benefits given her advanced age. Therefore pt was bridged back to coumadin. For HF, pt was also treated with lasix as per cardio and slowly improved.  Pt seen by PT who recommended SINAN.     ---  Cardio Dr Almazan   Nephro Jermaine Richardson   Neuro Dr. Arboleda, Rodrigo FLORES    ---  TIME SPENT:  I, the attending physician, was physically present for the key portions of the evaluation and management (E/M) service provided. The total amount of time spent reviewing the hospital notes, laboratory values, imaging findings, assessing/counseling the patient, discussing with consultant physicians, social work, nursing staff was -- minutes    ---  Primary care provider was made aware of plan for discharge:      [  ] NO     [  ] YES   FROM ADMISSION H+P:   HPI:  Patient is a 89 y/o F with a pmhx b/l LE edema, HTN, P Afib (on coumadin), and hypothyroidism presenting with sudden onset mid-sternal CP. Pt describes pain as a "prickly feeling", rated 4/10, constant in nature since 4am on 6/17, resolved in the ED. Pain was associated with L arm weakness, now resolved. Pt states the pain woke her up, and she was scared to walk and was unable to use her L arm for support to get out of bed. Pt was also nauseous at this time. No associated diaphoresis, palpitations, chest pressure. No amaurosis fugax, facial droop, garbled speech, hemiparesis, LOC, falls reported. No vomiting, fevers, chills, cough, sore throat, SOB, abd pain, constipation, dysuria. Pt had one loose BM this AM at home, denied hematochezia or melena.       In ED:  VS - HR 71 | /65 | RR 18 | sO2 94 | T 97.6  Labs - HgB 11.2 | INR 1.85 | K 3.3 | Glu 114 | trop 342.6 | CKMB 3.2 | BNP 2396  CTH NC - No acute intracranial hemorrhage. Lacunar infarct R inferior cerebellum  EKG - vent rate 66, QTc 501ms, sinus rhythm, old LBBB, no ischemic changes on personal read  Given -  mg PO. pt seen by cardiology Dr Almazan , pt admitted to [facility] for NSTEMI. (17 Jun 2022 09:52)      ---  HOSPITAL COURSE:   Patient admitted for chest pain to [facility]. Cardio Dr. Almazan was consulted. Shortly after admisison, pt's troponin increased from 300s to 25K, trended down to 15K. No acute EKG changes were seen therefore deemed an NSTEMI. Pt started on ACS therapy, treated with high dose aspirin followed by daily baby aspirin, Lipitor 80mg qD, Toprol xl 50mg qD, home coumadin to keep INR therapeutic, O2 NC as needed, PPI. Pt monitored on tele. TTE showed Mild to moderate segmental left ventricular systolic dysfunction, estimated LVEF of 35-40%. The apex, mid inferoseptal and mid  anterolateral walls appear severely hypokinetic. Right ventricular enlargement with grossly normal function. Biatrial enlargement. Calcified trileaflet aortic valve with mild to moderate aortic stenosis, without AI. Mild MR. Moderate TR. Neurology Dr. Arboleda was consulted for left arm heaviness weakness, MRI head showed no acute findings, deemed in setting of cardiac etiology. In anticipation for cath with ongoing discussion with family, coumadin initially held to reach INR goal for transfer for cath and LUIS CARLOS treated with gently IVF. Nephro Dr FLORIAN also consulted for LUIS CARLOS, started mucomyst as well. However the next day pt developed acute systolic CHF exacerbation likely brought  on by  gentle IVF. Decision was made by cardio that the risks of catheterization outweighed benefits given her advanced age. Therefore pt was bridged back to coumadin. For HF, pt was also treated with lasix as per cardio and slowly improved.  Pt seen by PT who recommended SINAN.     ---  Cardio Dr Almazan   Nephro Jermaine Richardson   Neuro Dr. Arboleda, Rodrigo FLORES    ---  TIME SPENT:  I, the attending physician, was physically present for the key portions of the evaluation and management (E/M) service provided. The total amount of time spent reviewing the hospital notes, laboratory values, imaging findings, assessing/counseling the patient, discussing with consultant physicians, social work, nursing staff was 60 minutes    ---  Primary care provider was made aware of plan for discharge:      [  ] NO     [ x ] YES

## 2022-06-20 NOTE — PROGRESS NOTE ADULT - PROBLEM SELECTOR PLAN 2
LUIS CARLOS on CKD stage 3B?   -Cr 1.5 baseline appears to be 1-1.2  -start Mucomyst q 12 hrs x 4 doses for prep for Cardiac Cath   -hold lasix, avoid nephrotoxic meds   -consulted Dr. FLORIAN nephro-BMP in AM LUIS CARLOS on CKD stage 3B?   -Cr 1.5 baseline appears to be 1-1.2  -start Mucomyst q 12 hrs x 4 doses for prep for Cardiac Cath   -Lasix 40mg IV x1 today for CHF  -consulted Dr. FLORIAN nephro-BMP in AM TTE: EF 35-40%, LV hypokinesis  - Noted to be SOB today with increase in proBNP>13k  - Lasix 40mg IV x1 today  - I/Os, daily weights Acute Systolic CHF TTE: EF 35-40%, LV hypokinesis  - Noted to be SOB today with increase in proBNP>13k  - Lasix 40mg IV x1 today, elevated pBNP ~ 66534  - I/Os, daily weights, D/W Cardio    Mild to moderate segmental left ventricular systolic dysfunction,   estimated LVEF of 35-40%. The apex, mid inferoseptal and mid   anterolateral walls appear severely hypokinetic  Right ventricular enlargement with grossly normal function  Biatrial enlargement  Calcified trileaflet aortic valve with mild to moderate aortic stenosis,   without AI.  Mild MR  Moderate TR.  No significant pericardial effusion.

## 2022-06-20 NOTE — DISCHARGE NOTE PROVIDER - NSDCMRMEDTOKEN_GEN_ALL_CORE_FT
Lasix 40 mg oral tablet: 1 tab(s) orally 2 times a day  Lopressor: 25  orally once a day  potassium chloride 10 mEq oral tablet, extended release: 2 tablets twice a day (takes it with her Lasix)   Synthroid 75 mcg (0.075 mg) oral tablet: 1 tab(s) orally once a day  warfarin 3 mg oral tablet: 1 tab(s) orally once a day (M, Tu, Th, F, Sa Su)  warfarin 4 mg oral tablet: 1 tab(s) orally Wednesday   aspirin 81 mg oral delayed release tablet: 1 tab(s) orally once a day  atorvastatin 80 mg oral tablet: 1 tab(s) orally once a day (at bedtime)  clopidogrel 75 mg oral tablet: 1 tab(s) orally once a day  Lasix 40 mg oral tablet: 1 tab(s) orally 2 times a day  metoprolol succinate 50 mg oral tablet, extended release: 1 tab(s) orally once a day  pantoprazole 40 mg intravenous injection: 40 milligram(s) intravenous once a day  Synthroid 75 mcg (0.075 mg) oral tablet: 1 tab(s) orally once a day  warfarin 3 mg oral tablet: 1 tab(s) orally once a day (M, Tu, Th, F, Sa Su)  warfarin 4 mg oral tablet: 1 tab(s) orally Wednesday   Lasix 40 mg oral tablet: 1 tab(s) orally 2 times a day  Metoprolol Tartrate 25 mg oral tablet: 1 tab(s) orally once a day  potassium chloride 10 mEq oral capsule, extended release: 2 cap(s) orally 2 times a day  Synthroid 75 mcg (0.075 mg) oral tablet: 1 tab(s) orally once a day  warfarin 3 mg oral tablet: 1 tab(s) orally once a day (M, Tu, Th, F, Sa Su)  warfarin 4 mg oral tablet: 1 tab(s) orally Wednesday   fluticasone 50 mcg/inh nasal spray: 1 spray(s) nasal 2 times a day  Lasix 40 mg oral tablet: 1 tab(s) orally 2 times a day  Synthroid 75 mcg (0.075 mg) oral tablet: 1 tab(s) orally once a day  warfarin 3 mg oral tablet: 1 tab(s) orally once a day (M, Tu, Th, F, Sa Su)  warfarin 4 mg oral tablet: 1 tab(s) orally Wednesday   aspirin 81 mg oral delayed release tablet: 1 tab(s) orally once a day  atorvastatin 20 mg oral tablet: 1 tab(s) orally once a day  clopidogrel 75 mg oral tablet: 1 tab(s) orally once a day  fluticasone 50 mcg/inh nasal spray: 1 spray(s) nasal 2 times a day  Lasix 40 mg oral tablet: 1 tab(s) orally 2 times a day  metoprolol succinate 50 mg oral tablet, extended release: 1 tab(s) orally once a day  Synthroid 75 mcg (0.075 mg) oral tablet: 1 tab(s) orally once a day  warfarin 3 mg oral tablet: 1 tab(s) orally once a day (M, Tu, Th, F, Sa Grider)  warfarin 4 mg oral tablet: 1 tab(s) orally Wednesday   aspirin 81 mg oral delayed release tablet: 1 tab(s) orally once a day  atorvastatin 20 mg oral tablet: 1 tab(s) orally once a day  clopidogrel 75 mg oral tablet: 1 tab(s) orally once a day  fluticasone 50 mcg/inh nasal spray: 1 spray(s) nasal 2 times a day  Lasix 40 mg oral tablet: 1 tab(s) orally 2 times a day  metoprolol succinate 50 mg oral tablet, extended release: 1 tab(s) orally once a day  sodium chloride 0.65% nasal spray: 2 spray(s) nasal 4 times a day  Synthroid 75 mcg (0.075 mg) oral tablet: 1 tab(s) orally once a day  warfarin 3 mg oral tablet: DO NOT Give Coumadin Today as INR 3.11, Check PT/INR on 7/1/22.Adjust Coumadin dose as per INR. start with Coumadin 2 mg   Keep INR 2-3    Pt&#x27;s HOME Coumadin schedule is as below  1 tab(s) orally once a day (M, Tu, Th, F, Sa Su) at Bed time   -Pt use to Take Coumadin 4 mg on every Wednesday  zinc oxide 40% topical ointment: Apply topically to affected area 2 times a day

## 2022-06-20 NOTE — PROGRESS NOTE ADULT - PROBLEM SELECTOR PLAN 5
chronic, stable - rate-controlled - on coumadin  - HOLD coumadin, goal INR 1.6 in order to be transferred for cath   - Toprol  XL 50mg PO qd w/ hold parameters  - f/up TTE  - continuous tele monitoring  -cardio Dr. James, recs appreciated- D/W cardio NP chronic, swelling LE b/l on admit  -On home lasix  TTE: EF 35-40%, LV hypokinesis

## 2022-06-20 NOTE — DISCHARGE NOTE PROVIDER - PROVIDER TOKENS
PROVIDER:[TOKEN:[[id number]],FOLLOWUP:[1 week]],PROVIDER:[TOKEN:[[id number]],FOLLOWUP:[1 week]],PROVIDER:[TOKEN:[[id number]],FOLLOWUP:[1 week]] PROVIDER:[TOKEN:[[id number]],FOLLOWUP:[1 week]],PROVIDER:[TOKEN:[[id number]],FOLLOWUP:[1 week]],PROVIDER:[TOKEN:[[id number]],FOLLOWUP:[2 weeks]]

## 2022-06-20 NOTE — PROGRESS NOTE ADULT - PROBLEM SELECTOR PLAN 7
normocytic anemia on admit - baseline ~10-11 appears near baseline  - unclear etiology - likely 2/2 fluid overload  -  iron studies, TFT's, B12, folate noted  - monitor for VS and s/s of worsening anemia and trend HgB in AM CBC chronic, elevated on admit - likely 2/2 pain  - continue toprol 50mg qD w/ hold parameters  - routine hemodynamic monitoring

## 2022-06-20 NOTE — PROGRESS NOTE ADULT - ASSESSMENT
Patient is a 89 y/o F with a pmhx b/l LE edema, HTN, P Afib (on coumadin), and hypothyroidism presenting with diarrhea and mid-sternal CP admitted for CP w/ elevated trops, NSTEMI. On ACS therapy, INR therapeutic, family agreed to cardiac cath, plan for transfer for cath once INR 1.6 and LUIS CARLOS improved.  Patient is a 89 y/o F with a pmhx b/l LE edema, HTN, P Afib (on coumadin), and hypothyroidism presenting with diarrhea and mid-sternal CP admitted for CP w/ elevated trops, NSTEMI. On ACS therapy, INR therapeutic, family agreed to cardiac cath, plan for transfer for cath once INR 1.6 and LUIS CARLOS improved.

## 2022-06-20 NOTE — PROGRESS NOTE ADULT - PROBLEM SELECTOR PLAN 4
chronic, swelling LE b/l on admit  -hold Lasix given LUIS CARLOS   - f/up TTE chronic, swelling LE b/l on admit  -On home lasix  - f/up TTE RESOLVED  -hypokalemic   - repleted with K IV 10 mEq x1, K PO powder 40 mEq x3   -f/u K in AM BMP, Mag level chronic, stable - rate-controlled - on coumadin  - HOLD coumadin, goal INR 1.6 in order to be transferred for cath   - Toprol  XL 50mg PO qd w/ hold parameters  TTE: EF 35-40%, LV hypokinesis  - continuous tele monitoring  -cardio Dr. Blair , recs appreciated- D/W cardio NP

## 2022-06-20 NOTE — PROGRESS NOTE ADULT - PROBLEM SELECTOR PLAN 8
chronic  - c/w home synthroid 75 mcg qd normocytic anemia on admit - baseline ~10-11 appears near baseline  - unclear etiology - likely 2/2 fluid overload  -  iron studies, TFT's, B12, folate noted  - monitor for VS and s/s of worsening anemia and trend HgB in AM CBC

## 2022-06-20 NOTE — PROGRESS NOTE ADULT - ASSESSMENT
89 y/o F with a pmhx b/l LE edema, HTN,P Afib on coumadin , hypothyroidism, pw midsternal chest discomfort atypical in nature and left arm discomfort since waking up this morning at  4am, describes it as "creepy feeling" assoc with left arm weakness, general weakness and not feeling well. Patient has no underlying coronary disease.     NSTEMI  - EKG showed Afib with underlying LBBB; unchanged from previous.  Non-anginal overnight  - hsT trended up to >01259, though, remained asymptomatic overnight.  Downtrended to ~95456  - Continue ASA, Plavix, BB, and statin.    - Follow up TTE.  Appears to be volume overloaded today, s/p IV fluids overnight with worsening edema  - Monitor for ischemic chest pain.  Ischemic eval discussed with patient and daughter, agreed to pursue cardiac cath.  Will hold off for now pending renal function, INR (will need to have at least 1.7 or below) , and euvolemia  - Obtain pro-BNP, may need to diurese patient.  Discussed with Primary Team  - Recommend Renal input for optimization.    Permanent Afib  - Rates remained controlled with no arrhythmias overnight.  Continue telemetry monitoring.  - No evidence of Afib  - Continue to hold Coumadin.  Follow up INR    - Monitor electrolytes, replete to keep K>4 and Mag>2    - Other cardiovascular workup will depend on clinical course.  - Will continue to follow.    Angeles Kim DNP, NP-C  Cardiology   Spectra #[phone]

## 2022-06-20 NOTE — PROGRESS NOTE ADULT - ASSESSMENT
LUIS CARLOS on CKD 3b  Hypokalemia  HTN  NSTEMI    -BLCr 1.2  -Check Urine lytes  -Check UA  -Cont IVF  -Mucomyst  -Discussed risks of contrast administration upto and including dialysis, patient and family undertand risks and willing to proceed  -KCl repletion LUIS CARLOS on CKD 3b  Hypokalemia  HTN  NSTEMI    -BLCr 1.2  -Check Urine lytes  -Check UA  -Please inform us when cardiac cath is scheduled  -Discussed risks of contrast administration upto and including dialysis, patient and family undertand risks and willing to proceed  -Phos repletion

## 2022-06-20 NOTE — DISCHARGE NOTE PROVIDER - CARE PROVIDERS DIRECT ADDRESSES
,DirectAddress_Unknown,[email or web address],DirectAddress_Unknown

## 2022-06-20 NOTE — DISCHARGE NOTE PROVIDER - CARE PROVIDER_API CALL
Barbara Mccord  [address] [address]  Phone: [phone]  Fax: [phone]  Follow Up Time: 1 week    Diony Maciel)  Cardiovascular Disease  [address] [place] [address]  Phone: [phone]  Fax: [phone]  Follow Up Time: 1 week    Jermaine Zacarias ()  Internal Medicine  [address] [place] [address], [address]  [place] [address]  Phone: [phone]  Fax: [phone]  Follow Up Time: 1 week   Barbara Mccord [address] [address]  Phone: [phone]  Fax: [phone]  Follow Up Time: 1 week    Diony Maciel)  Cardiovascular Disease  [address] [place] [address]  Phone: [phone]  Fax: [phone]  Follow Up Time: 1 week    Henry Lara)  Nephrology  [address] [place] [address], [address]  [place] [address]  Phone: [phone]  Fax: [phone]  Follow Up Time: 2 weeks

## 2022-06-20 NOTE — PROGRESS NOTE ADULT - SUBJECTIVE AND OBJECTIVE BOX
Neurology Follow up note    KENIA [id number]    HPI:  Patient is a 89 y/o F with a pmhx b/l LE edema, HTN, P Afib (on coumadin), and hypothyroidism presenting with sudden onset mid-sternal CP. Pt describes pain as a "prickly feeling", rated 4/10, constant in nature since 4am on 6/17, resolved in the ED. Pain was associated with L arm weakness, now resolved. Pt states the pain woke her up, and she was scared to walk and was unable to use her L arm for support to get out of bed. Pt was also nauseous at this time. No associated diaphoresis, palpitations, chest pressure. No amaurosis fugax, facial droop, garbled speech, hemiparesis, LOC, falls reported. No vomiting, fevers, chills, cough, sore throat, SOB, abd pain, constipation, dysuria. Pt had one loose BM this AM at home, denied hematochezia or melena.       In ED:  VS - HR 71 | /65 | RR 18 | sO2 94 | T 97.6  Labs - HgB 11.2 | INR 1.85 | K 3.3 | Glu 114 | trop 342.6 | CKMB 3.2 | BNP 2396  CTH NC - No acute intracranial hemorrhage. Lacunar infarct R inferior cerebellum  EKG - vent rate 66, QTc 501ms, sinus rhythm, old LBBB, no ischemic changes on personal read  Given -  mg PO. pt seen by cardiology Dr Almazan , pt admitted to [facility] for NSTEMI. (17 Jun 2022 09:52)      Interval History -no new weakness    Patient is seen, chart was reviewed and case was discussed with the treatment team.  Pt is not in any distress.   Lying on bed comfortably.   No events reported overnight.     Vital Signs Last 24 Hrs  T(C): 36.4 (20 Jun 2022 04:30), Max: 36.8 (19 Jun 2022 20:21)  T(F): 97.6 (20 Jun 2022 04:30), Max: 98.3 (19 Jun 2022 20:21)  HR: 62 (20 Jun 2022 04:30) (62 - 77)  BP: 105/61 (20 Jun 2022 04:30) (105/61 - 123/66)  BP(mean): --  RR: 18 (20 Jun 2022 04:30) (16 - 18)  SpO2: 96% (20 Jun 2022 04:30) (95% - 96%)        REVIEW OF SYSTEMS:    Constitutional: No fever,   Eyes: No eye pain, visual disturbances, or discharge  ENT:  No difficulty hearing, tinnitus, vertigo; No sinus or throat pain  Neck: No pain or stiffness  Respiratory: No cough, wheezing, chills or hemoptysis  Cardiovascular: No alpitations, shortness of breath,   Gastrointestinal: No abdominal or epigastric pain. No nausea, vomiting or hematemesis;  Genitourinary: No dysuria, frequency, hematuria  Neurological: No headaches tremors  Psychiatric: No depression, anxiety, mood swings or difficulty sleeping  Musculoskeletal: No joint pain or swelling;   Skin: No itching, burning, rashes or lesions   Lymph Nodes: No enlarged glands  Endocrine: No heat or cold intolerance;   Allergy and Immunologic: No hives or eczema    On Neurological Examination:    Mental Status - Pt is alert, awake, oriented X3.  Follows commands well and able to answer questions appropriately  .Mood and affect  normal    Speech -  Normal.    Cranial Nerves - Pupils 3 mm equal and reactive to light, extraocular eye movements intact. Pt has no visual field deficit.  Pt has no  facial asymmetry. Facial sensation is intact.Tongue - is in midline.    Muscle tone - is normal     Motor Exam - 5/5 of UE  LE 4/5    Sensory Exam -  Pt withdraws all extremities equally on stimulation. No asymmetry seen.      coordination:    Finger to nose: adele    Deep tendon Reflexes - 2 plus all over.         Neck Supple -  Yes.     MEDICATIONS    acetylcysteine  Oral Solution 600 milliGRAM(s) Oral every 12 hours  aspirin enteric coated 81 milliGRAM(s) Oral daily  atorvastatin 80 milliGRAM(s) Oral at bedtime  clopidogrel Tablet 75 milliGRAM(s) Oral daily  furosemide   Injectable 40 milliGRAM(s) IV Push once  levothyroxine 75 MICROGram(s) Oral daily  metoprolol succinate ER 50 milliGRAM(s) Oral daily  pantoprazole  Injectable 40 milliGRAM(s) IV Push daily  potassium phosphate / sodium phosphate Powder (PHOS-NaK) 1 Packet(s) Oral once  sodium chloride 0.9%. 1000 milliLiter(s) IV Continuous <Continuous>      Allergies    No Known Allergies    Intolerances        LABS:  CBC Full  -  ( 20 Jun 2022 09:53 )  WBC Count : 6.09 K/uL  RBC Count : 3.44 M/uL  Hemoglobin : 10.5 g/dL  Hematocrit : 32.3 %  Platelet Count - Automated : 180 K/uL  Mean Cell Volume : 93.9 fl  Mean Cell Hemoglobin : 30.5 pg  Mean Cell Hemoglobin Concentration : 32.5 gm/dL  Auto Neutrophil # : 3.64 K/uL        06-20    138  |  102  |  25<H>  ----------------------------<  115<H>  4.2   |  31  |  1.50<H>    Ca    7.6<L>      20 Jun 2022 09:53  Phos  2.2     06-20  Mg     2.2     06-20    TPro  6.4  /  Alb  2.4<L>  /  TBili  1.0  /  DBili  x   /  AST  57<H>  /  ALT  22  /  AlkPhos  88  06-20    Hemoglobin A1C:     Vitamin B12     RADIOLOGY    ASSESSMENT AND PLAN:      SEEN FOE LEFT ARM WEAKNESS LIKELY TIA  SP MI  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  OOB to chair/ambulation with assistance only.  Pain is accessed and addressed.  Would continue to follow.

## 2022-06-20 NOTE — PROGRESS NOTE ADULT - SUBJECTIVE AND OBJECTIVE BOX
[facility] -- Reid Astorga, Norah, Raheem, Flex Calvert Savella, Goodger  Office # [phone]    Follow Up:  NSTEMI, Afib    Subjective/Observations: States, she slept well.  Denies chest pain or palpitations overnight.  No significant tele events.  However, noted to be short of breath    REVIEW OF SYSTEMS: All other review of systems is negative unless indicated above  PAST MEDICAL & SURGICAL HISTORY:  Hypothyroid    Hypertension    Lower extremity edema    Paroxysmal atrial fibrillation    MEDICATIONS  (STANDING):  acetylcysteine  Oral Solution 600 milliGRAM(s) Oral every 12 hours  aspirin enteric coated 81 milliGRAM(s) Oral daily  atorvastatin 80 milliGRAM(s) Oral at bedtime  clopidogrel Tablet 75 milliGRAM(s) Oral daily  levothyroxine 75 MICROGram(s) Oral daily  metoprolol succinate ER 50 milliGRAM(s) Oral daily  pantoprazole  Injectable 40 milliGRAM(s) IV Push daily  sodium chloride 0.9%. 1000 milliLiter(s) (50 mL/Hr) IV Continuous <Continuous>    MEDICATIONS  (PRN):    Allergies    No Known Allergies    Intolerances    Vital Signs Last 24 Hrs  T(C): 36.4 (20 Jun 2022 04:30), Max: 36.8 (19 Jun 2022 20:21)  T(F): 97.6 (20 Jun 2022 04:30), Max: 98.3 (19 Jun 2022 20:21)  HR: 62 (20 Jun 2022 04:30) (62 - 77)  BP: 105/61 (20 Jun 2022 04:30) (105/61 - 123/66)  BP(mean): --  RR: 18 (20 Jun 2022 04:30) (16 - 18)  SpO2: 96% (20 Jun 2022 04:30) (95% - 96%)  I&O's Summary    19 Jun 2022 07:01  -  20 Jun 2022 07:00  --------------------------------------------------------  IN: 400 mL / OUT: 700 mL / NET: -300 mL    PHYSICAL EXAM:  TELE: Afib  Constitutional: NAD, awake and alert, well-developed  HEENT: Moist Mucous Membranes, Anicteric  Pulmonary: Non-labored, breath sounds are clear but very diminished at bases bilaterally, No wheezing, rales or rhonchi +SOB  Cardiovascular: IRRR, S1 and S2, +murmurs, no rubs, gallops or clicks  Gastrointestinal: Bowel Sounds present, soft, nontender.   Lymph: BLE edema +thighs. No lymphadenopathy.  Skin: No visible rashes or ulcers.  Psych:  Mood & affect appropriate    LABS: All Labs Reviewed:                        10.0   5.45  )-----------( 159      ( 19 Jun 2022 06:46 )             30.7                         10.9   5.70  )-----------( 183      ( 18 Jun 2022 08:32 )             34.1                         11.6   6.10  )-----------( 232      ( 17 Jun 2022 23:34 )             35.5     19 Jun 2022 06:46    143    |  106    |  22     ----------------------------<  90     3.2     |  32     |  1.50   18 Jun 2022 08:32    141    |  104    |  18     ----------------------------<  95     3.7     |  30     |  1.40   17 Jun 2022 23:34    141    |  104    |  15     ----------------------------<  124    3.9     |  31     |  1.30     Ca    7.8        19 Jun 2022 06:46  Ca    8.6        18 Jun 2022 08:32  Ca    8.6        17 Jun 2022 23:34  Phos  2.5       19 Jun 2022 06:46  Phos  3.3       18 Jun 2022 08:32  Phos  3.4       17 Jun 2022 23:34  Mg     2.3       19 Jun 2022 06:46  Mg     2.3       18 Jun 2022 08:32  Mg     2.1       17 Jun 2022 23:34    TPro  6.0    /  Alb  2.4    /  TBili  1.2    /  DBili  x      /  AST  65     /  ALT  21     /  AlkPhos  77     19 Jun 2022 06:46  TPro  6.8    /  Alb  2.8    /  TBili  1.5    /  DBili  x      /  AST  96     /  ALT  24     /  AlkPhos  92     18 Jun 2022 08:32  TPro  7.2    /  Alb  3.0    /  TBili  1.4    /  DBili  x      /  AST  107    /  ALT  26     /  AlkPhos  100    17 Jun 2022 23:34    PT/INR - ( 19 Jun 2022 06:46 )   PT: 25.5 sec;   INR: 2.16 ratio    PTT - ( 19 Jun 2022 06:46 )  PTT:40.3 sec    TTE pending       Ventricular Rate 66 BPM    Atrial Rate 66 BPM    QRS Duration 152 ms    Q-T Interval 478 ms    QTC Calculation(Bazett) 501 ms    R Axis -51 degrees    T Axis 133 degrees    Diagnosis Line atrial fibrillation  Left axis deviation  Left bundle branch block  Abnormal ECG  Confirmed by Norah ALVARENGA, Jhonny ([id number]) on 6/17/2022 12:40:02 PM    ACC: [id number] EXAM:  XR CHEST PORTABLE URGENT 1V                          PROCEDURE DATE:  06/17/2022      INTERPRETATION:  AP semierect chest on June 17, 2022 at 8:35 AM. Patient   has chest pain.    Heart magnified by technique.    There is elevation of the right hemidiaphragm again noted.    Mild right base effusion somewhat increased from October 11, 2021.    IMPRESSION: As above.    --- End of Report ---    RUSSELL MONTES MD; Attending Radiologist  This document has been electronically signed. Jun 17 2022 11:18AM

## 2022-06-20 NOTE — PROGRESS NOTE ADULT - SUBJECTIVE AND OBJECTIVE BOX
**CHARTING IN PROGRESS**     6/20/22: Patient seen and examined at bedside. Patient denies chest pain, palpitations, dyspnea. Noted to be slightly short of breath.  **CHARTING IN PROGRESS**       Patient is a 89y old  Female who presents with a chief complaint of CP w/ elevated trops (20 Jun 2022 09:37)    HPI:  Patient is a 89 y/o F with a pmhx b/l LE edema, HTN, P Afib (on coumadin), and hypothyroidism presenting with sudden onset mid-sternal CP. Pt describes pain as a "prickly feeling", rated 4/10, constant in nature since 4am on 6/17, resolved in the ED. Pain was associated with L arm weakness, now resolved. Pt states the pain woke her up, and she was scared to walk and was unable to use her L arm for support to get out of bed. Pt was also nauseous at this time. No associated diaphoresis, palpitations, chest pressure. No amaurosis fugax, facial droop, garbled speech, hemiparesis, LOC, falls reported. No vomiting, fevers, chills, cough, sore throat, SOB, abd pain, constipation, dysuria. Pt had one loose BM this AM at home, denied hematochezia or melena.       In ED:  VS - HR 71 | /65 | RR 18 | sO2 94 | T 97.6  Labs - HgB 11.2 | INR 1.85 | K 3.3 | Glu 114 | trop 342.6 | CKMB 3.2 | BNP 2396  [facility] NC - No acute intracranial hemorrhage. Lacunar infarct R inferior cerebellum  EKG - vent rate 66, QTc 501ms, sinus rhythm, old LBBB, no ischemic changes on personal read  Given -  mg PO. pt seen by cardiology Dr Almazan , pt admitted to [facility] for NSTEMI. (17 Jun 2022 09:52)    INTERVAL HPI:  6/18/22: Seen and examined at bedside. No acute complaints. Denies chest pain overnight and this AM. Overnight pt had episode of dyspnea, troponins inc from 300s to >[id number]. Trops now downtrended. Received 1 x dose 40mg IV lasix for dyspnea. Pt had statin dose inc to 80mg, had 1 x dose lopressor 25mg, and had her scheduled evening coumadin. Had 10 beats of NSVT this AM. Pt was started on standing 50mg toprol xL PO qD. Started on plavix 75mg qD. Extensive counseling done with the patient on [facility] and her current state of health. Patient states she has "forms at home" that her daughter will bring to elucidate [facility]. Patient values quality of life and wants to discuss with her daughter and son-inlaw. Family to discuss potential plan for cardiac cath and will arrive to a decision on 6/19. C/W medical therapy for ACS. HOLD coumadin as pt INR is therapeutic and to facilitate potential for cardiac cath. Will start renal adjusted FD Lovenox on 6/19 if INR is below 2.   6/19/22 Pt seen and examined at bedside. Pt states she slept well last night. Pt denies SOB, CP, palpitations, dizziness .Mildly elevated Cr , INR -Theraputic  Pt states the last time she had chest pain was Friday night. Later in the afternoon again patient was seen at bedside with  daughter HCP and son in law at bedside, decision was made for DNR/ DNI and cardiac cath.  6/20/22: Patient seen and examined at bedside. Patient denies chest pain, palpitations, dyspnea. Noted to be slightly short of breath. proBNP >13k, will give 1 dose of lasix 40mg IV today.    OVERNIGHT EVENTS:    Home Medications:  Lasix 40 mg oral tablet: 1 tab(s) orally 2 times a day (17 Jun 2022 11:27)  Lopressor: 25  orally once a day (17 Jun 2022 11:27)  potassium chloride 10 mEq oral tablet, extended release: 2 tablets twice a day (takes it with her Lasix)  (17 Jun 2022 11:27)  Synthroid 75 mcg (0.075 mg) oral tablet: 1 tab(s) orally once a day (17 Jun 2022 11:27)  warfarin 3 mg oral tablet: 1 tab(s) orally once a day (M, Tu, Th, F, Sa Grider) (17 Jun 2022 11:27)  warfarin 4 mg oral tablet: 1 tab(s) orally Wednesday (17 Jun 2022 11:27)      MEDICATIONS  (STANDING):  acetylcysteine  Oral Solution 600 milliGRAM(s) Oral every 12 hours  aspirin enteric coated 81 milliGRAM(s) Oral daily  atorvastatin 80 milliGRAM(s) Oral at bedtime  clopidogrel Tablet 75 milliGRAM(s) Oral daily  furosemide   Injectable 40 milliGRAM(s) IV Push once  levothyroxine 75 MICROGram(s) Oral daily  metoprolol succinate ER 50 milliGRAM(s) Oral daily  pantoprazole  Injectable 40 milliGRAM(s) IV Push daily  potassium phosphate / sodium phosphate Powder (PHOS-NaK) 1 Packet(s) Oral once  sodium chloride 0.9%. 1000 milliLiter(s) (50 mL/Hr) IV Continuous <Continuous>    MEDICATIONS  (PRN):      Allergies    No Known Allergies    Intolerances        Social History:  Home: lives alone  Smoke: none  Alcohol: none  Recreational drug use: none  Ambulate: independent, uses support from furniture and cane occasionally  Activities of daily living: independent  Job: retired  COVID - Vaccine (17 Jun 2022 09:52)      REVIEW OF SYSTEMS: i feel OK  CONSTITUTIONAL: No fever, No chills, No fatigue, No myalgia, No Body ache, No Weakness  EYES: No eye pain,  No visual disturbances, No discharge, NO Redness  ENMT:  No ear pain, No nose bleed, No vertigo; No sinus pain, NO throat pain, No Congestion  NECK: No pain, No stiffness  RESPIRATORY: No cough, NO wheezing, No hemoptysis, ADMITS brief shortness of breath earlier, now resolved  CARDIOVASCULAR: No chest pain, palpitations  GASTROINTESTINAL: No abdominal pain, NO epigastric pain. No nausea, No vomiting; No diarrhea, No constipation. [ x ] BM  GENITOURINARY: No dysuria, No frequency, No urgency, No hematuria, NO incontinence  NEUROLOGICAL: No headaches, No dizziness, No numbness, No tingling, No tremors, No weakness  EXT: admits CHRONIC Swelling in LE's;  No Pain, admits LE Edema  SKIN:  [ x ] No itching, burning, rashes, or lesions   MUSCULOSKELETAL: No joint pain ,No Jt swelling; No muscle pain, No back pain, No extremity pain  PSYCHIATRIC: No depression,  No anxiety,  No mood swings ,No difficulty sleeping at night  PAIN SCALE: [ x ] None  [  ] Other-  ROS Unable to obtain due to - [  ] Dementia  [  ] Lethargy [  ] Drowsy [  ] Sedated [  ] non verbal  REST OF REVIEW Of SYSTEM - [ x ] Normal       Vital Signs Last 24 Hrs  T(C): 36.4 (20 Jun 2022 04:30), Max: 36.8 (19 Jun 2022 20:21)  T(F): 97.6 (20 Jun 2022 04:30), Max: 98.3 (19 Jun 2022 20:21)  HR: 62 (20 Jun 2022 04:30) (62 - 77)  BP: 105/61 (20 Jun 2022 04:30) (105/61 - 123/66)  BP(mean): --  RR: 18 (20 Jun 2022 04:30) (16 - 18)  SpO2: 96% (20 Jun 2022 04:30) (95% - 96%)  Finger Stick        06-19 @ 07:01  -  06-20 @ 07:00  --------------------------------------------------------  IN: 400 mL / OUT: 700 mL / NET: -300 mL    06-20 @ 07:01  -  06-20 @ 10:45  --------------------------------------------------------  IN: 0 mL / OUT: 650 mL / NET: -650 mL        PHYSICAL EXAM:  GENERAL:  [ x ] NAD , [ x ] well appearing, [  ] Agitated, [  ] Drowsy,  [  ] Lethargy, [  ] confused   HEAD:  [ x ] Normal, [  ] Other  EYES:  [ x ] EOMI, [ x ] PERRLA, [ x ] conjunctiva and sclera clear normal, [  ] Other,  [  ] Pallor,[  ] Discharge  ENMT:  [ x ] Normal, [ x ] Moist mucous membranes, [ x ] Good dentition, [ x ] No Thrush  NECK:  [ x ] Supple, [x  ] No JVD, [ x ] Normal thyroid, [  ] Lymphadenopathy [  ] Other  CHEST/LUNG:  [ x ] Clear to auscultation bilaterally, [ x ] Breath Sounds equal B/L , [  ] poor effort  [ x ] No rales, [ x ] No rhonchi  [ x ]  No wheezing,   HEART:  [ x ] Regular rate and IRREGULARLY IRREGULAR rhythm, [  ] tachycardia, [  ] Bradycardia,  [  ] irregular  [ x ] No murmurs, No rubs, No gallops, [  ] PPM in place (Mfr:  )  ABDOMEN:  [ x ] Soft, [ x ] Nontender, [ x ] Nondistended, [ x ] No mass, [  x] Bowel sounds present, [  ] obese  NERVOUS SYSTEM:  [ x ] Alert & Oriented X3, [ x ] Nonfocal  [  ] Confusion  [  ] Encephalopathic [  ] Sedated [  ] Unable to assess, [  ] Dementia [  ] Other-  EXTREMITIES: [ x ] 2+ Peripheral Pulses, No clubbing, No cyanosis,  [ x ] 2 + pitting edema B/L lower EXT. [ x ] PVD stasis skin changes B/L Lower EXT, [  ] wound  LYMPH: No lymphadenopathy noted  SKIN:  [ x ] No rashes or lesions, [  ] Pressure Ulcers, [  ] ecchymosis, [  ] Skin Tears, [  ] Other    DIET: Diet, DASH/TLC:   Sodium & Cholesterol Restricted (06-17-22 @ 10:27)      LABS:                        10.5   6.09  )-----------( 180      ( 20 Jun 2022 09:53 )             32.3     20 Jun 2022 09:53    138    |  102    |  25     ----------------------------<  115    4.2     |  31     |  1.50     Ca    7.6        20 Jun 2022 09:53  Phos  2.2       20 Jun 2022 09:53  Mg     2.2       20 Jun 2022 09:53    TPro  6.4    /  Alb  2.4    /  TBili  1.0    /  DBili  x      /  AST  57     /  ALT  22     /  AlkPhos  88     20 Jun 2022 09:53    PT/INR - ( 20 Jun 2022 09:53 )   PT: 21.8 sec;   INR: 1.85 ratio         PTT - ( 20 Jun 2022 09:53 )  PTT:37.8 sec              CARDIAC MARKERS ( 18 Jun 2022 08:32 )  x     / x     / 364 U/L / x     / 24.0 ng/mL  CARDIAC MARKERS ( 17 Jun 2022 23:34 )  x     / x     / 497 U/L / x     / 52.0 ng/mL  CARDIAC MARKERS ( 17 Jun 2022 14:55 )  x     / x     / 523 U/L / x     / 70.3 ng/mL  CARDIAC MARKERS ( 17 Jun 2022 08:40 )  x     / x     / 135 U/L / x     / 3.2 ng/mL             Anemia Panel:  Iron Total, Serum: 88 ug/dL (06-18-22 @ 10:09)  Iron - Total Binding Capacity.: 354 ug/dL (06-18-22 @ 10:09)  Ferritin, Serum: 40 ng/mL (06-18-22 @ 10:09)  Vitamin B12, Serum: 619 pg/mL (06-18-22 @ 10:09)  Folate, Serum: 14.9 ng/mL (06-18-22 @ 10:09)      Thyroid Panel:  Thyroid Stimulating Hormone, Serum: 0.42 uIU/mL (06-18-22 @ 08:32)        Lipase, Serum: 157 U/L (06-17-22 @ 07:28)      Serum Pro-Brain Natriuretic Peptide: 15980 pg/mL (06-20-22 @ 09:53)  Serum Pro-Brain Natriuretic Peptide: 2396 pg/mL (06-17-22 @ 07:33)      RADIOLOGY & ADDITIONAL TESTS:      HEALTH ISSUES - PROBLEM Dx:  Hypothyroid    Hypertension    Lower extremity edema    Paroxysmal atrial fibrillation    Need for prophylactic measure    Anemia    Acute kidney injury superimposed on CKD    Hypokalemia    NSTEMI (non-ST elevation myocardial infarction)            Consultant(s) Notes Reviewed:  [ x ] YES     Care Discussed with [X] Consultants  [  ] Patient  [  ] Family [  ] HCP [  ]   [  ] Social Service  [  ] RN, [  ] Physical Therapy,[  ] Palliative care team  DVT PPX: [  ] Lovenox, [  ] S C Heparin, [ x ] Coumadin, [  ] Xarelto, [  ] Eliquis, [  ] Pradaxa, [  ] IV Heparin drip, [  ] SCD [  ] Contraindication 2 to GI Bleed,[  ] Ambulation [  ] Contraindicated 2 to  bleed [  ] Contraindicated 2 to Brain Bleed  Advanced directive: [  ] None, [ x ] DNR/DNI Patient is a 89y old  Female who presents with a chief complaint of CP w/ elevated trops (20 Jun 2022 09:37)    HPI:  Patient is a 89 y/o F with a pmhx b/l LE edema, HTN, P Afib (on coumadin), and hypothyroidism presenting with sudden onset mid-sternal CP. Pt describes pain as a "prickly feeling", rated 4/10, constant in nature since 4am on 6/17, resolved in the ED. Pain was associated with L arm weakness, now resolved. Pt states the pain woke her up, and she was scared to walk and was unable to use her L arm for support to get out of bed. Pt was also nauseous at this time. No associated diaphoresis, palpitations, chest pressure. No amaurosis fugax, facial droop, garbled speech, hemiparesis, LOC, falls reported. No vomiting, fevers, chills, cough, sore throat, SOB, abd pain, constipation, dysuria. Pt had one loose BM this AM at home, denied hematochezia or melena.       In ED:  VS - HR 71 | /65 | RR 18 | sO2 94 | T 97.6  Labs - HgB 11.2 | INR 1.85 | K 3.3 | Glu 114 | trop 342.6 | CKMB 3.2 | BNP 2396  [facility] NC - No acute intracranial hemorrhage. Lacunar infarct R inferior cerebellum  EKG - vent rate 66, QTc 501ms, sinus rhythm, old LBBB, no ischemic changes on personal read  Given -  mg PO. pt seen by cardiology Dr Almazan , pt admitted to [facility] for NSTEMI. (17 Jun 2022 09:52)    INTERVAL HPI:  6/18/22: Seen and examined at bedside. No acute complaints. Denies chest pain overnight and this AM. Overnight pt had episode of dyspnea, troponins inc from 300s to >23154. Trops now downtrended. Received 1 x dose 40mg IV lasix for dyspnea. Pt had statin dose inc to 80mg, had 1 x dose lopressor 25mg, and had her scheduled evening coumadin. Had 10 beats of NSVT this AM. Pt was started on standing 50mg toprol xL PO qD. Started on plavix 75mg qD. Extensive counseling done with the patient on [facility] and her current state of health. Patient states she has "forms at home" that her daughter will bring to elucidate [facility]. Patient values quality of life and wants to discuss with her daughter and son-andrewaw. Family to discuss potential plan for cardiac cath and will arrive to a decision on 6/19. C/W medical therapy for ACS. HOLD coumadin as pt INR is therapeutic and to facilitate potential for cardiac cath. Will start renal adjusted FD Lovenox on 6/19 if INR is below 2.   6/19/22 Pt seen and examined at bedside. Pt states she slept well last night. Pt denies SOB, CP, palpitations, dizziness .Mildly elevated Cr , INR -Theraputic  Pt states the last time she had chest pain was Friday night. Later in the afternoon again patient was seen at bedside with  daughter HCP and son in law at bedside, decision was made for DNR/ DNI and cardiac cath.  6/20/22: Patient seen and examined at bedside. Patient denies chest pain, palpitations, dyspnea. Noted to be slightly short of breath. proBNP >13k, will give 1 dose of lasix 40mg IV today.    OVERNIGHT EVENTS:    Home Medications:  Lasix 40 mg oral tablet: 1 tab(s) orally 2 times a day (17 Jun 2022 11:27)  Lopressor: 25  orally once a day (17 Jun 2022 11:27)  potassium chloride 10 mEq oral tablet, extended release: 2 tablets twice a day (takes it with her Lasix)  (17 Jun 2022 11:27)  Synthroid 75 mcg (0.075 mg) oral tablet: 1 tab(s) orally once a day (17 Jun 2022 11:27)  warfarin 3 mg oral tablet: 1 tab(s) orally once a day (M, Tu, Th, F, Sa Grider) (17 Jun 2022 11:27)  warfarin 4 mg oral tablet: 1 tab(s) orally Wednesday (17 Jun 2022 11:27)      MEDICATIONS  (STANDING):  acetylcysteine  Oral Solution 600 milliGRAM(s) Oral every 12 hours  aspirin enteric coated 81 milliGRAM(s) Oral daily  atorvastatin 80 milliGRAM(s) Oral at bedtime  clopidogrel Tablet 75 milliGRAM(s) Oral daily  furosemide   Injectable 40 milliGRAM(s) IV Push once  levothyroxine 75 MICROGram(s) Oral daily  metoprolol succinate ER 50 milliGRAM(s) Oral daily  pantoprazole  Injectable 40 milliGRAM(s) IV Push daily  potassium phosphate / sodium phosphate Powder (PHOS-NaK) 1 Packet(s) Oral once  sodium chloride 0.9%. 1000 milliLiter(s) (50 mL/Hr) IV Continuous <Continuous>    MEDICATIONS  (PRN):      Allergies    No Known Allergies    Intolerances        Social History:  Home: lives alone  Smoke: none  Alcohol: none  Recreational drug use: none  Ambulate: independent, uses support from furniture and cane occasionally  Activities of daily living: independent  Job: retired  COVID - Vaccine (17 Jun 2022 09:52)      REVIEW OF SYSTEMS: i feel OK  CONSTITUTIONAL: No fever, No chills, No fatigue, No myalgia, No Body ache, No Weakness  EYES: No eye pain,  No visual disturbances, No discharge, NO Redness  ENMT:  No ear pain, No nose bleed, No vertigo; No sinus pain, NO throat pain, No Congestion  NECK: No pain, No stiffness  RESPIRATORY: No cough, NO wheezing, No hemoptysis, ADMITS brief shortness of breath earlier, now resolved  CARDIOVASCULAR: No chest pain, palpitations  GASTROINTESTINAL: No abdominal pain, NO epigastric pain. No nausea, No vomiting; No diarrhea, No constipation. [ x ] BM  GENITOURINARY: No dysuria, No frequency, No urgency, No hematuria, NO incontinence  NEUROLOGICAL: No headaches, No dizziness, No numbness, No tingling, No tremors, No weakness  EXT: admits CHRONIC Swelling in LE's;  No Pain, admits LE Edema  SKIN:  [ x ] No itching, burning, rashes, or lesions   MUSCULOSKELETAL: No joint pain ,No Jt swelling; No muscle pain, No back pain, No extremity pain  PSYCHIATRIC: No depression,  No anxiety,  No mood swings ,No difficulty sleeping at night  PAIN SCALE: [ x ] None  [  ] Other-  ROS Unable to obtain due to - [  ] Dementia  [  ] Lethargy [  ] Drowsy [  ] Sedated [  ] non verbal  REST OF REVIEW Of SYSTEM - [ x ] Normal       Vital Signs Last 24 Hrs  T(C): 36.4 (20 Jun 2022 04:30), Max: 36.8 (19 Jun 2022 20:21)  T(F): 97.6 (20 Jun 2022 04:30), Max: 98.3 (19 Jun 2022 20:21)  HR: 62 (20 Jun 2022 04:30) (62 - 77)  BP: 105/61 (20 Jun 2022 04:30) (105/61 - 123/66)  BP(mean): --  RR: 18 (20 Jun 2022 04:30) (16 - 18)  SpO2: 96% (20 Jun 2022 04:30) (95% - 96%)  Finger Stick        06-19 @ 07:01  -  06-20 @ 07:00  --------------------------------------------------------  IN: 400 mL / OUT: 700 mL / NET: -300 mL    06-20 @ 07:01  -  06-20 @ 10:45  --------------------------------------------------------  IN: 0 mL / OUT: 650 mL / NET: -650 mL        PHYSICAL EXAM:  GENERAL:  [ x ] NAD , [ x ] well appearing, [  ] Agitated, [  ] Drowsy,  [  ] Lethargy, [  ] confused   HEAD:  [ x ] Normal, [  ] Other  EYES:  [ x ] EOMI, [ x ] PERRLA, [ x ] conjunctiva and sclera clear normal, [  ] Other,  [  ] Pallor,[  ] Discharge  ENMT:  [ x ] Normal, [ x ] Moist mucous membranes, [ x ] Good dentition, [ x ] No Thrush  NECK:  [ x ] Supple, [x  ] No JVD, [ x ] Normal thyroid, [  ] Lymphadenopathy [  ] Other  CHEST/LUNG:  [ x ] Clear to auscultation bilaterally, [ x ] Breath Sounds equal B/L , [  ] poor effort  [ x ] No rales, [ x ] No rhonchi  [ x ]  No wheezing,   HEART:  [ x ] Regular rate and IRREGULARLY IRREGULAR rhythm, [  ] tachycardia, [  ] Bradycardia,  [  ] irregular  [ x ] No murmurs, No rubs, No gallops, [  ] PPM in place (Mfr:  )  ABDOMEN:  [ x ] Soft, [ x ] Nontender, [ x ] Nondistended, [ x ] No mass, [  x] Bowel sounds present, [  ] obese  NERVOUS SYSTEM:  [ x ] Alert & Oriented X3, [ x ] Nonfocal  [  ] Confusion  [  ] Encephalopathic [  ] Sedated [  ] Unable to assess, [  ] Dementia [  ] Other-  EXTREMITIES: [ x ] 2+ Peripheral Pulses, No clubbing, No cyanosis,  [ x ] 2 + pitting edema B/L lower EXT. [ x ] PVD stasis skin changes B/L Lower EXT, [  ] wound  LYMPH: No lymphadenopathy noted  SKIN:  [ x ] No rashes or lesions, [  ] Pressure Ulcers, [  ] ecchymosis, [  ] Skin Tears, [  ] Other    DIET: Diet, DASH/TLC:   Sodium & Cholesterol Restricted (06-17-22 @ 10:27)      LABS:                        10.5   6.09  )-----------( 180      ( 20 Jun 2022 09:53 )             32.3     20 Jun 2022 09:53    138    |  102    |  25     ----------------------------<  115    4.2     |  31     |  1.50     Ca    7.6        20 Jun 2022 09:53  Phos  2.2       20 Jun 2022 09:53  Mg     2.2       20 Jun 2022 09:53    TPro  6.4    /  Alb  2.4    /  TBili  1.0    /  DBili  x      /  AST  57     /  ALT  22     /  AlkPhos  88     20 Jun 2022 09:53    PT/INR - ( 20 Jun 2022 09:53 )   PT: 21.8 sec;   INR: 1.85 ratio         PTT - ( 20 Jun 2022 09:53 )  PTT:37.8 sec              CARDIAC MARKERS ( 18 Jun 2022 08:32 )  x     / x     / 364 U/L / x     / 24.0 ng/mL  CARDIAC MARKERS ( 17 Jun 2022 23:34 )  x     / x     / 497 U/L / x     / 52.0 ng/mL  CARDIAC MARKERS ( 17 Jun 2022 14:55 )  x     / x     / 523 U/L / x     / 70.3 ng/mL  CARDIAC MARKERS ( 17 Jun 2022 08:40 )  x     / x     / 135 U/L / x     / 3.2 ng/mL             Anemia Panel:  Iron Total, Serum: 88 ug/dL (06-18-22 @ 10:09)  Iron - Total Binding Capacity.: 354 ug/dL (06-18-22 @ 10:09)  Ferritin, Serum: 40 ng/mL (06-18-22 @ 10:09)  Vitamin B12, Serum: 619 pg/mL (06-18-22 @ 10:09)  Folate, Serum: 14.9 ng/mL (06-18-22 @ 10:09)      Thyroid Panel:  Thyroid Stimulating Hormone, Serum: 0.42 uIU/mL (06-18-22 @ 08:32)        Lipase, Serum: 157 U/L (06-17-22 @ 07:28)      Serum Pro-Brain Natriuretic Peptide: 57596 pg/mL (06-20-22 @ 09:53)  Serum Pro-Brain Natriuretic Peptide: 2396 pg/mL (06-17-22 @ 07:33)      RADIOLOGY & ADDITIONAL TESTS:  < from: TTE Echo Complete w/o Contrast w/ Doppler (06.19.22 @ 10:00) >  Mild to moderate segmental left ventricular systolic dysfunction,   estimated LVEF of 35-40%. The apex, mid inferoseptal and mid   anterolateral walls appear severely hypokinetic  Right ventricular enlargement with grossly normal function  Biatrial enlargement  Calcified trileaflet aortic valve with mild to moderate aortic stenosis,   without AI.  Mild MR  Moderate TR.  No significant pericardial effusion.    < end of copied text >      HEALTH ISSUES - PROBLEM Dx:  Hypothyroid    Hypertension    Lower extremity edema    Paroxysmal atrial fibrillation    Need for prophylactic measure    Anemia    Acute kidney injury superimposed on CKD    Hypokalemia    NSTEMI (non-ST elevation myocardial infarction)            Consultant(s) Notes Reviewed:  [ x ] YES     Care Discussed with [X] Consultants  [  ] Patient  [  ] Family [  ] HCP [  ]   [  ] Social Service  [  ] RN, [  ] Physical Therapy,[  ] Palliative care team  DVT PPX: [  ] Lovenox, [  ] S C Heparin, [ x ] Coumadin, [  ] Xarelto, [  ] Eliquis, [  ] Pradaxa, [  ] IV Heparin drip, [  ] SCD [  ] Contraindication 2 to GI Bleed,[  ] Ambulation [  ] Contraindicated 2 to  bleed [  ] Contraindicated 2 to Brain Bleed  Advanced directive: [  ] None, [ x ] DNR/DNI Patient is a 89y old  Female who presents with a chief complaint of CP w/ elevated trops (20 Jun 2022 09:37)    HPI:  Patient is a 89 y/o F with a pmhx b/l LE edema, HTN, P Afib (on coumadin), and hypothyroidism presenting with sudden onset mid-sternal CP. Pt describes pain as a "prickly feeling", rated 4/10, constant in nature since 4am on 6/17, resolved in the ED. Pain was associated with L arm weakness, now resolved. Pt states the pain woke her up, and she was scared to walk and was unable to use her L arm for support to get out of bed. Pt was also nauseous at this time. No associated diaphoresis, palpitations, chest pressure. No amaurosis fugax, facial droop, garbled speech, hemiparesis, LOC, falls reported. No vomiting, fevers, chills, cough, sore throat, SOB, abd pain, constipation, dysuria. Pt had one loose BM this AM at home, denied hematochezia or melena.       In ED:  VS - HR 71 | /65 | RR 18 | sO2 94 | T 97.6  Labs - HgB 11.2 | INR 1.85 | K 3.3 | Glu 114 | trop 342.6 | CKMB 3.2 | BNP 2396  [facility] NC - No acute intracranial hemorrhage. Lacunar infarct R inferior cerebellum  EKG - vent rate 66, QTc 501ms, sinus rhythm, old LBBB, no ischemic changes on personal read  Given -  mg PO. pt seen by cardiology Dr Almazan , pt admitted to [facility] for NSTEMI. (17 Jun 2022 09:52)    INTERVAL HPI:  6/18/22: Seen and examined at bedside. No acute complaints. Denies chest pain overnight and this AM. Overnight pt had episode of dyspnea, troponins inc from 300s to >[id number]. Trops now downtrended. Received 1 x dose 40mg IV lasix for dyspnea. Pt had statin dose inc to 80mg, had 1 x dose lopressor 25mg, and had her scheduled evening coumadin. Had 10 beats of NSVT this AM. Pt was started on standing 50mg toprol xL PO qD. Started on plavix 75mg qD. Extensive counseling done with the patient on [facility] and her current state of health. Patient states she has "forms at home" that her daughter will bring to elucidate [facility]. Patient values quality of life and wants to discuss with her daughter and son-andrewaw. Family to discuss potential plan for cardiac cath and will arrive to a decision on 6/19. C/W medical therapy for ACS. HOLD coumadin as pt INR is therapeutic and to facilitate potential for cardiac cath. Will start renal adjusted FD Lovenox on 6/19 if INR is below 2.   6/19/22 Pt seen and examined at bedside. Pt states she slept well last night. Pt denies SOB, CP, palpitations, dizziness .Mildly elevated Cr , INR -Theraputic  Pt states the last time she had chest pain was Friday night. Later in the afternoon again patient was seen at bedside with  daughter HCP and son in law at bedside, decision was made for DNR/ DNI and cardiac cath.  6/20/22: Patient seen and examined at bedside. Patient denies chest pain, palpitations, dyspnea. Noted to be slightly short of breath. proBNP >13k, will give 1 dose of lasix 40mg IV today.    OVERNIGHT EVENTS: none    Home Medications:  Lasix 40 mg oral tablet: 1 tab(s) orally 2 times a day (17 Jun 2022 11:27)  Lopressor: 25  orally once a day (17 Jun 2022 11:27)  potassium chloride 10 mEq oral tablet, extended release: 2 tablets twice a day (takes it with her Lasix)  (17 Jun 2022 11:27)  Synthroid 75 mcg (0.075 mg) oral tablet: 1 tab(s) orally once a day (17 Jun 2022 11:27)  warfarin 3 mg oral tablet: 1 tab(s) orally once a day (M, Tu, Th, F, Sa Grider) (17 Jun 2022 11:27)  warfarin 4 mg oral tablet: 1 tab(s) orally Wednesday (17 Jun 2022 11:27)      MEDICATIONS  (STANDING):  acetylcysteine  Oral Solution 600 milliGRAM(s) Oral every 12 hours  aspirin enteric coated 81 milliGRAM(s) Oral daily  atorvastatin 80 milliGRAM(s) Oral at bedtime  clopidogrel Tablet 75 milliGRAM(s) Oral daily  furosemide   Injectable 40 milliGRAM(s) IV Push once  levothyroxine 75 MICROGram(s) Oral daily  metoprolol succinate ER 50 milliGRAM(s) Oral daily  pantoprazole  Injectable 40 milliGRAM(s) IV Push daily  potassium phosphate / sodium phosphate Powder (PHOS-NaK) 1 Packet(s) Oral once  sodium chloride 0.9%. 1000 milliLiter(s) (50 mL/Hr) IV Continuous <Continuous>    MEDICATIONS  (PRN):      Allergies    No Known Allergies    Intolerances        Social History:  Home: lives alone  Smoke: none  Alcohol: none  Recreational drug use: none  Ambulate: independent, uses support from furniture and cane occasionally  Activities of daily living: independent  Job: retired  COVID - Vaccine (17 Jun 2022 09:52)      REVIEW OF SYSTEMS: i feel OK  CONSTITUTIONAL: No fever, No chills, No fatigue, No myalgia, No Body ache, No Weakness  EYES: No eye pain,  No visual disturbances, No discharge, NO Redness  ENMT:  No ear pain, No nose bleed, No vertigo; No sinus pain, NO throat pain, No Congestion  NECK: No pain, No stiffness  RESPIRATORY: No cough, NO wheezing, No hemoptysis, ADMITS brief shortness of breath earlier, now resolved  CARDIOVASCULAR: No chest pain, palpitations  GASTROINTESTINAL: No abdominal pain, NO epigastric pain. No nausea, No vomiting; No diarrhea, No constipation. [ x ] BM  GENITOURINARY: No dysuria, No frequency, No urgency, No hematuria, NO incontinence  NEUROLOGICAL: No headaches, No dizziness, No numbness, No tingling, No tremors, No weakness  EXT: admits CHRONIC Swelling in LE's;  No Pain, admits LE Edema  SKIN:  [ x ] No itching, burning, rashes, or lesions   MUSCULOSKELETAL: No joint pain ,No Jt swelling; No muscle pain, No back pain, No extremity pain  PSYCHIATRIC: No depression,  No anxiety,  No mood swings ,No difficulty sleeping at night  PAIN SCALE: [ x ] None  [  ] Other-  ROS Unable to obtain due to - [  ] Dementia  [  ] Lethargy [  ] Drowsy [  ] Sedated [  ] non verbal  REST OF REVIEW Of SYSTEM - [ x ] Normal       Vital Signs Last 24 Hrs  T(C): 36.4 (20 Jun 2022 04:30), Max: 36.8 (19 Jun 2022 20:21)  T(F): 97.6 (20 Jun 2022 04:30), Max: 98.3 (19 Jun 2022 20:21)  HR: 62 (20 Jun 2022 04:30) (62 - 77)  BP: 105/61 (20 Jun 2022 04:30) (105/61 - 123/66)  BP(mean): --  RR: 18 (20 Jun 2022 04:30) (16 - 18)  SpO2: 96% (20 Jun 2022 04:30) (95% - 96%)  Finger Stick        06-19 @ 07:01  -  06-20 @ 07:00  --------------------------------------------------------  IN: 400 mL / OUT: 700 mL / NET: -300 mL    06-20 @ 07:01  -  06-20 @ 10:45  --------------------------------------------------------  IN: 0 mL / OUT: 650 mL / NET: -650 mL        PHYSICAL EXAM:   GENERAL:  [ x ] NAD , [ x ] well appearing, [  ] Agitated, [  ] Drowsy,  [  ] Lethargy, [  ] confused   HEAD:  [ x ] Normal, [  ] Other  EYES:  [ x ] EOMI, [ x ] PERRLA, [ x ] conjunctiva and sclera clear normal, [  ] Other,  [  ] Pallor,[  ] Discharge  ENMT:  [ x ] Normal, [ x ] Moist mucous membranes, [ x ] Good dentition, [ x ] No Thrush  NECK:  [ x ] Supple, [x  ] No JVD, [ x ] Normal thyroid, [  ] Lymphadenopathy [  ] Other  CHEST/LUNG:  [ x ] Clear to auscultation bilaterally, [ x ] Breath Sounds equal B/L , [  ] poor effort  [ x ] No rales, [ x ] No rhonchi  [ x ]  No wheezing,   HEART:  [ x ] Regular rate and IRREGULARLY IRREGULAR rhythm, [  ] tachycardia, [  ] Bradycardia,  [  ] irregular  [ x ] No murmurs, No rubs, No gallops, [  ] PPM in place (Mfr:  )  ABDOMEN:  [ x ] Soft, [ x ] Nontender, [ x ] Nondistended, [ x ] No mass, [ x] Bowel sounds present, [  ] obese  NERVOUS SYSTEM:  [ x ] Alert & Oriented X3, [ x ] Nonfocal  [  ] Confusion  [  ] Encephalopathic [  ] Sedated [  ] Unable to assess, [  ] Dementia [  ] Other-  EXTREMITIES: [ x ] 2+ Peripheral Pulses, No clubbing, No cyanosis,  [ x ] 2 + pitting edema B/L lower EXT. [ x ] PVD stasis skin changes B/L Lower EXT, [  ] wound  LYMPH: No lymphadenopathy noted  SKIN:  [ x ] No rashes or lesions, [  ] Pressure Ulcers, [  ] ecchymosis, [  ] Skin Tears, [  ] Other    DIET: Diet, DASH/TLC:   Sodium & Cholesterol Restricted (06-17-22 @ 10:27)      LABS:                        10.5   6.09  )-----------( 180      ( 20 Jun 2022 09:53 )             32.3     20 Jun 2022 09:53    138    |  102    |  25     ----------------------------<  115    4.2     |  31     |  1.50     Ca    7.6        20 Jun 2022 09:53  Phos  2.2       20 Jun 2022 09:53  Mg     2.2       20 Jun 2022 09:53    TPro  6.4    /  Alb  2.4    /  TBili  1.0    /  DBili  x      /  AST  57     /  ALT  22     /  AlkPhos  88     20 Jun 2022 09:53    PT/INR - ( 20 Jun 2022 09:53 )   PT: 21.8 sec;   INR: 1.85 ratio         PTT - ( 20 Jun 2022 09:53 )  PTT:37.8 sec      CARDIAC MARKERS ( 18 Jun 2022 08:32 )  x     / x     / 364 U/L / x     / 24.0 ng/mL  CARDIAC MARKERS ( 17 Jun 2022 23:34 )  x     / x     / 497 U/L / x     / 52.0 ng/mL  CARDIAC MARKERS ( 17 Jun 2022 14:55 )  x     / x     / 523 U/L / x     / 70.3 ng/mL  CARDIAC MARKERS ( 17 Jun 2022 08:40 )  x     / x     / 135 U/L / x     / 3.2 ng/mL      Anemia Panel:  Iron Total, Serum: 88 ug/dL (06-18-22 @ 10:09)  Iron - Total Binding Capacity.: 354 ug/dL (06-18-22 @ 10:09)  Ferritin, Serum: 40 ng/mL (06-18-22 @ 10:09)  Vitamin B12, Serum: 619 pg/mL (06-18-22 @ 10:09)  Folate, Serum: 14.9 ng/mL (06-18-22 @ 10:09)      Thyroid Panel:  Thyroid Stimulating Hormone, Serum: 0.42 uIU/mL (06-18-22 @ 08:32)        Lipase, Serum: 157 U/L (06-17-22 @ 07:28)      Serum Pro-Brain Natriuretic Peptide: 33463 pg/mL (06-20-22 @ 09:53)  Serum Pro-Brain Natriuretic Peptide: 2396 pg/mL (06-17-22 @ 07:33)      RADIOLOGY & ADDITIONAL TESTS:  < from: TTE Echo Complete w/o Contrast w/ Doppler (06.19.22 @ 10:00) >  Mild to moderate segmental left ventricular systolic dysfunction,   estimated LVEF of 35-40%. The apex, mid inferoseptal and mid   anterolateral walls appear severely hypokinetic  Right ventricular enlargement with grossly normal function  Biatrial enlargement  Calcified trileaflet aortic valve with mild to moderate aortic stenosis,   without AI.  Mild MR  Moderate TR.  No significant pericardial effusion.    < end of copied text >      HEALTH ISSUES - PROBLEM Dx:  Hypothyroid    Hypertension    Lower extremity edema    Paroxysmal atrial fibrillation    Need for prophylactic measure    Anemia    Acute kidney injury superimposed on CKD    Hypokalemia    NSTEMI (non-ST elevation myocardial infarction)        Consultant(s) Notes Reviewed:  [ x ] YES     Care Discussed with [X] Consultants  [ x ] Patient  [ x ] Family- Son in law [  ] HCP [  ]   [  ] Social Service  [ x ] RN, [  ] Physical Therapy,[  ] Palliative care team  DVT PPX: [  ] Lovenox, [  ] S C Heparin, [ x ] Coumadin, [  ] Xarelto, [  ] Eliquis, [  ] Pradaxa, [  ] IV Heparin drip, [  ] SCD [  ] Contraindication 2 to GI Bleed,[  ] Ambulation [  ] Contraindicated 2 to  bleed [  ] Contraindicated 2 to Brain Bleed  Advanced directive: [  ] None, [ x ] DNR/DNI Patient is a 89y old  Female who presents with a chief complaint of CP w/ elevated trops (20 Jun 2022 09:37)    HPI:  Patient is a 89 y/o F with a pmhx b/l LE edema, HTN, P Afib (on coumadin), and hypothyroidism presenting with sudden onset mid-sternal CP. Pt describes pain as a "prickly feeling", rated 4/10, constant in nature since 4am on 6/17, resolved in the ED. Pain was associated with L arm weakness, now resolved. Pt states the pain woke her up, and she was scared to walk and was unable to use her L arm for support to get out of bed. Pt was also nauseous at this time. No associated diaphoresis, palpitations, chest pressure. No amaurosis fugax, facial droop, garbled speech, hemiparesis, LOC, falls reported. No vomiting, fevers, chills, cough, sore throat, SOB, abd pain, constipation, dysuria. Pt had one loose BM this AM at home, denied hematochezia or melena.       In ED:  VS - HR 71 | /65 | RR 18 | sO2 94 | T 97.6  Labs - HgB 11.2 | INR 1.85 | K 3.3 | Glu 114 | trop 342.6 | CKMB 3.2 | BNP 2396  CTH NC - No acute intracranial hemorrhage. Lacunar infarct R inferior cerebellum  EKG - vent rate 66, QTc 501ms, sinus rhythm, old LBBB, no ischemic changes on personal read  Given -  mg PO. pt seen by cardiology Dr Almazan , pt admitted to [facility] for NSTEMI. (17 Jun 2022 09:52)    INTERVAL HPI:  6/18/22: Seen and examined at bedside. No acute complaints. Denies chest pain overnight and this AM. Overnight pt had episode of dyspnea, troponins inc from 300s to >63236. Trops now downtrended. Received 1 x dose 40mg IV lasix for dyspnea. Pt had statin dose inc to 80mg, had 1 x dose lopressor 25mg, and had her scheduled evening coumadin. Had 10 beats of NSVT this AM. Pt was started on standing 50mg toprol xL PO qD. Started on plavix 75mg qD. Extensive counseling done with the patient on [facility] and her current state of health. Patient states she has "forms at home" that her daughter will bring to elucidate [facility]. Patient values quality of life and wants to discuss with her daughter and son-andrewaw. Family to discuss potential plan for cardiac cath and will arrive to a decision on 6/19. C/W medical therapy for ACS. HOLD coumadin as pt INR is therapeutic and to facilitate potential for cardiac cath. Will start renal adjusted FD Lovenox on 6/19 if INR is below 2.   6/19/22 Pt seen and examined at bedside. Pt states she slept well last night. Pt denies SOB, CP, palpitations, dizziness .Mildly elevated Cr , INR -Theraputic  Pt states the last time she had chest pain was Friday night. Later in the afternoon again patient was seen at bedside with  daughter HCP and son in law at bedside, decision was made for DNR/ DNI and cardiac cath.  6/20/22: Patient seen and examined at bedside. Patient denies chest pain, palpitations, dyspnea. Noted to be slightly short of breath. proBNP >13k, will give 1 dose of lasix 40mg IV today.    OVERNIGHT EVENTS: none    Home Medications:  Lasix 40 mg oral tablet: 1 tab(s) orally 2 times a day (17 Jun 2022 11:27)  Lopressor: 25  orally once a day (17 Jun 2022 11:27)  potassium chloride 10 mEq oral tablet, extended release: 2 tablets twice a day (takes it with her Lasix)  (17 Jun 2022 11:27)  Synthroid 75 mcg (0.075 mg) oral tablet: 1 tab(s) orally once a day (17 Jun 2022 11:27)  warfarin 3 mg oral tablet: 1 tab(s) orally once a day (M, Tu, Th, F, Sa Grider) (17 Jun 2022 11:27)  warfarin 4 mg oral tablet: 1 tab(s) orally Wednesday (17 Jun 2022 11:27)      MEDICATIONS  (STANDING):  acetylcysteine  Oral Solution 600 milliGRAM(s) Oral every 12 hours  aspirin enteric coated 81 milliGRAM(s) Oral daily  atorvastatin 80 milliGRAM(s) Oral at bedtime  clopidogrel Tablet 75 milliGRAM(s) Oral daily  furosemide   Injectable 40 milliGRAM(s) IV Push once  levothyroxine 75 MICROGram(s) Oral daily  metoprolol succinate ER 50 milliGRAM(s) Oral daily  pantoprazole  Injectable 40 milliGRAM(s) IV Push daily  potassium phosphate / sodium phosphate Powder (PHOS-NaK) 1 Packet(s) Oral once  sodium chloride 0.9%. 1000 milliLiter(s) (50 mL/Hr) IV Continuous <Continuous>    MEDICATIONS  (PRN):      Allergies    No Known Allergies    Intolerances        Social History:  Home: lives alone  Smoke: none  Alcohol: none  Recreational drug use: none  Ambulate: independent, uses support from furniture and cane occasionally  Activities of daily living: independent  Job: retired  COVID - Vaccine (17 Jun 2022 09:52)      REVIEW OF SYSTEMS: i feel OK  CONSTITUTIONAL: No fever, No chills, No fatigue, No myalgia, No Body ache, No Weakness  EYES: No eye pain,  No visual disturbances, No discharge, NO Redness  ENMT:  No ear pain, No nose bleed, No vertigo; No sinus pain, NO throat pain, No Congestion  NECK: No pain, No stiffness  RESPIRATORY: No cough, NO wheezing, No hemoptysis, ADMITS brief shortness of breath earlier, now resolved  CARDIOVASCULAR: No chest pain, palpitations  GASTROINTESTINAL: No abdominal pain, NO epigastric pain. No nausea, No vomiting; No diarrhea, No constipation. [ x ] BM  GENITOURINARY: No dysuria, No frequency, No urgency, No hematuria, NO incontinence  NEUROLOGICAL: No headaches, No dizziness, No numbness, No tingling, No tremors, No weakness  EXT: admits CHRONIC Swelling in LE's;  No Pain, admits LE Edema  SKIN:  [ x ] No itching, burning, rashes, or lesions   MUSCULOSKELETAL: No joint pain ,No Jt swelling; No muscle pain, No back pain, No extremity pain  PSYCHIATRIC: No depression,  No anxiety,  No mood swings ,No difficulty sleeping at night  PAIN SCALE: [ x ] None  [  ] Other-  ROS Unable to obtain due to - [  ] Dementia  [  ] Lethargy [  ] Drowsy [  ] Sedated [  ] non verbal  REST OF REVIEW Of SYSTEM - [ x ] Normal       Vital Signs Last 24 Hrs  T(C): 36.4 (20 Jun 2022 04:30), Max: 36.8 (19 Jun 2022 20:21)  T(F): 97.6 (20 Jun 2022 04:30), Max: 98.3 (19 Jun 2022 20:21)  HR: 62 (20 Jun 2022 04:30) (62 - 77)  BP: 105/61 (20 Jun 2022 04:30) (105/61 - 123/66)  BP(mean): --  RR: 18 (20 Jun 2022 04:30) (16 - 18)  SpO2: 96% (20 Jun 2022 04:30) (95% - 96%)  Finger Stick        06-19 @ 07:01  -  06-20 @ 07:00  --------------------------------------------------------  IN: 400 mL / OUT: 700 mL / NET: -300 mL    06-20 @ 07:01  -  06-20 @ 10:45  --------------------------------------------------------  IN: 0 mL / OUT: 650 mL / NET: -650 mL        PHYSICAL EXAM:   GENERAL:  [ x ] NAD , [ x ] well appearing, [  ] Agitated, [  ] Drowsy,  [  ] Lethargy, [  ] confused   HEAD:  [ x ] Normal, [  ] Other  EYES:  [ x ] EOMI, [ x ] PERRLA, [ x ] conjunctiva and sclera clear normal, [  ] Other,  [  ] Pallor,[  ] Discharge  ENMT:  [ x ] Normal, [ x ] Moist mucous membranes, [ x ] Good dentition, [ x ] No Thrush  NECK:  [ x ] Supple, [x  ] No JVD, [ x ] Normal thyroid, [  ] Lymphadenopathy [  ] Other  CHEST/LUNG:  [ x ] Clear to auscultation bilaterally, [ x ] Breath Sounds equal B/L , [  ] poor effort  [ x ] No rales, [ x ] No rhonchi  [ x ]  No wheezing,   HEART:  [ x ] Regular rate and IRREGULARLY IRREGULAR rhythm, [  ] tachycardia, [  ] Bradycardia,  [  ] irregular  [ x ] No murmurs, No rubs, No gallops, [  ] PPM in place (Mfr:  )  ABDOMEN:  [ x ] Soft, [ x ] Nontender, [ x ] Nondistended, [ x ] No mass, [ x] Bowel sounds present, [  ] obese  NERVOUS SYSTEM:  [ x ] Alert & Oriented X3, [ x ] Nonfocal  [  ] Confusion  [  ] Encephalopathic [  ] Sedated [  ] Unable to assess, [  ] Dementia [  ] Other-  EXTREMITIES: [ x ] 2+ Peripheral Pulses, No clubbing, No cyanosis,  [ x ] 2 + pitting edema B/L lower EXT. [ x ] PVD stasis skin changes B/L Lower EXT, [  ] wound  LYMPH: No lymphadenopathy noted  SKIN:  [ x ] No rashes or lesions, [  ] Pressure Ulcers, [  ] ecchymosis, [  ] Skin Tears, [  ] Other    DIET: Diet, DASH/TLC:   Sodium & Cholesterol Restricted (06-17-22 @ 10:27)      LABS:                        10.5   6.09  )-----------( 180      ( 20 Jun 2022 09:53 )             32.3     20 Jun 2022 09:53    138    |  102    |  25     ----------------------------<  115    4.2     |  31     |  1.50     Ca    7.6        20 Jun 2022 09:53  Phos  2.2       20 Jun 2022 09:53  Mg     2.2       20 Jun 2022 09:53    TPro  6.4    /  Alb  2.4    /  TBili  1.0    /  DBili  x      /  AST  57     /  ALT  22     /  AlkPhos  88     20 Jun 2022 09:53    PT/INR - ( 20 Jun 2022 09:53 )   PT: 21.8 sec;   INR: 1.85 ratio         PTT - ( 20 Jun 2022 09:53 )  PTT:37.8 sec      CARDIAC MARKERS ( 18 Jun 2022 08:32 )  x     / x     / 364 U/L / x     / 24.0 ng/mL  CARDIAC MARKERS ( 17 Jun 2022 23:34 )  x     / x     / 497 U/L / x     / 52.0 ng/mL  CARDIAC MARKERS ( 17 Jun 2022 14:55 )  x     / x     / 523 U/L / x     / 70.3 ng/mL  CARDIAC MARKERS ( 17 Jun 2022 08:40 )  x     / x     / 135 U/L / x     / 3.2 ng/mL      Anemia Panel:  Iron Total, Serum: 88 ug/dL (06-18-22 @ 10:09)  Iron - Total Binding Capacity.: 354 ug/dL (06-18-22 @ 10:09)  Ferritin, Serum: 40 ng/mL (06-18-22 @ 10:09)  Vitamin B12, Serum: 619 pg/mL (06-18-22 @ 10:09)  Folate, Serum: 14.9 ng/mL (06-18-22 @ 10:09)      Thyroid Panel:  Thyroid Stimulating Hormone, Serum: 0.42 uIU/mL (06-18-22 @ 08:32)        Lipase, Serum: 157 U/L (06-17-22 @ 07:28)      Serum Pro-Brain Natriuretic Peptide: 15629 pg/mL (06-20-22 @ 09:53)  Serum Pro-Brain Natriuretic Peptide: 2396 pg/mL (06-17-22 @ 07:33)      RADIOLOGY & ADDITIONAL TESTS:  < from: TTE Echo Complete w/o Contrast w/ Doppler (06.19.22 @ 10:00) >  Mild to moderate segmental left ventricular systolic dysfunction,   estimated LVEF of 35-40%. The apex, mid inferoseptal and mid   anterolateral walls appear severely hypokinetic  Right ventricular enlargement with grossly normal function  Biatrial enlargement  Calcified trileaflet aortic valve with mild to moderate aortic stenosis,   without AI.  Mild MR  Moderate TR.  No significant pericardial effusion.    < end of copied text >  < from: US Duplex Carotid Arteries Complete, Bilateral (06.18.22 @ 14:55) >  Antegrade flow is noted within both vertebral arteries.    IMPRESSION: No significant hemodynamic stenosis of either carotid artery.  Atherosclerosis of the carotid arteries is present.    Measurement of carotid stenosis is based on velocity parameters that   correlate the residual internal carotid diameter with that of the more   distal vessel in accordance with a method such as the [place] American   Symptomatic Carotid Endarterectomy Trial (NASCET).      < end of copied text >  < from: MR Head No Cont (06.17.22 @ 13:20) >  IMPRESSION:    1.  No acute intracranial major vascular distribution infarction,   hemorrhage or mass effect.    2.  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   Additional nonspecific white matter findings, most commonly suggestive of   chronic small vessel ischemia and/or prior chronic lacunar infarcts.    --- End of Report ---    < end of copied text >      HEALTH ISSUES - PROBLEM Dx:  Hypothyroid    Hypertension    Lower extremity edema    Paroxysmal atrial fibrillation    Need for prophylactic measure    Anemia    Acute kidney injury superimposed on CKD    Hypokalemia    NSTEMI (non-ST elevation myocardial infarction)        Consultant(s) Notes Reviewed:  [ x ] YES     Care Discussed with [X] Consultants  [ x ] Patient  [ x ] Family- Son in law [  ] HCP [  ]   [  ] Social Service  [ x ] RN, [  ] Physical Therapy,[  ] Palliative care team  DVT PPX: [  ] Lovenox, [  ] S C Heparin, [ x ] Coumadin, [  ] Xarelto, [  ] Eliquis, [  ] Pradaxa, [  ] IV Heparin drip, [  ] SCD [  ] Contraindication 2 to GI Bleed,[  ] Ambulation [  ] Contraindicated 2 to  bleed [  ] Contraindicated 2 to Brain Bleed  Advanced directive: [  ] None, [ x ] DNR/DNI Patient is a 89y old  Female who presents with a chief complaint of CP w/ elevated trops (20 Jun 2022 09:37)    HPI:  Patient is a 89 y/o F with a pmhx b/l LE edema, HTN, P Afib (on coumadin), and hypothyroidism presenting with sudden onset mid-sternal CP. Pt describes pain as a "prickly feeling", rated 4/10, constant in nature since 4am on 6/17, resolved in the ED. Pain was associated with L arm weakness, now resolved. Pt states the pain woke her up, and she was scared to walk and was unable to use her L arm for support to get out of bed. Pt was also nauseous at this time. No associated diaphoresis, palpitations, chest pressure. No amaurosis fugax, facial droop, garbled speech, hemiparesis, LOC, falls reported. No vomiting, fevers, chills, cough, sore throat, SOB, abd pain, constipation, dysuria. Pt had one loose BM this AM at home, denied hematochezia or melena.       In ED:  VS - HR 71 | /65 | RR 18 | sO2 94 | T 97.6  Labs - HgB 11.2 | INR 1.85 | K 3.3 | Glu 114 | trop 342.6 | CKMB 3.2 | BNP 2396  CT[facility] NC - No acute intracranial hemorrhage. Lacunar infarct R inferior cerebellum  EKG - vent rate 66, QTc 501ms, sinus rhythm, old LBBB, no ischemic changes on personal read  Given -  mg PO. pt seen by cardiology Dr Almazan , pt admitted to [facility] for NSTEMI. (17 Jun 2022 09:52)    INTERVAL HPI:  6/18/22: Seen and examined at bedside. No acute complaints. Denies chest pain overnight and this AM. Overnight pt had episode of dyspnea, troponins inc from 300s to >09722. Trops now downtrended. Received 1 x dose 40mg IV lasix for dyspnea. Pt had statin dose inc to 80mg, had 1 x dose lopressor 25mg, and had her scheduled evening coumadin. Had 10 beats of NSVT this AM. Pt was started on standing 50mg toprol xL PO qD. Started on plavix 75mg qD. Extensive counseling done with the patient on [facility] and her current state of health. Patient states she has "forms at home" that her daughter will bring to elucidate [facility]. Patient values quality of life and wants to discuss with her daughter and son-andrewaw. Family to discuss potential plan for cardiac cath and will arrive to a decision on 6/19. C/W medical therapy for ACS. HOLD coumadin as pt INR is therapeutic and to facilitate potential for cardiac cath. Will start renal adjusted FD Lovenox on 6/19 if INR is below 2.   6/19/22 Pt seen and examined at bedside. Pt states she slept well last night. Pt denies SOB, CP, palpitations, dizziness .Mildly elevated Cr , INR -Theraputic  Pt states the last time she had chest pain was Friday night. Later in the afternoon again patient was seen at bedside with  daughter HCP and son in law at bedside, decision was made for DNR/ DNI and cardiac cath.  6/20/22: Patient seen and examined at bedside. Patient denies chest pain, palpitations, dyspnea. Noted to be slightly short of breath. proBNP >13k, will give 1 dose of lasix 40mg IV today.    OVERNIGHT EVENTS: none    Home Medications:  Lasix 40 mg oral tablet: 1 tab(s) orally 2 times a day (17 Jun 2022 11:27)  Lopressor: 25  orally once a day (17 Jun 2022 11:27)  potassium chloride 10 mEq oral tablet, extended release: 2 tablets twice a day (takes it with her Lasix)  (17 Jun 2022 11:27)  Synthroid 75 mcg (0.075 mg) oral tablet: 1 tab(s) orally once a day (17 Jun 2022 11:27)  warfarin 3 mg oral tablet: 1 tab(s) orally once a day (M, Tu, Th, F, Sa Grider) (17 Jun 2022 11:27)  warfarin 4 mg oral tablet: 1 tab(s) orally Wednesday (17 Jun 2022 11:27)      MEDICATIONS  (STANDING):  acetylcysteine  Oral Solution 600 milliGRAM(s) Oral every 12 hours  aspirin enteric coated 81 milliGRAM(s) Oral daily  atorvastatin 80 milliGRAM(s) Oral at bedtime  clopidogrel Tablet 75 milliGRAM(s) Oral daily  furosemide   Injectable 40 milliGRAM(s) IV Push once  levothyroxine 75 MICROGram(s) Oral daily  metoprolol succinate ER 50 milliGRAM(s) Oral daily  pantoprazole  Injectable 40 milliGRAM(s) IV Push daily  potassium phosphate / sodium phosphate Powder (PHOS-NaK) 1 Packet(s) Oral once  sodium chloride 0.9%. 1000 milliLiter(s) (50 mL/Hr) IV Continuous <Continuous>    MEDICATIONS  (PRN):      Allergies    No Known Allergies    Intolerances        Social History:  Home: lives alone  Smoke: none  Alcohol: none  Recreational drug use: none  Ambulate: independent, uses support from furniture and cane occasionally  Activities of daily living: independent  Job: retired  COVID - Vaccine (17 Jun 2022 09:52)      REVIEW OF SYSTEMS: i feel OK  CONSTITUTIONAL: No fever, No chills, No fatigue, No myalgia, No Body ache, No Weakness  EYES: No eye pain,  No visual disturbances, No discharge, NO Redness  ENMT:  No ear pain, No nose bleed, No vertigo; No sinus pain, NO throat pain, No Congestion  NECK: No pain, No stiffness  RESPIRATORY: No cough, NO wheezing, No hemoptysis, ADMITS brief shortness of breath earlier, now resolved  CARDIOVASCULAR: No chest pain, palpitations  GASTROINTESTINAL: No abdominal pain, NO epigastric pain. No nausea, No vomiting; No diarrhea, No constipation. [ x ] BM  GENITOURINARY: No dysuria, No frequency, No urgency, No hematuria, NO incontinence  NEUROLOGICAL: No headaches, No dizziness, No numbness, No tingling, No tremors, No weakness  EXT: admits CHRONIC Swelling in LE's;  No Pain, admits LE Edema  SKIN:  [ x ] No itching, burning, rashes, or lesions   MUSCULOSKELETAL: No joint pain ,No Jt swelling; No muscle pain, No back pain, No extremity pain  PSYCHIATRIC: No depression,  No anxiety,  No mood swings ,No difficulty sleeping at night  PAIN SCALE: [ x ] None  [  ] Other-  ROS Unable to obtain due to - [  ] Dementia  [  ] Lethargy [  ] Drowsy [  ] Sedated [  ] non verbal  REST OF REVIEW Of SYSTEM - [ x ] Normal       Vital Signs Last 24 Hrs  T(C): 36.4 (20 Jun 2022 04:30), Max: 36.8 (19 Jun 2022 20:21)  T(F): 97.6 (20 Jun 2022 04:30), Max: 98.3 (19 Jun 2022 20:21)  HR: 62 (20 Jun 2022 04:30) (62 - 77)  BP: 105/61 (20 Jun 2022 04:30) (105/61 - 123/66)  BP(mean): --  RR: 18 (20 Jun 2022 04:30) (16 - 18)  SpO2: 96% (20 Jun 2022 04:30) (95% - 96%)  Finger Stick        06-19 @ 07:01  -  06-20 @ 07:00  --------------------------------------------------------  IN: 400 mL / OUT: 700 mL / NET: -300 mL    06-20 @ 07:01  -  06-20 @ 10:45  --------------------------------------------------------  IN: 0 mL / OUT: 650 mL / NET: -650 mL        PHYSICAL EXAM:   GENERAL:  [ x ] NAD , [ x ] well appearing, [  ] Agitated, [  ] Drowsy,  [  ] Lethargy, [  ] confused   HEAD:  [ x ] Normal, [  ] Other  EYES:  [ x ] EOMI, [ x ] PERRLA, [ x ] conjunctiva and sclera clear normal, [  ] Other,  [  ] Pallor,[  ] Discharge  ENMT:  [ x ] Normal, [ x ] Moist mucous membranes, [ x ] Good dentition, [ x ] No Thrush  NECK:  [ x ] Supple, [x  ] No JVD, [ x ] Normal thyroid, [  ] Lymphadenopathy [  ] Other  CHEST/LUNG:  [ x ] Clear to auscultation bilaterally, [ x ] Breath Sounds equal B/L , [  ] poor effort  [ x ] No rales, [ x ] No rhonchi  [ x ]  No wheezing,   HEART:  [ x ] Regular rate and IRREGULARLY IRREGULAR rhythm, [  ] tachycardia, [  ] Bradycardia,  [  ] irregular  [ x ] No murmurs, No rubs, No gallops, [  ] PPM in place (Mfr:  )  ABDOMEN:  [ x ] Soft, [ x ] Nontender, [ x ] Nondistended, [ x ] No mass, [ x] Bowel sounds present, [  ] obese  NERVOUS SYSTEM:  [ x ] Alert & Oriented X3, [ x ] Nonfocal  [  ] Confusion  [  ] Encephalopathic [  ] Sedated [  ] Unable to assess, [  ] Dementia [  ] Other-  EXTREMITIES: [ x ] 2+ Peripheral Pulses, No clubbing, No cyanosis,  [ x ] 2 + pitting edema B/L lower EXT. [ x ] PVD stasis skin changes B/L Lower EXT, [  ] wound  LYMPH: No lymphadenopathy noted  SKIN:  [ x ] No rashes or lesions, [  ] Pressure Ulcers, [  ] ecchymosis, [  ] Skin Tears, [  ] Other    DIET: Diet, DASH/TLC:   Sodium & Cholesterol Restricted (06-17-22 @ 10:27)      LABS:                        10.5   6.09  )-----------( 180      ( 20 Jun 2022 09:53 )             32.3     20 Jun 2022 09:53    138    |  102    |  25     ----------------------------<  115    4.2     |  31     |  1.50     Ca    7.6        20 Jun 2022 09:53  Phos  2.2       20 Jun 2022 09:53  Mg     2.2       20 Jun 2022 09:53    TPro  6.4    /  Alb  2.4    /  TBili  1.0    /  DBili  x      /  AST  57     /  ALT  22     /  AlkPhos  88     20 Jun 2022 09:53    PT/INR - ( 20 Jun 2022 09:53 )   PT: 21.8 sec;   INR: 1.85 ratio         PTT - ( 20 Jun 2022 09:53 )  PTT:37.8 sec      CARDIAC MARKERS ( 18 Jun 2022 08:32 )  x     / x     / 364 U/L / x     / 24.0 ng/mL  CARDIAC MARKERS ( 17 Jun 2022 23:34 )  x     / x     / 497 U/L / x     / 52.0 ng/mL  CARDIAC MARKERS ( 17 Jun 2022 14:55 )  x     / x     / 523 U/L / x     / 70.3 ng/mL  CARDIAC MARKERS ( 17 Jun 2022 08:40 )  x     / x     / 135 U/L / x     / 3.2 ng/mL      Anemia Panel:  Iron Total, Serum: 88 ug/dL (06-18-22 @ 10:09)  Iron - Total Binding Capacity.: 354 ug/dL (06-18-22 @ 10:09)  Ferritin, Serum: 40 ng/mL (06-18-22 @ 10:09)  Vitamin B12, Serum: 619 pg/mL (06-18-22 @ 10:09)  Folate, Serum: 14.9 ng/mL (06-18-22 @ 10:09)      Thyroid Panel:  Thyroid Stimulating Hormone, Serum: 0.42 uIU/mL (06-18-22 @ 08:32)        Lipase, Serum: 157 U/L (06-17-22 @ 07:28)      Serum Pro-Brain Natriuretic Peptide: 43606 pg/mL (06-20-22 @ 09:53)  Serum Pro-Brain Natriuretic Peptide: 2396 pg/mL (06-17-22 @ 07:33)      RADIOLOGY & ADDITIONAL TESTS:  < from: TTE Echo Complete w/o Contrast w/ Doppler (06.19.22 @ 10:00) >  Mild to moderate segmental left ventricular systolic dysfunction,   estimated LVEF of 35-40%. The apex, mid inferoseptal and mid   anterolateral walls appear severely hypokinetic  Right ventricular enlargement with grossly normal function  Biatrial enlargement  Calcified trileaflet aortic valve with mild to moderate aortic stenosis,   without AI.  Mild MR  Moderate TR.  No significant pericardial effusion.    < end of copied text >  < from: US Duplex Carotid Arteries Complete, Bilateral (06.18.22 @ 14:55) >  Antegrade flow is noted within both vertebral arteries.    IMPRESSION: No significant hemodynamic stenosis of either carotid artery.  Atherosclerosis of the carotid arteries is present.    Measurement of carotid stenosis is based on velocity parameters that   correlate the residual internal carotid diameter with that of the more   distal vessel in accordance with a method such as the [place] [employer]   Symptomatic Carotid Endarterectomy Trial (NASCET).      < end of copied text >  < from: MR Head No Cont (06.17.22 @ 13:20) >  IMPRESSION:    1.  No acute intracranial major vascular distribution infarction,   hemorrhage or mass effect.    2.  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   Additional nonspecific white matter findings, most commonly suggestive of   chronic small vessel ischemia and/or prior chronic lacunar infarcts.    --- End of Report ---    < end of copied text >      HEALTH ISSUES - PROBLEM Dx:  Hypothyroid    Hypertension    Lower extremity edema    Paroxysmal atrial fibrillation    Need for prophylactic measure    Anemia    Acute kidney injury superimposed on CKD    Hypokalemia    NSTEMI (non-ST elevation myocardial infarction)        Consultant(s) Notes Reviewed:  [ x ] YES     Care Discussed with [X] Consultants  [ x ] Patient  [ x ] Family- Son in law [  ] HCP [  ]   [  ] Social Service  [ x ] RN, [  ] Physical Therapy,[  ] Palliative care team  DVT PPX: [  ] Lovenox, [  ] S C Heparin, [ x ] Coumadin, [  ] Xarelto, [  ] Eliquis, [  ] Pradaxa, [  ] IV Heparin drip, [  ] SCD [  ] Contraindication 2 to GI Bleed,[  ] Ambulation [  ] Contraindicated 2 to  bleed [  ] Contraindicated 2 to Brain Bleed  Advanced directive: [  ] None, [ x ] DNR/DNI

## 2022-06-20 NOTE — PROGRESS NOTE ADULT - PROBLEM SELECTOR PLAN 9
DVT ppx HOLD coumadin, goal INR for transfer for cath is 1.6 chronic  - c/w home synthroid 75 mcg qd

## 2022-06-21 DIAGNOSIS — D64.9 ANEMIA, UNSPECIFIED: ICD-10-CM

## 2022-06-21 LAB
ALBUMIN SERPL ELPH-MCNC: 2.1 G/DL — LOW (ref 3.3–5)
ALP SERPL-CCNC: 82 U/L — SIGNIFICANT CHANGE UP (ref 40–120)
ALT FLD-CCNC: 21 U/L — SIGNIFICANT CHANGE UP (ref 12–78)
ANION GAP SERPL CALC-SCNC: 9 MMOL/L — SIGNIFICANT CHANGE UP (ref 5–17)
APTT BLD: 35.8 SEC — HIGH (ref 27.5–35.5)
AST SERPL-CCNC: 41 U/L — HIGH (ref 15–37)
BASOPHILS # BLD AUTO: 0.03 K/UL — SIGNIFICANT CHANGE UP (ref 0–0.2)
BASOPHILS NFR BLD AUTO: 0.7 % — SIGNIFICANT CHANGE UP (ref 0–2)
BILIRUB SERPL-MCNC: 1 MG/DL — SIGNIFICANT CHANGE UP (ref 0.2–1.2)
BUN SERPL-MCNC: 26 MG/DL — HIGH (ref 7–23)
CALCIUM SERPL-MCNC: 7.4 MG/DL — LOW (ref 8.5–10.1)
CHLORIDE SERPL-SCNC: 101 MMOL/L — SIGNIFICANT CHANGE UP (ref 96–108)
CO2 SERPL-SCNC: 28 MMOL/L — SIGNIFICANT CHANGE UP (ref 22–31)
CREAT SERPL-MCNC: 1.3 MG/DL — SIGNIFICANT CHANGE UP (ref 0.5–1.3)
EGFR: 38 ML/MIN/1.73M2 — LOW
EOSINOPHIL # BLD AUTO: 0.14 K/UL — SIGNIFICANT CHANGE UP (ref 0–0.5)
EOSINOPHIL NFR BLD AUTO: 3.2 % — SIGNIFICANT CHANGE UP (ref 0–6)
GLUCOSE SERPL-MCNC: 85 MG/DL — SIGNIFICANT CHANGE UP (ref 70–99)
HCT VFR BLD CALC: 30.2 % — LOW (ref 34.5–45)
HGB BLD-MCNC: 9.8 G/DL — LOW (ref 11.5–15.5)
IMM GRANULOCYTES NFR BLD AUTO: 0.5 % — SIGNIFICANT CHANGE UP (ref 0–1.5)
INR BLD: 1.61 RATIO — HIGH (ref 0.88–1.16)
LYMPHOCYTES # BLD AUTO: 1.24 K/UL — SIGNIFICANT CHANGE UP (ref 1–3.3)
LYMPHOCYTES # BLD AUTO: 28 % — SIGNIFICANT CHANGE UP (ref 13–44)
MAGNESIUM SERPL-MCNC: 2.2 MG/DL — SIGNIFICANT CHANGE UP (ref 1.6–2.6)
MCHC RBC-ENTMCNC: 29.7 PG — SIGNIFICANT CHANGE UP (ref 27–34)
MCHC RBC-ENTMCNC: 32.5 GM/DL — SIGNIFICANT CHANGE UP (ref 32–36)
MCV RBC AUTO: 91.5 FL — SIGNIFICANT CHANGE UP (ref 80–100)
MONOCYTES # BLD AUTO: 0.52 K/UL — SIGNIFICANT CHANGE UP (ref 0–0.9)
MONOCYTES NFR BLD AUTO: 11.7 % — SIGNIFICANT CHANGE UP (ref 2–14)
NEUTROPHILS # BLD AUTO: 2.48 K/UL — SIGNIFICANT CHANGE UP (ref 1.8–7.4)
NEUTROPHILS NFR BLD AUTO: 55.9 % — SIGNIFICANT CHANGE UP (ref 43–77)
NRBC # BLD: 0 /100 WBCS — SIGNIFICANT CHANGE UP (ref 0–0)
PHOSPHATE SERPL-MCNC: 2.7 MG/DL — SIGNIFICANT CHANGE UP (ref 2.5–4.5)
PLATELET # BLD AUTO: 165 K/UL — SIGNIFICANT CHANGE UP (ref 150–400)
POTASSIUM SERPL-MCNC: 3.9 MMOL/L — SIGNIFICANT CHANGE UP (ref 3.5–5.3)
POTASSIUM SERPL-SCNC: 3.9 MMOL/L — SIGNIFICANT CHANGE UP (ref 3.5–5.3)
PROT SERPL-MCNC: 6.1 G/DL — SIGNIFICANT CHANGE UP (ref 6–8.3)
PROTHROM AB SERPL-ACNC: 18.9 SEC — HIGH (ref 10.5–13.4)
RBC # BLD: 3.3 M/UL — LOW (ref 3.8–5.2)
RBC # FLD: 19.4 % — HIGH (ref 10.3–14.5)
SARS-COV-2 RNA SPEC QL NAA+PROBE: SIGNIFICANT CHANGE UP
SODIUM SERPL-SCNC: 138 MMOL/L — SIGNIFICANT CHANGE UP (ref 135–145)
UUN UR-MCNC: 392 MG/DL — SIGNIFICANT CHANGE UP
WBC # BLD: 4.43 K/UL — SIGNIFICANT CHANGE UP (ref 3.8–10.5)
WBC # FLD AUTO: 4.43 K/UL — SIGNIFICANT CHANGE UP (ref 3.8–10.5)

## 2022-06-21 PROCEDURE — 99233 SBSQ HOSP IP/OBS HIGH 50: CPT

## 2022-06-21 RX ORDER — METOPROLOL TARTRATE 50 MG
1 TABLET ORAL
Qty: 0 | Refills: 0 | DISCHARGE
Start: 2022-06-21

## 2022-06-21 RX ORDER — CALCIUM GLUCONATE 100 MG/ML
1 VIAL (ML) INTRAVENOUS ONCE
Refills: 0 | Status: COMPLETED | OUTPATIENT
Start: 2022-06-21 | End: 2022-06-21

## 2022-06-21 RX ORDER — ASPIRIN/CALCIUM CARB/MAGNESIUM 324 MG
1 TABLET ORAL
Qty: 0 | Refills: 0 | DISCHARGE
Start: 2022-06-21

## 2022-06-21 RX ORDER — PANTOPRAZOLE SODIUM 20 MG/1
40 TABLET, DELAYED RELEASE ORAL
Qty: 0 | Refills: 0 | DISCHARGE
Start: 2022-06-21

## 2022-06-21 RX ORDER — METOPROLOL TARTRATE 50 MG
25 TABLET ORAL
Qty: 0 | Refills: 0 | DISCHARGE

## 2022-06-21 RX ORDER — ENOXAPARIN SODIUM 100 MG/ML
70 INJECTION SUBCUTANEOUS EVERY 24 HOURS
Refills: 0 | Status: COMPLETED | OUTPATIENT
Start: 2022-06-21 | End: 2022-06-21

## 2022-06-21 RX ORDER — CLOPIDOGREL BISULFATE 75 MG/1
1 TABLET, FILM COATED ORAL
Qty: 0 | Refills: 0 | DISCHARGE
Start: 2022-06-21

## 2022-06-21 RX ORDER — POTASSIUM CHLORIDE 20 MEQ
1 PACKET (EA) ORAL
Qty: 0 | Refills: 0 | DISCHARGE

## 2022-06-21 RX ORDER — ACETYLCYSTEINE 200 MG/ML
600 VIAL (ML) MISCELLANEOUS EVERY 12 HOURS
Refills: 0 | Status: COMPLETED | OUTPATIENT
Start: 2022-06-21 | End: 2022-06-22

## 2022-06-21 RX ORDER — ATORVASTATIN CALCIUM 80 MG/1
1 TABLET, FILM COATED ORAL
Qty: 0 | Refills: 0 | DISCHARGE
Start: 2022-06-21

## 2022-06-21 RX ORDER — FUROSEMIDE 40 MG
40 TABLET ORAL ONCE
Refills: 0 | Status: COMPLETED | OUTPATIENT
Start: 2022-06-21 | End: 2022-06-21

## 2022-06-21 RX ADMIN — Medication 81 MILLIGRAM(S): at 11:06

## 2022-06-21 RX ADMIN — Medication 600 MILLIGRAM(S): at 09:04

## 2022-06-21 RX ADMIN — Medication 100 GRAM(S): at 10:54

## 2022-06-21 RX ADMIN — Medication 40 MILLIGRAM(S): at 10:54

## 2022-06-21 RX ADMIN — PANTOPRAZOLE SODIUM 40 MILLIGRAM(S): 20 TABLET, DELAYED RELEASE ORAL at 11:06

## 2022-06-21 RX ADMIN — Medication 600 MILLIGRAM(S): at 05:58

## 2022-06-21 RX ADMIN — ATORVASTATIN CALCIUM 80 MILLIGRAM(S): 80 TABLET, FILM COATED ORAL at 21:05

## 2022-06-21 RX ADMIN — CLOPIDOGREL BISULFATE 75 MILLIGRAM(S): 75 TABLET, FILM COATED ORAL at 11:06

## 2022-06-21 RX ADMIN — Medication 600 MILLIGRAM(S): at 19:16

## 2022-06-21 RX ADMIN — Medication 75 MICROGRAM(S): at 05:33

## 2022-06-21 RX ADMIN — ENOXAPARIN SODIUM 70 MILLIGRAM(S): 100 INJECTION SUBCUTANEOUS at 12:18

## 2022-06-21 NOTE — PROGRESS NOTE ADULT - SUBJECTIVE AND OBJECTIVE BOX
Patient is a 89y old  Female who presents with a chief complaint of CP w/ elevated trops (20 Jun 2022 16:34)    HPI:  Patient is a 89 y/o F with a pmhx b/l LE edema, HTN, P Afib (on coumadin), and hypothyroidism presenting with sudden onset mid-sternal CP. Pt describes pain as a "prickly feeling", rated 4/10, constant in nature since 4am on 6/17, resolved in the ED. Pain was associated with L arm weakness, now resolved. Pt states the pain woke her up, and she was scared to walk and was unable to use her L arm for support to get out of bed. Pt was also nauseous at this time. No associated diaphoresis, palpitations, chest pressure. No amaurosis fugax, facial droop, garbled speech, hemiparesis, LOC, falls reported. No vomiting, fevers, chills, cough, sore throat, SOB, abd pain, constipation, dysuria. Pt had one loose BM this AM at home, denied hematochezia or melena.       In ED:  VS - HR 71 | /65 | RR 18 | sO2 94 | T 97.6  Labs - HgB 11.2 | INR 1.85 | K 3.3 | Glu 114 | trop 342.6 | CKMB 3.2 | BNP 2396  CTH NC - No acute intracranial hemorrhage. Lacunar infarct R inferior cerebellum  EKG - vent rate 66, QTc 501ms, sinus rhythm, old LBBB, no ischemic changes on personal read  Given -  mg PO. pt seen by cardiology Dr Almazan , pt admitted to [facility] for NSTEMI. (17 Jun 2022 09:52)    INTERVAL HPI:  6/18/22: Seen and examined at bedside. No acute complaints. Denies chest pain overnight and this AM. Overnight pt had episode of dyspnea, troponins inc from 300s to >54601. Trops now downtrended. Received 1 x dose 40mg IV lasix for dyspnea. Pt had statin dose inc to 80mg, had 1 x dose lopressor 25mg, and had her scheduled evening coumadin. Had 10 beats of NSVT this AM. Pt was started on standing 50mg toprol xL PO qD. Started on plavix 75mg qD. Extensive counseling done with the patient on [facility] and her current state of health. Patient states she has "forms at home" that her daughter will bring to [facility]. Patient values quality of life and wants to discuss with her daughter and son-inlaw. Family to discuss potential plan for cardiac cath and will arrive to a decision on 6/19. C/W medical therapy for ACS. HOLD coumadin as pt INR is therapeutic and to facilitate potential for cardiac cath. Will start renal adjusted FD Lovenox on 6/19 if INR is below 2.   6/19/22 Pt seen and examined at bedside. Pt states she slept well last night. Pt denies SOB, CP, palpitations, dizziness .Mildly elevated Cr , INR -Theraputic  Pt states the last time she had chest pain was Friday night. Later in the afternoon again patient was seen at bedside with  daughter HCP and son in law at bedside, decision was made for DNR/ DNI and cardiac cath.  6/20/22: Patient seen and examined at bedside. Patient denies chest pain, palpitations, dyspnea. Noted to be slightly short of breath. proBNP >13k, will give 1 dose of lasix 40mg IV today.  6/21/22: Patient continues to deny anginal symptoms. Will be transferred for cardiac cath today, as INR downtrended to 1.6 and Cr improved to 1.3. For LUIS CARLOS on CKD, will defer further IVF hydration at this time due to TTE results and SOB after previous IVF. Will give Mucomyst q12 x4 for renal protection.     OVERNIGHT EVENTS: Tele- afib HR 70s, LBBB, no events (few PVCs)    Home Medications:  aspirin 81 mg oral delayed release tablet: 1 tab(s) orally once a day (21 Jun 2022 08:48)  atorvastatin 80 mg oral tablet: 1 tab(s) orally once a day (at bedtime) (21 Jun 2022 08:48)  clopidogrel 75 mg oral tablet: 1 tab(s) orally once a day (21 Jun 2022 08:48)  Lasix 40 mg oral tablet: 1 tab(s) orally 2 times a day (17 Jun 2022 11:27)  metoprolol succinate 50 mg oral tablet, extended release: 1 tab(s) orally once a day (21 Jun 2022 08:48)  pantoprazole 40 mg intravenous injection: 40 milligram(s) intravenous once a day (21 Jun 2022 08:48)  Synthroid 75 mcg (0.075 mg) oral tablet: 1 tab(s) orally once a day (17 Jun 2022 11:27)  warfarin 3 mg oral tablet: 1 tab(s) orally once a day (M, Tu, Th, F, Sa Grider) (17 Jun 2022 11:27)  warfarin 4 mg oral tablet: 1 tab(s) orally Wednesday (17 Jun 2022 11:27)      MEDICATIONS  (STANDING):  acetylcysteine  Oral Solution 600 milliGRAM(s) Oral every 12 hours  aspirin enteric coated 81 milliGRAM(s) Oral daily  atorvastatin 80 milliGRAM(s) Oral at bedtime  calcium gluconate IVPB 1 Gram(s) IV Intermittent once  clopidogrel Tablet 75 milliGRAM(s) Oral daily  levothyroxine 75 MICROGram(s) Oral daily  metoprolol succinate ER 50 milliGRAM(s) Oral daily  pantoprazole  Injectable 40 milliGRAM(s) IV Push daily  sodium chloride 0.9%. 1000 milliLiter(s) (50 mL/Hr) IV Continuous <Continuous>    MEDICATIONS  (PRN):      Allergies    No Known Allergies    Intolerances        Social History:  Home: lives alone  Smoke: none  Alcohol: none  Recreational drug use: none  Ambulate: independent, uses support from furniture and cane occasionally  Activities of daily living: independent  Job: retired  COVID - Vaccine (17 Jun 2022 09:52)        REVIEW OF SYSTEMS: i feel OK  CONSTITUTIONAL: No fever, No chills, No fatigue, No myalgia, No Body ache, No Weakness  EYES: No eye pain,  No visual disturbances, No discharge, NO Redness  ENMT:  No ear pain, No nose bleed, No vertigo; No sinus pain, NO throat pain, No Congestion  NECK: No pain, No stiffness  RESPIRATORY: No cough, NO wheezing, No hemoptysis, ADMITS brief shortness of breath earlier, now resolved  CARDIOVASCULAR: No chest pain, palpitations  GASTROINTESTINAL: No abdominal pain, NO epigastric pain. No nausea, No vomiting; No diarrhea, No constipation. [ x ] BM  GENITOURINARY: No dysuria, No frequency, No urgency, No hematuria, NO incontinence  NEUROLOGICAL: No headaches, No dizziness, No numbness, No tingling, No tremors, No weakness  EXT: admits CHRONIC Swelling in LE's;  No Pain, admits LE Edema  SKIN:  [ x ] No itching, burning, rashes, or lesions   MUSCULOSKELETAL: No joint pain ,No Jt swelling; No muscle pain, No back pain, No extremity pain  PSYCHIATRIC: No depression,  No anxiety,  No mood swings ,No difficulty sleeping at night  PAIN SCALE: [ x ] None  [  ] Other-  ROS Unable to obtain due to - [  ] Dementia  [  ] Lethargy [  ] Drowsy [  ] Sedated [  ] non verbal  REST OF REVIEW Of SYSTEM - [ x ] Normal     Vital Signs Last 24 Hrs  T(C): 36.3 (21 Jun 2022 05:44), Max: 36.8 (20 Jun 2022 11:30)  T(F): 97.3 (21 Jun 2022 05:44), Max: 98.2 (20 Jun 2022 11:30)  HR: 73 (21 Jun 2022 05:44) (73 - 94)  BP: 95/56 (21 Jun 2022 05:44) (95/56 - 108/64)  BP(mean): --  RR: 18 (21 Jun 2022 05:44) (16 - 18)  SpO2: 95% (21 Jun 2022 05:44) (95% - 97%)  Finger Stick        06-20 @ 07:01  -  06-21 @ 07:00  --------------------------------------------------------  IN: 360 mL / OUT: 650 mL / NET: -290 mL            PHYSICAL EXAM:   GENERAL:  [ x ] NAD , [ x ] well appearing, [  ] Agitated, [  ] Drowsy,  [  ] Lethargy, [  ] confused   HEAD:  [ x ] Normal, [  ] Other  EYES:  [ x ] EOMI, [ x ] PERRLA, [ x ] conjunctiva and sclera clear normal, [  ] Other,  [  ] Pallor,[  ] Discharge  ENMT:  [ x ] Normal, [ x ] Moist mucous membranes, [ x ] Good dentition, [ x ] No Thrush  NECK:  [ x ] Supple, [x  ] No JVD, [ x ] Normal thyroid, [  ] Lymphadenopathy [  ] Other  CHEST/LUNG:  [ x ] Clear to auscultation bilaterally, [ x ] Breath Sounds equal B/L , [  ] poor effort  [ x ] No rales, [ x ] No rhonchi  [ x ]  No wheezing,   HEART:  [ x ] Regular rate and IRREGULARLY IRREGULAR rhythm, [  ] tachycardia, [  ] Bradycardia,  [  ] irregular  [ x ] No murmurs, No rubs, No gallops, [  ] PPM in place (Mfr:  )  ABDOMEN:  [ x ] Soft, [ x ] Nontender, [ x ] Nondistended, [ x ] No mass, [ x] Bowel sounds present, [  ] obese  NERVOUS SYSTEM:  [ x ] Alert & Oriented X3, [ x ] Nonfocal  [  ] Confusion  [  ] Encephalopathic [  ] Sedated [  ] Unable to assess, [  ] Dementia [  ] Other-  EXTREMITIES: [ x ] 2+ Peripheral Pulses, No clubbing, No cyanosis,  [ x ] 2 + pitting edema B/L lower EXT. [ x ] PVD stasis skin changes B/L Lower EXT, [  ] wound  LYMPH: No lymphadenopathy noted  SKIN:  [ x ] No rashes or lesions, [  ] Pressure Ulcers, [  ] ecchymosis, [  ] Skin Tears, [  ] Other      DIET: Diet, DASH/TLC:   Sodium & Cholesterol Restricted (06-17-22 @ 10:27)      LABS:                        9.8    4.43  )-----------( 165      ( 21 Jun 2022 06:20 )             30.2     21 Jun 2022 06:20    138    |  101    |  26     ----------------------------<  85     3.9     |  28     |  1.30     Ca    7.4        21 Jun 2022 06:20  Phos  2.7       21 Jun 2022 06:20  Mg     2.2       21 Jun 2022 06:20    TPro  6.1    /  Alb  2.1    /  TBili  1.0    /  DBili  x      /  AST  41     /  ALT  21     /  AlkPhos  82     21 Jun 2022 06:20    PT/INR - ( 21 Jun 2022 06:20 )   PT: 18.9 sec;   INR: 1.61 ratio         PTT - ( 21 Jun 2022 06:20 )  PTT:35.8 sec              CARDIAC MARKERS ( 18 Jun 2022 08:32 )  x     / x     / 364 U/L / x     / 24.0 ng/mL  CARDIAC MARKERS ( 17 Jun 2022 23:34 )  x     / x     / 497 U/L / x     / 52.0 ng/mL  CARDIAC MARKERS ( 17 Jun 2022 14:55 )  x     / x     / 523 U/L / x     / 70.3 ng/mL  CARDIAC MARKERS ( 17 Jun 2022 08:40 )  x     / x     / 135 U/L / x     / 3.2 ng/mL             Anemia Panel:  Iron Total, Serum: 88 ug/dL (06-18-22 @ 10:09)  Iron - Total Binding Capacity.: 354 ug/dL (06-18-22 @ 10:09)  Ferritin, Serum: 40 ng/mL (06-18-22 @ 10:09)  Vitamin B12, Serum: 619 pg/mL (06-18-22 @ 10:09)  Folate, Serum: 14.9 ng/mL (06-18-22 @ 10:09)      Thyroid Panel:  Thyroid Stimulating Hormone, Serum: 0.42 uIU/mL (06-18-22 @ 08:32)        Lipase, Serum: 157 U/L (06-17-22 @ 07:28)      Serum Pro-Brain Natriuretic Peptide: 25607 pg/mL (06-20-22 @ 09:53)  Serum Pro-Brain Natriuretic Peptide: 2396 pg/mL (06-17-22 @ 07:33)      RADIOLOGY & ADDITIONAL TESTS:      HEALTH ISSUES - PROBLEM Dx:  Hypokalemia    Lower extremity edema    Paroxysmal atrial fibrillation    Hypertension    Anemia    Hypothyroid    Need for prophylactic measure    Acute systolic congestive heart failure    NSTEMI (non-ST elevation myocardial infarction)    Acute kidney injury superimposed on CKD        Consultant(s) Notes Reviewed:  [ x ] YES     Care Discussed with [X] Consultants  [ x ] Patient  [ x ] Family- Son in law [  ] HCP [  ]   [  ] Social Service  [ x ] RN, [  ] Physical Therapy,[  ] Palliative care team  DVT PPX: [  ] Lovenox, [  ] S C Heparin, [ x ] Coumadin, [  ] Xarelto, [  ] Eliquis, [  ] Pradaxa, [  ] IV Heparin drip, [  ] SCD [  ] Contraindication 2 to GI Bleed,[  ] Ambulation [  ] Contraindicated 2 to  bleed [  ] Contraindicated 2 to Brain Bleed  Advanced directive: [  ] None, [ x ] DNR/DNI Patient is a 89y old  Female who presents with a chief complaint of CP w/ elevated trops (20 Jun 2022 16:34)    HPI:  Patient is a 89 y/o F with a pmhx b/l LE edema, HTN, P Afib (on coumadin), and hypothyroidism presenting with sudden onset mid-sternal CP. Pt describes pain as a "prickly feeling", rated 4/10, constant in nature since 4am on 6/17, resolved in the ED. Pain was associated with L arm weakness, now resolved. Pt states the pain woke her up, and she was scared to walk and was unable to use her L arm for support to get out of bed. Pt was also nauseous at this time. No associated diaphoresis, palpitations, chest pressure. No amaurosis fugax, facial droop, garbled speech, hemiparesis, LOC, falls reported. No vomiting, fevers, chills, cough, sore throat, SOB, abd pain, constipation, dysuria. Pt had one loose BM this AM at home, denied hematochezia or melena.       In ED:  VS - HR 71 | /65 | RR 18 | sO2 94 | T 97.6  Labs - HgB 11.2 | INR 1.85 | K 3.3 | Glu 114 | trop 342.6 | CKMB 3.2 | BNP 2396  CTH NC - No acute intracranial hemorrhage. Lacunar infarct R inferior cerebellum  EKG - vent rate 66, QTc 501ms, sinus rhythm, old LBBB, no ischemic changes on personal read  Given -  mg PO. pt seen by cardiology Dr Almazan , pt admitted to [facility] for NSTEMI. (17 Jun 2022 09:52)    INTERVAL HPI:  6/18/22: Seen and examined at bedside. No acute complaints. Denies chest pain overnight and this AM. Overnight pt had episode of dyspnea, troponins inc from 300s to >36744. Trops now downtrended. Received 1 x dose 40mg IV lasix for dyspnea. Pt had statin dose inc to 80mg, had 1 x dose lopressor 25mg, and had her scheduled evening coumadin. Had 10 beats of NSVT this AM. Pt was started on standing 50mg toprol xL PO qD. Started on plavix 75mg qD. Extensive counseling done with the patient on [facility] and her current state of health. Patient states she has "forms at home" that her daughter will bring to [facility]. Patient values quality of life and wants to discuss with her daughter and son-inlaw. Family to discuss potential plan for cardiac cath and will arrive to a decision on 6/19. C/W medical therapy for ACS. HOLD coumadin as pt INR is therapeutic and to facilitate potential for cardiac cath. Will start renal adjusted FD Lovenox on 6/19 if INR is below 2.   6/19/22 Pt seen and examined at bedside. Pt states she slept well last night. Pt denies SOB, CP, palpitations, dizziness .Mildly elevated Cr , INR -Theraputic  Pt states the last time she had chest pain was Friday night. Later in the afternoon again patient was seen at bedside with  daughter HCP and son in law at bedside, decision was made for DNR/ DNI and cardiac cath.  6/20/22: Patient seen and examined at bedside. Patient denies chest pain, palpitations, dyspnea. Noted to be slightly short of breath. proBNP >13k, will give 1 dose of lasix 40mg IV today.  6/21/22: Patient continues to deny anginal symptoms. Will be transferred for cardiac cath today, as INR downtrended to 1.6 and Cr improved to 1.3. For LUIS CARLOS on CKD, will defer further IVF hydration at this time due to TTE results and SOB after previous IVF. Will give Mucomyst q12 x4 for renal protection.     OVERNIGHT EVENTS: Tele- afib HR 70s, LBBB, no events (few PVCs)    Home Medications:  aspirin 81 mg oral delayed release tablet: 1 tab(s) orally once a day (21 Jun 2022 08:48)  atorvastatin 80 mg oral tablet: 1 tab(s) orally once a day (at bedtime) (21 Jun 2022 08:48)  clopidogrel 75 mg oral tablet: 1 tab(s) orally once a day (21 Jun 2022 08:48)  Lasix 40 mg oral tablet: 1 tab(s) orally 2 times a day (17 Jun 2022 11:27)  metoprolol succinate 50 mg oral tablet, extended release: 1 tab(s) orally once a day (21 Jun 2022 08:48)  pantoprazole 40 mg intravenous injection: 40 milligram(s) intravenous once a day (21 Jun 2022 08:48)  Synthroid 75 mcg (0.075 mg) oral tablet: 1 tab(s) orally once a day (17 Jun 2022 11:27)  warfarin 3 mg oral tablet: 1 tab(s) orally once a day (M, Tu, Th, F, Sa Grider) (17 Jun 2022 11:27)  warfarin 4 mg oral tablet: 1 tab(s) orally Wednesday (17 Jun 2022 11:27)      MEDICATIONS  (STANDING):  acetylcysteine  Oral Solution 600 milliGRAM(s) Oral every 12 hours  aspirin enteric coated 81 milliGRAM(s) Oral daily  atorvastatin 80 milliGRAM(s) Oral at bedtime  calcium gluconate IVPB 1 Gram(s) IV Intermittent once  clopidogrel Tablet 75 milliGRAM(s) Oral daily  levothyroxine 75 MICROGram(s) Oral daily  metoprolol succinate ER 50 milliGRAM(s) Oral daily  pantoprazole  Injectable 40 milliGRAM(s) IV Push daily  sodium chloride 0.9%. 1000 milliLiter(s) (50 mL/Hr) IV Continuous <Continuous>    MEDICATIONS  (PRN):      Allergies    No Known Allergies    Intolerances        Social History:  Home: lives alone  Smoke: none  Alcohol: none  Recreational drug use: none  Ambulate: independent, uses support from furniture and cane occasionally  Activities of daily living: independent  Job: retired  COVID - Vaccine (17 Jun 2022 09:52)        REVIEW OF SYSTEMS: i feel OK  CONSTITUTIONAL: No fever, No chills, No fatigue, No myalgia, No Body ache, No Weakness  EYES: No eye pain,  No visual disturbances, No discharge, NO Redness  ENMT:  No ear pain, No nose bleed, No vertigo; No sinus pain, NO throat pain, No Congestion  NECK: No pain, No stiffness  RESPIRATORY: No cough, NO wheezing, No hemoptysis, ADMITS brief shortness of breath earlier, now resolved  CARDIOVASCULAR: No chest pain, palpitations  GASTROINTESTINAL: No abdominal pain, NO epigastric pain. No nausea, No vomiting; No diarrhea, No constipation. [ x ] BM  GENITOURINARY: No dysuria, No frequency, No urgency, No hematuria, NO incontinence  NEUROLOGICAL: No headaches, No dizziness, No numbness, No tingling, No tremors, No weakness  EXT: admits CHRONIC Swelling in LE's;  No Pain, admits LE Edema  SKIN:  [ x ] No itching, burning, rashes, or lesions   MUSCULOSKELETAL: No joint pain ,No Jt swelling; No muscle pain, No back pain, No extremity pain  PSYCHIATRIC: No depression,  No anxiety,  No mood swings ,No difficulty sleeping at night  PAIN SCALE: [ x ] None  [  ] Other-  ROS Unable to obtain due to - [  ] Dementia  [  ] Lethargy [  ] Drowsy [  ] Sedated [  ] non verbal  REST OF REVIEW Of SYSTEM - [ x ] Normal     Vital Signs Last 24 Hrs  T(C): 36.3 (21 Jun 2022 05:44), Max: 36.8 (20 Jun 2022 11:30)  T(F): 97.3 (21 Jun 2022 05:44), Max: 98.2 (20 Jun 2022 11:30)  HR: 73 (21 Jun 2022 05:44) (73 - 94)  BP: 95/56 (21 Jun 2022 05:44) (95/56 - 108/64)  BP(mean): --  RR: 18 (21 Jun 2022 05:44) (16 - 18)  SpO2: 95% (21 Jun 2022 05:44) (95% - 97%)  Finger Stick        06-20 @ 07:01  -  06-21 @ 07:00  --------------------------------------------------------  IN: 360 mL / OUT: 650 mL / NET: -290 mL            PHYSICAL EXAM:   GENERAL:  [ x ] NAD , [ x ] well appearing, [  ] Agitated, [  ] Drowsy,  [  ] Lethargy, [  ] confused   HEAD:  [ x ] Normal, [  ] Other  EYES:  [ x ] EOMI, [ x ] PERRLA, [ x ] conjunctiva and sclera clear normal, [  ] Other,  [  ] Pallor,[  ] Discharge  ENMT:  [ x ] Normal, [ x ] Moist mucous membranes, [ x ] Good dentition, [ x ] No Thrush  NECK:  [ x ] Supple, [x  ] No JVD, [ x ] Normal thyroid, [  ] Lymphadenopathy [  ] Other  CHEST/LUNG:  [ x ] Clear to auscultation bilaterally, [ x ] Breath Sounds equal B/L , [  ] poor effort  [ x ] No rales, [ x ] No rhonchi  [ x ]  No wheezing,   HEART:  [ x ] Regular rate and IRREGULARLY IRREGULAR rhythm, [  ] tachycardia, [  ] Bradycardia,  [  ] irregular  [ x ] No murmurs, No rubs, No gallops, [  ] PPM in place (Mfr:  )  ABDOMEN:  [ x ] Soft, [ x ] Nontender, [ x ] Nondistended, [ x ] No mass, [ x] Bowel sounds present, [  ] obese  NERVOUS SYSTEM:  [ x ] Alert & Oriented X3, [ x ] Nonfocal  [  ] Confusion  [  ] Encephalopathic [  ] Sedated [  ] Unable to assess, [  ] Dementia [  ] Other-  EXTREMITIES: [ x ] 2+ Peripheral Pulses, No clubbing, No cyanosis,  [ x ] 2 + pitting edema B/L lower EXT. [ x ] PVD stasis skin changes B/L Lower EXT, [  ] wound  LYMPH: No lymphadenopathy noted  SKIN:  [ x ] No rashes or lesions, [  ] Pressure Ulcers, [  ] ecchymosis, [  ] Skin Tears, [  ] Other      DIET: Diet, DASH/TLC:   Sodium & Cholesterol Restricted (06-17-22 @ 10:27)      LABS:                        9.8    4.43  )-----------( 165      ( 21 Jun 2022 06:20 )             30.2     21 Jun 2022 06:20    138    |  101    |  26     ----------------------------<  85     3.9     |  28     |  1.30     Ca    7.4        21 Jun 2022 06:20  Phos  2.7       21 Jun 2022 06:20  Mg     2.2       21 Jun 2022 06:20    TPro  6.1    /  Alb  2.1    /  TBili  1.0    /  DBili  x      /  AST  41     /  ALT  21     /  AlkPhos  82     21 Jun 2022 06:20    PT/INR - ( 21 Jun 2022 06:20 )   PT: 18.9 sec;   INR: 1.61 ratio         PTT - ( 21 Jun 2022 06:20 )  PTT:35.8 sec              CARDIAC MARKERS ( 18 Jun 2022 08:32 )  x     / x     / 364 U/L / x     / 24.0 ng/mL  CARDIAC MARKERS ( 17 Jun 2022 23:34 )  x     / x     / 497 U/L / x     / 52.0 ng/mL  CARDIAC MARKERS ( 17 Jun 2022 14:55 )  x     / x     / 523 U/L / x     / 70.3 ng/mL  CARDIAC MARKERS ( 17 Jun 2022 08:40 )  x     / x     / 135 U/L / x     / 3.2 ng/mL             Anemia Panel:  Iron Total, Serum: 88 ug/dL (06-18-22 @ 10:09)  Iron - Total Binding Capacity.: 354 ug/dL (06-18-22 @ 10:09)  Ferritin, Serum: 40 ng/mL (06-18-22 @ 10:09)  Vitamin B12, Serum: 619 pg/mL (06-18-22 @ 10:09)  Folate, Serum: 14.9 ng/mL (06-18-22 @ 10:09)      Thyroid Panel:  Thyroid Stimulating Hormone, Serum: 0.42 uIU/mL (06-18-22 @ 08:32)        Lipase, Serum: 157 U/L (06-17-22 @ 07:28)      Serum Pro-Brain Natriuretic Peptide: 44941 pg/mL (06-20-22 @ 09:53)  Serum Pro-Brain Natriuretic Peptide: 2396 pg/mL (06-17-22 @ 07:33)      RADIOLOGY & ADDITIONAL TESTS:      HEALTH ISSUES - PROBLEM Dx:  Hypokalemia    Lower extremity edema    Paroxysmal atrial fibrillation    Hypertension    Anemia    Hypothyroid    Need for prophylactic measure    Acute systolic congestive heart failure    NSTEMI (non-ST elevation myocardial infarction)    Acute kidney injury superimposed on CKD        Consultant(s) Notes Reviewed:  [ x ] YES     Care Discussed with [X] Consultants  [ x ] Patient  [ x ] Family- Son in law [  ] HCP [  ]   [  ] Social Service  [ x ] RN, [  ] Physical Therapy,[  ] Palliative care team  DVT PPX: [  ] Lovenox, [  ] S C Heparin, [ x ] Coumadin, [  ] Xarelto, [  ] Eliquis, [  ] Pradaxa, [  ] IV Heparin drip, [  ] SCD [  ] Contraindication 2 to GI Bleed,[  ] Ambulation [  ] Contraindicated 2 to  bleed [  ] Contraindicated 2 to Brain Bleed  Advanced directive: [  ] None, [ x ] DNR/DNI

## 2022-06-21 NOTE — PROGRESS NOTE ADULT - SUBJECTIVE AND OBJECTIVE BOX
Patient is a 89y old  Female who presents with a chief complaint of CP w/ elevated trops (19 Jun 2022 09:28)       HPI:  Patient is a 89 y/o F with a pmhx b/l LE edema, HTN, P Afib (on coumadin), and hypothyroidism presenting with sudden onset mid-sternal CP. Pt describes pain as a "prickly feeling", rated 4/10, constant in nature since 4am on 6/17, resolved in the ED. Pain was associated with L arm weakness, now resolved. Pt states the pain woke her up, and she was scared to walk and was unable to use her L arm for support to get out of bed. Pt was also nauseous at this time. No associated diaphoresis, palpitations, chest pressure. No amaurosis fugax, facial droop, garbled speech, hemiparesis, LOC, falls reported. No vomiting, fevers, chills, cough, sore throat, SOB, abd pain, constipation, dysuria. Pt had one loose BM this AM at home, denied hematochezia or melena.   Patient has not seen nephrologist in the past.  Denies any N/V/Urinary complaints.      No acute events noted       PAST MEDICAL & SURGICAL HISTORY:  Hypothyroid      Hypertension      Lower extremity edema      Paroxysmal atrial fibrillation           FAMILY HISTORY:  NC    Social History:Non smoker    MEDICATIONS  (STANDING):  acetylcysteine  Oral Solution 600 milliGRAM(s) Oral every 12 hours  aspirin enteric coated 81 milliGRAM(s) Oral daily  atorvastatin 80 milliGRAM(s) Oral at bedtime  calcium gluconate IVPB 1 Gram(s) IV Intermittent once  clopidogrel Tablet 75 milliGRAM(s) Oral daily  furosemide   Injectable 40 milliGRAM(s) IV Push once  levothyroxine 75 MICROGram(s) Oral daily  metoprolol succinate ER 50 milliGRAM(s) Oral daily  pantoprazole  Injectable 40 milliGRAM(s) IV Push daily  sodium chloride 0.9%. 1000 milliLiter(s) (50 mL/Hr) IV Continuous <Continuous>    MEDICATIONS  (PRN):      Allergies    No Known Allergies    Intolerances         REVIEW OF SYSTEMS:    Review of Systems:   Constitutional: Denies fatigue  HEENT: Denies headaches and dizziness  Respiratory: denies SOB, cough, or wheezing  Cardiovascular: denies CP, palpitations  Gastrointestinal: Denies nausea, denies vomiting, diarrhea, constipation, abdominal pain, or bloody stools  Genitourinary: denies painful urination, increased frequency, urgency, or bloody urine  Skin: denies rashes or itching  Musculoskeletal: denies muscle aches, joint swelling  Neurologic: Denies generalized weakness, denies loss of sensation, numbness, or tingling      Vital Signs Last 24 Hrs  T(C): 36.3 (21 Jun 2022 05:44), Max: 36.8 (20 Jun 2022 11:30)  T(F): 97.3 (21 Jun 2022 05:44), Max: 98.2 (20 Jun 2022 11:30)  HR: 73 (21 Jun 2022 05:44) (73 - 94)  BP: 95/56 (21 Jun 2022 05:44) (95/56 - 108/64)  BP(mean): --  RR: 18 (21 Jun 2022 05:44) (16 - 18)  SpO2: 95% (21 Jun 2022 05:44) (95% - 97%)      PHYSICAL EXAM:    GENERAL: NAD  HEAD:  Atraumatic, Normocephalic  EYES: EOMI, conjunctiva and sclera clear  ENMT: No Drainage from nares, No drainage from ears  NECK: Supple, neck  veins full  NERVOUS SYSTEM:  Awake and Alert  CHEST/LUNG: mildly Decreased BS R No rales, rhonchi, wheezing, or rubs  HEART: Regular rate and rhythm; No murmurs, rubs, or gallops  ABDOMEN: Soft, Nontender, Nondistended; Bowel sounds present  EXTREMITIES:  + Edema  SKIN: No rashes No obvious ecchymosis      LABS:                                          9.8    4.43  )-----------( 165      ( 21 Jun 2022 06:20 )             30.2     06-21    138  |  101  |  26<H>  ----------------------------<  85  3.9   |  28  |  1.30    Ca    7.4<L>      21 Jun 2022 06:20  Phos  2.7     06-21  Mg     2.2     06-21    TPro  6.1  /  Alb  2.1<L>  /  TBili  1.0  /  DBili  x   /  AST  41<H>  /  ALT  21  /  AlkPhos  82  06-21    PT/INR - ( 21 Jun 2022 06:20 )   PT: 18.9 sec;   INR: 1.61 ratio         PTT - ( 21 Jun 2022 06:20 )  PTT:35.8 sec

## 2022-06-21 NOTE — PROGRESS NOTE ADULT - PROBLEM SELECTOR PLAN 6
RESOLVED  -hypokalemic   - repleted with K IV 10 mEq x1, K PO powder 40 mEq x3   -f/u K in AM BMP, Mag level chronic, swelling LE b/l on admit  -On home lasix  TTE: EF 35-40%, LV hypokinesis

## 2022-06-21 NOTE — PROGRESS NOTE ADULT - ASSESSMENT
LUIS CARLOS on CKD 3b  Hypokalemia  HTN  NSTEMI  CHF    -BLCr 1.2  -Urine indices reviewed  -Please inform us when cardiac cath is scheduled  -Discussed risks of contrast administration up to and including dialysis, patient and family undertand risks and willing to proceed  -Agree with additional IV lasix today  -Renal indices are improving well; lytes stable

## 2022-06-21 NOTE — PROGRESS NOTE ADULT - PROBLEM SELECTOR PLAN 1
presents with acute CP pain w radiation down L arm - trops elevated w/ no acute EKG changes   - admitted to to tele for NSTEMI , Few beats - NSVT on Tele. Remains non anginal since Friday.    -Family discussion with patient, son in law, and HCP daughter. Decision was made to proceed with cath. Patient was made DNR DNI and MOLST was placed in the chart.   -As per discussion with cardio, tentative plan to transfer for cath once INR 1.6 and LUIS CARLOS.  For INR will continue to hold coumadin and Lovenox.   -trop initially 342.6, up trended to over 22K, came down to 15 K   - EKG Afib with underlying LBBB; unchanged from previous  -c/w ASA 81mg qD, Lipitor 80mg qD, Toprol xl 50mg qD, O2 NC as needed, PPI  - TTE: EF 35-40%, LV hypokinesis  - continuous tele monitoring  -K, mag, Phos level in AM  -cardio Dr. James, recs appreciated- D/W cardio NP  -DNR DNI order placed, MOLST in chart

## 2022-06-21 NOTE — PROGRESS NOTE ADULT - SUBJECTIVE AND OBJECTIVE BOX
Neurology Follow up note    KENIA [id number]    HPI:  Patient is a 89 y/o F with a pmhx b/l LE edema, HTN, P Afib (on coumadin), and hypothyroidism presenting with sudden onset mid-sternal CP. Pt describes pain as a "prickly feeling", rated 4/10, constant in nature since 4am on 6/17, resolved in the ED. Pain was associated with L arm weakness, now resolved. Pt states the pain woke her up, and she was scared to walk and was unable to use her L arm for support to get out of bed. Pt was also nauseous at this time. No associated diaphoresis, palpitations, chest pressure. No amaurosis fugax, facial droop, garbled speech, hemiparesis, LOC, falls reported. No vomiting, fevers, chills, cough, sore throat, SOB, abd pain, constipation, dysuria. Pt had one loose BM this AM at home, denied hematochezia or melena.       In ED:  VS - HR 71 | /65 | RR 18 | sO2 94 | T 97.6  Labs - HgB 11.2 | INR 1.85 | K 3.3 | Glu 114 | trop 342.6 | CKMB 3.2 | BNP 2396  CTH NC - No acute intracranial hemorrhage. Lacunar infarct R inferior cerebellum  EKG - vent rate 66, QTc 501ms, sinus rhythm, old LBBB, no ischemic changes on personal read  Given -  mg PO. pt seen by cardiology Dr Almazan , pt admitted to [facility] for NSTEMI. (17 Jun 2022 09:52)      Interval History -no new events    Patient is seen, chart was reviewed and case was discussed with the treatment team.  Pt is not in any distress.   Lying on bed comfortably.       Vital Signs Last 24 Hrs  T(C): 36.8 (21 Jun 2022 12:04), Max: 36.8 (20 Jun 2022 21:43)  T(F): 98.3 (21 Jun 2022 12:04), Max: 98.3 (21 Jun 2022 12:04)  HR: 84 (21 Jun 2022 12:04) (73 - 94)  BP: 114/64 (21 Jun 2022 12:04) (95/56 - 114/64)  BP(mean): --  RR: 17 (21 Jun 2022 12:04) (16 - 18)  SpO2: 95% (21 Jun 2022 12:04) (93% - 95%)        REVIEW OF SYSTEMS:    Constitutional: No fever,   Eyes: No eye pain, visual disturbances, or discharge  ENT:  No difficulty hearing, tinnitus, vertigo; No sinus or throat pain  Neck: No pain or stiffness  Respiratory: No cough, wheezing, chills or hemoptysis  Cardiovascular: No alpitations, shortness of breath,   Gastrointestinal: No abdominal or epigastric pain. No nausea, vomiting or hematemesis;  Genitourinary: No dysuria, frequency, hematuria  Neurological: No headaches tremors  Psychiatric: No depression, anxiety, mood swings or difficulty sleeping  Musculoskeletal: No joint pain or swelling;   Skin: No itching, burning, rashes or lesions   Lymph Nodes: No enlarged glands  Endocrine: No heat or cold intolerance;   Allergy and Immunologic: No hives or eczema    On Neurological Examination:    Mental Status - Pt is alert, awake, oriented X3.  Follows commands well and able to answer questions appropriately  .Mood and affect  normal    Speech -  Normal.    Cranial Nerves - Pupils 3 mm equal and reactive to light, extraocular eye movements intact. Pt has no visual field deficit.  Pt has no  facial asymmetry. Facial sensation is intact.Tongue - is in midline.    Muscle tone - is normal     Motor Exam - 5/5 of UE  LE 4/5    Sensory Exam -  Pt withdraws all extremities equally on stimulation. No asymmetry seen.      coordination:    Finger to nose: adele    Deep tendon Reflexes - 2 plus all over.         Neck Supple -  Yes.     MEDICATIONS    acetylcysteine  Oral Solution 600 milliGRAM(s) Oral every 12 hours  aspirin enteric coated 81 milliGRAM(s) Oral daily  atorvastatin 80 milliGRAM(s) Oral at bedtime  clopidogrel Tablet 75 milliGRAM(s) Oral daily  furosemide   Injectable 40 milliGRAM(s) IV Push once  levothyroxine 75 MICROGram(s) Oral daily  metoprolol succinate ER 50 milliGRAM(s) Oral daily  pantoprazole  Injectable 40 milliGRAM(s) IV Push daily  potassium phosphate / sodium phosphate Powder (PHOS-NaK) 1 Packet(s) Oral once  sodium chloride 0.9%. 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  OOB to chair/ambulation with assistance only.  Pain is accessed and addressed.  Would continue to follow.

## 2022-06-21 NOTE — PROGRESS NOTE ADULT - PROBLEM SELECTOR PLAN 9
chronic  - c/w home synthroid 75 mcg qd DVT ppx HOLD coumadin, goal INR for transfer for cath is 1.6

## 2022-06-21 NOTE — PROGRESS NOTE ADULT - SUBJECTIVE AND OBJECTIVE BOX
[facility] -- Reid Astorga, Norah, Raheem, Flex Calvert, Jacob Blair: Office # [phone]    Follow Up:  NSTEMI, Afib    Subjective/Observations: Patient seen and examined, awake, alert, resting comfortably in bed, denies chest pain, palpitations or dizziness, On O2 via NC    REVIEW OF SYSTEMS: All review of systems is negative for eye, ENT, GI, , allergic, dermatologic, musculoskeletal and neurologic except as described above    PAST MEDICAL & SURGICAL HISTORY:  Hypothyroid      Hypertension      Lower extremity edema      Paroxysmal atrial fibrillation          MEDICATIONS  (STANDING):  acetylcysteine  Oral Solution 600 milliGRAM(s) Oral every 12 hours  aspirin enteric coated 81 milliGRAM(s) Oral daily  atorvastatin 80 milliGRAM(s) Oral at bedtime  calcium gluconate IVPB 1 Gram(s) IV Intermittent once  clopidogrel Tablet 75 milliGRAM(s) Oral daily  furosemide   Injectable 40 milliGRAM(s) IV Push once  levothyroxine 75 MICROGram(s) Oral daily  metoprolol succinate ER 50 milliGRAM(s) Oral daily  pantoprazole  Injectable 40 milliGRAM(s) IV Push daily  sodium chloride 0.9%. 1000 milliLiter(s) (50 mL/Hr) IV Continuous <Continuous>    MEDICATIONS  (PRN):    Allergies    No Known Allergies    Intolerances      Vital Signs Last 24 Hrs  T(C): 36.3 (21 Jun 2022 05:44), Max: 36.8 (20 Jun 2022 11:30)  T(F): 97.3 (21 Jun 2022 05:44), Max: 98.2 (20 Jun 2022 11:30)  HR: 73 (21 Jun 2022 05:44) (73 - 94)  BP: 95/56 (21 Jun 2022 05:44) (95/56 - 108/64)  BP(mean): --  RR: 18 (21 Jun 2022 05:44) (16 - 18)  SpO2: 95% (21 Jun 2022 05:44) (95% - 97%)  I&O's Summary    20 Jun 2022 07:01  -  21 Jun 2022 07:00  --------------------------------------------------------  IN: 360 mL / OUT: 650 mL / NET: -290 mL          TELE: Afib 60-70's  PHYSICAL EXAM:  Constitutional: NAD, awake and alert, well-developed  HEENT: Moist Mucous Membranes, Anicteric  Pulmonary: Non-labored, + wheezing diminished at bases bilaterally, rales or rhonchi  Cardiovascular: irregular, S1 and S2, + murmurs, rubs, gallops or clicks  Gastrointestinal: Bowel Sounds present, soft, nontender.   Lymph: B/L LE edema chronic) No lymphadenopathy.  Skin: No visible rashes or ulcers.  Psych:  Mood & affect appropriate for situation    LABS: All Labs Reviewed:                        9.8    4.43  )-----------( 165      ( 21 Jun 2022 06:20 )             30.2                         10.5   6.09  )-----------( 180      ( 20 Jun 2022 09:53 )             32.3                         10.0   5.45  )-----------( 159      ( 19 Jun 2022 06:46 )             30.7     21 Jun 2022 06:20    138    |  101    |  26     ----------------------------<  85     3.9     |  28     |  1.30   20 Jun 2022 09:53    138    |  102    |  25     ----------------------------<  115    4.2     |  31     |  1.50   19 Jun 2022 06:46    143    |  106    |  22     ----------------------------<  90     3.2     |  32     |  1.50     Ca    7.4        21 Jun 2022 06:20  Ca    7.6        20 Jun 2022 09:53  Ca    7.8        19 Jun 2022 06:46  Phos  2.7       21 Jun 2022 06:20  Phos  2.2       20 Jun 2022 09:53  Phos  2.5       19 Jun 2022 06:46  Mg     2.2       21 Jun 2022 06:20  Mg     2.2       20 Jun 2022 09:53  Mg     2.3       19 Jun 2022 06:46    TPro  6.1    /  Alb  2.1    /  TBili  1.0    /  DBili  x      /  AST  41     /  ALT  21     /  AlkPhos  82     21 Jun 2022 06:20  TPro  6.4    /  Alb  2.4    /  TBili  1.0    /  DBili  x      /  AST  57     /  ALT  22     /  AlkPhos  88     20 Jun 2022 09:53  TPro  6.0    /  Alb  2.4    /  TBili  1.2    /  DBili  x      /  AST  65     /  ALT  21     /  AlkPhos  77     19 Jun 2022 06:46    PT/INR - ( 21 Jun 2022 06:20 )   PT: 18.9 sec;   INR: 1.61 ratio         PTT - ( 21 Jun 2022 06:20 )  PTT:35.8 sec  Creatine Kinase, Serum: 364 U/L (06-18-22 @ 08:32)  Troponin I, High Sensitivity Result: 32994.6 ng/L (06-18-22 @ 08:32)  Troponin I, High Sensitivity Result: 81459.3 ng/L (06-17-22 @ 23:55)  Creatine Kinase, Serum: 497 U/L (06-17-22 @ 23:34)  Creatine Kinase, Serum: 523 U/L (06-17-22 @ 14:55)            Cholesterol, Serum: 144 mg/dL (06-18-22 @ 10:09)  HDL Cholesterol, Serum: 44 mg/dL (06-18-22 @ 10:09)  Triglycerides, Serum: 65 mg/dL (06-18-22 @ 10:09)      12 Lead ECG:   Ventricular Rate 99 BPM    Atrial Rate 73 BPM    QRS Duration 148 ms    Q-T Interval 434 ms    QTC Calculation(Bazett) 556 ms    R Axis -65 degrees    T Axis 153 degrees    Diagnosis Line Atrial fibrillation with premature ventricular or aberrantly conducted complexes  Left axis deviation  Left bundle branch block  Abnormal ECG  Confirmed by Norah ALVARENGA, Jhonny ([id number]) on 6/18/2022 11:46:05 AM (06-17-22 @ 23:23)    < from: Xray Chest 1 View- PORTABLE-Urgent (Xray Chest 1 View- PORTABLE-Urgent .) (06.17.22 @ 08:39) >    ACC: [id number] EXAM:  XR CHEST PORTABLE URGENT 1V                          PROCEDURE DATE:  06/17/2022          INTERPRETATION:  AP semierect chest on June 17, 2022 at 8:35 AM. Patient   has chest pain.    Heart magnified by technique.    There is elevation of the right hemidiaphragm again noted.    Mild right base effusion somewhat increased from October 11, 2021.    IMPRESSION: As above.    --- End of Report ---            RUSSELL MONTES MD; Attending Radiologist  This document has been electronically signed. Jun 17 2022 11:18AM    < end of copied text >  < from: TTE Echo Complete w/o Contrast w/ Doppler (06.19.22 @ 10:00) >    ACC: [id number] EXAM:  ECHO TTE WO CON COMP W DOPP                          PROCEDURE DATE:  06/19/2022          INTERPRETATION:  INDICATION: Chest pain  Sonographer LK    Blood Pressure 147/84    Height 165.1 cm     Weight 72.6 kg       BSA 1.8   sqm    Dimensions:  LA 3.8       Normal Values: 2.0 - 4.0 cm  Ao 3.2        Normal Values: 2.0 - 3.8 cm  SEPTUM 1.7       Normal Values: 0.6 - 1.2 cm  PWT 1.2       Normal Values: 0.6 - 1.1 cm  LVIDd 4.8         Normal Values: 3.0 - 5.6 cm  LVIDs 3.2      Normal Values: 1.8 - 4.0 cm      OBSERVATIONS:  Mitral Valve: Mitral annular calcification with thickened leaflets, mild   MR.  Aortic Valve/Aorta: Calcified trileaflet aortic valve with decreased   opening. Peak transaortic valve gradient equals 20.4 mmHg with a mean   transaortic valve gradient 13.2 mmHg. By visual estimation there appears   to be mild to moderate aortic stenosis  Tricuspid Valve: Moderate TR.  Pulmonic Valve: Trace PI  Left Atrium: Enlarged  Right Atrium: Enlarged  Left Ventricle: Mild to moderate segmental left ventricular systolic   dysfunction, estimated LVEF of 35-40%. The apex, mid inferoseptal and mid   anterolateral walls appear severely hypokinetic  Right Ventricle: Right ventricular enlargement with grossly normal   function  Pericardium: no significant pericardial effusion.  Pulmonary/RV Pressure: estimated PA systolic pressure of 36 mmHg  IVC measures 1.47 cm          IMPRESSION:  Mild to moderate segmental left ventricular systolic dysfunction,   estimated LVEF of 35-40%. The apex, mid inferoseptal and mid   anterolateral walls appear severely hypokinetic  Right ventricular enlargement with grossly normal function  Biatrial enlargement  Calcified trileaflet aortic valve with mild to moderate aortic stenosis,   without AI.  Mild MR  Moderate TR.  No significant pericardial effusion.    --- End of Report ---            JEAN CARLOS HENRY MD; Attending Cardiologist  This document has been electronically signed. Jun 20 2022 12:56PM    < end of copied text >

## 2022-06-21 NOTE — PROGRESS NOTE ADULT - ATTENDING COMMENTS
Patient is a 89 y/o F with a pmhx b/l LE edema, HTN, P Afib (on coumadin), and hypothyroidism presenting with diarrhea and mid-sternal CP admitted for CP w/ elevated trops, NSTEMI.  Pt seen, examined, case & care plan d/w pt, residents at detail.  -Cardiology Dr Maciel  follow up - Lasix x 1 dose for volume overload  -Renal DR Rangel  follow up ,  -AM labs   -PO diet  -Palliative care -DNR/DNI  -Possible transfer for cardiac Cath if stable Respiratory status. COVID 19 -Neg  Total care time is 40 minutes. Patient is a 89 y/o F with a pmhx b/l LE edema, HTN, P Afib (on coumadin), and hypothyroidism presenting with diarrhea and mid-sternal CP admitted for CP w/ elevated trops, NSTEMI.  Pt seen, examined, case & care plan d/w pt, residents at detail.  -Cardiology Dr Maciel  follow up - Lasix x 1 dose for volume overload  -Renal DR Rangel  follow up ,  -AM labs   -PO diet  -Palliative care -DNR/DNI  -Possible transfer for cardiac Cath if stable Respiratory status. COVID 19 -Neg  Total care time is 40 minutes.

## 2022-06-21 NOTE — PROGRESS NOTE ADULT - ASSESSMENT
Patient is a 89 y/o F with a pmhx b/l LE edema, HTN, P Afib (on coumadin), and hypothyroidism presenting with diarrhea and mid-sternal CP admitted for CP w/ elevated trops, NSTEMI. On ACS therapy, INR therapeutic, family agreed to cardiac cath, plan for transfer for cath once INR 1.6 and LUIS CARLOS improved.  Patient is a 89 y/o F with a pmhx b/l LE edema, HTN, P Afib (on coumadin), and hypothyroidism presenting with diarrhea and mid-sternal CP admitted for CP w/ elevated trops, NSTEMI. On ACS therapy, INR therapeutic, family agreed to cardiac cath, plan for transfer for cath once INR 1.6 and LUIS CARLOS improved.

## 2022-06-21 NOTE — PROGRESS NOTE ADULT - PROBLEM SELECTOR PLAN 5
chronic, swelling LE b/l on admit  -On home lasix  TTE: EF 35-40%, LV hypokinesis chronic, stable - rate-controlled - on coumadin  - HOLD coumadin, goal INR 1.6 in order to be transferred for cath   - Toprol  XL 50mg PO qd w/ hold parameters  TTE: EF 35-40%, LV hypokinesis  - continuous tele monitoring  -cardio Dr. Blair , recs appreciated- D/W cardio NP

## 2022-06-21 NOTE — PROGRESS NOTE ADULT - PROBLEM SELECTOR PLAN 8
normocytic anemia on admit - baseline ~10-11 appears near baseline  - unclear etiology - likely 2/2 fluid overload  -  iron studies, TFT's, B12, folate noted  - monitor for VS and s/s of worsening anemia and trend HgB in AM CBC chronic  - c/w home synthroid 75 mcg qd

## 2022-06-21 NOTE — PROGRESS NOTE ADULT - ASSESSMENT
89 y/o F with a pmhx b/l LE edema, HTN,P Afib on coumadin , hypothyroidism, pw midsternal chest discomfort atypical in nature and left arm discomfort since waking up this morning at  4am, describes it as "creepy feeling" assoc with left arm weakness, general weakness and not feeling well. Patient has no underlying coronary disease.     NSTEMI  - EKG: Afib with underlying LBBB; unchanged from previous.  Non-anginal overnight  - hsT trended up to >51599, downtrended to 55965, remained asymptomatic, denies any anginal symptoms  - Continue ASA, Plavix, BB, and statin.    - continue to monitor for ischemic chest pain.  Ischemic eval discussed again with patient, aware of risk and benefits of procedure, agreed to pursue cardiac cath once optimized   - renal function and INR improved     - TTE:(6/19/2022) Mild to moderate segmental LV systolic dysfunction,   est LVEF of 35-40%. The apex, mid inferoseptal and mid   anterolateral walls appear severely hypokinetic, RV enlargement with grossly normal function. Biatrial enlargement Calcified trileaflet aortic valve with mild to moderate aortic stenosis, without AI. Mild MR Moderate TR.  - remains volume overloaded today, on O2 supplementation, unable to tolerate lying flat,   - pro-BNP: 40153, give lasix 40 mg IV x 1 today, will reassess in AM   - Renal input for optimization.    Permanent Afib  - Telemetry: Afib 60-70's with no arrhythmias overnight.  Continue telemetry monitoring.  - subtherapeutic INR: 1.6 today, would bridge hep gtt to coumadin, for goal INR 2-3   - Monitor electrolytes, replete to keep K>4 and Mag>2  - Other cardiovascular workup will depend on clinical course.  - Will continue to follow.    - BP 90- 100's systolics  - continue BB with hold parameters   - continue to monitor routine hemodynamics    - Will continue to follow.    Melany Howe, [facility]  Nurse Practitioner - Cardiology   Spectra #[phone]

## 2022-06-21 NOTE — PROGRESS NOTE ADULT - PROBLEM SELECTOR PLAN 4
chronic, stable - rate-controlled - on coumadin  - HOLD coumadin, goal INR 1.6 in order to be transferred for cath   - Toprol  XL 50mg PO qd w/ hold parameters  TTE: EF 35-40%, LV hypokinesis  - continuous tele monitoring  -cardio Dr. Blair , recs appreciated- D/W cardio NP LUIS CARLOS on CKD stage 3B?   -Cr 1.5 baseline appears to be 1-1.2  -start Mucomyst q 12 hrs x 4 doses for prep for Cardiac Cath   - Will give additional Lasix 40mg IV today for CHF  -s/p Lasix 40mg IV x1 on 6/20 for CHF  -consulted Dr. FLORIAN nephro-BMP in AM

## 2022-06-21 NOTE — PROGRESS NOTE ADULT - PROBLEM SELECTOR PLAN 2
Acute Systolic CHF TTE: EF 35-40%, LV hypokinesis  - Noted to be SOB on 6/20 with increase in proBNP>13k  - Lasix 40mg IV x1 on 6/20, elevated pBNP ~ 46162  - I/Os, daily weights, D/W Cardio    Mild to moderate segmental left ventricular systolic dysfunction,   estimated LVEF of 35-40%. The apex, mid inferoseptal and mid   anterolateral walls appear severely hypokinetic  Right ventricular enlargement with grossly normal function  Biatrial enlargement  Calcified trileaflet aortic valve with mild to moderate aortic stenosis,   without AI.  Mild MR  Moderate TR.  No significant pericardial effusion. Acute Systolic CHF TTE: EF 35-40%, LV hypokinesis  - Noted to be SOB on 6/20 with increase in proBNP>13k  - Will give additional Lasix 40mg IV today  - s/p Lasix 40mg IV x1 on 6/20, elevated pBNP ~ 61327  - I/Os, daily weights, D/W Cardio    Mild to moderate segmental left ventricular systolic dysfunction,   estimated LVEF of 35-40%. The apex, mid inferoseptal and mid   anterolateral walls appear severely hypokinetic  Right ventricular enlargement with grossly normal function  Biatrial enlargement  Calcified trileaflet aortic valve with mild to moderate aortic stenosis,   without AI.  Mild MR  Moderate TR.  No significant pericardial effusion. Acute Systolic CHF TTE: EF 35-40%, LV hypokinesis  - Noted to be SOB on 6/20 with increase in proBNP>13k  - Will give additional Lasix 40mg IV today  - s/p Lasix 40mg IV x1 on 6/20, elevated pBNP ~ 18187  - I/Os, daily weights, D/W Cardio-Lasix 40 mg IV x 1 dose 6/21    Mild to moderate segmental left ventricular systolic dysfunction,   estimated LVEF of 35-40%. The apex, mid inferoseptal and mid   anterolateral walls appear severely hypokinetic  Right ventricular enlargement with grossly normal function  Biatrial enlargement  Calcified trileaflet aortic valve with mild to moderate aortic stenosis,   without AI.  Mild MR  Moderate TR.  No significant pericardial effusion.

## 2022-06-21 NOTE — PROGRESS NOTE ADULT - PROBLEM SELECTOR PLAN 7
chronic, elevated on admit - likely 2/2 pain  - continue toprol 50mg qD w/ hold parameters  - routine hemodynamic monitoring

## 2022-06-21 NOTE — PROGRESS NOTE ADULT - PROBLEM SELECTOR PLAN 3
LUIS CARLOS on CKD stage 3B?   -Cr 1.5 baseline appears to be 1-1.2  -start Mucomyst q 12 hrs x 4 doses for prep for Cardiac Cath   -Lasix 40mg IV x1 on 6/20 for CHF  -consulted Dr. FLORIAN nephro-BMP in AM LUIS CARLOS on CKD stage 3B?   -Cr 1.5 baseline appears to be 1-1.2  -start Mucomyst q 12 hrs x 4 doses for prep for Cardiac Cath   - Will give additional Lasix 40mg IV today for CHF  -s/p Lasix 40mg IV x1 on 6/20 for CHF  -consulted Dr. FLORIAN nephro-BMP in AM normocytic anemia on admit - baseline ~10-11 appears near baseline  - unclear etiology - likely 2/2 fluid overload  -  iron studies, TFT's, B12, folate noted  - monitor for VS and s/s of worsening anemia and trend HgB in AM CBC

## 2022-06-22 LAB
ALBUMIN SERPL ELPH-MCNC: 2.2 G/DL — LOW (ref 3.3–5)
ALP SERPL-CCNC: 84 U/L — SIGNIFICANT CHANGE UP (ref 40–120)
ALT FLD-CCNC: 21 U/L — SIGNIFICANT CHANGE UP (ref 12–78)
ANION GAP SERPL CALC-SCNC: 4 MMOL/L — LOW (ref 5–17)
APTT BLD: 36.9 SEC — HIGH (ref 27.5–35.5)
AST SERPL-CCNC: 41 U/L — HIGH (ref 15–37)
BASOPHILS # BLD AUTO: 0.02 K/UL — SIGNIFICANT CHANGE UP (ref 0–0.2)
BASOPHILS NFR BLD AUTO: 0.4 % — SIGNIFICANT CHANGE UP (ref 0–2)
BILIRUB SERPL-MCNC: 0.8 MG/DL — SIGNIFICANT CHANGE UP (ref 0.2–1.2)
BUN SERPL-MCNC: 27 MG/DL — HIGH (ref 7–23)
CALCIUM SERPL-MCNC: 7.6 MG/DL — LOW (ref 8.5–10.1)
CHLORIDE SERPL-SCNC: 102 MMOL/L — SIGNIFICANT CHANGE UP (ref 96–108)
CO2 SERPL-SCNC: 31 MMOL/L — SIGNIFICANT CHANGE UP (ref 22–31)
CREAT SERPL-MCNC: 1.5 MG/DL — HIGH (ref 0.5–1.3)
EGFR: 32 ML/MIN/1.73M2 — LOW
EOSINOPHIL # BLD AUTO: 0.19 K/UL — SIGNIFICANT CHANGE UP (ref 0–0.5)
EOSINOPHIL NFR BLD AUTO: 4 % — SIGNIFICANT CHANGE UP (ref 0–6)
GLUCOSE SERPL-MCNC: 89 MG/DL — SIGNIFICANT CHANGE UP (ref 70–99)
HCT VFR BLD CALC: 31.6 % — LOW (ref 34.5–45)
HGB BLD-MCNC: 10.3 G/DL — LOW (ref 11.5–15.5)
IMM GRANULOCYTES NFR BLD AUTO: 0.2 % — SIGNIFICANT CHANGE UP (ref 0–1.5)
INR BLD: 1.41 RATIO — HIGH (ref 0.88–1.16)
LYMPHOCYTES # BLD AUTO: 1.36 K/UL — SIGNIFICANT CHANGE UP (ref 1–3.3)
LYMPHOCYTES # BLD AUTO: 28.9 % — SIGNIFICANT CHANGE UP (ref 13–44)
MAGNESIUM SERPL-MCNC: 2.3 MG/DL — SIGNIFICANT CHANGE UP (ref 1.6–2.6)
MCHC RBC-ENTMCNC: 30.1 PG — SIGNIFICANT CHANGE UP (ref 27–34)
MCHC RBC-ENTMCNC: 32.6 GM/DL — SIGNIFICANT CHANGE UP (ref 32–36)
MCV RBC AUTO: 92.4 FL — SIGNIFICANT CHANGE UP (ref 80–100)
MONOCYTES # BLD AUTO: 0.69 K/UL — SIGNIFICANT CHANGE UP (ref 0–0.9)
MONOCYTES NFR BLD AUTO: 14.6 % — HIGH (ref 2–14)
NEUTROPHILS # BLD AUTO: 2.44 K/UL — SIGNIFICANT CHANGE UP (ref 1.8–7.4)
NEUTROPHILS NFR BLD AUTO: 51.9 % — SIGNIFICANT CHANGE UP (ref 43–77)
NRBC # BLD: 0 /100 WBCS — SIGNIFICANT CHANGE UP (ref 0–0)
PHOSPHATE SERPL-MCNC: 2.6 MG/DL — SIGNIFICANT CHANGE UP (ref 2.5–4.5)
PLATELET # BLD AUTO: 181 K/UL — SIGNIFICANT CHANGE UP (ref 150–400)
POTASSIUM SERPL-MCNC: 3.7 MMOL/L — SIGNIFICANT CHANGE UP (ref 3.5–5.3)
POTASSIUM SERPL-SCNC: 3.7 MMOL/L — SIGNIFICANT CHANGE UP (ref 3.5–5.3)
PROT SERPL-MCNC: 6 G/DL — SIGNIFICANT CHANGE UP (ref 6–8.3)
PROTHROM AB SERPL-ACNC: 16.6 SEC — HIGH (ref 10.5–13.4)
RBC # BLD: 3.42 M/UL — LOW (ref 3.8–5.2)
RBC # FLD: 19.3 % — HIGH (ref 10.3–14.5)
SODIUM SERPL-SCNC: 137 MMOL/L — SIGNIFICANT CHANGE UP (ref 135–145)
WBC # BLD: 4.71 K/UL — SIGNIFICANT CHANGE UP (ref 3.8–10.5)
WBC # FLD AUTO: 4.71 K/UL — SIGNIFICANT CHANGE UP (ref 3.8–10.5)

## 2022-06-22 PROCEDURE — 93010 ELECTROCARDIOGRAM REPORT: CPT

## 2022-06-22 PROCEDURE — 99233 SBSQ HOSP IP/OBS HIGH 50: CPT

## 2022-06-22 RX ORDER — SODIUM,POTASSIUM PHOSPHATES 278-250MG
1 POWDER IN PACKET (EA) ORAL ONCE
Refills: 0 | Status: COMPLETED | OUTPATIENT
Start: 2022-06-22 | End: 2022-06-22

## 2022-06-22 RX ORDER — ENOXAPARIN SODIUM 100 MG/ML
70 INJECTION SUBCUTANEOUS EVERY 24 HOURS
Refills: 0 | Status: COMPLETED | OUTPATIENT
Start: 2022-06-22 | End: 2022-06-22

## 2022-06-22 RX ORDER — ALBUTEROL 90 UG/1
2 AEROSOL, METERED ORAL EVERY 12 HOURS
Refills: 0 | Status: DISCONTINUED | OUTPATIENT
Start: 2022-06-22 | End: 2022-06-30

## 2022-06-22 RX ADMIN — Medication 81 MILLIGRAM(S): at 12:09

## 2022-06-22 RX ADMIN — Medication 600 MILLIGRAM(S): at 18:10

## 2022-06-22 RX ADMIN — ALBUTEROL 2 PUFF(S): 90 AEROSOL, METERED ORAL at 10:13

## 2022-06-22 RX ADMIN — ENOXAPARIN SODIUM 70 MILLIGRAM(S): 100 INJECTION SUBCUTANEOUS at 15:43

## 2022-06-22 RX ADMIN — CLOPIDOGREL BISULFATE 75 MILLIGRAM(S): 75 TABLET, FILM COATED ORAL at 12:08

## 2022-06-22 RX ADMIN — PANTOPRAZOLE SODIUM 40 MILLIGRAM(S): 20 TABLET, DELAYED RELEASE ORAL at 12:09

## 2022-06-22 RX ADMIN — Medication 1 PACKET(S): at 10:09

## 2022-06-22 RX ADMIN — ATORVASTATIN CALCIUM 80 MILLIGRAM(S): 80 TABLET, FILM COATED ORAL at 22:14

## 2022-06-22 RX ADMIN — Medication 30 MILLILITER(S): at 02:15

## 2022-06-22 RX ADMIN — Medication 50 MILLIGRAM(S): at 05:01

## 2022-06-22 RX ADMIN — Medication 600 MILLIGRAM(S): at 05:03

## 2022-06-22 RX ADMIN — Medication 75 MICROGRAM(S): at 05:01

## 2022-06-22 NOTE — PROGRESS NOTE ADULT - ASSESSMENT
LUIS CARLOS on CKD 3b  Hypokalemia  HTN  NSTEMI  CHF    -BLCr 1.2  -Urine indices reviewed  -Please inform us when cardiac cath is scheduled  -Discussed risks of contrast administration up to and including dialysis, patient and family undertand risks and willing to proceed  -Agree with additional IV lasix today  -Renal indices fluctuates around baseline. Electrolyte stable. Volume acceptable   -Mucomyst started by primary team. Unable to give IVF due to CHF history

## 2022-06-22 NOTE — PROGRESS NOTE ADULT - PROBLEM SELECTOR PLAN 3
normocytic anemia on admit - baseline ~10-11 appears near baseline  - unclear etiology - likely 2/2 fluid overload  -  iron studies, TFT's, B12, folate noted  - monitor for VS and s/s of worsening anemia and trend HgB in AM CBC

## 2022-06-22 NOTE — PROGRESS NOTE ADULT - SUBJECTIVE AND OBJECTIVE BOX
Neurology Follow up note    KENIA [id number]    HPI:  Patient is a 89 y/o F with a pmhx b/l LE edema, HTN, P Afib (on coumadin), and hypothyroidism presenting with sudden onset mid-sternal CP. Pt describes pain as a "prickly feeling", rated 4/10, constant in nature since 4am on 6/17, resolved in the ED. Pain was associated with L arm weakness, now resolved. Pt states the pain woke her up, and she was scared to walk and was unable to use her L arm for support to get out of bed. Pt was also nauseous at this time. No associated diaphoresis, palpitations, chest pressure. No amaurosis fugax, facial droop, garbled speech, hemiparesis, LOC, falls reported. No vomiting, fevers, chills, cough, sore throat, SOB, abd pain, constipation, dysuria. Pt had one loose BM this AM at home, denied hematochezia or melena.       In ED:  VS - HR 71 | /65 | RR 18 | sO2 94 | T 97.6  Labs - HgB 11.2 | INR 1.85 | K 3.3 | Glu 114 | trop 342.6 | CKMB 3.2 | BNP 2396  CTH NC - No acute intracranial hemorrhage. Lacunar infarct R inferior cerebellum  EKG - vent rate 66, QTc 501ms, sinus rhythm, old LBBB, no ischemic changes on personal read  Given -  mg PO. pt seen by cardiology Dr Almazan , pt admitted to [facility] for NSTEMI. (17 Jun 2022 09:52)      Interval History -no new events    Patient is seen, chart was reviewed and case was discussed with the treatment team.  Pt is not in any distress.   Lying on bed comfortably.       Vital Signs Last 24 Hrs  T(C): 36.4 (22 Jun 2022 11:43), Max: 37 (21 Jun 2022 19:06)  T(F): 97.6 (22 Jun 2022 11:43), Max: 98.6 (21 Jun 2022 19:06)  HR: 70 (22 Jun 2022 11:43) (68 - 78)  BP: 114/59 (22 Jun 2022 11:43) (109/56 - 115/68)  BP(mean): --  RR: 18 (22 Jun 2022 11:43) (18 - 18)  SpO2: 90% (22 Jun 2022 11:43) (90% - 95%)        REVIEW OF SYSTEMS:    Constitutional: No fever,   Eyes: No eye pain, visual disturbances, or discharge  ENT:  No difficulty hearing, tinnitus, vertigo; No sinus or throat pain  Neck: No pain or stiffness  Respiratory: No cough, wheezing, chills or hemoptysis  Cardiovascular: No alpitations, shortness of breath,   Gastrointestinal: No abdominal or epigastric pain. No nausea, vomiting or hematemesis;  Genitourinary: No dysuria, frequency, hematuria  Neurological: No headaches tremors  Psychiatric: No depression, anxiety, mood swings or difficulty sleeping  Musculoskeletal: No joint pain or swelling;   Skin: No itching, burning, rashes or lesions   Lymph Nodes: No enlarged glands  Endocrine: No heat or cold intolerance;   Allergy and Immunologic: No hives or eczema    On Neurological Examination:    Mental Status - Pt is alert, awake, oriented X3.  Follows commands well and able to answer questions appropriately  .Mood and affect  normal    Speech -  Normal.    Cranial Nerves - Pupils 3 mm equal and reactive to light, extraocular eye movements intact. Pt has no visual field deficit.  Pt has no  facial asymmetry. Facial sensation is intact.Tongue - is in midline.    Muscle tone - is normal     Motor Exam - 5/5 of UE  LE 4/5    Sensory Exam -  Pt withdraws all extremities equally on stimulation. No asymmetry seen.      coordination:    Finger to nose: adele    Deep tendon Reflexes - 2 plus all over.         Neck Supple -  Yes.     MEDICATIONS    acetylcysteine  Oral Solution 600 milliGRAM(s) Oral every 12 hours  aspirin enteric coated 81 milliGRAM(s) Oral daily  atorvastatin 80 milliGRAM(s) Oral at bedtime  clopidogrel Tablet 75 milliGRAM(s) Oral daily  furosemide   Injectable 40 milliGRAM(s) IV Push once  levothyroxine 75 MICROGram(s) Oral daily  metoprolol succinate ER 50 milliGRAM(s) Oral daily  pantoprazole  Injectable 40 milliGRAM(s) IV Push daily  potassium phosphate / sodium phosphate Powder (PHOS-NaK) 1 Packet(s) Oral once  sodium chloride 0.9%. 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  OOB to chair/ambulation with assistance only.  Pain is accessed and addressed.  Would continue to follow.

## 2022-06-22 NOTE — PROGRESS NOTE ADULT - ASSESSMENT
89 y/o F with a pmhx b/l LE edema, HTN,P Afib on coumadin , hypothyroidism, pw midsternal chest discomfort atypical in nature and left arm discomfort since waking up this morning at  4am, describes it as "creepy feeling" assoc with left arm weakness, general weakness and not feeling well. Patient has no underlying coronary disease.     NSTEMI  - EKG: Afib with underlying LBBB; unchanged from previous.  Non-anginal overnight  - hsT trended up to >62204, downtrended to 23191, remained asymptomatic, denies any anginal symptoms to date  - Continue ASA, Plavix, BB, and statin.    - Continue to monitor for ischemic chest pain.  Ischemic eval discussed with patient and daughter; aware of risk and benefits of procedure, agreed to pursue cardiac cath once optimized   - INR improved but creatinine is back up to 1.5.  Renal input noted.  On Mucomyst x 4 doses  - BP stable and controlled    Acute Systolic HF  - TTE:(6/19/2022) Mild to moderate segmental LV systolic dysfunction, est LVEF of 35-40%. The apex, mid inferoseptal and mid anterolateral walls appear severely hypokinetic, RV enlargement with grossly normal function. Biatrial enlargement Calcified trileaflet aortic valve with mild to moderate aortic stenosis, without AI. Mild MR Moderate TR.  - s/p Lasix 40 mg 40 mg IV x 2 days.  Remains on O2 supplementation.  Now appears compensated from HF standpoint  - Continue strict I/O's.  She has chronic BLE edema.  Initiate MANN stockings    Permanent Afib  - Telemetry: Afib 60-70's with no arrhythmias overnight.  Continue telemetry monitoring.  - Holding AC in anticipation with Cardiac cath  - Monitor electrolytes, replete to keep K>4 and Mag>2    - Other cardiovascular workup will depend on clinical course.  - Will continue to follow.    Angeles Kim DNP, NP-C  Cardiology   Spectra #[phone]        Airway patent, Nasal mucosa clear. Mouth with normal mucosa. Throat has no vesicles, no oropharyngeal exudates and uvula is midline.

## 2022-06-22 NOTE — CHART NOTE - NSCHARTNOTEFT_GEN_A_CORE
called by RN @ 2:05 for right side abdominal discomfort. Patient seen and examined at the bedside. Alert and oriented. Cooperative. Reports she was in bed for past few days, bed rest due to NSTEMI management.  c/o R side abdominal discomfort, comes and goes. Like gas is traveling. Had BM yesterday, regular. Admits passing little gas,  reports passing lot of gas at home. Non tender on palpation. Pope : Negative  Denies associated chest pain/pressure, nausea/vomiting, dysuria, diarrhea or constipation.      Vital Signs Last 24 Hrs  T(C): 37 (21 Jun 2022 19:06), Max: 37 (21 Jun 2022 19:06)  T(F): 98.6 (21 Jun 2022 19:06), Max: 98.6 (21 Jun 2022 19:06)  HR: 68 (21 Jun 2022 19:06) (68 - 84)  BP: 109/56 (21 Jun 2022 19:06) (95/56 - 114/64)  BP(mean): --  RR: 18 (21 Jun 2022 19:06) (16 - 18)  SpO2: 95% (21 Jun 2022 19:06) (93% - 95%) on 2 litre nasal cannula     Physical exam:   Constitutional: NAD, awake and alert  HEENT: Moist Mucous Membranes, Anicteric  Pulmonary: Non-labored, breath sound diminished at bases bilaterally, no rales or rhonchi  Cardiovascular: irregular, S1 and S2, + murmurs, rubs, gallops or clicks  Gastrointestinal: Bowel Sounds present, soft, nontender. + hyperactive bowel sound   Lymph: B/L LE edema chronic) No lymphadenopathy.  Skin: No visible rashes or ulcers.      Plan:  - ordered  Maalox x1   - repositioned to release gas  - PPI daily   - RN to call if any changes.

## 2022-06-22 NOTE — PROGRESS NOTE ADULT - SUBJECTIVE AND OBJECTIVE BOX
[facility] -- Reid Astorga, Norah, Raheem, Flex Calvert Savella, Goodger  Office # [phone]    Follow Up:  NSTEMI    Subjective/Observations: Seen and evaluated, NC in use.  Denies SOB or orthopnea.  Denies chest pain or palpitations.  No t alison events.  c/o epigastric pain radiating to back overnight    REVIEW OF SYSTEMS: All other review of systems is negative unless indicated above  PAST MEDICAL & SURGICAL HISTORY:  Hypothyroid    Hypertension    Lower extremity edema    Paroxysmal atrial fibrillation    MEDICATIONS  (STANDING):  acetylcysteine  Oral Solution 600 milliGRAM(s) Oral every 12 hours  aspirin enteric coated 81 milliGRAM(s) Oral daily  atorvastatin 80 milliGRAM(s) Oral at bedtime  clopidogrel Tablet 75 milliGRAM(s) Oral daily  levothyroxine 75 MICROGram(s) Oral daily  metoprolol succinate ER 50 milliGRAM(s) Oral daily  pantoprazole  Injectable 40 milliGRAM(s) IV Push daily  potassium phosphate / sodium phosphate Powder (PHOS-NaK) 1 Packet(s) Oral once    MEDICATIONS  (PRN):  aluminum hydroxide/magnesium hydroxide/simethicone Suspension 30 milliLiter(s) Oral every 6 hours PRN Dyspepsia    Allergies    No Known Allergies    Intolerances    Vital Signs Last 24 Hrs  T(C): 36.4 (22 Jun 2022 05:32), Max: 37 (21 Jun 2022 19:06)  T(F): 97.6 (22 Jun 2022 05:32), Max: 98.6 (21 Jun 2022 19:06)  HR: 78 (22 Jun 2022 05:32) (68 - 84)  BP: 115/68 (22 Jun 2022 05:32) (104/64 - 115/68)  BP(mean): --  RR: 18 (22 Jun 2022 05:32) (16 - 18)  SpO2: 95% (22 Jun 2022 05:32) (93% - 95%)  I&O's Summary    21 Jun 2022 07:01  -  22 Jun 2022 07:00  --------------------------------------------------------  IN: 240 mL / OUT: 1100 mL / NET: -860 mL     PHYSICAL EXAM:  TELE: NSR  Constitutional: NAD, awake and alert, well-developed  HEENT: Moist Mucous Membranes, Anicteric  Pulmonary: Non-labored, breath sounds are clear but slightly diminished at bases bilaterally, No wheezing, rales or rhonchi  Cardiovascular: Regular, S1 and S2, +murmurs, no rubs, gallops or clicks  Gastrointestinal: Bowel Sounds present, soft, nontender.   Lymph: Trace lumbar, +1 BLE edema. No lymphadenopathy.  Skin: No visible rashes or ulcers.  Psych:  Mood & affect appropriate  LABS: All Labs Reviewed:                        10.3   4.71  )-----------( 181      ( 22 Jun 2022 06:32 )             31.6                         9.8    4.43  )-----------( 165      ( 21 Jun 2022 06:20 )             30.2                         10.5   6.09  )-----------( 180      ( 20 Jun 2022 09:53 )             32.3     22 Jun 2022 06:32    137    |  102    |  27     ----------------------------<  89     3.7     |  31     |  1.50   21 Jun 2022 06:20    138    |  101    |  26     ----------------------------<  85     3.9     |  28     |  1.30   20 Jun 2022 09:53    138    |  102    |  25     ----------------------------<  115    4.2     |  31     |  1.50     Ca    7.6        22 Jun 2022 06:32  Ca    7.4        21 Jun 2022 06:20  Ca    7.6        20 Jun 2022 09:53  Phos  2.6       22 Jun 2022 06:32  Phos  2.7       21 Jun 2022 06:20  Phos  2.2       20 Jun 2022 09:53  Mg     2.3       22 Jun 2022 06:32  Mg     2.2       21 Jun 2022 06:20  Mg     2.2       20 Jun 2022 09:53    TPro  6.0    /  Alb  2.2    /  TBili  0.8    /  DBili  x      /  AST  41     /  ALT  21     /  AlkPhos  84     22 Jun 2022 06:32  TPro  6.1    /  Alb  2.1    /  TBili  1.0    /  DBili  x      /  AST  41     /  ALT  21     /  AlkPhos  82     21 Jun 2022 06:20  TPro  6.4    /  Alb  2.4    /  TBili  1.0    /  DBili  x      /  AST  57     /  ALT  22     /  AlkPhos  88     20 Jun 2022 09:53    PT/INR - ( 22 Jun 2022 06:32 )   PT: 16.6 sec;   INR: 1.41 ratio     PTT - ( 22 Jun 2022 06:32 )  PTT:36.9 sec    ACC: [id number] EXAM:  ECHO TTE WO CON COMP W DOPP                          PROCEDURE DATE:  06/19/2022      INTERPRETATION:  INDICATION: Chest pain  Sonographer LK    Blood Pressure 147/84    Height 165.1 cm     Weight 72.6 kg       BSA 1.8   sqm    Dimensions:  LA 3.8       Normal Values: 2.0 - 4.0 cm  Ao 3.2        Normal Values: 2.0 - 3.8 cm  SEPTUM 1.7       Normal Values: 0.6 - 1.2 cm  PWT 1.2       Normal Values: 0.6 - 1.1 cm  LVIDd 4.8         Normal Values: 3.0 - 5.6 cm  LVIDs 3.2      Normal Values: 1.8 - 4.0 cm    OBSERVATIONS:  Mitral Valve: Mitral annular calcification with thickened leaflets, mild   MR.  Aortic Valve/Aorta: Calcified trileaflet aortic valve with decreased   opening. Peak transaortic valve gradient equals 20.4 mmHg with a mean   transaortic valve gradient 13.2 mmHg. By visual estimation there appears   to be mild to moderate aortic stenosis  Tricuspid Valve: Moderate TR.  Pulmonic Valve: Trace PI  Left Atrium: Enlarged  Right Atrium: Enlarged  Left Ventricle: Mild to moderate segmental left ventricular systolic   dysfunction, estimated LVEF of 35-40%. The apex, mid inferoseptal and mid   anterolateral walls appear severely hypokinetic  Right Ventricle: Right ventricular enlargement with grossly normal   function  Pericardium: no significant pericardial effusion.  Pulmonary/RV Pressure: estimated PA systolic pressure of 36 mmHg  IVC measures 1.47 cm    IMPRESSION:  Mild to moderate segmental left ventricular systolic dysfunction,   estimated LVEF of 35-40%. The apex, mid inferoseptal and mid   anterolateral walls appear severely hypokinetic  Right ventricular enlargement with grossly normal function  Biatrial enlargement  Calcified trileaflet aortic valve with mild to moderate aortic stenosis,   without AI.  Mild MR  Moderate TR.  No significant pericardial effusion.    --- End of Report ---    JEAN CARLOS HENRY MD; Attending Cardiologist  This document has been electronically signed. Jun 20 2022 12:56PM    ACC: [id number] EXAM:  XR CHEST PORTABLE URGENT 1V                          PROCEDURE DATE:  06/17/2022      INTERPRETATION:  AP semierect chest on June 17, 2022 at 8:35 AM. Patient   has chest pain.    Heart magnified by technique.    There is elevation of the right hemidiaphragm again noted.    Mild right base effusion somewhat increased from October 11, 2021.    IMPRESSION: As above.    --- End of Report ---    RUSSELL MONTES MD; Attending Radiologist  This document has been electronically signed. Jun 17 2022 11:18AM     Ventricular Rate 99 BPM    Atrial Rate 73 BPM    QRS Duration 148 ms    Q-T Interval 434 ms    QTC Calculation(Bazett) 556 ms    R Axis -65 degrees    T Axis 153 degrees    Diagnosis Line Atrial fibrillation with premature ventricular or aberrantlyconducted complexes  Left axis deviation  Left bundle branch block  Abnormal ECG  Confirmed by Jhonny Almazan MD ([id number]) on 6/18/2022 11:46:05 AM

## 2022-06-22 NOTE — PROGRESS NOTE ADULT - PROBLEM SELECTOR PLAN 5
chronic, stable - rate-controlled - on coumadin  - HOLD coumadin, goal INR 1.6 in order to be transferred for cath   - Toprol  XL 50mg PO qd w/ hold parameters  TTE: EF 35-40%, LV hypokinesis  - continuous tele monitoring  -cardio Dr. Blair , recs appreciated- D/W cardio NP

## 2022-06-22 NOTE — PROGRESS NOTE ADULT - ATTENDING COMMENTS
Patient is a 89 y/o F with a pmhx b/l LE edema, HTN, P Afib (on coumadin), and hypothyroidism presenting with diarrhea and mid-sternal CP admitted for CP w/ elevated trops, NSTEMI.  Pt seen, examined, case & care plan d/w pt, residents at detail.  -Cardiology Dr Calvert  follow up - Cr -mildly elevated, HOLD Lasix today,   -Renal DR ALONSO Lara  follow up , S/P Mucomyst given  -AM labs   -PO diet  -Palliative care -DNR/DNI  -Possible transfer for cardiac Cath if stable Respiratory status. Cr stable.  - COVID 19 -Neg  -D/W Dtr & Son in Law at detail.  Total care time is 40 minutes. Patient is a 89 y/o F with a pmhx b/l LE edema, HTN, P Afib (on coumadin), and hypothyroidism presenting with diarrhea and mid-sternal CP admitted for CP w/ elevated trops, NSTEMI.  Pt seen, examined, case & care plan d/w pt, residents at detail.  -Cardiology Dr Calvert  follow up - Cr -mildly elevated, HOLD Lasix today,   -Renal DR ALONSO Lara  follow up , S/P Mucomyst given  -AM labs   -PO diet  -Palliative care -DNR/DNI  -Possible transfer for cardiac Cath if stable Respiratory status. Cr stable.  - COVID 19 -Neg  -D/W Dtr & Son in Law at detail.  Total care time is 40 minutes.

## 2022-06-22 NOTE — PROGRESS NOTE ADULT - PROBLEM SELECTOR PLAN 2
Acute Systolic CHF TTE: EF 35-40%, LV hypokinesis  - Noted to be SOB on 6/20 with increase in proBNP>13k  - s/p Lasix 40mg IV x2 on 6/20 and 6/21, elevated pBNP ~ 02311  - avoid IVF  - I/Os, daily weights, D/W Cardio-Lasix 40 mg IV x 1 dose 6/21    Mild to moderate segmental left ventricular systolic dysfunction,   estimated LVEF of 35-40%. The apex, mid inferoseptal and mid   anterolateral walls appear severely hypokinetic  Right ventricular enlargement with grossly normal function  Biatrial enlargement  Calcified trileaflet aortic valve with mild to moderate aortic stenosis,   without AI.  Mild MR  Moderate TR.  No significant pericardial effusion.

## 2022-06-22 NOTE — PROGRESS NOTE ADULT - PROBLEM SELECTOR PLAN 4
LUIS CARLOS on CKD stage 3B?   -Cr 1.5 baseline appears to be 1-1.2  -start Mucomyst q 12 hrs x 4 doses for prep for Cardiac Cath   - avoid IVF due to CHF  - s/p Lasix 40mg IV x2 on 6/20 and 6/21, elevated pBNP ~ 47881 for CHF  -consulted Dr. FLORIAN nephro-BMP in AM

## 2022-06-22 NOTE — PROGRESS NOTE ADULT - PROBLEM SELECTOR PLAN 9
DVT ppx HOLD coumadin, goal INR for transfer for cath is 1.6  s/p full dose lovenox renally dosed on 6/21

## 2022-06-22 NOTE — PROGRESS NOTE ADULT - ASSESSMENT
Patient is a 89 y/o F with a pmhx b/l LE edema, HTN, P Afib (on coumadin), and hypothyroidism presenting with diarrhea and mid-sternal CP admitted for CP w/ elevated trops, NSTEMI. On ACS therapy, INR therapeutic, family agreed to cardiac cath, plan for transfer for cath once INR 1.6 and LUIS CARLOS improved.  Patient is a 89 y/o F with a pmhx b/l LE edema, HTN, P Afib (on coumadin), and hypothyroidism presenting with diarrhea and mid-sternal CP admitted for CP w/ elevated trops, NSTEMI. On ACS therapy, INR therapeutic, family agreed to cardiac cath, plan for transfer for cath once INR 1.6 and LUIS CARLOS improved.

## 2022-06-22 NOTE — PROGRESS NOTE ADULT - SUBJECTIVE AND OBJECTIVE BOX
Patient is a 89y old  Female who presents with a chief complaint of CP w/ elevated trops (21 Jun 2022 16:14)    HPI:  Patient is a 89 y/o F with a pmhx b/l LE edema, HTN, P Afib (on coumadin), and hypothyroidism presenting with sudden onset mid-sternal CP. Pt describes pain as a "prickly feeling", rated 4/10, constant in nature since 4am on 6/17, resolved in the ED. Pain was associated with L arm weakness, now resolved. Pt states the pain woke her up, and she was scared to walk and was unable to use her L arm for support to get out of bed. Pt was also nauseous at this time. No associated diaphoresis, palpitations, chest pressure. No amaurosis fugax, facial droop, garbled speech, hemiparesis, LOC, falls reported. No vomiting, fevers, chills, cough, sore throat, SOB, abd pain, constipation, dysuria. Pt had one loose BM this AM at home, denied hematochezia or melena.       In ED:  VS - HR 71 | /65 | RR 18 | sO2 94 | T 97.6  Labs - HgB 11.2 | INR 1.85 | K 3.3 | Glu 114 | trop 342.6 | CKMB 3.2 | BNP 2396  CTH NC - No acute intracranial hemorrhage. Lacunar infarct R inferior cerebellum  EKG - vent rate 66, QTc 501ms, sinus rhythm, old LBBB, no ischemic changes on personal read  Given -  mg PO. pt seen by cardiology Dr Almazan , pt admitted to [facility] for NSTEMI. (17 Jun 2022 09:52)    INTERVAL HPI:  6/18/22: Seen and examined at bedside. No acute complaints. Denies chest pain overnight and this AM. Overnight pt had episode of dyspnea, troponins inc from 300s to >84007. Trops now downtrended. Received 1 x dose 40mg IV lasix for dyspnea. Pt had statin dose inc to 80mg, had 1 x dose lopressor 25mg, and had her scheduled evening coumadin. Had 10 beats of NSVT this AM. Pt was started on standing 50mg toprol xL PO qD. Started on plavix 75mg qD. Extensive counseling done with the patient on [facility] and her current state of health. Patient states she has "forms at home" that her daughter will bring to [facility]. Patient values quality of life and wants to discuss with her daughter and son-inlaw. Family to discuss potential plan for cardiac cath and will arrive to a decision on 6/19. C/W medical therapy for ACS. HOLD coumadin as pt INR is therapeutic and to facilitate potential for cardiac cath. Will start renal adjusted FD Lovenox on 6/19 if INR is below 2.   6/19/22 Pt seen and examined at bedside. Pt states she slept well last night. Pt denies SOB, CP, palpitations, dizziness .Mildly elevated Cr , INR -Theraputic  Pt states the last time she had chest pain was Friday night. Later in the afternoon again patient was seen at bedside with  daughter HCP and son in law at bedside, decision was made for DNR/ DNI and cardiac cath.  6/20/22: Patient seen and examined at bedside. Patient denies chest pain, palpitations, dyspnea. Noted to be slightly short of breath. proBNP >13k, will give 1 dose of lasix 40mg IV today.  6/21/22: Patient continues to deny anginal symptoms. Will be transferred for cardiac cath, as INR downtrended to 1.6 and Cr improved to 1.3. For LUIS CARLOS on CKD, will defer further IVF hydration at this time due to TTE results and SOB after previous IVF. Will give Mucomyst q12 x4 for renal protection. Given lasix 40mg IVP x1 to improve respirations. Given full dose lovenox x1.   6/22/22: Overnight patient with R sided abdominal discomfort, given maalox. Patient breathing improved s/p lasix 40mg IV yesterday. Patient's INR 1.4 today and Cr increased to 1.5. Plan for cardiac cath.          OVERNIGHT EVENTS: Overnight patient with R sided abdominal discomfort, given maalox.     Home Medications:  aspirin 81 mg oral delayed release tablet: 1 tab(s) orally once a day (21 Jun 2022 08:48)  atorvastatin 80 mg oral tablet: 1 tab(s) orally once a day (at bedtime) (21 Jun 2022 08:48)  clopidogrel 75 mg oral tablet: 1 tab(s) orally once a day (21 Jun 2022 08:48)  Lasix 40 mg oral tablet: 1 tab(s) orally 2 times a day (17 Jun 2022 11:27)  metoprolol succinate 50 mg oral tablet, extended release: 1 tab(s) orally once a day (21 Jun 2022 08:48)  pantoprazole 40 mg intravenous injection: 40 milligram(s) intravenous once a day (21 Jun 2022 08:48)  Synthroid 75 mcg (0.075 mg) oral tablet: 1 tab(s) orally once a day (17 Jun 2022 11:27)  warfarin 3 mg oral tablet: 1 tab(s) orally once a day (M, Tu, Th, F, Sa Su) (17 Jun 2022 11:27)  warfarin 4 mg oral tablet: 1 tab(s) orally Wednesday (17 Jun 2022 11:27)      MEDICATIONS  (STANDING):  acetylcysteine  Oral Solution 600 milliGRAM(s) Oral every 12 hours  aspirin enteric coated 81 milliGRAM(s) Oral daily  atorvastatin 80 milliGRAM(s) Oral at bedtime  clopidogrel Tablet 75 milliGRAM(s) Oral daily  levothyroxine 75 MICROGram(s) Oral daily  metoprolol succinate ER 50 milliGRAM(s) Oral daily  pantoprazole  Injectable 40 milliGRAM(s) IV Push daily    MEDICATIONS  (PRN):  aluminum hydroxide/magnesium hydroxide/simethicone Suspension 30 milliLiter(s) Oral every 6 hours PRN Dyspepsia      Allergies    No Known Allergies    Intolerances        Social History:  Home: lives alone  Smoke: none  Alcohol: none  Recreational drug use: none  Ambulate: independent, uses support from furniture and cane occasionally  Activities of daily living: independent  Job: retired  COVID - Vaccine (17 Jun 2022 09:52)      REVIEW OF SYSTEMS: i feel OK  CONSTITUTIONAL: No fever, No chills, No fatigue, No myalgia, No Body ache, No Weakness  EYES: No eye pain,  No visual disturbances, No discharge, NO Redness  ENMT:  No ear pain, No nose bleed, No vertigo; No sinus pain, NO throat pain, No Congestion  NECK: No pain, No stiffness  RESPIRATORY: No cough, NO wheezing, No hemoptysis, ADMITS brief shortness of breath earlier, now resolved  CARDIOVASCULAR: No chest pain, palpitations  GASTROINTESTINAL: No abdominal pain, NO epigastric pain. No nausea, No vomiting; No diarrhea, No constipation. [ x ] BM  GENITOURINARY: No dysuria, No frequency, No urgency, No hematuria, NO incontinence  NEUROLOGICAL: No headaches, No dizziness, No numbness, No tingling, No tremors, No weakness  EXT: admits CHRONIC Swelling in LE's;  No Pain, admits LE Edema  SKIN:  [ x ] No itching, burning, rashes, or lesions   MUSCULOSKELETAL: No joint pain ,No Jt swelling; No muscle pain, No back pain, No extremity pain  PSYCHIATRIC: No depression,  No anxiety,  No mood swings ,No difficulty sleeping at night  PAIN SCALE: [ x ] None  [  ] Other-  ROS Unable to obtain due to - [  ] Dementia  [  ] Lethargy [  ] Drowsy [  ] Sedated [  ] non verbal  REST OF REVIEW Of SYSTEM - [ x ] Normal     Vital Signs Last 24 Hrs  T(C): 36.4 (22 Jun 2022 05:32), Max: 37 (21 Jun 2022 19:06)  T(F): 97.6 (22 Jun 2022 05:32), Max: 98.6 (21 Jun 2022 19:06)  HR: 78 (22 Jun 2022 05:32) (68 - 84)  BP: 115/68 (22 Jun 2022 05:32) (104/64 - 115/68)  BP(mean): --  RR: 18 (22 Jun 2022 05:32) (16 - 18)  SpO2: 95% (22 Jun 2022 05:32) (93% - 95%)  Finger Stick        06-21 @ 07:01  -  06-22 @ 07:00  --------------------------------------------------------  IN: 240 mL / OUT: 1100 mL / NET: -860 mL        PHYSICAL EXAM:   GENERAL:  [ x ] NAD , [ x ] well appearing, [  ] Agitated, [  ] Drowsy,  [  ] Lethargy, [  ] confused   HEAD:  [ x ] Normal, [  ] Other  EYES:  [ x ] EOMI, [ x ] PERRLA, [ x ] conjunctiva and sclera clear normal, [  ] Other,  [  ] Pallor,[  ] Discharge  ENMT:  [ x ] Normal, [ x ] Moist mucous membranes, [ x ] Good dentition, [ x ] No Thrush  NECK:  [ x ] Supple, [x  ] No JVD, [ x ] Normal thyroid, [  ] Lymphadenopathy [  ] Other  CHEST/LUNG:  [ x ] Clear to auscultation bilaterally, [ x ] Breath Sounds equal B/L , [  ] poor effort  [ x ] No rales, [ x ] No rhonchi  [ x ]  No wheezing,   HEART:  [ x ] Regular rate and IRREGULARLY IRREGULAR rhythm, [  ] tachycardia, [  ] Bradycardia,  [  ] irregular  [ x ] No murmurs, No rubs, No gallops, [  ] PPM in place (Mfr:  )  ABDOMEN:  [ x ] Soft, [ x ] Nontender, [ x ] Nondistended, [ x ] No mass, [ x] Bowel sounds present, [  ] obese  NERVOUS SYSTEM:  [ x ] Alert & Oriented X3, [ x ] Nonfocal  [  ] Confusion  [  ] Encephalopathic [  ] Sedated [  ] Unable to assess, [  ] Dementia [  ] Other-  EXTREMITIES: [ x ] 2+ Peripheral Pulses, No clubbing, No cyanosis,  [ x ] 2 + pitting edema B/L lower EXT. [ x ] PVD stasis skin changes B/L Lower EXT, [  ] wound  LYMPH: No lymphadenopathy noted  SKIN:  [ x ] No rashes or lesions, [  ] Pressure Ulcers, [  ] ecchymosis, [  ] Skin Tears, [  ] Other    DIET: Diet, DASH/TLC:   Sodium & Cholesterol Restricted (06-17-22 @ 10:27)      LABS:                        10.3   4.71  )-----------( 181      ( 22 Jun 2022 06:32 )             31.6     22 Jun 2022 06:32    137    |  102    |  27     ----------------------------<  89     3.7     |  31     |  1.50     Ca    7.6        22 Jun 2022 06:32  Phos  2.6       22 Jun 2022 06:32  Mg     2.3       22 Jun 2022 06:32    TPro  6.0    /  Alb  2.2    /  TBili  0.8    /  DBili  x      /  AST  41     /  ALT  21     /  AlkPhos  84     22 Jun 2022 06:32    PT/INR - ( 22 Jun 2022 06:32 )   PT: 16.6 sec;   INR: 1.41 ratio         PTT - ( 22 Jun 2022 06:32 )  PTT:36.9 sec              CARDIAC MARKERS ( 18 Jun 2022 08:32 )  x     / x     / 364 U/L / x     / 24.0 ng/mL  CARDIAC MARKERS ( 17 Jun 2022 23:34 )  x     / x     / 497 U/L / x     / 52.0 ng/mL  CARDIAC MARKERS ( 17 Jun 2022 14:55 )  x     / x     / 523 U/L / x     / 70.3 ng/mL  CARDIAC MARKERS ( 17 Jun 2022 08:40 )  x     / x     / 135 U/L / x     / 3.2 ng/mL             Anemia Panel:  Iron Total, Serum: 88 ug/dL (06-18-22 @ 10:09)  Iron - Total Binding Capacity.: 354 ug/dL (06-18-22 @ 10:09)  Ferritin, Serum: 40 ng/mL (06-18-22 @ 10:09)  Vitamin B12, Serum: 619 pg/mL (06-18-22 @ 10:09)  Folate, Serum: 14.9 ng/mL (06-18-22 @ 10:09)      Thyroid Panel:  Thyroid Stimulating Hormone, Serum: 0.42 uIU/mL (06-18-22 @ 08:32)        Lipase, Serum: 157 U/L (06-17-22 @ 07:28)      Serum Pro-Brain Natriuretic Peptide: 79087 pg/mL (06-20-22 @ 09:53)  Serum Pro-Brain Natriuretic Peptide: 2396 pg/mL (06-17-22 @ 07:33)      RADIOLOGY & ADDITIONAL TESTS:      HEALTH ISSUES - PROBLEM Dx:  Lower extremity edema    Paroxysmal atrial fibrillation    Hypertension    Anemia    Hypothyroid    Need for prophylactic measure    Acute systolic congestive heart failure    NSTEMI (non-ST elevation myocardial infarction)    Acute kidney injury superimposed on CKD            Consultant(s) Notes Reviewed:  [ x ] YES     Care Discussed with [X] Consultants  [  ] Patient  [  ] Family [  ] HCP [  ]   [  ] Social Service  [  ] RN, [  ] Physical Therapy,[  ] Palliative care team  DVT PPX: [  ] Lovenox, [  ] S C Heparin, [  ] Coumadin, [  ] Xarelto, [  ] Eliquis, [  ] Pradaxa, [  ] IV Heparin drip, [  ] SCD [  ] Contraindication 2 to GI Bleed,[  ] Ambulation [  ] Contraindicated 2 to  bleed [  ] Contraindicated 2 to Brain Bleed  Advanced directive: [  ] None, [ x ] DNR/DNI Patient is a 89y old  Female who presents with a chief complaint of CP w/ elevated trops (21 Jun 2022 16:14)    HPI:  Patient is a 89 y/o F with a pmhx b/l LE edema, HTN, P Afib (on coumadin), and hypothyroidism presenting with sudden onset mid-sternal CP. Pt describes pain as a "prickly feeling", rated 4/10, constant in nature since 4am on 6/17, resolved in the ED. Pain was associated with L arm weakness, now resolved. Pt states the pain woke her up, and she was scared to walk and was unable to use her L arm for support to get out of bed. Pt was also nauseous at this time. No associated diaphoresis, palpitations, chest pressure. No amaurosis fugax, facial droop, garbled speech, hemiparesis, LOC, falls reported. No vomiting, fevers, chills, cough, sore throat, SOB, abd pain, constipation, dysuria. Pt had one loose BM this AM at home, denied hematochezia or melena.       In ED:  VS - HR 71 | /65 | RR 18 | sO2 94 | T 97.6  Labs - HgB 11.2 | INR 1.85 | K 3.3 | Glu 114 | trop 342.6 | CKMB 3.2 | BNP 2396  CTH NC - No acute intracranial hemorrhage. Lacunar infarct R inferior cerebellum  EKG - vent rate 66, QTc 501ms, sinus rhythm, old LBBB, no ischemic changes on personal read  Given -  mg PO. pt seen by cardiology Dr Almazan , pt admitted to [facility] for NSTEMI. (17 Jun 2022 09:52)    INTERVAL HPI:  6/18/22: Seen and examined at bedside. No acute complaints. Denies chest pain overnight and this AM. Overnight pt had episode of dyspnea, troponins inc from 300s to >04337. Trops now downtrended. Received 1 x dose 40mg IV lasix for dyspnea. Pt had statin dose inc to 80mg, had 1 x dose lopressor 25mg, and had her scheduled evening coumadin. Had 10 beats of NSVT this AM. Pt was started on standing 50mg toprol xL PO qD. Started on plavix 75mg qD. Extensive counseling done with the patient on [facility] and her current state of health. Patient states she has "forms at home" that her daughter will bring to [facility]. Patient values quality of life and wants to discuss with her daughter and son-andrewaw. Family to discuss potential plan for cardiac cath and will arrive to a decision on 6/19. C/W medical therapy for ACS. HOLD coumadin as pt INR is therapeutic and to facilitate potential for cardiac cath. Will start renal adjusted FD Lovenox on 6/19 if INR is below 2.   6/19/22 Pt seen and examined at bedside. Pt states she slept well last night. Pt denies SOB, CP, palpitations, dizziness .Mildly elevated Cr , INR -Theraputic  Pt states the last time she had chest pain was Friday night. Later in the afternoon again patient was seen at bedside with  daughter HCP and son in law at bedside, decision was made for DNR/ DNI and cardiac cath.  6/20/22: Patient seen and examined at bedside. Patient denies chest pain, palpitations, dyspnea. Noted to be slightly short of breath. proBNP >13k, will give 1 dose of lasix 40mg IV today.  6/21/22: Patient continues to deny anginal symptoms. Will be transferred for cardiac cath, as INR downtrended to 1.6 and Cr improved to 1.3. For LUIS CARLOS on CKD, will defer further IVF hydration at this time due to TTE results and SOB after previous IVF. Will give Mucomyst q12 x4 for renal protection. Given lasix 40mg IVP x1 to improve respirations. Given full dose lovenox x1.   6/22/22: Overnight patient with R sided abdominal discomfort, given maalox. Patient breathing improved s/p lasix 40mg IV yesterday. Patient's INR 1.4 today and Cr increased to 1.5. Plan for cardiac cath. If Cr stable,    OVERNIGHT EVENTS: Overnight patient with R sided abdominal discomfort, given maalox.     Home Medications:  aspirin 81 mg oral delayed release tablet: 1 tab(s) orally once a day (21 Jun 2022 08:48)  atorvastatin 80 mg oral tablet: 1 tab(s) orally once a day (at bedtime) (21 Jun 2022 08:48)  clopidogrel 75 mg oral tablet: 1 tab(s) orally once a day (21 Jun 2022 08:48)  Lasix 40 mg oral tablet: 1 tab(s) orally 2 times a day (17 Jun 2022 11:27)  metoprolol succinate 50 mg oral tablet, extended release: 1 tab(s) orally once a day (21 Jun 2022 08:48)  pantoprazole 40 mg intravenous injection: 40 milligram(s) intravenous once a day (21 Jun 2022 08:48)  Synthroid 75 mcg (0.075 mg) oral tablet: 1 tab(s) orally once a day (17 Jun 2022 11:27)  warfarin 3 mg oral tablet: 1 tab(s) orally once a day (M, Tu, Th, F, Sa Grider) (17 Jun 2022 11:27)  warfarin 4 mg oral tablet: 1 tab(s) orally Wednesday (17 Jun 2022 11:27)      MEDICATIONS  (STANDING):  acetylcysteine  Oral Solution 600 milliGRAM(s) Oral every 12 hours  aspirin enteric coated 81 milliGRAM(s) Oral daily  atorvastatin 80 milliGRAM(s) Oral at bedtime  clopidogrel Tablet 75 milliGRAM(s) Oral daily  levothyroxine 75 MICROGram(s) Oral daily  metoprolol succinate ER 50 milliGRAM(s) Oral daily  pantoprazole  Injectable 40 milliGRAM(s) IV Push daily    MEDICATIONS  (PRN):  aluminum hydroxide/magnesium hydroxide/simethicone Suspension 30 milliLiter(s) Oral every 6 hours PRN Dyspepsia      Allergies    No Known Allergies    Intolerances        Social History:  Home: lives alone  Smoke: none  Alcohol: none  Recreational drug use: none  Ambulate: independent, uses support from furniture and cane occasionally  Activities of daily living: independent  Job: retired  COVID - Vaccine (17 Jun 2022 09:52)      REVIEW OF SYSTEMS: i feel OK  CONSTITUTIONAL: No fever, No chills, No fatigue, No myalgia, No Body ache, No Weakness  EYES: No eye pain,  No visual disturbances, No discharge, NO Redness  ENMT:  No ear pain, No nose bleed, No vertigo; No sinus pain, NO throat pain, No Congestion  NECK: No pain, No stiffness  RESPIRATORY: No cough, NO wheezing, No hemoptysis, ADMITS brief shortness of breath earlier, now resolved  CARDIOVASCULAR: No chest pain, palpitations  GASTROINTESTINAL: No abdominal pain, NO epigastric pain. No nausea, No vomiting; No diarrhea, No constipation. [ x ] BM  GENITOURINARY: No dysuria, No frequency, No urgency, No hematuria, NO incontinence  NEUROLOGICAL: No headaches, No dizziness, No numbness, No tingling, No tremors, No weakness  EXT: admits CHRONIC Swelling in LE's;  No Pain, admits LE Edema  SKIN:  [ x ] No itching, burning, rashes, or lesions   MUSCULOSKELETAL: No joint pain ,No Jt swelling; No muscle pain, No back pain, No extremity pain  PSYCHIATRIC: No depression,  No anxiety,  No mood swings ,No difficulty sleeping at night  PAIN SCALE: [ x ] None  [  ] Other-  ROS Unable to obtain due to - [  ] Dementia  [  ] Lethargy [  ] Drowsy [  ] Sedated [  ] non verbal  REST OF REVIEW Of SYSTEM - [ x ] Normal     Vital Signs Last 24 Hrs  T(C): 36.4 (22 Jun 2022 05:32), Max: 37 (21 Jun 2022 19:06)  T(F): 97.6 (22 Jun 2022 05:32), Max: 98.6 (21 Jun 2022 19:06)  HR: 78 (22 Jun 2022 05:32) (68 - 84)  BP: 115/68 (22 Jun 2022 05:32) (104/64 - 115/68)  BP(mean): --  RR: 18 (22 Jun 2022 05:32) (16 - 18)  SpO2: 95% (22 Jun 2022 05:32) (93% - 95%)  Finger Stick        06-21 @ 07:01  -  06-22 @ 07:00  --------------------------------------------------------  IN: 240 mL / OUT: 1100 mL / NET: -860 mL        PHYSICAL EXAM: O2 NC  GENERAL:  [ x ] NAD , [ x ] well appearing, [  ] Agitated, [  ] Drowsy,  [  ] Lethargy, [  ] confused   HEAD:  [ x ] Normal, [  ] Other  EYES:  [ x ] EOMI, [ x ] PERRLA, [ x ] conjunctiva and sclera clear normal, [  ] Other,  [  ] Pallor,[  ] Discharge  ENMT:  [ x ] Normal, [ x ] Moist mucous membranes, [ x ] Good dentition, [ x ] No Thrush  NECK:  [ x ] Supple, [x  ] No JVD, [ x ] Normal thyroid, [  ] Lymphadenopathy [  ] Other  CHEST/LUNG:  [ x ] Clear to auscultation bilaterally, [ x ] Breath Sounds equal B/L , [  ] poor effort  [ x ] No rales, [ x ] No rhonchi  [ x ]  No wheezing,   HEART:  [ x ] Regular rate and IRREGULARLY IRREGULAR rhythm, [  ] tachycardia, [  ] Bradycardia,  [  ] irregular  [ x ] No murmurs, No rubs, No gallops, [  ] PPM in place (Mfr:  )  ABDOMEN:  [ x ] Soft, [ x ] Nontender, [ x ] Nondistended, [ x ] No mass, [ x] Bowel sounds present, [  ] obese  NERVOUS SYSTEM:  [ x ] Alert & Oriented X3, [ x ] Nonfocal  [  ] Confusion  [  ] Encephalopathic [  ] Sedated [  ] Unable to assess, [  ] Dementia [  ] Other-  EXTREMITIES: [ x ] 2+ Peripheral Pulses, No clubbing, No cyanosis,  [ x ] 2 + pitting edema B/L lower EXT. [ x ] PVD stasis skin changes B/L Lower EXT, [  ] wound  LYMPH: No lymphadenopathy noted  SKIN:  [ x ] No rashes or lesions, [  ] Pressure Ulcers, [  ] ecchymosis, [  ] Skin Tears, [  ] Other    DIET: Diet, DASH/TLC:   Sodium & Cholesterol Restricted (06-17-22 @ 10:27)      LABS:                        10.3   4.71  )-----------( 181      ( 22 Jun 2022 06:32 )             31.6     22 Jun 2022 06:32    137    |  102    |  27     ----------------------------<  89     3.7     |  31     |  1.50     Ca    7.6        22 Jun 2022 06:32  Phos  2.6       22 Jun 2022 06:32  Mg     2.3       22 Jun 2022 06:32    TPro  6.0    /  Alb  2.2    /  TBili  0.8    /  DBili  x      /  AST  41     /  ALT  21     /  AlkPhos  84     22 Jun 2022 06:32    PT/INR - ( 22 Jun 2022 06:32 )   PT: 16.6 sec;   INR: 1.41 ratio         PTT - ( 22 Jun 2022 06:32 )  PTT:36.9 sec              CARDIAC MARKERS ( 18 Jun 2022 08:32 )  x     / x     / 364 U/L / x     / 24.0 ng/mL  CARDIAC MARKERS ( 17 Jun 2022 23:34 )  x     / x     / 497 U/L / x     / 52.0 ng/mL  CARDIAC MARKERS ( 17 Jun 2022 14:55 )  x     / x     / 523 U/L / x     / 70.3 ng/mL  CARDIAC MARKERS ( 17 Jun 2022 08:40 )  x     / x     / 135 U/L / x     / 3.2 ng/mL             Anemia Panel:  Iron Total, Serum: 88 ug/dL (06-18-22 @ 10:09)  Iron - Total Binding Capacity.: 354 ug/dL (06-18-22 @ 10:09)  Ferritin, Serum: 40 ng/mL (06-18-22 @ 10:09)  Vitamin B12, Serum: 619 pg/mL (06-18-22 @ 10:09)  Folate, Serum: 14.9 ng/mL (06-18-22 @ 10:09)      Thyroid Panel:  Thyroid Stimulating Hormone, Serum: 0.42 uIU/mL (06-18-22 @ 08:32)        Lipase, Serum: 157 U/L (06-17-22 @ 07:28)      Serum Pro-Brain Natriuretic Peptide: 78265 pg/mL (06-20-22 @ 09:53)  Serum Pro-Brain Natriuretic Peptide: 2396 pg/mL (06-17-22 @ 07:33)      RADIOLOGY & ADDITIONAL TESTS:      HEALTH ISSUES - PROBLEM Dx:  Lower extremity edema    Paroxysmal atrial fibrillation    Hypertension    Anemia    Hypothyroid    Need for prophylactic measure    Acute systolic congestive heart failure    NSTEMI (non-ST elevation myocardial infarction)    Acute kidney injury superimposed on CKD            Consultant(s) Notes Reviewed:  [ x ] YES     Care Discussed with [X] Consultants  [  ] Patient  [  ] Family [  ] HCP [  ]   [  ] Social Service  [  ] RN, [  ] Physical Therapy,[  ] Palliative care team  DVT PPX: [  ] Lovenox, [  ] S C Heparin, [  ] Coumadin, [  ] Xarelto, [  ] Eliquis, [  ] Pradaxa, [  ] IV Heparin drip, [  ] SCD [  ] Contraindication 2 to GI Bleed,[  ] Ambulation [  ] Contraindicated 2 to  bleed [  ] Contraindicated 2 to Brain Bleed  Advanced directive: [  ] None, [ x ] DNR/DNI Patient is a 89y old  Female who presents with a chief complaint of CP w/ elevated trops (21 Jun 2022 16:14)    HPI:  Patient is a 89 y/o F with a pmhx b/l LE edema, HTN, P Afib (on coumadin), and hypothyroidism presenting with sudden onset mid-sternal CP. Pt describes pain as a "prickly feeling", rated 4/10, constant in nature since 4am on 6/17, resolved in the ED. Pain was associated with L arm weakness, now resolved. Pt states the pain woke her up, and she was scared to walk and was unable to use her L arm for support to get out of bed. Pt was also nauseous at this time. No associated diaphoresis, palpitations, chest pressure. No amaurosis fugax, facial droop, garbled speech, hemiparesis, LOC, falls reported. No vomiting, fevers, chills, cough, sore throat, SOB, abd pain, constipation, dysuria. Pt had one loose BM this AM at home, denied hematochezia or melena.       In ED:  VS - HR 71 | /65 | RR 18 | sO2 94 | T 97.6  Labs - HgB 11.2 | INR 1.85 | K 3.3 | Glu 114 | trop 342.6 | CKMB 3.2 | BNP 2396  CTH NC - No acute intracranial hemorrhage. Lacunar infarct R inferior cerebellum  EKG - vent rate 66, QTc 501ms, sinus rhythm, old LBBB, no ischemic changes on personal read  Given -  mg PO. pt seen by cardiology Dr Almazan , pt admitted to [facility] for NSTEMI. (17 Jun 2022 09:52)    INTERVAL HPI:  6/18/22: Seen and examined at bedside. No acute complaints. Denies chest pain overnight and this AM. Overnight pt had episode of dyspnea, troponins inc from 300s to >54766. Trops now downtrended. Received 1 x dose 40mg IV lasix for dyspnea. Pt had statin dose inc to 80mg, had 1 x dose lopressor 25mg, and had her scheduled evening coumadin. Had 10 beats of NSVT this AM. Pt was started on standing 50mg toprol xL PO qD. Started on plavix 75mg qD. Extensive counseling done with the patient on [facility] and her current state of health. Patient states she has "forms at home" that her daughter will bring to [facility]. Patient values quality of life and wants to discuss with her daughter and son-andrewaw. Family to discuss potential plan for cardiac cath and will arrive to a decision on 6/19. C/W medical therapy for ACS. HOLD coumadin as pt INR is therapeutic and to facilitate potential for cardiac cath. Will start renal adjusted FD Lovenox on 6/19 if INR is below 2.   6/19/22 Pt seen and examined at bedside. Pt states she slept well last night. Pt denies SOB, CP, palpitations, dizziness .Mildly elevated Cr , INR -Theraputic  Pt states the last time she had chest pain was Friday night. Later in the afternoon again patient was seen at bedside with  daughter HCP and son in law at bedside, decision was made for DNR/ DNI and cardiac cath.  6/20/22: Patient seen and examined at bedside. Patient denies chest pain, palpitations, dyspnea. Noted to be slightly short of breath. proBNP >13k, will give 1 dose of lasix 40mg IV today.  6/21/22: Patient continues to deny anginal symptoms. Will be transferred for cardiac cath, as INR downtrended to 1.6 and Cr improved to 1.3. For LUIS CARLOS on CKD, will defer further IVF hydration at this time due to TTE results and SOB after previous IVF. Will give Mucomyst q12 x4 for renal protection. Given lasix 40mg IVP x1 to improve respirations. Given full dose lovenox x1.   6/22/22: Overnight patient with R sided abdominal discomfort, given maalox. Patient breathing improved s/p lasix 40mg IV yesterday. Patient's INR 1.4 today and Cr increased to 1.5. Plan for cardiac cath. If Cr stable,    OVERNIGHT EVENTS: Overnight patient with R sided abdominal discomfort, given maalox.     Home Medications:  aspirin 81 mg oral delayed release tablet: 1 tab(s) orally once a day (21 Jun 2022 08:48)  atorvastatin 80 mg oral tablet: 1 tab(s) orally once a day (at bedtime) (21 Jun 2022 08:48)  clopidogrel 75 mg oral tablet: 1 tab(s) orally once a day (21 Jun 2022 08:48)  Lasix 40 mg oral tablet: 1 tab(s) orally 2 times a day (17 Jun 2022 11:27)  metoprolol succinate 50 mg oral tablet, extended release: 1 tab(s) orally once a day (21 Jun 2022 08:48)  pantoprazole 40 mg intravenous injection: 40 milligram(s) intravenous once a day (21 Jun 2022 08:48)  Synthroid 75 mcg (0.075 mg) oral tablet: 1 tab(s) orally once a day (17 Jun 2022 11:27)  warfarin 3 mg oral tablet: 1 tab(s) orally once a day (M, Tu, Th, F, Sa Grider) (17 Jun 2022 11:27)  warfarin 4 mg oral tablet: 1 tab(s) orally Wednesday (17 Jun 2022 11:27)      MEDICATIONS  (STANDING):  acetylcysteine  Oral Solution 600 milliGRAM(s) Oral every 12 hours  aspirin enteric coated 81 milliGRAM(s) Oral daily  atorvastatin 80 milliGRAM(s) Oral at bedtime  clopidogrel Tablet 75 milliGRAM(s) Oral daily  levothyroxine 75 MICROGram(s) Oral daily  metoprolol succinate ER 50 milliGRAM(s) Oral daily  pantoprazole  Injectable 40 milliGRAM(s) IV Push daily    MEDICATIONS  (PRN):  aluminum hydroxide/magnesium hydroxide/simethicone Suspension 30 milliLiter(s) Oral every 6 hours PRN Dyspepsia      Allergies    No Known Allergies    Intolerances        Social History:  Home: lives alone  Smoke: none  Alcohol: none  Recreational drug use: none  Ambulate: independent, uses support from furniture and cane occasionally  Activities of daily living: independent  Job: retired  COVID - Vaccine (17 Jun 2022 09:52)      REVIEW OF SYSTEMS: i feel OK  CONSTITUTIONAL: No fever, No chills, No fatigue, No myalgia, No Body ache, No Weakness  EYES: No eye pain,  No visual disturbances, No discharge, NO Redness  ENMT:  No ear pain, No nose bleed, No vertigo; No sinus pain, NO throat pain, No Congestion  NECK: No pain, No stiffness  RESPIRATORY: No cough, NO wheezing, No hemoptysis, ADMITS brief shortness of breath earlier, now resolved  CARDIOVASCULAR: No chest pain, palpitations  GASTROINTESTINAL: No abdominal pain, NO epigastric pain. No nausea, No vomiting; No diarrhea, No constipation. [ x ] BM  GENITOURINARY: No dysuria, No frequency, No urgency, No hematuria, NO incontinence  NEUROLOGICAL: No headaches, No dizziness, No numbness, No tingling, No tremors, No weakness  EXT: admits CHRONIC Swelling in LE's;  No Pain, admits LE Edema  SKIN:  [ x ] No itching, burning, rashes, or lesions   MUSCULOSKELETAL: No joint pain ,No Jt swelling; No muscle pain, No back pain, No extremity pain  PSYCHIATRIC: No depression,  No anxiety,  No mood swings ,No difficulty sleeping at night  PAIN SCALE: [ x ] None  [  ] Other-  ROS Unable to obtain due to - [  ] Dementia  [  ] Lethargy [  ] Drowsy [  ] Sedated [  ] non verbal  REST OF REVIEW Of SYSTEM - [ x ] Normal     Vital Signs Last 24 Hrs  T(C): 36.4 (22 Jun 2022 05:32), Max: 37 (21 Jun 2022 19:06)  T(F): 97.6 (22 Jun 2022 05:32), Max: 98.6 (21 Jun 2022 19:06)  HR: 78 (22 Jun 2022 05:32) (68 - 84)  BP: 115/68 (22 Jun 2022 05:32) (104/64 - 115/68)  BP(mean): --  RR: 18 (22 Jun 2022 05:32) (16 - 18)  SpO2: 95% (22 Jun 2022 05:32) (93% - 95%)  Finger Stick        06-21 @ 07:01  -  06-22 @ 07:00  --------------------------------------------------------  IN: 240 mL / OUT: 1100 mL / NET: -860 mL        PHYSICAL EXAM: O2 NC  GENERAL:  [ x ] NAD , [ x ] well appearing, [  ] Agitated, [  ] Drowsy,  [  ] Lethargy, [  ] confused   HEAD:  [ x ] Normal, [  ] Other  EYES:  [ x ] EOMI, [ x ] PERRLA, [ x ] conjunctiva and sclera clear normal, [  ] Other,  [  ] Pallor,[  ] Discharge  ENMT:  [ x ] Normal, [ x ] Moist mucous membranes, [ x ] Good dentition, [ x ] No Thrush  NECK:  [ x ] Supple, [x  ] No JVD, [ x ] Normal thyroid, [  ] Lymphadenopathy [  ] Other  CHEST/LUNG:  [ x ] Clear to auscultation bilaterally, [ x ] Breath Sounds equal B/L , [  ] poor effort  [ x ] No rales, [ x ] No rhonchi  [ x ]  No wheezing,   HEART:  [ x ] Regular rate and IRREGULARLY IRREGULAR rhythm, [  ] tachycardia, [  ] Bradycardia,  [  ] irregular  [ x ] No murmurs, No rubs, No gallops, [  ] PPM in place (Mfr:  )  ABDOMEN:  [ x ] Soft, [ x ] Nontender, [ x ] Nondistended, [ x ] No mass, [ x] Bowel sounds present, [  ] obese  NERVOUS SYSTEM:  [ x ] Alert & Oriented X3, [ x ] Nonfocal  [  ] Confusion  [  ] Encephalopathic [  ] Sedated [  ] Unable to assess, [  ] Dementia [  ] Other-  EXTREMITIES: [ x ] 2+ Peripheral Pulses, No clubbing, No cyanosis,  [ x ] 2 + pitting edema B/L lower EXT. [ x ] PVD stasis skin changes B/L Lower EXT, [  ] wound  LYMPH: No lymphadenopathy noted  SKIN:  [ x ] No rashes or lesions, [  ] Pressure Ulcers, [  ] ecchymosis, [  ] Skin Tears, [  ] Other    DIET: Diet, DASH/TLC:   Sodium & Cholesterol Restricted (06-17-22 @ 10:27)      LABS:                        10.3   4.71  )-----------( 181      ( 22 Jun 2022 06:32 )             31.6     22 Jun 2022 06:32    137    |  102    |  27     ----------------------------<  89     3.7     |  31     |  1.50     Ca    7.6        22 Jun 2022 06:32  Phos  2.6       22 Jun 2022 06:32  Mg     2.3       22 Jun 2022 06:32    TPro  6.0    /  Alb  2.2    /  TBili  0.8    /  DBili  x      /  AST  41     /  ALT  21     /  AlkPhos  84     22 Jun 2022 06:32    PT/INR - ( 22 Jun 2022 06:32 )   PT: 16.6 sec;   INR: 1.41 ratio         PTT - ( 22 Jun 2022 06:32 )  PTT:36.9 sec              CARDIAC MARKERS ( 18 Jun 2022 08:32 )  x     / x     / 364 U/L / x     / 24.0 ng/mL  CARDIAC MARKERS ( 17 Jun 2022 23:34 )  x     / x     / 497 U/L / x     / 52.0 ng/mL  CARDIAC MARKERS ( 17 Jun 2022 14:55 )  x     / x     / 523 U/L / x     / 70.3 ng/mL  CARDIAC MARKERS ( 17 Jun 2022 08:40 )  x     / x     / 135 U/L / x     / 3.2 ng/mL             Anemia Panel:  Iron Total, Serum: 88 ug/dL (06-18-22 @ 10:09)  Iron - Total Binding Capacity.: 354 ug/dL (06-18-22 @ 10:09)  Ferritin, Serum: 40 ng/mL (06-18-22 @ 10:09)  Vitamin B12, Serum: 619 pg/mL (06-18-22 @ 10:09)  Folate, Serum: 14.9 ng/mL (06-18-22 @ 10:09)      Thyroid Panel:  Thyroid Stimulating Hormone, Serum: 0.42 uIU/mL (06-18-22 @ 08:32)        Lipase, Serum: 157 U/L (06-17-22 @ 07:28)      Serum Pro-Brain Natriuretic Peptide: 92519 pg/mL (06-20-22 @ 09:53)  Serum Pro-Brain Natriuretic Peptide: 2396 pg/mL (06-17-22 @ 07:33)      RADIOLOGY & ADDITIONAL TESTS:      HEALTH ISSUES - PROBLEM Dx:  Lower extremity edema    Paroxysmal atrial fibrillation    Hypertension    Anemia    Hypothyroid    Need for prophylactic measure    Acute systolic congestive heart failure    NSTEMI (non-ST elevation myocardial infarction)    Acute kidney injury superimposed on CKD            Consultant(s) Notes Reviewed:  [ x ] YES     Care Discussed with [X] Consultants  [  ] Patient  [  ] Family [  ] HCP [  ]   [  ] Social Service  [  ] RN, [  ] Physical Therapy,[  ] Palliative care team  DVT PPX: [  ] Lovenox, [  ] S C Heparin, [  ] Coumadin, [  ] Xarelto, [  ] Eliquis, [  ] Pradaxa, [  ] IV Heparin drip, [  ] SCD [  ] Contraindication 2 to GI Bleed,[  ] Ambulation [  ] Contraindicated 2 to  bleed [  ] Contraindicated 2 to Brain Bleed  Advanced directive: [  ] None, [ x ] DNR/DNI

## 2022-06-22 NOTE — PROGRESS NOTE ADULT - SUBJECTIVE AND OBJECTIVE BOX
Patient is a 89y old  Female who presents with a chief complaint of CP w/ elevated trops (19 Jun 2022 09:28)       HPI:  Patient is a 89 y/o F with a pmhx b/l LE edema, HTN, P Afib (on coumadin), and hypothyroidism presenting with sudden onset mid-sternal CP. Pt describes pain as a "prickly feeling", rated 4/10, constant in nature since 4am on 6/17, resolved in the ED. Pain was associated with L arm weakness, now resolved. Pt states the pain woke her up, and she was scared to walk and was unable to use her L arm for support to get out of bed. Pt was also nauseous at this time. No associated diaphoresis, palpitations, chest pressure. No amaurosis fugax, facial droop, garbled speech, hemiparesis, LOC, falls reported. No vomiting, fevers, chills, cough, sore throat, SOB, abd pain, constipation, dysuria. Pt had one loose BM this AM at home, denied hematochezia or melena.   Patient has not seen nephrologist in the past.  Denies any N/V/Urinary complaints.      No acute events noted       PAST MEDICAL & SURGICAL HISTORY:  Hypothyroid      Hypertension      Lower extremity edema      Paroxysmal atrial fibrillation           FAMILY HISTORY:  NC    Social History:Non smoker    MEDICATIONS  (STANDING):  acetylcysteine  Oral Solution 600 milliGRAM(s) Oral every 12 hours  aspirin enteric coated 81 milliGRAM(s) Oral daily  atorvastatin 80 milliGRAM(s) Oral at bedtime  calcium gluconate IVPB 1 Gram(s) IV Intermittent once  clopidogrel Tablet 75 milliGRAM(s) Oral daily  furosemide   Injectable 40 milliGRAM(s) IV Push once  levothyroxine 75 MICROGram(s) Oral daily  metoprolol succinate ER 50 milliGRAM(s) Oral daily  pantoprazole  Injectable 40 milliGRAM(s) IV Push daily  sodium chloride 0.9%. 1000 milliLiter(s) (50 mL/Hr) IV Continuous <Continuous>    MEDICATIONS  (PRN):      Allergies    No Known Allergies    Intolerances         REVIEW OF SYSTEMS:    Review of Systems:   Constitutional: Denies fatigue  HEENT: Denies headaches and dizziness  Respiratory: denies SOB, cough, or wheezing  Cardiovascular: denies CP, palpitations  Gastrointestinal: Denies nausea, denies vomiting, diarrhea, constipation, abdominal pain, or bloody stools  Genitourinary: denies painful urination, increased frequency, urgency, or bloody urine  Skin: denies rashes or itching  Musculoskeletal: denies muscle aches, joint swelling  Neurologic: Denies generalized weakness, denies loss of sensation, numbness, or tingling    Vital Signs Last 24 Hrs  T(C): 36.4 (22 Jun 2022 05:32), Max: 37 (21 Jun 2022 19:06)  T(F): 97.6 (22 Jun 2022 05:32), Max: 98.6 (21 Jun 2022 19:06)  HR: 78 (22 Jun 2022 05:32) (68 - 84)  BP: 115/68 (22 Jun 2022 05:32) (104/64 - 115/68)  BP(mean): --  RR: 18 (22 Jun 2022 05:32) (16 - 18)  SpO2: 95% (22 Jun 2022 05:32) (93% - 95%)    PHYSICAL EXAM:    GENERAL: NAD  HEAD:  Atraumatic, Normocephalic  EYES: EOMI, conjunctiva and sclera clear  ENMT: No Drainage from nares, No drainage from ears  NECK: Supple, neck  veins full  NERVOUS SYSTEM:  Awake and Alert  CHEST/LUNG: mildly Decreased BS R No rales, rhonchi, wheezing, or rubs  HEART: Regular rate and rhythm; No murmurs, rubs, or gallops  ABDOMEN: Soft, Nontender, Nondistended; Bowel sounds present  EXTREMITIES:  + Edema  SKIN: No rashes No obvious ecchymosis      LABS:                        10.3   4.71  )-----------( 181      ( 22 Jun 2022 06:32 )             31.6     06-22    137  |  102  |  27<H>  ----------------------------<  89  3.7   |  31  |  1.50<H>    Ca    7.6<L>      22 Jun 2022 06:32  Phos  2.6     06-22  Mg     2.3     06-22    TPro  6.0  /  Alb  2.2<L>  /  TBili  0.8  /  DBili  x   /  AST  41<H>  /  ALT  21  /  AlkPhos  84  06-22    PT/INR - ( 22 Jun 2022 06:32 )   PT: 16.6 sec;   INR: 1.41 ratio         PTT - ( 22 Jun 2022 06:32 )  PTT:36.9 sec

## 2022-06-23 LAB
ALBUMIN SERPL ELPH-MCNC: 2.5 G/DL — LOW (ref 3.3–5)
ALP SERPL-CCNC: 101 U/L — SIGNIFICANT CHANGE UP (ref 30–120)
ALT FLD-CCNC: 23 U/L DA — SIGNIFICANT CHANGE UP (ref 10–60)
ANION GAP SERPL CALC-SCNC: 4 MMOL/L — LOW (ref 5–17)
APTT BLD: 36.2 SEC — HIGH (ref 27.5–35.5)
AST SERPL-CCNC: 41 U/L — HIGH (ref 10–40)
BASOPHILS # BLD AUTO: 0.03 K/UL — SIGNIFICANT CHANGE UP (ref 0–0.2)
BASOPHILS NFR BLD AUTO: 0.7 % — SIGNIFICANT CHANGE UP (ref 0–2)
BILIRUB SERPL-MCNC: 0.9 MG/DL — SIGNIFICANT CHANGE UP (ref 0.2–1.2)
BUN SERPL-MCNC: 27 MG/DL — HIGH (ref 7–23)
CALCIUM SERPL-MCNC: 8.2 MG/DL — LOW (ref 8.4–10.5)
CHLORIDE SERPL-SCNC: 98 MMOL/L — SIGNIFICANT CHANGE UP (ref 96–108)
CO2 SERPL-SCNC: 32 MMOL/L — HIGH (ref 22–31)
CREAT SERPL-MCNC: 1.4 MG/DL — HIGH (ref 0.5–1.3)
EGFR: 35 ML/MIN/1.73M2 — LOW
EOSINOPHIL # BLD AUTO: 0.2 K/UL — SIGNIFICANT CHANGE UP (ref 0–0.5)
EOSINOPHIL NFR BLD AUTO: 4.9 % — SIGNIFICANT CHANGE UP (ref 0–6)
GLUCOSE SERPL-MCNC: 97 MG/DL — SIGNIFICANT CHANGE UP (ref 70–99)
HCT VFR BLD CALC: 33.3 % — LOW (ref 34.5–45)
HGB BLD-MCNC: 10.9 G/DL — LOW (ref 11.5–15.5)
IMM GRANULOCYTES NFR BLD AUTO: 0.2 % — SIGNIFICANT CHANGE UP (ref 0–1.5)
INR BLD: 1.23 RATIO — HIGH (ref 0.88–1.16)
LYMPHOCYTES # BLD AUTO: 1.22 K/UL — SIGNIFICANT CHANGE UP (ref 1–3.3)
LYMPHOCYTES # BLD AUTO: 30.1 % — SIGNIFICANT CHANGE UP (ref 13–44)
MAGNESIUM SERPL-MCNC: 2.5 MG/DL — SIGNIFICANT CHANGE UP (ref 1.6–2.6)
MCHC RBC-ENTMCNC: 30.1 PG — SIGNIFICANT CHANGE UP (ref 27–34)
MCHC RBC-ENTMCNC: 32.7 GM/DL — SIGNIFICANT CHANGE UP (ref 32–36)
MCV RBC AUTO: 92 FL — SIGNIFICANT CHANGE UP (ref 80–100)
MONOCYTES # BLD AUTO: 0.62 K/UL — SIGNIFICANT CHANGE UP (ref 0–0.9)
MONOCYTES NFR BLD AUTO: 15.3 % — HIGH (ref 2–14)
NEUTROPHILS # BLD AUTO: 1.97 K/UL — SIGNIFICANT CHANGE UP (ref 1.8–7.4)
NEUTROPHILS NFR BLD AUTO: 48.8 % — SIGNIFICANT CHANGE UP (ref 43–77)
NRBC # BLD: 0 /100 WBCS — SIGNIFICANT CHANGE UP (ref 0–0)
PHOSPHATE SERPL-MCNC: 3.2 MG/DL — SIGNIFICANT CHANGE UP (ref 2.5–4.5)
PLATELET # BLD AUTO: 182 K/UL — SIGNIFICANT CHANGE UP (ref 150–400)
POTASSIUM SERPL-MCNC: 4.1 MMOL/L — SIGNIFICANT CHANGE UP (ref 3.5–5.3)
POTASSIUM SERPL-SCNC: 4.1 MMOL/L — SIGNIFICANT CHANGE UP (ref 3.5–5.3)
PROT SERPL-MCNC: 6.7 G/DL — SIGNIFICANT CHANGE UP (ref 6–8.3)
PROTHROM AB SERPL-ACNC: 14.4 SEC — HIGH (ref 10.5–13.4)
RBC # BLD: 3.62 M/UL — LOW (ref 3.8–5.2)
RBC # FLD: 19 % — HIGH (ref 10.3–14.5)
SODIUM SERPL-SCNC: 134 MMOL/L — LOW (ref 135–145)
WBC # BLD: 4.05 K/UL — SIGNIFICANT CHANGE UP (ref 3.8–10.5)
WBC # FLD AUTO: 4.05 K/UL — SIGNIFICANT CHANGE UP (ref 3.8–10.5)

## 2022-06-23 PROCEDURE — 99232 SBSQ HOSP IP/OBS MODERATE 35: CPT

## 2022-06-23 RX ORDER — FUROSEMIDE 40 MG
20 TABLET ORAL ONCE
Refills: 0 | Status: COMPLETED | OUTPATIENT
Start: 2022-06-23 | End: 2022-06-23

## 2022-06-23 RX ORDER — ENOXAPARIN SODIUM 100 MG/ML
70 INJECTION SUBCUTANEOUS EVERY 24 HOURS
Refills: 0 | Status: DISCONTINUED | OUTPATIENT
Start: 2022-06-23 | End: 2022-06-24

## 2022-06-23 RX ORDER — ENOXAPARIN SODIUM 100 MG/ML
70 INJECTION SUBCUTANEOUS EVERY 12 HOURS
Refills: 0 | Status: DISCONTINUED | OUTPATIENT
Start: 2022-06-23 | End: 2022-06-23

## 2022-06-23 RX ORDER — FUROSEMIDE 40 MG
20 TABLET ORAL ONCE
Refills: 0 | Status: DISCONTINUED | OUTPATIENT
Start: 2022-06-23 | End: 2022-06-23

## 2022-06-23 RX ORDER — WARFARIN SODIUM 2.5 MG/1
3 TABLET ORAL ONCE
Refills: 0 | Status: COMPLETED | OUTPATIENT
Start: 2022-06-23 | End: 2022-06-23

## 2022-06-23 RX ADMIN — PANTOPRAZOLE SODIUM 40 MILLIGRAM(S): 20 TABLET, DELAYED RELEASE ORAL at 12:03

## 2022-06-23 RX ADMIN — Medication 81 MILLIGRAM(S): at 12:03

## 2022-06-23 RX ADMIN — Medication 75 MICROGRAM(S): at 05:56

## 2022-06-23 RX ADMIN — ENOXAPARIN SODIUM 70 MILLIGRAM(S): 100 INJECTION SUBCUTANEOUS at 15:38

## 2022-06-23 RX ADMIN — ATORVASTATIN CALCIUM 80 MILLIGRAM(S): 80 TABLET, FILM COATED ORAL at 21:44

## 2022-06-23 RX ADMIN — Medication 50 MILLIGRAM(S): at 05:56

## 2022-06-23 RX ADMIN — Medication 20 MILLIGRAM(S): at 11:02

## 2022-06-23 RX ADMIN — WARFARIN SODIUM 3 MILLIGRAM(S): 2.5 TABLET ORAL at 22:06

## 2022-06-23 RX ADMIN — CLOPIDOGREL BISULFATE 75 MILLIGRAM(S): 75 TABLET, FILM COATED ORAL at 12:03

## 2022-06-23 NOTE — PROGRESS NOTE ADULT - SUBJECTIVE AND OBJECTIVE BOX
Patient is a 89y old  Female who presents with a chief complaint of CP w/ elevated trops (19 Jun 2022 09:28)       HPI:  Patient is a 89 y/o F with a pmhx b/l LE edema, HTN, P Afib (on coumadin), and hypothyroidism presenting with sudden onset mid-sternal CP. Pt describes pain as a "prickly feeling", rated 4/10, constant in nature since 4am on 6/17, resolved in the ED. Pain was associated with L arm weakness, now resolved. Pt states the pain woke her up, and she was scared to walk and was unable to use her L arm for support to get out of bed. Pt was also nauseous at this time. No associated diaphoresis, palpitations, chest pressure. No amaurosis fugax, facial droop, garbled speech, hemiparesis, LOC, falls reported. No vomiting, fevers, chills, cough, sore throat, SOB, abd pain, constipation, dysuria. Pt had one loose BM this AM at home, denied hematochezia or melena.   Patient has not seen nephrologist in the past.  Denies any N/V/Urinary complaints.      No acute events noted       PAST MEDICAL & SURGICAL HISTORY:  Hypothyroid      Hypertension      Lower extremity edema      Paroxysmal atrial fibrillation           FAMILY HISTORY:  NC    Social History:Non smoker    MEDICATIONS  (STANDING):  acetylcysteine  Oral Solution 600 milliGRAM(s) Oral every 12 hours  aspirin enteric coated 81 milliGRAM(s) Oral daily  atorvastatin 80 milliGRAM(s) Oral at bedtime  calcium gluconate IVPB 1 Gram(s) IV Intermittent once  clopidogrel Tablet 75 milliGRAM(s) Oral daily  furosemide   Injectable 40 milliGRAM(s) IV Push once  levothyroxine 75 MICROGram(s) Oral daily  metoprolol succinate ER 50 milliGRAM(s) Oral daily  pantoprazole  Injectable 40 milliGRAM(s) IV Push daily  sodium chloride 0.9%. 1000 milliLiter(s) (50 mL/Hr) IV Continuous <Continuous>    MEDICATIONS  (PRN):      Allergies    No Known Allergies    Intolerances         REVIEW OF SYSTEMS:    Review of Systems:   Constitutional: Denies fatigue  HEENT: Denies headaches and dizziness  Respiratory: denies SOB, cough, or wheezing  Cardiovascular: denies CP, palpitations  Gastrointestinal: Denies nausea, denies vomiting, diarrhea, constipation, abdominal pain, or bloody stools  Genitourinary: denies painful urination, increased frequency, urgency, or bloody urine  Skin: denies rashes or itching  Musculoskeletal: denies muscle aches, joint swelling  Neurologic: Denies generalized weakness, denies loss of sensation, numbness, or tingling    Vital Signs Last 24 Hrs  T(C): 36.6 (23 Jun 2022 04:45), Max: 36.9 (22 Jun 2022 20:17)  T(F): 97.8 (23 Jun 2022 04:45), Max: 98.5 (22 Jun 2022 20:17)  HR: 68 (23 Jun 2022 10:57) (68 - 79)  BP: 109/56 (23 Jun 2022 10:57) (102/60 - 114/59)  BP(mean): --  RR: 16 (23 Jun 2022 10:57) (16 - 20)  SpO2: 93% (23 Jun 2022 10:57) (90% - 95%)    PHYSICAL EXAM:    GENERAL: NAD  HEAD:  Atraumatic, Normocephalic  EYES: EOMI, conjunctiva and sclera clear  ENMT: No Drainage from nares, No drainage from ears  NECK: Supple, neck  veins full  NERVOUS SYSTEM:  Awake and Alert  CHEST/LUNG: mildly Decreased BS R No rales, rhonchi, wheezing, or rubs  HEART: Regular rate and rhythm; No murmurs, rubs, or gallops  ABDOMEN: Soft, Nontender, Nondistended; Bowel sounds present  EXTREMITIES:  + Edema  SKIN: No rashes No obvious ecchymosis      LABS:                                          10.9   4.05  )-----------( 182      ( 23 Jun 2022 06:10 )             33.3     06-23    134<L>  |  98  |  27<H>  ----------------------------<  97  4.1   |  32<H>  |  1.40<H>    Ca    8.2<L>      23 Jun 2022 06:10  Phos  3.2     06-23  Mg     2.5     06-23    TPro  6.7  /  Alb  2.5<L>  /  TBili  0.9  /  DBili  x   /  AST  41<H>  /  ALT  23  /  AlkPhos  101  06-23    PT/INR - ( 23 Jun 2022 06:10 )   PT: 14.4 sec;   INR: 1.23 ratio         PTT - ( 23 Jun 2022 06:10 )  PTT:36.2 sec

## 2022-06-23 NOTE — PROGRESS NOTE ADULT - SUBJECTIVE AND OBJECTIVE BOX
Neurology Follow up note    KENIA [id number]    HPI:  Patient is a 89 y/o F with a pmhx b/l LE edema, HTN, P Afib (on coumadin), and hypothyroidism presenting with sudden onset mid-sternal CP. Pt describes pain as a "prickly feeling", rated 4/10, constant in nature since 4am on 6/17, resolved in the ED. Pain was associated with L arm weakness, now resolved. Pt states the pain woke her up, and she was scared to walk and was unable to use her L arm for support to get out of bed. Pt was also nauseous at this time. No associated diaphoresis, palpitations, chest pressure. No amaurosis fugax, facial droop, garbled speech, hemiparesis, LOC, falls reported. No vomiting, fevers, chills, cough, sore throat, SOB, abd pain, constipation, dysuria. Pt had one loose BM this AM at home, denied hematochezia or melena.       In ED:  VS - HR 71 | /65 | RR 18 | sO2 94 | T 97.6  Labs - HgB 11.2 | INR 1.85 | K 3.3 | Glu 114 | trop 342.6 | CKMB 3.2 | BNP 2396  CTH NC - No acute intracranial hemorrhage. Lacunar infarct R inferior cerebellum  EKG - vent rate 66, QTc 501ms, sinus rhythm, old LBBB, no ischemic changes on personal read  Given -  mg PO. pt seen by cardiology Dr Almazan , pt admitted to [facility] for NSTEMI. (17 Jun 2022 09:52)      Interval History -no new events    Patient is seen, chart was reviewed and case was discussed with the treatment team.  Pt is not in any distress.   Lying on bed comfortably.     Vital Signs Last 24 Hrs  T(C): 36.7 (23 Jun 2022 12:39), Max: 36.9 (22 Jun 2022 20:17)  T(F): 98.1 (23 Jun 2022 12:39), Max: 98.5 (22 Jun 2022 20:17)  HR: 72 (23 Jun 2022 12:39) (68 - 79)  BP: 113/65 (23 Jun 2022 12:39) (102/60 - 113/65)  BP(mean): --  RR: 20 (23 Jun 2022 12:39) (16 - 20)  SpO2: 85% (23 Jun 2022 12:39) (85% - 95%)        REVIEW OF SYSTEMS:    Constitutional: No fever,   Eyes: No eye pain, visual disturbances, or discharge  ENT:  No difficulty hearing, tinnitus, vertigo; No sinus or throat pain  Neck: No pain or stiffness  Respiratory: No cough, wheezing, chills or hemoptysis  Cardiovascular: No alpitations, shortness of breath,   Gastrointestinal: No abdominal or epigastric pain. No nausea, vomiting or hematemesis;  Genitourinary: No dysuria, frequency, hematuria  Neurological: No headaches tremors  Psychiatric: No depression, anxiety, mood swings or difficulty sleeping  Musculoskeletal: No joint pain or swelling;   Skin: No itching, burning, rashes or lesions   Lymph Nodes: No enlarged glands  Endocrine: No heat or cold intolerance;   Allergy and Immunologic: No hives or eczema    On Neurological Examination:    Mental Status - Pt is alert, awake, oriented X3.  Follows commands well and able to answer questions appropriately  .Mood and affect  normal    Speech -  Normal.    Cranial Nerves - Pupils 3 mm equal and reactive to light, extraocular eye movements intact. Pt has no visual field deficit.  Pt has no  facial asymmetry. Facial sensation is intact.Tongue - is in midline.    Muscle tone - is normal     Motor Exam - 5/5 of UE  LE 4/5    Sensory Exam -  Pt withdraws all extremities equally on stimulation. No asymmetry seen.      coordination:    Finger to nose: adele    Deep tendon Reflexes - 2 plus all over.         Neck Supple -  Yes.     MEDICATIONS    acetylcysteine  Oral Solution 600 milliGRAM(s) Oral every 12 hours  aspirin enteric coated 81 milliGRAM(s) Oral daily  atorvastatin 80 milliGRAM(s) Oral at bedtime  clopidogrel Tablet 75 milliGRAM(s) Oral daily  furosemide   Injectable 40 milliGRAM(s) IV Push once  levothyroxine 75 MICROGram(s) Oral daily  metoprolol succinate ER 50 milliGRAM(s) Oral daily  pantoprazole  Injectable 40 milliGRAM(s) IV Push daily  potassium phosphate / sodium phosphate Powder (PHOS-NaK) 1 Packet(s) Oral once  sodium chloride 0.9%. 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  OOB to chair/ambulation with assistance only.  Pain is accessed and addressed.  Would continue to follow.

## 2022-06-23 NOTE — PROGRESS NOTE ADULT - PROBLEM SELECTOR PLAN 5
chronic, stable - rate-controlled - on coumadin  - HOLD coumadin, goal INR 1.6 in order to be transferred for cath   - Toprol  XL 50mg PO qd w/ hold parameters  TTE: EF 35-40%, LV hypokinesis  - continuous tele monitoring  -cardio Dr. Blair , recs appreciated- D/W cardio NP chronic, stable - rate-controlled - on coumadin  - HOLD coumadin, goal INR 1.6 in order to be transferred for cath   - Toprol  XL 50mg PO qd w/ hold parameters  TTE: EF 35-40%, LV hypokinesis  - continuous tele monitoring  -cardio Dr. mckay -- D/W cardio NP

## 2022-06-23 NOTE — PROGRESS NOTE ADULT - PROBLEM SELECTOR PLAN 7
chronic, elevated on admit - likely 2/2 pain  - continue toprol 50mg qD w/ hold parameters  - routine hemodynamic monitoring chronic, elevated on admit - likely 2/2 pain  - continue Toprol 50mg qD w/ hold parameters  - routine hemodynamic monitoring

## 2022-06-23 NOTE — PROGRESS NOTE ADULT - SUBJECTIVE AND OBJECTIVE BOX
Patient is a 89y old  Female who presents with a chief complaint of CP w/ elevated trops (22 Jun 2022 16:09)    HPI:  Patient is a 89 y/o F with a pmhx b/l LE edema, HTN, P Afib (on coumadin), and hypothyroidism presenting with sudden onset mid-sternal CP. Pt describes pain as a "prickly feeling", rated 4/10, constant in nature since 4am on 6/17, resolved in the ED. Pain was associated with L arm weakness, now resolved. Pt states the pain woke her up, and she was scared to walk and was unable to use her L arm for support to get out of bed. Pt was also nauseous at this time. No associated diaphoresis, palpitations, chest pressure. No amaurosis fugax, facial droop, garbled speech, hemiparesis, LOC, falls reported. No vomiting, fevers, chills, cough, sore throat, SOB, abd pain, constipation, dysuria. Pt had one loose BM this AM at home, denied hematochezia or melena.       In ED:  VS - HR 71 | /65 | RR 18 | sO2 94 | T 97.6  Labs - HgB 11.2 | INR 1.85 | K 3.3 | Glu 114 | trop 342.6 | CKMB 3.2 | BNP 2396  CTH NC - No acute intracranial hemorrhage. Lacunar infarct R inferior cerebellum  EKG - vent rate 66, QTc 501ms, sinus rhythm, old LBBB, no ischemic changes on personal read  Given -  mg PO. pt seen by cardiology Dr Almazan , pt admitted to [facility] for NSTEMI. (17 Jun 2022 09:52)    INTERVAL HPI:  6/18/22: Seen and examined at bedside. No acute complaints. Denies chest pain overnight and this AM. Overnight pt had episode of dyspnea, troponins inc from 300s to >[id number]. Trops now downtrended. Received 1 x dose 40mg IV lasix for dyspnea. Pt had statin dose inc to 80mg, had 1 x dose lopressor 25mg, and had her scheduled evening coumadin. Had 10 beats of NSVT this AM. Pt was started on standing 50mg toprol xL PO qD. Started on plavix 75mg qD. Extensive counseling done with the patient on [facility] and her current state of health. Patient states she has "forms at home" that her daughter will bring to [facility]. Patient values quality of life and wants to discuss with her daughter and son-inlaw. Family to discuss potential plan for cardiac cath and will arrive to a decision on 6/19. C/W medical therapy for ACS. HOLD coumadin as pt INR is therapeutic and to facilitate potential for cardiac cath. Will start renal adjusted FD Lovenox on 6/19 if INR is below 2.   6/19/22 Pt seen and examined at bedside. Pt states she slept well last night. Pt denies SOB, CP, palpitations, dizziness .Mildly elevated Cr , INR -Theraputic  Pt states the last time she had chest pain was Friday night. Later in the afternoon again patient was seen at bedside with  daughter HCP and son in law at bedside, decision was made for DNR/ DNI and cardiac cath.  6/20/22: Patient seen and examined at bedside. Patient denies chest pain, palpitations, dyspnea. Noted to be slightly short of breath. proBNP >13k, will give 1 dose of lasix 40mg IV today.  6/21/22: Patient continues to deny anginal symptoms. Will be transferred for cardiac cath, as INR downtrended to 1.6 and Cr improved to 1.3. For LUIS CARLOS on CKD, will defer further IVF hydration at this time due to TTE results and SOB after previous IVF. Will give Mucomyst q12 x4 for renal protection. Given lasix 40mg IVP x1 to improve respirations. Given full dose lovenox x1.   6/22/22: Overnight patient with R sided abdominal discomfort, given maalox. Patient breathing improved s/p lasix 40mg IV yesterday. Patient's INR 1.4 today and Cr increased to 1.5. Plan for cardiac cath If Cr stable. Given additional dose of full dose lovenox.  6/23/22: Patient seen and examined at bedside. Patient denies chest pain, pressure, sob, palpitations. Cr downtrending today, plan for transfer for cardiac cath.      OVERNIGHT EVENTS: None    Home Medications:  aspirin 81 mg oral delayed release tablet: 1 tab(s) orally once a day (21 Jun 2022 08:48)  atorvastatin 80 mg oral tablet: 1 tab(s) orally once a day (at bedtime) (21 Jun 2022 08:48)  clopidogrel 75 mg oral tablet: 1 tab(s) orally once a day (21 Jun 2022 08:48)  Lasix 40 mg oral tablet: 1 tab(s) orally 2 times a day (17 Jun 2022 11:27)  metoprolol succinate 50 mg oral tablet, extended release: 1 tab(s) orally once a day (21 Jun 2022 08:48)  pantoprazole 40 mg intravenous injection: 40 milligram(s) intravenous once a day (21 Jun 2022 08:48)  Synthroid 75 mcg (0.075 mg) oral tablet: 1 tab(s) orally once a day (17 Jun 2022 11:27)  warfarin 3 mg oral tablet: 1 tab(s) orally once a day (M, Tu, Th, F, Sa Su) (17 Jun 2022 11:27)  warfarin 4 mg oral tablet: 1 tab(s) orally Wednesday (17 Jun 2022 11:27)      MEDICATIONS  (STANDING):  aspirin enteric coated 81 milliGRAM(s) Oral daily  atorvastatin 80 milliGRAM(s) Oral at bedtime  clopidogrel Tablet 75 milliGRAM(s) Oral daily  levothyroxine 75 MICROGram(s) Oral daily  metoprolol succinate ER 50 milliGRAM(s) Oral daily  pantoprazole  Injectable 40 milliGRAM(s) IV Push daily    MEDICATIONS  (PRN):  ALBUTerol    90 MICROgram(s) HFA Inhaler 2 Puff(s) Inhalation every 12 hours PRN Shortness of Breath and/or Wheezing  aluminum hydroxide/magnesium hydroxide/simethicone Suspension 30 milliLiter(s) Oral every 6 hours PRN Dyspepsia      Allergies    No Known Allergies    Intolerances        Social History:  Home: lives alone  Smoke: none  Alcohol: none  Recreational drug use: none  Ambulate: independent, uses support from furniture and cane occasionally  Activities of daily living: independent  Job: retired  COVID - Vaccine (17 Jun 2022 09:52)    REVIEW OF SYSTEMS: i feel OK  CONSTITUTIONAL: No fever, No chills, No fatigue, No myalgia, No Body ache, No Weakness  EYES: No eye pain,  No visual disturbances, No discharge, NO Redness  ENMT:  No ear pain, No nose bleed, No vertigo; No sinus pain, NO throat pain, No Congestion  NECK: No pain, No stiffness  RESPIRATORY: No cough, NO wheezing, No hemoptysis, ADMITS brief shortness of breath earlier, now resolved  CARDIOVASCULAR: No chest pain, palpitations  GASTROINTESTINAL: No abdominal pain, NO epigastric pain. No nausea, No vomiting; No diarrhea, No constipation. [ x ] BM  GENITOURINARY: No dysuria, No frequency, No urgency, No hematuria, NO incontinence  NEUROLOGICAL: No headaches, No dizziness, No numbness, No tingling, No tremors, No weakness  EXT: admits CHRONIC Swelling in LE's;  No Pain, admits LE Edema  SKIN:  [ x ] No itching, burning, rashes, or lesions   MUSCULOSKELETAL: No joint pain ,No Jt swelling; No muscle pain, No back pain, No extremity pain  PSYCHIATRIC: No depression,  No anxiety,  No mood swings ,No difficulty sleeping at night  PAIN SCALE: [ x ] None  [  ] Other-  ROS Unable to obtain due to - [  ] Dementia  [  ] Lethargy [  ] Drowsy [  ] Sedated [  ] non verbal    Vital Signs Last 24 Hrs  T(C): 36.6 (23 Jun 2022 04:45), Max: 36.9 (22 Jun 2022 20:17)  T(F): 97.8 (23 Jun 2022 04:45), Max: 98.5 (22 Jun 2022 20:17)  HR: 71 (23 Jun 2022 04:45) (70 - 79)  BP: 111/71 (23 Jun 2022 04:45) (102/60 - 114/59)  BP(mean): --  RR: 20 (23 Jun 2022 04:45) (18 - 20)  SpO2: 95% (23 Jun 2022 04:45) (90% - 95%)  Finger Stick        06-22 @ 07:01  -  06-23 @ 07:00  --------------------------------------------------------  IN: 0 mL / OUT: 600 mL / NET: -600 mL        PHYSICAL EXAM: O2 NC  GENERAL:  [ x ] NAD , [ x ] well appearing, [  ] Agitated, [  ] Drowsy,  [  ] Lethargy, [  ] confused   HEAD:  [ x ] Normal, [  ] Other  EYES:  [ x ] EOMI, [ x ] PERRLA, [ x ] conjunctiva and sclera clear normal, [  ] Other,  [  ] Pallor,[  ] Discharge  ENMT:  [ x ] Normal, [ x ] Moist mucous membranes, [ x ] Good dentition, [ x ] No Thrush  NECK:  [ x ] Supple, [x  ] No JVD, [ x ] Normal thyroid, [  ] Lymphadenopathy [  ] Other  CHEST/LUNG:  [ x ] Clear to auscultation bilaterally, [ x ] Breath Sounds equal B/L , [  ] poor effort  [ x ] No rales, [ x ] No rhonchi  [ x ]  slight expiratory wheezing,   HEART:  [ x ] Regular rate and IRREGULARLY IRREGULAR rhythm, [  ] tachycardia, [  ] Bradycardia,  [  ] irregular  [ x ] No murmurs, No rubs, No gallops, [  ] PPM in place (Mfr:  )  ABDOMEN:  [ x ] Soft, [ x ] Nontender, [ x ] Nondistended, [ x ] No mass, [ x] Bowel sounds present, [  ] obese  NERVOUS SYSTEM:  [ x ] Alert & Oriented X3, [ x ] Nonfocal  [  ] Confusion  [  ] Encephalopathic [  ] Sedated [  ] Unable to assess, [  ] Dementia [  ] Other-  EXTREMITIES: [ x ] 2+ Peripheral Pulses, No clubbing, No cyanosis,  [ x ] 2 + pitting edema B/L lower EXT. [ x ] PVD stasis skin changes B/L Lower EXT, [  ] wound  LYMPH: No lymphadenopathy noted  SKIN:  [ x ] No rashes or lesions, [  ] Pressure Ulcers, [  ] ecchymosis, [  ] Skin Tears, [  ] Other    DIET: Diet, DASH/TLC:   Sodium & Cholesterol Restricted  1200mL Fluid Restriction ([id number]) (06-22-22 @ 08:53)      LABS:                        10.9   4.05  )-----------( 182      ( 23 Jun 2022 06:10 )             33.3     23 Jun 2022 06:10    134    |  98     |  27     ----------------------------<  97     4.1     |  32     |  1.40     Ca    8.2        23 Jun 2022 06:10  Phos  3.2       23 Jun 2022 06:10  Mg     2.5       23 Jun 2022 06:10    TPro  6.7    /  Alb  2.5    /  TBili  0.9    /  DBili  x      /  AST  41     /  ALT  23     /  AlkPhos  101    23 Jun 2022 06:10    PT/INR - ( 23 Jun 2022 06:10 )   PT: 14.4 sec;   INR: 1.23 ratio         PTT - ( 23 Jun 2022 06:10 )  PTT:36.2 sec              CARDIAC MARKERS ( 18 Jun 2022 08:32 )  x     / x     / 364 U/L / x     / 24.0 ng/mL  CARDIAC MARKERS ( 17 Jun 2022 23:34 )  x     / x     / 497 U/L / x     / 52.0 ng/mL  CARDIAC MARKERS ( 17 Jun 2022 14:55 )  x     / x     / 523 U/L / x     / 70.3 ng/mL  CARDIAC MARKERS ( 17 Jun 2022 08:40 )  x     / x     / 135 U/L / x     / 3.2 ng/mL             Anemia Panel:  Iron Total, Serum: 88 ug/dL (06-18-22 @ 10:09)  Iron - Total Binding Capacity.: 354 ug/dL (06-18-22 @ 10:09)  Ferritin, Serum: 40 ng/mL (06-18-22 @ 10:09)  Vitamin B12, Serum: 619 pg/mL (06-18-22 @ 10:09)  Folate, Serum: 14.9 ng/mL (06-18-22 @ 10:09)      Thyroid Panel:  Thyroid Stimulating Hormone, Serum: 0.42 uIU/mL (06-18-22 @ 08:32)        Lipase, Serum: 157 U/L (06-17-22 @ 07:28)      Serum Pro-Brain Natriuretic Peptide: 11850 pg/mL (06-20-22 @ 09:53)  Serum Pro-Brain Natriuretic Peptide: 2396 pg/mL (06-17-22 @ 07:33)      RADIOLOGY & ADDITIONAL TESTS:      HEALTH ISSUES - PROBLEM Dx:  Lower extremity edema    Paroxysmal atrial fibrillation    Hypertension    Anemia    Hypothyroid    Need for prophylactic measure    Acute systolic congestive heart failure    NSTEMI (non-ST elevation myocardial infarction)    Acute kidney injury superimposed on CKD            Consultant(s) Notes Reviewed:  [  ] YES     Care Discussed with [X] Consultants  [  ] Patient  [  ] Family [  ] HCP [  ]   [  ] Social Service  [  ] RN, [  ] Physical Therapy,[  ] Palliative care team  DVT PPX: [ x ] Lovenox, [  ] S C Heparin, [  ] Coumadin, [  ] Xarelto, [  ] Eliquis, [  ] Pradaxa, [  ] IV Heparin drip, [  ] SCD [  ] Contraindication 2 to GI Bleed,[  ] Ambulation [  ] Contraindicated 2 to  bleed [  ] Contraindicated 2 to Brain Bleed  Advanced directive: [  ] None, [ x ] DNR/DNI Patient is a 89y old  Female who presents with a chief complaint of CP w/ elevated trops (22 Jun 2022 16:09)    HPI:  Patient is a 89 y/o F with a pmhx b/l LE edema, HTN, P Afib (on coumadin), and hypothyroidism presenting with sudden onset mid-sternal CP. Pt describes pain as a "prickly feeling", rated 4/10, constant in nature since 4am on 6/17, resolved in the ED. Pain was associated with L arm weakness, now resolved. Pt states the pain woke her up, and she was scared to walk and was unable to use her L arm for support to get out of bed. Pt was also nauseous at this time. No associated diaphoresis, palpitations, chest pressure. No amaurosis fugax, facial droop, garbled speech, hemiparesis, LOC, falls reported. No vomiting, fevers, chills, cough, sore throat, SOB, abd pain, constipation, dysuria. Pt had one loose BM this AM at home, denied hematochezia or melena.       In ED:  VS - HR 71 | /65 | RR 18 | sO2 94 | T 97.6  Labs - HgB 11.2 | INR 1.85 | K 3.3 | Glu 114 | trop 342.6 | CKMB 3.2 | BNP 2396  CTH NC - No acute intracranial hemorrhage. Lacunar infarct R inferior cerebellum  EKG - vent rate 66, QTc 501ms, sinus rhythm, old LBBB, no ischemic changes on personal read  Given -  mg PO. pt seen by cardiology Dr Almazan , pt admitted to [facility] for NSTEMI. (17 Jun 2022 09:52)    INTERVAL HPI:  6/18/22: Seen and examined at bedside. No acute complaints. Denies chest pain overnight and this AM. Overnight pt had episode of dyspnea, troponins inc from 300s to >43728. Trops now downtrended. Received 1 x dose 40mg IV lasix for dyspnea. Pt had statin dose inc to 80mg, had 1 x dose lopressor 25mg, and had her scheduled evening coumadin. Had 10 beats of NSVT this AM. Pt was started on standing 50mg toprol xL PO qD. Started on plavix 75mg qD. Extensive counseling done with the patient on [facility] and her current state of health. Patient states she has "forms at home" that her daughter will bring to [facility]. Patient values quality of life and wants to discuss with her daughter and son-inlaw. Family to discuss potential plan for cardiac cath and will arrive to a decision on 6/19. C/W medical therapy for ACS. HOLD coumadin as pt INR is therapeutic and to facilitate potential for cardiac cath. Will start renal adjusted FD Lovenox on 6/19 if INR is below 2.   6/19/22 Pt seen and examined at bedside. Pt states she slept well last night. Pt denies SOB, CP, palpitations, dizziness .Mildly elevated Cr , INR -Theraputic  Pt states the last time she had chest pain was Friday night. Later in the afternoon again patient was seen at bedside with  daughter HCP and son in law at bedside, decision was made for DNR/ DNI and cardiac cath.  6/20/22: Patient seen and examined at bedside. Patient denies chest pain, palpitations, dyspnea. Noted to be slightly short of breath. proBNP >13k, will give 1 dose of lasix 40mg IV today.  6/21/22: Patient continues to deny anginal symptoms. Will be transferred for cardiac cath, as INR downtrended to 1.6 and Cr improved to 1.3. For LUIS CARLOS on CKD, will defer further IVF hydration at this time due to TTE results and SOB after previous IVF. Will give Mucomyst q12 x4 for renal protection. Given lasix 40mg IVP x1 to improve respirations. Given full dose lovenox x1.   6/22/22: Overnight patient with R sided abdominal discomfort, given maalox. Patient breathing improved s/p lasix 40mg IV yesterday. Patient's INR 1.4 today and Cr increased to 1.5. Plan for cardiac cath If Cr stable. Given additional dose of full dose lovenox.  6/23/22: Patient seen and examined at bedside. Patient denies chest pain, pressure, sob, palpitations. Cr downtrending today, plan for transfer for cardiac cath.      OVERNIGHT EVENTS: None    Home Medications:  aspirin 81 mg oral delayed release tablet: 1 tab(s) orally once a day (21 Jun 2022 08:48)  atorvastatin 80 mg oral tablet: 1 tab(s) orally once a day (at bedtime) (21 Jun 2022 08:48)  clopidogrel 75 mg oral tablet: 1 tab(s) orally once a day (21 Jun 2022 08:48)  Lasix 40 mg oral tablet: 1 tab(s) orally 2 times a day (17 Jun 2022 11:27)  metoprolol succinate 50 mg oral tablet, extended release: 1 tab(s) orally once a day (21 Jun 2022 08:48)  pantoprazole 40 mg intravenous injection: 40 milligram(s) intravenous once a day (21 Jun 2022 08:48)  Synthroid 75 mcg (0.075 mg) oral tablet: 1 tab(s) orally once a day (17 Jun 2022 11:27)  warfarin 3 mg oral tablet: 1 tab(s) orally once a day (M, Tu, Th, F, Sa Su) (17 Jun 2022 11:27)  warfarin 4 mg oral tablet: 1 tab(s) orally Wednesday (17 Jun 2022 11:27)      MEDICATIONS  (STANDING):  aspirin enteric coated 81 milliGRAM(s) Oral daily  atorvastatin 80 milliGRAM(s) Oral at bedtime  clopidogrel Tablet 75 milliGRAM(s) Oral daily  levothyroxine 75 MICROGram(s) Oral daily  metoprolol succinate ER 50 milliGRAM(s) Oral daily  pantoprazole  Injectable 40 milliGRAM(s) IV Push daily    MEDICATIONS  (PRN):  ALBUTerol    90 MICROgram(s) HFA Inhaler 2 Puff(s) Inhalation every 12 hours PRN Shortness of Breath and/or Wheezing  aluminum hydroxide/magnesium hydroxide/simethicone Suspension 30 milliLiter(s) Oral every 6 hours PRN Dyspepsia      Allergies    No Known Allergies    Intolerances        Social History:  Home: lives alone  Smoke: none  Alcohol: none  Recreational drug use: none  Ambulate: independent, uses support from furniture and cane occasionally  Activities of daily living: independent  Job: retired  COVID - Vaccine (17 Jun 2022 09:52)    REVIEW OF SYSTEMS: i feel OK  CONSTITUTIONAL: No fever, No chills, No fatigue, No myalgia, No Body ache, No Weakness  EYES: No eye pain,  No visual disturbances, No discharge, NO Redness  ENMT:  No ear pain, No nose bleed, No vertigo; No sinus pain, NO throat pain, No Congestion  NECK: No pain, No stiffness  RESPIRATORY: No cough, NO wheezing, No hemoptysis, ADMITS brief shortness of breath earlier, now resolved  CARDIOVASCULAR: No chest pain, palpitations  GASTROINTESTINAL: No abdominal pain, NO epigastric pain. No nausea, No vomiting; No diarrhea, No constipation. [ x ] BM  GENITOURINARY: No dysuria, No frequency, No urgency, No hematuria, NO incontinence  NEUROLOGICAL: No headaches, No dizziness, No numbness, No tingling, No tremors, No weakness  EXT: admits CHRONIC Swelling in LE's;  No Pain, admits LE Edema  SKIN:  [ x ] No itching, burning, rashes, or lesions   MUSCULOSKELETAL: No joint pain ,No Jt swelling; No muscle pain, No back pain, No extremity pain  PSYCHIATRIC: No depression,  No anxiety,  No mood swings ,No difficulty sleeping at night  PAIN SCALE: [ x ] None  [  ] Other-  ROS Unable to obtain due to - [  ] Dementia  [  ] Lethargy [  ] Drowsy [  ] Sedated [  ] non verbal    Vital Signs Last 24 Hrs  T(C): 36.6 (23 Jun 2022 04:45), Max: 36.9 (22 Jun 2022 20:17)  T(F): 97.8 (23 Jun 2022 04:45), Max: 98.5 (22 Jun 2022 20:17)  HR: 71 (23 Jun 2022 04:45) (70 - 79)  BP: 111/71 (23 Jun 2022 04:45) (102/60 - 114/59)  BP(mean): --  RR: 20 (23 Jun 2022 04:45) (18 - 20)  SpO2: 95% (23 Jun 2022 04:45) (90% - 95%)  Finger Stick        06-22 @ 07:01  -  06-23 @ 07:00  --------------------------------------------------------  IN: 0 mL / OUT: 600 mL / NET: -600 mL        PHYSICAL EXAM: O2 NC  GENERAL:  [ x ] NAD , [ x ] well appearing, [  ] Agitated, [  ] Drowsy,  [  ] Lethargy, [  ] confused   HEAD:  [ x ] Normal, [  ] Other  EYES:  [ x ] EOMI, [ x ] PERRLA, [ x ] conjunctiva and sclera clear normal, [  ] Other,  [  ] Pallor,[  ] Discharge  ENMT:  [ x ] Normal, [ x ] Moist mucous membranes, [ x ] Good dentition, [ x ] No Thrush  NECK:  [ x ] Supple, [x  ] No JVD, [ x ] Normal thyroid, [  ] Lymphadenopathy [  ] Other  CHEST/LUNG:  [ x ] Clear to auscultation bilaterally, [ x ] Breath Sounds equal B/L , [  ] poor effort  [ x ] No rales, [ x ] No rhonchi  [ x ]  slight expiratory wheezing,   HEART:  [ x ] Regular rate and IRREGULARLY IRREGULAR rhythm, [  ] tachycardia, [  ] Bradycardia,  [  ] irregular  [ x ] No murmurs, No rubs, No gallops, [  ] PPM in place (Mfr:  )  ABDOMEN:  [ x ] Soft, [ x ] Nontender, [ x ] Nondistended, [ x ] No mass, [ x] Bowel sounds present, [  ] obese  NERVOUS SYSTEM:  [ x ] Alert & Oriented X3, [ x ] Nonfocal  [  ] Confusion  [  ] Encephalopathic [  ] Sedated [  ] Unable to assess, [  ] Dementia [  ] Other-  EXTREMITIES: [ x ] 2+ Peripheral Pulses, No clubbing, No cyanosis,  [ x ] 2 + pitting edema B/L lower EXT. [ x ] PVD stasis skin changes B/L Lower EXT, [  ] wound  LYMPH: No lymphadenopathy noted  SKIN:  [ x ] No rashes or lesions, [  ] Pressure Ulcers, [  ] ecchymosis, [  ] Skin Tears, [  ] Other    DIET: Diet, DASH/TLC:   Sodium & Cholesterol Restricted  1200mL Fluid Restriction ([id number]) (06-22-22 @ 08:53)      LABS:                        10.9   4.05  )-----------( 182      ( 23 Jun 2022 06:10 )             33.3     23 Jun 2022 06:10    134    |  98     |  27     ----------------------------<  97     4.1     |  32     |  1.40     Ca    8.2        23 Jun 2022 06:10  Phos  3.2       23 Jun 2022 06:10  Mg     2.5       23 Jun 2022 06:10    TPro  6.7    /  Alb  2.5    /  TBili  0.9    /  DBili  x      /  AST  41     /  ALT  23     /  AlkPhos  101    23 Jun 2022 06:10    PT/INR - ( 23 Jun 2022 06:10 )   PT: 14.4 sec;   INR: 1.23 ratio         PTT - ( 23 Jun 2022 06:10 )  PTT:36.2 sec              CARDIAC MARKERS ( 18 Jun 2022 08:32 )  x     / x     / 364 U/L / x     / 24.0 ng/mL  CARDIAC MARKERS ( 17 Jun 2022 23:34 )  x     / x     / 497 U/L / x     / 52.0 ng/mL  CARDIAC MARKERS ( 17 Jun 2022 14:55 )  x     / x     / 523 U/L / x     / 70.3 ng/mL  CARDIAC MARKERS ( 17 Jun 2022 08:40 )  x     / x     / 135 U/L / x     / 3.2 ng/mL             Anemia Panel:  Iron Total, Serum: 88 ug/dL (06-18-22 @ 10:09)  Iron - Total Binding Capacity.: 354 ug/dL (06-18-22 @ 10:09)  Ferritin, Serum: 40 ng/mL (06-18-22 @ 10:09)  Vitamin B12, Serum: 619 pg/mL (06-18-22 @ 10:09)  Folate, Serum: 14.9 ng/mL (06-18-22 @ 10:09)      Thyroid Panel:  Thyroid Stimulating Hormone, Serum: 0.42 uIU/mL (06-18-22 @ 08:32)        Lipase, Serum: 157 U/L (06-17-22 @ 07:28)      Serum Pro-Brain Natriuretic Peptide: 20073 pg/mL (06-20-22 @ 09:53)  Serum Pro-Brain Natriuretic Peptide: 2396 pg/mL (06-17-22 @ 07:33)      RADIOLOGY & ADDITIONAL TESTS:      HEALTH ISSUES - PROBLEM Dx:  Lower extremity edema    Paroxysmal atrial fibrillation    Hypertension    Anemia    Hypothyroid    Need for prophylactic measure    Acute systolic congestive heart failure    NSTEMI (non-ST elevation myocardial infarction)    Acute kidney injury superimposed on CKD            Consultant(s) Notes Reviewed:  [  ] YES     Care Discussed with [X] Consultants  [  ] Patient  [  ] Family [  ] HCP [  ]   [  ] Social Service  [  ] RN, [  ] Physical Therapy,[  ] Palliative care team  DVT PPX: [ x ] Lovenox, [  ] S C Heparin, [  ] Coumadin, [  ] Xarelto, [  ] Eliquis, [  ] Pradaxa, [  ] IV Heparin drip, [  ] SCD [  ] Contraindication 2 to GI Bleed,[  ] Ambulation [  ] Contraindicated 2 to  bleed [  ] Contraindicated 2 to Brain Bleed  Advanced directive: [  ] None, [ x ] DNR/DNI Patient is a 89y old  Female who presents with a chief complaint of CP w/ elevated trops (22 Jun 2022 16:09)    HPI:  Patient is a 89 y/o F with a pmhx b/l LE edema, HTN, P Afib (on coumadin), and hypothyroidism presenting with sudden onset mid-sternal CP. Pt describes pain as a "prickly feeling", rated 4/10, constant in nature since 4am on 6/17, resolved in the ED. Pain was associated with L arm weakness, now resolved. Pt states the pain woke her up, and she was scared to walk and was unable to use her L arm for support to get out of bed. Pt was also nauseous at this time. No associated diaphoresis, palpitations, chest pressure. No amaurosis fugax, facial droop, garbled speech, hemiparesis, LOC, falls reported. No vomiting, fevers, chills, cough, sore throat, SOB, abd pain, constipation, dysuria. Pt had one loose BM this AM at home, denied hematochezia or melena.       In ED:  VS - HR 71 | /65 | RR 18 | sO2 94 | T 97.6  Labs - HgB 11.2 | INR 1.85 | K 3.3 | Glu 114 | trop 342.6 | CKMB 3.2 | BNP 2396  CTH NC - No acute intracranial hemorrhage. Lacunar infarct R inferior cerebellum  EKG - vent rate 66, QTc 501ms, sinus rhythm, old LBBB, no ischemic changes on personal read  Given -  mg PO. pt seen by cardiology Dr Almazan , pt admitted to [facility] for NSTEMI. (17 Jun 2022 09:52)    INTERVAL HPI:  6/18/22: Seen and examined at bedside. No acute complaints. Denies chest pain overnight and this AM. Overnight pt had episode of dyspnea, troponins inc from 300s to >00161. Trops now downtrended. Received 1 x dose 40mg IV lasix for dyspnea. Pt had statin dose inc to 80mg, had 1 x dose lopressor 25mg, and had her scheduled evening coumadin. Had 10 beats of NSVT this AM. Pt was started on standing 50mg toprol xL PO qD. Started on plavix 75mg qD. Extensive counseling done with the patient on [facility] and her current state of health. Patient states she has "forms at home" that her daughter will bring to [facility]. Patient values quality of life and wants to discuss with her daughter and son-inlaw. Family to discuss potential plan for cardiac cath and will arrive to a decision on 6/19. C/W medical therapy for ACS. HOLD coumadin as pt INR is therapeutic and to facilitate potential for cardiac cath. Will start renal adjusted FD Lovenox on 6/19 if INR is below 2.   6/19/22 Pt seen and examined at bedside. Pt states she slept well last night. Pt denies SOB, CP, palpitations, dizziness .Mildly elevated Cr , INR -Theraputic  Pt states the last time she had chest pain was Friday night. Later in the afternoon again patient was seen at bedside with  daughter HCP and son in law at bedside, decision was made for DNR/ DNI and cardiac cath.  6/20/22: Patient seen and examined at bedside. Patient denies chest pain, palpitations, dyspnea. Noted to be slightly short of breath. proBNP >13k, will give 1 dose of lasix 40mg IV today.  6/21/22: Patient continues to deny anginal symptoms. Will be transferred for cardiac cath, as INR downtrended to 1.6 and Cr improved to 1.3. For LUIS CARLOS on CKD, will defer further IVF hydration at this time due to TTE results and SOB after previous IVF. Will give Mucomyst q12 x4 for renal protection. Given lasix 40mg IVP x1 to improve respirations. Given full dose lovenox x1.   6/22/22: Overnight patient with R sided abdominal discomfort, given maalox. Patient breathing improved s/p lasix 40mg IV yesterday. Patient's INR 1.4 today and Cr increased to 1.5. Plan for cardiac cath If Cr stable. Given additional dose of full dose lovenox.  6/23/22: Patient seen and examined at bedside. Patient denies chest pain, pressure, sob, palpitations. Cr downtrending today,  As per Cardiology NO plan for transfer for cardiac cath as pt is still SOB , Needs Lasix & Cr is elevated.. Low stable H/H.      OVERNIGHT EVENTS: None    Home Medications:  aspirin 81 mg oral delayed release tablet: 1 tab(s) orally once a day (21 Jun 2022 08:48)  atorvastatin 80 mg oral tablet: 1 tab(s) orally once a day (at bedtime) (21 Jun 2022 08:48)  clopidogrel 75 mg oral tablet: 1 tab(s) orally once a day (21 Jun 2022 08:48)  Lasix 40 mg oral tablet: 1 tab(s) orally 2 times a day (17 Jun 2022 11:27)  metoprolol succinate 50 mg oral tablet, extended release: 1 tab(s) orally once a day (21 Jun 2022 08:48)  pantoprazole 40 mg intravenous injection: 40 milligram(s) intravenous once a day (21 Jun 2022 08:48)  Synthroid 75 mcg (0.075 mg) oral tablet: 1 tab(s) orally once a day (17 Jun 2022 11:27)  warfarin 3 mg oral tablet: 1 tab(s) orally once a day (M, Tu, Th, F, Sa Su) (17 Jun 2022 11:27)  warfarin 4 mg oral tablet: 1 tab(s) orally Wednesday (17 Jun 2022 11:27)      MEDICATIONS  (STANDING):  aspirin enteric coated 81 milliGRAM(s) Oral daily  atorvastatin 80 milliGRAM(s) Oral at bedtime  clopidogrel Tablet 75 milliGRAM(s) Oral daily  levothyroxine 75 MICROGram(s) Oral daily  metoprolol succinate ER 50 milliGRAM(s) Oral daily  pantoprazole  Injectable 40 milliGRAM(s) IV Push daily    MEDICATIONS  (PRN):  ALBUTerol    90 MICROgram(s) HFA Inhaler 2 Puff(s) Inhalation every 12 hours PRN Shortness of Breath and/or Wheezing  aluminum hydroxide/magnesium hydroxide/simethicone Suspension 30 milliLiter(s) Oral every 6 hours PRN Dyspepsia      Allergies    No Known Allergies    Intolerances        Social History:  Home: lives alone  Smoke: none  Alcohol: none  Recreational drug use: none  Ambulate: independent, uses support from furniture and cane occasionally  Activities of daily living: independent  Job: retired  COVID - Vaccine (17 Jun 2022 09:52)    REVIEW OF SYSTEMS: i feel OK  CONSTITUTIONAL: No fever, No chills, No fatigue, No myalgia, No Body ache, No Weakness  EYES: No eye pain,  No visual disturbances, No discharge, NO Redness  ENMT:  No ear pain, No nose bleed, No vertigo; No sinus pain, NO throat pain, No Congestion  NECK: No pain, No stiffness  RESPIRATORY: No cough, NO wheezing, No hemoptysis, ADMITS brief shortness of breath earlier, now resolved  CARDIOVASCULAR: No chest pain, palpitations  GASTROINTESTINAL: No abdominal pain, NO epigastric pain. No nausea, No vomiting; No diarrhea, No constipation. [ x ] BM  GENITOURINARY: No dysuria, No frequency, No urgency, No hematuria, NO incontinence  NEUROLOGICAL: No headaches, No dizziness, No numbness, No tingling, No tremors, No weakness  EXT: admits CHRONIC Swelling in LE's;  No Pain, admits LE Edema  SKIN:  [ x ] No itching, burning, rashes, or lesions   MUSCULOSKELETAL: No joint pain ,No Jt swelling; No muscle pain, No back pain, No extremity pain  PSYCHIATRIC: No depression,  No anxiety,  No mood swings ,No difficulty sleeping at night  PAIN SCALE: [ x ] None  [  ] Other-  ROS Unable to obtain due to - [  ] Dementia  [  ] Lethargy [  ] Drowsy [  ] Sedated [  ] non verbal    Vital Signs Last 24 Hrs  T(C): 36.6 (23 Jun 2022 04:45), Max: 36.9 (22 Jun 2022 20:17)  T(F): 97.8 (23 Jun 2022 04:45), Max: 98.5 (22 Jun 2022 20:17)  HR: 71 (23 Jun 2022 04:45) (70 - 79)  BP: 111/71 (23 Jun 2022 04:45) (102/60 - 114/59)  BP(mean): --  RR: 20 (23 Jun 2022 04:45) (18 - 20)  SpO2: 95% (23 Jun 2022 04:45) (90% - 95%)  Finger Stick        06-22 @ 07:01  -  06-23 @ 07:00  --------------------------------------------------------  IN: 0 mL / OUT: 600 mL / NET: -600 mL        PHYSICAL EXAM: O2 NC  GENERAL:  [ x ] NAD , [ x ] well appearing, [  ] Agitated, [  ] Drowsy,  [  ] Lethargy, [  ] confused   HEAD:  [ x ] Normal, [  ] Other  EYES:  [ x ] EOMI, [ x ] PERRLA, [ x ] conjunctiva and sclera clear normal, [  ] Other,  [  ] Pallor,[  ] Discharge  ENMT:  [ x ] Normal, [ x ] Moist mucous membranes, [ x ] Good dentition, [ x ] No Thrush  NECK:  [ x ] Supple, [x  ] No JVD, [ x ] Normal thyroid, [  ] Lymphadenopathy [  ] Other  CHEST/LUNG:  [ x ] Clear to auscultation bilaterally, [ x ] Breath Sounds equal B/L , [  ] poor effort  [ x ] No rales, [ x ] No rhonchi  [ x ]  slight expiratory wheezing,   HEART:  [ x ] Regular rate and IRREGULARLY IRREGULAR rhythm, [  ] tachycardia, [  ] Bradycardia,  [  ] irregular  [ x ] No murmurs, No rubs, No gallops, [  ] PPM in place (Mfr:  )  ABDOMEN:  [ x ] Soft, [ x ] Nontender, [ x ] Nondistended, [ x ] No mass, [ x] Bowel sounds present, [  ] obese  NERVOUS SYSTEM:  [ x ] Alert & Oriented X3, [ x ] Nonfocal  [  ] Confusion  [  ] Encephalopathic [  ] Sedated [  ] Unable to assess, [  ] Dementia [  ] Other-  EXTREMITIES: [ x ] 2+ Peripheral Pulses, No clubbing, No cyanosis,  [ x ] 2 + pitting edema B/L lower EXT. [ x ] PVD stasis skin changes B/L Lower EXT, [  ] wound  LYMPH: No lymphadenopathy noted  SKIN:  [ x ] No rashes or lesions, [  ] Pressure Ulcers, [  ] ecchymosis, [  ] Skin Tears, [  ] Other    DIET: Diet, DASH/TLC:   Sodium & Cholesterol Restricted  1200mL Fluid Restriction ([id number]) (06-22-22 @ 08:53)      LABS:                        10.9   4.05  )-----------( 182      ( 23 Jun 2022 06:10 )             33.3     23 Jun 2022 06:10    134    |  98     |  27     ----------------------------<  97     4.1     |  32     |  1.40     Ca    8.2        23 Jun 2022 06:10  Phos  3.2       23 Jun 2022 06:10  Mg     2.5       23 Jun 2022 06:10    TPro  6.7    /  Alb  2.5    /  TBili  0.9    /  DBili  x      /  AST  41     /  ALT  23     /  AlkPhos  101    23 Jun 2022 06:10    PT/INR - ( 23 Jun 2022 06:10 )   PT: 14.4 sec;   INR: 1.23 ratio         PTT - ( 23 Jun 2022 06:10 )  PTT:36.2 sec      CARDIAC MARKERS ( 18 Jun 2022 08:32 )  x     / x     / 364 U/L / x     / 24.0 ng/mL  CARDIAC MARKERS ( 17 Jun 2022 23:34 )  x     / x     / 497 U/L / x     / 52.0 ng/mL  CARDIAC MARKERS ( 17 Jun 2022 14:55 )  x     / x     / 523 U/L / x     / 70.3 ng/mL  CARDIAC MARKERS ( 17 Jun 2022 08:40 )  x     / x     / 135 U/L / x     / 3.2 ng/mL        Anemia Panel:  Iron Total, Serum: 88 ug/dL (06-18-22 @ 10:09)  Iron - Total Binding Capacity.: 354 ug/dL (06-18-22 @ 10:09)  Ferritin, Serum: 40 ng/mL (06-18-22 @ 10:09)  Vitamin B12, Serum: 619 pg/mL (06-18-22 @ 10:09)  Folate, Serum: 14.9 ng/mL (06-18-22 @ 10:09)      Thyroid Panel:  Thyroid Stimulating Hormone, Serum: 0.42 uIU/mL (06-18-22 @ 08:32)        Lipase, Serum: 157 U/L (06-17-22 @ 07:28)      Serum Pro-Brain Natriuretic Peptide: 83664 pg/mL (06-20-22 @ 09:53)  Serum Pro-Brain Natriuretic Peptide: 2396 pg/mL (06-17-22 @ 07:33)      RADIOLOGY & ADDITIONAL TESTS:  < from: TTE Echo Complete w/o Contrast w/ Doppler (06.19.22 @ 10:00) >    IMPRESSION:  Mild to moderate segmental left ventricular systolic dysfunction,   estimated LVEF of 35-40%. The apex, mid inferoseptal and mid   anterolateral walls appear severely hypokinetic  Right ventricular enlargement with grossly normal function  Biatrial enlargement  Calcified trileaflet aortic valve with mild to moderate aortic stenosis,   without AI.  Mild MR  Moderate TR.  No significant pericardial effusion.      < end of copied text >        HEALTH ISSUES - PROBLEM Dx:  Lower extremity edema    Paroxysmal atrial fibrillation    Hypertension    Anemia    Hypothyroid    Need for prophylactic measure    Acute systolic congestive heart failure    NSTEMI (non-ST elevation myocardial infarction)    Acute kidney injury superimposed on CKD        Consultant(s) Notes Reviewed:  [ x ] YES     Care Discussed with [X] Consultants  [ x ] Patient  [ x ] Family- Son in Law  [  ] HCP [  ]   [  ] Social Service  [ x ] RN, [  ] Physical Therapy,[  ] Palliative care team  DVT PPX: [ x ] Lovenox, [  ] S C Heparin, [  ] Coumadin, [  ] Xarelto, [  ] Eliquis, [  ] Pradaxa, [  ] IV Heparin drip, [  ] SCD [  ] Contraindication 2 to GI Bleed,[  ] Ambulation [  ] Contraindicated 2 to  bleed [  ] Contraindicated 2 to Brain Bleed  Advanced directive: [  ] None, [ x ] DNR/DNI

## 2022-06-23 NOTE — PROGRESS NOTE ADULT - PROBLEM SELECTOR PLAN 9
DVT ppx HOLD coumadin, goal INR for transfer for cath is 1.6  s/p full dose lovenox x2 renally dosed on 6/21 and 6/22 DVT ppx HOLD coumadin, goal INR for transfer for cath is 1.6  s/p full dose Lovenox x2 renally dosed on 6/21 and 6/22  Restart Coumadin today as NO Plan for cath

## 2022-06-23 NOTE — PROGRESS NOTE ADULT - PROBLEM SELECTOR PLAN 2
Acute Systolic CHF TTE: EF 35-40%, LV hypokinesis  - Noted to be SOB on 6/20 with increase in proBNP>13k  - s/p Lasix 40mg IV x2 on 6/20 and 6/21, elevated pBNP ~ 92518  - avoid IVF  - I/Os, daily weights, D/W Cardio    TTE: Mild to moderate segmental left ventricular systolic dysfunction, estimated LVEF of 35-40%. The apex, mid inferoseptal and mid anterolateral walls appear severely hypokinetic Right ventricular enlargement with grossly normal function Biatrial enlargement Calcified trileaflet aortic valve with mild to moderate aortic stenosis,   without AI. Mild MR Moderate TR. No significant pericardial effusion.

## 2022-06-23 NOTE — PROGRESS NOTE ADULT - ASSESSMENT
LUIS CARLOS on CKD 3b  Hypokalemia  HTN  NSTEMI  CHF    -BLCr 1.2  -Urine indices reviewed  -IV Lasix as needed  -Renal indices are improving  -Cardio follow up noted; no clear plan for Cath yet given age and creatinine  -Previously Discussed risks of contrast administration up to and including dialysis, patient and family understand risks; initially wanting to proceed, but now unsure  -Unable to give fluids if needs cath due to CHF  -Monitor chemistries    D/W Dr Villegas in detail

## 2022-06-23 NOTE — PROGRESS NOTE ADULT - ASSESSMENT
89 y/o F with a pmhx b/l LE edema, HTN,P Afib on coumadin , hypothyroidism, pw midsternal chest discomfort atypical in nature and left arm discomfort since waking up this morning at  4am, describes it as "creepy feeling" assoc with left arm weakness, general weakness and not feeling well. Patient has no underlying coronary disease.     NSTEMI  - EKG: Afib with underlying LBBB; unchanged from previous.  Remains non-anginal overnight.  No tele events.  Can D/C tele  - hsT trended up to >64285, downtrended to 45664, remained asymptomatic, denies any anginal symptoms to date  - Continue ASA, Plavix, BB, and statin.    - Continue to monitor for ischemic chest pain.  Ischemic eval discussed with patient and daughter; aware of risk and benefits of procedure.  Spoke again with daughter 6/22, states, she would discuss with her brother  - INR improved; creatinine trended down to 1.4.  Renal input noted.  s/p Mucomyst x 4 doses.  As of now, patient is not optimized.  May be safer to medically manage patient given advance age, labile renal function, and volume overload.  This was also discussed with both patient and daughter  - BP stable and controlled    Acute Systolic HF  - TTE:(6/19/2022) Mild to moderate segmental LV systolic dysfunction, est LVEF of 35-40%. The apex, mid inferoseptal and mid anterolateral walls appear severely hypokinetic, RV enlargement with grossly normal function. Biatrial enlargement Calcified trileaflet aortic valve with mild to moderate aortic stenosis, without AI. Mild MR Moderate TR.  - s/p Lasix 40 mg 40 mg IV x 2 days.  Remains on O2 supplementation.  On exam today, she appears to be mildly overloaded, though, labs showing intravascularly dry  - Continue strict I/O's.  She has chronic BLE edema.  Initiate MANN stockings    Permanent Afib  - Telemetry: Afib 60-70's with no arrhythmias overnight.    - Holding Coumadin in anticipation with Cardiac cath.  However, since no decision made to date, would start therapeutic Lovenox  - Monitor electrolytes, replete to keep K>4 and Mag>2    - Other cardiovascular workup will depend on clinical course.  - Will continue to follow.    Angeles Kim DNP, NP-C  Cardiology   Spectra #[phone]     89 y/o F with a pmhx b/l LE edema, HTN,P Afib on coumadin , hypothyroidism, pw midsternal chest discomfort atypical in nature and left arm discomfort since waking up this morning at  4am, describes it as "creepy feeling" assoc with left arm weakness, general weakness and not feeling well. Patient has no underlying coronary disease.     NSTEMI  - EKG: Afib with underlying LBBB; unchanged from previous.  Remains non-anginal overnight.  No tele events.  Can D/C tele  - hsT trended up to >91899, downtrended to 51854, remained asymptomatic, denies any anginal symptoms to date  - Continue ASA, Plavix, BB, and statin.    - Continue to monitor for ischemic chest pain.  Ischemic eval discussed with patient and daughter; aware of risk and benefits of procedure.  Spoke again with daughter 6/22, states, she would discuss with her brother  - INR improved; creatinine trended down to 1.4.  Renal input noted.  s/p Mucomyst x 4 doses.  As of now, patient is not optimized.  May be safer to medically manage patient given advance age, labile renal function, and volume overload.  This was also discussed with both patient and daughter  - BP stable and controlled    Acute Systolic HF  - TTE:(6/19/2022) Mild to moderate segmental LV systolic dysfunction, est LVEF of 35-40%. The apex, mid inferoseptal and mid anterolateral walls appear severely hypokinetic, RV enlargement with grossly normal function. Biatrial enlargement Calcified trileaflet aortic valve with mild to moderate aortic stenosis, without AI. Mild MR Moderate TR.  - s/p Lasix 40 mg  x 2 days.  Remains on O2 supplementation.  HCO3 rising. would give diuretic holiday suellen  - Continue strict I/O's.  She has chronic BLE edema.  Initiate MANN stockings    Permanent Afib  - Telemetry: Afib 60-70's with no arrhythmias overnight.    - Holding Coumadin in anticipation with Cardiac cath.  However, since no decision made to date, would start therapeutic Lovenox  - Monitor electrolytes, replete to keep K>4 and Mag>2    - Other cardiovascular workup will depend on clinical course.  - Will continue to follow.    Angeles Kim DNP, NP-C  Cardiology   Spectra #[phone]

## 2022-06-23 NOTE — PROGRESS NOTE ADULT - PROBLEM SELECTOR PLAN 1
presents with acute CP pain w radiation down L arm - trops elevated w/ no acute EKG changes   - admitted to to tele for NSTEMI , Few beats - NSVT on Tele. Remains non anginal since Friday.    -Family discussion with patient, son in law, and HCP daughter. Decision was made to proceed with cath. Patient was made DNR DNI and MOLST was placed in the chart.   -As per discussion with cardio, tentative plan to transfer for cath once INR 1.6 and LUIS CARLOS.  For INR will continue to hold coumadin and Lovenox.   -trop initially 342.6, up trended to over 22K, came down to 15 K   - EKG Afib with underlying LBBB; unchanged from previous  -c/w ASA 81mg qD, Lipitor 80mg qD, Toprol xl 50mg qD, O2 NC as needed, PPI  -s/p full dose lovenox x2 on 6/21 and 6/22  - TTE: EF 35-40%, LV hypokinesis  - continuous tele monitoring  -K, mag, Phos level in AM  -cardio Dr. James, recs appreciated- D/W cardio NP  -DNR DNI order placed, MOLST in chart presents with acute CP pain w radiation down L arm - trops elevated w/ no acute EKG changes   - admitted to to tele for NSTEMI , Few beats - NSVT on Tele. Remains non anginal since Friday.    -Family discussion with patient, son in law, and HCP daughter. Decision was made to proceed with cath. Patient was made DNR DNI and MOLST was placed in the chart.   -As per discussion with cardio, tentative plan to transfer for cath once INR 1.6 and LUIS CARLOS.  For INR will continue to hold coumadin and Lovenox.   -trop initially 342.6, up trended to over 22K, came down to 15 K   - EKG Afib with underlying LBBB; unchanged from previous  -c/w ASA 81mg qD, Lipitor 80mg qD, Toprol xl 50mg qD, O2 NC as needed, PPI  -s/p full dose Lovenox x2 on 6/21 and 6/22  - TTE: EF 35-40%, LV hypokinesis  - continuous tele monitoring  -K, mag, Phos level in AM  -cardio Dr. Mccord d/w No Cath as still fluid overload , D/W cardio NP  -DNR DNI order placed, MOLST in chart

## 2022-06-23 NOTE — PROGRESS NOTE ADULT - SUBJECTIVE AND OBJECTIVE BOX
[facility] -- Reid Astorga, Norah, Raheem, Flex Calvert Savella, Goodger  Office # [phone]    Follow Up:  NSTEMI, Acute SHF    Subjective/Observations: Awake and alert, NC remains in use but denies SOB or orthopnea, though, c/o cough.  Denies chest pain or palpitations overnight.  No  tele events noted    REVIEW OF SYSTEMS: All other review of systems is negative unless indicated above  PAST MEDICAL & SURGICAL HISTORY:  Hypothyroid    Hypertension    Lower extremity edema  Paroxysmal atrial fibrillation    MEDICATIONS  (STANDING):  aspirin enteric coated 81 milliGRAM(s) Oral daily  atorvastatin 80 milliGRAM(s) Oral at bedtime  clopidogrel Tablet 75 milliGRAM(s) Oral daily  enoxaparin Injectable 70 milliGRAM(s) SubCutaneous every 12 hours  levothyroxine 75 MICROGram(s) Oral daily  metoprolol succinate ER 50 milliGRAM(s) Oral daily  pantoprazole  Injectable 40 milliGRAM(s) IV Push daily    MEDICATIONS  (PRN):  ALBUTerol    90 MICROgram(s) HFA Inhaler 2 Puff(s) Inhalation every 12 hours PRN Shortness of Breath and/or Wheezing  aluminum hydroxide/magnesium hydroxide/simethicone Suspension 30 milliLiter(s) Oral every 6 hours PRN Dyspepsia    Allergies    No Known Allergies    Intolerances    Vital Signs Last 24 Hrs  T(C): 36.6 (23 Jun 2022 04:45), Max: 36.9 (22 Jun 2022 20:17)  T(F): 97.8 (23 Jun 2022 04:45), Max: 98.5 (22 Jun 2022 20:17)  HR: 71 (23 Jun 2022 04:45) (70 - 79)  BP: 111/71 (23 Jun 2022 04:45) (102/60 - 114/59)  BP(mean): --  RR: 20 (23 Jun 2022 04:45) (18 - 20)  SpO2: 95% (23 Jun 2022 04:45) (90% - 95%)  I&O's Summary    22 Jun 2022 07:01  -  23 Jun 2022 07:00  --------------------------------------------------------  IN: 0 mL / OUT: 600 mL / NET: -600 mL    PHYSICAL EXAM:  TELE: NSR  Constitutional: NAD, awake and alert, well-developed  HEENT: Moist Mucous Membranes, Anicteric  Pulmonary: Non-labored, breath sounds are clear but slightly diminished at bases bilaterally, No wheezing, fine rales at bases or rhonchi  Cardiovascular: Regular, S1 and S2, +murmurs, no rubs, gallops or clicks  Gastrointestinal: Bowel Sounds present, soft, nontender.   Lymph: Trace lumbar, +1 BLE edema. No lymphadenopathy.  Skin: No visible rashes or ulcers.  Psych:  Mood & affect appropriate    LABS: All Labs Reviewed:                        10.9   4.05  )-----------( 182      ( 23 Jun 2022 06:10 )             33.3                         10.3   4.71  )-----------( 181      ( 22 Jun 2022 06:32 )             31.6                         9.8    4.43  )-----------( 165      ( 21 Jun 2022 06:20 )             30.2     23 Jun 2022 06:10    134    |  98     |  27     ----------------------------<  97     4.1     |  32     |  1.40   22 Jun 2022 06:32    137    |  102    |  27     ----------------------------<  89     3.7     |  31     |  1.50   21 Jun 2022 06:20    138    |  101    |  26     ----------------------------<  85     3.9     |  28     |  1.30     Ca    8.2        23 Jun 2022 06:10  Ca    7.6        22 Jun 2022 06:32  Ca    7.4        21 Jun 2022 06:20  Phos  3.2       23 Jun 2022 06:10  Phos  2.6       22 Jun 2022 06:32  Phos  2.7       21 Jun 2022 06:20  Mg     2.5       23 Jun 2022 06:10  Mg     2.3       22 Jun 2022 06:32  Mg     2.2       21 Jun 2022 06:20    TPro  6.7    /  Alb  2.5    /  TBili  0.9    /  DBili  x      /  AST  41     /  ALT  23     /  AlkPhos  101    23 Jun 2022 06:10  TPro  6.0    /  Alb  2.2    /  TBili  0.8    /  DBili  x      /  AST  41     /  ALT  21     /  AlkPhos  84     22 Jun 2022 06:32  TPro  6.1    /  Alb  2.1    /  TBili  1.0    /  DBili  x      /  AST  41     /  ALT  21     /  AlkPhos  82     21 Jun 2022 06:20    PT/INR - ( 23 Jun 2022 06:10 )   PT: 14.4 sec;   INR: 1.23 ratio     PTT - ( 23 Jun 2022 06:10 )  PTT:36.2 sec    ACC: [id number] EXAM:  ECHO TTE WO CON COMP W DOPP                          PROCEDURE DATE:  06/19/2022      INTERPRETATION:  INDICATION: Chest pain  Sonographer LK    Blood Pressure 147/84    Height 165.1 cm     Weight 72.6 kg       BSA 1.8   sqm    Dimensions:  LA 3.8       Normal Values: 2.0 - 4.0 cm  Ao 3.2        Normal Values: 2.0 - 3.8 cm  SEPTUM 1.7       Normal Values: 0.6 - 1.2 cm  PWT 1.2       Normal Values: 0.6 - 1.1 cm  LVIDd 4.8         Normal Values: 3.0 - 5.6 cm  LVIDs 3.2      Normal Values: 1.8 - 4.0 cm    OBSERVATIONS:  Mitral Valve: Mitral annular calcification with thickened leaflets, mild   MR.  Aortic Valve/Aorta: Calcified trileaflet aortic valve with decreased   opening. Peak transaortic valve gradient equals 20.4 mmHg with a mean   transaortic valve gradient 13.2 mmHg. By visual estimation there appears   to be mild to moderate aortic stenosis  Tricuspid Valve: Moderate TR.  Pulmonic Valve: Trace PI  Left Atrium: Enlarged  Right Atrium: Enlarged  Left Ventricle: Mild to moderate segmental left ventricular systolic   dysfunction, estimated LVEF of 35-40%. The apex, mid inferoseptal and mid   anterolateral walls appear severely hypokinetic  Right Ventricle: Right ventricular enlargement with grossly normal   function  Pericardium: no significant pericardial effusion.  Pulmonary/RV Pressure: estimated PA systolic pressure of 36 mmHg  IVC measures 1.47 cm    IMPRESSION:  Mild to moderate segmental left ventricular systolic dysfunction,   estimated LVEF of 35-40%. The apex, mid inferoseptal and mid   anterolateral walls appear severely hypokinetic  Right ventricular enlargement with grossly normal function  Biatrial enlargement  Calcified trileaflet aortic valve with mild to moderate aortic stenosis,   without AI.  Mild MR  Moderate TR.  No significant pericardial effusion.    --- End of Report ---    JEAN CARLOS HENRY MD; Attending Cardiologist  This document has been electronically signed. Jun 20 2022 12:56PM    ACC: [id number] EXAM:  XR CHEST PORTABLE URGENT 1V                          PROCEDURE DATE:  06/17/2022      INTERPRETATION:  AP semierect chest on June 17, 2022 at 8:35 AM. Patient   has chest pain.    Heart magnified by technique.    There is elevation of the right hemidiaphragm again noted.    Mild right base effusion somewhat increased from October 11, 2021.    IMPRESSION: As above.    --- End of Report ---    RUSSELL MONTES MD; Attending Radiologist  This document has been electronically signed. Jun 17 2022 11:18AM     Ventricular Rate 99 BPM    Atrial Rate 73 BPM    QRS Duration 148 ms    Q-T Interval 434 ms    QTC Calculation(Bazett) 556 ms    R Axis -65 degrees    T Axis 153 degrees    Diagnosis Line Atrial fibrillation with premature ventricular or aberrantlyconducted complexes  Left axis deviation  Left bundle branch block  Abnormal ECG  Confirmed by Norah ALVARENGA, Jhonny ([id number]) on 6/18/2022 11:46:05 AM      [facility] -- Reid Astorga, Norah, Raheem, Flex Calvert Savella, Goodger  Office # [phone]    Follow Up:  NSTEMI, Acute SHF    Subjective/Observations: Awake and alert, NC remains in use but denies SOB or orthopnea, though, c/o cough.  Denies chest pain or palpitations overnight.  No  tele events noted    REVIEW OF SYSTEMS: All other review of systems is negative unless indicated above    PAST MEDICAL & SURGICAL HISTORY:  Hypothyroid    Hypertension    Lower extremity edema  Paroxysmal atrial fibrillation    MEDICATIONS  (STANDING):  aspirin enteric coated 81 milliGRAM(s) Oral daily  atorvastatin 80 milliGRAM(s) Oral at bedtime  clopidogrel Tablet 75 milliGRAM(s) Oral daily  enoxaparin Injectable 70 milliGRAM(s) SubCutaneous every 12 hours  levothyroxine 75 MICROGram(s) Oral daily  metoprolol succinate ER 50 milliGRAM(s) Oral daily  pantoprazole  Injectable 40 milliGRAM(s) IV Push daily    MEDICATIONS  (PRN):  ALBUTerol    90 MICROgram(s) HFA Inhaler 2 Puff(s) Inhalation every 12 hours PRN Shortness of Breath and/or Wheezing  aluminum hydroxide/magnesium hydroxide/simethicone Suspension 30 milliLiter(s) Oral every 6 hours PRN Dyspepsia    Allergies    No Known Allergies    Intolerances    Vital Signs Last 24 Hrs  T(C): 36.6 (23 Jun 2022 04:45), Max: 36.9 (22 Jun 2022 20:17)  T(F): 97.8 (23 Jun 2022 04:45), Max: 98.5 (22 Jun 2022 20:17)  HR: 71 (23 Jun 2022 04:45) (70 - 79)  BP: 111/71 (23 Jun 2022 04:45) (102/60 - 114/59)  BP(mean): --  RR: 20 (23 Jun 2022 04:45) (18 - 20)  SpO2: 95% (23 Jun 2022 04:45) (90% - 95%)  I&O's Summary    22 Jun 2022 07:01  -  23 Jun 2022 07:00  --------------------------------------------------------  IN: 0 mL / OUT: 600 mL / NET: -600 mL    PHYSICAL EXAM:  TELE: NSR  Constitutional: NAD, awake and alert, well-developed  HEENT: Moist Mucous Membranes, Anicteric  Pulmonary: Non-labored, breath sounds are clear but slightly diminished at bases bilaterally, No wheezing, fine rales at bases or rhonchi  Cardiovascular: Regular, S1 and S2, +murmurs, no rubs, gallops or clicks  Gastrointestinal: Bowel Sounds present, soft, nontender.   Lymph: Trace lumbar, +1 BLE edema. No lymphadenopathy.  Skin: No visible rashes or ulcers.  Psych:  Mood & affect appropriate    LABS: All Labs Reviewed:                        10.9   4.05  )-----------( 182      ( 23 Jun 2022 06:10 )             33.3                         10.3   4.71  )-----------( 181      ( 22 Jun 2022 06:32 )             31.6                         9.8    4.43  )-----------( 165      ( 21 Jun 2022 06:20 )             30.2     23 Jun 2022 06:10    134    |  98     |  27     ----------------------------<  97     4.1     |  32     |  1.40   22 Jun 2022 06:32    137    |  102    |  27     ----------------------------<  89     3.7     |  31     |  1.50   21 Jun 2022 06:20    138    |  101    |  26     ----------------------------<  85     3.9     |  28     |  1.30     Ca    8.2        23 Jun 2022 06:10  Ca    7.6        22 Jun 2022 06:32  Ca    7.4        21 Jun 2022 06:20  Phos  3.2       23 Jun 2022 06:10  Phos  2.6       22 Jun 2022 06:32  Phos  2.7       21 Jun 2022 06:20  Mg     2.5       23 Jun 2022 06:10  Mg     2.3       22 Jun 2022 06:32  Mg     2.2       21 Jun 2022 06:20    TPro  6.7    /  Alb  2.5    /  TBili  0.9    /  DBili  x      /  AST  41     /  ALT  23     /  AlkPhos  101    23 Jun 2022 06:10  TPro  6.0    /  Alb  2.2    /  TBili  0.8    /  DBili  x      /  AST  41     /  ALT  21     /  AlkPhos  84     22 Jun 2022 06:32  TPro  6.1    /  Alb  2.1    /  TBili  1.0    /  DBili  x      /  AST  41     /  ALT  21     /  AlkPhos  82     21 Jun 2022 06:20    PT/INR - ( 23 Jun 2022 06:10 )   PT: 14.4 sec;   INR: 1.23 ratio     PTT - ( 23 Jun 2022 06:10 )  PTT:36.2 sec    ACC: [id number] EXAM:  ECHO TTE WO CON COMP W DOPP                          PROCEDURE DATE:  06/19/2022      INTERPRETATION:  INDICATION: Chest pain  Sonographer LK    Blood Pressure 147/84    Height 165.1 cm     Weight 72.6 kg       BSA 1.8   sqm    Dimensions:  LA 3.8       Normal Values: 2.0 - 4.0 cm  Ao 3.2        Normal Values: 2.0 - 3.8 cm  SEPTUM 1.7       Normal Values: 0.6 - 1.2 cm  PWT 1.2       Normal Values: 0.6 - 1.1 cm  LVIDd 4.8         Normal Values: 3.0 - 5.6 cm  LVIDs 3.2      Normal Values: 1.8 - 4.0 cm    OBSERVATIONS:  Mitral Valve: Mitral annular calcification with thickened leaflets, mild   MR.  Aortic Valve/Aorta: Calcified trileaflet aortic valve with decreased   opening. Peak transaortic valve gradient equals 20.4 mmHg with a mean   transaortic valve gradient 13.2 mmHg. By visual estimation there appears   to be mild to moderate aortic stenosis  Tricuspid Valve: Moderate TR.  Pulmonic Valve: Trace PI  Left Atrium: Enlarged  Right Atrium: Enlarged  Left Ventricle: Mild to moderate segmental left ventricular systolic   dysfunction, estimated LVEF of 35-40%. The apex, mid inferoseptal and mid   anterolateral walls appear severely hypokinetic  Right Ventricle: Right ventricular enlargement with grossly normal   function  Pericardium: no significant pericardial effusion.  Pulmonary/RV Pressure: estimated PA systolic pressure of 36 mmHg  IVC measures 1.47 cm    IMPRESSION:  Mild to moderate segmental left ventricular systolic dysfunction,   estimated LVEF of 35-40%. The apex, mid inferoseptal and mid   anterolateral walls appear severely hypokinetic  Right ventricular enlargement with grossly normal function  Biatrial enlargement  Calcified trileaflet aortic valve with mild to moderate aortic stenosis,   without AI.  Mild MR  Moderate TR.  No significant pericardial effusion.    --- End of Report ---    JEAN CARLOS HENRY MD; Attending Cardiologist  This document has been electronically signed. Jun 20 2022 12:56PM    ACC: [id number] EXAM:  XR CHEST PORTABLE URGENT 1V                          PROCEDURE DATE:  06/17/2022      INTERPRETATION:  AP semierect chest on June 17, 2022 at 8:35 AM. Patient   has chest pain.    Heart magnified by technique.    There is elevation of the right hemidiaphragm again noted.    Mild right base effusion somewhat increased from October 11, 2021.    IMPRESSION: As above.    --- End of Report ---    RUSSELL MONTES MD; Attending Radiologist  This document has been electronically signed. Jun 17 2022 11:18AM     Ventricular Rate 99 BPM    Atrial Rate 73 BPM    QRS Duration 148 ms    Q-T Interval 434 ms    QTC Calculation(Bazett) 556 ms    R Axis -65 degrees    T Axis 153 degrees    Diagnosis Line Atrial fibrillation with premature ventricular or aberrantlyconducted complexes  Left axis deviation  Left bundle branch block  Abnormal ECG  Confirmed by Norah ALVARENGA, Jhonny ([id number]) on 6/18/2022 11:46:05 AM

## 2022-06-23 NOTE — PROGRESS NOTE ADULT - PROBLEM SELECTOR PLAN 4
LUIS CARLOS on CKD stage 3B?   -Cr 1.5 baseline appears to be 1-1.2  -s/p Mucomyst q 12 hrs x 4 doses for prep for Cardiac Cath   - avoid IVF due to CHF  - s/p Lasix 40mg IV x2 on 6/20 and 6/21, elevated pBNP ~ 47554 for CHF  -consulted Dr. FLORIAN nephro-BMP in AM LUIS CARLOS on CKD stage 3B?   -Cr 1.5 baseline appears to be 1-1.2  -s/p Mucomyst q 12 hrs x 4 doses for prep for Cardiac Cath   - avoid IVF due to CHF  - s/p Lasix 40mg IV x2 on 6/20 and 6/21, elevated pBNP ~ 85508 for CHF  -Lasix 20 mg IVP x 1 dose  -consulted Dr. FLORIAN nephro-BMP in AM

## 2022-06-23 NOTE — PROGRESS NOTE ADULT - ATTENDING COMMENTS
Patient is a 89 y/o F with a pmhx b/l LE edema, HTN, P Afib (on coumadin), and hypothyroidism presenting with diarrhea and mid-sternal CP admitted for CP w/ elevated trops, NSTEMI.  Pt seen, examined, case & care plan d/w pt, residents at detail.  -Cardiology Dr Mccord  follow up - Cr -mildly elevated, -S/P IV Lasix 20 mg x 1 dose.  -Renal DR Rangel d/w at detail.  -AM labs   -PO diet  -Palliative care -DNR/DNI  -D/W Dtr & Son in Law at detail. No Plan for cardiac cath as d/w Dr ALEXANDRU Mccord as pt still needs IV Lasix for SOB, wheezing , Cr elevated, Concern for KIMMY & risks for HD   Total care time is 40 minutes. Patient is a 89 y/o F with a pmhx b/l LE edema, HTN, P Afib (on coumadin), and hypothyroidism presenting with diarrhea and mid-sternal CP admitted for CP w/ elevated trops, NSTEMI.  Pt seen, examined, case & care plan d/w pt, residents at detail.  -Cardiology Dr Mccord  follow up - Cr -mildly elevated, -S/P IV Lasix 20 mg x 1 dose.  -Renal DR Rangel d/w at detail.  -AM labs   -PO diet  -Palliative care -DNR/DNI  -D/W Dtr & Son in Law at detail. No Plan for cardiac cath as d/w Dr ALEXANDRU Mccord as pt still needs IV Lasix for SOB, wheezing , Cr elevated, Concern for KIMMY & risks for HD   Total care time is 40 minutes.

## 2022-06-24 LAB
ALBUMIN SERPL ELPH-MCNC: 2.3 G/DL — LOW (ref 3.3–5)
ALP SERPL-CCNC: 91 U/L — SIGNIFICANT CHANGE UP (ref 40–120)
ALT FLD-CCNC: 19 U/L — SIGNIFICANT CHANGE UP (ref 12–78)
ANION GAP SERPL CALC-SCNC: 4 MMOL/L — LOW (ref 5–17)
APTT BLD: 35.9 SEC — HIGH (ref 27.5–35.5)
AST SERPL-CCNC: 34 U/L — SIGNIFICANT CHANGE UP (ref 15–37)
BASOPHILS # BLD AUTO: 0.02 K/UL — SIGNIFICANT CHANGE UP (ref 0–0.2)
BASOPHILS NFR BLD AUTO: 0.4 % — SIGNIFICANT CHANGE UP (ref 0–2)
BILIRUB SERPL-MCNC: 0.8 MG/DL — SIGNIFICANT CHANGE UP (ref 0.2–1.2)
BUN SERPL-MCNC: 26 MG/DL — HIGH (ref 7–23)
CALCIUM SERPL-MCNC: 7.8 MG/DL — LOW (ref 8.5–10.1)
CHLORIDE SERPL-SCNC: 103 MMOL/L — SIGNIFICANT CHANGE UP (ref 96–108)
CO2 SERPL-SCNC: 32 MMOL/L — HIGH (ref 22–31)
CREAT SERPL-MCNC: 1.3 MG/DL — SIGNIFICANT CHANGE UP (ref 0.5–1.3)
EGFR: 38 ML/MIN/1.73M2 — LOW
EOSINOPHIL # BLD AUTO: 0.18 K/UL — SIGNIFICANT CHANGE UP (ref 0–0.5)
EOSINOPHIL NFR BLD AUTO: 4 % — SIGNIFICANT CHANGE UP (ref 0–6)
GLUCOSE SERPL-MCNC: 84 MG/DL — SIGNIFICANT CHANGE UP (ref 70–99)
HCT VFR BLD CALC: 31.7 % — LOW (ref 34.5–45)
HGB BLD-MCNC: 10.4 G/DL — LOW (ref 11.5–15.5)
IMM GRANULOCYTES NFR BLD AUTO: 0.2 % — SIGNIFICANT CHANGE UP (ref 0–1.5)
INR BLD: 1.18 RATIO — HIGH (ref 0.88–1.16)
LYMPHOCYTES # BLD AUTO: 1.3 K/UL — SIGNIFICANT CHANGE UP (ref 1–3.3)
LYMPHOCYTES # BLD AUTO: 29 % — SIGNIFICANT CHANGE UP (ref 13–44)
MAGNESIUM SERPL-MCNC: 2.7 MG/DL — HIGH (ref 1.6–2.6)
MCHC RBC-ENTMCNC: 30.3 PG — SIGNIFICANT CHANGE UP (ref 27–34)
MCHC RBC-ENTMCNC: 32.8 GM/DL — SIGNIFICANT CHANGE UP (ref 32–36)
MCV RBC AUTO: 92.4 FL — SIGNIFICANT CHANGE UP (ref 80–100)
MONOCYTES # BLD AUTO: 0.74 K/UL — SIGNIFICANT CHANGE UP (ref 0–0.9)
MONOCYTES NFR BLD AUTO: 16.5 % — HIGH (ref 2–14)
NEUTROPHILS # BLD AUTO: 2.23 K/UL — SIGNIFICANT CHANGE UP (ref 1.8–7.4)
NEUTROPHILS NFR BLD AUTO: 49.9 % — SIGNIFICANT CHANGE UP (ref 43–77)
NRBC # BLD: 0 /100 WBCS — SIGNIFICANT CHANGE UP (ref 0–0)
PHOSPHATE SERPL-MCNC: 2.7 MG/DL — SIGNIFICANT CHANGE UP (ref 2.5–4.5)
PLATELET # BLD AUTO: 203 K/UL — SIGNIFICANT CHANGE UP (ref 150–400)
POTASSIUM SERPL-MCNC: 3.9 MMOL/L — SIGNIFICANT CHANGE UP (ref 3.5–5.3)
POTASSIUM SERPL-SCNC: 3.9 MMOL/L — SIGNIFICANT CHANGE UP (ref 3.5–5.3)
PROT SERPL-MCNC: 6 G/DL — SIGNIFICANT CHANGE UP (ref 6–8.3)
PROTHROM AB SERPL-ACNC: 13.8 SEC — HIGH (ref 10.5–13.4)
RBC # BLD: 3.43 M/UL — LOW (ref 3.8–5.2)
RBC # FLD: 18.9 % — HIGH (ref 10.3–14.5)
SODIUM SERPL-SCNC: 139 MMOL/L — SIGNIFICANT CHANGE UP (ref 135–145)
WBC # BLD: 4.48 K/UL — SIGNIFICANT CHANGE UP (ref 3.8–10.5)
WBC # FLD AUTO: 4.48 K/UL — SIGNIFICANT CHANGE UP (ref 3.8–10.5)

## 2022-06-24 PROCEDURE — 99232 SBSQ HOSP IP/OBS MODERATE 35: CPT

## 2022-06-24 RX ORDER — WARFARIN SODIUM 2.5 MG/1
4 TABLET ORAL ONCE
Refills: 0 | Status: COMPLETED | OUTPATIENT
Start: 2022-06-24 | End: 2022-06-24

## 2022-06-24 RX ORDER — FUROSEMIDE 40 MG
40 TABLET ORAL
Refills: 0 | Status: DISCONTINUED | OUTPATIENT
Start: 2022-06-24 | End: 2022-06-26

## 2022-06-24 RX ADMIN — ENOXAPARIN SODIUM 70 MILLIGRAM(S): 100 INJECTION SUBCUTANEOUS at 13:12

## 2022-06-24 RX ADMIN — Medication 75 MICROGRAM(S): at 05:36

## 2022-06-24 RX ADMIN — Medication 40 MILLIGRAM(S): at 13:11

## 2022-06-24 RX ADMIN — CLOPIDOGREL BISULFATE 75 MILLIGRAM(S): 75 TABLET, FILM COATED ORAL at 11:09

## 2022-06-24 RX ADMIN — WARFARIN SODIUM 4 MILLIGRAM(S): 2.5 TABLET ORAL at 21:34

## 2022-06-24 RX ADMIN — Medication 81 MILLIGRAM(S): at 11:08

## 2022-06-24 RX ADMIN — ATORVASTATIN CALCIUM 80 MILLIGRAM(S): 80 TABLET, FILM COATED ORAL at 21:32

## 2022-06-24 RX ADMIN — PANTOPRAZOLE SODIUM 40 MILLIGRAM(S): 20 TABLET, DELAYED RELEASE ORAL at 11:09

## 2022-06-24 RX ADMIN — Medication 50 MILLIGRAM(S): at 05:36

## 2022-06-24 NOTE — DIETITIAN INITIAL EVALUATION ADULT - ORAL INTAKE PTA/DIET HISTORY
patient reports eats two meals a day at home. has been eating well in hospital . willing to take sandwich at lunch. on coumadin PTA   denies problems chewing swallowing

## 2022-06-24 NOTE — PROGRESS NOTE ADULT - SUBJECTIVE AND OBJECTIVE BOX
Neurology Follow up note    KENIA [id number]    HPI:  Patient is a 89 y/o F with a pmhx b/l LE edema, HTN, P Afib (on coumadin), and hypothyroidism presenting with sudden onset mid-sternal CP. Pt describes pain as a "prickly feeling", rated 4/10, constant in nature since 4am on 6/17, resolved in the ED. Pain was associated with L arm weakness, now resolved. Pt states the pain woke her up, and she was scared to walk and was unable to use her L arm for support to get out of bed. Pt was also nauseous at this time. No associated diaphoresis, palpitations, chest pressure. No amaurosis fugax, facial droop, garbled speech, hemiparesis, LOC, falls reported. No vomiting, fevers, chills, cough, sore throat, SOB, abd pain, constipation, dysuria. Pt had one loose BM this AM at home, denied hematochezia or melena.       In ED:  VS - HR 71 | /65 | RR 18 | sO2 94 | T 97.6  Labs - HgB 11.2 | INR 1.85 | K 3.3 | Glu 114 | trop 342.6 | CKMB 3.2 | BNP 2396  CTH NC - No acute intracranial hemorrhage. Lacunar infarct R inferior cerebellum  EKG - vent rate 66, QTc 501ms, sinus rhythm, old LBBB, no ischemic changes on personal read  Given -  mg PO. pt seen by cardiology Dr Almazan , pt admitted to [facility] for NSTEMI. (17 Jun 2022 09:52)      Interval History -did not sleep last night    Patient is seen, chart was reviewed and case was discussed with the treatment team.  Pt is not in any distress.   Lying on bed comfortably.     Vital Signs Last 24 Hrs  T(C): 36.5 (24 Jun 2022 12:00), Max: 36.6 (24 Jun 2022 04:28)  T(F): 97.7 (24 Jun 2022 12:00), Max: 97.8 (24 Jun 2022 04:28)  HR: 62 (24 Jun 2022 13:38) (62 - 70)  BP: 104/65 (24 Jun 2022 12:00) (102/67 - 117/57)  BP(mean): --  RR: 18 (24 Jun 2022 13:38) (17 - 19)  SpO2: 91% (24 Jun 2022 13:38) (91% - 97%))        REVIEW OF SYSTEMS:    Constitutional: No fever,   Eyes: No eye pain, visual disturbances, or discharge  ENT:  No difficulty hearing, tinnitus, vertigo; No sinus or throat pain  Neck: No pain or stiffness  Respiratory: No cough, wheezing, chills or hemoptysis  Cardiovascular: No alpitations, shortness of breath,   Gastrointestinal: No abdominal or epigastric pain. No nausea, vomiting or hematemesis;  Genitourinary: No dysuria, frequency, hematuria  Neurological: No headaches tremors  Psychiatric: No depression, anxiety, mood swings or difficulty sleeping  Musculoskeletal: No joint pain or swelling;   Skin: No itching, burning, rashes or lesions   Lymph Nodes: No enlarged glands  Endocrine: No heat or cold intolerance;   Allergy and Immunologic: No hives or eczema    On Neurological Examination:    Mental Status - Pt is alert, awake, oriented X3.  Follows commands well and able to answer questions appropriately  .Mood and affect  normal    Speech -  Normal.    Cranial Nerves - Pupils 3 mm equal and reactive to light, extraocular eye movements intact. Pt has no visual field deficit.  Pt has no  facial asymmetry. Facial sensation is intact.Tongue - is in midline.    Muscle tone - is normal     Motor Exam - 5/5 of UE  LE 4/5    Sensory Exam -  Pt withdraws all extremities equally on stimulation. No asymmetry seen.      coordination:    Finger to nose: adele    Deep tendon Reflexes - 2 plus all over.         Neck Supple -  Yes.     MEDICATIONS    acetylcysteine  Oral Solution 600 milliGRAM(s) Oral every 12 hours  aspirin enteric coated 81 milliGRAM(s) Oral daily  atorvastatin 80 milliGRAM(s) Oral at bedtime  clopidogrel Tablet 75 milliGRAM(s) Oral daily  furosemide   Injectable 40 milliGRAM(s) IV Push once  levothyroxine 75 MICROGram(s) Oral daily  metoprolol succinate ER 50 milliGRAM(s) Oral daily  pantoprazole  Injectable 40 milliGRAM(s) IV Push daily  potassium phosphate / sodium phosphate Powder (PHOS-NaK) 1 Packet(s) Oral once  sodium chloride 0.9%. 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  OOB to chair/ambulation with assistance only.  Pain is accessed and addressed.  Would continue to follow.

## 2022-06-24 NOTE — PROGRESS NOTE ADULT - ASSESSMENT
LUIS CARLOS on CKD 3b  Hypokalemia  HTN  NSTEMI  CHF    -BLCr 1.2  -Urine indices reviewed  -IV Lasix as needed  -Renal indices are improving back to baseline  -Cardio follow up noted; no clear plan for Cath yet given age and creatinine  -Previously Discussed risks of contrast administration up to and including dialysis, patient and family understand risks; initially wanting to proceed, but now unsure  -Unable to give fluids if needs cath due to CHF  -Monitor chemistries    Called Daughter, Cristela; message left to call back

## 2022-06-24 NOTE — PROGRESS NOTE ADULT - ATTENDING COMMENTS
Patient is a 89 y/o F with a pmhx b/l LE edema, HTN, P Afib (on coumadin), and hypothyroidism presenting with diarrhea and mid-sternal CP admitted for CP w/ elevated trops, NSTEMI.  Pt seen, examined, case & care plan d/w pt, residents at detail.  -Cardiology Dr Blair- Restart Lasix 40 mg 2x day -Home dose   -Renal DR Rangel d/w at detail.  -AM labs   -PO diet  -Palliative care -DNR/DNI  -D/W Dtr  at detail with Cardio NP . No Plan for cardiac cath as pt is  SOB, wheezing ,Medical management , out pt cardiology follow up  -PT----> SINAN -Dtr & Pt refusing SINAN, RA O2 Sat at rest & Ambulation , D/W Social service   Total care time is 45 minutes. Patient is a 89 y/o F with a pmhx b/l LE edema, HTN, P Afib (on coumadin), and hypothyroidism presenting with diarrhea and mid-sternal CP admitted for CP w/ elevated trops, NSTEMI.  Pt seen, examined, case & care plan d/w pt, residents at detail.  -Cardiology Dr Blair- Restart Lasix 40 mg 2x day -Home dose   -Renal DR Rangel d/w at detail.  -AM labs   -PO diet  -Palliative care -DNR/DNI  -D/W Dtr  at detail with Cardio NP . No Plan for cardiac cath as pt is  SOB, wheezing ,Medical management , out pt cardiology follow up  -PT----> SINAN -Dtr & Pt refusing SINAN, RA O2 Sat at rest & Ambulation , D/W Social service   Total care time is 45 minutes.

## 2022-06-24 NOTE — PROGRESS NOTE ADULT - PROBLEM SELECTOR PLAN 9
DVT ppx HOLD coumadin, goal INR for transfer for cath is 1.6  s/p full dose Lovenox x2 renally dosed on 6/21 and 6/22  Restart Coumadin today as NO Plan for cath

## 2022-06-24 NOTE — PROGRESS NOTE ADULT - PROBLEM SELECTOR PLAN 1
presents with acute CP pain w radiation down L arm - trops elevated w/ no acute EKG changes   - admitted to to tele for NSTEMI , Few beats - NSVT on Tele. Remains non anginal   -As per discussion with cardio, tentative plan for outpatient cath   -trop initially 342.6, up trended to over 22K, came down to 15 K   - EKG Afib with underlying LBBB; unchanged from previous  -c/w ASA 81mg qD, Lipitor 80mg qD, Toprol xl 50mg qD, O2 NC as needed, PPI  -s/p full dose Lovenox x2 on 6/21 and 6/22  -s/p coumadin 3mg 6/23  - TTE: EF 35-40%, LV hypokinesis  - continuous tele monitoring  -K, mag, Phos level in AM  -cardio Dr. Mccord d/w No Cath as still fluid overload , D/W cardio NP  -DNR DNI order placed, MOLST in chart presents with acute CP pain w radiation down L arm - trops elevated w/ no acute EKG changes   - admitted to to tele for NSTEMI , Few beats - NSVT on Tele. Remains non anginal   -As per discussion with cardio, NO  plan for outpatient cath 2/2 persistent SOB/Fluid overload  -trop initially 342.6, up trended to over 22K, came down to 15 K   - EKG Afib with underlying LBBB; unchanged from previous  -c/w ASA 81mg qD, Lipitor 80mg qD, Toprol xl 50mg qD,  PPI   - TTE: EF 35-40%, LV hypokinesis  - continuous tele monitoring  -cardio Dr. Blair d/w No Cath as still fluid overload , D/W cardio NP  -Lasix 40 mg 2x day   -DNR DNI order placed, JAMES in chart

## 2022-06-24 NOTE — PROGRESS NOTE ADULT - ASSESSMENT
89 y/o F with a pmhx b/l LE edema, HTN,P Afib on coumadin , hypothyroidism, pw midsternal chest discomfort atypical in nature and left arm discomfort since waking up this morning at  4am, describes it as "creepy feeling" assoc with left arm weakness, general weakness and not feeling well. Patient has no underlying coronary disease.     NSTEMI  - EKG: Afib with underlying LBBB; unchanged from previous.    - hsT trended up to >50274, downtrended to 563[address], remained asymptomatic, denies any anginal symptoms to date  - Continue ASA, Plavix, BB, and statin.    - Continue to monitor for ischemic chest pain.  Ischemic eval has been discussed with patient and daughter; aware of risk and benefits of procedure.  Spoke again with daughter 6/22, states, she would discuss with her brother  - INR improved; creatinine trended down to 1.3.  Renal input noted.  s/p Mucomyst x 4 doses.  As of now, patient is not optimized.  May be safer to medically manage patient given advance age, labile renal function, and volume overload.  This was also discussed with both patient and daughter  - BP stable and controlled    Acute Systolic HF  - TTE:(6/19/2022) Mild to moderate segmental LV systolic dysfunction, est LVEF of 35-40%. The apex, mid inferoseptal and mid anterolateral walls appear severely hypokinetic, RV enlargement with grossly normal function. Biatrial enlargement Calcified trileaflet aortic valve with mild to moderate aortic stenosis, without AI. Mild MR Moderate TR.  - s/p Lasix 40 mg  x 2 days.  Remains on O2 supplementation, + wheezing, HCO3 rising unchanged from yesterday, would hold off diuretics for today, net neg 1.7 x 24 hrs, will reassess in AM   - Continue strict I/O's.  She has chronic B/L LE edema.  Initiate MANN stockings    Permanent Afib  - per flow sheet HR 60's -70's controlled, now off tele   - Coumadin on hold in anticipation with Cardiac cath.  However, since no decision made to date, continue Lovenox  - Monitor electrolytes, replete to keep K>4 and Mag>2    - Other cardiovascular workup will depend on clinical course.  - Will continue to follow.    Melany Howe, [facility]  Nurse Practitioner - Cardiology   Spectra #[phone]

## 2022-06-24 NOTE — DIETITIAN INITIAL EVALUATION ADULT - PERTINENT MEDS FT
MEDICATIONS  (STANDING):  aspirin enteric coated 81 milliGRAM(s) Oral daily  atorvastatin 80 milliGRAM(s) Oral at bedtime  clopidogrel Tablet 75 milliGRAM(s) Oral daily  enoxaparin Injectable 70 milliGRAM(s) SubCutaneous every 24 hours  levothyroxine 75 MICROGram(s) Oral daily  metoprolol succinate ER 50 milliGRAM(s) Oral daily  pantoprazole  Injectable 40 milliGRAM(s) IV Push daily    MEDICATIONS  (PRN):  ALBUTerol    90 MICROgram(s) HFA Inhaler 2 Puff(s) Inhalation every 12 hours PRN Shortness of Breath and/or Wheezing  aluminum hydroxide/magnesium hydroxide/simethicone Suspension 30 milliLiter(s) Oral every 6 hours PRN Dyspepsia

## 2022-06-24 NOTE — PROGRESS NOTE ADULT - SUBJECTIVE AND OBJECTIVE BOX
[facility] -- Reid Astorga, Norah, Raheem, Flex Muniz Savella, Goodger: Office # [phone]    Follow Up:  NSTEMI, Acute CHF    Subjective/Observations: Patient awake, alert, resting comfortably in bed, denies chest pain, dyspnea, palpitations or dizziness. +O2 via NC. c/o LLQ abd discomfort     REVIEW OF SYSTEMS: All review of systems is negative for eye, ENT, GI, , allergic, dermatologic, musculoskeletal and neurologic except as described above    PAST MEDICAL & SURGICAL HISTORY:  Hypothyroid      Hypertension      Lower extremity edema      Paroxysmal atrial fibrillation          MEDICATIONS  (STANDING):  aspirin enteric coated 81 milliGRAM(s) Oral daily  atorvastatin 80 milliGRAM(s) Oral at bedtime  clopidogrel Tablet 75 milliGRAM(s) Oral daily  enoxaparin Injectable 70 milliGRAM(s) SubCutaneous every 24 hours  levothyroxine 75 MICROGram(s) Oral daily  metoprolol succinate ER 50 milliGRAM(s) Oral daily  pantoprazole  Injectable 40 milliGRAM(s) IV Push daily    MEDICATIONS  (PRN):  ALBUTerol    90 MICROgram(s) HFA Inhaler 2 Puff(s) Inhalation every 12 hours PRN Shortness of Breath and/or Wheezing  aluminum hydroxide/magnesium hydroxide/simethicone Suspension 30 milliLiter(s) Oral every 6 hours PRN Dyspepsia    Allergies    No Known Allergies    Intolerances      Vital Signs Last 24 Hrs  T(C): 36.6 (24 Jun 2022 04:28), Max: 36.7 (23 Jun 2022 12:39)  T(F): 97.8 (24 Jun 2022 04:28), Max: 98.1 (23 Jun 2022 12:39)  HR: 68 (24 Jun 2022 04:28) (67 - 72)  BP: 117/57 (24 Jun 2022 04:28) (103/56 - 117/57)  BP(mean): --  RR: 17 (24 Jun 2022 04:28) (16 - 20)  SpO2: 94% (24 Jun 2022 04:28) (85% - 96%)  I&O's Summary    23 Jun 2022 07:01  -  24 Jun 2022 07:00  --------------------------------------------------------  IN: 0 mL / OUT: 1750 mL / NET: -1750 mL          TELE: Not on telemetry   PHYSICAL EXAM:  Constitutional: NAD, awake and alert, well-developed  HEENT: Moist Mucous Membranes, Anicteric  Pulmonary: Non-labored, breath sounds are clear bilaterally, No wheezing, rales or rhonchi  Cardiovascular: Regular, S1 and S2, + murmurs, rubs, gallops or clicks  Gastrointestinal: Bowel Sounds present, soft, nontender.   Lymph: trace b/l peripheral edema. No lymphadenopathy.  Skin: No visible rashes or ulcers.  Psych:  Mood & affect appropriate for situation    LABS: All Labs Reviewed:                        10.4 4.48  )-----------( 203      ( 24 Jun 2022 06:08 )             31.7                         10.9   4.05  )-----------( 182      ( 23 Jun 2022 06:10 )             33.3                         10.3   4.71  )-----------( 181      ( 22 Jun 2022 06:32 )             31.6     24 Jun 2022 06:08    139    |  103    |  26     ----------------------------<  84     3.9     |  32     |  1.30   23 Jun 2022 06:10    134    |  98     |  27     ----------------------------<  97     4.1     |  32     |  1.40   22 Jun 2022 06:32    137    |  102    |  27     ----------------------------<  89     3.7     |  31     |  1.50     Ca    7.8        24 Jun 2022 06:08  Ca    8.2        23 Jun 2022 06:10  Ca    7.6        22 Jun 2022 06:32  Phos  2.7       24 Jun 2022 06:08  Phos  3.2       23 Jun 2022 06:10  Phos  2.6       22 Jun 2022 06:32  Mg     2.7       24 Jun 2022 06:08  Mg     2.5       23 Jun 2022 06:10  Mg     2.3       22 Jun 2022 06:32    TPro  6.0    /  Alb  2.3    /  TBili  0.8    /  DBili  x      /  AST  34     /  ALT  19     /  AlkPhos  91     24 Jun 2022 06:08  TPro  6.7    /  Alb  2.5    /  TBili  0.9    /  DBili  x      /  AST  41     /  ALT  23     /  AlkPhos  101    23 Jun 2022 06:10  TPro  6.0    /  Alb  2.2    /  TBili  0.8    /  DBili  x      /  AST  41     /  ALT  21     /  AlkPhos  84     22 Jun 2022 06:32    PT/INR - ( 24 Jun 2022 06:08 )   PT: 13.8 sec;   INR: 1.18 ratio         PTT - ( 24 Jun 2022 06:08 )  PTT:35.9 sec  Creatine Kinase, Serum: 364 U/L (06-18-22 @ 08:32)  Troponin I, High Sensitivity Result: 22357.6 ng/L (06-18-22 @ 08:32)  Troponin I, High Sensitivity Result: 31238.3 ng/L (06-17-22 @ 23:55)  Creatine Kinase, Serum: 497 U/L (06-17-22 @ 23:34)  Creatine Kinase, Serum: 523 U/L (06-17-22 @ 14:55)            Cholesterol, Serum: 144 mg/dL (06-18-22 @ 10:09)  HDL Cholesterol, Serum: 44 mg/dL (06-18-22 @ 10:09)  Triglycerides, Serum: 65 mg/dL (06-18-22 @ 10:09)      12 Lead ECG:   Ventricular Rate 74 BPM    Atrial Rate 71 BPM    QRS Duration 154 ms    Q-T Interval 458 ms    QTC Calculation(Bazett) 508 ms    R Axis 251 degrees    T Axis 225 degrees    Diagnosis Line Atrial fibrillation  Left bundle branch block  Confirmed by KEVIN MUNIZ ([id number]) on 6/22/2022 12:20:29 PM (06-22-22 @ 09:28)    < from: Xray Chest 1 View- PORTABLE-Urgent (Xray Chest 1 View- PORTABLE-Urgent .) (06.17.22 @ 08:39) >    ACC: [id number] EXAM:  XR CHEST PORTABLE URGENT 1V                          PROCEDURE DATE:  06/17/2022          INTERPRETATION:  AP semierect chest on June 17, 2022 at 8:35 AM. Patient   has chest pain.    Heart magnified by technique.    There is elevation of the right hemidiaphragm again noted.    Mild right base effusion somewhat increased from October 11, 2021.    IMPRESSION: As above.    --- End of Report ---            RUSSELL MONTES MD; Attending Radiologist  This document has been electronically signed. Jun 17 2022 11:18AM    < end of copied text >  < from: TTE Echo Complete w/o Contrast w/ Doppler (06.19.22 @ 10:00) >    ACC: [id number] EXAM:  ECHO TTE WO CON COMP W DOPP                          PROCEDURE DATE:  06/19/2022          INTERPRETATION:  INDICATION: Chest pain  Sonographer LK    Blood Pressure 147/84    Height 165.1 cm     Weight 72.6 kg       BSA 1.8   sqm    Dimensions:  LA 3.8       Normal Values: 2.0 - 4.0 cm  Ao 3.2        Normal Values: 2.0 - 3.8 cm  SEPTUM 1.7       Normal Values: 0.6 - 1.2 cm  PWT 1.2       Normal Values: 0.6 - 1.1 cm  LVIDd 4.8         Normal Values: 3.0 - 5.6 cm  LVIDs 3.2      Normal Values: 1.8 - 4.0 cm      OBSERVATIONS:  Mitral Valve: Mitral annular calcification with thickened leaflets, mild   MR.  Aortic Valve/Aorta: Calcified trileaflet aortic valve with decreased   opening. Peak transaortic valve gradient equals 20.4 mmHg with a mean   transaortic valve gradient 13.2 mmHg. By visual estimation there appears   to be mild to moderate aortic stenosis  Tricuspid Valve: Moderate TR.  Pulmonic Valve: Trace PI  Left Atrium: Enlarged  Right Atrium: Enlarged  Left Ventricle: Mild to moderate segmental left ventricular systolic   dysfunction, estimated LVEF of 35-40%. The apex, mid inferoseptal and mid   anterolateral walls appear severely hypokinetic  Right Ventricle: Right ventricular enlargement with grossly normal   function  Pericardium: no significant pericardial effusion.  Pulmonary/RV Pressure: estimated PA systolic pressure of 36 mmHg  IVC measures 1.47 cm          IMPRESSION:  Mild to moderate segmental left ventricular systolic dysfunction,   estimated LVEF of 35-40%. The apex, mid inferoseptal and mid   anterolateral walls appear severely hypokinetic  Right ventricular enlargement with grossly normal function  Biatrial enlargement  Calcified trileaflet aortic valve with mild to moderate aortic stenosis,   without AI.  Mild MR  Moderate TR.  No significant pericardial effusion.    --- End of Report ---            JEAN CARLOS HENRY MD; Attending Cardiologist  This document has been electronically signed. Jun 20 2022 12:56PM    < end of copied text >

## 2022-06-24 NOTE — PROGRESS NOTE ADULT - SUBJECTIVE AND OBJECTIVE BOX
Patient is a 89y old  Female who presents with a chief complaint of CP w/ elevated trops (19 Jun 2022 09:28)       HPI:  Patient is a 89 y/o F with a pmhx b/l LE edema, HTN, P Afib (on coumadin), and hypothyroidism presenting with sudden onset mid-sternal CP. Pt describes pain as a "prickly feeling", rated 4/10, constant in nature since 4am on 6/17, resolved in the ED. Pain was associated with L arm weakness, now resolved. Pt states the pain woke her up, and she was scared to walk and was unable to use her L arm for support to get out of bed. Pt was also nauseous at this time. No associated diaphoresis, palpitations, chest pressure. No amaurosis fugax, facial droop, garbled speech, hemiparesis, LOC, falls reported. No vomiting, fevers, chills, cough, sore throat, SOB, abd pain, constipation, dysuria. Pt had one loose BM this AM at home, denied hematochezia or melena.   Patient has not seen nephrologist in the past.  Denies any N/V/Urinary complaints.      No acute events noted       PAST MEDICAL & SURGICAL HISTORY:  Hypothyroid      Hypertension      Lower extremity edema      Paroxysmal atrial fibrillation           FAMILY HISTORY:  NC    Social History:Non smoker    MEDICATIONS  (STANDING):  acetylcysteine  Oral Solution 600 milliGRAM(s) Oral every 12 hours  aspirin enteric coated 81 milliGRAM(s) Oral daily  atorvastatin 80 milliGRAM(s) Oral at bedtime  calcium gluconate IVPB 1 Gram(s) IV Intermittent once  clopidogrel Tablet 75 milliGRAM(s) Oral daily  furosemide   Injectable 40 milliGRAM(s) IV Push once  levothyroxine 75 MICROGram(s) Oral daily  metoprolol succinate ER 50 milliGRAM(s) Oral daily  pantoprazole  Injectable 40 milliGRAM(s) IV Push daily  sodium chloride 0.9%. 1000 milliLiter(s) (50 mL/Hr) IV Continuous <Continuous>    MEDICATIONS  (PRN):      Allergies    No Known Allergies    Intolerances         REVIEW OF SYSTEMS:    Review of Systems:   Constitutional: Denies fatigue  HEENT: Denies headaches and dizziness  Respiratory: denies SOB, cough, or wheezing  Cardiovascular: denies CP, palpitations  Gastrointestinal: Denies nausea, denies vomiting, diarrhea, constipation, abdominal pain, or bloody stools  Genitourinary: denies painful urination, increased frequency, urgency, or bloody urine  Skin: denies rashes or itching  Musculoskeletal: denies muscle aches, joint swelling  Neurologic: Denies generalized weakness, denies loss of sensation, numbness, or tingling    Vital Signs Last 24 Hrs  T(C): 36.6 (24 Jun 2022 04:28), Max: 36.7 (23 Jun 2022 12:39)  T(F): 97.8 (24 Jun 2022 04:28), Max: 98.1 (23 Jun 2022 12:39)  HR: 67 (24 Jun 2022 10:30) (67 - 72)  BP: 102/67 (24 Jun 2022 10:30) (102/67 - 117/57)  BP(mean): --  RR: 17 (24 Jun 2022 04:28) (17 - 20)  SpO2: 97% (24 Jun 2022 10:30) (85% - 97%)    PHYSICAL EXAM:    GENERAL: NAD  HEAD:  Atraumatic, Normocephalic  EYES: EOMI, conjunctiva and sclera clear  ENMT: No Drainage from nares, No drainage from ears  NECK: Supple, neck  veins full  NERVOUS SYSTEM:  Awake and Alert  CHEST/LUNG: mildly Decreased BS R No rales, rhonchi, wheezing, or rubs  HEART: Regular rate and rhythm; No murmurs, rubs, or gallops  ABDOMEN: Soft, Nontender, Nondistended; Bowel sounds present  EXTREMITIES:  + Edema  SKIN: No rashes No obvious ecchymosis      LABS:                                     10.4   4.48  )-----------( 203      ( 24 Jun 2022 06:08 )             31.7     06-24    139  |  103  |  26<H>  ----------------------------<  84  3.9   |  32<H>  |  1.30    Ca    7.8<L>      24 Jun 2022 06:08  Phos  2.7     06-24  Mg     2.7     06-24    TPro  6.0  /  Alb  2.3<L>  /  TBili  0.8  /  DBili  x   /  AST  34  /  ALT  19  /  AlkPhos  91  06-24    PT/INR - ( 24 Jun 2022 06:08 )   PT: 13.8 sec;   INR: 1.18 ratio         PTT - ( 24 Jun 2022 06:08 )  PTT:35.9 sec

## 2022-06-24 NOTE — PHYSICAL THERAPY INITIAL EVALUATION ADULT - ADDITIONAL COMMENTS
Pt reports that she lives alone in a home in [place]. Is independent will all household ADL's, and ambulation. States that her daughter does live close enough to help if needed. Has 2 stairs to get into the home with double railings, no stairs inside. Has flat shower and seat in shower.

## 2022-06-24 NOTE — PROGRESS NOTE ADULT - SUBJECTIVE AND OBJECTIVE BOX
Patient is a 89y old  Female who presents with a chief complaint of CP w/ elevated trops (24 Jun 2022 09:46)    HPI:  Patient is a 89 y/o F with a pmhx b/l LE edema, HTN, P Afib (on coumadin), and hypothyroidism presenting with sudden onset mid-sternal CP. Pt describes pain as a "prickly feeling", rated 4/10, constant in nature since 4am on 6/17, resolved in the ED. Pain was associated with L arm weakness, now resolved. Pt states the pain woke her up, and she was scared to walk and was unable to use her L arm for support to get out of bed. Pt was also nauseous at this time. No associated diaphoresis, palpitations, chest pressure. No amaurosis fugax, facial droop, garbled speech, hemiparesis, LOC, falls reported. No vomiting, fevers, chills, cough, sore throat, SOB, abd pain, constipation, dysuria. Pt had one loose BM this AM at home, denied hematochezia or melena.       In ED:  VS - HR 71 | /65 | RR 18 | sO2 94 | T 97.6  Labs - HgB 11.2 | INR 1.85 | K 3.3 | Glu 114 | trop 342.6 | CKMB 3.2 | BNP 2396  CTH NC - No acute intracranial hemorrhage. Lacunar infarct R inferior cerebellum  EKG - vent rate 66, QTc 501ms, sinus rhythm, old LBBB, no ischemic changes on personal read  Given -  mg PO. pt seen by cardiology Dr Almazan , pt admitted to [facility] for NSTEMI. (17 Jun 2022 09:52)    INTERVAL HPI:  6/18/22: Seen and examined at bedside. No acute complaints. Denies chest pain overnight and this AM. Overnight pt had episode of dyspnea, troponins inc from 300s to >91570. Trops now downtrended. Received 1 x dose 40mg IV lasix for dyspnea. Pt had statin dose inc to 80mg, had 1 x dose lopressor 25mg, and had her scheduled evening coumadin. Had 10 beats of NSVT this AM. Pt was started on standing 50mg toprol xL PO qD. Started on plavix 75mg qD. Extensive counseling done with the patient on [facility] and her current state of health. Patient states she has "forms at home" that her daughter will bring to [facility]. Patient values quality of life and wants to discuss with her daughter and son-inlaw. Family to discuss potential plan for cardiac cath and will arrive to a decision on 6/19. C/W medical therapy for ACS. HOLD coumadin as pt INR is therapeutic and to facilitate potential for cardiac cath. Will start renal adjusted FD Lovenox on 6/19 if INR is below 2.   6/19/22 Pt seen and examined at bedside. Pt states she slept well last night. Pt denies SOB, CP, palpitations, dizziness .Mildly elevated Cr , INR -Theraputic  Pt states the last time she had chest pain was Friday night. Later in the afternoon again patient was seen at bedside with  daughter HCP and son in law at bedside, decision was made for DNR/ DNI and cardiac cath.  6/20/22: Patient seen and examined at bedside. Patient denies chest pain, palpitations, dyspnea. Noted to be slightly short of breath. proBNP >13k, will give 1 dose of lasix 40mg IV today.  6/21/22: Patient continues to deny anginal symptoms. Will be transferred for cardiac cath, as INR downtrended to 1.6 and Cr improved to 1.3. For LUIS CARLOS on CKD, will defer further IVF hydration at this time due to TTE results and SOB after previous IVF. Will give Mucomyst q12 x4 for renal protection. Given lasix 40mg IVP x1 to improve respirations. Given full dose lovenox x1.   6/22/22: Overnight patient with R sided abdominal discomfort, given maalox. Patient breathing improved s/p lasix 40mg IV yesterday. Patient's INR 1.4 today and Cr increased to 1.5. Plan for cardiac cath If Cr stable. Given additional dose of full dose lovenox.  6/23/22: Patient seen and examined at bedside. Patient denies chest pain, pressure, sob, palpitations. Cr downtrending today,  As per Cardiology NO plan for transfer for cardiac cath as pt is still SOB , Needs Lasix & Cr is elevated.. Low stable H/H. Warfarin 3mg today.  6/24/22: Patient seen and examined at bedside. Patient denies any SOB today, speaking comfortably. Denies chest pain, pressure, sob, palpitations.     OVERNIGHT EVENTS:    Home Medications:  aspirin 81 mg oral delayed release tablet: 1 tab(s) orally once a day (21 Jun 2022 08:48)  atorvastatin 80 mg oral tablet: 1 tab(s) orally once a day (at bedtime) (21 Jun 2022 08:48)  clopidogrel 75 mg oral tablet: 1 tab(s) orally once a day (21 Jun 2022 08:48)  Lasix 40 mg oral tablet: 1 tab(s) orally 2 times a day (17 Jun 2022 11:27)  metoprolol succinate 50 mg oral tablet, extended release: 1 tab(s) orally once a day (21 Jun 2022 08:48)  pantoprazole 40 mg intravenous injection: 40 milligram(s) intravenous once a day (21 Jun 2022 08:48)  Synthroid 75 mcg (0.075 mg) oral tablet: 1 tab(s) orally once a day (17 Jun 2022 11:27)  warfarin 3 mg oral tablet: 1 tab(s) orally once a day (M, Tu, Th, F, Sa Su) (17 Jun 2022 11:27)  warfarin 4 mg oral tablet: 1 tab(s) orally Wednesday (17 Jun 2022 11:27)      MEDICATIONS  (STANDING):  aspirin enteric coated 81 milliGRAM(s) Oral daily  atorvastatin 80 milliGRAM(s) Oral at bedtime  clopidogrel Tablet 75 milliGRAM(s) Oral daily  enoxaparin Injectable 70 milliGRAM(s) SubCutaneous every 24 hours  levothyroxine 75 MICROGram(s) Oral daily  metoprolol succinate ER 50 milliGRAM(s) Oral daily  pantoprazole  Injectable 40 milliGRAM(s) IV Push daily    MEDICATIONS  (PRN):  ALBUTerol    90 MICROgram(s) HFA Inhaler 2 Puff(s) Inhalation every 12 hours PRN Shortness of Breath and/or Wheezing  aluminum hydroxide/magnesium hydroxide/simethicone Suspension 30 milliLiter(s) Oral every 6 hours PRN Dyspepsia      Allergies    No Known Allergies    Intolerances        Social History:  Home: lives alone  Smoke: none  Alcohol: none  Recreational drug use: none  Ambulate: independent, uses support from furniture and cane occasionally  Activities of daily living: independent  Job: retired  COVID - Vaccine (17 Jun 2022 09:52)      REVIEW OF SYSTEMS: i feel OK  CONSTITUTIONAL: No fever, No chills, No fatigue, No myalgia, No Body ache, No Weakness  EYES: No eye pain,  No visual disturbances, No discharge, NO Redness  ENMT:  No ear pain, No nose bleed, No vertigo; No sinus pain, NO throat pain, No Congestion  NECK: No pain, No stiffness  RESPIRATORY: No cough, NO wheezing, No hemoptysis, ADMITS brief shortness of breath earlier, now resolved  CARDIOVASCULAR: No chest pain, palpitations  GASTROINTESTINAL: No abdominal pain, NO epigastric pain. No nausea, No vomiting; No diarrhea, No constipation. [ x ] BM  GENITOURINARY: No dysuria, No frequency, No urgency, No hematuria, NO incontinence  NEUROLOGICAL: No headaches, No dizziness, No numbness, No tingling, No tremors, No weakness  EXT: admits CHRONIC Swelling in LE's;  No Pain, admits LE Edema  SKIN:  [ x ] No itching, burning, rashes, or lesions   MUSCULOSKELETAL: No joint pain ,No Jt swelling; No muscle pain, No back pain, No extremity pain  PSYCHIATRIC: No depression,  No anxiety,  No mood swings ,No difficulty sleeping at night  PAIN SCALE: [ x ] None  [  ] Other-  ROS Unable to obtain due to - [  ] Dementia  [  ] Lethargy [  ] Drowsy [  ] Sedated [  ] non verbal    Vital Signs Last 24 Hrs  T(C): 36.6 (24 Jun 2022 04:28), Max: 36.7 (23 Jun 2022 12:39)  T(F): 97.8 (24 Jun 2022 04:28), Max: 98.1 (23 Jun 2022 12:39)  HR: 68 (24 Jun 2022 04:28) (67 - 72)  BP: 117/57 (24 Jun 2022 04:28) (103/56 - 117/57)  BP(mean): --  RR: 17 (24 Jun 2022 04:28) (16 - 20)  SpO2: 94% (24 Jun 2022 04:28) (85% - 96%)  Finger Stick        06-23 @ 07:01  -  06-24 @ 07:00  --------------------------------------------------------  IN: 0 mL / OUT: 1750 mL / NET: -1750 mL        PHYSICAL EXAM: O2 NC  GENERAL:  [ x ] NAD , [ x ] well appearing, [  ] Agitated, [  ] Drowsy,  [  ] Lethargy, [  ] confused   HEAD:  [ x ] Normal, [  ] Other  EYES:  [ x ] EOMI, [ x ] PERRLA, [ x ] conjunctiva and sclera clear normal, [  ] Other,  [  ] Pallor,[  ] Discharge  ENMT:  [ x ] Normal, [ x ] Moist mucous membranes, [ x ] Good dentition, [ x ] No Thrush  NECK:  [ x ] Supple, [x  ] No JVD, [ x ] Normal thyroid, [  ] Lymphadenopathy [  ] Other  CHEST/LUNG:  [ x ] Clear to auscultation bilaterally, [ x ] Breath Sounds equal B/L , [  ] poor effort  [ x ] No rales, [ x ] No rhonchi  [ x ]  slight expiratory wheezing,   HEART:  [ x ] Regular rate and IRREGULARLY IRREGULAR rhythm, [  ] tachycardia, [  ] Bradycardia,  [  ] irregular  [ x ] No murmurs, No rubs, No gallops, [  ] PPM in place (Mfr:  )  ABDOMEN:  [ x ] Soft, [ x ] Nontender, [ x ] Nondistended, [ x ] No mass, [ x] Bowel sounds present, [  ] obese  NERVOUS SYSTEM:  [ x ] Alert & Oriented X3, [ x ] Nonfocal  [  ] Confusion  [  ] Encephalopathic [  ] Sedated [  ] Unable to assess, [  ] Dementia [  ] Other-  EXTREMITIES: [ x ] 2+ Peripheral Pulses, No clubbing, No cyanosis,  [ x ] 2 + pitting edema B/L lower EXT. [ x ] PVD stasis skin changes B/L Lower EXT, [  ] wound  LYMPH: No lymphadenopathy noted  SKIN:  [ x ] No rashes or lesions, [  ] Pressure Ulcers, [  ] ecchymosis, [  ] Skin Tears, [  ] OtherConsultant(s) Notes Reviewed:  [ x ] YES     DIET: Diet, DASH/TLC:   Sodium & Cholesterol Restricted  1200mL Fluid Restriction ([id number]) (06-22-22 @ 08:53)      LABS:                        10.4   4.48  )-----------( 203      ( 24 Jun 2022 06:08 )             31.7     24 Jun 2022 06:08    139    |  103    |  26     ----------------------------<  84     3.9     |  32     |  1.30     Ca    7.8        24 Jun 2022 06:08  Phos  2.7       24 Jun 2022 06:08  Mg     2.7       24 Jun 2022 06:08    TPro  6.0    /  Alb  2.3    /  TBili  0.8    /  DBili  x      /  AST  34     /  ALT  19     /  AlkPhos  91     24 Jun 2022 06:08    PT/INR - ( 24 Jun 2022 06:08 )   PT: 13.8 sec;   INR: 1.18 ratio         PTT - ( 24 Jun 2022 06:08 )  PTT:35.9 sec              CARDIAC MARKERS ( 18 Jun 2022 08:32 )  x     / x     / 364 U/L / x     / 24.0 ng/mL  CARDIAC MARKERS ( 17 Jun 2022 23:34 )  x     / x     / 497 U/L / x     / 52.0 ng/mL  CARDIAC MARKERS ( 17 Jun 2022 14:55 )  x     / x     / 523 U/L / x     / 70.3 ng/mL             Anemia Panel:  Iron Total, Serum: 88 ug/dL (06-18-22 @ 10:09)  Iron - Total Binding Capacity.: 354 ug/dL (06-18-22 @ 10:09)  Ferritin, Serum: 40 ng/mL (06-18-22 @ 10:09)  Vitamin B12, Serum: 619 pg/mL (06-18-22 @ 10:09)  Folate, Serum: 14.9 ng/mL (06-18-22 @ 10:09)      Thyroid Panel:  Thyroid Stimulating Hormone, Serum: 0.42 uIU/mL (06-18-22 @ 08:32)            Serum Pro-Brain Natriuretic Peptide: 29603 pg/mL (06-20-22 @ 09:53)      RADIOLOGY & ADDITIONAL TESTS:      HEALTH ISSUES - PROBLEM Dx:  Lower extremity edema    Paroxysmal atrial fibrillation    Hypertension    Anemia    Hypothyroid    Need for prophylactic measure    Acute systolic congestive heart failure    NSTEMI (non-ST elevation myocardial infarction)    Acute kidney injury superimposed on CKD          Care Discussed with [X] Consultants  [ x ] Patient  [ x ] Family- Son in Law  [  ] HCP [  ]   [  ] Social Service  [ x ] RN, [  ] Physical Therapy,[  ] Palliative care team  DVT PPX: [ x ] Lovenox, [  ] S C Heparin, [  ] Coumadin, [  ] Xarelto, [  ] Eliquis, [  ] Pradaxa, [  ] IV Heparin drip, [  ] SCD [  ] Contraindication 2 to GI Bleed,[  ] Ambulation [  ] Contraindicated 2 to  bleed [  ] Contraindicated 2 to Brain Bleed  Advanced directive: [  ] None, [ x ] DNR/DNI Patient is a 89y old  Female who presents with a chief complaint of CP w/ elevated trops (24 Jun 2022 09:46)    HPI:  Patient is a 89 y/o F with a pmhx b/l LE edema, HTN, P Afib (on coumadin), and hypothyroidism presenting with sudden onset mid-sternal CP. Pt describes pain as a "prickly feeling", rated 4/10, constant in nature since 4am on 6/17, resolved in the ED. Pain was associated with L arm weakness, now resolved. Pt states the pain woke her up, and she was scared to walk and was unable to use her L arm for support to get out of bed. Pt was also nauseous at this time. No associated diaphoresis, palpitations, chest pressure. No amaurosis fugax, facial droop, garbled speech, hemiparesis, LOC, falls reported. No vomiting, fevers, chills, cough, sore throat, SOB, abd pain, constipation, dysuria. Pt had one loose BM this AM at home, denied hematochezia or melena.       In ED:  VS - HR 71 | /65 | RR 18 | sO2 94 | T 97.6  Labs - HgB 11.2 | INR 1.85 | K 3.3 | Glu 114 | trop 342.6 | CKMB 3.2 | BNP 2396  CTH NC - No acute intracranial hemorrhage. Lacunar infarct R inferior cerebellum  EKG - vent rate 66, QTc 501ms, sinus rhythm, old LBBB, no ischemic changes on personal read  Given -  mg PO. pt seen by cardiology Dr Almazan , pt admitted to [facility] for NSTEMI. (17 Jun 2022 09:52)    INTERVAL HPI:  6/18/22: Seen and examined at bedside. No acute complaints. Denies chest pain overnight and this AM. Overnight pt had episode of dyspnea, troponins inc from 300s to >76300. Trops now downtrended. Received 1 x dose 40mg IV lasix for dyspnea. Pt had statin dose inc to 80mg, had 1 x dose lopressor 25mg, and had her scheduled evening coumadin. Had 10 beats of NSVT this AM. Pt was started on standing 50mg toprol xL PO qD. Started on plavix 75mg qD. Extensive counseling done with the patient on [facility] and her current state of health. Patient states she has "forms at home" that her daughter will bring to [facility]. Patient values quality of life and wants to discuss with her daughter and son-inlaw. Family to discuss potential plan for cardiac cath and will arrive to a decision on 6/19. C/W medical therapy for ACS. HOLD coumadin as pt INR is therapeutic and to facilitate potential for cardiac cath. Will start renal adjusted FD Lovenox on 6/19 if INR is below 2.   6/19/22 Pt seen and examined at bedside. Pt states she slept well last night. Pt denies SOB, CP, palpitations, dizziness .Mildly elevated Cr , INR -Theraputic  Pt states the last time she had chest pain was Friday night. Later in the afternoon again patient was seen at bedside with  daughter HCP and son in law at bedside, decision was made for DNR/ DNI and cardiac cath.  6/20/22: Patient seen and examined at bedside. Patient denies chest pain, palpitations, dyspnea. Noted to be slightly short of breath. proBNP >13k, will give 1 dose of lasix 40mg IV today.  6/21/22: Patient continues to deny anginal symptoms. Will be transferred for cardiac cath, as INR downtrended to 1.6 and Cr improved to 1.3. For LUIS CARLOS on CKD, will defer further IVF hydration at this time due to TTE results and SOB after previous IVF. Will give Mucomyst q12 x4 for renal protection. Given lasix 40mg IVP x1 to improve respirations. Given full dose lovenox x1.   6/22/22: Overnight patient with R sided abdominal discomfort, given maalox. Patient breathing improved s/p lasix 40mg IV yesterday. Patient's INR 1.4 today and Cr increased to 1.5. Plan for cardiac cath If Cr stable. Given additional dose of full dose lovenox.  6/23/22: Patient seen and examined at bedside. Patient denies chest pain, pressure, sob, palpitations. Cr downtrending today,  As per Cardiology NO plan for transfer for cardiac cath as pt is still SOB , Needs Lasix & Cr is elevated.. Low stable H/H. Warfarin 3mg today.  6/24/22: Patient seen and examined at bedside. Patient denies any SOB today, speaking comfortably. Denies chest pain, pressure, sob, palpitations. Restart Lasix & Coumadin    OVERNIGHT EVENTS: NONE    Home Medications:  aspirin 81 mg oral delayed release tablet: 1 tab(s) orally once a day (21 Jun 2022 08:48)  atorvastatin 80 mg oral tablet: 1 tab(s) orally once a day (at bedtime) (21 Jun 2022 08:48)  clopidogrel 75 mg oral tablet: 1 tab(s) orally once a day (21 Jun 2022 08:48)  Lasix 40 mg oral tablet: 1 tab(s) orally 2 times a day (17 Jun 2022 11:27)  metoprolol succinate 50 mg oral tablet, extended release: 1 tab(s) orally once a day (21 Jun 2022 08:48)  pantoprazole 40 mg intravenous injection: 40 milligram(s) intravenous once a day (21 Jun 2022 08:48)  Synthroid 75 mcg (0.075 mg) oral tablet: 1 tab(s) orally once a day (17 Jun 2022 11:27)  warfarin 3 mg oral tablet: 1 tab(s) orally once a day (M, Tu, Th, F, Sa Grider) (17 Jun 2022 11:27)  warfarin 4 mg oral tablet: 1 tab(s) orally Wednesday (17 Jun 2022 11:27)      MEDICATIONS  (STANDING):  aspirin enteric coated 81 milliGRAM(s) Oral daily  atorvastatin 80 milliGRAM(s) Oral at bedtime  clopidogrel Tablet 75 milliGRAM(s) Oral daily  enoxaparin Injectable 70 milliGRAM(s) SubCutaneous every 24 hours  levothyroxine 75 MICROGram(s) Oral daily  metoprolol succinate ER 50 milliGRAM(s) Oral daily  pantoprazole  Injectable 40 milliGRAM(s) IV Push daily    MEDICATIONS  (PRN):  ALBUTerol    90 MICROgram(s) HFA Inhaler 2 Puff(s) Inhalation every 12 hours PRN Shortness of Breath and/or Wheezing  aluminum hydroxide/magnesium hydroxide/simethicone Suspension 30 milliLiter(s) Oral every 6 hours PRN Dyspepsia      Allergies    No Known Allergies    Intolerances        Social History:  Home: lives alone  Smoke: none  Alcohol: none  Recreational drug use: none  Ambulate: independent, uses support from furniture and cane occasionally  Activities of daily living: independent  Job: retired  COVID - Vaccine (17 Jun 2022 09:52)      REVIEW OF SYSTEMS: i feel OK  CONSTITUTIONAL: No fever, No chills, No fatigue, No myalgia, No Body ache, No Weakness  EYES: No eye pain,  No visual disturbances, No discharge, NO Redness  ENMT:  No ear pain, No nose bleed, No vertigo; No sinus pain, NO throat pain, No Congestion  NECK: No pain, No stiffness  RESPIRATORY: No cough, NO wheezing, No hemoptysis, ADMITS brief shortness of breath earlier, now resolved  CARDIOVASCULAR: No chest pain, palpitations  GASTROINTESTINAL: No abdominal pain, NO epigastric pain. No nausea, No vomiting; No diarrhea, No constipation. [ x ] BM  GENITOURINARY: No dysuria, No frequency, No urgency, No hematuria, NO incontinence  NEUROLOGICAL: No headaches, No dizziness, No numbness, No tingling, No tremors, No weakness  EXT: admits CHRONIC Swelling in LE's;  No Pain, admits LE Edema  SKIN:  [ x ] No itching, burning, rashes, or lesions   MUSCULOSKELETAL: No joint pain ,No Jt swelling; No muscle pain, No back pain, No extremity pain  PSYCHIATRIC: No depression,  No anxiety,  No mood swings ,No difficulty sleeping at night  PAIN SCALE: [ x ] None  [  ] Other-  ROS Unable to obtain due to - [  ] Dementia  [  ] Lethargy [  ] Drowsy [  ] Sedated [  ] non verbal    Vital Signs Last 24 Hrs  T(C): 36.6 (24 Jun 2022 04:28), Max: 36.7 (23 Jun 2022 12:39)  T(F): 97.8 (24 Jun 2022 04:28), Max: 98.1 (23 Jun 2022 12:39)  HR: 68 (24 Jun 2022 04:28) (67 - 72)  BP: 117/57 (24 Jun 2022 04:28) (103/56 - 117/57)  BP(mean): --  RR: 17 (24 Jun 2022 04:28) (16 - 20)  SpO2: 94% (24 Jun 2022 04:28) (85% - 96%)  Finger Stick        06-23 @ 07:01  -  06-24 @ 07:00  --------------------------------------------------------  IN: 0 mL / OUT: 1750 mL / NET: -1750 mL        PHYSICAL EXAM: O2 NC  GENERAL:  [ x ] NAD , [ x ] well appearing, [  ] Agitated, [  ] Drowsy,  [  ] Lethargy, [  ] confused   HEAD:  [ x ] Normal, [  ] Other  EYES:  [ x ] EOMI, [ x ] PERRLA, [ x ] conjunctiva and sclera clear normal, [  ] Other,  [  ] Pallor,[  ] Discharge  ENMT:  [ x ] Normal, [ x ] Moist mucous membranes, [ x ] Good dentition, [ x ] No Thrush  NECK:  [ x ] Supple, [x  ] No JVD, [ x ] Normal thyroid, [  ] Lymphadenopathy [  ] Other  CHEST/LUNG:  [ x ] Clear to auscultation bilaterally, [ x ] Breath Sounds equal B/L , [  ] poor effort  [ x ] No rales, [ x ] No rhonchi  [ x ]  slight expiratory wheezing,   HEART:  [ x ] Regular rate and IRREGULARLY IRREGULAR rhythm, [  ] tachycardia, [  ] Bradycardia,  [  ] irregular  [ x ] murmurs, No rubs, No gallops, [  ] PPM in place (Mfr:  )  ABDOMEN:  [ x ] Soft, [ x ] Nontender, [ x ] Nondistended, [ x ] No mass, [ x] Bowel sounds present, [  ] obese  NERVOUS SYSTEM:  [ x ] Alert & Oriented X3, [ x ] Nonfocal  [  ] Confusion  [  ] Encephalopathic [  ] Sedated [  ] Unable to assess, [  ] Dementia [  ] Other-  EXTREMITIES: [ x ] 2+ Peripheral Pulses, No clubbing, No cyanosis,  [ x ] 2 + pitting edema B/L lower EXT. [ x ] PVD stasis skin changes B/L Lower EXT, [  ] wound  LYMPH: No lymphadenopathy noted  SKIN:  [ x ] No rashes or lesions, [  ] Pressure Ulcers, [  ] ecchymosis, [  ] Skin Tears, [  ] Other     DIET: Diet, DASH/TLC:   Sodium & Cholesterol Restricted  1200mL Fluid Restriction ([id number]) (06-22-22 @ 08:53)      LABS:                        10.4   4.48  )-----------( 203 ( 24 Jun 2022 06:08 )             31.7     24 Jun 2022 06:08    139    |  103    |  26     ----------------------------<  84     3.9     |  32     |  1.30     Ca    7.8        24 Jun 2022 06:08  Phos  2.7       24 Jun 2022 06:08  Mg     2.7       24 Jun 2022 06:08    TPro  6.0    /  Alb  2.3    /  TBili  0.8    /  DBili  x      /  AST  34     /  ALT  19     /  AlkPhos  91     24 Jun 2022 06:08    PT/INR - ( 24 Jun 2022 06:08 )   PT: 13.8 sec;   INR: 1.18 ratio         PTT - ( 24 Jun 2022 06:08 )  PTT:35.9 sec              CARDIAC MARKERS ( 18 Jun 2022 08:32 )  x     / x     / 364 U/L / x     / 24.0 ng/mL  CARDIAC MARKERS ( 17 Jun 2022 23:34 )  x     / x     / 497 U/L / x     / 52.0 ng/mL  CARDIAC MARKERS ( 17 Jun 2022 14:55 )  x     / x     / 523 U/L / x     / 70.3 ng/mL             Anemia Panel:  Iron Total, Serum: 88 ug/dL (06-18-22 @ 10:09)  Iron - Total Binding Capacity.: 354 ug/dL (06-18-22 @ 10:09)  Ferritin, Serum: 40 ng/mL (06-18-22 @ 10:09)  Vitamin B12, Serum: 619 pg/mL (06-18-22 @ 10:09)  Folate, Serum: 14.9 ng/mL (06-18-22 @ 10:09)      Thyroid Panel:  Thyroid Stimulating Hormone, Serum: 0.42 uIU/mL (06-18-22 @ 08:32)            Serum Pro-Brain Natriuretic Peptide: 67726 pg/mL (06-20-22 @ 09:53)      RADIOLOGY & ADDITIONAL TESTS:      HEALTH ISSUES - PROBLEM Dx:  Lower extremity edema    Paroxysmal atrial fibrillation    Hypertension    Anemia    Hypothyroid    Need for prophylactic measure    Acute systolic congestive heart failure    NSTEMI (non-ST elevation myocardial infarction)    Acute kidney injury superimposed on CKD          Care Discussed with [X] Consultants  [ x ] Patient  [ x ] Family-Dtr  [  ] HCP [  ]   [  ] Social Service  [ x ] RN, [  ] Physical Therapy,[  ] Palliative care team  DVT PPX: [ x ] Lovenox, [  ] S C Heparin, [  ] Coumadin, [  ] Xarelto, [  ] Eliquis, [  ] Pradaxa, [  ] IV Heparin drip, [  ] SCD [  ] Contraindication 2 to GI Bleed,[  ] Ambulation [  ] Contraindicated 2 to  bleed [  ] Contraindicated 2 to Brain Bleed  Advanced directive: [  ] None, [ x ] DNR/DNI Patient is a 89y old  Female who presents with a chief complaint of CP w/ elevated trops (24 Jun 2022 09:46)    HPI:  Patient is a 89 y/o F with a pmhx b/l LE edema, HTN, P Afib (on coumadin), and hypothyroidism presenting with sudden onset mid-sternal CP. Pt describes pain as a "prickly feeling", rated 4/10, constant in nature since 4am on 6/17, resolved in the ED. Pain was associated with L arm weakness, now resolved. Pt states the pain woke her up, and she was scared to walk and was unable to use her L arm for support to get out of bed. Pt was also nauseous at this time. No associated diaphoresis, palpitations, chest pressure. No amaurosis fugax, facial droop, garbled speech, hemiparesis, LOC, falls reported. No vomiting, fevers, chills, cough, sore throat, SOB, abd pain, constipation, dysuria. Pt had one loose BM this AM at home, denied hematochezia or melena.       In ED:  VS - HR 71 | /65 | RR 18 | sO2 94 | T 97.6  Labs - HgB 11.2 | INR 1.85 | K 3.3 | Glu 114 | trop 342.6 | CKMB 3.2 | BNP 2396  CTH NC - No acute intracranial hemorrhage. Lacunar infarct R inferior cerebellum  EKG - vent rate 66, QTc 501ms, sinus rhythm, old LBBB, no ischemic changes on personal read  Given -  mg PO. pt seen by cardiology Dr Almazan , pt admitted to [facility] for NSTEMI. (17 Jun 2022 09:52)    INTERVAL HPI:  6/18/22: Seen and examined at bedside. No acute complaints. Denies chest pain overnight and this AM. Overnight pt had episode of dyspnea, troponins inc from 300s to >15773. Trops now downtrended. Received 1 x dose 40mg IV lasix for dyspnea. Pt had statin dose inc to 80mg, had 1 x dose lopressor 25mg, and had her scheduled evening coumadin. Had 10 beats of NSVT this AM. Pt was started on standing 50mg toprol xL PO qD. Started on plavix 75mg qD. Extensive counseling done with the patient on [facility] and her current state of health. Patient states she has "forms at home" that her daughter will bring to [facility]. Patient values quality of life and wants to discuss with her daughter and son-inlaw. Family to discuss potential plan for cardiac cath and will arrive to a decision on 6/19. C/W medical therapy for ACS. HOLD coumadin as pt INR is therapeutic and to facilitate potential for cardiac cath. Will start renal adjusted FD Lovenox on 6/19 if INR is below 2.   6/19/22 Pt seen and examined at bedside. Pt states she slept well last night. Pt denies SOB, CP, palpitations, dizziness .Mildly elevated Cr , INR -Theraputic  Pt states the last time she had chest pain was Friday night. Later in the afternoon again patient was seen at bedside with  daughter HCP and son in law at bedside, decision was made for DNR/ DNI and cardiac cath.  6/20/22: Patient seen and examined at bedside. Patient denies chest pain, palpitations, dyspnea. Noted to be slightly short of breath. proBNP >13k, will give 1 dose of lasix 40mg IV today.  6/21/22: Patient continues to deny anginal symptoms. Will be transferred for cardiac cath, as INR downtrended to 1.6 and Cr improved to 1.3. For LUIS CARLOS on CKD, will defer further IVF hydration at this time due to TTE results and SOB after previous IVF. Will give Mucomyst q12 x4 for renal protection. Given lasix 40mg IVP x1 to improve respirations. Given full dose lovenox x1.   6/22/22: Overnight patient with R sided abdominal discomfort, given maalox. Patient breathing improved s/p lasix 40mg IV yesterday. Patient's INR 1.4 today and Cr increased to 1.5. Plan for cardiac cath If Cr stable. Given additional dose of full dose lovenox.  6/23/22: Patient seen and examined at bedside. Patient denies chest pain, pressure, sob, palpitations. Cr downtrending today,  As per Cardiology NO plan for transfer for cardiac cath as pt is still SOB , Needs Lasix & Cr is elevated.. Low stable H/H. Warfarin 3mg today.  6/24/22: Patient seen and examined at bedside. Patient denies any SOB today, speaking comfortably. Denies chest pain, pressure, sob, palpitations. Restart Lasix & Coumadin    OVERNIGHT EVENTS: NONE    Home Medications:  aspirin 81 mg oral delayed release tablet: 1 tab(s) orally once a day (21 Jun 2022 08:48)  atorvastatin 80 mg oral tablet: 1 tab(s) orally once a day (at bedtime) (21 Jun 2022 08:48)  clopidogrel 75 mg oral tablet: 1 tab(s) orally once a day (21 Jun 2022 08:48)  Lasix 40 mg oral tablet: 1 tab(s) orally 2 times a day (17 Jun 2022 11:27)  metoprolol succinate 50 mg oral tablet, extended release: 1 tab(s) orally once a day (21 Jun 2022 08:48)  pantoprazole 40 mg intravenous injection: 40 milligram(s) intravenous once a day (21 Jun 2022 08:48)  Synthroid 75 mcg (0.075 mg) oral tablet: 1 tab(s) orally once a day (17 Jun 2022 11:27)  warfarin 3 mg oral tablet: 1 tab(s) orally once a day (M, Tu, Th, F, Sa Grider) (17 Jun 2022 11:27)  warfarin 4 mg oral tablet: 1 tab(s) orally Wednesday (17 Jun 2022 11:27)      MEDICATIONS  (STANDING):  aspirin enteric coated 81 milliGRAM(s) Oral daily  atorvastatin 80 milliGRAM(s) Oral at bedtime  clopidogrel Tablet 75 milliGRAM(s) Oral daily  enoxaparin Injectable 70 milliGRAM(s) SubCutaneous every 24 hours  levothyroxine 75 MICROGram(s) Oral daily  metoprolol succinate ER 50 milliGRAM(s) Oral daily  pantoprazole  Injectable 40 milliGRAM(s) IV Push daily    MEDICATIONS  (PRN):  ALBUTerol    90 MICROgram(s) HFA Inhaler 2 Puff(s) Inhalation every 12 hours PRN Shortness of Breath and/or Wheezing  aluminum hydroxide/magnesium hydroxide/simethicone Suspension 30 milliLiter(s) Oral every 6 hours PRN Dyspepsia      Allergies    No Known Allergies    Intolerances        Social History:  Home: lives alone  Smoke: none  Alcohol: none  Recreational drug use: none  Ambulate: independent, uses support from furniture and cane occasionally  Activities of daily living: independent  Job: retired  COVID - Vaccine (17 Jun 2022 09:52)      REVIEW OF SYSTEMS: i feel OK  CONSTITUTIONAL: No fever, No chills, No fatigue, No myalgia, No Body ache, No Weakness  EYES: No eye pain,  No visual disturbances, No discharge, NO Redness  ENMT:  No ear pain, No nose bleed, No vertigo; No sinus pain, NO throat pain, No Congestion  NECK: No pain, No stiffness  RESPIRATORY: No cough, NO wheezing, No hemoptysis, ADMITS brief shortness of breath earlier, now resolved  CARDIOVASCULAR: No chest pain, palpitations  GASTROINTESTINAL: No abdominal pain, NO epigastric pain. No nausea, No vomiting; No diarrhea, No constipation. [ x ] BM  GENITOURINARY: No dysuria, No frequency, No urgency, No hematuria, NO incontinence  NEUROLOGICAL: No headaches, No dizziness, No numbness, No tingling, No tremors, No weakness  EXT: admits CHRONIC Swelling in LE's;  No Pain, admits LE Edema  SKIN:  [ x ] No itching, burning, rashes, or lesions   MUSCULOSKELETAL: No joint pain ,No Jt swelling; No muscle pain, No back pain, No extremity pain  PSYCHIATRIC: No depression,  No anxiety,  No mood swings ,No difficulty sleeping at night  PAIN SCALE: [ x ] None  [  ] Other-  ROS Unable to obtain due to - [  ] Dementia  [  ] Lethargy [  ] Drowsy [  ] Sedated [  ] non verbal    Vital Signs Last 24 Hrs  T(C): 36.6 (24 Jun 2022 04:28), Max: 36.7 (23 Jun 2022 12:39)  T(F): 97.8 (24 Jun 2022 04:28), Max: 98.1 (23 Jun 2022 12:39)  HR: 68 (24 Jun 2022 04:28) (67 - 72)  BP: 117/57 (24 Jun 2022 04:28) (103/56 - 117/57)  BP(mean): --  RR: 17 (24 Jun 2022 04:28) (16 - 20)  SpO2: 94% (24 Jun 2022 04:28) (85% - 96%)  Finger Stick        06-23 @ 07:01  -  06-24 @ 07:00  --------------------------------------------------------  IN: 0 mL / OUT: 1750 mL / NET: -1750 mL        PHYSICAL EXAM: O2 NC  GENERAL:  [ x ] NAD , [ x ] well appearing, [  ] Agitated, [  ] Drowsy,  [  ] Lethargy, [  ] confused   HEAD:  [ x ] Normal, [  ] Other  EYES:  [ x ] EOMI, [ x ] PERRLA, [ x ] conjunctiva and sclera clear normal, [  ] Other,  [  ] Pallor,[  ] Discharge  ENMT:  [ x ] Normal, [ x ] Moist mucous membranes, [ x ] Good dentition, [ x ] No Thrush  NECK:  [ x ] Supple, [x  ] No JVD, [ x ] Normal thyroid, [  ] Lymphadenopathy [  ] Other  CHEST/LUNG:  [ x ] Clear to auscultation bilaterally, [ x ] Breath Sounds equal B/L , [  ] poor effort  [ x ] No rales, [ x ] No rhonchi  [ x ]  slight expiratory wheezing,   HEART:  [ x ] Regular rate and IRREGULARLY IRREGULAR rhythm, [  ] tachycardia, [  ] Bradycardia,  [  ] irregular  [ x ] murmurs, No rubs, No gallops, [  ] PPM in place (Mfr:  )  ABDOMEN:  [ x ] Soft, [ x ] Nontender, [ x ] Nondistended, [ x ] No mass, [ x] Bowel sounds present, [  ] obese  NERVOUS SYSTEM:  [ x ] Alert & Oriented X3, [ x ] Nonfocal  [  ] Confusion  [  ] Encephalopathic [  ] Sedated [  ] Unable to assess, [  ] Dementia [  ] Other-  EXTREMITIES: [ x ] 2+ Peripheral Pulses, No clubbing, No cyanosis,  [ x ] 2 + pitting edema B/L lower EXT. [ x ] PVD stasis skin changes B/L Lower EXT, [  ] wound  LYMPH: No lymphadenopathy noted  SKIN:  [ x ] No rashes or lesions, [  ] Pressure Ulcers, [  ] ecchymosis, [  ] Skin Tears, [  ] Other     DIET: Diet, DASH/TLC:   Sodium & Cholesterol Restricted  1200mL Fluid Restriction ([id number]) (06-22-22 @ 08:53)      LABS:                        10.4   4.48  )-----------( 203 ( 24 Jun 2022 06:08 )             31.7     24 Jun 2022 06:08    139    |  103    |  26     ----------------------------<  84     3.9     |  32     |  1.30     Ca    7.8        24 Jun 2022 06:08  Phos  2.7       24 Jun 2022 06:08  Mg     2.7       24 Jun 2022 06:08    TPro  6.0    /  Alb  2.3    /  TBili  0.8    /  DBili  x      /  AST  34     /  ALT  19     /  AlkPhos  91     24 Jun 2022 06:08    PT/INR - ( 24 Jun 2022 06:08 )   PT: 13.8 sec;   INR: 1.18 ratio         PTT - ( 24 Jun 2022 06:08 )  PTT:35.9 sec              CARDIAC MARKERS ( 18 Jun 2022 08:32 )  x     / x     / 364 U/L / x     / 24.0 ng/mL  CARDIAC MARKERS ( 17 Jun 2022 23:34 )  x     / x     / 497 U/L / x     / 52.0 ng/mL  CARDIAC MARKERS ( 17 Jun 2022 14:55 )  x     / x     / 523 U/L / x     / 70.3 ng/mL             Anemia Panel:  Iron Total, Serum: 88 ug/dL (06-18-22 @ 10:09)  Iron - Total Binding Capacity.: 354 ug/dL (06-18-22 @ 10:09)  Ferritin, Serum: 40 ng/mL (06-18-22 @ 10:09)  Vitamin B12, Serum: 619 pg/mL (06-18-22 @ 10:09)  Folate, Serum: 14.9 ng/mL (06-18-22 @ 10:09)      Thyroid Panel:  Thyroid Stimulating Hormone, Serum: 0.42 uIU/mL (06-18-22 @ 08:32)            Serum Pro-Brain Natriuretic Peptide: 48902 pg/mL (06-20-22 @ 09:53)      RADIOLOGY & ADDITIONAL TESTS:      HEALTH ISSUES - PROBLEM Dx:  Lower extremity edema    Paroxysmal atrial fibrillation    Hypertension    Anemia    Hypothyroid    Need for prophylactic measure    Acute systolic congestive heart failure    NSTEMI (non-ST elevation myocardial infarction)    Acute kidney injury superimposed on CKD          Care Discussed with [X] Consultants  [ x ] Patient  [ x ] Family-Dtr  [  ] HCP [  ]   [  ] Social Service  [ x ] RN, [  ] Physical Therapy,[  ] Palliative care team  DVT PPX: [ x ] Lovenox, [  ] S C Heparin, [  ] Coumadin, [  ] Xarelto, [  ] Eliquis, [  ] Pradaxa, [  ] IV Heparin drip, [  ] SCD [  ] Contraindication 2 to GI Bleed,[  ] Ambulation [  ] Contraindicated 2 to  bleed [  ] Contraindicated 2 to Brain Bleed  Advanced directive: [  ] None, [ x ] DNR/DNI

## 2022-06-24 NOTE — PHYSICAL THERAPY INITIAL EVALUATION ADULT - PERTINENT HX OF CURRENT PROBLEM, REHAB EVAL
pt is a 89 y/o F admitted 6/17 with a pmhx b/l LE edema, HTN, P Afib (on coumadin), and hypothyroidism presenting with diarrhea and mid-sternal CP admitted for CP w/ elevated trops, NSTEMI.

## 2022-06-24 NOTE — PROGRESS NOTE ADULT - PROBLEM SELECTOR PLAN 7
chronic, elevated on admit - likely 2/2 pain  - continue Toprol 50mg qD w/ hold parameters  - routine hemodynamic monitoring chronic, elevated on admit - likely 2/2 pain  - continue Toprol 50mg qD w/ hold parameters  Lasix 40 mg 2x day   - routine hemodynamic monitoring

## 2022-06-24 NOTE — PROGRESS NOTE ADULT - PROBLEM SELECTOR PLAN 2
Acute Systolic CHF TTE: EF 35-40%, LV hypokinesis  - proBNP>13k  - Daily assessment of volume status, lasix prn  - Hold lasix for today  - avoid IVF  - I/Os, daily weights, D/W Cardio    TTE: Mild to moderate segmental left ventricular systolic dysfunction, estimated LVEF of 35-40%. The apex, mid inferoseptal and mid anterolateral walls appear severely hypokinetic Right ventricular enlargement with grossly normal function Biatrial enlargement Calcified trileaflet aortic valve with mild to moderate aortic stenosis,   without AI. Mild MR Moderate TR. No significant pericardial effusion. Acute Systolic CHF TTE: EF 35-40%, LV hypokinesis 2/2 NSTEMI   - proBNP>13k  - Daily assessment of volume status,  - Restart lasix 40 mg 2x day   - avoid IVF  - I/Os, daily weights, D/W Cardio    TTE: Mild to moderate segmental left ventricular systolic dysfunction, estimated LVEF of 35-40%. The apex, mid inferoseptal and mid anterolateral walls appear severely hypokinetic Right ventricular enlargement with grossly normal function Biatrial enlargement Calcified trileaflet aortic valve with mild to moderate aortic stenosis,   without AI. Mild MR Moderate TR. No significant pericardial effusion.

## 2022-06-24 NOTE — DIETITIAN INITIAL EVALUATION ADULT - OTHER INFO
89 year old female Dx Chest pain MI acute CHF PMH AFIB HTN hypothyroid   weight history ? 160# admit 177.4# bed scale October admit wt 155# with dx malnutrition at that time with weight loss and inadequate energy intake with death of spouse.   6/22 BM   MOLST DNR DNI

## 2022-06-24 NOTE — PROGRESS NOTE ADULT - PROBLEM SELECTOR PLAN 6
chronic, swelling LE b/l on admit  -On home lasix  TTE: EF 35-40%, LV hypokinesis chronic, swelling LE b/l on admit  -On home lasix 40 mg 2x day  TTE: EF 35-40%, LV hypokinesis

## 2022-06-24 NOTE — PROGRESS NOTE ADULT - PROBLEM SELECTOR PLAN 4
LUIS CARLOS on CKD stage 3B?   -Cr 1.5 baseline appears to be 1-1.2  -s/p Mucomyst q 12 hrs x 4 doses for prep for Cardiac Cath   - avoid IVF due to CHF  - s/p Lasix 40mg IV x2 on 6/20 and 6/21, elevated pBNP ~ 29176 for CHF  -Lasix 20 mg IVP x 1 dose  -consulted Dr. FLORIAN nephro-BMP in AM LUIS CARLOS on CKD stage 3B?   -Cr 1.5 baseline appears to be 1-1.2  -s/p Mucomyst q 12 hrs x 4 doses for prep for Cardiac Cath   - avoid IVF due to CHF  - s/p Lasix 40mg IV x2 on 6/20 and 6/21, elevated pBNP ~ 63479 for CHF-Lasix 20 mg IVP x 1 dose-6/23  -Restart Lasix 40 mg 2x day PO  -BMP in AM  -Renal follow up

## 2022-06-24 NOTE — PROGRESS NOTE ADULT - PROBLEM SELECTOR PLAN 5
chronic, stable - rate-controlled - on coumadin  - HOLD coumadin, goal INR 1.6 in order to be transferred for cath   - Toprol  XL 50mg PO qd w/ hold parameters  TTE: EF 35-40%, LV hypokinesis  - continuous tele monitoring  -cardio Dr. mckay -- D/W cardio NP chronic, stable - rate-controlled - on coumadin  - Toprol  XL 50mg PO qd w/ hold parameters  TTE: EF 35-40%, LV hypokinesis  - continuous tele monitoring  -cardio Dr. Blair -- D/W cardio NP, Restart Coumadin

## 2022-06-24 NOTE — PROGRESS NOTE ADULT - PROBLEM SELECTOR PLAN 3
normocytic anemia on admit - baseline ~10-11 appears near baseline  - unclear etiology - likely 2/2 fluid overload  -  iron studies, TFT's, B12, folate noted  - monitor for VS and s/s of worsening anemia and trend HgB in AM CBC normocytic anemia on admit - baseline ~10-11 appears near baseline  - unclear etiology - likely 2/2 fluid overload  -  iron studies, TFT's, B12, folate -Nl  - monitor for VS and s/s of worsening anemia and trend HgB in AM CBC

## 2022-06-24 NOTE — PROGRESS NOTE ADULT - ASSESSMENT
Patient is a 89 y/o F with a pmhx b/l LE edema, HTN, P Afib (on coumadin), and hypothyroidism presenting with diarrhea and mid-sternal CP admitted for CP w/ elevated trops, NSTEMI. On ACS therapy, currently being re-bridged to coumadin (held for plan for inpatient cath), plan for outpatient cardiac catheterization.

## 2022-06-25 LAB
ALBUMIN SERPL ELPH-MCNC: 2.3 G/DL — LOW (ref 3.3–5)
ALP SERPL-CCNC: 93 U/L — SIGNIFICANT CHANGE UP (ref 40–120)
ALT FLD-CCNC: 22 U/L — SIGNIFICANT CHANGE UP (ref 12–78)
ANION GAP SERPL CALC-SCNC: 5 MMOL/L — SIGNIFICANT CHANGE UP (ref 5–17)
APTT BLD: 40.5 SEC — HIGH (ref 27.5–35.5)
AST SERPL-CCNC: 37 U/L — SIGNIFICANT CHANGE UP (ref 15–37)
BASOPHILS # BLD AUTO: 0.03 K/UL — SIGNIFICANT CHANGE UP (ref 0–0.2)
BASOPHILS NFR BLD AUTO: 0.7 % — SIGNIFICANT CHANGE UP (ref 0–2)
BILIRUB SERPL-MCNC: 0.8 MG/DL — SIGNIFICANT CHANGE UP (ref 0.2–1.2)
BUN SERPL-MCNC: 27 MG/DL — HIGH (ref 7–23)
CALCIUM SERPL-MCNC: 8.2 MG/DL — LOW (ref 8.5–10.1)
CHLORIDE SERPL-SCNC: 103 MMOL/L — SIGNIFICANT CHANGE UP (ref 96–108)
CO2 SERPL-SCNC: 32 MMOL/L — HIGH (ref 22–31)
CREAT SERPL-MCNC: 1.3 MG/DL — SIGNIFICANT CHANGE UP (ref 0.5–1.3)
EGFR: 38 ML/MIN/1.73M2 — LOW
EOSINOPHIL # BLD AUTO: 0.2 K/UL — SIGNIFICANT CHANGE UP (ref 0–0.5)
EOSINOPHIL NFR BLD AUTO: 4.8 % — SIGNIFICANT CHANGE UP (ref 0–6)
GLUCOSE SERPL-MCNC: 90 MG/DL — SIGNIFICANT CHANGE UP (ref 70–99)
HCT VFR BLD CALC: 32 % — LOW (ref 34.5–45)
HGB BLD-MCNC: 10.3 G/DL — LOW (ref 11.5–15.5)
IMM GRANULOCYTES NFR BLD AUTO: 0.5 % — SIGNIFICANT CHANGE UP (ref 0–1.5)
INR BLD: 1.2 RATIO — HIGH (ref 0.88–1.16)
LYMPHOCYTES # BLD AUTO: 1.04 K/UL — SIGNIFICANT CHANGE UP (ref 1–3.3)
LYMPHOCYTES # BLD AUTO: 24.8 % — SIGNIFICANT CHANGE UP (ref 13–44)
MAGNESIUM SERPL-MCNC: 2.6 MG/DL — SIGNIFICANT CHANGE UP (ref 1.6–2.6)
MCHC RBC-ENTMCNC: 29.8 PG — SIGNIFICANT CHANGE UP (ref 27–34)
MCHC RBC-ENTMCNC: 32.2 GM/DL — SIGNIFICANT CHANGE UP (ref 32–36)
MCV RBC AUTO: 92.5 FL — SIGNIFICANT CHANGE UP (ref 80–100)
MONOCYTES # BLD AUTO: 0.71 K/UL — SIGNIFICANT CHANGE UP (ref 0–0.9)
MONOCYTES NFR BLD AUTO: 16.9 % — HIGH (ref 2–14)
NEUTROPHILS # BLD AUTO: 2.19 K/UL — SIGNIFICANT CHANGE UP (ref 1.8–7.4)
NEUTROPHILS NFR BLD AUTO: 52.3 % — SIGNIFICANT CHANGE UP (ref 43–77)
NRBC # BLD: 0 /100 WBCS — SIGNIFICANT CHANGE UP (ref 0–0)
NT-PROBNP SERPL-SCNC: 9883 PG/ML — HIGH (ref 0–450)
PHOSPHATE SERPL-MCNC: 2.8 MG/DL — SIGNIFICANT CHANGE UP (ref 2.5–4.5)
PLATELET # BLD AUTO: 206 K/UL — SIGNIFICANT CHANGE UP (ref 150–400)
POTASSIUM SERPL-MCNC: 3.5 MMOL/L — SIGNIFICANT CHANGE UP (ref 3.5–5.3)
POTASSIUM SERPL-SCNC: 3.5 MMOL/L — SIGNIFICANT CHANGE UP (ref 3.5–5.3)
PROT SERPL-MCNC: 5.9 G/DL — LOW (ref 6–8.3)
PROTHROM AB SERPL-ACNC: 14.1 SEC — HIGH (ref 10.5–13.4)
RBC # BLD: 3.46 M/UL — LOW (ref 3.8–5.2)
RBC # FLD: 19.1 % — HIGH (ref 10.3–14.5)
SODIUM SERPL-SCNC: 140 MMOL/L — SIGNIFICANT CHANGE UP (ref 135–145)
WBC # BLD: 4.19 K/UL — SIGNIFICANT CHANGE UP (ref 3.8–10.5)
WBC # FLD AUTO: 4.19 K/UL — SIGNIFICANT CHANGE UP (ref 3.8–10.5)

## 2022-06-25 PROCEDURE — 99232 SBSQ HOSP IP/OBS MODERATE 35: CPT

## 2022-06-25 PROCEDURE — 71045 X-RAY EXAM CHEST 1 VIEW: CPT | Mod: 26

## 2022-06-25 RX ORDER — ZINC OXIDE 200 MG/G
1 OINTMENT TOPICAL
Refills: 0 | Status: DISCONTINUED | OUTPATIENT
Start: 2022-06-25 | End: 2022-06-30

## 2022-06-25 RX ORDER — POTASSIUM CHLORIDE 20 MEQ
40 PACKET (EA) ORAL ONCE
Refills: 0 | Status: COMPLETED | OUTPATIENT
Start: 2022-06-25 | End: 2022-06-25

## 2022-06-25 RX ORDER — FUROSEMIDE 40 MG
20 TABLET ORAL ONCE
Refills: 0 | Status: COMPLETED | OUTPATIENT
Start: 2022-06-25 | End: 2022-06-25

## 2022-06-25 RX ORDER — WARFARIN SODIUM 2.5 MG/1
4 TABLET ORAL ONCE
Refills: 0 | Status: COMPLETED | OUTPATIENT
Start: 2022-06-25 | End: 2022-06-25

## 2022-06-25 RX ADMIN — Medication 81 MILLIGRAM(S): at 12:21

## 2022-06-25 RX ADMIN — Medication 40 MILLIGRAM(S): at 18:04

## 2022-06-25 RX ADMIN — Medication 40 MILLIEQUIVALENT(S): at 14:11

## 2022-06-25 RX ADMIN — ATORVASTATIN CALCIUM 80 MILLIGRAM(S): 80 TABLET, FILM COATED ORAL at 21:45

## 2022-06-25 RX ADMIN — PANTOPRAZOLE SODIUM 40 MILLIGRAM(S): 20 TABLET, DELAYED RELEASE ORAL at 12:21

## 2022-06-25 RX ADMIN — Medication 20 MILLIGRAM(S): at 14:12

## 2022-06-25 RX ADMIN — WARFARIN SODIUM 4 MILLIGRAM(S): 2.5 TABLET ORAL at 21:45

## 2022-06-25 RX ADMIN — CLOPIDOGREL BISULFATE 75 MILLIGRAM(S): 75 TABLET, FILM COATED ORAL at 12:21

## 2022-06-25 RX ADMIN — Medication 50 MILLIGRAM(S): at 05:41

## 2022-06-25 RX ADMIN — Medication 40 MILLIGRAM(S): at 05:41

## 2022-06-25 RX ADMIN — ALBUTEROL 2 PUFF(S): 90 AEROSOL, METERED ORAL at 14:17

## 2022-06-25 RX ADMIN — Medication 75 MICROGRAM(S): at 05:41

## 2022-06-25 RX ADMIN — ZINC OXIDE 1 APPLICATION(S): 200 OINTMENT TOPICAL at 17:00

## 2022-06-25 NOTE — PROGRESS NOTE ADULT - PROBLEM SELECTOR PLAN 5
chronic, stable - rate-controlled - on coumadin  - Toprol  XL 50mg PO qd w/ hold parameters  TTE: EF 35-40%, LV hypokinesis  - continuous tele monitoring  -cardio Dr. Blair -- D/W cardio NP, Restart Coumadin

## 2022-06-25 NOTE — PROGRESS NOTE ADULT - SUBJECTIVE AND OBJECTIVE BOX
Patient is a 89y old  Female who presents with a chief complaint of CP w/ elevated trops (19 Jun 2022 09:28)       HPI:  Patient is a 89 y/o F with a pmhx b/l LE edema, HTN, P Afib (on coumadin), and hypothyroidism presenting with sudden onset mid-sternal CP. Pt describes pain as a "prickly feeling", rated 4/10, constant in nature since 4am on 6/17, resolved in the ED. Pain was associated with L arm weakness, now resolved. Pt states the pain woke her up, and she was scared to walk and was unable to use her L arm for support to get out of bed. Pt was also nauseous at this time. No associated diaphoresis, palpitations, chest pressure. No amaurosis fugax, facial droop, garbled speech, hemiparesis, LOC, falls reported. No vomiting, fevers, chills, cough, sore throat, SOB, abd pain, constipation, dysuria. Pt had one loose BM this AM at home, denied hematochezia or melena.   Patient has not seen nephrologist in the past.  Denies any N/V/Urinary complaints.      No acute events noted       PAST MEDICAL & SURGICAL HISTORY:  Hypothyroid      Hypertension      Lower extremity edema      Paroxysmal atrial fibrillation           FAMILY HISTORY:  NC    Social History:Non smoker    MEDICATIONS  (STANDING):  acetylcysteine  Oral Solution 600 milliGRAM(s) Oral every 12 hours  aspirin enteric coated 81 milliGRAM(s) Oral daily  atorvastatin 80 milliGRAM(s) Oral at bedtime  calcium gluconate IVPB 1 Gram(s) IV Intermittent once  clopidogrel Tablet 75 milliGRAM(s) Oral daily  furosemide   Injectable 40 milliGRAM(s) IV Push once  levothyroxine 75 MICROGram(s) Oral daily  metoprolol succinate ER 50 milliGRAM(s) Oral daily  pantoprazole  Injectable 40 milliGRAM(s) IV Push daily  sodium chloride 0.9%. 1000 milliLiter(s) (50 mL/Hr) IV Continuous <Continuous>    MEDICATIONS  (PRN):      Allergies    No Known Allergies    Intolerances         REVIEW OF SYSTEMS:    Review of Systems:   Constitutional: Denies fatigue  HEENT: Denies headaches and dizziness  Respiratory: denies SOB, cough, or wheezing  Cardiovascular: denies CP, palpitations  Gastrointestinal: Denies nausea, denies vomiting, diarrhea, constipation, abdominal pain, or bloody stools  Genitourinary: denies painful urination, increased frequency, urgency, or bloody urine  Skin: denies rashes or itching  Musculoskeletal: denies muscle aches, joint swelling  Neurologic: Denies generalized weakness, denies loss of sensation, numbness, or tingling    Vital Signs Last 24 Hrs  T(C): 36.3 (25 Jun 2022 04:53), Max: 36.6 (24 Jun 2022 20:37)  T(F): 97.3 (25 Jun 2022 04:53), Max: 97.9 (24 Jun 2022 20:37)  HR: 69 (25 Jun 2022 04:53) (62 - 71)  BP: 106/53 (25 Jun 2022 04:53) (102/67 - 115/66)  BP(mean): --  RR: 18 (25 Jun 2022 04:53) (18 - 19)  SpO2: 95% (25 Jun 2022 04:53) (83% - 97%)    PHYSICAL EXAM:    GENERAL: NAD  HEAD:  Atraumatic, Normocephalic  EYES: EOMI, conjunctiva and sclera clear  ENMT: No Drainage from nares, No drainage from ears  NECK: Supple, neck  veins full  NERVOUS SYSTEM:  Awake and Alert  CHEST/LUNG: mildly Decreased BS R No rales, rhonchi, wheezing, or rubs  HEART: Regular rate and rhythm; No murmurs, rubs, or gallops  ABDOMEN: Soft, Nontender, Nondistended; Bowel sounds present  EXTREMITIES:  + Edema  SKIN: No rashes No obvious ecchymosis      LABS:                        10.3   4.19  )-----------( 206      ( 25 Jun 2022 07:03 )             32.0     06-25    140  |  103  |  27<H>  ----------------------------<  90  3.5   |  32<H>  |  1.30    Ca    8.2<L>      25 Jun 2022 07:03  Phos  2.8     06-25  Mg     2.6     06-25    TPro  5.9<L>  /  Alb  2.3<L>  /  TBili  0.8  /  DBili  x   /  AST  37  /  ALT  22  /  AlkPhos  93  06-25    PT/INR - ( 25 Jun 2022 07:03 )   PT: 14.1 sec;   INR: 1.20 ratio         PTT - ( 25 Jun 2022 07:03 )  PTT:40.5 sec

## 2022-06-25 NOTE — PROGRESS NOTE ADULT - ATTENDING COMMENTS
Patient is a 89 y/o F with a pmhx b/l LE edema, HTN, P Afib (on coumadin), and hypothyroidism presenting with diarrhea and mid-sternal CP admitted for CP w/ elevated trops, NSTEMI.  Pt seen, examined, case & care plan d/w pt, residents at detail.  -Cardiology Dr Blair- Restart Lasix 40 mg 2x day -Home dose   -Renal DR Rangel d/w at detail.  -AM labs   -PO diet  -Palliative care -DNR/DNI  -D/W Dtr  at detail with Cardio NP . No Plan for cardiac cath as pt is  SOB, wheezing ,Medical management , out pt cardiology follow up  -PT----> SINAN -Dtr & Pt refusing SINAN, RA O2 Sat at rest & Ambulation ,   Total care time is 45 minutes. Patient is a 89 y/o F with a pmhx b/l LE edema, HTN, P Afib (on coumadin), and hypothyroidism presenting with diarrhea and mid-sternal CP admitted for CP w/ elevated trops, NSTEMI.  Pt seen, examined, case & care plan d/w pt, residents at detail.  -Cardiology Dr Blair- Restart Lasix 40 mg 2x day -Home dose   -Renal DR Rangel d/w at detail.  -AM labs   -PO diet  -Palliative care -DNR/DNI  -D/W Dtr  at detail with Cardio NP . No Plan for cardiac cath as pt is  SOB, wheezing ,Medical management , out pt cardiology follow up  -PT----> SINAN -Dtr & Pt refusing SINAN, RA O2 Sat at rest & Ambulation ,   Total care time is 45 minutes.

## 2022-06-25 NOTE — PROGRESS NOTE ADULT - PROBLEM SELECTOR PLAN 6
chronic, swelling LE b/l on admit  -On home lasix 40 mg 2x day  TTE: EF 35-40%, LV hypokinesis chronic, swelling LE b/l on admit  -On home lasix 40 mg 2x day  -Will give additional lasix 20mg IV  TTE: EF 35-40%, LV hypokinesis

## 2022-06-25 NOTE — PROGRESS NOTE ADULT - SUBJECTIVE AND OBJECTIVE BOX
Patient is a 89y old  Female who presents with a chief complaint of CP w/ elevated trops (24 Jun 2022 16:17)    HPI:  Patient is a 89 y/o F with a pmhx b/l LE edema, HTN, P Afib (on coumadin), and hypothyroidism presenting with sudden onset mid-sternal CP. Pt describes pain as a "prickly feeling", rated 4/10, constant in nature since 4am on 6/17, resolved in the ED. Pain was associated with L arm weakness, now resolved. Pt states the pain woke her up, and she was scared to walk and was unable to use her L arm for support to get out of bed. Pt was also nauseous at this time. No associated diaphoresis, palpitations, chest pressure. No amaurosis fugax, facial droop, garbled speech, hemiparesis, LOC, falls reported. No vomiting, fevers, chills, cough, sore throat, SOB, abd pain, constipation, dysuria. Pt had one loose BM this AM at home, denied hematochezia or melena.       In ED:  VS - HR 71 | /65 | RR 18 | sO2 94 | T 97.6  Labs - HgB 11.2 | INR 1.85 | K 3.3 | Glu 114 | trop 342.6 | CKMB 3.2 | BNP 2396  CTH NC - No acute intracranial hemorrhage. Lacunar infarct R inferior cerebellum  EKG - vent rate 66, QTc 501ms, sinus rhythm, old LBBB, no ischemic changes on personal read  Given -  mg PO. pt seen by cardiology Dr Almazan , pt admitted to [facility] for NSTEMI. (17 Jun 2022 09:52)    INTERVAL HPI:  6/18/22: Seen and examined at bedside. No acute complaints. Denies chest pain overnight and this AM. Overnight pt had episode of dyspnea, troponins inc from 300s to >62783. Trops now downtrended. Received 1 x dose 40mg IV lasix for dyspnea. Pt had statin dose inc to 80mg, had 1 x dose lopressor 25mg, and had her scheduled evening coumadin. Had 10 beats of NSVT this AM. Pt was started on standing 50mg toprol xL PO qD. Started on plavix 75mg qD. Extensive counseling done with the patient on [facility] and her current state of health. Patient states she has "forms at home" that her daughter will bring to [facility]. Patient values quality of life and wants to discuss with her daughter and son-inlaw. Family to discuss potential plan for cardiac cath and will arrive to a decision on 6/19. C/W medical therapy for ACS. HOLD coumadin as pt INR is therapeutic and to facilitate potential for cardiac cath. Will start renal adjusted FD Lovenox on 6/19 if INR is below 2.   6/19/22 Pt seen and examined at bedside. Pt states she slept well last night. Pt denies SOB, CP, palpitations, dizziness .Mildly elevated Cr , INR -Theraputic  Pt states the last time she had chest pain was Friday night. Later in the afternoon again patient was seen at bedside with  daughter HCP and son in law at bedside, decision was made for DNR/ DNI and cardiac cath.  6/20/22: Patient seen and examined at bedside. Patient denies chest pain, palpitations, dyspnea. Noted to be slightly short of breath. proBNP >13k, will give 1 dose of lasix 40mg IV today.  6/21/22: Patient continues to deny anginal symptoms. Will be transferred for cardiac cath, as INR downtrended to 1.6 and Cr improved to 1.3. For LUIS CARLOS on CKD, will defer further IVF hydration at this time due to TTE results and SOB after previous IVF. Will give Mucomyst q12 x4 for renal protection. Given lasix 40mg IVP x1 to improve respirations. Given full dose lovenox x1.   6/22/22: Overnight patient with R sided abdominal discomfort, given maalox. Patient breathing improved s/p lasix 40mg IV yesterday. Patient's INR 1.4 today and Cr increased to 1.5. Plan for cardiac cath If Cr stable. Given additional dose of full dose lovenox.  6/23/22: Patient seen and examined at bedside. Patient denies chest pain, pressure, sob, palpitations. Cr downtrending today,  As per Cardiology NO plan for transfer for cardiac cath as pt is still SOB , Needs Lasix & Cr is elevated.. Low stable H/H. Warfarin 3mg today.  6/24/22: Patient seen and examined at bedside. Patient denies any SOB today, speaking comfortably. Denies chest pain, pressure, sob, palpitations. Restart Lasix & Coumadin  6/25/22: Patient seen and examined at bedside. Patient denies any SOB today, speaking comfortably. Denies chest pain, pressure, sob, palpitations. Continue home lasix dose and coumadin restart.       OVERNIGHT EVENTS: None    Home Medications:  aspirin 81 mg oral delayed release tablet: 1 tab(s) orally once a day (21 Jun 2022 08:48)  atorvastatin 80 mg oral tablet: 1 tab(s) orally once a day (at bedtime) (21 Jun 2022 08:48)  clopidogrel 75 mg oral tablet: 1 tab(s) orally once a day (21 Jun 2022 08:48)  Lasix 40 mg oral tablet: 1 tab(s) orally 2 times a day (17 Jun 2022 11:27)  metoprolol succinate 50 mg oral tablet, extended release: 1 tab(s) orally once a day (21 Jun 2022 08:48)  pantoprazole 40 mg intravenous injection: 40 milligram(s) intravenous once a day (21 Jun 2022 08:48)  Synthroid 75 mcg (0.075 mg) oral tablet: 1 tab(s) orally once a day (17 Jun 2022 11:27)  warfarin 3 mg oral tablet: 1 tab(s) orally once a day (M, Tu, Th, F, Sa Su) (17 Jun 2022 11:27)  warfarin 4 mg oral tablet: 1 tab(s) orally Wednesday (17 Jun 2022 11:27)      MEDICATIONS  (STANDING):  aspirin enteric coated 81 milliGRAM(s) Oral daily  atorvastatin 80 milliGRAM(s) Oral at bedtime  clopidogrel Tablet 75 milliGRAM(s) Oral daily  furosemide    Tablet 40 milliGRAM(s) Oral two times a day  levothyroxine 75 MICROGram(s) Oral daily  metoprolol succinate ER 50 milliGRAM(s) Oral daily  pantoprazole  Injectable 40 milliGRAM(s) IV Push daily    MEDICATIONS  (PRN):  ALBUTerol    90 MICROgram(s) HFA Inhaler 2 Puff(s) Inhalation every 12 hours PRN Shortness of Breath and/or Wheezing  aluminum hydroxide/magnesium hydroxide/simethicone Suspension 30 milliLiter(s) Oral every 6 hours PRN Dyspepsia      Allergies    No Known Allergies    Intolerances        Social History:  Home: lives alone  Smoke: none  Alcohol: none  Recreational drug use: none  Ambulate: independent, uses support from furniture and cane occasionally  Activities of daily living: independent  Job: retired  COVID - Vaccine (17 Jun 2022 09:52)      REVIEW OF SYSTEMS: i feel OK  CONSTITUTIONAL: No fever, No chills, No fatigue, No myalgia, No Body ache, No Weakness  EYES: No eye pain,  No visual disturbances, No discharge, NO Redness  ENMT:  No ear pain, No nose bleed, No vertigo; No sinus pain, NO throat pain, No Congestion  NECK: No pain, No stiffness  RESPIRATORY: No cough, NO wheezing, No hemoptysis, ADMITS brief shortness of breath earlier, now resolved  CARDIOVASCULAR: No chest pain, palpitations  GASTROINTESTINAL: No abdominal pain, NO epigastric pain. No nausea, No vomiting; No diarrhea, No constipation. [ x ] BM  GENITOURINARY: No dysuria, No frequency, No urgency, No hematuria, NO incontinence  NEUROLOGICAL: No headaches, No dizziness, No numbness, No tingling, No tremors, No weakness  EXT: admits CHRONIC Swelling in LE's;  No Pain, admits LE Edema  SKIN:  [ x ] No itching, burning, rashes, or lesions   MUSCULOSKELETAL: No joint pain ,No Jt swelling; No muscle pain, No back pain, No extremity pain  PSYCHIATRIC: No depression,  No anxiety,  No mood swings ,No difficulty sleeping at night  PAIN SCALE: [ x ] None  [  ] Other-  ROS Unable to obtain due to - [  ] Dementia  [  ] Lethargy [  ] Drowsy [  ] Sedated [  ] non verbal    Vital Signs Last 24 Hrs  T(C): 36.3 (25 Jun 2022 04:53), Max: 36.6 (24 Jun 2022 20:37)  T(F): 97.3 (25 Jun 2022 04:53), Max: 97.9 (24 Jun 2022 20:37)  HR: 69 (25 Jun 2022 04:53) (62 - 71)  BP: 106/53 (25 Jun 2022 04:53) (102/67 - 115/66)  BP(mean): --  RR: 18 (25 Jun 2022 04:53) (18 - 19)  SpO2: 95% (25 Jun 2022 04:53) (83% - 97%)  Finger Stick        06-24 @ 07:01  -  06-25 @ 07:00  --------------------------------------------------------  IN: 0 mL / OUT: 950 mL / NET: -950 mL        PHYSICAL EXAM: O2 NC  GENERAL:  [ x ] NAD , [ x ] well appearing, [  ] Agitated, [  ] Drowsy,  [  ] Lethargy, [  ] confused   HEAD:  [ x ] Normal, [  ] Other  EYES:  [ x ] EOMI, [ x ] PERRLA, [ x ] conjunctiva and sclera clear normal, [  ] Other,  [  ] Pallor,[  ] Discharge  ENMT:  [ x ] Normal, [ x ] Moist mucous membranes, [ x ] Good dentition, [ x ] No Thrush  NECK:  [ x ] Supple, [x  ] No JVD, [ x ] Normal thyroid, [  ] Lymphadenopathy [  ] Other  CHEST/LUNG:  [ x ] Clear to auscultation bilaterally, [ x ] Breath Sounds equal B/L , [  ] poor effort  [ x ] No rales, [ x ] No rhonchi  [ x ]  slight expiratory wheezing,   HEART:  [ x ] Regular rate and IRREGULARLY IRREGULAR rhythm, [  ] tachycardia, [  ] Bradycardia,  [  ] irregular  [ x ] murmurs, No rubs, No gallops, [  ] PPM in place (Mfr:  )  ABDOMEN:  [ x ] Soft, [ x ] Nontender, [ x ] Nondistended, [ x ] No mass, [ x] Bowel sounds present, [  ] obese  NERVOUS SYSTEM:  [ x ] Alert & Oriented X3, [ x ] Nonfocal  [  ] Confusion  [  ] Encephalopathic [  ] Sedated [  ] Unable to assess, [  ] Dementia [  ] Other-  EXTREMITIES: [ x ] 2+ Peripheral Pulses, No clubbing, No cyanosis,  [ x ] 2 + pitting edema B/L lower EXT. [ x ] PVD stasis skin changes B/L Lower EXT, [  ] wound  LYMPH: No lymphadenopathy noted  SKIN:  [ x ] No rashes or lesions, [  ] Pressure Ulcers, [  ] ecchymosis, [  ] Skin Tears, [  ] Other     DIET: Diet, DASH/TLC:   Sodium & Cholesterol Restricted  1200mL Fluid Restriction ([id number]) (06-22-22 @ 08:53)      LABS:                        10.3   4.19  )-----------( 206      ( 25 Jun 2022 07:03 )             32.0     25 Jun 2022 07:03    140    |  103    |  27     ----------------------------<  90     3.5     |  32     |  1.30     Ca    8.2        25 Jun 2022 07:03  Phos  2.8       25 Jun 2022 07:03  Mg     2.6       25 Jun 2022 07:03    TPro  5.9    /  Alb  2.3    /  TBili  0.8    /  DBili  x      /  AST  37     /  ALT  22     /  AlkPhos  93     25 Jun 2022 07:03    PT/INR - ( 25 Jun 2022 07:03 )   PT: 14.1 sec;   INR: 1.20 ratio         PTT - ( 25 Jun 2022 07:03 )  PTT:40.5 sec                         Anemia Panel:  Iron Total, Serum: 88 ug/dL (06-18-22 @ 10:09)  Iron - Total Binding Capacity.: 354 ug/dL (06-18-22 @ 10:09)  Ferritin, Serum: 40 ng/mL (06-18-22 @ 10:09)  Vitamin B12, Serum: 619 pg/mL (06-18-22 @ 10:09)  Folate, Serum: 14.9 ng/mL (06-18-22 @ 10:09)      Thyroid Panel:            Serum Pro-Brain Natriuretic Peptide: 70294 pg/mL (06-20-22 @ 09:53)      RADIOLOGY & ADDITIONAL TESTS:      HEALTH ISSUES - PROBLEM Dx:  Lower extremity edema    Paroxysmal atrial fibrillation    Hypertension    Anemia    Hypothyroid    Need for prophylactic measure    Acute systolic congestive heart failure    NSTEMI (non-ST elevation myocardial infarction)    Acute kidney injury superimposed on CKD            Consultant(s) Notes Reviewed:  [x  ] YES     Care Discussed with [X] Consultants  [ x ] Patient  [ x ] Family-Dtr  [  ] HCP [  ]   [  ] Social Service  [ x ] RN, [  ] Physical Therapy,[  ] Palliative care team  DVT PPX: [ x ] Lovenox, [  ] S C Heparin, [  ] Coumadin, [  ] Xarelto, [  ] Eliquis, [  ] Pradaxa, [  ] IV Heparin drip, [  ] SCD [  ] Contraindication 2 to GI Bleed,[  ] Ambulation [  ] Contraindicated 2 to  bleed [  ] Contraindicated 2 to Brain Bleed  Advanced directive: [  ] None, [ x ] DNR/DNI Patient is a 89y old  Female who presents with a chief complaint of CP w/ elevated trops (24 Jun 2022 16:17)    HPI:  Patient is a 89 y/o F with a pmhx b/l LE edema, HTN, P Afib (on coumadin), and hypothyroidism presenting with sudden onset mid-sternal CP. Pt describes pain as a "prickly feeling", rated 4/10, constant in nature since 4am on 6/17, resolved in the ED. Pain was associated with L arm weakness, now resolved. Pt states the pain woke her up, and she was scared to walk and was unable to use her L arm for support to get out of bed. Pt was also nauseous at this time. No associated diaphoresis, palpitations, chest pressure. No amaurosis fugax, facial droop, garbled speech, hemiparesis, LOC, falls reported. No vomiting, fevers, chills, cough, sore throat, SOB, abd pain, constipation, dysuria. Pt had one loose BM this AM at home, denied hematochezia or melena.       In ED:  VS - HR 71 | /65 | RR 18 | sO2 94 | T 97.6  Labs - HgB 11.2 | INR 1.85 | K 3.3 | Glu 114 | trop 342.6 | CKMB 3.2 | BNP 2396  CTH NC - No acute intracranial hemorrhage. Lacunar infarct R inferior cerebellum  EKG - vent rate 66, QTc 501ms, sinus rhythm, old LBBB, no ischemic changes on personal read  Given -  mg PO. pt seen by cardiology Dr Almazan , pt admitted to [facility] for NSTEMI. (17 Jun 2022 09:52)    INTERVAL HPI:  6/18/22: Seen and examined at bedside. No acute complaints. Denies chest pain overnight and this AM. Overnight pt had episode of dyspnea, troponins inc from 300s to >87253. Trops now downtrended. Received 1 x dose 40mg IV lasix for dyspnea. Pt had statin dose inc to 80mg, had 1 x dose lopressor 25mg, and had her scheduled evening coumadin. Had 10 beats of NSVT this AM. Pt was started on standing 50mg toprol xL PO qD. Started on plavix 75mg qD. Extensive counseling done with the patient on [facility] and her current state of health. Patient states she has "forms at home" that her daughter will bring to [facility]. Patient values quality of life and wants to discuss with her daughter and son-inlaw. Family to discuss potential plan for cardiac cath and will arrive to a decision on 6/19. C/W medical therapy for ACS. HOLD coumadin as pt INR is therapeutic and to facilitate potential for cardiac cath. Will start renal adjusted FD Lovenox on 6/19 if INR is below 2.   6/19/22 Pt seen and examined at bedside. Pt states she slept well last night. Pt denies SOB, CP, palpitations, dizziness .Mildly elevated Cr , INR -Theraputic  Pt states the last time she had chest pain was Friday night. Later in the afternoon again patient was seen at bedside with  daughter HCP and son in law at bedside, decision was made for DNR/ DNI and cardiac cath.  6/20/22: Patient seen and examined at bedside. Patient denies chest pain, palpitations, dyspnea. Noted to be slightly short of breath. proBNP >13k, will give 1 dose of lasix 40mg IV today.  6/21/22: Patient continues to deny anginal symptoms. Will be transferred for cardiac cath, as INR downtrended to 1.6 and Cr improved to 1.3. For LUIS CARLOS on CKD, will defer further IVF hydration at this time due to TTE results and SOB after previous IVF. Will give Mucomyst q12 x4 for renal protection. Given lasix 40mg IVP x1 to improve respirations. Given full dose lovenox x1.   6/22/22: Overnight patient with R sided abdominal discomfort, given maalox. Patient breathing improved s/p lasix 40mg IV yesterday. Patient's INR 1.4 today and Cr increased to 1.5. Plan for cardiac cath If Cr stable. Given additional dose of full dose lovenox.  6/23/22: Patient seen and examined at bedside. Patient denies chest pain, pressure, sob, palpitations. Cr downtrending today,  As per Cardiology NO plan for transfer for cardiac cath as pt is still SOB , Needs Lasix & Cr is elevated.. Low stable H/H. Warfarin 3mg today.  6/24/22: Patient seen and examined at bedside. Patient denies any SOB today, speaking comfortably. Denies chest pain, pressure, sob, palpitations. Restart Lasix & Coumadin  6/25/22: Patient seen and examined at bedside. Patient denies any SOB today, speaking comfortably. Denies chest pain, pressure, sob, palpitations. Continue home lasix dose and coumadin restart. Lasix 20 mg IVP x 1 dose.      OVERNIGHT EVENTS: None    Home Medications:  aspirin 81 mg oral delayed release tablet: 1 tab(s) orally once a day (21 Jun 2022 08:48)  atorvastatin 80 mg oral tablet: 1 tab(s) orally once a day (at bedtime) (21 Jun 2022 08:48)  clopidogrel 75 mg oral tablet: 1 tab(s) orally once a day (21 Jun 2022 08:48)  Lasix 40 mg oral tablet: 1 tab(s) orally 2 times a day (17 Jun 2022 11:27)  metoprolol succinate 50 mg oral tablet, extended release: 1 tab(s) orally once a day (21 Jun 2022 08:48)  pantoprazole 40 mg intravenous injection: 40 milligram(s) intravenous once a day (21 Jun 2022 08:48)  Synthroid 75 mcg (0.075 mg) oral tablet: 1 tab(s) orally once a day (17 Jun 2022 11:27)  warfarin 3 mg oral tablet: 1 tab(s) orally once a day (M, Tu, Th, F, Sa Su) (17 Jun 2022 11:27)  warfarin 4 mg oral tablet: 1 tab(s) orally Wednesday (17 Jun 2022 11:27)      MEDICATIONS  (STANDING):  aspirin enteric coated 81 milliGRAM(s) Oral daily  atorvastatin 80 milliGRAM(s) Oral at bedtime  clopidogrel Tablet 75 milliGRAM(s) Oral daily  furosemide    Tablet 40 milliGRAM(s) Oral two times a day  levothyroxine 75 MICROGram(s) Oral daily  metoprolol succinate ER 50 milliGRAM(s) Oral daily  pantoprazole  Injectable 40 milliGRAM(s) IV Push daily    MEDICATIONS  (PRN):  ALBUTerol    90 MICROgram(s) HFA Inhaler 2 Puff(s) Inhalation every 12 hours PRN Shortness of Breath and/or Wheezing  aluminum hydroxide/magnesium hydroxide/simethicone Suspension 30 milliLiter(s) Oral every 6 hours PRN Dyspepsia      Allergies    No Known Allergies    Intolerances        Social History:  Home: lives alone  Smoke: none  Alcohol: none  Recreational drug use: none  Ambulate: independent, uses support from furniture and cane occasionally  Activities of daily living: independent  Job: retired  COVID - Vaccine (17 Jun 2022 09:52)      REVIEW OF SYSTEMS: i feel OK  CONSTITUTIONAL: No fever, No chills, No fatigue, No myalgia, No Body ache, No Weakness  EYES: No eye pain,  No visual disturbances, No discharge, NO Redness  ENMT:  No ear pain, No nose bleed, No vertigo; No sinus pain, NO throat pain, No Congestion  NECK: No pain, No stiffness  RESPIRATORY: No cough, NO wheezing, No hemoptysis, ADMITS brief shortness of breath earlier, now resolved  CARDIOVASCULAR: No chest pain, palpitations  GASTROINTESTINAL: No abdominal pain, NO epigastric pain. No nausea, No vomiting; No diarrhea, No constipation. [ x ] BM  GENITOURINARY: No dysuria, No frequency, No urgency, No hematuria, NO incontinence  NEUROLOGICAL: No headaches, No dizziness, No numbness, No tingling, No tremors, No weakness  EXT: admits CHRONIC Swelling in LE's;  No Pain, admits LE Edema  SKIN:  [ x ] No itching, burning, rashes, or lesions   MUSCULOSKELETAL: No joint pain ,No Jt swelling; No muscle pain, No back pain, No extremity pain  PSYCHIATRIC: No depression,  No anxiety,  No mood swings ,No difficulty sleeping at night  PAIN SCALE: [ x ] None  [  ] Other-  ROS Unable to obtain due to - [  ] Dementia  [  ] Lethargy [  ] Drowsy [  ] Sedated [  ] non verbal    Vital Signs Last 24 Hrs  T(C): 36.3 (25 Jun 2022 04:53), Max: 36.6 (24 Jun 2022 20:37)  T(F): 97.3 (25 Jun 2022 04:53), Max: 97.9 (24 Jun 2022 20:37)  HR: 69 (25 Jun 2022 04:53) (62 - 71)  BP: 106/53 (25 Jun 2022 04:53) (102/67 - 115/66)  BP(mean): --  RR: 18 (25 Jun 2022 04:53) (18 - 19)  SpO2: 95% (25 Jun 2022 04:53) (83% - 97%)  Finger Stick        06-24 @ 07:01  -  06-25 @ 07:00  --------------------------------------------------------  IN: 0 mL / OUT: 950 mL / NET: -950 mL        PHYSICAL EXAM: O2 NC  GENERAL:  [ x ] NAD , [ x ] well appearing, [  ] Agitated, [  ] Drowsy,  [  ] Lethargy, [  ] confused   HEAD:  [ x ] Normal, [  ] Other  EYES:  [ x ] EOMI, [ x ] PERRLA, [ x ] conjunctiva and sclera clear normal, [  ] Other,  [  ] Pallor,[  ] Discharge  ENMT:  [ x ] Normal, [ x ] Moist mucous membranes, [ x ] Good dentition, [ x ] No Thrush  NECK:  [ x ] Supple, [x  ] No JVD, [ x ] Normal thyroid, [  ] Lymphadenopathy [  ] Other  CHEST/LUNG:  [ x ] Clear to auscultation bilaterally, [ x ] Breath Sounds equal B/L , [  ] poor effort  [ x ] No rales, [ x ] No rhonchi  [ x ]  slight expiratory wheezing,   HEART:  [ x ] Regular rate and IRREGULARLY IRREGULAR rhythm, [  ] tachycardia, [  ] Bradycardia,  [  ] irregular  [ x ] murmurs, No rubs, No gallops, [  ] PPM in place (Mfr:  )  ABDOMEN:  [ x ] Soft, [ x ] Nontender, [ x ] Nondistended, [ x ] No mass, [ x] Bowel sounds present, [ x ] obese  NERVOUS SYSTEM:  [ x ] Alert & Oriented X3, [ x ] Nonfocal  [  ] Confusion  [  ] Encephalopathic [  ] Sedated [  ] Unable to assess, [  ] Dementia [  ] Other-  EXTREMITIES: [ x ] 2+ Peripheral Pulses, No clubbing, No cyanosis,  [ x ] 2 + pitting edema B/L lower EXT. [ x ] PVD stasis skin changes B/L Lower EXT, [  ] wound  LYMPH: No lymphadenopathy noted  SKIN:  [ x ] No rashes or lesions, [  ] Pressure Ulcers, [  ] ecchymosis, [  ] Skin Tears, [ x ] Other - redness B/L Buttocs    DIET: Diet, DASH/TLC:   Sodium & Cholesterol Restricted  1200mL Fluid Restriction ([id number]) (06-22-22 @ 08:53)      LABS:                        10.3   4.19  )-----------( 206      ( 25 Jun 2022 07:03 )             32.0     25 Jun 2022 07:03    140    |  103    |  27     ----------------------------<  90     3.5     |  32     |  1.30     Ca    8.2        25 Jun 2022 07:03  Phos  2.8       25 Jun 2022 07:03  Mg     2.6       25 Jun 2022 07:03    TPro  5.9    /  Alb  2.3    /  TBili  0.8    /  DBili  x      /  AST  37     /  ALT  22     /  AlkPhos  93     25 Jun 2022 07:03    PT/INR - ( 25 Jun 2022 07:03 )   PT: 14.1 sec;   INR: 1.20 ratio         PTT - ( 25 Jun 2022 07:03 )  PTT:40.5 sec    Anemia Panel:  Iron Total, Serum: 88 ug/dL (06-18-22 @ 10:09)  Iron - Total Binding Capacity.: 354 ug/dL (06-18-22 @ 10:09)  Ferritin, Serum: 40 ng/mL (06-18-22 @ 10:09)  Vitamin B12, Serum: 619 pg/mL (06-18-22 @ 10:09)  Folate, Serum: 14.9 ng/mL (06-18-22 @ 10:09)    Serum Pro-Brain Natriuretic Peptide: 39667 pg/mL (06-20-22 @ 09:53)      RADIOLOGY & ADDITIONAL TESTS:  none     HEALTH ISSUES - PROBLEM Dx:  Lower extremity edema    Paroxysmal atrial fibrillation    Hypertension    Anemia    Hypothyroid    Need for prophylactic measure    Acute systolic congestive heart failure    NSTEMI (non-ST elevation myocardial infarction)    Acute kidney injury superimposed on CKD            Consultant(s) Notes Reviewed:  [x  ] YES     Care Discussed with [X] Consultants  [ x ] Patient  [ x ] Family-Dtr  [  ] HCP [  ]   [  ] Social Service  [ x ] RN, [  ] Physical Therapy,[  ] Palliative care team  DVT PPX: [ x ] Lovenox, [  ] S C Heparin, [  ] Coumadin, [  ] Xarelto, [  ] Eliquis, [  ] Pradaxa, [  ] IV Heparin drip, [  ] SCD [  ] Contraindication 2 to GI Bleed,[  ] Ambulation [  ] Contraindicated 2 to  bleed [  ] Contraindicated 2 to Brain Bleed  Advanced directive: [  ] None, [ x ] DNR/DNI Patient is a 89y old  Female who presents with a chief complaint of CP w/ elevated trops (24 Jun 2022 16:17)    HPI:  Patient is a 89 y/o F with a pmhx b/l LE edema, HTN, P Afib (on coumadin), and hypothyroidism presenting with sudden onset mid-sternal CP. Pt describes pain as a "prickly feeling", rated 4/10, constant in nature since 4am on 6/17, resolved in the ED. Pain was associated with L arm weakness, now resolved. Pt states the pain woke her up, and she was scared to walk and was unable to use her L arm for support to get out of bed. Pt was also nauseous at this time. No associated diaphoresis, palpitations, chest pressure. No amaurosis fugax, facial droop, garbled speech, hemiparesis, LOC, falls reported. No vomiting, fevers, chills, cough, sore throat, SOB, abd pain, constipation, dysuria. Pt had one loose BM this AM at home, denied hematochezia or melena.       In ED:  VS - HR 71 | /65 | RR 18 | sO2 94 | T 97.6  Labs - HgB 11.2 | INR 1.85 | K 3.3 | Glu 114 | trop 342.6 | CKMB 3.2 | BNP 2396  CTH NC - No acute intracranial hemorrhage. Lacunar infarct R inferior cerebellum  EKG - vent rate 66, QTc 501ms, sinus rhythm, old LBBB, no ischemic changes on personal read  Given -  mg PO. pt seen by cardiology Dr Almazan , pt admitted to [facility] for NSTEMI. (17 Jun 2022 09:52)    INTERVAL HPI:  6/18/22: Seen and examined at bedside. No acute complaints. Denies chest pain overnight and this AM. Overnight pt had episode of dyspnea, troponins inc from 300s to >18391. Trops now downtrended. Received 1 x dose 40mg IV lasix for dyspnea. Pt had statin dose inc to 80mg, had 1 x dose lopressor 25mg, and had her scheduled evening coumadin. Had 10 beats of NSVT this AM. Pt was started on standing 50mg toprol xL PO qD. Started on plavix 75mg qD. Extensive counseling done with the patient on [facility] and her current state of health. Patient states she has "forms at home" that her daughter will bring to [facility]. Patient values quality of life and wants to discuss with her daughter and son-inlaw. Family to discuss potential plan for cardiac cath and will arrive to a decision on 6/19. C/W medical therapy for ACS. HOLD coumadin as pt INR is therapeutic and to facilitate potential for cardiac cath. Will start renal adjusted FD Lovenox on 6/19 if INR is below 2.   6/19/22 Pt seen and examined at bedside. Pt states she slept well last night. Pt denies SOB, CP, palpitations, dizziness .Mildly elevated Cr , INR -Theraputic  Pt states the last time she had chest pain was Friday night. Later in the afternoon again patient was seen at bedside with  daughter HCP and son in law at bedside, decision was made for DNR/ DNI and cardiac cath.  6/20/22: Patient seen and examined at bedside. Patient denies chest pain, palpitations, dyspnea. Noted to be slightly short of breath. proBNP >13k, will give 1 dose of lasix 40mg IV today.  6/21/22: Patient continues to deny anginal symptoms. Will be transferred for cardiac cath, as INR downtrended to 1.6 and Cr improved to 1.3. For LUIS CARLOS on CKD, will defer further IVF hydration at this time due to TTE results and SOB after previous IVF. Will give Mucomyst q12 x4 for renal protection. Given lasix 40mg IVP x1 to improve respirations. Given full dose lovenox x1.   6/22/22: Overnight patient with R sided abdominal discomfort, given maalox. Patient breathing improved s/p lasix 40mg IV yesterday. Patient's INR 1.4 today and Cr increased to 1.5. Plan for cardiac cath If Cr stable. Given additional dose of full dose lovenox.  6/23/22: Patient seen and examined at bedside. Patient denies chest pain, pressure, sob, palpitations. Cr downtrending today,  As per Cardiology NO plan for transfer for cardiac cath as pt is still SOB , Needs Lasix & Cr is elevated.. Low stable H/H. Warfarin 3mg today.  6/24/22: Patient seen and examined at bedside. Patient denies any SOB today, speaking comfortably. Denies chest pain, pressure, sob, palpitations. Restart Lasix & Coumadin  6/25/22: Patient seen and examined at bedside. Patient denies any SOB today, speaking comfortably. Denies chest pain, pressure, sob, palpitations. Continue home lasix dose and coumadin restart. Lasix 20 mg IVP x 1 dose.      OVERNIGHT EVENTS: None    Home Medications:  aspirin 81 mg oral delayed release tablet: 1 tab(s) orally once a day (21 Jun 2022 08:48)  atorvastatin 80 mg oral tablet: 1 tab(s) orally once a day (at bedtime) (21 Jun 2022 08:48)  clopidogrel 75 mg oral tablet: 1 tab(s) orally once a day (21 Jun 2022 08:48)  Lasix 40 mg oral tablet: 1 tab(s) orally 2 times a day (17 Jun 2022 11:27)  metoprolol succinate 50 mg oral tablet, extended release: 1 tab(s) orally once a day (21 Jun 2022 08:48)  pantoprazole 40 mg intravenous injection: 40 milligram(s) intravenous once a day (21 Jun 2022 08:48)  Synthroid 75 mcg (0.075 mg) oral tablet: 1 tab(s) orally once a day (17 Jun 2022 11:27)  warfarin 3 mg oral tablet: 1 tab(s) orally once a day (M, Tu, Th, F, Sa Su) (17 Jun 2022 11:27)  warfarin 4 mg oral tablet: 1 tab(s) orally Wednesday (17 Jun 2022 11:27)      MEDICATIONS  (STANDING):  aspirin enteric coated 81 milliGRAM(s) Oral daily  atorvastatin 80 milliGRAM(s) Oral at bedtime  clopidogrel Tablet 75 milliGRAM(s) Oral daily  furosemide    Tablet 40 milliGRAM(s) Oral two times a day  levothyroxine 75 MICROGram(s) Oral daily  metoprolol succinate ER 50 milliGRAM(s) Oral daily  pantoprazole  Injectable 40 milliGRAM(s) IV Push daily    MEDICATIONS  (PRN):  ALBUTerol    90 MICROgram(s) HFA Inhaler 2 Puff(s) Inhalation every 12 hours PRN Shortness of Breath and/or Wheezing  aluminum hydroxide/magnesium hydroxide/simethicone Suspension 30 milliLiter(s) Oral every 6 hours PRN Dyspepsia      Allergies    No Known Allergies    Intolerances        Social History:  Home: lives alone  Smoke: none  Alcohol: none  Recreational drug use: none  Ambulate: independent, uses support from furniture and cane occasionally  Activities of daily living: independent  Job: retired  COVID - Vaccine (17 Jun 2022 09:52)      REVIEW OF SYSTEMS: i feel OK  CONSTITUTIONAL: No fever, No chills, No fatigue, No myalgia, No Body ache, No Weakness  EYES: No eye pain,  No visual disturbances, No discharge, NO Redness  ENMT:  No ear pain, No nose bleed, No vertigo; No sinus pain, NO throat pain, No Congestion  NECK: No pain, No stiffness  RESPIRATORY: No cough, NO wheezing, No hemoptysis, ADMITS brief shortness of breath earlier, now resolved  CARDIOVASCULAR: No chest pain, palpitations  GASTROINTESTINAL: No abdominal pain, NO epigastric pain. No nausea, No vomiting; No diarrhea, No constipation. [ x ] BM  GENITOURINARY: No dysuria, No frequency, No urgency, No hematuria, NO incontinence  NEUROLOGICAL: No headaches, No dizziness, No numbness, No tingling, No tremors, No weakness  EXT: admits CHRONIC Swelling in LE's;  No Pain, admits LE Edema  SKIN:  [ x ] No itching, burning, rashes, or lesions   MUSCULOSKELETAL: No joint pain ,No Jt swelling; No muscle pain, No back pain, No extremity pain  PSYCHIATRIC: No depression,  No anxiety,  No mood swings ,No difficulty sleeping at night  PAIN SCALE: [ x ] None  [  ] Other-  ROS Unable to obtain due to - [  ] Dementia  [  ] Lethargy [  ] Drowsy [  ] Sedated [  ] non verbal    Vital Signs Last 24 Hrs  T(C): 36.3 (25 Jun 2022 04:53), Max: 36.6 (24 Jun 2022 20:37)  T(F): 97.3 (25 Jun 2022 04:53), Max: 97.9 (24 Jun 2022 20:37)  HR: 69 (25 Jun 2022 04:53) (62 - 71)  BP: 106/53 (25 Jun 2022 04:53) (102/67 - 115/66)  BP(mean): --  RR: 18 (25 Jun 2022 04:53) (18 - 19)  SpO2: 95% (25 Jun 2022 04:53) (83% - 97%)  Finger Stick        06-24 @ 07:01  -  06-25 @ 07:00  --------------------------------------------------------  IN: 0 mL / OUT: 950 mL / NET: -950 mL        PHYSICAL EXAM: O2 NC  GENERAL:  [ x ] NAD , [ x ] well appearing, [  ] Agitated, [  ] Drowsy,  [  ] Lethargy, [  ] confused   HEAD:  [ x ] Normal, [  ] Other  EYES:  [ x ] EOMI, [ x ] PERRLA, [ x ] conjunctiva and sclera clear normal, [  ] Other,  [  ] Pallor,[  ] Discharge  ENMT:  [ x ] Normal, [ x ] Moist mucous membranes, [ x ] Good dentition, [ x ] No Thrush  NECK:  [ x ] Supple, [x  ] No JVD, [ x ] Normal thyroid, [  ] Lymphadenopathy [  ] Other  CHEST/LUNG:  [ x ] Clear to auscultation bilaterally, [ x ] Breath Sounds equal B/L , [  ] poor effort  [ x ] No rales, [ x ] No rhonchi  [ x ]  slight expiratory wheezing,   HEART:  [ x ] Regular rate and IRREGULARLY IRREGULAR rhythm, [  ] tachycardia, [  ] Bradycardia,  [  ] irregular  [ x ] murmurs, No rubs, No gallops, [  ] PPM in place (Mfr:  )  ABDOMEN:  [ x ] Soft, [ x ] Nontender, [ x ] Nondistended, [ x ] No mass, [ x] Bowel sounds present, [ x ] obese  NERVOUS SYSTEM:  [ x ] Alert & Oriented X3, [ x ] Nonfocal  [  ] Confusion  [  ] Encephalopathic [  ] Sedated [  ] Unable to assess, [  ] Dementia [  ] Other-  EXTREMITIES: [ x ] 2+ Peripheral Pulses, No clubbing, No cyanosis,  [ x ] 2 + pitting edema B/L lower EXT. [ x ] PVD stasis skin changes B/L Lower EXT, [  ] wound  LYMPH: No lymphadenopathy noted  SKIN:  [ x ] No rashes or lesions, [  ] Pressure Ulcers, [  ] ecchymosis, [  ] Skin Tears, [ x ] Other - redness B/L Buttocs    DIET: Diet, DASH/TLC:   Sodium & Cholesterol Restricted  1200mL Fluid Restriction ([id number]) (06-22-22 @ 08:53)      LABS:                        10.3   4.19  )-----------( 206      ( 25 Jun 2022 07:03 )             32.0     25 Jun 2022 07:03    140    |  103    |  27     ----------------------------<  90     3.5     |  32     |  1.30     Ca    8.2        25 Jun 2022 07:03  Phos  2.8       25 Jun 2022 07:03  Mg     2.6       25 Jun 2022 07:03    TPro  5.9    /  Alb  2.3    /  TBili  0.8    /  DBili  x      /  AST  37     /  ALT  22     /  AlkPhos  93     25 Jun 2022 07:03    PT/INR - ( 25 Jun 2022 07:03 )   PT: 14.1 sec;   INR: 1.20 ratio         PTT - ( 25 Jun 2022 07:03 )  PTT:40.5 sec    Anemia Panel:  Iron Total, Serum: 88 ug/dL (06-18-22 @ 10:09)  Iron - Total Binding Capacity.: 354 ug/dL (06-18-22 @ 10:09)  Ferritin, Serum: 40 ng/mL (06-18-22 @ 10:09)  Vitamin B12, Serum: 619 pg/mL (06-18-22 @ 10:09)  Folate, Serum: 14.9 ng/mL (06-18-22 @ 10:09)    Serum Pro-Brain Natriuretic Peptide: 72655 pg/mL (06-20-22 @ 09:53)      RADIOLOGY & ADDITIONAL TESTS:  none     HEALTH ISSUES - PROBLEM Dx:  Lower extremity edema    Paroxysmal atrial fibrillation    Hypertension    Anemia    Hypothyroid    Need for prophylactic measure    Acute systolic congestive heart failure    NSTEMI (non-ST elevation myocardial infarction)    Acute kidney injury superimposed on CKD            Consultant(s) Notes Reviewed:  [x  ] YES     Care Discussed with [X] Consultants  [ x ] Patient  [ x ] Family-Dtr  [  ] HCP [  ]   [  ] Social Service  [ x ] RN, [  ] Physical Therapy,[  ] Palliative care team  DVT PPX: [ x ] Lovenox, [  ] S C Heparin, [  ] Coumadin, [  ] Xarelto, [  ] Eliquis, [  ] Pradaxa, [  ] IV Heparin drip, [  ] SCD [  ] Contraindication 2 to GI Bleed,[  ] Ambulation [  ] Contraindicated 2 to  bleed [  ] Contraindicated 2 to Brain Bleed  Advanced directive: [  ] None, [ x ] DNR/DNI

## 2022-06-25 NOTE — PROGRESS NOTE ADULT - PROBLEM SELECTOR PLAN 2
Acute Systolic CHF TTE: EF 35-40%, LV hypokinesis 2/2 NSTEMI   - proBNP>13k  - Daily assessment of volume status,  -Restarted home Lasix 40 mg BID  - avoid IVF  - I/Os, daily weights, D/W Cardio    TTE: Mild to moderate segmental left ventricular systolic dysfunction, estimated LVEF of 35-40%. The apex, mid inferoseptal and mid anterolateral walls appear severely hypokinetic Right ventricular enlargement with grossly normal function Biatrial enlargement Calcified trileaflet aortic valve with mild to moderate aortic stenosis,   without AI. Mild MR Moderate TR. No significant pericardial effusion. Acute Systolic CHF TTE: EF 35-40%, LV hypokinesis 2/2 NSTEMI   - proBNP>13k  - Daily assessment of volume status,  -Restarted home Lasix 40 mg BID  -Will give additional lasix 20mg IV  - avoid IVF  - I/Os, daily weights, D/W Cardio    TTE: Mild to moderate segmental left ventricular systolic dysfunction, estimated LVEF of 35-40%. The apex, mid inferoseptal and mid anterolateral walls appear severely hypokinetic Right ventricular enlargement with grossly normal function Biatrial enlargement Calcified trileaflet aortic valve with mild to moderate aortic stenosis,   without AI. Mild MR Moderate TR. No significant pericardial effusion.

## 2022-06-25 NOTE — PROGRESS NOTE ADULT - SUBJECTIVE AND OBJECTIVE BOX
[facility] -- Reid Astorga, Raheem Almazan, Flex Muniz Savella, Goodger: Office # [phone]    Follow Up:  NSTEMI, Acute CHF    Subjective/Observations: Patient seen and examined, awake, alert, resting comfortably in bed, denies chest pain, dyspnea, palpitations or dizziness, Tolerating O2 via NC     REVIEW OF SYSTEMS: All review of systems is negative for eye, ENT, GI, , allergic, dermatologic, musculoskeletal and neurologic except as described above    PAST MEDICAL & SURGICAL HISTORY:  Hypothyroid      Hypertension      Lower extremity edema      Paroxysmal atrial fibrillation          MEDICATIONS  (STANDING):  aspirin enteric coated 81 milliGRAM(s) Oral daily  atorvastatin 80 milliGRAM(s) Oral at bedtime  clopidogrel Tablet 75 milliGRAM(s) Oral daily  furosemide    Tablet 40 milliGRAM(s) Oral two times a day  levothyroxine 75 MICROGram(s) Oral daily  metoprolol succinate ER 50 milliGRAM(s) Oral daily  pantoprazole  Injectable 40 milliGRAM(s) IV Push daily    MEDICATIONS  (PRN):  ALBUTerol    90 MICROgram(s) HFA Inhaler 2 Puff(s) Inhalation every 12 hours PRN Shortness of Breath and/or Wheezing  aluminum hydroxide/magnesium hydroxide/simethicone Suspension 30 milliLiter(s) Oral every 6 hours PRN Dyspepsia    Allergies    No Known Allergies    Intolerances      Vital Signs Last 24 Hrs  T(C): 36.3 (25 Jun 2022 04:53), Max: 36.6 (24 Jun 2022 20:37)  T(F): 97.3 (25 Jun 2022 04:53), Max: 97.9 (24 Jun 2022 20:37)  HR: 69 (25 Jun 2022 04:53) (62 - 71)  BP: 106/53 (25 Jun 2022 04:53) (104/65 - 115/66)  BP(mean): --  RR: 18 (25 Jun 2022 04:53) (18 - 19)  SpO2: 95% (25 Jun 2022 04:53) (83% - 96%)  I&O's Summary    24 Jun 2022 07:01  -  25 Jun 2022 07:00  --------------------------------------------------------  IN: 0 mL / OUT: 950 mL / NET: -950 mL          TELE: Not on telemetry   PHYSICAL EXAM:  Constitutional: NAD, awake and alert, well-developed  HEENT: Moist Mucous Membranes, Anicteric  Pulmonary: Non-labored, breath sounds are clear bilaterally, No wheezing, rales or rhonchi  Cardiovascular: Regular, S1 and S2, + murmurs, rubs, gallops or clicks  Gastrointestinal: Bowel Sounds present, soft, nontender.   Lymph: trace b/l peripheral edema. No lymphadenopathy.  Skin: No visible rashes or ulcers.  Psych:  Mood & affect appropriate for situation          LABS: All Labs Reviewed:                        10.3   4.19  )-----------( 206      ( 25 Jun 2022 07:03 )             32.0                         10.4   4.48  )-----------( 203      ( 24 Jun 2022 06:08 )             31.7                         10.9   4.05  )-----------( 182      ( 23 Jun 2022 06:10 )             33.3     25 Jun 2022 07:03    140    |  103    |  27     ----------------------------<  90     3.5     |  32     |  1.30   24 Jun 2022 06:08    139    |  103    |  26     ----------------------------<  84     3.9     |  32     |  1.30   23 Jun 2022 06:10    134    |  98     |  27     ----------------------------<  97     4.1     |  32     |  1.40     Ca    8.2        25 Jun 2022 07:03  Ca    7.8        24 Jun 2022 06:08  Ca    8.2        23 Jun 2022 06:10  Phos  2.8       25 Jun 2022 07:03  Phos  2.7       24 Jun 2022 06:08  Phos  3.2       23 Jun 2022 06:10  Mg     2.6       25 Jun 2022 07:03  Mg     2.7       24 Jun 2022 06:08  Mg     2.5       23 Jun 2022 06:10    TPro  5.9    /  Alb  2.3    /  TBili  0.8    /  DBili  x      /  AST  37     /  ALT  22     /  AlkPhos  93     25 Jun 2022 07:03  TPro  6.0    /  Alb  2.3    /  TBili  0.8    /  DBili  x      /  AST  34     /  ALT  19     /  AlkPhos  91     24 Jun 2022 06:08  TPro  6.7    /  Alb  2.5    /  TBili  0.9    /  DBili  x      /  AST  41     /  ALT  23     /  AlkPhos  101    23 Jun 2022 06:10    PT/INR - ( 25 Jun 2022 07:03 )   PT: 14.1 sec;   INR: 1.20 ratio         PTT - ( 25 Jun 2022 07:03 )  PTT:40.5 sec  Creatine Kinase, Serum: 364 U/L (06-18-22 @ 08:32)  Troponin I, High Sensitivity Result: 86846.6 ng/L (06-18-22 @ 08:32)  Troponin I, High Sensitivity Result: 24633.3 ng/L (06-17-22 @ 23:55)  Creatine Kinase, Serum: 497 U/L (06-17-22 @ 23:34)  Creatine Kinase, Serum: 523 U/L (06-17-22 @ 14:55)            Cholesterol, Serum: 144 mg/dL (06-18-22 @ 10:09)  HDL Cholesterol, Serum: 44 mg/dL (06-18-22 @ 10:09)  Triglycerides, Serum: 65 mg/dL (06-18-22 @ 10:09)      12 Lead ECG:   Ventricular Rate 74 BPM    Atrial Rate 71 BPM    QRS Duration 154 ms    Q-T Interval 458 ms    QTC Calculation(Bazett) 508 ms    R Axis 251 degrees    T Axis 225 degrees    Diagnosis Line Atrial fibrillation  Left bundle branch block  Confirmed by KEVIN MUNIZ ([id number]) on 6/22/2022 12:20:29 PM (06-22-22 @ 09:28)    < from: Xray Chest 1 View- PORTABLE-Urgent (Xray Chest 1 View- PORTABLE-Urgent .) (06.17.22 @ 08:39) >    ACC: [id number] EXAM:  XR CHEST PORTABLE URGENT 1V                          PROCEDURE DATE:  06/17/2022          INTERPRETATION:  AP semierect chest on June 17, 2022 at 8:35 AM. Patient   has chest pain.    Heart magnified by technique.    There is elevation of the right hemidiaphragm again noted.    Mild right base effusion somewhat increased from October 11, 2021.    IMPRESSION: As above.    --- End of Report ---            RUSSELL MONTES MD; Attending Radiologist  This document has been electronically signed. Jun 17 2022 11:18AM    < end of copied text >  < from: TTE Echo Complete w/o Contrast w/ Doppler (06.19.22 @ 10:00) >    ACC: [id number] EXAM:  ECHO TTE WO CON COMP W DOPP                          PROCEDURE DATE:  06/19/2022          INTERPRETATION:  INDICATION: Chest pain  Sonographer LK    Blood Pressure 147/84    Height 165.1 cm     Weight 72.6 kg       BSA 1.8   sqm    Dimensions:  LA 3.8       Normal Values: 2.0 - 4.0 cm  Ao 3.2        Normal Values: 2.0 - 3.8 cm  SEPTUM 1.7       Normal Values: 0.6 - 1.2 cm  PWT 1.2       Normal Values: 0.6 - 1.1 cm  LVIDd 4.8         Normal Values: 3.0 - 5.6 cm  LVIDs 3.2      Normal Values: 1.8 - 4.0 cm      OBSERVATIONS:  Mitral Valve: Mitral annular calcification with thickened leaflets, mild   MR.  Aortic Valve/Aorta: Calcified trileaflet aortic valve with decreased   opening. Peak transaortic valve gradient equals 20.4 mmHg with a mean   transaortic valve gradient 13.2 mmHg. By visual estimation there appears   to be mild to moderate aortic stenosis  Tricuspid Valve: Moderate TR.  Pulmonic Valve: Trace PI  Left Atrium: Enlarged  Right Atrium: Enlarged  Left Ventricle: Mild to moderate segmental left ventricular systolic   dysfunction, estimated LVEF of 35-40%. The apex, mid inferoseptal and mid   anterolateral walls appear severely hypokinetic  Right Ventricle: Right ventricular enlargement with grossly normal   function  Pericardium: no significant pericardial effusion.  Pulmonary/RV Pressure: estimated PA systolic pressure of 36 mmHg  IVC measures 1.47 cm          IMPRESSION:  Mild to moderate segmental left ventricular systolic dysfunction,   estimated LVEF of 35-40%. The apex, mid inferoseptal and mid   anterolateral walls appear severely hypokinetic  Right ventricular enlargement with grossly normal function  Biatrial enlargement  Calcified trileaflet aortic valve with mild to moderate aortic stenosis,   without AI.  Mild MR  Moderate TR.  No significant pericardial effusion.    --- End of Report ---            JEAN CARLOS HENRY MD; Attending Cardiologist  This document has been electronically signed. Jun 20 2022 12:56PM    < end of copied text >

## 2022-06-25 NOTE — PROGRESS NOTE ADULT - PROBLEM SELECTOR PLAN 1
presents with acute CP pain w radiation down L arm - trops elevated w/ no acute EKG changes   - admitted to to tele for NSTEMI , Few beats - NSVT on Tele. Remains non anginal   -As per discussion with cardio, NO  plan for outpatient cath 2/2 persistent SOB/Fluid overload  -trop initially 342.6, up trended to over 22K, came down to 15 K   - EKG Afib with underlying LBBB; unchanged from previous  -c/w ASA 81mg qD, Lipitor 80mg qD, Toprol xl 50mg qD,  PPI   - TTE: EF 35-40%, LV hypokinesis  - continuous tele monitoring  -cardio Dr. Blair d/w No Cath as still fluid overload , D/W cardio NP  -Restarted home Lasix 40 mg BID  -DNR DNI order placed, JAMES in chart

## 2022-06-25 NOTE — PROGRESS NOTE ADULT - PROBLEM SELECTOR PLAN 3
normocytic anemia on admit - baseline ~10-11 appears near baseline  - unclear etiology - likely 2/2 fluid overload  -  iron studies, TFT's, B12, folate -Nl  - monitor for VS and s/s of worsening anemia and trend HgB in AM CBC

## 2022-06-25 NOTE — PROGRESS NOTE ADULT - PROBLEM SELECTOR PLAN 7
chronic, elevated on admit - likely 2/2 pain  - continue Toprol 50mg qD w/ hold parameters  Lasix 40 mg 2x day   - routine hemodynamic monitoring

## 2022-06-25 NOTE — PROGRESS NOTE ADULT - ASSESSMENT
LUIS CARLOS on CKD 3b  Hypokalemia  HTN  NSTEMI  CHF    -BLCr 1.2  -Urine indices reviewed  -Can continue Lasix 40 mg po q 12 hours   -Renal indices are improving back to baseline  -Cardio follow up noted; no clear plan for Cath yet given age and creatinine  -Previously Discussed risks of contrast administration up to and including dialysis, patient and family understand risks; initially wanting to proceed, but now unsure  -Monitor chemistries    Called Daughter, Cristela; message left to call back

## 2022-06-25 NOTE — PROGRESS NOTE ADULT - ASSESSMENT
89 y/o F with a pmhx b/l LE edema, HTN,P Afib on coumadin , hypothyroidism, pw midsternal chest discomfort atypical in nature and left arm discomfort since waking up this morning at  4am, describes it as "creepy feeling" assoc with left arm weakness, general weakness and not feeling well. Patient has no underlying coronary disease.     NSTEMI  - EKG: Afib with underlying LBBB; unchanged from previous.    - hsT trended up to >52628, downtrended to 02906, remained asymptomatic, denies any anginal symptoms to date  - Continue to monitor for ischemic chest pain.   - Ischemic eval and plan has been discussed with patient and daughter, multiple times, aware of risk and benefits of procedure, understands that As of now, patient is not optimized.  May be safer to medically manage patient given advance age, labile renal function, and volume overload.   - renal indices improving back to baseline, renal following   - BP stable and controlled  - Continue GDMT: DAPT (ASA, Plavix), BB, and statin.    - continue routine hemodynamics    Acute Systolic HF  - TTE:(6/19/2022) Mild to moderate segmental LV systolic dysfunction, est LVEF of 35-40%. The apex, mid inferoseptal and mid anterolateral walls appear severely hypokinetic, RV enlargement with grossly normal function. Biatrial enlargement Calcified trileaflet aortic valve with mild to moderate aortic stenosis, without AI. Mild MR Moderate TR.  - remains overloaded, still requiring O2 supplement, wean as tolerated  - She has chronic B/L LE edema.  Initiate MANN stockings  - s/p Lasix IV, now on Lasix PO, continue   - continue to monitor volume status   - Continue strict I/O's.     Permanent Afib  - per flow sheet HR 60's -70's controlled, now off tele   - would restart AC, on coumadin at home   - Monitor electrolytes, replete to keep K>4 and Mag>2    - Other cardiovascular workup will depend on clinical course.  - Will continue to follow.    Melany Howe [facility]  Nurse Practitioner - Cardiology   Spectra #[phone]

## 2022-06-25 NOTE — PROGRESS NOTE ADULT - PROBLEM SELECTOR PLAN 4
LUIS CARLOS on CKD stage 3B?   -Cr 1.5 baseline appears to be 1-1.2  -s/p Mucomyst q 12 hrs x 4 doses for prep for Cardiac Cath   - avoid IVF due to CHF  - s/p Lasix 40mg IV x2 on 6/20 and 6/21, elevated pBNP ~ 91543 for CHF-Lasix 20 mg IVP x 1 dose-6/23  -Restart Lasix 40 mg 2x day PO  -BMP in AM  -Renal follow up LUIS CARLOS on CKD stage 3B?   -Cr 1.5 baseline appears to be 1-1.2  -s/p Mucomyst q 12 hrs x 4 doses for prep for Cardiac Cath   - avoid IVF due to CHF  - s/p Lasix 40mg IV x2 on 6/20 and 6/21, elevated pBNP ~ 49195 for CHF-Lasix 20 mg IVP x 1 dose-6/23  -Restart Lasix 40 mg 2x day PO  -Will give additional lasix 20mg IV  -BMP in AM  -Renal follow up

## 2022-06-26 LAB
ALBUMIN SERPL ELPH-MCNC: 2.3 G/DL — LOW (ref 3.3–5)
ALP SERPL-CCNC: 101 U/L — SIGNIFICANT CHANGE UP (ref 40–120)
ALT FLD-CCNC: 25 U/L — SIGNIFICANT CHANGE UP (ref 12–78)
ANION GAP SERPL CALC-SCNC: 5 MMOL/L — SIGNIFICANT CHANGE UP (ref 5–17)
APTT BLD: 35.7 SEC — HIGH (ref 27.5–35.5)
AST SERPL-CCNC: 44 U/L — HIGH (ref 15–37)
BASOPHILS # BLD AUTO: 0.02 K/UL — SIGNIFICANT CHANGE UP (ref 0–0.2)
BASOPHILS NFR BLD AUTO: 0.4 % — SIGNIFICANT CHANGE UP (ref 0–2)
BILIRUB SERPL-MCNC: 0.8 MG/DL — SIGNIFICANT CHANGE UP (ref 0.2–1.2)
BUN SERPL-MCNC: 22 MG/DL — SIGNIFICANT CHANGE UP (ref 7–23)
CALCIUM SERPL-MCNC: 8.1 MG/DL — LOW (ref 8.5–10.1)
CHLORIDE SERPL-SCNC: 104 MMOL/L — SIGNIFICANT CHANGE UP (ref 96–108)
CO2 SERPL-SCNC: 31 MMOL/L — SIGNIFICANT CHANGE UP (ref 22–31)
CREAT SERPL-MCNC: 1.2 MG/DL — SIGNIFICANT CHANGE UP (ref 0.5–1.3)
EGFR: 42 ML/MIN/1.73M2 — LOW
EOSINOPHIL # BLD AUTO: 0.21 K/UL — SIGNIFICANT CHANGE UP (ref 0–0.5)
EOSINOPHIL NFR BLD AUTO: 4.1 % — SIGNIFICANT CHANGE UP (ref 0–6)
GLUCOSE SERPL-MCNC: 101 MG/DL — HIGH (ref 70–99)
HCT VFR BLD CALC: 32 % — LOW (ref 34.5–45)
HGB BLD-MCNC: 10.5 G/DL — LOW (ref 11.5–15.5)
IMM GRANULOCYTES NFR BLD AUTO: 0.4 % — SIGNIFICANT CHANGE UP (ref 0–1.5)
INR BLD: 1.46 RATIO — HIGH (ref 0.88–1.16)
LYMPHOCYTES # BLD AUTO: 1.02 K/UL — SIGNIFICANT CHANGE UP (ref 1–3.3)
LYMPHOCYTES # BLD AUTO: 20.1 % — SIGNIFICANT CHANGE UP (ref 13–44)
MAGNESIUM SERPL-MCNC: 2.4 MG/DL — SIGNIFICANT CHANGE UP (ref 1.6–2.6)
MCHC RBC-ENTMCNC: 29.9 PG — SIGNIFICANT CHANGE UP (ref 27–34)
MCHC RBC-ENTMCNC: 32.8 GM/DL — SIGNIFICANT CHANGE UP (ref 32–36)
MCV RBC AUTO: 91.2 FL — SIGNIFICANT CHANGE UP (ref 80–100)
MONOCYTES # BLD AUTO: 0.74 K/UL — SIGNIFICANT CHANGE UP (ref 0–0.9)
MONOCYTES NFR BLD AUTO: 14.6 % — HIGH (ref 2–14)
NEUTROPHILS # BLD AUTO: 3.07 K/UL — SIGNIFICANT CHANGE UP (ref 1.8–7.4)
NEUTROPHILS NFR BLD AUTO: 60.4 % — SIGNIFICANT CHANGE UP (ref 43–77)
NRBC # BLD: 0 /100 WBCS — SIGNIFICANT CHANGE UP (ref 0–0)
PHOSPHATE SERPL-MCNC: 2.6 MG/DL — SIGNIFICANT CHANGE UP (ref 2.5–4.5)
PLATELET # BLD AUTO: 216 K/UL — SIGNIFICANT CHANGE UP (ref 150–400)
POTASSIUM SERPL-MCNC: 3.4 MMOL/L — LOW (ref 3.5–5.3)
POTASSIUM SERPL-SCNC: 3.4 MMOL/L — LOW (ref 3.5–5.3)
PROT SERPL-MCNC: 6 G/DL — SIGNIFICANT CHANGE UP (ref 6–8.3)
PROTHROM AB SERPL-ACNC: 17.2 SEC — HIGH (ref 10.5–13.4)
RBC # BLD: 3.51 M/UL — LOW (ref 3.8–5.2)
RBC # FLD: 19 % — HIGH (ref 10.3–14.5)
SODIUM SERPL-SCNC: 140 MMOL/L — SIGNIFICANT CHANGE UP (ref 135–145)
WBC # BLD: 5.08 K/UL — SIGNIFICANT CHANGE UP (ref 3.8–10.5)
WBC # FLD AUTO: 5.08 K/UL — SIGNIFICANT CHANGE UP (ref 3.8–10.5)

## 2022-06-26 PROCEDURE — 99232 SBSQ HOSP IP/OBS MODERATE 35: CPT

## 2022-06-26 RX ORDER — LIDOCAINE 4 G/100G
1 CREAM TOPICAL ONCE
Refills: 0 | Status: DISCONTINUED | OUTPATIENT
Start: 2022-06-26 | End: 2022-06-26

## 2022-06-26 RX ORDER — BACITRACIN ZINC 500 UNIT/G
1 OINTMENT IN PACKET (EA) TOPICAL
Refills: 0 | Status: DISCONTINUED | OUTPATIENT
Start: 2022-06-26 | End: 2022-06-30

## 2022-06-26 RX ORDER — ALBUTEROL 90 UG/1
2 AEROSOL, METERED ORAL ONCE
Refills: 0 | Status: COMPLETED | OUTPATIENT
Start: 2022-06-26 | End: 2022-06-26

## 2022-06-26 RX ORDER — POTASSIUM CHLORIDE 20 MEQ
40 PACKET (EA) ORAL EVERY 4 HOURS
Refills: 0 | Status: COMPLETED | OUTPATIENT
Start: 2022-06-26 | End: 2022-06-26

## 2022-06-26 RX ORDER — ACETAMINOPHEN 500 MG
650 TABLET ORAL EVERY 6 HOURS
Refills: 0 | Status: DISCONTINUED | OUTPATIENT
Start: 2022-06-26 | End: 2022-06-30

## 2022-06-26 RX ORDER — WARFARIN SODIUM 2.5 MG/1
3 TABLET ORAL ONCE
Refills: 0 | Status: COMPLETED | OUTPATIENT
Start: 2022-06-26 | End: 2022-06-26

## 2022-06-26 RX ORDER — FUROSEMIDE 40 MG
40 TABLET ORAL
Refills: 0 | Status: DISCONTINUED | OUTPATIENT
Start: 2022-06-26 | End: 2022-06-30

## 2022-06-26 RX ADMIN — ZINC OXIDE 1 APPLICATION(S): 200 OINTMENT TOPICAL at 05:42

## 2022-06-26 RX ADMIN — ZINC OXIDE 1 APPLICATION(S): 200 OINTMENT TOPICAL at 18:45

## 2022-06-26 RX ADMIN — CLOPIDOGREL BISULFATE 75 MILLIGRAM(S): 75 TABLET, FILM COATED ORAL at 11:44

## 2022-06-26 RX ADMIN — ALBUTEROL 2 PUFF(S): 90 AEROSOL, METERED ORAL at 11:47

## 2022-06-26 RX ADMIN — Medication 75 MICROGRAM(S): at 05:37

## 2022-06-26 RX ADMIN — Medication 650 MILLIGRAM(S): at 18:52

## 2022-06-26 RX ADMIN — Medication 40 MILLIEQUIVALENT(S): at 11:43

## 2022-06-26 RX ADMIN — ATORVASTATIN CALCIUM 80 MILLIGRAM(S): 80 TABLET, FILM COATED ORAL at 21:27

## 2022-06-26 RX ADMIN — WARFARIN SODIUM 3 MILLIGRAM(S): 2.5 TABLET ORAL at 21:27

## 2022-06-26 RX ADMIN — Medication 81 MILLIGRAM(S): at 11:44

## 2022-06-26 RX ADMIN — Medication 40 MILLIEQUIVALENT(S): at 15:16

## 2022-06-26 RX ADMIN — PANTOPRAZOLE SODIUM 40 MILLIGRAM(S): 20 TABLET, DELAYED RELEASE ORAL at 11:44

## 2022-06-26 RX ADMIN — Medication 50 MILLIGRAM(S): at 05:37

## 2022-06-26 RX ADMIN — Medication 40 MILLIGRAM(S): at 15:14

## 2022-06-26 RX ADMIN — Medication 40 MILLIGRAM(S): at 05:36

## 2022-06-26 RX ADMIN — Medication 1 APPLICATION(S): at 20:19

## 2022-06-26 NOTE — PROGRESS NOTE ADULT - PROBLEM SELECTOR PLAN 4
LUIS CARLOS on CKD stage 3B?   -Cr 1.5 baseline appears to be 1-1.2  -s/p Mucomyst q 12 hrs x 4 doses for prep for Cardiac Cath   - avoid IVF due to CHF  - s/p Lasix 40mg IV x2 on 6/20 and 6/21, elevated pBNP ~ 06226 for CHF-Lasix 20 mg IVP x 1 dose-6/23  -Restart Lasix 40 mg 2x day PO  -Will give additional lasix 20mg IV  -BMP in AM  -Renal follow up LUIS CARLOS on CKD stage 3B?   -Cr 1.5 baseline appears to be 1-1.2  -s/p Mucomyst q 12 hrs x 4 doses for prep for Cardiac Cath   - avoid IVF due to CHF  - s/p Lasix 40mg IV x2 on 6/20 and 6/21, elevated pBNP ~ 03245 for CHF-Lasix 20 mg IVP x 1 dose-6/23  -Lasix 40 mg 2x day PO  -BMP in AM  -Renal follow up

## 2022-06-26 NOTE — PROGRESS NOTE ADULT - PROBLEM SELECTOR PLAN 9
DVT ppx HOLD coumadin, goal INR for transfer for cath is 1.6  s/p full dose Lovenox x2 renally dosed on 6/21 and 6/22  Restart Coumadin today as NO Plan for cath DVT ppx coumadin 3 mg tonight INR 1.46, no plan for cath   s/p full dose Lovenox x2 renally dosed on 6/21 and 6/22

## 2022-06-26 NOTE — PROVIDER CONTACT NOTE (CHANGE IN STATUS NOTIFICATION) - ASSESSMENT
Pt A&Ox4. Area of +3 pitting edema noted to forearm of LUE. (+) 2 radial pulses, extremity warm to touch. Pt denies numbness/tingling

## 2022-06-26 NOTE — PROGRESS NOTE ADULT - PROBLEM SELECTOR PLAN 1
presents with acute CP pain w radiation down L arm - trops elevated w/ no acute EKG changes   - admitted to to tele for NSTEMI , Few beats - NSVT on Tele. Remains non anginal   -As per discussion with cardio, NO  plan for outpatient cath 2/2 persistent SOB/Fluid overload  -trop initially 342.6, up trended to over 22K, came down to 15 K   - EKG Afib with underlying LBBB; unchanged from previous  -c/w ASA 81mg qD, Lipitor 80mg qD, Toprol xl 50mg qD,  PPI   - TTE: EF 35-40%, LV hypokinesis  - continuous tele monitoring  -cardio Dr. Blair d/w No Cath as still fluid overload , D/W cardio NP  -Restarted home Lasix 40 mg BID  -DNR DNI order placed, JAMES in chart Acute Systolic CHF excaerbation  -TTE: EF 35-40%, LV hypokinesis 2/2 NSTEMI. [place], mid inferoseptal and mid anterolateral walls severely hypokinetic. Right ventricular enlargement. Biatrial enlargement. Moderate aortic stenosis. Moderate TR.   - proBNP>13k  - Daily assessment of volume status,  -Restarted home Lasix 40 mg BID  - avoid IVF  - I/Os, daily weights  -cardio Dr. Blair Acute Systolic CHF excaerbation  -TTE: EF 35-40%, LV hypokinesis 2/2 NSTEMI. [place], mid inferoseptal and mid anterolateral walls severely hypokinetic. Right ventricular enlargement. Biatrial enlargement. Moderate aortic stenosis. Moderate TR.   -proBNP>13k  -Daily assessment of volume status,  -continue home Lasix 40 mg BID, start incentive spirometry, albuterol inhaler x1 dose given this AM for wheezing   -avoid IVF  -I/Os, daily weights  -cardio Dr. Blair  -PT recommending [facility] Acute Systolic CHF excaerbation  -TTE: EF 35-40%, LV hypokinesis 2/2 NSTEMI. [place], mid inferoseptal and mid anterolateral walls severely hypokinetic. Right ventricular enlargement. Biatrial enlargement. Moderate aortic stenosis. Moderate TR.   -proBNP>13k  -Daily assessment of volume status,  -continue home Lasix 40 mg BID, start incentive spirometry, albuterol inhaler x1 dose given this AM for wheezing. Spoke with nurse, will try to keep on RA today if able.     -avoid IVF  -I/Os, daily weights  -cardio Dr. Blair  -PT recommending [facility]

## 2022-06-26 NOTE — PROGRESS NOTE ADULT - ASSESSMENT
Patient is a 89 y/o F with a pmhx b/l LE edema, HTN, P Afib (on coumadin), and hypothyroidism presenting with diarrhea and mid-sternal CP admitted for CP w/ elevated trops, NSTEMI. On ACS therapy, currently being re-bridged to coumadin (held for plan for inpatient cath), plan for outpatient cardiac catheterization. Patient is a 89 y/o F with a pmhx b/l LE edema, HTN, P Afib (on coumadin), and hypothyroidism presenting with diarrhea and mid-sternal CP admitted for CP w/ elevated trops, NSTEMI. On ACS therapy, re-bridged to coumadin (held for plan for inpatient cath), plan for outpatient cardiac catheterization, dc when optimized in setting of acute systolic CHF exacerbation.

## 2022-06-26 NOTE — PROGRESS NOTE ADULT - SUBJECTIVE AND OBJECTIVE BOX
[facility] -- Reid Astorga, Norah, Raheem, Flex Muniz Savella, Goodger: Office # [phone]    Follow Up:  NSTEMI, Acute CHF    Subjective/Observations: Patient seen and examined, awake, alert, resting comfortably in bed, denies chest pain, dyspnea, palpitations or dizziness,  Tolerating O2 via NC , c/o left elbow pain,  noted + close blister    REVIEW OF SYSTEMS: All review of systems is negative for eye, ENT, GI, , allergic, dermatologic, musculoskeletal and neurologic except as described above    PAST MEDICAL & SURGICAL HISTORY:  Hypothyroid      Hypertension      Lower extremity edema      Paroxysmal atrial fibrillation          MEDICATIONS  (STANDING):  ALBUTerol    90 MICROgram(s) HFA Inhaler 2 Puff(s) Inhalation once  aspirin enteric coated 81 milliGRAM(s) Oral daily  atorvastatin 80 milliGRAM(s) Oral at bedtime  clopidogrel Tablet 75 milliGRAM(s) Oral daily  furosemide    Tablet 40 milliGRAM(s) Oral two times a day  levothyroxine 75 MICROGram(s) Oral daily  metoprolol succinate ER 50 milliGRAM(s) Oral daily  pantoprazole  Injectable 40 milliGRAM(s) IV Push daily  potassium chloride    Tablet ER 40 milliEquivalent(s) Oral every 4 hours  warfarin 3 milliGRAM(s) Oral once  zinc oxide 40% Paste 1 Application(s) Topical two times a day    MEDICATIONS  (PRN):  ALBUTerol    90 MICROgram(s) HFA Inhaler 2 Puff(s) Inhalation every 12 hours PRN Shortness of Breath and/or Wheezing  aluminum hydroxide/magnesium hydroxide/simethicone Suspension 30 milliLiter(s) Oral every 6 hours PRN Dyspepsia    Allergies    No Known Allergies    Intolerances      Vital Signs Last 24 Hrs  T(C): 36.6 (26 Jun 2022 04:29), Max: 36.7 (25 Jun 2022 21:23)  T(F): 97.8 (26 Jun 2022 04:29), Max: 98 (25 Jun 2022 21:23)  HR: 76 (26 Jun 2022 04:29) (71 - 76)  BP: 111/61 (26 Jun 2022 04:29) (108/84 - 119/66)  BP(mean): --  RR: 18 (26 Jun 2022 04:29) (18 - 18)  SpO2: 94% (26 Jun 2022 04:29) (86% - 94%)  I&O's Summary    25 Jun 2022 07:01  -  26 Jun 2022 07:00  --------------------------------------------------------  IN: 0 mL / OUT: 600 mL / NET: -600 mL    26 Jun 2022 07:01  -  26 Jun 2022 11:24  --------------------------------------------------------  IN: 237 mL / OUT: 200 mL / NET: 37 mL        TELE: Not on telemetry   PHYSICAL EXAM:  Constitutional: NAD, awake and alert, well-developed  HEENT: Moist Mucous Membranes, Anicteric  Pulmonary: Non-labored, breath sounds are clear bilaterally, No wheezing, rales or rhonchi  Cardiovascular: Regular, S1 and S2, + murmurs, rubs, gallops or clicks  Gastrointestinal: Bowel Sounds present, soft, nontender.   Lymph: trace b/l peripheral edema. No lymphadenopathy.  Skin: No visible rashes or ulcers.  Psych:  Mood & affect appropriate for situation  LABS: All Labs Reviewed:                        10.5   5.08  )-----------( 216      ( 26 Jun 2022 07:08 )             32.0                         10.3   4.19  )-----------( 206      ( 25 Jun 2022 07:03 )             32.0                         10.4   4.48  )-----------( 203      ( 24 Jun 2022 06:08 )             31.7     26 Jun 2022 07:08    140    |  104    |  22     ----------------------------<  101    3.4     |  31     |  1.20   25 Jun 2022 07:03    140    |  103    |  27     ----------------------------<  90     3.5     |  32     |  1.30   24 Jun 2022 06:08    139    |  103    |  26     ----------------------------<  84     3.9     |  32     |  1.30     Ca    8.1        26 Jun 2022 07:08  Ca    8.2        25 Jun 2022 07:03  Ca    7.8        24 Jun 2022 06:08  Phos  2.6       26 Jun 2022 07:08  Phos  2.8       25 Jun 2022 07:03  Phos  2.7       24 Jun 2022 06:08  Mg     2.4       26 Jun 2022 07:08  Mg     2.6       25 Jun 2022 07:03  Mg     2.7       24 Jun 2022 06:08    TPro  6.0    /  Alb  2.3    /  TBili  0.8    /  DBili  x      /  AST  44     /  ALT  25     /  AlkPhos  101    26 Jun 2022 07:08  TPro  5.9    /  Alb  2.3    /  TBili  0.8    /  DBili  x      /  AST  37     /  ALT  22     /  AlkPhos  93     25 Jun 2022 07:03  TPro  6.0    /  Alb  2.3    /  TBili  0.8    /  DBili  x      /  AST  34     /  ALT  19     /  AlkPhos  91     24 Jun 2022 06:08    PT/INR - ( 26 Jun 2022 07:08 )   PT: 17.2 sec;   INR: 1.46 ratio         PTT - ( 26 Jun 2022 07:08 )  PTT:35.7 sec  Creatine Kinase, Serum: 364 U/L (06-18-22 @ 08:32)  Troponin I, High Sensitivity Result: 91969.6 ng/L (06-18-22 @ 08:32)  Troponin I, High Sensitivity Result: 15215.3 ng/L (06-17-22 @ 23:55)  Creatine Kinase, Serum: 497 U/L (06-17-22 @ 23:34)  Creatine Kinase, Serum: 523 U/L (06-17-22 @ 14:55)            Cholesterol, Serum: 144 mg/dL (06-18-22 @ 10:09)  HDL Cholesterol, Serum: 44 mg/dL (06-18-22 @ 10:09)  Triglycerides, Serum: 65 mg/dL (06-18-22 @ 10:09)      12 Lead ECG:   Ventricular Rate 74 BPM    Atrial Rate 71 BPM    QRS Duration 154 ms    Q-T Interval 458 ms    QTC Calculation(Bazett) 508 ms    R Axis 251 degrees    T Axis 225 degrees    Diagnosis Line Atrial fibrillation  Left bundle branch block  Confirmed by KEVIN MUNIZ ([id number]) on 6/22/2022 12:20:29 PM (06-22-22 @ 09:28)    < from: Xray Chest 1 View- PORTABLE-Urgent (Xray Chest 1 View- PORTABLE-Urgent .) (06.17.22 @ 08:39) >    ACC: [id number] EXAM:  XR CHEST PORTABLE URGENT 1V                          PROCEDURE DATE:  06/17/2022          INTERPRETATION:  AP semierect chest on June 17, 2022 at 8:35 AM. Patient   has chest pain.    Heart magnified by technique.    There is elevation of the right hemidiaphragm again noted.    Mild right base effusion somewhat increased from October 11, 2021.    IMPRESSION: As above.    --- End of Report ---            RUSSELL MONTES MD; Attending Radiologist  This document has been electronically signed. Jun 17 2022 11:18AM    < end of copied text >  < from: TTE Echo Complete w/o Contrast w/ Doppler (06.19.22 @ 10:00) >    ACC: [id number] EXAM:  ECHO TTE WO CON COMP W DOPP                          PROCEDURE DATE:  06/19/2022          INTERPRETATION:  INDICATION: Chest pain  Sonographer LK    Blood Pressure 147/84    Height 165.1 cm     Weight 72.6 kg       BSA 1.8   sqm    Dimensions:  LA 3.8       Normal Values: 2.0 - 4.0 cm  Ao 3.2        Normal Values: 2.0 - 3.8 cm  SEPTUM 1.7       Normal Values: 0.6 - 1.2 cm  PWT 1.2       Normal Values: 0.6 - 1.1 cm  LVIDd 4.8         Normal Values: 3.0 - 5.6 cm  LVIDs 3.2      Normal Values: 1.8 - 4.0 cm      OBSERVATIONS:  Mitral Valve: Mitral annular calcification with thickened leaflets, mild   MR.  Aortic Valve/Aorta: Calcified trileaflet aortic valve with decreased   opening. Peak transaortic valve gradient equals 20.4 mmHg with a mean   transaortic valve gradient 13.2 mmHg. By visual estimation there appears   to be mild to moderate aortic stenosis  Tricuspid Valve: Moderate TR.  Pulmonic Valve: Trace PI  Left Atrium: Enlarged  Right Atrium: Enlarged  Left Ventricle: Mild to moderate segmental left ventricular systolic   dysfunction, estimated LVEF of 35-40%. The apex, mid inferoseptal and mid   anterolateral walls appear severely hypokinetic  Right Ventricle: Right ventricular enlargement with grossly normal   function  Pericardium: no significant pericardial effusion.  Pulmonary/RV Pressure: estimated PA systolic pressure of 36 mmHg  IVC measures 1.47 cm          IMPRESSION:  Mild to moderate segmental left ventricular systolic dysfunction,   estimated LVEF of 35-40%. The apex, mid inferoseptal and mid   anterolateral walls appear severely hypokinetic  Right ventricular enlargement with grossly normal function  Biatrial enlargement  Calcified trileaflet aortic valve with mild to moderate aortic stenosis,   without AI.  Mild MR  Moderate TR.  No significant pericardial effusion.    --- End of Report ---            JEAN CARLOS HENRY MD; Attending Cardiologist  This document has been electronically signed. Jun 20 2022 12:56PM    < end of copied text >

## 2022-06-26 NOTE — PROGRESS NOTE ADULT - ASSESSMENT
89 y/o F with a pmhx b/l LE edema, HTN,P Afib on coumadin , hypothyroidism, pw midsternal chest discomfort atypical in nature and left arm discomfort since waking up this morning at  4am, describes it as "creepy feeling" assoc with left arm weakness, general weakness and not feeling well. Patient has no underlying coronary disease.     NSTEMI  - EKG: Afib with underlying LBBB; unchanged from previous.    - hsT trended up to >43103, downtrended to 99227, remained asymptomatic, denies any anginal symptoms to date  - Continue to monitor for ischemic chest pain.   - Ischemic eval and plan has been discussed with patient and daughter, multiple times, aware of risk and benefits of procedure, understands that As of now, patient is not optimized.  May be safer to medically manage patient given advance age, labile renal function, and volume overload.   - renal indices improving back to baseline, renal following   - BP, HR, stable and controlled  - Continue GDMT: DAPT (ASA, Plavix), BB, and statin.   - continue daily dose warfarin    - continue routine hemodynamics    Acute Systolic HF  - TTE:(6/19/2022) Mild to moderate segmental LV systolic dysfunction, est LVEF of 35-40%. The apex, mid inferoseptal and mid anterolateral walls appear severely hypokinetic, RV enlargement with grossly normal function. Biatrial enlargement Calcified trileaflet aortic valve with mild to moderate aortic stenosis, without AI. Mild MR Moderate TR.  - remains overloaded, though slowly improving still requiring O2 supplement, wean as tolerated  - She has chronic B/L LE edema with improvement, Initiate MANN stockings  - s/p Lasix IV, now on Lasix PO, continue   - continue to monitor volume status   - Continue strict I/O's.     - Monitor electrolytes, replete to keep K>4 and Mag>2  - Other cardiovascular workup will depend on clinical course.  - Will continue to follow.    Melany Howe, [facility]  Nurse Practitioner - Cardiology   Spectra #[id number]

## 2022-06-26 NOTE — PROGRESS NOTE ADULT - SUBJECTIVE AND OBJECTIVE BOX
Patient is a 89y old  Female who presents with a chief complaint of CP w/ elevated trops (25 Jun 2022 10:56)        HPI:  Patient is a 89 y/o F with a pmhx b/l LE edema, HTN, P Afib (on coumadin), and hypothyroidism presenting with sudden onset mid-sternal CP. Pt describes pain as a "prickly feeling", rated 4/10, constant in nature since 4am on 6/17, resolved in the ED. Pain was associated with L arm weakness, now resolved. Pt states the pain woke her up, and she was scared to walk and was unable to use her L arm for support to get out of bed. Pt was also nauseous at this time. No associated diaphoresis, palpitations, chest pressure. No amaurosis fugax, facial droop, garbled speech, hemiparesis, LOC, falls reported. No vomiting, fevers, chills, cough, sore throat, SOB, abd pain, constipation, dysuria. Pt had one loose BM this AM at home, denied hematochezia or melena.       In ED:  VS - HR 71 | /65 | RR 18 | sO2 94 | T 97.6  Labs - HgB 11.2 | INR 1.85 | K 3.3 | Glu 114 | trop 342.6 | CKMB 3.2 | BNP 2396  CTH NC - No acute intracranial hemorrhage. Lacunar infarct R inferior cerebellum  EKG - vent rate 66, QTc 501ms, sinus rhythm, old LBBB, no ischemic changes on personal read  Given -  mg PO. pt seen by cardiology Dr Almazan , pt admitted to [facility] for NSTEMI. (17 Jun 2022 09:52)    INTERVAL HPI:  6/18/22: Seen and examined at bedside. No acute complaints. Denies chest pain overnight and this AM. Overnight pt had episode of dyspnea, troponins inc from 300s to >92613. Trops now downtrended. Received 1 x dose 40mg IV lasix for dyspnea. Pt had statin dose inc to 80mg, had 1 x dose lopressor 25mg, and had her scheduled evening coumadin. Had 10 beats of NSVT this AM. Pt was started on standing 50mg toprol xL PO qD. Started on plavix 75mg qD. Extensive counseling done with the patient on [facility] and her current state of health. Patient states she has "forms at home" that her daughter will bring to [facility]. Patient values quality of life and wants to discuss with her daughter and son-inlaw. Family to discuss potential plan for cardiac cath and will arrive to a decision on 6/19. C/W medical therapy for ACS. HOLD coumadin as pt INR is therapeutic and to facilitate potential for cardiac cath. Will start renal adjusted FD Lovenox on 6/19 if INR is below 2.   6/19/22 Pt seen and examined at bedside. Pt states she slept well last night. Pt denies SOB, CP, palpitations, dizziness .Mildly elevated Cr , INR -Theraputic  Pt states the last time she had chest pain was Friday night. Later in the afternoon again patient was seen at bedside with  daughter HCP and son in law at bedside, decision was made for DNR/ DNI and cardiac cath.  6/20/22: Patient seen and examined at bedside. Patient denies chest pain, palpitations, dyspnea. Noted to be slightly short of breath. proBNP >13k, will give 1 dose of lasix 40mg IV today.  6/21/22: Patient continues to deny anginal symptoms. Will be transferred for cardiac cath, as INR downtrended to 1.6 and Cr improved to 1.3. For LUIS CARLOS on CKD, will defer further IVF hydration at this time due to TTE results and SOB after previous IVF. Will give Mucomyst q12 x4 for renal protection. Given lasix 40mg IVP x1 to improve respirations. Given full dose lovenox x1.   6/22/22: Overnight patient with R sided abdominal discomfort, given maalox. Patient breathing improved s/p lasix 40mg IV yesterday. Patient's INR 1.4 today and Cr increased to 1.5. Plan for cardiac cath If Cr stable. Given additional dose of full dose lovenox.  6/23/22: Patient seen and examined at bedside. Patient denies chest pain, pressure, sob, palpitations. Cr downtrending today,  As per Cardiology NO plan for transfer for cardiac cath as pt is still SOB , Needs Lasix & Cr is elevated.. Low stable H/H. Warfarin 3mg today.  6/24/22: Patient seen and examined at bedside. Patient denies any SOB today, speaking comfortably. Denies chest pain, pressure, sob, palpitations. Restart Lasix & Coumadin  6/25/22: Patient seen and examined at bedside. Patient denies any SOB today, speaking comfortably. Denies chest pain, pressure, sob, palpitations. Continue home lasix dose and coumadin restart. Lasix 20 mg IVP x 1 dose.  6/26/22: Patient seen and examined at bedside. Pt denies any SOB, CP, palpitations dizziness. Pt is on 2 L NC however cannula had fallen out of nose, replaced NC and dropped down to 1L.     OVERNIGHT EVENTS: No acute events overnight.     Home Medications:  aspirin 81 mg oral delayed release tablet: 1 tab(s) orally once a day (21 Jun 2022 08:48)  atorvastatin 80 mg oral tablet: 1 tab(s) orally once a day (at bedtime) (21 Jun 2022 08:48)  clopidogrel 75 mg oral tablet: 1 tab(s) orally once a day (21 Jun 2022 08:48)  Lasix 40 mg oral tablet: 1 tab(s) orally 2 times a day (17 Jun 2022 11:27)  metoprolol succinate 50 mg oral tablet, extended release: 1 tab(s) orally once a day (21 Jun 2022 08:48)  pantoprazole 40 mg intravenous injection: 40 milligram(s) intravenous once a day (21 Jun 2022 08:48)  Synthroid 75 mcg (0.075 mg) oral tablet: 1 tab(s) orally once a day (17 Jun 2022 11:27)  warfarin 3 mg oral tablet: 1 tab(s) orally once a day (M, Tu, Th, F, Sa Grider) (17 Jun 2022 11:27)  warfarin 4 mg oral tablet: 1 tab(s) orally Wednesday (17 Jun 2022 11:27)      MEDICATIONS  (STANDING):  aspirin enteric coated 81 milliGRAM(s) Oral daily  atorvastatin 80 milliGRAM(s) Oral at bedtime  clopidogrel Tablet 75 milliGRAM(s) Oral daily  furosemide    Tablet 40 milliGRAM(s) Oral two times a day  levothyroxine 75 MICROGram(s) Oral daily  metoprolol succinate ER 50 milliGRAM(s) Oral daily  pantoprazole  Injectable 40 milliGRAM(s) IV Push daily  zinc oxide 40% Paste 1 Application(s) Topical two times a day    MEDICATIONS  (PRN):  ALBUTerol    90 MICROgram(s) HFA Inhaler 2 Puff(s) Inhalation every 12 hours PRN Shortness of Breath and/or Wheezing  aluminum hydroxide/magnesium hydroxide/simethicone Suspension 30 milliLiter(s) Oral every 6 hours PRN Dyspepsia      Allergies    No Known Allergies    Intolerances      REVIEW OF SYSTEMS: I feel "fine"  CONSTITUTIONAL: No fever, No chills, No fatigue, No myalgia, No Body ache, No Weakness  EYES: No eye pain,  No visual disturbances, No discharge, NO Redness  ENMT:  No ear pain, No nose bleed, No vertigo; No sinus pain, NO throat pain, No Congestion  NECK: No pain, No stiffness  RESPIRATORY: No cough, NO wheezing, No hemoptysis, ADMITS brief shortness of breath earlier, now resolved  CARDIOVASCULAR: No chest pain, palpitations  GASTROINTESTINAL: No abdominal pain, NO epigastric pain. No nausea, No vomiting; No diarrhea, No constipation. [ x ] BM  GENITOURINARY: No dysuria, No frequency, No urgency, No hematuria, NO incontinence  NEUROLOGICAL: No headaches, No dizziness, No numbness, No tingling, No tremors, No weakness  EXT: admits chronic Swelling in LE's;  No Pain, admits LE Edema  SKIN:  [ x ] No itching, burning, rashes, or lesions   MUSCULOSKELETAL: No joint pain ,No Jt swelling; No muscle pain, No back pain, No extremity pain  PSYCHIATRIC: No depression,  No anxiety,  No mood swings ,No difficulty sleeping at night  PAIN SCALE: [ x ] None  [  ] Other-  ROS Unable to obtain due to - [  ] Dementia  [  ] Lethargy [  ] Drowsy [  ] Sedated [  ] non verbal    Vital Signs Last 24 Hrs  T(C): 36.6 (26 Jun 2022 04:29), Max: 36.7 (25 Jun 2022 21:23)  T(F): 97.8 (26 Jun 2022 04:29), Max: 98 (25 Jun 2022 21:23)  HR: 76 (26 Jun 2022 04:29) (71 - 76)  BP: 111/61 (26 Jun 2022 04:29) (108/84 - 119/66)  BP(mean): --  RR: 18 (26 Jun 2022 04:29) (18 - 18)  SpO2: 94% (26 Jun 2022 04:29) (86% - 94%)    Finger Stick        06-25 @ 07:01 - 06-26 @ 07:00  --------------------------------------------------------  IN: 0 mL / OUT: 600 mL / NET: -600 mL    06-26 @ 07:01 - 06-26 @ 09:22  --------------------------------------------------------  IN: 237 mL / OUT: 0 mL / NET: 237 mL      PHYSICAL EXAM: O2 NC out of nose but at 2 L, replaced and dropped down to 1 L NC   GENERAL:  [ x ] NAD initially sleeping when entered room, [ x ] well appearing, [  ] Agitated, [  ] Drowsy,  [  ] Lethargy, [  ] confused   HEAD:  [ x ] Normal, [  ] Other  EYES:  [ x ] EOMI, [ x ] PERRLA, [ x ] conjunctiva and sclera clear normal, [  ] Other,  [  ] Pallor,[  ] Discharge  ENMT:  [ x ] Normal, [ x ] Moist mucous membranes, [ x ] Good dentition, [ x ] No Thrush  NECK:  [ x ] Supple, [x  ] No JVD, [ x ] Normal thyroid, [  ] Lymphadenopathy [  ] Other  CHEST/LUNG:  [ x ] Clear to auscultation bilaterally, [ x ] Breath Sounds equal B/L , [  ] poor effort  [ x ] No rales, [ x ] No rhonchi  [ x ]  slight expiratory wheezing,   HEART:  [ x ] Regular rate and IRREGULARLY IRREGULAR rhythm, [  ] tachycardia, [  ] Bradycardia,  [  ] irregular  [ x ] murmurs, No rubs, No gallops, [  ] PPM in place (Mfr:  )  ABDOMEN:  [ x ] Soft, [ x ] Nontender, [ x ] Nondistended, [ x ] No mass, [ x] Bowel sounds present, [ x ] obese  NERVOUS SYSTEM:  [ x ] Alert & Oriented X3, [ x ] Nonfocal  [  ] Confusion  [  ] Encephalopathic [  ] Sedated [  ] Unable to assess, [  ] Dementia [  ] Other-  EXTREMITIES: [ x ] 2+ Peripheral Pulses, No clubbing, No cyanosis,  [ x ] 2 + pitting edema B/L lower EXT. [ x ] PVD stasis skin changes B/L Lower EXT, [  ] wound  LYMPH: No lymphadenopathy noted  SKIN:  [ x ] No rashes or lesions, [  ] Pressure Ulcers, [  ] ecchymosis, [  ] Skin Tears, [ x ] Other - redness B/L Buttocs    DIET: Diet, DASH/TLC:   Sodium & Cholesterol Restricted  1200mL Fluid Restriction ([id number]) (06-22-22 @ 08:53)  DIET:     LABS:                        10.5   5.08  )-----------( 216      ( 26 Jun 2022 07:08 )             32.0     26 Jun 2022 07:08    140    |  104    |  22     ----------------------------<  101    3.4     |  31     |  1.20     Ca    8.1        26 Jun 2022 07:08  Phos  2.6       26 Jun 2022 07:08  Mg     2.4       26 Jun 2022 07:08    TPro  6.0    /  Alb  2.3    /  TBili  0.8    /  DBili  x      /  AST  44     /  ALT  25     /  AlkPhos  101    26 Jun 2022 07:08    PT/INR - ( 26 Jun 2022 07:08 )   PT: 17.2 sec;   INR: 1.46 ratio         PTT - ( 26 Jun 2022 07:08 )  PTT:35.7 sec               Anemia Panel:      Thyroid Panel:            Serum Pro-Brain Natriuretic Peptide: 9883 pg/mL (06-25-22 @ 07:03)  Serum Pro-Brain Natriuretic Peptide: 90627 pg/mL (06-20-22 @ 09:53)      RADIOLOGY & ADDITIONAL TESTS:    HEALTH ISSUES - PROBLEM Dx:  Acute kidney injury superimposed on CKD    Lower extremity edema    Paroxysmal atrial fibrillation    Hypertension    Anemia    Hypothyroid    Need for prophylactic measure    NSTEMI (non-ST elevation myocardial infarction)    Acute systolic congestive heart failure          Consultant(s) Notes Reviewed:  [  ] YES     Care Discussed with [  ] Consultants, [  ] Patient, [  ] Family, [  ] , [  ] Social Service, [  ] RN, [  ] Physical Therapy  DVT PPX: [  ] Lovenox, [  ] SC Heparin, [  ] Coumadin, [  ] Xarelto, [  ] Eliquis, [  ] Pradaxa, [  ] IV Heparin drip, [  ] SCD, [  ] Contraindication secondary to GI Bleed, [  ] Ambulation  Advanced Directive: [  ] None, [  ] DNR/DNI Patient is a 89y old  Female who presents with a chief complaint of CP w/ elevated trops (25 Jun 2022 10:56)        HPI:  Patient is a 89 y/o F with a pmhx b/l LE edema, HTN, P Afib (on coumadin), and hypothyroidism presenting with sudden onset mid-sternal CP. Pt describes pain as a "prickly feeling", rated 4/10, constant in nature since 4am on 6/17, resolved in the ED. Pain was associated with L arm weakness, now resolved. Pt states the pain woke her up, and she was scared to walk and was unable to use her L arm for support to get out of bed. Pt was also nauseous at this time. No associated diaphoresis, palpitations, chest pressure. No amaurosis fugax, facial droop, garbled speech, hemiparesis, LOC, falls reported. No vomiting, fevers, chills, cough, sore throat, SOB, abd pain, constipation, dysuria. Pt had one loose BM this AM at home, denied hematochezia or melena.       In ED:  VS - HR 71 | /65 | RR 18 | sO2 94 | T 97.6  Labs - HgB 11.2 | INR 1.85 | K 3.3 | Glu 114 | trop 342.6 | CKMB 3.2 | BNP 2396  CTH NC - No acute intracranial hemorrhage. Lacunar infarct R inferior cerebellum  EKG - vent rate 66, QTc 501ms, sinus rhythm, old LBBB, no ischemic changes on personal read  Given -  mg PO. pt seen by cardiology Dr Almazan , pt admitted to [facility] for NSTEMI. (17 Jun 2022 09:52)    INTERVAL HPI:  6/18/22: Seen and examined at bedside. No acute complaints. Denies chest pain overnight and this AM. Overnight pt had episode of dyspnea, troponins inc from 300s to >[id number]. Trops now downtrended. Received 1 x dose 40mg IV lasix for dyspnea. Pt had statin dose inc to 80mg, had 1 x dose lopressor 25mg, and had her scheduled evening coumadin. Had 10 beats of NSVT this AM. Pt was started on standing 50mg toprol xL PO qD. Started on plavix 75mg qD. Extensive counseling done with the patient on [facility] and her current state of health. Patient states she has "forms at home" that her daughter will bring to [facility]. Patient values quality of life and wants to discuss with her daughter and son-inlaw. Family to discuss potential plan for cardiac cath and will arrive to a decision on 6/19. C/W medical therapy for ACS. HOLD coumadin as pt INR is therapeutic and to facilitate potential for cardiac cath. Will start renal adjusted FD Lovenox on 6/19 if INR is below 2.   6/19/22 Pt seen and examined at bedside. Pt states she slept well last night. Pt denies SOB, CP, palpitations, dizziness .Mildly elevated Cr , INR -Theraputic  Pt states the last time she had chest pain was Friday night. Later in the afternoon again patient was seen at bedside with  daughter HCP and son in law at bedside, decision was made for DNR/ DNI and cardiac cath.  6/20/22: Patient seen and examined at bedside. Patient denies chest pain, palpitations, dyspnea. Noted to be slightly short of breath. proBNP >13k, will give 1 dose of lasix 40mg IV today.  6/21/22: Patient continues to deny anginal symptoms. Will be transferred for cardiac cath, as INR downtrended to 1.6 and Cr improved to 1.3. For LUIS CARLOS on CKD, will defer further IVF hydration at this time due to TTE results and SOB after previous IVF. Will give Mucomyst q12 x4 for renal protection. Given lasix 40mg IVP x1 to improve respirations. Given full dose lovenox x1.   6/22/22: Overnight patient with R sided abdominal discomfort, given maalox. Patient breathing improved s/p lasix 40mg IV yesterday. Patient's INR 1.4 today and Cr increased to 1.5. Plan for cardiac cath If Cr stable. Given additional dose of full dose lovenox.  6/23/22: Patient seen and examined at bedside. Patient denies chest pain, pressure, sob, palpitations. Cr downtrending today,  As per Cardiology NO plan for transfer for cardiac cath as pt is still SOB , Needs Lasix & Cr is elevated.. Low stable H/H. Warfarin 3mg today.  6/24/22: Patient seen and examined at bedside. Patient denies any SOB today, speaking comfortably. Denies chest pain, pressure, sob, palpitations. Restart Lasix & Coumadin  6/25/22: Patient seen and examined at bedside. Patient denies any SOB today, speaking comfortably. Denies chest pain, pressure, sob, palpitations. Continue home lasix dose and coumadin restart. Lasix 20 mg IVP x 1 dose.  6/26/22: Patient seen and examined at bedside. Pt denies any SOB, CP, palpitations dizziness. Pt is on 2 L NC however cannula had fallen out of nose, replaced NC and dropped down to 1L.     OVERNIGHT EVENTS: No acute events overnight.     Home Medications:  aspirin 81 mg oral delayed release tablet: 1 tab(s) orally once a day (21 Jun 2022 08:48)  atorvastatin 80 mg oral tablet: 1 tab(s) orally once a day (at bedtime) (21 Jun 2022 08:48)  clopidogrel 75 mg oral tablet: 1 tab(s) orally once a day (21 Jun 2022 08:48)  Lasix 40 mg oral tablet: 1 tab(s) orally 2 times a day (17 Jun 2022 11:27)  metoprolol succinate 50 mg oral tablet, extended release: 1 tab(s) orally once a day (21 Jun 2022 08:48)  pantoprazole 40 mg intravenous injection: 40 milligram(s) intravenous once a day (21 Jun 2022 08:48)  Synthroid 75 mcg (0.075 mg) oral tablet: 1 tab(s) orally once a day (17 Jun 2022 11:27)  warfarin 3 mg oral tablet: 1 tab(s) orally once a day (M, Tu, Th, F, Sa Grider) (17 Jun 2022 11:27)  warfarin 4 mg oral tablet: 1 tab(s) orally Wednesday (17 Jun 2022 11:27)      MEDICATIONS  (STANDING):  aspirin enteric coated 81 milliGRAM(s) Oral daily  atorvastatin 80 milliGRAM(s) Oral at bedtime  clopidogrel Tablet 75 milliGRAM(s) Oral daily  furosemide    Tablet 40 milliGRAM(s) Oral two times a day  levothyroxine 75 MICROGram(s) Oral daily  metoprolol succinate ER 50 milliGRAM(s) Oral daily  pantoprazole  Injectable 40 milliGRAM(s) IV Push daily  zinc oxide 40% Paste 1 Application(s) Topical two times a day    MEDICATIONS  (PRN):  ALBUTerol    90 MICROgram(s) HFA Inhaler 2 Puff(s) Inhalation every 12 hours PRN Shortness of Breath and/or Wheezing  aluminum hydroxide/magnesium hydroxide/simethicone Suspension 30 milliLiter(s) Oral every 6 hours PRN Dyspepsia      Allergies    No Known Allergies    Intolerances      REVIEW OF SYSTEMS: I feel "fine"  CONSTITUTIONAL: No fever, No chills, No fatigue, No myalgia, No Body ache, No Weakness  EYES: No eye pain,  No visual disturbances, No discharge, NO Redness  ENMT:  No ear pain, No nose bleed, No vertigo; No sinus pain, NO throat pain, No Congestion  NECK: No pain, No stiffness  RESPIRATORY: No cough, NO wheezing, No hemoptysis, ADMITS brief shortness of breath earlier, now resolved  CARDIOVASCULAR: No chest pain, palpitations  GASTROINTESTINAL: No abdominal pain, NO epigastric pain. No nausea, No vomiting; No diarrhea, No constipation. [ x ] BM  GENITOURINARY: No dysuria, No frequency, No urgency, No hematuria, NO incontinence  NEUROLOGICAL: No headaches, No dizziness, No numbness, No tingling, No tremors, No weakness  EXT: admits chronic Swelling in LE's;  No Pain, admits LE Edema  SKIN:  [ x ] No itching, burning, rashes, or lesions   MUSCULOSKELETAL: No joint pain ,No Jt swelling; No muscle pain, No back pain, No extremity pain  PSYCHIATRIC: No depression,  No anxiety,  No mood swings ,No difficulty sleeping at night  PAIN SCALE: [ x ] None  [  ] Other-  ROS Unable to obtain due to - [  ] Dementia  [  ] Lethargy [  ] Drowsy [  ] Sedated [  ] non verbal    Vital Signs Last 24 Hrs  T(C): 36.6 (26 Jun 2022 04:29), Max: 36.7 (25 Jun 2022 21:23)  T(F): 97.8 (26 Jun 2022 04:29), Max: 98 (25 Jun 2022 21:23)  HR: 76 (26 Jun 2022 04:29) (71 - 76)  BP: 111/61 (26 Jun 2022 04:29) (108/84 - 119/66)  BP(mean): --  RR: 18 (26 Jun 2022 04:29) (18 - 18)  SpO2: 94% (26 Jun 2022 04:29) (86% - 94%)    Finger Stick        06-25 @ 07:01 - 06-26 @ 07:00  --------------------------------------------------------  IN: 0 mL / OUT: 600 mL / NET: -600 mL    06-26 @ 07:01 - 06-26 @ 09:22  --------------------------------------------------------  IN: 237 mL / OUT: 0 mL / NET: 237 mL      PHYSICAL EXAM: O2 NC out of nose but at 2 L, replaced and dropped down to 1 L NC   GENERAL:  [ x ] NAD initially sleeping when entered room, [ x ] well appearing, [  ] Agitated, [  ] Drowsy,  [  ] Lethargy, [  ] confused   HEAD:  [ x ] Normal, [  ] Other  EYES:  [ x ] EOMI, [ x ] PERRLA, [ x ] conjunctiva and sclera clear normal, [  ] Other,  [  ] Pallor,[  ] Discharge  ENMT:  [ x ] Normal, [ x ] Moist mucous membranes, [ x ] Good dentition, [ x ] No Thrush  NECK:  [ x ] Supple, [x  ] No JVD, [ x ] Normal thyroid, [  ] Lymphadenopathy [  ] Other  CHEST/LUNG:  [ x ] Clear to auscultation bilaterally, [ x ] Breath Sounds equal B/L , [  ] poor effort  [ x ] No rales, [ x ] No rhonchi  [ x ]  slight expiratory wheezing,   HEART:  [ x ] Regular rate and IRREGULARLY IRREGULAR rhythm, [  ] tachycardia, [  ] Bradycardia,  [  ] irregular  [ x ] systolic murmur heard best at RUSB No rubs, No gallops, [  ] PPM in place (Mfr:  )  ABDOMEN:  [ x ] Soft, [ x ] Nontender, [ x ] Nondistended, [ x ] No mass, [ x] Bowel sounds present, [ x ] obese  NERVOUS SYSTEM:  [ x ] Alert & Oriented X3, [ x ] Nonfocal  [  ] Confusion  [  ] Encephalopathic [  ] Sedated [  ] Unable to assess, [  ] Dementia [  ] Other-  EXTREMITIES: [ x ] 2+ Peripheral Pulses, No clubbing, No cyanosis,  [ x ] 2 + pitting edema B/L lower EXT. [ x ] PVD stasis skin changes B/L Lower EXT, [  ] wound  LYMPH: No lymphadenopathy noted  SKIN:  [ x ] No rashes or lesions, [  ] Pressure Ulcers, [  ] ecchymosis, [  ] Skin Tears, [ x ] Other - redness B/L Buttocks    DIET: Diet, DASH/TLC:   Sodium & Cholesterol Restricted  1200mL Fluid Restriction ([id number]) (06-22-22 @ 08:53)  DIET:     LABS:                        10.5   5.08  )-----------( 216      ( 26 Jun 2022 07:08 )             32.0     26 Jun 2022 07:08    140    |  104    |  22     ----------------------------<  101    3.4     |  31     |  1.20     Ca    8.1        26 Jun 2022 07:08  Phos  2.6       26 Jun 2022 07:08  Mg     2.4       26 Jun 2022 07:08    TPro  6.0    /  Alb  2.3    /  TBili  0.8    /  DBili  x      /  AST  44     /  ALT  25     /  AlkPhos  101    26 Jun 2022 07:08    PT/INR - ( 26 Jun 2022 07:08 )   PT: 17.2 sec;   INR: 1.46 ratio         PTT - ( 26 Jun 2022 07:08 )  PTT:35.7 sec               Anemia Panel:      Thyroid Panel:            Serum Pro-Brain Natriuretic Peptide: 9883 pg/mL (06-25-22 @ 07:03)  Serum Pro-Brain Natriuretic Peptide: 49250 pg/mL (06-20-22 @ 09:53)      RADIOLOGY & ADDITIONAL TESTS:    HEALTH ISSUES - PROBLEM Dx:  Acute kidney injury superimposed on CKD    Lower extremity edema    Paroxysmal atrial fibrillation    Hypertension    Anemia    Hypothyroid    Need for prophylactic measure    NSTEMI (non-ST elevation myocardial infarction)    Acute systolic congestive heart failure          Consultant(s) Notes Reviewed:  [x  ] YES     Care Discussed with [ ] Consultants  [ x ] Patient  [ x ] Family [  ] HCP [  ]   [  ] Social Service  [ x ] RN, [  ] Physical Therapy,[  ] Palliative care team  DVT PPX: [  ] Lovenox, [  ] S C Heparin, [x  ] Coumadin, [  ] Xarelto, [  ] Eliquis, [  ] Pradaxa, [  ] IV Heparin drip, [  ] SCD [  ] Contraindication 2 to GI Bleed,[  ] Ambulation [  ] Contraindicated 2 to  bleed [  ] Contraindicated 2 to Brain Bleed  Advanced directive: [  ] None, [ x ] DNR/DNI   Patient is a 89y old  Female who presents with a chief complaint of CP w/ elevated trops (25 Jun 2022 10:56)        HPI:  Patient is a 89 y/o F with a pmhx b/l LE edema, HTN, P Afib (on coumadin), and hypothyroidism presenting with sudden onset mid-sternal CP. Pt describes pain as a "prickly feeling", rated 4/10, constant in nature since 4am on 6/17, resolved in the ED. Pain was associated with L arm weakness, now resolved. Pt states the pain woke her up, and she was scared to walk and was unable to use her L arm for support to get out of bed. Pt was also nauseous at this time. No associated diaphoresis, palpitations, chest pressure. No amaurosis fugax, facial droop, garbled speech, hemiparesis, LOC, falls reported. No vomiting, fevers, chills, cough, sore throat, SOB, abd pain, constipation, dysuria. Pt had one loose BM this AM at home, denied hematochezia or melena.       In ED:  VS - HR 71 | /65 | RR 18 | sO2 94 | T 97.6  Labs - HgB 11.2 | INR 1.85 | K 3.3 | Glu 114 | trop 342.6 | CKMB 3.2 | BNP 2396  CTH NC - No acute intracranial hemorrhage. Lacunar infarct R inferior cerebellum  EKG - vent rate 66, QTc 501ms, sinus rhythm, old LBBB, no ischemic changes on personal read  Given -  mg PO. pt seen by cardiology Dr Almazan , pt admitted to [facility] for NSTEMI. (17 Jun 2022 09:52)    INTERVAL HPI:  6/18/22: Seen and examined at bedside. No acute complaints. Denies chest pain overnight and this AM. Overnight pt had episode of dyspnea, troponins inc from 300s to >07746. Trops now downtrended. Received 1 x dose 40mg IV lasix for dyspnea. Pt had statin dose inc to 80mg, had 1 x dose lopressor 25mg, and had her scheduled evening coumadin. Had 10 beats of NSVT this AM. Pt was started on standing 50mg toprol xL PO qD. Started on plavix 75mg qD. Extensive counseling done with the patient on [facility] and her current state of health. Patient states she has "forms at home" that her daughter will bring to [facility]. Patient values quality of life and wants to discuss with her daughter and son-inlaw. Family to discuss potential plan for cardiac cath and will arrive to a decision on 6/19. C/W medical therapy for ACS. HOLD coumadin as pt INR is therapeutic and to facilitate potential for cardiac cath. Will start renal adjusted FD Lovenox on 6/19 if INR is below 2.   6/19/22 Pt seen and examined at bedside. Pt states she slept well last night. Pt denies SOB, CP, palpitations, dizziness .Mildly elevated Cr , INR -Theraputic  Pt states the last time she had chest pain was Friday night. Later in the afternoon again patient was seen at bedside with  daughter HCP and son in law at bedside, decision was made for DNR/ DNI and cardiac cath.  6/20/22: Patient seen and examined at bedside. Patient denies chest pain, palpitations, dyspnea. Noted to be slightly short of breath. proBNP >13k, will give 1 dose of lasix 40mg IV today.  6/21/22: Patient continues to deny anginal symptoms. Will be transferred for cardiac cath, as INR downtrended to 1.6 and Cr improved to 1.3. For LUIS CARLOS on CKD, will defer further IVF hydration at this time due to TTE results and SOB after previous IVF. Will give Mucomyst q12 x4 for renal protection. Given lasix 40mg IVP x1 to improve respirations. Given full dose lovenox x1.   6/22/22: Overnight patient with R sided abdominal discomfort, given maalox. Patient breathing improved s/p lasix 40mg IV yesterday. Patient's INR 1.4 today and Cr increased to 1.5. Plan for cardiac cath If Cr stable. Given additional dose of full dose lovenox.  6/23/22: Patient seen and examined at bedside. Patient denies chest pain, pressure, sob, palpitations. Cr downtrending today,  As per Cardiology NO plan for transfer for cardiac cath as pt is still SOB , Needs Lasix & Cr is elevated.. Low stable H/H. Warfarin 3mg today.  6/24/22: Patient seen and examined at bedside. Patient denies any SOB today, speaking comfortably. Denies chest pain, pressure, sob, palpitations. Restart Lasix & Coumadin  6/25/22: Patient seen and examined at bedside. Patient denies any SOB today, speaking comfortably. Denies chest pain, pressure, sob, palpitations. Continue home lasix dose and coumadin restart. Lasix 20 mg IVP x 1 dose.  6/26/22: Patient seen and examined at bedside. Pt denies any SOB, CP, palpitations dizziness. Pt is on 2 L NC however cannula had fallen out of nose, replaced NC and dropped down to 1L.     OVERNIGHT EVENTS: No acute events overnight.     Home Medications:  aspirin 81 mg oral delayed release tablet: 1 tab(s) orally once a day (21 Jun 2022 08:48)  atorvastatin 80 mg oral tablet: 1 tab(s) orally once a day (at bedtime) (21 Jun 2022 08:48)  clopidogrel 75 mg oral tablet: 1 tab(s) orally once a day (21 Jun 2022 08:48)  Lasix 40 mg oral tablet: 1 tab(s) orally 2 times a day (17 Jun 2022 11:27)  metoprolol succinate 50 mg oral tablet, extended release: 1 tab(s) orally once a day (21 Jun 2022 08:48)  pantoprazole 40 mg intravenous injection: 40 milligram(s) intravenous once a day (21 Jun 2022 08:48)  Synthroid 75 mcg (0.075 mg) oral tablet: 1 tab(s) orally once a day (17 Jun 2022 11:27)  warfarin 3 mg oral tablet: 1 tab(s) orally once a day (M, Tu, Th, F, Sa Grider) (17 Jun 2022 11:27)  warfarin 4 mg oral tablet: 1 tab(s) orally Wednesday (17 Jun 2022 11:27)      MEDICATIONS  (STANDING):  aspirin enteric coated 81 milliGRAM(s) Oral daily  atorvastatin 80 milliGRAM(s) Oral at bedtime  clopidogrel Tablet 75 milliGRAM(s) Oral daily  furosemide    Tablet 40 milliGRAM(s) Oral two times a day  levothyroxine 75 MICROGram(s) Oral daily  metoprolol succinate ER 50 milliGRAM(s) Oral daily  pantoprazole  Injectable 40 milliGRAM(s) IV Push daily  zinc oxide 40% Paste 1 Application(s) Topical two times a day    MEDICATIONS  (PRN):  ALBUTerol    90 MICROgram(s) HFA Inhaler 2 Puff(s) Inhalation every 12 hours PRN Shortness of Breath and/or Wheezing  aluminum hydroxide/magnesium hydroxide/simethicone Suspension 30 milliLiter(s) Oral every 6 hours PRN Dyspepsia      Allergies    No Known Allergies    Intolerances      REVIEW OF SYSTEMS: I feel "fine"  CONSTITUTIONAL: No fever, No chills, No fatigue, No myalgia, No Body ache, No Weakness  EYES: No eye pain,  No visual disturbances, No discharge, NO Redness  ENMT:  No ear pain, No nose bleed, No vertigo; No sinus pain, NO throat pain, No Congestion  NECK: No pain, No stiffness  RESPIRATORY: No cough, NO wheezing, No hemoptysis, ADMITS brief shortness of breath earlier, now resolved  CARDIOVASCULAR: No chest pain, palpitations  GASTROINTESTINAL: No abdominal pain, NO epigastric pain. No nausea, No vomiting; No diarrhea, No constipation. [ x ] BM  GENITOURINARY: No dysuria, No frequency, No urgency, No hematuria, NO incontinence  NEUROLOGICAL: No headaches, No dizziness, No numbness, No tingling, No tremors, No weakness  EXT: admits chronic Swelling in LE's, No Pain, admits LE Edema  SKIN:  [ x ] No itching, burning, rashes, or lesions   MUSCULOSKELETAL: No joint pain ,No Jt swelling; No muscle pain, No back pain, No extremity pain  PSYCHIATRIC: No depression,  No anxiety,  No mood swings ,No difficulty sleeping at night  PAIN SCALE: [ x ] None  [  ] Other-  ROS Unable to obtain due to - [  ] Dementia  [  ] Lethargy [  ] Drowsy [  ] Sedated [  ] non verbal    Vital Signs Last 24 Hrs  T(C): 36.6 (26 Jun 2022 04:29), Max: 36.7 (25 Jun 2022 21:23)  T(F): 97.8 (26 Jun 2022 04:29), Max: 98 (25 Jun 2022 21:23)  HR: 76 (26 Jun 2022 04:29) (71 - 76)  BP: 111/61 (26 Jun 2022 04:29) (108/84 - 119/66)  BP(mean): --  RR: 18 (26 Jun 2022 04:29) (18 - 18)  SpO2: 94% (26 Jun 2022 04:29) (86% - 94%)    Finger Stick        06-25 @ 07:01 - 06-26 @ 07:00  --------------------------------------------------------  IN: 0 mL / OUT: 600 mL / NET: -600 mL    06-26 @ 07:01 - 06-26 @ 09:22  --------------------------------------------------------  IN: 237 mL / OUT: 0 mL / NET: 237 mL      PHYSICAL EXAM: O2 NC out of nose but at 2 L, replaced and dropped down to 1 L NC   GENERAL:  [ x ] NAD initially sleeping when entered room, [ x ] well appearing, [  ] Agitated, [  ] Drowsy,  [  ] Lethargy, [  ] confused   HEAD:  [ x ] Normal, [  ] Other  EYES:  [ x ] EOMI, [ x ] PERRLA, [ x ] conjunctiva and sclera clear normal, [  ] Other,  [  ] Pallor,[  ] Discharge  ENMT:  [ x ] Normal, [ x ] Moist mucous membranes, [ x ] Good dentition, [ x ] No Thrush  NECK:  [ x ] Supple, [x  ] No JVD, [ x ] Normal thyroid, [  ] Lymphadenopathy [  ] Other  CHEST/LUNG:  [ x ] Clear to auscultation bilaterally, [ x ] Breath Sounds equal B/L , [  ] poor effort  [ x ] No rales, [ x ] No rhonchi  [ x ]  slight expiratory wheezing,   HEART:  [ x ] Regular rate and IRREGULARLY IRREGULAR rhythm, [  ] tachycardia, [  ] Bradycardia,  [  ] irregular  [ x ] systolic murmur heard best at RUSB No rubs, No gallops, [  ] PPM in place (Mfr:  )  ABDOMEN:  [ x ] Soft, [ x ] Nontender, [ x ] Nondistended, [ x ] No mass, [ x] Bowel sounds present, [ x ] obese  NERVOUS SYSTEM:  [ x ] Alert & Oriented X3, [ x ] Nonfocal  [  ] Confusion  [  ] Encephalopathic [  ] Sedated [  ] Unable to assess, [  ] Dementia [  ] Other-  EXTREMITIES: [ x ] 2+ Peripheral Pulses, No clubbing, No cyanosis,  [ x ] +1 edema B/L lower EXT. [ x ] PVD stasis skin changes B/L Lower EXT, [  ] wound  LYMPH: No lymphadenopathy noted  SKIN:  [ x ] No rashes or lesions, [  ] Pressure Ulcers, [  ] ecchymosis, [  ] Skin Tears, [ x ] Other - redness B/L Buttocks    DIET: Diet, DASH/TLC:   Sodium & Cholesterol Restricted  1200mL Fluid Restriction ([id number]) (06-22-22 @ 08:53)  DIET:     LABS:                        10.5   5.08  )-----------( 216      ( 26 Jun 2022 07:08 )             32.0     26 Jun 2022 07:08    140    |  104    |  22     ----------------------------<  101    3.4     |  31     |  1.20     Ca    8.1        26 Jun 2022 07:08  Phos  2.6       26 Jun 2022 07:08  Mg     2.4       26 Jun 2022 07:08    TPro  6.0    /  Alb  2.3    /  TBili  0.8    /  DBili  x      /  AST  44     /  ALT  25     /  AlkPhos  101    26 Jun 2022 07:08    PT/INR - ( 26 Jun 2022 07:08 )   PT: 17.2 sec;   INR: 1.46 ratio         PTT - ( 26 Jun 2022 07:08 )  PTT:35.7 sec               Anemia Panel:      Thyroid Panel:            Serum Pro-Brain Natriuretic Peptide: 9883 pg/mL (06-25-22 @ 07:03)  Serum Pro-Brain Natriuretic Peptide: 97569 pg/mL (06-20-22 @ 09:53)      RADIOLOGY & ADDITIONAL TESTS:    HEALTH ISSUES - PROBLEM Dx:  Acute kidney injury superimposed on CKD    Lower extremity edema    Paroxysmal atrial fibrillation    Hypertension    Anemia    Hypothyroid    Need for prophylactic measure    NSTEMI (non-ST elevation myocardial infarction)    Acute systolic congestive heart failure            Consultant(s) Notes Reviewed:  [x  ] YES     Care Discussed with [ ] Consultants  [ x ] Patient  [ x ] Family [  ] HCP [  ]   [  ] Social Service  [ x ] RN, [  ] Physical Therapy,[  ] Palliative care team  DVT PPX: [  ] Lovenox, [  ] S C Heparin, [x  ] Coumadin, [  ] Xarelto, [  ] Eliquis, [  ] Pradaxa, [  ] IV Heparin drip, [  ] SCD [  ] Contraindication 2 to GI Bleed,[  ] Ambulation [  ] Contraindicated 2 to  bleed [  ] Contraindicated 2 to Brain Bleed  Advanced directive: [  ] None, [ x ] DNR/DNI   Patient is a 89y old  Female who presents with a chief complaint of CP w/ elevated trops (25 Jun 2022 10:56)        HPI:  Patient is a 89 y/o F with a pmhx b/l LE edema, HTN, P Afib (on coumadin), and hypothyroidism presenting with sudden onset mid-sternal CP. Pt describes pain as a "prickly feeling", rated 4/10, constant in nature since 4am on 6/17, resolved in the ED. Pain was associated with L arm weakness, now resolved. Pt states the pain woke her up, and she was scared to walk and was unable to use her L arm for support to get out of bed. Pt was also nauseous at this time. No associated diaphoresis, palpitations, chest pressure. No amaurosis fugax, facial droop, garbled speech, hemiparesis, LOC, falls reported. No vomiting, fevers, chills, cough, sore throat, SOB, abd pain, constipation, dysuria. Pt had one loose BM this AM at home, denied hematochezia or melena.       In ED:  VS - HR 71 | /65 | RR 18 | sO2 94 | T 97.6  Labs - HgB 11.2 | INR 1.85 | K 3.3 | Glu 114 | trop 342.6 | CKMB 3.2 | BNP 2396  CTH NC - No acute intracranial hemorrhage. Lacunar infarct R inferior cerebellum  EKG - vent rate 66, QTc 501ms, sinus rhythm, old LBBB, no ischemic changes on personal read  Given -  mg PO. pt seen by cardiology Dr Almazan , pt admitted to [facility] for NSTEMI. (17 Jun 2022 09:52)    INTERVAL HPI:  6/18/22: Seen and examined at bedside. No acute complaints. Denies chest pain overnight and this AM. Overnight pt had episode of dyspnea, troponins inc from 300s to >31356. Trops now downtrended. Received 1 x dose 40mg IV lasix for dyspnea. Pt had statin dose inc to 80mg, had 1 x dose lopressor 25mg, and had her scheduled evening coumadin. Had 10 beats of NSVT this AM. Pt was started on standing 50mg toprol xL PO qD. Started on plavix 75mg qD. Extensive counseling done with the patient on [facility] and her current state of health. Patient states she has "forms at home" that her daughter will bring to [facility]. Patient values quality of life and wants to discuss with her daughter and son-inlaw. Family to discuss potential plan for cardiac cath and will arrive to a decision on 6/19. C/W medical therapy for ACS. HOLD coumadin as pt INR is therapeutic and to facilitate potential for cardiac cath. Will start renal adjusted FD Lovenox on 6/19 if INR is below 2.   6/19/22 Pt seen and examined at bedside. Pt states she slept well last night. Pt denies SOB, CP, palpitations, dizziness .Mildly elevated Cr , INR -Theraputic  Pt states the last time she had chest pain was Friday night. Later in the afternoon again patient was seen at bedside with  daughter HCP and son in law at bedside, decision was made for DNR/ DNI and cardiac cath.  6/20/22: Patient seen and examined at bedside. Patient denies chest pain, palpitations, dyspnea. Noted to be slightly short of breath. proBNP >13k, will give 1 dose of lasix 40mg IV today.  6/21/22: Patient continues to deny anginal symptoms. Will be transferred for cardiac cath, as INR downtrended to 1.6 and Cr improved to 1.3. For LUIS CARLOS on CKD, will defer further IVF hydration at this time due to TTE results and SOB after previous IVF. Will give Mucomyst q12 x4 for renal protection. Given lasix 40mg IVP x1 to improve respirations. Given full dose lovenox x1.   6/22/22: Overnight patient with R sided abdominal discomfort, given maalox. Patient breathing improved s/p lasix 40mg IV yesterday. Patient's INR 1.4 today and Cr increased to 1.5. Plan for cardiac cath If Cr stable. Given additional dose of full dose lovenox.  6/23/22: Patient seen and examined at bedside. Patient denies chest pain, pressure, sob, palpitations. Cr downtrending today,  As per Cardiology NO plan for transfer for cardiac cath as pt is still SOB , Needs Lasix & Cr is elevated.. Low stable H/H. Warfarin 3mg today.  6/24/22: Patient seen and examined at bedside. Patient denies any SOB today, speaking comfortably. Denies chest pain, pressure, sob, palpitations. Restart Lasix & Coumadin  6/25/22: Patient seen and examined at bedside. Patient denies any SOB today, speaking comfortably. Denies chest pain, pressure, sob, palpitations. Continue home lasix dose and coumadin restart. Lasix 20 mg IVP x 1 dose.  6/26/22: Patient seen and examined at bedside. Pt denies any SOB, CP, palpitations dizziness. Pt is on 2 L NC however cannula had fallen out of nose, replaced NC and dropped down to 1L.     OVERNIGHT EVENTS: No acute events overnight.     Home Medications:  aspirin 81 mg oral delayed release tablet: 1 tab(s) orally once a day (21 Jun 2022 08:48)  atorvastatin 80 mg oral tablet: 1 tab(s) orally once a day (at bedtime) (21 Jun 2022 08:48)  clopidogrel 75 mg oral tablet: 1 tab(s) orally once a day (21 Jun 2022 08:48)  Lasix 40 mg oral tablet: 1 tab(s) orally 2 times a day (17 Jun 2022 11:27)  metoprolol succinate 50 mg oral tablet, extended release: 1 tab(s) orally once a day (21 Jun 2022 08:48)  pantoprazole 40 mg intravenous injection: 40 milligram(s) intravenous once a day (21 Jun 2022 08:48)  Synthroid 75 mcg (0.075 mg) oral tablet: 1 tab(s) orally once a day (17 Jun 2022 11:27)  warfarin 3 mg oral tablet: 1 tab(s) orally once a day (M, Tu, Th, F, Sa Grider) (17 Jun 2022 11:27)  warfarin 4 mg oral tablet: 1 tab(s) orally Wednesday (17 Jun 2022 11:27)      MEDICATIONS  (STANDING):  aspirin enteric coated 81 milliGRAM(s) Oral daily  atorvastatin 80 milliGRAM(s) Oral at bedtime  clopidogrel Tablet 75 milliGRAM(s) Oral daily  furosemide    Tablet 40 milliGRAM(s) Oral two times a day  levothyroxine 75 MICROGram(s) Oral daily  metoprolol succinate ER 50 milliGRAM(s) Oral daily  pantoprazole  Injectable 40 milliGRAM(s) IV Push daily  zinc oxide 40% Paste 1 Application(s) Topical two times a day    MEDICATIONS  (PRN):  ALBUTerol    90 MICROgram(s) HFA Inhaler 2 Puff(s) Inhalation every 12 hours PRN Shortness of Breath and/or Wheezing  aluminum hydroxide/magnesium hydroxide/simethicone Suspension 30 milliLiter(s) Oral every 6 hours PRN Dyspepsia      Allergies    No Known Allergies    Intolerances      REVIEW OF SYSTEMS: I feel "fine"  CONSTITUTIONAL: No fever, No chills, No fatigue, No myalgia, No Body ache, No Weakness  EYES: No eye pain,  No visual disturbances, No discharge, NO Redness  ENMT:  No ear pain, No nose bleed, No vertigo; No sinus pain, NO throat pain, No Congestion  NECK: No pain, No stiffness  RESPIRATORY: No cough, NO wheezing, No hemoptysis, ADMITS brief shortness of breath earlier, now resolved  CARDIOVASCULAR: No chest pain, palpitations  GASTROINTESTINAL: No abdominal pain, NO epigastric pain. No nausea, No vomiting; No diarrhea, No constipation. [ x ] BM  GENITOURINARY: No dysuria, No frequency, No urgency, No hematuria, NO incontinence  NEUROLOGICAL: No headaches, No dizziness, No numbness, No tingling, No tremors, No weakness  EXT: admits chronic Swelling in LE's, No Pain, admits LE Edema  SKIN:  [ x ] No itching, burning, rashes, or lesions   MUSCULOSKELETAL: No joint pain ,No Jt swelling; No muscle pain, No back pain, No extremity pain  PSYCHIATRIC: No depression,  No anxiety,  No mood swings ,No difficulty sleeping at night  PAIN SCALE: [ x ] None  [  ] Other-  ROS Unable to obtain due to - [  ] Dementia  [  ] Lethargy [  ] Drowsy [  ] Sedated [  ] non verbal    Vital Signs Last 24 Hrs  T(C): 36.6 (26 Jun 2022 04:29), Max: 36.7 (25 Jun 2022 21:23)  T(F): 97.8 (26 Jun 2022 04:29), Max: 98 (25 Jun 2022 21:23)  HR: 76 (26 Jun 2022 04:29) (71 - 76)  BP: 111/61 (26 Jun 2022 04:29) (108/84 - 119/66)  BP(mean): --  RR: 18 (26 Jun 2022 04:29) (18 - 18)  SpO2: 94% (26 Jun 2022 04:29) (86% - 94%)    Finger Stick        06-25 @ 07:01 - 06-26 @ 07:00  --------------------------------------------------------  IN: 0 mL / OUT: 600 mL / NET: -600 mL    06-26 @ 07:01 - 06-26 @ 09:22  --------------------------------------------------------  IN: 237 mL / OUT: 0 mL / NET: 237 mL      PHYSICAL EXAM: O2 NC out of nose but at 2 L, replaced and dropped down to 1 L NC   GENERAL:  [ x ] NAD initially sleeping when entered room, [ x ] well appearing, [  ] Agitated, [  ] Drowsy,  [  ] Lethargy, [  ] confused   HEAD:  [ x ] Normal, [  ] Other  EYES:  [ x ] EOMI, [ x ] PERRLA, [ x ] conjunctiva and sclera clear normal, [  ] Other,  [  ] Pallor,[  ] Discharge  ENMT:  [ x ] Normal, [ x ] Moist mucous membranes, [ x ] Good dentition, [ x ] No Thrush  NECK:  [ x ] Supple, [x  ] No JVD, [ x ] Normal thyroid, [  ] Lymphadenopathy [  ] Other  CHEST/LUNG:  [ x ] Clear to auscultation bilaterally, [ x ] Breath Sounds equal B/L , [  ] poor effort  [ x ] No rales, [ x ] No rhonchi  [ x ]  slight expiratory wheezing,   HEART:  [ x ] Regular rate and IRREGULARLY IRREGULAR rhythm, [  ] tachycardia, [  ] Bradycardia,  [  ] irregular  [ x ] systolic murmur heard best at RUSB No rubs, No gallops, [  ] PPM in place (Mfr:  )  ABDOMEN:  [ x ] Soft, [ x ] Nontender, [ x ] Nondistended, [ x ] No mass, [ x] Bowel sounds present, [ x ] obese  NERVOUS SYSTEM:  [ x ] Alert & Oriented X3, [ x ] Nonfocal  [  ] Confusion  [  ] Encephalopathic [  ] Sedated [  ] Unable to assess, [  ] Dementia [  ] Other-  EXTREMITIES: [ x ] 2+ Peripheral Pulses, No clubbing, No cyanosis,  [ x ] b/l UE +2 pitting edema gathering at proximal forearms, trace edema b/l LEs, 2+ radial pulses b/l, extremities warm to touch [ x ] PVD stasis skin changes B/L Lower EXT, [  ] wound  LYMPH: No lymphadenopathy noted  SKIN:  [ x ] No rashes or lesions, [  ] Pressure Ulcers, [  ] ecchymosis, [  ] Skin Tears, [ x ] Other - redness B/L Buttocks    DIET: Diet, DASH/TLC:   Sodium & Cholesterol Restricted  1200mL Fluid Restriction ([id number]) (06-22-22 @ 08:53)  DIET:     LABS:                        10.5   5.08  )-----------( 216      ( 26 Jun 2022 07:08 )             32.0     26 Jun 2022 07:08    140    |  104    |  22     ----------------------------<  101    3.4     |  31     |  1.20     Ca    8.1        26 Jun 2022 07:08  Phos  2.6       26 Jun 2022 07:08  Mg     2.4       26 Jun 2022 07:08    TPro  6.0    /  Alb  2.3    /  TBili  0.8    /  DBili  x      /  AST  44     /  ALT  25     /  AlkPhos  101    26 Jun 2022 07:08    PT/INR - ( 26 Jun 2022 07:08 )   PT: 17.2 sec;   INR: 1.46 ratio         PTT - ( 26 Jun 2022 07:08 )  PTT:35.7 sec               Anemia Panel:      Thyroid Panel:            Serum Pro-Brain Natriuretic Peptide: 9883 pg/mL (06-25-22 @ 07:03)  Serum Pro-Brain Natriuretic Peptide: 62133 pg/mL (06-20-22 @ 09:53)      RADIOLOGY & ADDITIONAL TESTS:    HEALTH ISSUES - PROBLEM Dx:  Acute kidney injury superimposed on CKD    Lower extremity edema    Paroxysmal atrial fibrillation    Hypertension    Anemia    Hypothyroid    Need for prophylactic measure    NSTEMI (non-ST elevation myocardial infarction)    Acute systolic congestive heart failure            Consultant(s) Notes Reviewed:  [x  ] YES     Care Discussed with [ ] Consultants  [ x ] Patient  [ x ] Family [  ] HCP [  ]   [  ] Social Service  [ x ] RN, [  ] Physical Therapy,[  ] Palliative care team  DVT PPX: [  ] Lovenox, [  ] S C Heparin, [x  ] Coumadin, [  ] Xarelto, [  ] Eliquis, [  ] Pradaxa, [  ] IV Heparin drip, [  ] SCD [  ] Contraindication 2 to GI Bleed,[  ] Ambulation [  ] Contraindicated 2 to  bleed [  ] Contraindicated 2 to Brain Bleed  Advanced directive: [  ] None, [ x ] DNR/DNI   Patient is a 89y old  Female who presents with a chief complaint of CP w/ elevated trops (25 Jun 2022 10:56)        HPI:  Patient is a 89 y/o F with a pmhx b/l LE edema, HTN, P Afib (on coumadin), and hypothyroidism presenting with sudden onset mid-sternal CP. Pt describes pain as a "prickly feeling", rated 4/10, constant in nature since 4am on 6/17, resolved in the ED. Pain was associated with L arm weakness, now resolved. Pt states the pain woke her up, and she was scared to walk and was unable to use her L arm for support to get out of bed. Pt was also nauseous at this time. No associated diaphoresis, palpitations, chest pressure. No amaurosis fugax, facial droop, garbled speech, hemiparesis, LOC, falls reported. No vomiting, fevers, chills, cough, sore throat, SOB, abd pain, constipation, dysuria. Pt had one loose BM this AM at home, denied hematochezia or melena.       In ED:  VS - HR 71 | /65 | RR 18 | sO2 94 | T 97.6  Labs - HgB 11.2 | INR 1.85 | K 3.3 | Glu 114 | trop 342.6 | CKMB 3.2 | BNP 2396  CTH NC - No acute intracranial hemorrhage. Lacunar infarct R inferior cerebellum  EKG - vent rate 66, QTc 501ms, sinus rhythm, old LBBB, no ischemic changes on personal read  Given -  mg PO. pt seen by cardiology Dr Almazan , pt admitted to [facility] for NSTEMI. (17 Jun 2022 09:52)    INTERVAL HPI:  6/18/22: Seen and examined at bedside. No acute complaints. Denies chest pain overnight and this AM. Overnight pt had episode of dyspnea, troponins inc from 300s to >18125. Trops now downtrended. Received 1 x dose 40mg IV lasix for dyspnea. Pt had statin dose inc to 80mg, had 1 x dose lopressor 25mg, and had her scheduled evening coumadin. Had 10 beats of NSVT this AM. Pt was started on standing 50mg toprol xL PO qD. Started on plavix 75mg qD. Extensive counseling done with the patient on [facility] and her current state of health. Patient states she has "forms at home" that her daughter will bring to [facility]. Patient values quality of life and wants to discuss with her daughter and son-inlaw. Family to discuss potential plan for cardiac cath and will arrive to a decision on 6/19. C/W medical therapy for ACS. HOLD coumadin as pt INR is therapeutic and to facilitate potential for cardiac cath. Will start renal adjusted FD Lovenox on 6/19 if INR is below 2.   6/19/22 Pt seen and examined at bedside. Pt states she slept well last night. Pt denies SOB, CP, palpitations, dizziness .Mildly elevated Cr , INR -Theraputic  Pt states the last time she had chest pain was Friday night. Later in the afternoon again patient was seen at bedside with  daughter HCP and son in law at bedside, decision was made for DNR/ DNI and cardiac cath.  6/20/22: Patient seen and examined at bedside. Patient denies chest pain, palpitations, dyspnea. Noted to be slightly short of breath. proBNP >13k, will give 1 dose of lasix 40mg IV today.  6/21/22: Patient continues to deny anginal symptoms. Will be transferred for cardiac cath, as INR downtrended to 1.6 and Cr improved to 1.3. For LUIS CARLOS on CKD, will defer further IVF hydration at this time due to TTE results and SOB after previous IVF. Will give Mucomyst q12 x4 for renal protection. Given lasix 40mg IVP x1 to improve respirations. Given full dose lovenox x1.   6/22/22: Overnight patient with R sided abdominal discomfort, given maalox. Patient breathing improved s/p lasix 40mg IV yesterday. Patient's INR 1.4 today and Cr increased to 1.5. Plan for cardiac cath If Cr stable. Given additional dose of full dose lovenox.  6/23/22: Patient seen and examined at bedside. Patient denies chest pain, pressure, sob, palpitations. Cr downtrending today,  As per Cardiology NO plan for transfer for cardiac cath as pt is still SOB , Needs Lasix & Cr is elevated.. Low stable H/H. Warfarin 3mg today.  6/24/22: Patient seen and examined at bedside. Patient denies any SOB today, speaking comfortably. Denies chest pain, pressure, sob, palpitations. Restart Lasix & Coumadin  6/25/22: Patient seen and examined at bedside. Patient denies any SOB today, speaking comfortably. Denies chest pain, pressure, sob, palpitations. Continue home lasix dose and coumadin restart. Lasix 20 mg IVP x 1 dose.  6/26/22: Patient seen and examined at bedside. Pt denies any SOB, CP, palpitations dizziness. Pt is on 2 L NC however cannula had fallen out of nose, replaced NC and dropped down to 1L. Lasix 2x day    OVERNIGHT EVENTS: No acute events overnight.     Home Medications:  aspirin 81 mg oral delayed release tablet: 1 tab(s) orally once a day (21 Jun 2022 08:48)  atorvastatin 80 mg oral tablet: 1 tab(s) orally once a day (at bedtime) (21 Jun 2022 08:48)  clopidogrel 75 mg oral tablet: 1 tab(s) orally once a day (21 Jun 2022 08:48)  Lasix 40 mg oral tablet: 1 tab(s) orally 2 times a day (17 Jun 2022 11:27)  metoprolol succinate 50 mg oral tablet, extended release: 1 tab(s) orally once a day (21 Jun 2022 08:48)  pantoprazole 40 mg intravenous injection: 40 milligram(s) intravenous once a day (21 Jun 2022 08:48)  Synthroid 75 mcg (0.075 mg) oral tablet: 1 tab(s) orally once a day (17 Jun 2022 11:27)  warfarin 3 mg oral tablet: 1 tab(s) orally once a day (M, Tu, Th, F, Sa Grider) (17 Jun 2022 11:27)  warfarin 4 mg oral tablet: 1 tab(s) orally Wednesday (17 Jun 2022 11:27)      MEDICATIONS  (STANDING):  aspirin enteric coated 81 milliGRAM(s) Oral daily  atorvastatin 80 milliGRAM(s) Oral at bedtime  clopidogrel Tablet 75 milliGRAM(s) Oral daily  furosemide    Tablet 40 milliGRAM(s) Oral two times a day  levothyroxine 75 MICROGram(s) Oral daily  metoprolol succinate ER 50 milliGRAM(s) Oral daily  pantoprazole  Injectable 40 milliGRAM(s) IV Push daily  zinc oxide 40% Paste 1 Application(s) Topical two times a day    MEDICATIONS  (PRN):  ALBUTerol    90 MICROgram(s) HFA Inhaler 2 Puff(s) Inhalation every 12 hours PRN Shortness of Breath and/or Wheezing  aluminum hydroxide/magnesium hydroxide/simethicone Suspension 30 milliLiter(s) Oral every 6 hours PRN Dyspepsia      Allergies    No Known Allergies    Intolerances      REVIEW OF SYSTEMS: I feel tired   CONSTITUTIONAL: No fever, No chills, No fatigue, No myalgia, No Body ache, No Weakness  EYES: No eye pain,  No visual disturbances, No discharge, NO Redness  ENMT:  No ear pain, No nose bleed, No vertigo; No sinus pain, NO throat pain, No Congestion  NECK: No pain, No stiffness  RESPIRATORY: No cough, NO wheezing, No hemoptysis, ADMITS brief shortness of breath earlier, now resolved  CARDIOVASCULAR: No chest pain, palpitations  GASTROINTESTINAL: No abdominal pain, NO epigastric pain. No nausea, No vomiting; No diarrhea, No constipation. [ x ] BM  GENITOURINARY: No dysuria, No frequency, No urgency, No hematuria, NO incontinence  NEUROLOGICAL: No headaches, No dizziness, No numbness, No tingling, No tremors, No weakness  EXT: admits chronic Swelling in LE's, No Pain, admits LE Edema  SKIN:  [ x ] No itching, burning, rashes, or lesions   MUSCULOSKELETAL: No joint pain ,No Jt swelling; No muscle pain, No back pain, No extremity pain  PSYCHIATRIC: No depression,  No anxiety,  No mood swings ,No difficulty sleeping at night  PAIN SCALE: [ x ] None  [  ] Other-  ROS Unable to obtain due to - [  ] Dementia  [  ] Lethargy [  ] Drowsy [  ] Sedated [  ] non verbal    Vital Signs Last 24 Hrs  T(C): 36.6 (26 Jun 2022 04:29), Max: 36.7 (25 Jun 2022 21:23)  T(F): 97.8 (26 Jun 2022 04:29), Max: 98 (25 Jun 2022 21:23)  HR: 76 (26 Jun 2022 04:29) (71 - 76)  BP: 111/61 (26 Jun 2022 04:29) (108/84 - 119/66)  BP(mean): --  RR: 18 (26 Jun 2022 04:29) (18 - 18)  SpO2: 94% (26 Jun 2022 04:29) (86% - 94%)    Finger Stick        06-25 @ 07:01 - 06-26 @ 07:00  --------------------------------------------------------  IN: 0 mL / OUT: 600 mL / NET: -600 mL    06-26 @ 07:01 - 06-26 @ 09:22  --------------------------------------------------------  IN: 237 mL / OUT: 0 mL / NET: 237 mL      PHYSICAL EXAM: O2 NC out of nose but at 2 L, replaced and dropped down to 1 L NC   GENERAL:  [ x ] NAD initially sleeping when entered room, [ x ] well appearing, [  ] Agitated, [  ] Drowsy,  [  ] Lethargy, [  ] confused   HEAD:  [ x ] Normal, [  ] Other  EYES:  [ x ] EOMI, [ x ] PERRLA, [ x ] conjunctiva and sclera clear normal, [  ] Other,  [  ] Pallor,[  ] Discharge  ENMT:  [ x ] Normal, [ x ] Moist mucous membranes, [ x ] Good dentition, [ x ] No Thrush  NECK:  [ x ] Supple, [x  ] No JVD, [ x ] Normal thyroid, [  ] Lymphadenopathy [  ] Other  CHEST/LUNG:  [ x ] Clear to auscultation bilaterally, [ x ] Breath Sounds equal B/L , [  ] poor effort  [ x ] No rales, [ x ] No rhonchi  [ x ]  slight expiratory wheezing,   HEART:  [ x ] Regular rate and IRREGULARLY IRREGULAR rhythm, [  ] tachycardia, [  ] Bradycardia,  [  ] irregular  [ x ] systolic murmur heard best at RUSB No rubs, No gallops, [  ] PPM in place (Mfr:  )  ABDOMEN:  [ x ] Soft, [ x ] Nontender, [ x ] Nondistended, [ x ] No mass, [ x] Bowel sounds present, [ x ] obese  NERVOUS SYSTEM:  [ x ] Alert & Oriented X3, [ x ] Nonfocal  [  ] Confusion  [  ] Encephalopathic [  ] Sedated [  ] Unable to assess, [  ] Dementia [  ] Other-  EXTREMITIES: [ x ] 2+ Peripheral Pulses, No clubbing, No cyanosis,  [ x ] b/l UE +2 pitting edema legs &  gathering at proximal forearms, trace edema b/l LEs, 2+ radial pulses b/l, extremities warm to touch [ x ] PVD stasis skin changes B/L Lower EXT, [  ] wound  LYMPH: No lymphadenopathy noted  SKIN:  [ x ] No rashes or lesions, [  ] Pressure Ulcers, [  ] ecchymosis, [  ] Skin Tears, [ x ] Other - redness B/L Buttocks    DIET: Diet, DASH/TLC:   Sodium & Cholesterol Restricted  1200mL Fluid Restriction ([id number]) (06-22-22 @ 08:53)  DIET:     LABS:                        10.5   5.08  )-----------( 216      ( 26 Jun 2022 07:08 )             32.0     26 Jun 2022 07:08    140    |  104    |  22     ----------------------------<  101    3.4     |  31     |  1.20     Ca    8.1        26 Jun 2022 07:08  Phos  2.6       26 Jun 2022 07:08  Mg     2.4       26 Jun 2022 07:08    TPro  6.0    /  Alb  2.3    /  TBili  0.8    /  DBili  x      /  AST  44     /  ALT  25     /  AlkPhos  101    26 Jun 2022 07:08    PT/INR - ( 26 Jun 2022 07:08 )   PT: 17.2 sec;   INR: 1.46 ratio         PTT - ( 26 Jun 2022 07:08 )  PTT:35.7 sec               Anemia Panel:      Thyroid Panel:            Serum Pro-Brain Natriuretic Peptide: 9883 pg/mL (06-25-22 @ 07:03)  Serum Pro-Brain Natriuretic Peptide: 23065 pg/mL (06-20-22 @ 09:53)      RADIOLOGY & ADDITIONAL TESTS:    HEALTH ISSUES - PROBLEM Dx:  Acute kidney injury superimposed on CKD    Lower extremity edema    Paroxysmal atrial fibrillation    Hypertension    Anemia    Hypothyroid    Need for prophylactic measure    NSTEMI (non-ST elevation myocardial infarction)    Acute systolic congestive heart failure            Consultant(s) Notes Reviewed:  [x  ] YES     Care Discussed with [ ] Consultants  [ x ] Patient  [ x ] Family [  ] HCP [  ]   [  ] Social Service  [ x ] RN, [  ] Physical Therapy,[  ] Palliative care team  DVT PPX: [  ] Lovenox, [  ] S C Heparin, [x  ] Coumadin, [  ] Xarelto, [  ] Eliquis, [  ] Pradaxa, [  ] IV Heparin drip, [  ] SCD [  ] Contraindication 2 to GI Bleed,[  ] Ambulation [  ] Contraindicated 2 to  bleed [  ] Contraindicated 2 to Brain Bleed  Advanced directive: [  ] None, [ x ] DNR/DNI

## 2022-06-26 NOTE — PROGRESS NOTE ADULT - PROBLEM SELECTOR PLAN 6
chronic, swelling LE b/l on admit  -On home lasix 40 mg 2x day  -Will give additional lasix 20mg IV  TTE: EF 35-40%, LV hypokinesis

## 2022-06-26 NOTE — PROGRESS NOTE ADULT - SUBJECTIVE AND OBJECTIVE BOX
Patient is a 89y old  Female who presents with a chief complaint of CP w/ elevated trops (19 Jun 2022 09:28)       HPI:  Patient is a 89 y/o F with a pmhx b/l LE edema, HTN, P Afib (on coumadin), and hypothyroidism presenting with sudden onset mid-sternal CP. Pt describes pain as a "prickly feeling", rated 4/10, constant in nature since 4am on 6/17, resolved in the ED. Pain was associated with L arm weakness, now resolved. Pt states the pain woke her up, and she was scared to walk and was unable to use her L arm for support to get out of bed. Pt was also nauseous at this time. No associated diaphoresis, palpitations, chest pressure. No amaurosis fugax, facial droop, garbled speech, hemiparesis, LOC, falls reported. No vomiting, fevers, chills, cough, sore throat, SOB, abd pain, constipation, dysuria. Pt had one loose BM this AM at home, denied hematochezia or melena.   Patient has not seen nephrologist in the past.  Denies any N/V/Urinary complaints.      No acute events noted       PAST MEDICAL & SURGICAL HISTORY:  Hypothyroid      Hypertension      Lower extremity edema      Paroxysmal atrial fibrillation           FAMILY HISTORY:  NC    Social History:Non smoker    MEDICATIONS  (STANDING):  acetylcysteine  Oral Solution 600 milliGRAM(s) Oral every 12 hours  aspirin enteric coated 81 milliGRAM(s) Oral daily  atorvastatin 80 milliGRAM(s) Oral at bedtime  calcium gluconate IVPB 1 Gram(s) IV Intermittent once  clopidogrel Tablet 75 milliGRAM(s) Oral daily  furosemide   Injectable 40 milliGRAM(s) IV Push once  levothyroxine 75 MICROGram(s) Oral daily  metoprolol succinate ER 50 milliGRAM(s) Oral daily  pantoprazole  Injectable 40 milliGRAM(s) IV Push daily  sodium chloride 0.9%. 1000 milliLiter(s) (50 mL/Hr) IV Continuous <Continuous>    MEDICATIONS  (PRN):      Allergies    No Known Allergies    Intolerances         REVIEW OF SYSTEMS:    Review of Systems:   Constitutional: Denies fatigue  HEENT: Denies headaches and dizziness  Respiratory: denies SOB, cough, or wheezing  Cardiovascular: denies CP, palpitations  Gastrointestinal: Denies nausea, denies vomiting, diarrhea, constipation, abdominal pain, or bloody stools  Genitourinary: denies painful urination, increased frequency, urgency, or bloody urine  Skin: denies rashes or itching  Musculoskeletal: denies muscle aches, joint swelling  Neurologic: Denies generalized weakness, denies loss of sensation, numbness, or tingling    Vital Signs Last 24 Hrs  T(C): 36.6 (26 Jun 2022 04:29), Max: 36.7 (25 Jun 2022 21:23)  T(F): 97.8 (26 Jun 2022 04:29), Max: 98 (25 Jun 2022 21:23)  HR: 76 (26 Jun 2022 04:29) (71 - 76)  BP: 111/61 (26 Jun 2022 04:29) (108/84 - 119/66)  BP(mean): --  RR: 18 (26 Jun 2022 04:29) (18 - 18)  SpO2: 94% (26 Jun 2022 04:29) (86% - 94%)      PHYSICAL EXAM:    GENERAL: NAD  HEAD:  Atraumatic, Normocephalic  EYES: EOMI, conjunctiva and sclera clear  ENMT: No Drainage from nares, No drainage from ears  NECK: Supple, neck  veins full  NERVOUS SYSTEM:  Awake and Alert  CHEST/LUNG: mildly Decreased BS R No rales, rhonchi, wheezing, or rubs  HEART: Regular rate and rhythm; No murmurs, rubs, or gallops  ABDOMEN: Soft, Nontender, Nondistended; Bowel sounds present  EXTREMITIES:  + Edema  SKIN: No rashes No obvious ecchymosis      LABS:                        10.5   5.08  )-----------( 216      ( 26 Jun 2022 07:08 )             32.0     06-26    140  |  104  |  22  ----------------------------<  101<H>  3.4<L>   |  31  |  1.20    Ca    8.1<L>      26 Jun 2022 07:08  Phos  2.6     06-26  Mg     2.4     06-26    TPro  6.0  /  Alb  2.3<L>  /  TBili  0.8  /  DBili  x   /  AST  44<H>  /  ALT  25  /  AlkPhos  101  06-26    PT/INR - ( 26 Jun 2022 07:08 )   PT: 17.2 sec;   INR: 1.46 ratio         PTT - ( 26 Jun 2022 07:08 )  PTT:35.7 sec

## 2022-06-26 NOTE — CHART NOTE - NSCHARTNOTEFT_GEN_A_CORE
Called by RN for Area of pitting edema noted to proximal forearm of LUE. Pt seen and examined at bedside. Pt denies SOB, CP, palpitations, UE numbness/tingling, UE pain.     T(C): 36.6 (06-26-22 @ 12:00), Max: 36.7 (06-25-22 @ 21:23)  HR: 77 (06-26-22 @ 12:00) (71 - 77)  BP: 117/68 (06-26-22 @ 12:00) (108/84 - 119/66)  RR: 18 (06-26-22 @ 12:06) (18 - 18)  SpO2: 92% (06-26-22 @ 12:06) (82% - 94%)  Wt(kg): --    Physical :  Gen- NAD  Ext- b/l UE +2 pitting edema gathering at proximal forearms, trace edema b/l LEs, 2+ radial pulses b/l, extremities warm to touch  Neuro- alert and oriented x4, moving all 4 extremities, sensation intact and equal b/l UEs, strength 5/5 b/l extrem    LABS:                        10.5   5.08  )-----------( 216      ( 26 Jun 2022 07:08 )             32.0     06-26    140  |  104  |  22  ----------------------------<  101<H>  3.4<L>   |  31  |  1.20    Ca    8.1<L>      26 Jun 2022 07:08  Phos  2.6     06-26  Mg     2.4     06-26    TPro  6.0  /  Alb  2.3<L>  /  TBili  0.8  /  DBili  x   /  AST  44<H>  /  ALT  25  /  AlkPhos  101  06-26    PT/INR - ( 26 Jun 2022 07:08 )   PT: 17.2 sec;   INR: 1.46 ratio         PTT - ( 26 Jun 2022 07:08 )  PTT:35.7 sec            Assessment/Plan  89y Female with PMHX LE edema, HTN, P Afib (on coumadin), anemia, and hypothyroidism admitted for NSTEMI, acute CHF exacerbation. Pt now with UE edema.     -b/l +2 UE swelling in similar distribution b/l in proximal forearm region. Similar to morning exam. Pt is being treated for volume overload in setting of acute systolic CHF exacerbation. Is being treated with lasix 40 PO BID and is being followed by cardiology for diuresis reccs.   -discussed with RN  -RN to call if any changes    Dr Duran  [id number]  [phone]

## 2022-06-26 NOTE — PROGRESS NOTE ADULT - PROBLEM SELECTOR PLAN 2
Acute Systolic CHF TTE: EF 35-40%, LV hypokinesis 2/2 NSTEMI   - proBNP>13k  - Daily assessment of volume status,  -Restarted home Lasix 40 mg BID  -Will give additional lasix 20mg IV  - avoid IVF  - I/Os, daily weights, D/W Cardio    TTE: Mild to moderate segmental left ventricular systolic dysfunction, estimated LVEF of 35-40%. The apex, mid inferoseptal and mid anterolateral walls appear severely hypokinetic Right ventricular enlargement with grossly normal function Biatrial enlargement Calcified trileaflet aortic valve with mild to moderate aortic stenosis,   without AI. Mild MR Moderate TR. No significant pericardial effusion. presents with acute CP pain w radiation down L arm - trops elevated w/ no acute EKG changes   - admitted to to tele for NSTEMI , Few beats - NSVT on Tele. Remains non anginal   -As per discussion with cardio, NO  plan for outpatient cath 2/2 persistent SOB/Fluid overload  -trop initially 342.6, up trended to over 22K, came down to 15 K   - EKG Afib with underlying LBBB; unchanged from previous  -c/w ASA 81mg qD, Lipitor 80mg qD, Toprol xl 50mg qD,  PPI   - TTE: EF 35-40%, LV hypokinesis  - continuous tele monitoring  -cardio Dr. Blair d/w No Cath as still fluid overload , D/W cardio NP  -Restarted home Lasix 40 mg BID  -DNR DNI order placed, JAMES in chart presents with acute CP pain w radiation down L arm - trops elevated w/ no acute EKG changes   - admitted to to tele for NSTEMI , Few beats - NSVT on Tele. Remains non anginal   -As per discussion with cardio, no plan for outpatient cath 2/2 persistent SOB/Fluid overload  -trop initially 342.6, up trended to over 22K, came down to 15 K   - EKG Afib with underlying LBBB; unchanged from previous  -c/w ASA 81mg qD, Lipitor 80mg qD, Toprol xl 50mg qD, PPI   - TTE: EF 35-40%, LV hypokinesis  -continuous tele monitoring  -cardio Dr. Blair d/w no Cath as still fluid overload   -continue home Lasix 40 mg BID  -DNR DNI order placed, JAMES in chart

## 2022-06-26 NOTE — PROGRESS NOTE ADULT - SUBJECTIVE AND OBJECTIVE BOX
Neurology Follow up note    KENIA [id number]    HPI:  Patient is a 89 y/o F with a pmhx b/l LE edema, HTN, P Afib (on coumadin), and hypothyroidism presenting with sudden onset mid-sternal CP. Pt describes pain as a "prickly feeling", rated 4/10, constant in nature since 4am on 6/17, resolved in the ED. Pain was associated with L arm weakness, now resolved. Pt states the pain woke her up, and she was scared to walk and was unable to use her L arm for support to get out of bed. Pt was also nauseous at this time. No associated diaphoresis, palpitations, chest pressure. No amaurosis fugax, facial droop, garbled speech, hemiparesis, LOC, falls reported. No vomiting, fevers, chills, cough, sore throat, SOB, abd pain, constipation, dysuria. Pt had one loose BM this AM at home, denied hematochezia or melena.       In ED:  VS - HR 71 | /65 | RR 18 | sO2 94 | T 97.6  Labs - HgB 11.2 | INR 1.85 | K 3.3 | Glu 114 | trop 342.6 | CKMB 3.2 | BNP 2396  CTH NC - No acute intracranial hemorrhage. Lacunar infarct R inferior cerebellum  EKG - vent rate 66, QTc 501ms, sinus rhythm, old LBBB, no ischemic changes on personal read  Given -  mg PO. pt seen by cardiology Dr Almazan , pt admitted to [facility] for NSTEMI. (17 Jun 2022 09:52)      Interval History -reported diarrhea    Patient is seen, chart was reviewed and case was discussed with the treatment team.  Pt is not in any distress.   Lying on bed comfortably.     Vital Signs Last 24 Hrs  T(C): 36.6 (26 Jun 2022 12:00), Max: 36.7 (25 Jun 2022 21:23)  T(F): 97.8 (26 Jun 2022 12:00), Max: 98 (25 Jun 2022 21:23)  HR: 77 (26 Jun 2022 12:00) (71 - 77)  BP: 117/68 (26 Jun 2022 12:00) (108/84 - 119/66)  BP(mean): --  RR: 18 (26 Jun 2022 12:06) (18 - 18)  SpO2: 92% (26 Jun 2022 12:06) (82% - 94%)        REVIEW OF SYSTEMS:    Constitutional: No fever,   Eyes: No eye pain, visual disturbances, or discharge  ENT:  No difficulty hearing, tinnitus, vertigo; No sinus or throat pain  Neck: No pain or stiffness  Respiratory: No cough, wheezing, chills or hemoptysis  Cardiovascular: No alpitations, shortness of breath,   Gastrointestinal: No abdominal or epigastric pain. No nausea, vomiting or hematemesis;  Genitourinary: No dysuria, frequency, hematuria  Neurological: No headaches tremors  Psychiatric: No depression, anxiety, mood swings or difficulty sleeping  Musculoskeletal: No joint pain or swelling;   Skin: No itching, burning, rashes or lesions   Lymph Nodes: No enlarged glands  Endocrine: No heat or cold intolerance;   Allergy and Immunologic: No hives or eczema    On Neurological Examination:    Mental Status - Pt is alert, awake, oriented X3.  Follows commands well and able to answer questions appropriately  .Mood and affect  normal    Speech -  Normal.    Cranial Nerves - Pupils 3 mm equal and reactive to light, extraocular eye movements intact. Pt has no visual field deficit.  Pt has no  facial asymmetry. Facial sensation is intact.Tongue - is in midline.    Muscle tone - is normal     Motor Exam - 5/5 of UE  LE 4/5    Sensory Exam -  Pt withdraws all extremities equally on stimulation. No asymmetry seen.      coordination:    Finger to nose: adele    Deep tendon Reflexes - 2 plus all over.         Neck Supple -  Yes.     MEDICATIONS  (STANDING):  aspirin enteric coated 81 milliGRAM(s) Oral daily  atorvastatin 80 milliGRAM(s) Oral at bedtime  BACItracin   Ointment 1 Application(s) Topical two times a day  clopidogrel Tablet 75 milliGRAM(s) Oral daily  furosemide    Tablet 40 milliGRAM(s) Oral two times a day  levothyroxine 75 MICROGram(s) Oral daily  metoprolol succinate ER 50 milliGRAM(s) Oral daily  pantoprazole  Injectable 40 milliGRAM(s) IV Push daily  potassium chloride    Tablet ER 40 milliEquivalent(s) Oral every 4 hours  warfarin 3 milliGRAM(s) Oral once  zinc oxide 40% Paste 1 Application(s) Topical two times a day    MEDICATIONS  (PRN):  ALBUTerol    90 MICROgram(s) HFA Inhaler 2 Puff(s) Inhalation every 12 hours PRN Shortness of Breath and/or Wheezing  aluminum hydroxide/magnesium hydroxide/simethicone Suspension 30 milliLiter(s) Oral every 6 hours PRN Dyspepsia  >      Allergies    No Known Allergies    Intolerances  -                      10.5   5.08  )-----------( 216      ( 26 Jun 2022 07:08 )             32.0     06-26    140  |  104  |  22  ----------------------------<  101<H>  3.4<L>   |  31  |  1.20    Ca    8.1<L>      26 Jun 2022 07:08  Phos  2.6     06-26  Mg     2.4     06-26    TPro  6.0  /  Alb  2.3<L>  /  TBili  0.8  /  DBili  x   /  AST  44<H>  /  ALT  25  /  AlkPhos  101  06-26      Vitamin B12     RADIOLOGY    ASSESSMENT AND PLAN:      SEEN FOE LEFT ARM WEAKNESS LIKELY TIA  SP MI  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  OOB to chair/ambulation with assistance only.  Pain is accessed and addressed.  Would continue to follow.

## 2022-06-26 NOTE — PROGRESS NOTE ADULT - ATTENDING COMMENTS
Patient is a 89 y/o F with a pmhx b/l LE edema, HTN, P Afib (on coumadin), and hypothyroidism presenting with diarrhea and mid-sternal CP admitted for CP w/ elevated trops, NSTEMI.  Pt seen, examined, case & care plan d/w pt, residents at detail.  -Cardiology Dr Blair- Restart Lasix 40 mg 2x day -Home dose   -Renal DR Rangel d/w at detail.  -AM labs   -PO diet  -Palliative care -DNR/DNI  -D/W cardio. No Plan for cardiac cath ,,Medical management , out pt cardiology follow up  -PT----> SINAN -Dtr & Pt refusing SINAN, RA O2 Sat at rest & Ambulation ,   Total care time is 45 minutes.

## 2022-06-27 LAB
ALBUMIN SERPL ELPH-MCNC: 2.3 G/DL — LOW (ref 3.3–5)
ALP SERPL-CCNC: 113 U/L — SIGNIFICANT CHANGE UP (ref 40–120)
ALT FLD-CCNC: 28 U/L — SIGNIFICANT CHANGE UP (ref 12–78)
ANION GAP SERPL CALC-SCNC: 2 MMOL/L — LOW (ref 5–17)
APTT BLD: 37.6 SEC — HIGH (ref 27.5–35.5)
AST SERPL-CCNC: 52 U/L — HIGH (ref 15–37)
BILIRUB SERPL-MCNC: 1 MG/DL — SIGNIFICANT CHANGE UP (ref 0.2–1.2)
BUN SERPL-MCNC: 22 MG/DL — SIGNIFICANT CHANGE UP (ref 7–23)
CALCIUM SERPL-MCNC: 8.3 MG/DL — LOW (ref 8.5–10.1)
CHLORIDE SERPL-SCNC: 104 MMOL/L — SIGNIFICANT CHANGE UP (ref 96–108)
CO2 SERPL-SCNC: 35 MMOL/L — HIGH (ref 22–31)
CREAT SERPL-MCNC: 1.3 MG/DL — SIGNIFICANT CHANGE UP (ref 0.5–1.3)
EGFR: 38 ML/MIN/1.73M2 — LOW
GLUCOSE SERPL-MCNC: 103 MG/DL — HIGH (ref 70–99)
HCT VFR BLD CALC: 33.8 % — LOW (ref 34.5–45)
HGB BLD-MCNC: 10.9 G/DL — LOW (ref 11.5–15.5)
INR BLD: 1.87 RATIO — HIGH (ref 0.88–1.16)
MCHC RBC-ENTMCNC: 30.2 PG — SIGNIFICANT CHANGE UP (ref 27–34)
MCHC RBC-ENTMCNC: 32.2 GM/DL — SIGNIFICANT CHANGE UP (ref 32–36)
MCV RBC AUTO: 93.6 FL — SIGNIFICANT CHANGE UP (ref 80–100)
NRBC # BLD: 0 /100 WBCS — SIGNIFICANT CHANGE UP (ref 0–0)
PLATELET # BLD AUTO: 230 K/UL — SIGNIFICANT CHANGE UP (ref 150–400)
POTASSIUM SERPL-MCNC: 3.8 MMOL/L — SIGNIFICANT CHANGE UP (ref 3.5–5.3)
POTASSIUM SERPL-SCNC: 3.8 MMOL/L — SIGNIFICANT CHANGE UP (ref 3.5–5.3)
PROT SERPL-MCNC: 6.1 G/DL — SIGNIFICANT CHANGE UP (ref 6–8.3)
PROTHROM AB SERPL-ACNC: 22 SEC — HIGH (ref 10.5–13.4)
RBC # BLD: 3.61 M/UL — LOW (ref 3.8–5.2)
RBC # FLD: 19.1 % — HIGH (ref 10.3–14.5)
SODIUM SERPL-SCNC: 141 MMOL/L — SIGNIFICANT CHANGE UP (ref 135–145)
WBC # BLD: 5.02 K/UL — SIGNIFICANT CHANGE UP (ref 3.8–10.5)
WBC # FLD AUTO: 5.02 K/UL — SIGNIFICANT CHANGE UP (ref 3.8–10.5)

## 2022-06-27 PROCEDURE — 99232 SBSQ HOSP IP/OBS MODERATE 35: CPT

## 2022-06-27 RX ORDER — SODIUM CHLORIDE 0.65 %
1 AEROSOL, SPRAY (ML) NASAL
Refills: 0 | Status: DISCONTINUED | OUTPATIENT
Start: 2022-06-27 | End: 2022-06-30

## 2022-06-27 RX ORDER — WARFARIN SODIUM 2.5 MG/1
3 TABLET ORAL ONCE
Refills: 0 | Status: DISCONTINUED | OUTPATIENT
Start: 2022-06-27 | End: 2022-06-27

## 2022-06-27 RX ORDER — WARFARIN SODIUM 2.5 MG/1
2 TABLET ORAL ONCE
Refills: 0 | Status: COMPLETED | OUTPATIENT
Start: 2022-06-27 | End: 2022-06-27

## 2022-06-27 RX ORDER — FLUTICASONE PROPIONATE 50 MCG
1 SPRAY, SUSPENSION NASAL
Refills: 0 | Status: DISCONTINUED | OUTPATIENT
Start: 2022-06-27 | End: 2022-06-30

## 2022-06-27 RX ADMIN — CLOPIDOGREL BISULFATE 75 MILLIGRAM(S): 75 TABLET, FILM COATED ORAL at 11:15

## 2022-06-27 RX ADMIN — Medication 1 APPLICATION(S): at 05:56

## 2022-06-27 RX ADMIN — Medication 50 MILLIGRAM(S): at 05:56

## 2022-06-27 RX ADMIN — ZINC OXIDE 1 APPLICATION(S): 200 OINTMENT TOPICAL at 05:58

## 2022-06-27 RX ADMIN — Medication 81 MILLIGRAM(S): at 11:15

## 2022-06-27 RX ADMIN — WARFARIN SODIUM 2 MILLIGRAM(S): 2.5 TABLET ORAL at 21:47

## 2022-06-27 RX ADMIN — Medication 75 MICROGRAM(S): at 05:56

## 2022-06-27 RX ADMIN — Medication 40 MILLIGRAM(S): at 05:56

## 2022-06-27 RX ADMIN — Medication 1 SPRAY(S): at 21:49

## 2022-06-27 RX ADMIN — Medication 1 SPRAY(S): at 17:19

## 2022-06-27 RX ADMIN — PANTOPRAZOLE SODIUM 40 MILLIGRAM(S): 20 TABLET, DELAYED RELEASE ORAL at 11:15

## 2022-06-27 RX ADMIN — ATORVASTATIN CALCIUM 80 MILLIGRAM(S): 80 TABLET, FILM COATED ORAL at 21:47

## 2022-06-27 RX ADMIN — ZINC OXIDE 1 APPLICATION(S): 200 OINTMENT TOPICAL at 17:18

## 2022-06-27 RX ADMIN — Medication 1 SPRAY(S): at 11:15

## 2022-06-27 NOTE — PROGRESS NOTE ADULT - ASSESSMENT
Patient is a 89 y/o F with a pmhx b/l LE edema, HTN, P Afib (on coumadin), and hypothyroidism presenting with diarrhea and mid-sternal CP admitted for CP w/ elevated trops, NSTEMI. On ACS therapy, re-bridged to coumadin (held for plan for inpatient cath), plan for outpatient cardiac catheterization, dc when optimized in setting of acute systolic CHF exacerbation.

## 2022-06-27 NOTE — PROGRESS NOTE ADULT - SUBJECTIVE AND OBJECTIVE BOX
Patient is a 89y old  Female who presents with a chief complaint of CP w/ elevated trops (26 Jun 2022 13:55)      HPI:  Patient is a 89 y/o F with a pmhx b/l LE edema, HTN, P Afib (on coumadin), and hypothyroidism presenting with sudden onset mid-sternal CP. Pt describes pain as a "prickly feeling", rated 4/10, constant in nature since 4am on 6/17, resolved in the ED. Pain was associated with L arm weakness, now resolved. Pt states the pain woke her up, and she was scared to walk and was unable to use her L arm for support to get out of bed. Pt was also nauseous at this time. No associated diaphoresis, palpitations, chest pressure. No amaurosis fugax, facial droop, garbled speech, hemiparesis, LOC, falls reported. No vomiting, fevers, chills, cough, sore throat, SOB, abd pain, constipation, dysuria. Pt had one loose BM this AM at home, denied hematochezia or melena.       In ED:  VS - HR 71 | /65 | RR 18 | sO2 94 | T 97.6  Labs - HgB 11.2 | INR 1.85 | K 3.3 | Glu 114 | trop 342.6 | CKMB 3.2 | BNP 2396  CTH NC - No acute intracranial hemorrhage. Lacunar infarct R inferior cerebellum  EKG - vent rate 66, QTc 501ms, sinus rhythm, old LBBB, no ischemic changes on personal read  Given -  mg PO. pt seen by cardiology Dr Almazan , pt admitted to [facility] for NSTEMI. (17 Jun 2022 09:52)      INTERVAL HPI:  6/18/22: Seen and examined at bedside. No acute complaints. Denies chest pain overnight and this AM. Overnight pt had episode of dyspnea, troponins inc from 300s to >28236. Trops now downtrended. Received 1 x dose 40mg IV lasix for dyspnea. Pt had statin dose inc to 80mg, had 1 x dose lopressor 25mg, and had her scheduled evening coumadin. Had 10 beats of NSVT this AM. Pt was started on standing 50mg toprol xL PO qD. Started on plavix 75mg qD. Extensive counseling done with the patient on [facility] and her current state of health. Patient states she has "forms at home" that her daughter will bring to [facility]. Patient values quality of life and wants to discuss with her daughter and son-inlaw. Family to discuss potential plan for cardiac cath and will arrive to a decision on 6/19. C/W medical therapy for ACS. HOLD coumadin as pt INR is therapeutic and to facilitate potential for cardiac cath. Will start renal adjusted FD Lovenox on 6/19 if INR is below 2.   6/19/22 Pt seen and examined at bedside. Pt states she slept well last night. Pt denies SOB, CP, palpitations, dizziness .Mildly elevated Cr , INR -Theraputic  Pt states the last time she had chest pain was Friday night. Later in the afternoon again patient was seen at bedside with  daughter HCP and son in law at bedside, decision was made for DNR/ DNI and cardiac cath.  6/20/22: Patient seen and examined at bedside. Patient denies chest pain, palpitations, dyspnea. Noted to be slightly short of breath. proBNP >13k, will give 1 dose of lasix 40mg IV today.  6/21/22: Patient continues to deny anginal symptoms. Will be transferred for cardiac cath, as INR downtrended to 1.6 and Cr improved to 1.3. For LUIS CARLOS on CKD, will defer further IVF hydration at this time due to TTE results and SOB after previous IVF. Will give Mucomyst q12 x4 for renal protection. Given lasix 40mg IVP x1 to improve respirations. Given full dose lovenox x1.   6/22/22: Overnight patient with R sided abdominal discomfort, given maalox. Patient breathing improved s/p lasix 40mg IV yesterday. Patient's INR 1.4 today and Cr increased to 1.5. Plan for cardiac cath If Cr stable. Given additional dose of full dose lovenox.  6/23/22: Patient seen and examined at bedside. Patient denies chest pain, pressure, sob, palpitations. Cr downtrending today,  As per Cardiology NO plan for transfer for cardiac cath as pt is still SOB , Needs Lasix & Cr is elevated.. Low stable H/H. Warfarin 3mg today.  6/24/22: Patient seen and examined at bedside. Patient denies any SOB today, speaking comfortably. Denies chest pain, pressure, sob, palpitations. Restart Lasix & Coumadin  6/25/22: Patient seen and examined at bedside. Patient denies any SOB today, speaking comfortably. Denies chest pain, pressure, sob, palpitations. Continue home lasix dose and coumadin restart. Lasix 20 mg IVP x 1 dose.  6/26/22: Patient seen and examined at bedside. Pt denies any SOB, CP, palpitations dizziness. Pt is on 2 L NC however cannula had fallen out of nose, replaced NC and dropped down to 1L. Lasix 2x day  6/27/22: Pt seen and examined at bedside. Pt states she has noticed blood clots from the nose last night. Pt denies any shortness of breath, chest pain, or dizziness. Pt is on 2L NC. Currently on lasix BID. Awaiting INR results.     OVERNIGHT EVENTS: No acute overnight events    Home Medications:  Lasix 40 mg oral tablet: 1 tab(s) orally 2 times a day (26 Jun 2022 12:41)  Metoprolol Tartrate 25 mg oral tablet: 1 tab(s) orally once a day (26 Jun 2022 12:41)  potassium chloride 10 mEq oral capsule, extended release: 2 cap(s) orally 2 times a day (26 Jun 2022 12:41)  Synthroid 75 mcg (0.075 mg) oral tablet: 1 tab(s) orally once a day (26 Jun 2022 12:41)  warfarin 3 mg oral tablet: 1 tab(s) orally once a day (M, Tu, Th, F, Sa Grider) (26 Jun 2022 12:41)  warfarin 4 mg oral tablet: 1 tab(s) orally Wednesday (26 Jun 2022 12:41)      MEDICATIONS  (STANDING):  aspirin enteric coated 81 milliGRAM(s) Oral daily  atorvastatin 80 milliGRAM(s) Oral at bedtime  BACItracin   Ointment 1 Application(s) Topical two times a day  clopidogrel Tablet 75 milliGRAM(s) Oral daily  furosemide    Tablet 40 milliGRAM(s) Oral two times a day  levothyroxine 75 MICROGram(s) Oral daily  metoprolol succinate ER 50 milliGRAM(s) Oral daily  pantoprazole  Injectable 40 milliGRAM(s) IV Push daily  zinc oxide 40% Paste 1 Application(s) Topical two times a day    MEDICATIONS  (PRN):  acetaminophen     Tablet .. 650 milliGRAM(s) Oral every 6 hours PRN Mild Pain (1 - 3)  ALBUTerol    90 MICROgram(s) HFA Inhaler 2 Puff(s) Inhalation every 12 hours PRN Shortness of Breath and/or Wheezing  aluminum hydroxide/magnesium hydroxide/simethicone Suspension 30 milliLiter(s) Oral every 6 hours PRN Dyspepsia      Allergies    No Known Allergies    Intolerances        REVIEW OF SYSTEMS: I feel ok  CONSTITUTIONAL: No fever, No chills, No fatigue, No myalgia, No Body ache, No Weakness  EYES: No eye pain,  No visual disturbances, No discharge, NO Redness  ENMT:  No ear pain, No nose bleed, No vertigo; No sinus pain, NO throat pain, No Congestion  NECK: No pain, No stiffness  RESPIRATORY: No cough, NO wheezing, No hemoptysis, ADMITS brief shortness of breath earlier, now resolved  CARDIOVASCULAR: No chest pain, palpitations  GASTROINTESTINAL: No abdominal pain, NO epigastric pain. No nausea, No vomiting; No diarrhea, No constipation. [ x ] BM  GENITOURINARY: No dysuria, No frequency, No urgency, No hematuria, NO incontinence  NEUROLOGICAL: No headaches, No dizziness, No numbness, No tingling, No tremors, No weakness  EXT: admits chronic Swelling in LE's, No Pain, admits LE Edema  SKIN:  [ x ] No itching, burning, rashes, or lesions   MUSCULOSKELETAL: No joint pain ,No Jt swelling; No muscle pain, No back pain, No extremity pain  PSYCHIATRIC: No depression,  No anxiety,  No mood swings ,No difficulty sleeping at night  PAIN SCALE: [ x ] None  [  ] Other-  ROS Unable to obtain due to - [  ] Dementia  [  ] Lethargy [  ] Drowsy [  ] Sedated [  ] non verbal    Vital Signs Last 24 Hrs  T(C): 36.8 (27 Jun 2022 05:47), Max: 36.8 (27 Jun 2022 05:47)  T(F): 98.2 (27 Jun 2022 05:47), Max: 98.2 (27 Jun 2022 05:47)  HR: 74 (27 Jun 2022 05:47) (73 - 83)  BP: 112/66 (27 Jun 2022 05:47) (104/54 - 129/68)  BP(mean): --  RR: 18 (27 Jun 2022 05:47) (18 - 18)  SpO2: 93% (27 Jun 2022 05:47) (82% - 94%)  Finger Stick        06-26 @ 07:01  -  06-27 @ 07:00  --------------------------------------------------------  IN: 474 mL / OUT: 1550 mL / NET: -1076 mL        PHYSICAL EXAM: O2 NC on 2 L  GENERAL:  [ x ] NAD  [ x ] well appearing, [  ] Agitated, [  ] Drowsy,  [  ] Lethargy, [  ] confused   HEAD:  [ x ] Normal, [  ] Other  EYES:  [ x ] EOMI, [ x ] PERRLA, [ x ] conjunctiva and sclera clear normal, [  ] Other,  [  ] Pallor,[  ] Discharge  ENMT:  [ x ] Normal, [ x ] Moist mucous membranes, [ x ] Good dentition, [ x ] No Thrush  NECK:  [ x ] Supple, [x  ] No JVD, [ x ] Normal thyroid, [  ] Lymphadenopathy [  ] Other  CHEST/LUNG:  [ x ] Clear to auscultation bilaterally, [  ] Breath Sounds equal B/L , [  ] poor effort  [ x ] Right lung base rales, [ x ] No rhonchi  [  ]  slight expiratory wheezing,   HEART:  [ x ] Regular rate and IRREGULARLY IRREGULAR rhythm, [  ] tachycardia, [  ] Bradycardia,  [  ] irregular  [ x ] systolic murmur heard best at RUSB No rubs, No gallops, [  ] PPM in place (Mfr:  )  ABDOMEN:  [ x ] Soft, [ x ] Nontender, [ x ] Nondistended, [ x ] No mass, [ x] Bowel sounds present, [ x ] obese  NERVOUS SYSTEM:  [ x ] Alert & Oriented X3, [ x ] Nonfocal  [  ] Confusion  [  ] Encephalopathic [  ] Sedated [  ] Unable to assess, [  ] Dementia [  ] Other-  EXTREMITIES: [ x ] 2+ Peripheral Pulses, No clubbing, No cyanosis,  [ x ] b/l UE +2 pitting edema legs &  gathering at proximal forearms, trace edema b/l LEs, 2+ radial pulses b/l, extremities warm to touch [ x ] PVD stasis skin changes B/L Lower EXT, [  ] wound  LYMPH: No lymphadenopathy noted  SKIN:  [ x ] No rashes or lesions, [  ] Pressure Ulcers, [  ] ecchymosis, [  ] Skin Tears, [ x ] Other - redness B/L Buttocks    DIET: Diet, DASH/TLC:   Sodium & Cholesterol Restricted  1200mL Fluid Restriction ([id number]) (06-22-22 @ 08:53)      LABS:                        10.9   5.02  )-----------( 230      ( 27 Jun 2022 07:08 )             33.8       Ca    8.1        26 Jun 2022 07:08      PT/INR - ( 26 Jun 2022 07:08 )   PT: 17.2 sec;   INR: 1.46 ratio         PTT - ( 26 Jun 2022 07:08 )  PTT:35.7 sec               Anemia Panel:      Thyroid Panel:            Serum Pro-Brain Natriuretic Peptide: 9883 pg/mL (06-25-22 @ 07:03)  Serum Pro-Brain Natriuretic Peptide: 51276 pg/mL (06-20-22 @ 09:53)      RADIOLOGY & ADDITIONAL TESTS: Personally reviewed.     HEALTH ISSUES - PROBLEM Dx:  Hypothyroid    Hypertension    Lower extremity edema    Paroxysmal atrial fibrillation    Need for prophylactic measure    Anemia    Acute kidney injury superimposed on CKD    NSTEMI (non-ST elevation myocardial infarction)    Acute systolic congestive heart failure            Consultant(s) Notes Reviewed:  [x  ] YES     Care Discussed with [ ] Consultants  [ x ] Patient  [ x ] Family [  ] HCP [  ]   [  ] Social Service  [ x ] RN, [  ] Physical Therapy,[  ] Palliative care team  DVT PPX: [  ] Lovenox, [  ] S C Heparin, [x  ] Coumadin, [  ] Xarelto, [  ] Eliquis, [  ] Pradaxa, [  ] IV Heparin drip, [  ] SCD [  ] Contraindication 2 to GI Bleed,[  ] Ambulation [  ] Contraindicated 2 to  bleed [  ] Contraindicated 2 to Brain Bleed  Advanced directive: [  ] None, [ x ] DNR/DNI Patient is a 89y old  Female who presents with a chief complaint of CP w/ elevated trops (26 Jun 2022 13:55)      HPI:  Patient is a 89 y/o F with a pmhx b/l LE edema, HTN, P Afib (on coumadin), and hypothyroidism presenting with sudden onset mid-sternal CP. Pt describes pain as a "prickly feeling", rated 4/10, constant in nature since 4am on 6/17, resolved in the ED. Pain was associated with L arm weakness, now resolved. Pt states the pain woke her up, and she was scared to walk and was unable to use her L arm for support to get out of bed. Pt was also nauseous at this time. No associated diaphoresis, palpitations, chest pressure. No amaurosis fugax, facial droop, garbled speech, hemiparesis, LOC, falls reported. No vomiting, fevers, chills, cough, sore throat, SOB, abd pain, constipation, dysuria. Pt had one loose BM this AM at home, denied hematochezia or melena.       In ED:  VS - HR 71 | /65 | RR 18 | sO2 94 | T 97.6  Labs - HgB 11.2 | INR 1.85 | K 3.3 | Glu 114 | trop 342.6 | CKMB 3.2 | BNP 2396  CTH NC - No acute intracranial hemorrhage. Lacunar infarct R inferior cerebellum  EKG - vent rate 66, QTc 501ms, sinus rhythm, old LBBB, no ischemic changes on personal read  Given -  mg PO. pt seen by cardiology Dr Almazan , pt admitted to [facility] for NSTEMI. (17 Jun 2022 09:52)      INTERVAL HPI:  6/18/22: Seen and examined at bedside. No acute complaints. Denies chest pain overnight and this AM. Overnight pt had episode of dyspnea, troponins inc from 300s to >77853. Trops now downtrended. Received 1 x dose 40mg IV lasix for dyspnea. Pt had statin dose inc to 80mg, had 1 x dose lopressor 25mg, and had her scheduled evening coumadin. Had 10 beats of NSVT this AM. Pt was started on standing 50mg toprol xL PO qD. Started on plavix 75mg qD. Extensive counseling done with the patient on [facility] and her current state of health. Patient states she has "forms at home" that her daughter will bring to [facility]. Patient values quality of life and wants to discuss with her daughter and son-inlaw. Family to discuss potential plan for cardiac cath and will arrive to a decision on 6/19. C/W medical therapy for ACS. HOLD coumadin as pt INR is therapeutic and to facilitate potential for cardiac cath. Will start renal adjusted FD Lovenox on 6/19 if INR is below 2.   6/19/22 Pt seen and examined at bedside. Pt states she slept well last night. Pt denies SOB, CP, palpitations, dizziness .Mildly elevated Cr , INR -Theraputic  Pt states the last time she had chest pain was Friday night. Later in the afternoon again patient was seen at bedside with  daughter HCP and son in law at bedside, decision was made for DNR/ DNI and cardiac cath.  6/20/22: Patient seen and examined at bedside. Patient denies chest pain, palpitations, dyspnea. Noted to be slightly short of breath. proBNP >13k, will give 1 dose of lasix 40mg IV today.  6/21/22: Patient continues to deny anginal symptoms. Will be transferred for cardiac cath, as INR downtrended to 1.6 and Cr improved to 1.3. For LUIS CARLOS on CKD, will defer further IVF hydration at this time due to TTE results and SOB after previous IVF. Will give Mucomyst q12 x4 for renal protection. Given lasix 40mg IVP x1 to improve respirations. Given full dose lovenox x1.   6/22/22: Overnight patient with R sided abdominal discomfort, given maalox. Patient breathing improved s/p lasix 40mg IV yesterday. Patient's INR 1.4 today and Cr increased to 1.5. Plan for cardiac cath If Cr stable. Given additional dose of full dose lovenox.  6/23/22: Patient seen and examined at bedside. Patient denies chest pain, pressure, sob, palpitations. Cr downtrending today,  As per Cardiology NO plan for transfer for cardiac cath as pt is still SOB , Needs Lasix & Cr is elevated.. Low stable H/H. Warfarin 3mg today.  6/24/22: Patient seen and examined at bedside. Patient denies any SOB today, speaking comfortably. Denies chest pain, pressure, sob, palpitations. Restart Lasix & Coumadin  6/25/22: Patient seen and examined at bedside. Patient denies any SOB today, speaking comfortably. Denies chest pain, pressure, sob, palpitations. Continue home lasix dose and coumadin restart. Lasix 20 mg IVP x 1 dose.  6/26/22: Patient seen and examined at bedside. Pt denies any SOB, CP, palpitations dizziness. Pt is on 2 L NC however cannula had fallen out of nose, replaced NC and dropped down to 1L. Lasix 2x day  6/27/22: Pt seen and examined at bedside. Pt states she has noticed blood clots from the nose last night - placed pt on humidified O2 and standing nasal saline spray. Pt denies any shortness of breath, chest pain, or dizziness. Pt is on 2L NC. Currently on lasix BID. INR 1.87     OVERNIGHT EVENTS: No acute overnight events    Home Medications:  Lasix 40 mg oral tablet: 1 tab(s) orally 2 times a day (26 Jun 2022 12:41)  Metoprolol Tartrate 25 mg oral tablet: 1 tab(s) orally once a day (26 Jun 2022 12:41)  potassium chloride 10 mEq oral capsule, extended release: 2 cap(s) orally 2 times a day (26 Jun 2022 12:41)  Synthroid 75 mcg (0.075 mg) oral tablet: 1 tab(s) orally once a day (26 Jun 2022 12:41)  warfarin 3 mg oral tablet: 1 tab(s) orally once a day (M, Tu, Th, F, Sa Grider) (26 Jun 2022 12:41)  warfarin 4 mg oral tablet: 1 tab(s) orally Wednesday (26 Jun 2022 12:41)      MEDICATIONS  (STANDING):  aspirin enteric coated 81 milliGRAM(s) Oral daily  atorvastatin 80 milliGRAM(s) Oral at bedtime  BACItracin   Ointment 1 Application(s) Topical two times a day  clopidogrel Tablet 75 milliGRAM(s) Oral daily  furosemide    Tablet 40 milliGRAM(s) Oral two times a day  levothyroxine 75 MICROGram(s) Oral daily  metoprolol succinate ER 50 milliGRAM(s) Oral daily  pantoprazole  Injectable 40 milliGRAM(s) IV Push daily  zinc oxide 40% Paste 1 Application(s) Topical two times a day    MEDICATIONS  (PRN):  acetaminophen     Tablet .. 650 milliGRAM(s) Oral every 6 hours PRN Mild Pain (1 - 3)  ALBUTerol    90 MICROgram(s) HFA Inhaler 2 Puff(s) Inhalation every 12 hours PRN Shortness of Breath and/or Wheezing  aluminum hydroxide/magnesium hydroxide/simethicone Suspension 30 milliLiter(s) Oral every 6 hours PRN Dyspepsia      Allergies    No Known Allergies    Intolerances        REVIEW OF SYSTEMS: I feel ok  CONSTITUTIONAL: No fever, No chills, No fatigue, No myalgia, No Body ache, No Weakness  EYES: No eye pain,  No visual disturbances, No discharge, NO Redness  ENMT:  No ear pain, No nose bleed, No vertigo; No sinus pain, NO throat pain, No Congestion  NECK: No pain, No stiffness  RESPIRATORY: No cough, NO wheezing, No hemoptysis, ADMITS brief shortness of breath earlier, now resolved  CARDIOVASCULAR: No chest pain, palpitations  GASTROINTESTINAL: No abdominal pain, NO epigastric pain. No nausea, No vomiting; No diarrhea, No constipation. [ x ] BM  GENITOURINARY: No dysuria, No frequency, No urgency, No hematuria, NO incontinence  NEUROLOGICAL: No headaches, No dizziness, No numbness, No tingling, No tremors, No weakness  EXT: admits chronic Swelling in LE's, No Pain, admits LE Edema  SKIN:  [ x ] No itching, burning, rashes, or lesions   MUSCULOSKELETAL: No joint pain ,No Jt swelling; No muscle pain, No back pain, No extremity pain  PSYCHIATRIC: No depression,  No anxiety,  No mood swings ,No difficulty sleeping at night  PAIN SCALE: [ x ] None  [  ] Other-  ROS Unable to obtain due to - [  ] Dementia  [  ] Lethargy [  ] Drowsy [  ] Sedated [  ] non verbal    Vital Signs Last 24 Hrs  T(C): 36.8 (27 Jun 2022 05:47), Max: 36.8 (27 Jun 2022 05:47)  T(F): 98.2 (27 Jun 2022 05:47), Max: 98.2 (27 Jun 2022 05:47)  HR: 74 (27 Jun 2022 05:47) (73 - 83)  BP: 112/66 (27 Jun 2022 05:47) (104/54 - 129/68)  BP(mean): --  RR: 18 (27 Jun 2022 05:47) (18 - 18)  SpO2: 93% (27 Jun 2022 05:47) (82% - 94%)  Finger Stick        06-26 @ 07:01  -  06-27 @ 07:00  --------------------------------------------------------  IN: 474 mL / OUT: 1550 mL / NET: -1076 mL        PHYSICAL EXAM: O2 NC on 2 L  GENERAL:  [ x ] NAD  [ x ] well appearing, [  ] Agitated, [  ] Drowsy,  [  ] Lethargy, [  ] confused   HEAD:  [ x ] Normal, [  ] Other  EYES:  [ x ] EOMI, [ x ] PERRLA, [ x ] conjunctiva and sclera clear normal, [  ] Other,  [  ] Pallor,[  ] Discharge  ENMT:  [ x ] Normal, [ x ] Moist mucous membranes, [ x ] Good dentition, [ x ] No Thrush  NECK:  [ x ] Supple, [x  ] No JVD, [ x ] Normal thyroid, [  ] Lymphadenopathy [  ] Other  CHEST/LUNG:  [ x ] Clear to auscultation bilaterally, [  ] Breath Sounds equal B/L , [  ] poor effort  [ x ] Right lung base rales, [ x ] No rhonchi  [  ]  slight expiratory wheezing,   HEART:  [ x ] Regular rate and IRREGULARLY IRREGULAR rhythm, [  ] tachycardia, [  ] Bradycardia,  [  ] irregular  [ x ] systolic murmur heard best at RUSB No rubs, No gallops, [  ] PPM in place (Mfr:  )  ABDOMEN:  [ x ] Soft, [ x ] Nontender, [ x ] Nondistended, [ x ] No mass, [ x] Bowel sounds present, [ x ] obese  NERVOUS SYSTEM:  [ x ] Alert & Oriented X3, [ x ] Nonfocal  [  ] Confusion  [  ] Encephalopathic [  ] Sedated [  ] Unable to assess, [  ] Dementia [  ] Other-  EXTREMITIES: [ x ] 2+ Peripheral Pulses, No clubbing, No cyanosis,  [ x ] b/l UE +2 pitting edema legs &  gathering at proximal forearms, trace edema b/l LEs, 2+ radial pulses b/l, extremities warm to touch [ x ] PVD stasis skin changes B/L Lower EXT, [  ] wound  LYMPH: No lymphadenopathy noted  SKIN:  [ x ] No rashes or lesions, [  ] Pressure Ulcers, [  ] ecchymosis, [  ] Skin Tears, [ x ] Other - redness B/L Buttocks    DIET: Diet, DASH/TLC:   Sodium & Cholesterol Restricted  1200mL Fluid Restriction ([id number]) (06-22-22 @ 08:53)      LABS:                        10.9   5.02  )-----------( 230      ( 27 Jun 2022 07:08 )             33.8       Ca    8.1        26 Jun 2022 07:08      PT/INR - ( 26 Jun 2022 07:08 )   PT: 17.2 sec;   INR: 1.46 ratio         PTT - ( 26 Jun 2022 07:08 )  PTT:35.7 sec               Anemia Panel:      Thyroid Panel:            Serum Pro-Brain Natriuretic Peptide: 9883 pg/mL (06-25-22 @ 07:03)  Serum Pro-Brain Natriuretic Peptide: 82506 pg/mL (06-20-22 @ 09:53)      RADIOLOGY & ADDITIONAL TESTS: Personally reviewed.     HEALTH ISSUES - PROBLEM Dx:  Hypothyroid    Hypertension    Lower extremity edema    Paroxysmal atrial fibrillation    Need for prophylactic measure    Anemia    Acute kidney injury superimposed on CKD    NSTEMI (non-ST elevation myocardial infarction)    Acute systolic congestive heart failure            Consultant(s) Notes Reviewed:  [x  ] YES     Care Discussed with [ ] Consultants  [ x ] Patient  [ x ] Family [  ] HCP [  ]   [  ] Social Service  [ x ] RN, [  ] Physical Therapy,[  ] Palliative care team  DVT PPX: [  ] Lovenox, [  ] S C Heparin, [x  ] Coumadin, [  ] Xarelto, [  ] Eliquis, [  ] Pradaxa, [  ] IV Heparin drip, [  ] SCD [  ] Contraindication 2 to GI Bleed,[  ] Ambulation [  ] Contraindicated 2 to  bleed [  ] Contraindicated 2 to Brain Bleed  Advanced directive: [  ] None, [ x ] DNR/DNI Patient is a 89y old  Female who presents with a chief complaint of CP w/ elevated trops (26 Jun 2022 13:55)      HPI:  Patient is a 89 y/o F with a pmhx b/l LE edema, HTN, P Afib (on coumadin), and hypothyroidism presenting with sudden onset mid-sternal CP. Pt describes pain as a "prickly feeling", rated 4/10, constant in nature since 4am on 6/17, resolved in the ED. Pain was associated with L arm weakness, now resolved. Pt states the pain woke her up, and she was scared to walk and was unable to use her L arm for support to get out of bed. Pt was also nauseous at this time. No associated diaphoresis, palpitations, chest pressure. No amaurosis fugax, facial droop, garbled speech, hemiparesis, LOC, falls reported. No vomiting, fevers, chills, cough, sore throat, SOB, abd pain, constipation, dysuria. Pt had one loose BM this AM at home, denied hematochezia or melena.       In ED:  VS - HR 71 | /65 | RR 18 | sO2 94 | T 97.6  Labs - HgB 11.2 | INR 1.85 | K 3.3 | Glu 114 | trop 342.6 | CKMB 3.2 | BNP 2396  CTH NC - No acute intracranial hemorrhage. Lacunar infarct R inferior cerebellum  EKG - vent rate 66, QTc 501ms, sinus rhythm, old LBBB, no ischemic changes on personal read  Given -  mg PO. pt seen by cardiology Dr Almazan , pt admitted to [facility] for NSTEMI. (17 Jun 2022 09:52)      INTERVAL HPI:  6/18/22: Seen and examined at bedside. No acute complaints. Denies chest pain overnight and this AM. Overnight pt had episode of dyspnea, troponins inc from 300s to >11428. Trops now downtrended. Received 1 x dose 40mg IV lasix for dyspnea. Pt had statin dose inc to 80mg, had 1 x dose lopressor 25mg, and had her scheduled evening coumadin. Had 10 beats of NSVT this AM. Pt was started on standing 50mg toprol xL PO qD. Started on plavix 75mg qD. Extensive counseling done with the patient on [facility] and her current state of health. Patient states she has "forms at home" that her daughter will bring to [facility]. Patient values quality of life and wants to discuss with her daughter and son-inlaw. Family to discuss potential plan for cardiac cath and will arrive to a decision on 6/19. C/W medical therapy for ACS. HOLD coumadin as pt INR is therapeutic and to facilitate potential for cardiac cath. Will start renal adjusted FD Lovenox on 6/19 if INR is below 2.   6/19/22 Pt seen and examined at bedside. Pt states she slept well last night. Pt denies SOB, CP, palpitations, dizziness .Mildly elevated Cr , INR -Theraputic  Pt states the last time she had chest pain was Friday night. Later in the afternoon again patient was seen at bedside with  daughter HCP and son in law at bedside, decision was made for DNR/ DNI and cardiac cath.  6/20/22: Patient seen and examined at bedside. Patient denies chest pain, palpitations, dyspnea. Noted to be slightly short of breath. proBNP >13k, will give 1 dose of lasix 40mg IV today.  6/21/22: Patient continues to deny anginal symptoms. Will be transferred for cardiac cath, as INR downtrended to 1.6 and Cr improved to 1.3. For LUIS CARLOS on CKD, will defer further IVF hydration at this time due to TTE results and SOB after previous IVF. Will give Mucomyst q12 x4 for renal protection. Given lasix 40mg IVP x1 to improve respirations. Given full dose lovenox x1.   6/22/22: Overnight patient with R sided abdominal discomfort, given maalox. Patient breathing improved s/p lasix 40mg IV yesterday. Patient's INR 1.4 today and Cr increased to 1.5. Plan for cardiac cath If Cr stable. Given additional dose of full dose lovenox.  6/23/22: Patient seen and examined at bedside. Patient denies chest pain, pressure, sob, palpitations. Cr downtrending today,  As per Cardiology NO plan for transfer for cardiac cath as pt is still SOB , Needs Lasix & Cr is elevated.. Low stable H/H. Warfarin 3mg today.  6/24/22: Patient seen and examined at bedside. Patient denies any SOB today, speaking comfortably. Denies chest pain, pressure, sob, palpitations. Restart Lasix & Coumadin  6/25/22: Patient seen and examined at bedside. Patient denies any SOB today, speaking comfortably. Denies chest pain, pressure, sob, palpitations. Continue home lasix dose and coumadin restart. Lasix 20 mg IVP x 1 dose.  6/26/22: Patient seen and examined at bedside. Pt denies any SOB, CP, palpitations dizziness. Pt is on 2 L NC however cannula had fallen out of nose, replaced NC and dropped down to 1L. Lasix 2x day  6/27/22: Pt seen and examined at bedside. Pt states she has noticed blood clots from the nose last night - placed pt on humidified O2 and standing nasal saline spray. Pt denies any shortness of breath, chest pain, or dizziness. Pt is on 2L NC. Currently on lasix BID. INR 1.87     OVERNIGHT EVENTS: No acute overnight events    Home Medications:  Lasix 40 mg oral tablet: 1 tab(s) orally 2 times a day (26 Jun 2022 12:41)  Metoprolol Tartrate 25 mg oral tablet: 1 tab(s) orally once a day (26 Jun 2022 12:41)  potassium chloride 10 mEq oral capsule, extended release: 2 cap(s) orally 2 times a day (26 Jun 2022 12:41)  Synthroid 75 mcg (0.075 mg) oral tablet: 1 tab(s) orally once a day (26 Jun 2022 12:41)  warfarin 3 mg oral tablet: 1 tab(s) orally once a day (M, Tu, Th, F, Sa Grider) (26 Jun 2022 12:41)  warfarin 4 mg oral tablet: 1 tab(s) orally Wednesday (26 Jun 2022 12:41)      MEDICATIONS  (STANDING):  aspirin enteric coated 81 milliGRAM(s) Oral daily  atorvastatin 80 milliGRAM(s) Oral at bedtime  BACItracin   Ointment 1 Application(s) Topical two times a day  clopidogrel Tablet 75 milliGRAM(s) Oral daily  furosemide    Tablet 40 milliGRAM(s) Oral two times a day  levothyroxine 75 MICROGram(s) Oral daily  metoprolol succinate ER 50 milliGRAM(s) Oral daily  pantoprazole  Injectable 40 milliGRAM(s) IV Push daily  zinc oxide 40% Paste 1 Application(s) Topical two times a day    MEDICATIONS  (PRN):  acetaminophen     Tablet .. 650 milliGRAM(s) Oral every 6 hours PRN Mild Pain (1 - 3)  ALBUTerol    90 MICROgram(s) HFA Inhaler 2 Puff(s) Inhalation every 12 hours PRN Shortness of Breath and/or Wheezing  aluminum hydroxide/magnesium hydroxide/simethicone Suspension 30 milliLiter(s) Oral every 6 hours PRN Dyspepsia      Allergies    No Known Allergies    Intolerances        REVIEW OF SYSTEMS: I feel ok  CONSTITUTIONAL: No fever, No chills, No fatigue, No myalgia, No Body ache, No Weakness  EYES: No eye pain,  No visual disturbances, No discharge, NO Redness  ENMT:  No ear pain, No nose bleed, No vertigo; No sinus pain, NO throat pain, No Congestion  NECK: No pain, No stiffness  RESPIRATORY: No cough, NO wheezing, No hemoptysis, ADMITS brief shortness of breath earlier, now resolved  CARDIOVASCULAR: No chest pain, palpitations  GASTROINTESTINAL: No abdominal pain, NO epigastric pain. No nausea, No vomiting; No diarrhea, No constipation. [ x ] BM  GENITOURINARY: No dysuria, No frequency, No urgency, No hematuria, NO incontinence  NEUROLOGICAL: No headaches, No dizziness, No numbness, No tingling, No tremors, No weakness  EXT: admits chronic Swelling in LE's, No Pain, admits LE Edema  SKIN:  [ x ] No itching, burning, rashes, or lesions   MUSCULOSKELETAL: No joint pain ,No Jt swelling; No muscle pain, No back pain, No extremity pain  PSYCHIATRIC: No depression,  No anxiety,  No mood swings ,No difficulty sleeping at night  PAIN SCALE: [ x ] None  [  ] Other-  ROS Unable to obtain due to - [  ] Dementia  [  ] Lethargy [  ] Drowsy [  ] Sedated [  ] non verbal    Vital Signs Last 24 Hrs  T(C): 36.8 (27 Jun 2022 05:47), Max: 36.8 (27 Jun 2022 05:47)  T(F): 98.2 (27 Jun 2022 05:47), Max: 98.2 (27 Jun 2022 05:47)  HR: 74 (27 Jun 2022 05:47) (73 - 83)  BP: 112/66 (27 Jun 2022 05:47) (104/54 - 129/68)  BP(mean): --  RR: 18 (27 Jun 2022 05:47) (18 - 18)  SpO2: 93% (27 Jun 2022 05:47) (82% - 94%)  Finger Stick        06-26 @ 07:01  -  06-27 @ 07:00  --------------------------------------------------------  IN: 474 mL / OUT: 1550 mL / NET: -1076 mL        PHYSICAL EXAM: O2 NC on 2 L  GENERAL:  [ x ] NAD  [ x ] well appearing, [  ] Agitated, [  ] Drowsy,  [  ] Lethargy, [  ] confused   HEAD:  [ x ] Normal, [  ] Other  EYES:  [ x ] EOMI, [ x ] PERRLA, [ x ] conjunctiva and sclera clear normal, [  ] Other,  [  ] Pallor,[  ] Discharge  ENMT:  [ x ] Normal, [ x ] Moist mucous membranes, [ x ] Good dentition, [ x ] No Thrush  NECK:  [ x ] Supple, [x  ] No JVD, [ x ] Normal thyroid, [  ] Lymphadenopathy [  ] Other  CHEST/LUNG:  [ x ] Clear to auscultation bilaterally, [  ] Breath Sounds equal B/L , [  ] poor effort  [ x ] Right lung base rales, [ x ] No rhonchi  [  ]  slight expiratory wheezing,   HEART:  [ x ] Regular rate and IRREGULARLY IRREGULAR rhythm, [  ] tachycardia, [  ] Bradycardia,  [  ] irregular  [ x ] systolic murmur heard best at RUSB No rubs, No gallops, [  ] PPM in place (Mfr:  )  ABDOMEN:  [ x ] Soft, [ x ] Nontender, [ x ] Nondistended, [ x ] No mass, [ x] Bowel sounds present, [ x ] obese  NERVOUS SYSTEM:  [ x ] Alert & Oriented X3, [ x ] Nonfocal  [  ] Confusion  [  ] Encephalopathic [  ] Sedated [  ] Unable to assess, [  ] Dementia [  ] Other-  EXTREMITIES: [ x ] 2+ Peripheral Pulses, No clubbing, No cyanosis,  [ x ] b/l UE +2 pitting edema legs &  gathering at proximal forearms, trace edema b/l LEs, 2+ radial pulses b/l, extremities warm to touch [ x ] PVD stasis skin changes B/L Lower EXT, [  ] wound  LYMPH: No lymphadenopathy noted  SKIN:  [ x ] No rashes or lesions, [  ] Pressure Ulcers, [  ] ecchymosis, [  ] Skin Tears, [ x ] Other - redness B/L Buttocks    DIET: Diet, DASH/TLC:   Sodium & Cholesterol Restricted  1200mL Fluid Restriction ([id number]) (06-22-22 @ 08:53)      LABS:                        10.9   5.02  )-----------( 230      ( 27 Jun 2022 07:08 )             33.8       Ca    8.1        26 Jun 2022 07:08      PT/INR - ( 26 Jun 2022 07:08 )   PT: 17.2 sec;   INR: 1.46 ratio         PTT - ( 26 Jun 2022 07:08 )  PTT:35.7 sec               Anemia Panel:      Thyroid Panel:            Serum Pro-Brain Natriuretic Peptide: 9883 pg/mL (06-25-22 @ 07:03)  Serum Pro-Brain Natriuretic Peptide: 39007 pg/mL (06-20-22 @ 09:53)      RADIOLOGY & ADDITIONAL TESTS: Personally reviewed.     HEALTH ISSUES - PROBLEM Dx:  Hypothyroid    Hypertension    Lower extremity edema    Paroxysmal atrial fibrillation    Need for prophylactic measure    Anemia    Acute kidney injury superimposed on CKD    NSTEMI (non-ST elevation myocardial infarction)    Acute systolic congestive heart failure            Consultant(s) Notes Reviewed:  [x  ] YES     Care Discussed with [ ] Consultants  [ x ] Patient  [ x ] Family [  ] HCP [  ]   [  ] Social Service  [ x ] RN, [  ] Physical Therapy,[  ] Palliative care team  DVT PPX: [  ] Lovenox, [  ] S C Heparin, [x  ] Coumadin, [  ] Xarelto, [  ] Eliquis, [  ] Pradaxa, [  ] IV Heparin drip, [  ] SCD [  ] Contraindication 2 to GI Bleed,[  ] Ambulation [  ] Contraindicated 2 to  bleed [  ] Contraindicated 2 to Brain Bleed  Advanced directive: [  ] None, [ x ] DNR/DNI Patient is a 89y old  Female who presents with a chief complaint of CP w/ elevated trops (26 Jun 2022 13:55)      HPI:  Patient is a 89 y/o F with a pmhx b/l LE edema, HTN, P Afib (on coumadin), and hypothyroidism presenting with sudden onset mid-sternal CP. Pt describes pain as a "prickly feeling", rated 4/10, constant in nature since 4am on 6/17, resolved in the ED. Pain was associated with L arm weakness, now resolved. Pt states the pain woke her up, and she was scared to walk and was unable to use her L arm for support to get out of bed. Pt was also nauseous at this time. No associated diaphoresis, palpitations, chest pressure. No amaurosis fugax, facial droop, garbled speech, hemiparesis, LOC, falls reported. No vomiting, fevers, chills, cough, sore throat, SOB, abd pain, constipation, dysuria. Pt had one loose BM this AM at home, denied hematochezia or melena.       In ED:  VS - HR 71 | /65 | RR 18 | sO2 94 | T 97.6  Labs - HgB 11.2 | INR 1.85 | K 3.3 | Glu 114 | trop 342.6 | CKMB 3.2 | BNP 2396  CTH NC - No acute intracranial hemorrhage. Lacunar infarct R inferior cerebellum  EKG - vent rate 66, QTc 501ms, sinus rhythm, old LBBB, no ischemic changes on personal read  Given -  mg PO. pt seen by cardiology Dr Almazan , pt admitted to [facility] for NSTEMI. (17 Jun 2022 09:52)      INTERVAL HPI:  6/18/22: Seen and examined at bedside. No acute complaints. Denies chest pain overnight and this AM. Overnight pt had episode of dyspnea, troponins inc from 300s to >18777. Trops now downtrended. Received 1 x dose 40mg IV lasix for dyspnea. Pt had statin dose inc to 80mg, had 1 x dose lopressor 25mg, and had her scheduled evening coumadin. Had 10 beats of NSVT this AM. Pt was started on standing 50mg toprol xL PO qD. Started on plavix 75mg qD. Extensive counseling done with the patient on [facility] and her current state of health. Patient states she has "forms at home" that her daughter will bring to [facility]. Patient values quality of life and wants to discuss with her daughter and son-inlaw. Family to discuss potential plan for cardiac cath and will arrive to a decision on 6/19. C/W medical therapy for ACS. HOLD coumadin as pt INR is therapeutic and to facilitate potential for cardiac cath. Will start renal adjusted FD Lovenox on 6/19 if INR is below 2.   6/19/22 Pt seen and examined at bedside. Pt states she slept well last night. Pt denies SOB, CP, palpitations, dizziness .Mildly elevated Cr , INR -Theraputic  Pt states the last time she had chest pain was Friday night. Later in the afternoon again patient was seen at bedside with  daughter HCP and son in law at bedside, decision was made for DNR/ DNI and cardiac cath.  6/20/22: Patient seen and examined at bedside. Patient denies chest pain, palpitations, dyspnea. Noted to be slightly short of breath. proBNP >13k, will give 1 dose of lasix 40mg IV today.  6/21/22: Patient continues to deny anginal symptoms. Will be transferred for cardiac cath, as INR downtrended to 1.6 and Cr improved to 1.3. For LUIS CARLOS on CKD, will defer further IVF hydration at this time due to TTE results and SOB after previous IVF. Will give Mucomyst q12 x4 for renal protection. Given lasix 40mg IVP x1 to improve respirations. Given full dose lovenox x1.   6/22/22: Overnight patient with R sided abdominal discomfort, given maalox. Patient breathing improved s/p lasix 40mg IV yesterday. Patient's INR 1.4 today and Cr increased to 1.5. Plan for cardiac cath If Cr stable. Given additional dose of full dose lovenox.  6/23/22: Patient seen and examined at bedside. Patient denies chest pain, pressure, sob, palpitations. Cr downtrending today,  As per Cardiology NO plan for transfer for cardiac cath as pt is still SOB , Needs Lasix & Cr is elevated.. Low stable H/H. Warfarin 3mg today.  6/24/22: Patient seen and examined at bedside. Patient denies any SOB today, speaking comfortably. Denies chest pain, pressure, sob, palpitations. Restart Lasix & Coumadin  6/25/22: Patient seen and examined at bedside. Patient denies any SOB today, speaking comfortably. Denies chest pain, pressure, sob, palpitations. Continue home lasix dose and coumadin restart. Lasix 20 mg IVP x 1 dose.  6/26/22: Patient seen and examined at bedside. Pt denies any SOB, CP, palpitations dizziness. Pt is on 2 L NC however cannula had fallen out of nose, replaced NC and dropped down to 1L. Lasix 2x day  6/27/22: Pt seen and examined at bedside. Pt states she has noticed blood clots from the nose last night - placed pt on humidified O2 and standing nasal saline spray. Pt denies any shortness of breath, chest pain, or dizziness. Pt is on 2L NC. Currently on lasix BID. INR 1.87 C/O Nose bleed , dry nose, Change to Humidified O2 .    OVERNIGHT EVENTS: No acute overnight events    Home Medications:  Lasix 40 mg oral tablet: 1 tab(s) orally 2 times a day (26 Jun 2022 12:41)  Metoprolol Tartrate 25 mg oral tablet: 1 tab(s) orally once a day (26 Jun 2022 12:41)  potassium chloride 10 mEq oral capsule, extended release: 2 cap(s) orally 2 times a day (26 Jun 2022 12:41)  Synthroid 75 mcg (0.075 mg) oral tablet: 1 tab(s) orally once a day (26 Jun 2022 12:41)  warfarin 3 mg oral tablet: 1 tab(s) orally once a day (M, Tu, Th, F, Sa Grider) (26 Jun 2022 12:41)  warfarin 4 mg oral tablet: 1 tab(s) orally Wednesday (26 Jun 2022 12:41)      MEDICATIONS  (STANDING):  aspirin enteric coated 81 milliGRAM(s) Oral daily  atorvastatin 80 milliGRAM(s) Oral at bedtime  BACItracin   Ointment 1 Application(s) Topical two times a day  clopidogrel Tablet 75 milliGRAM(s) Oral daily  furosemide    Tablet 40 milliGRAM(s) Oral two times a day  levothyroxine 75 MICROGram(s) Oral daily  metoprolol succinate ER 50 milliGRAM(s) Oral daily  pantoprazole  Injectable 40 milliGRAM(s) IV Push daily  zinc oxide 40% Paste 1 Application(s) Topical two times a day    MEDICATIONS  (PRN):  acetaminophen     Tablet .. 650 milliGRAM(s) Oral every 6 hours PRN Mild Pain (1 - 3)  ALBUTerol    90 MICROgram(s) HFA Inhaler 2 Puff(s) Inhalation every 12 hours PRN Shortness of Breath and/or Wheezing  aluminum hydroxide/magnesium hydroxide/simethicone Suspension 30 milliLiter(s) Oral every 6 hours PRN Dyspepsia      Allergies    No Known Allergies    Intolerances        REVIEW OF SYSTEMS: I feel ok  CONSTITUTIONAL: No fever, No chills, No fatigue, No myalgia, No Body ache, No Weakness  EYES: No eye pain,  No visual disturbances, No discharge, NO Redness  ENMT:  No ear pain, No nose bleed, No vertigo; No sinus pain, NO throat pain, No Congestion  NECK: No pain, No stiffness  RESPIRATORY: No cough, NO wheezing, No hemoptysis, ADMITS brief shortness of breath earlier, now resolved  CARDIOVASCULAR: No chest pain, palpitations  GASTROINTESTINAL: No abdominal pain, NO epigastric pain. No nausea, No vomiting; No diarrhea, No constipation. [ x ] BM  GENITOURINARY: No dysuria, No frequency, No urgency, No hematuria, NO incontinence  NEUROLOGICAL: No headaches, No dizziness, No numbness, No tingling, No tremors, No weakness  EXT: admits chronic Swelling in LE's, No Pain, admits LE Edema  SKIN:  [ x ] No itching, burning, rashes, or lesions   MUSCULOSKELETAL: No joint pain ,No Jt swelling; No muscle pain, No back pain, No extremity pain  PSYCHIATRIC: No depression,  No anxiety,  No mood swings ,No difficulty sleeping at night  PAIN SCALE: [ x ] None  [  ] Other-  ROS Unable to obtain due to - [  ] Dementia  [  ] Lethargy [  ] Drowsy [  ] Sedated [  ] non verbal    Vital Signs Last 24 Hrs  T(C): 36.8 (27 Jun 2022 05:47), Max: 36.8 (27 Jun 2022 05:47)  T(F): 98.2 (27 Jun 2022 05:47), Max: 98.2 (27 Jun 2022 05:47)  HR: 74 (27 Jun 2022 05:47) (73 - 83)  BP: 112/66 (27 Jun 2022 05:47) (104/54 - 129/68)  BP(mean): --  RR: 18 (27 Jun 2022 05:47) (18 - 18)  SpO2: 93% (27 Jun 2022 05:47) (82% - 94%)  Finger Stick        06-26 @ 07:01  -  06-27 @ 07:00  --------------------------------------------------------  IN: 474 mL / OUT: 1550 mL / NET: -1076 mL        PHYSICAL EXAM: O2 NC on 2 L  GENERAL:  [ x ] NAD  [ x ] well appearing, [  ] Agitated, [  ] Drowsy,  [  ] Lethargy, [  ] confused   HEAD:  [ x ] Normal, [  ] Other  EYES:  [ x ] EOMI, [ x ] PERRLA, [ x ] conjunctiva and sclera clear normal, [  ] Other,  [  ] Pallor,[  ] Discharge  ENMT:  [ x ] Normal, [ x ] Moist mucous membranes, [ x ] Good dentition, [ x ] No Thrush  NECK:  [ x ] Supple, [x  ] No JVD, [ x ] Normal thyroid, [  ] Lymphadenopathy [  ] Other  CHEST/LUNG:  [ x ] Clear to auscultation bilaterally, [  ] Breath Sounds equal B/L , [  ] poor effort  [ x ] Right lung base rales, [ x ] No rhonchi  [  ]  slight expiratory wheezing,   HEART:  [ x ] Regular rate and IRREGULARLY IRREGULAR rhythm, [  ] tachycardia, [  ] Bradycardia,  [  ] irregular  [ x ] systolic murmur heard best at RUSB No rubs, No gallops, [  ] PPM in place (Mfr:  )  ABDOMEN:  [ x ] Soft, [ x ] Nontender, [ x ] Nondistended, [ x ] No mass, [ x] Bowel sounds present, [ x ] obese  NERVOUS SYSTEM:  [ x ] Alert & Oriented X3, [ x ] Nonfocal  [  ] Confusion  [  ] Encephalopathic [  ] Sedated [  ] Unable to assess, [  ] Dementia [  ] Other-  EXTREMITIES: [ x ] 2+ Peripheral Pulses, No clubbing, No cyanosis,  [ x ] b/l UE +2 pitting edema legs &  gathering at proximal forearms, trace edema b/l LEs, 2+ radial pulses b/l, extremities warm to touch [ x ] PVD stasis skin changes B/L Lower EXT, [  ] wound  LYMPH: No lymphadenopathy noted  SKIN:  [ x ] No rashes or lesions, [  ] Pressure Ulcers, [  ] ecchymosis, [  ] Skin Tears, [ x ] Other - redness B/L Buttocks    DIET: Diet, DASH/TLC:   Sodium & Cholesterol Restricted  1200mL Fluid Restriction ([id number]) (06-22-22 @ 08:53)      LABS:                        10.9   5.02  )-----------( 230      ( 27 Jun 2022 07:08 )             33.8       Ca    8.1        26 Jun 2022 07:08      PT/INR - ( 26 Jun 2022 07:08 )   PT: 17.2 sec;   INR: 1.46 ratio         PTT - ( 26 Jun 2022 07:08 )  PTT:35.7 sec    Serum Pro-Brain Natriuretic Peptide: 9883 pg/mL (06-25-22 @ 07:03)  Serum Pro-Brain Natriuretic Peptide: 72652 pg/mL (06-20-22 @ 09:53)      RADIOLOGY & ADDITIONAL TESTS: Personally reviewed.     HEALTH ISSUES - PROBLEM Dx:  Hypothyroid    Hypertension    Lower extremity edema    Paroxysmal atrial fibrillation    Need for prophylactic measure    Anemia    Acute kidney injury superimposed on CKD    NSTEMI (non-ST elevation myocardial infarction)    Acute systolic congestive heart failure            Consultant(s) Notes Reviewed:  [x  ] YES     Care Discussed with [ ] Consultants  [ x ] Patient  [ x ] Family [  ] HCP [  ]   [ x ] Social Service  [ x ] RN, [  ] Physical Therapy,[  ] Palliative care team  DVT PPX: [  ] Lovenox, [  ] S C Heparin, [x  ] Coumadin, [  ] Xarelto, [  ] Eliquis, [  ] Pradaxa, [  ] IV Heparin drip, [  ] SCD [  ] Contraindication 2 to GI Bleed,[  ] Ambulation [  ] Contraindicated 2 to  bleed [  ] Contraindicated 2 to Brain Bleed  Advanced directive: [  ] None, [ x ] DNR/DNI

## 2022-06-27 NOTE — PROGRESS NOTE ADULT - SUBJECTIVE AND OBJECTIVE BOX
Neurology Follow up note    KENIA [id number]    HPI:  Patient is a 89 y/o F with a pmhx b/l LE edema, HTN, P Afib (on coumadin), and hypothyroidism presenting with sudden onset mid-sternal CP. Pt describes pain as a "prickly feeling", rated 4/10, constant in nature since 4am on 6/17, resolved in the ED. Pain was associated with L arm weakness, now resolved. Pt states the pain woke her up, and she was scared to walk and was unable to use her L arm for support to get out of bed. Pt was also nauseous at this time. No associated diaphoresis, palpitations, chest pressure. No amaurosis fugax, facial droop, garbled speech, hemiparesis, LOC, falls reported. No vomiting, fevers, chills, cough, sore throat, SOB, abd pain, constipation, dysuria. Pt had one loose BM this AM at home, denied hematochezia or melena.       In ED:  VS - HR 71 | /65 | RR 18 | sO2 94 | T 97.6  Labs - HgB 11.2 | INR 1.85 | K 3.3 | Glu 114 | trop 342.6 | CKMB 3.2 | BNP 2396  CTH NC - No acute intracranial hemorrhage. Lacunar infarct R inferior cerebellum  EKG - vent rate 66, QTc 501ms, sinus rhythm, old LBBB, no ischemic changes on personal read  Given -  mg PO. pt seen by cardiology Dr Almazan , pt admitted to [facility] for NSTEMI. (17 Jun 2022 09:52)      Interval History -reported diarrhea    Patient is seen, chart was reviewed and case was discussed with the treatment team.  Pt is not in any distress.   Lying on bed comfortably.     Vital Signs Last 24 Hrs  T(C): 36.8 (27 Jun 2022 11:49), Max: 36.8 (27 Jun 2022 05:47)  T(F): 98.3 (27 Jun 2022 11:49), Max: 98.3 (27 Jun 2022 11:49)  HR: 73 (27 Jun 2022 11:49) (73 - 74)  BP: 97/61 (27 Jun 2022 13:59) (97/61 - 129/68)  BP(mean): --  RR: 17 (27 Jun 2022 11:49) (17 - 18)  SpO2: 92% (27 Jun 2022 11:49) (92% - 94%)        REVIEW OF SYSTEMS:    Constitutional: No fever,   Eyes: No eye pain, visual disturbances, or discharge  ENT:  No difficulty hearing, tinnitus, vertigo; No sinus or throat pain  Neck: No pain or stiffness  Respiratory: No cough, wheezing, chills or hemoptysis  Cardiovascular: No alpitations, shortness of breath,   Gastrointestinal: No abdominal or epigastric pain. No nausea, vomiting or hematemesis;  Genitourinary: No dysuria, frequency, hematuria  Neurological: No headaches tremors  Psychiatric: No depression, anxiety, mood swings or difficulty sleeping  Musculoskeletal: No joint pain or swelling;   Skin: No itching, burning, rashes or lesions   Lymph Nodes: No enlarged glands  Endocrine: No heat or cold intolerance;   Allergy and Immunologic: No hives or eczema    On Neurological Examination:    Mental Status - Pt is alert, awake, oriented X3.  Follows commands well and able to answer questions appropriately  .Mood and affect  normal    Speech -  Normal.    Cranial Nerves - Pupils 3 mm equal and reactive to light, extraocular eye movements intact. Pt has no visual field deficit.  Pt has no  facial asymmetry. Facial sensation is intact.Tongue - is in midline.    Muscle tone - is normal     Motor Exam - 5/5 of UE  LE 4/5    Sensory Exam -  Pt withdraws all extremities equally on stimulation. No asymmetry seen.      coordination:    Finger to nose: adele    Deep tendon Reflexes - 2 plus all over.         Neck Supple -  Yes.     MEDICATIONS  (STANDING):  aspirin enteric coated 81 milliGRAM(s) Oral daily  atorvastatin 80 milliGRAM(s) Oral at bedtime  BACItracin   Ointment 1 Application(s) Topical two times a day  clopidogrel Tablet 75 milliGRAM(s) Oral daily  furosemide    Tablet 40 milliGRAM(s) Oral two times a day  levothyroxine 75 MICROGram(s) Oral daily  metoprolol succinate ER 50 milliGRAM(s) Oral daily  pantoprazole  Injectable 40 milliGRAM(s) IV Push daily  potassium chloride    Tablet ER 40 milliEquivalent(s) Oral every 4 hours  warfarin 3 milliGRAM(s) Oral once  zinc oxide 40% Paste 1 Application(s) Topical two times a day    MEDICATIONS  (PRN):  ALBUTerol    90 MICROgram(s) HFA Inhaler 2 Puff(s) Inhalation every 12 hours PRN Shortness of Breath and/or Wheezing  aluminum hydroxide/magnesium hydroxide/simethicone Suspension 30 milliLiter(s) Oral every 6 hours PRN Dyspepsia  >      Allergies    No Known Allergies    Intolerances  06-27    141  |  104  |  22  ----------------------------<  103<H>  3.8   |  35<H>  |  1.30    Ca    8.3<L>      27 Jun 2022 07:08  Phos  2.6     06-26  Mg     2.4     06-26    TPro  6.1  /  Alb  2.3<L>  /  TBili  1.0  /  DBili  x   /  AST  52<H>  /  ALT  28  /  AlkPhos  113  06-27    Vitamin B12     RADIOLOGY    ASSESSMENT AND PLAN:      SEEN FOE LEFT ARM WEAKNESS LIKELY TIA  SP MI  OLD CEREBELLAR INFARCT  LUIS CARLOS improving renal function    NO FURTHER NEURO W/U  FOR CARDIAC CATHETERIZATION   CONTINUE AP/STATIN FOR STROKE PREVENTION  Physical therapy evaluation.  OOB to chair/ambulation with assistance only.  Pain is accessed and addressed.  Would continue to follow PRN

## 2022-06-27 NOTE — PROGRESS NOTE ADULT - ATTENDING COMMENTS
Patient is a 89 y/o F with a pmhx b/l LE edema, HTN, P Afib (on coumadin), and hypothyroidism presenting with diarrhea and mid-sternal CP admitted for CP w/ elevated trops, NSTEMI.  Pt seen, examined, case & care plan d/w pt, residents at detail.  -Cardiology Dr ALEXANDRU Mccord follow up-Lasix 40 mg PO 2x a day., O2 NC  -Nasal saline spray 4-5 x day, Add Flonase nasal spay 2x day  -Renal DR ALEXANDRU FLORIAN  d/w at detail.  -AM labs   -PO diet  -Palliative care -DNR/DNI  -D/W Case management  about D/C Plan.  Total care time is 45 minutes.

## 2022-06-27 NOTE — PROGRESS NOTE ADULT - SUBJECTIVE AND OBJECTIVE BOX
Patient is a 89y old  Female who presents with a chief complaint of CP w/ elevated trops (19 Jun 2022 09:28)       HPI:  Patient is a 89 y/o F with a pmhx b/l LE edema, HTN, P Afib (on coumadin), and hypothyroidism presenting with sudden onset mid-sternal CP. Pt describes pain as a "prickly feeling", rated 4/10, constant in nature since 4am on 6/17, resolved in the ED. Pain was associated with L arm weakness, now resolved. Pt states the pain woke her up, and she was scared to walk and was unable to use her L arm for support to get out of bed. Pt was also nauseous at this time. No associated diaphoresis, palpitations, chest pressure. No amaurosis fugax, facial droop, garbled speech, hemiparesis, LOC, falls reported. No vomiting, fevers, chills, cough, sore throat, SOB, abd pain, constipation, dysuria. Pt had one loose BM this AM at home, denied hematochezia or melena.   Patient has not seen nephrologist in the past.  Denies any N/V/Urinary complaints.      No acute events noted       PAST MEDICAL & SURGICAL HISTORY:  Hypothyroid      Hypertension      Lower extremity edema      Paroxysmal atrial fibrillation           FAMILY HISTORY:  NC    Social History:Non smoker    MEDICATIONS  (STANDING):  acetylcysteine  Oral Solution 600 milliGRAM(s) Oral every 12 hours  aspirin enteric coated 81 milliGRAM(s) Oral daily  atorvastatin 80 milliGRAM(s) Oral at bedtime  calcium gluconate IVPB 1 Gram(s) IV Intermittent once  clopidogrel Tablet 75 milliGRAM(s) Oral daily  furosemide   Injectable 40 milliGRAM(s) IV Push once  levothyroxine 75 MICROGram(s) Oral daily  metoprolol succinate ER 50 milliGRAM(s) Oral daily  pantoprazole  Injectable 40 milliGRAM(s) IV Push daily  sodium chloride 0.9%. 1000 milliLiter(s) (50 mL/Hr) IV Continuous <Continuous>    MEDICATIONS  (PRN):      Allergies    No Known Allergies    Intolerances         REVIEW OF SYSTEMS:    Review of Systems:   Constitutional: Denies fatigue  HEENT: Denies headaches and dizziness  Respiratory: denies SOB, cough, or wheezing  Cardiovascular: denies CP, palpitations  Gastrointestinal: Denies nausea, denies vomiting, diarrhea, constipation, abdominal pain, or bloody stools  Genitourinary: denies painful urination, increased frequency, urgency, or bloody urine  Skin: denies rashes or itching  Musculoskeletal: denies muscle aches, joint swelling  Neurologic: Denies generalized weakness, denies loss of sensation, numbness, or tingling    ICU Vital Signs Last 24 Hrs  T(C): 36.8 (27 Jun 2022 11:49), Max: 36.8 (27 Jun 2022 05:47)  T(F): 98.3 (27 Jun 2022 11:49), Max: 98.3 (27 Jun 2022 11:49)  HR: 73 (27 Jun 2022 11:49) (73 - 74)  BP: 97/61 (27 Jun 2022 13:59) (97/61 - 129/68)  BP(mean): --  ABP: --  ABP(mean): --  RR: 17 (27 Jun 2022 11:49) (17 - 18)  SpO2: 92% (27 Jun 2022 11:49) (92% - 94%)        PHYSICAL EXAM:    GENERAL: NAD  HEAD:  Atraumatic, Normocephalic  EYES: EOMI, conjunctiva and sclera clear  ENMT: No Drainage from nares, No drainage from ears  NECK: Supple, neck  veins full  NERVOUS SYSTEM:  Awake and Alert  CHEST/LUNG: mildly Decreased BS R No rales, rhonchi, wheezing, or rubs  HEART: Regular rate and rhythm; No murmurs, rubs, or gallops  ABDOMEN: Soft, Nontender, Nondistended; Bowel sounds present  EXTREMITIES:  + Edema  SKIN: No rashes No obvious ecchymosis      LABS:                                   10.9   5.02  )-----------( 230      ( 27 Jun 2022 07:08 )             33.8     06-27    141  |  104  |  22  ----------------------------<  103<H>  3.8   |  35<H>  |  1.30    Ca    8.3<L>      27 Jun 2022 07:08  Phos  2.6     06-26  Mg     2.4     06-26    TPro  6.1  /  Alb  2.3<L>  /  TBili  1.0  /  DBili  x   /  AST  52<H>  /  ALT  28  /  AlkPhos  113  06-27    PT/INR - ( 27 Jun 2022 07:08 )   PT: 22.0 sec;   INR: 1.87 ratio         PTT - ( 27 Jun 2022 07:08 )  PTT:37.6 sec

## 2022-06-27 NOTE — PROGRESS NOTE ADULT - PROBLEM SELECTOR PLAN 4
LUIS CARLOS on CKD stage 3B?   -Cr 1.5 baseline appears to be 1-1.2  -s/p Mucomyst q 12 hrs x 4 doses for prep for Cardiac Cath   - avoid IVF due to CHF  - s/p Lasix 40mg IV x2 on 6/20 and 6/21, elevated pBNP ~ 28535 for CHF-Lasix 20 mg IVP x 1 dose-6/23  -Lasix 40 mg 2x day PO  -BMP in AM  -Renal follow up

## 2022-06-27 NOTE — PROGRESS NOTE ADULT - PROBLEM SELECTOR PLAN 3
normocytic anemia on admit - baseline ~10-11 appears near baseline  - unclear etiology - likely 2/2 fluid overload  -  iron studies, TFT's, B12, folate -Nl  - monitor for VS and s/s of worsening anemia and trend HgB in AM CBC normocytic anemia on admit - baseline ~10-11 appears near baseline  - unclear etiology - likely 2/2 fluid overload  - overnight with blood clots noted from nasal orifice - started standing nasal saline spray and humidified O2   -  iron studies, TFT's, B12, folate -Nl  - monitor for VS and s/s of worsening anemia and trend HgB in AM CBC

## 2022-06-27 NOTE — PROGRESS NOTE ADULT - ASSESSMENT
LUIS CARLOS on CKD 3b  Hypokalemia  HTN  NSTEMI  CHF    -BLCr 1.2  -Urine indices reviewed  -Can continue Lasix 40 mg po q 12 hours   -Renal indices are improving back to baseline  -Cardio follow up noted; no clear plan for Cath yet given age and creatinine  -Previously Discussed risks of contrast administration up to and including dialysis, patient and family understand risks; initially wanting to proceed, but now unsure  -Monitor chemistries

## 2022-06-27 NOTE — PROGRESS NOTE ADULT - ASSESSMENT
89 y/o F with a pmhx b/l LE edema, HTN,P Afib on coumadin , hypothyroidism, pw midsternal chest discomfort atypical in nature and left arm discomfort since waking up this morning at  4am, describes it as "creepy feeling" assoc with left arm weakness, general weakness and not feeling well. Patient has no underlying coronary disease.     NSTEMI  - EKG: Afib with underlying LBBB; unchanged from previous.    - hsT trended up to >04423, downtrended to 69[address], remained asymptomatic, denies any anginal symptoms to date  - Continue to monitor for ischemic chest pain.   - Ischemic eval and plan has been discussed with patient and daughter, multiple times, aware of risk and benefits of procedure, understands that as of now medical management would be more favorable given pt's  advance age, labile renal function, and volume overload.   - renal indices improving back to baseline, renal following   - BP, HR, stable and controlled  - Continue GDMT: DAPT (ASA, Plavix), BB, and statin.   - continue daily dose warfarin    - continue routine hemodynamics    Acute Systolic HF  - TTE:(6/19/2022) Mild to moderate segmental LV systolic dysfunction, est LVEF of 35-40%. The apex, mid inferoseptal and mid anterolateral walls appear severely hypokinetic, RV enlargement with grossly normal function. Biatrial enlargement Calcified trileaflet aortic valve with mild to moderate aortic stenosis, without AI. Mild MR Moderate TR.  - appears more compensated from HF POV   - She has chronic B/L LE edema with improvement, Initiate MANN stockings  - s/p Lasix IV, continue Lasix PO  - continue to monitor volume status   - Continue strict I/O's.     - Monitor electrolytes, replete to keep K>4 and Mag>2  - rec PT f/u, pt appears deconditioned   - Will continue to follow.    Melany Howe, [facility]  Nurse Practitioner - Cardiology   Spectra #[phone]   89 y/o F with a pmhx b/l LE edema, HTN,P Afib on coumadin , hypothyroidism, pw midsternal chest discomfort atypical in nature and left arm discomfort since waking up this morning at  4am, describes it as "creepy feeling" assoc with left arm weakness, general weakness and not feeling well. Patient has no underlying coronary disease.     NSTEMI  - EKG: Afib with underlying LBBB; unchanged from previous.    - hsT trended up to >63055, downtrended to 04563, remained asymptomatic, denies any anginal symptoms to date  - Continue to monitor for ischemic chest pain.   - Ischemic eval and plan has been discussed with patient and daughter, multiple times, aware of risk and benefits of procedure, understands that as of now medical management would be more favorable given pt's  advance age, labile renal function, and volume overload.   - renal indices improving back to baseline, renal following   - BP, HR, stable and controlled  - Continue GDMT: DAPT (ASA, Plavix), BB, and statin.   - continue daily dose warfarin    - would only keep on triple therapy for short period. would likely reduce to coumadin and plavix.   - continue routine hemodynamics    Acute Systolic HF  - TTE:(6/19/2022) Mild to moderate segmental LV systolic dysfunction, est LVEF of 35-40%. The apex, mid inferoseptal and mid anterolateral walls appear severely hypokinetic, RV enlargement with grossly normal function. Biatrial enlargement Calcified trileaflet aortic valve with mild to moderate aortic stenosis, without AI. Mild MR Moderate TR.  - appears more compensated from HF POV   - She has chronic B/L LE edema with improvement, Initiate MANN stockings  - s/p Lasix IV, continue Lasix PO  - continue to monitor volume status   - Continue strict I/O's.     - Monitor electrolytes, replete to keep K>4 and Mag>2  - rec PT f/u, pt appears deconditioned   - Will continue to follow.    Melany Howe, [facility]  Nurse Practitioner - Cardiology   Spectra #[phone]

## 2022-06-27 NOTE — PROGRESS NOTE ADULT - PROBLEM SELECTOR PLAN 9
DVT ppx coumadin 3 mg tonight INR 1.46, no plan for cath   s/p full dose Lovenox x2 renally dosed on 6/21 and 6/22 DVT ppx coumadin 3 mg tonight INR 1.8, no plan for cath   s/p full dose Lovenox x2 renally dosed on 6/21 and 6/22

## 2022-06-27 NOTE — PROGRESS NOTE ADULT - PROBLEM SELECTOR PLAN 2
presents with acute CP pain w radiation down L arm - trops elevated w/ no acute EKG changes   - admitted to to tele for NSTEMI , Few beats - NSVT on Tele. Remains non anginal   -As per discussion with cardio, no plan for outpatient cath 2/2 persistent SOB/Fluid overload  -trop initially 342.6, up trended to over 22K, came down to 15 K   - EKG Afib with underlying LBBB; unchanged from previous  -c/w ASA 81mg qD, Lipitor 80mg qD, Toprol xl 50mg qD, PPI   - TTE: EF 35-40%, LV hypokinesis  -continuous tele monitoring  -cardio Dr. Blair d/w no Cath as still fluid overload   -continue home Lasix 40 mg BID  -DNR DNI order placed, JAMES in chart

## 2022-06-27 NOTE — PROGRESS NOTE ADULT - PROBLEM SELECTOR PLAN 1
Acute Systolic CHF excaerbation  -TTE: EF 35-40%, LV hypokinesis 2/2 NSTEMI. [place], mid inferoseptal and mid anterolateral walls severely hypokinetic. Right ventricular enlargement. Biatrial enlargement. Moderate aortic stenosis. Moderate TR.   -proBNP>13k  -Daily assessment of volume status,  -continue home Lasix 40 mg BID, start incentive spirometry, albuterol inhaler x1 dose given this AM for wheezing. Spoke with nurse, will try to wean O2 as tolerated.  -avoid IVF  -I/Os, daily weights  -cardio Dr. Blair  -PT recommending SINAN

## 2022-06-27 NOTE — PROGRESS NOTE ADULT - SUBJECTIVE AND OBJECTIVE BOX
[facility] -- Reid Astorga, Raheem Almazan, Flex Muniz Savella, Goodger: Office # [phone]    Follow Up:  NSTEMI, Acute CHF    Subjective/Observations: Patient seen and examined, awake, alert, in bed eating breakfast, denies chest pain, dyspnea, palpitations or dizziness. ToleratingO2 via NC     REVIEW OF SYSTEMS: All review of systems is negative for eye, ENT, GI, , allergic, dermatologic, musculoskeletal and neurologic except as described above    PAST MEDICAL & SURGICAL HISTORY:  Hypothyroid      Hypertension      Lower extremity edema      Paroxysmal atrial fibrillation          MEDICATIONS  (STANDING):  aspirin enteric coated 81 milliGRAM(s) Oral daily  atorvastatin 80 milliGRAM(s) Oral at bedtime  BACItracin   Ointment 1 Application(s) Topical two times a day  clopidogrel Tablet 75 milliGRAM(s) Oral daily  furosemide    Tablet 40 milliGRAM(s) Oral two times a day  levothyroxine 75 MICROGram(s) Oral daily  metoprolol succinate ER 50 milliGRAM(s) Oral daily  pantoprazole  Injectable 40 milliGRAM(s) IV Push daily  sodium chloride 0.65% Nasal 1 Spray(s) Both Nostrils five times a day  warfarin 3 milliGRAM(s) Oral once  zinc oxide 40% Paste 1 Application(s) Topical two times a day    MEDICATIONS  (PRN):  acetaminophen     Tablet .. 650 milliGRAM(s) Oral every 6 hours PRN Mild Pain (1 - 3)  ALBUTerol    90 MICROgram(s) HFA Inhaler 2 Puff(s) Inhalation every 12 hours PRN Shortness of Breath and/or Wheezing  aluminum hydroxide/magnesium hydroxide/simethicone Suspension 30 milliLiter(s) Oral every 6 hours PRN Dyspepsia    Allergies    No Known Allergies    Intolerances      Vital Signs Last 24 Hrs  T(C): 36.8 (27 Jun 2022 05:47), Max: 36.8 (27 Jun 2022 05:47)  T(F): 98.2 (27 Jun 2022 05:47), Max: 98.2 (27 Jun 2022 05:47)  HR: 74 (27 Jun 2022 05:47) (73 - 83)  BP: 112/66 (27 Jun 2022 05:47) (104/54 - 129/68)  BP(mean): --  RR: 18 (27 Jun 2022 05:47) (18 - 18)  SpO2: 93% (27 Jun 2022 05:47) (82% - 94%)  I&O's Summary    26 Jun 2022 07:01  -  27 Jun 2022 07:00  --------------------------------------------------------  IN: 474 mL / OUT: 1550 mL / NET: -1076 mL            TELE: Not on telemetry   PHYSICAL EXAM:  Constitutional: NAD, awake and alert, well-developed  HEENT: Moist Mucous Membranes, Anicteric  Pulmonary: Non-labored, breath sounds are clear bilaterally, No wheezing, rales or rhonchi  Cardiovascular: Regular, S1 and S2, + murmurs, rubs, gallops or clicks  Gastrointestinal: Bowel Sounds present, soft, nontender.   Lymph: trace b/l peripheral edema. b/l UE edema   Skin: No visible rashes or ulcers.  Psych:  Mood & affect appropriate for situation      LABS: All Labs Reviewed:                        10.9   5.02  )-----------( 230      ( 27 Jun 2022 07:08 )             33.8                         10.5   5.08  )-----------( 216      ( 26 Jun 2022 07:08 )             32.0                         10.3   4.19  )-----------( 206      ( 25 Jun 2022 07:03 )             32.0     27 Jun 2022 07:08    141    |  104    |  22     ----------------------------<  103    3.8     |  35     |  1.30   26 Jun 2022 07:08    140    |  104    |  22     ----------------------------<  101    3.4     |  31     |  1.20   25 Jun 2022 07:03    140    |  103    |  27     ----------------------------<  90     3.5     |  32     |  1.30     Ca    8.3        27 Jun 2022 07:08  Ca    8.1        26 Jun 2022 07:08  Ca    8.2        25 Jun 2022 07:03  Phos  2.6       26 Jun 2022 07:08  Phos  2.8       25 Jun 2022 07:03  Mg     2.4       26 Jun 2022 07:08  Mg     2.6       25 Jun 2022 07:03    TPro  6.1    /  Alb  2.3    /  TBili  1.0    /  DBili  x      /  AST  52     /  ALT  28     /  AlkPhos  113    27 Jun 2022 07:08  TPro  6.0    /  Alb  2.3    /  TBili  0.8    /  DBili  x      /  AST  44     /  ALT  25     /  AlkPhos  101    26 Jun 2022 07:08  TPro  5.9    /  Alb  2.3    /  TBili  0.8    /  DBili  x      /  AST  37     /  ALT  22     /  AlkPhos  93     25 Jun 2022 07:03    PT/INR - ( 27 Jun 2022 07:08 )   PT: 22.0 sec;   INR: 1.87 ratio         PTT - ( 27 Jun 2022 07:08 )  PTT:37.6 sec  Creatine Kinase, Serum: 364 U/L (06-18-22 @ 08:32)  Troponin I, High Sensitivity Result: 34783.6 ng/L (06-18-22 @ 08:32)  Troponin I, High Sensitivity Result: 87485.3 ng/L (06-17-22 @ 23:55)  Creatine Kinase, Serum: 497 U/L (06-17-22 @ 23:34)  Creatine Kinase, Serum: 523 U/L (06-17-22 @ 14:55)            Cholesterol, Serum: 144 mg/dL (06-18-22 @ 10:09)  HDL Cholesterol, Serum: 44 mg/dL (06-18-22 @ 10:09)  Triglycerides, Serum: 65 mg/dL (06-18-22 @ 10:09)      12 Lead ECG:   Ventricular Rate 74 BPM    Atrial Rate 71 BPM    QRS Duration 154 ms    Q-T Interval 458 ms    QTC Calculation(Bazett) 508 ms    R Axis 251 degrees    T Axis 225 degrees    Diagnosis Line Atrial fibrillation  Left bundle branch block  Confirmed by KEVIN MUNIZ ([id number]) on 6/22/2022 12:20:29 PM (06-22-22 @ 09:28)    < from: Xray Chest 1 View- PORTABLE-Urgent (Xray Chest 1 View- PORTABLE-Urgent .) (06.17.22 @ 08:39) >    ACC: [id number] EXAM:  XR CHEST PORTABLE URGENT 1V                          PROCEDURE DATE:  06/17/2022          INTERPRETATION:  AP semierect chest on June 17, 2022 at 8:35 AM. Patient   has chest pain.    Heart magnified by technique.    There is elevation of the right hemidiaphragm again noted.    Mild right base effusion somewhat increased from October 11, 2021.    IMPRESSION: As above.    --- End of Report ---            RUSSELL MONTES MD; Attending Radiologist  This document has been electronically signed. Jun 17 2022 11:18AM    < end of copied text >  < from: TTE Echo Complete w/o Contrast w/ Doppler (06.19.22 @ 10:00) >    ACC: [id number] EXAM:  ECHO TTE WO CON COMP W DOPP                          PROCEDURE DATE:  06/19/2022          INTERPRETATION:  INDICATION: Chest pain  Sonographer LK    Blood Pressure 147/84    Height 165.1 cm     Weight 72.6 kg       BSA 1.8   sqm    Dimensions:  LA 3.8       Normal Values: 2.0 - 4.0 cm  Ao 3.2        Normal Values: 2.0 - 3.8 cm  SEPTUM 1.7       Normal Values: 0.6 - 1.2 cm  PWT 1.2       Normal Values: 0.6 - 1.1 cm  LVIDd 4.8         Normal Values: 3.0 - 5.6 cm  LVIDs 3.2      Normal Values: 1.8 - 4.0 cm      OBSERVATIONS:  Mitral Valve: Mitral annular calcification with thickened leaflets, mild   MR.  Aortic Valve/Aorta: Calcified trileaflet aortic valve with decreased   opening. Peak transaortic valve gradient equals 20.4 mmHg with a mean   transaortic valve gradient 13.2 mmHg. By visual estimation there appears   to be mild to moderate aortic stenosis  Tricuspid Valve: Moderate TR.  Pulmonic Valve: Trace PI  Left Atrium: Enlarged  Right Atrium: Enlarged  Left Ventricle: Mild to moderate segmental left ventricular systolic   dysfunction, estimated LVEF of 35-40%. The apex, mid inferoseptal and mid   anterolateral walls appear severely hypokinetic  Right Ventricle: Right ventricular enlargement with grossly normal   function  Pericardium: no significant pericardial effusion.  Pulmonary/RV Pressure: estimated PA systolic pressure of 36 mmHg  IVC measures 1.47 cm          IMPRESSION:  Mild to moderate segmental left ventricular systolic dysfunction,   estimated LVEF of 35-40%. The apex, mid inferoseptal and mid   anterolateral walls appear severely hypokinetic  Right ventricular enlargement with grossly normal function  Biatrial enlargement  Calcified trileaflet aortic valve with mild to moderate aortic stenosis,   without AI.  Mild MR  Moderate TR.  No significant pericardial effusion.    --- End of Report ---            JEAN CARLOS HENRY MD; Attending Cardiologist  This document has been electronically signed. Jun 20 2022 12:56PM    < end of copied text >   [facility] -- Reid Astorga, Raheem Almazan, Flex Muniz Savella, Goodger: Office # [phone]    Follow Up:  NSTEMI, Acute CHF    Subjective/Observations: Patient seen and examined, awake, alert, in bed eating breakfast, denies chest pain, dyspnea, palpitations or dizziness. ToleratingO2 via NC with epistaxis today. self limiting.     REVIEW OF SYSTEMS: All review of systems is negative for eye, ENT, GI, , allergic, dermatologic, musculoskeletal and neurologic except as described above    PAST MEDICAL & SURGICAL HISTORY:  Hypothyroid      Hypertension      Lower extremity edema      Paroxysmal atrial fibrillation          MEDICATIONS  (STANDING):  aspirin enteric coated 81 milliGRAM(s) Oral daily  atorvastatin 80 milliGRAM(s) Oral at bedtime  BACItracin   Ointment 1 Application(s) Topical two times a day  clopidogrel Tablet 75 milliGRAM(s) Oral daily  furosemide    Tablet 40 milliGRAM(s) Oral two times a day  levothyroxine 75 MICROGram(s) Oral daily  metoprolol succinate ER 50 milliGRAM(s) Oral daily  pantoprazole  Injectable 40 milliGRAM(s) IV Push daily  sodium chloride 0.65% Nasal 1 Spray(s) Both Nostrils five times a day  warfarin 3 milliGRAM(s) Oral once  zinc oxide 40% Paste 1 Application(s) Topical two times a day    MEDICATIONS  (PRN):  acetaminophen     Tablet .. 650 milliGRAM(s) Oral every 6 hours PRN Mild Pain (1 - 3)  ALBUTerol    90 MICROgram(s) HFA Inhaler 2 Puff(s) Inhalation every 12 hours PRN Shortness of Breath and/or Wheezing  aluminum hydroxide/magnesium hydroxide/simethicone Suspension 30 milliLiter(s) Oral every 6 hours PRN Dyspepsia    Allergies    No Known Allergies    Intolerances      Vital Signs Last 24 Hrs  T(C): 36.8 (27 Jun 2022 05:47), Max: 36.8 (27 Jun 2022 05:47)  T(F): 98.2 (27 Jun 2022 05:47), Max: 98.2 (27 Jun 2022 05:47)  HR: 74 (27 Jun 2022 05:47) (73 - 83)  BP: 112/66 (27 Jun 2022 05:47) (104/54 - 129/68)  BP(mean): --  RR: 18 (27 Jun 2022 05:47) (18 - 18)  SpO2: 93% (27 Jun 2022 05:47) (82% - 94%)  I&O's Summary    26 Jun 2022 07:01  -  27 Jun 2022 07:00  --------------------------------------------------------  IN: 474 mL / OUT: 1550 mL / NET: -1076 mL            TELE: Not on telemetry   PHYSICAL EXAM:  Constitutional: NAD, awake and alert, well-developed  HEENT: Moist Mucous Membranes, Anicteric  Pulmonary: Non-labored, breath sounds are clear bilaterally, No wheezing, rales or rhonchi  Cardiovascular: Regular, S1 and S2, + murmurs, rubs, gallops or clicks  Gastrointestinal: Bowel Sounds present, soft, nontender.   Lymph: trace b/l peripheral edema. b/l UE edema   Skin: No visible rashes or ulcers.  Psych:  Mood & affect appropriate for situation      LABS: All Labs Reviewed:                        10.9   5.02  )-----------( 230      ( 27 Jun 2022 07:08 )             33.8                         10.5   5.08  )-----------( 216      ( 26 Jun 2022 07:08 )             32.0                         10.3   4.19  )-----------( 206      ( 25 Jun 2022 07:03 )             32.0     27 Jun 2022 07:08    141    |  104    |  22     ----------------------------<  103    3.8     |  35     |  1.30   26 Jun 2022 07:08    140    |  104    |  22     ----------------------------<  101    3.4     |  31     |  1.20   25 Jun 2022 07:03    140    |  103    |  27     ----------------------------<  90     3.5     |  32     |  1.30     Ca    8.3        27 Jun 2022 07:08  Ca    8.1        26 Jun 2022 07:08  Ca    8.2        25 Jun 2022 07:03  Phos  2.6       26 Jun 2022 07:08  Phos  2.8       25 Jun 2022 07:03  Mg     2.4       26 Jun 2022 07:08  Mg     2.6       25 Jun 2022 07:03    TPro  6.1    /  Alb  2.3    /  TBili  1.0    /  DBili  x      /  AST  52     /  ALT  28     /  AlkPhos  113    27 Jun 2022 07:08  TPro  6.0    /  Alb  2.3    /  TBili  0.8    /  DBili  x      /  AST  44     /  ALT  25     /  AlkPhos  101    26 Jun 2022 07:08  TPro  5.9    /  Alb  2.3    /  TBili  0.8    /  DBili  x      /  AST  37     /  ALT  22     /  AlkPhos  93     25 Jun 2022 07:03    PT/INR - ( 27 Jun 2022 07:08 )   PT: 22.0 sec;   INR: 1.87 ratio         PTT - ( 27 Jun 2022 07:08 )  PTT:37.6 sec  Creatine Kinase, Serum: 364 U/L (06-18-22 @ 08:32)  Troponin I, High Sensitivity Result: 10300.6 ng/L (06-18-22 @ 08:32)  Troponin I, High Sensitivity Result: 80891.3 ng/L (06-17-22 @ 23:55)  Creatine Kinase, Serum: 497 U/L (06-17-22 @ 23:34)  Creatine Kinase, Serum: 523 U/L (06-17-22 @ 14:55)            Cholesterol, Serum: 144 mg/dL (06-18-22 @ 10:09)  HDL Cholesterol, Serum: 44 mg/dL (06-18-22 @ 10:09)  Triglycerides, Serum: 65 mg/dL (06-18-22 @ 10:09)      12 Lead ECG:   Ventricular Rate 74 BPM    Atrial Rate 71 BPM    QRS Duration 154 ms    Q-T Interval 458 ms    QTC Calculation(Bazett) 508 ms    R Axis 251 degrees    T Axis 225 degrees    Diagnosis Line Atrial fibrillation  Left bundle branch block  Confirmed by KEVIN MUNIZ ([id number]) on 6/22/2022 12:20:29 PM (06-22-22 @ 09:28)    < from: Xray Chest 1 View- PORTABLE-Urgent (Xray Chest 1 View- PORTABLE-Urgent .) (06.17.22 @ 08:39) >    ACC: [id number] EXAM:  XR CHEST PORTABLE URGENT 1V                          PROCEDURE DATE:  06/17/2022          INTERPRETATION:  AP semierect chest on June 17, 2022 at 8:35 AM. Patient   has chest pain.    Heart magnified by technique.    There is elevation of the right hemidiaphragm again noted.    Mild right base effusion somewhat increased from October 11, 2021.    IMPRESSION: As above.    --- End of Report ---            RUSSELL MONTES MD; Attending Radiologist  This document has been electronically signed. Jun 17 2022 11:18AM    < end of copied text >  < from: TTE Echo Complete w/o Contrast w/ Doppler (06.19.22 @ 10:00) >    ACC: [id number] EXAM:  ECHO TTE WO CON COMP W DOPP                          PROCEDURE DATE:  06/19/2022          INTERPRETATION:  INDICATION: Chest pain  Sonographer LK    Blood Pressure 147/84    Height 165.1 cm     Weight 72.6 kg       BSA 1.8   sqm    Dimensions:  LA 3.8       Normal Values: 2.0 - 4.0 cm  Ao 3.2        Normal Values: 2.0 - 3.8 cm  SEPTUM 1.7       Normal Values: 0.6 - 1.2 cm  PWT 1.2       Normal Values: 0.6 - 1.1 cm  LVIDd 4.8         Normal Values: 3.0 - 5.6 cm  LVIDs 3.2      Normal Values: 1.8 - 4.0 cm      OBSERVATIONS:  Mitral Valve: Mitral annular calcification with thickened leaflets, mild   MR.  Aortic Valve/Aorta: Calcified trileaflet aortic valve with decreased   opening. Peak transaortic valve gradient equals 20.4 mmHg with a mean   transaortic valve gradient 13.2 mmHg. By visual estimation there appears   to be mild to moderate aortic stenosis  Tricuspid Valve: Moderate TR.  Pulmonic Valve: Trace PI  Left Atrium: Enlarged  Right Atrium: Enlarged  Left Ventricle: Mild to moderate segmental left ventricular systolic   dysfunction, estimated LVEF of 35-40%. The apex, mid inferoseptal and mid   anterolateral walls appear severely hypokinetic  Right Ventricle: Right ventricular enlargement with grossly normal   function  Pericardium: no significant pericardial effusion.  Pulmonary/RV Pressure: estimated PA systolic pressure of 36 mmHg  IVC measures 1.47 cm          IMPRESSION:  Mild to moderate segmental left ventricular systolic dysfunction,   estimated LVEF of 35-40%. The apex, mid inferoseptal and mid   anterolateral walls appear severely hypokinetic  Right ventricular enlargement with grossly normal function  Biatrial enlargement  Calcified trileaflet aortic valve with mild to moderate aortic stenosis,   without AI.  Mild MR  Moderate TR.  No significant pericardial effusion.    --- End of Report ---            JEAN CARLOS HENRY MD; Attending Cardiologist  This document has been electronically signed. Jun 20 2022 12:56PM    < end of copied text >

## 2022-06-28 LAB
ALBUMIN SERPL ELPH-MCNC: 2.2 G/DL — LOW (ref 3.3–5)
ALP SERPL-CCNC: 124 U/L — HIGH (ref 40–120)
ALT FLD-CCNC: 32 U/L — SIGNIFICANT CHANGE UP (ref 12–78)
ANION GAP SERPL CALC-SCNC: 2 MMOL/L — LOW (ref 5–17)
APTT BLD: 37.7 SEC — HIGH (ref 27.5–35.5)
AST SERPL-CCNC: 56 U/L — HIGH (ref 15–37)
BILIRUB SERPL-MCNC: 1.1 MG/DL — SIGNIFICANT CHANGE UP (ref 0.2–1.2)
BUN SERPL-MCNC: 24 MG/DL — HIGH (ref 7–23)
CALCIUM SERPL-MCNC: 7.7 MG/DL — LOW (ref 8.5–10.1)
CHLORIDE SERPL-SCNC: 103 MMOL/L — SIGNIFICANT CHANGE UP (ref 96–108)
CO2 SERPL-SCNC: 35 MMOL/L — HIGH (ref 22–31)
CREAT SERPL-MCNC: 1.5 MG/DL — HIGH (ref 0.5–1.3)
EGFR: 32 ML/MIN/1.73M2 — LOW
GLUCOSE SERPL-MCNC: 88 MG/DL — SIGNIFICANT CHANGE UP (ref 70–99)
HCT VFR BLD CALC: 31.3 % — LOW (ref 34.5–45)
HGB BLD-MCNC: 10.4 G/DL — LOW (ref 11.5–15.5)
INR BLD: 1.92 RATIO — HIGH (ref 0.88–1.16)
MCHC RBC-ENTMCNC: 30.3 PG — SIGNIFICANT CHANGE UP (ref 27–34)
MCHC RBC-ENTMCNC: 33.2 GM/DL — SIGNIFICANT CHANGE UP (ref 32–36)
MCV RBC AUTO: 91.3 FL — SIGNIFICANT CHANGE UP (ref 80–100)
NRBC # BLD: 0 /100 WBCS — SIGNIFICANT CHANGE UP (ref 0–0)
PLATELET # BLD AUTO: 205 K/UL — SIGNIFICANT CHANGE UP (ref 150–400)
POTASSIUM SERPL-MCNC: 3.8 MMOL/L — SIGNIFICANT CHANGE UP (ref 3.5–5.3)
POTASSIUM SERPL-SCNC: 3.8 MMOL/L — SIGNIFICANT CHANGE UP (ref 3.5–5.3)
PROT SERPL-MCNC: 6.4 G/DL — SIGNIFICANT CHANGE UP (ref 6–8.3)
PROTHROM AB SERPL-ACNC: 22.6 SEC — HIGH (ref 10.5–13.4)
RBC # BLD: 3.43 M/UL — LOW (ref 3.8–5.2)
RBC # FLD: 18.9 % — HIGH (ref 10.3–14.5)
SODIUM SERPL-SCNC: 140 MMOL/L — SIGNIFICANT CHANGE UP (ref 135–145)
WBC # BLD: 4.11 K/UL — SIGNIFICANT CHANGE UP (ref 3.8–10.5)
WBC # FLD AUTO: 4.11 K/UL — SIGNIFICANT CHANGE UP (ref 3.8–10.5)

## 2022-06-28 PROCEDURE — 99232 SBSQ HOSP IP/OBS MODERATE 35: CPT

## 2022-06-28 RX ORDER — WARFARIN SODIUM 2.5 MG/1
3 TABLET ORAL ONCE
Refills: 0 | Status: COMPLETED | OUTPATIENT
Start: 2022-06-28 | End: 2022-06-28

## 2022-06-28 RX ORDER — ATORVASTATIN CALCIUM 80 MG/1
20 TABLET, FILM COATED ORAL AT BEDTIME
Refills: 0 | Status: DISCONTINUED | OUTPATIENT
Start: 2022-06-28 | End: 2022-06-30

## 2022-06-28 RX ADMIN — ZINC OXIDE 1 APPLICATION(S): 200 OINTMENT TOPICAL at 17:18

## 2022-06-28 RX ADMIN — Medication 1 SPRAY(S): at 11:48

## 2022-06-28 RX ADMIN — CLOPIDOGREL BISULFATE 75 MILLIGRAM(S): 75 TABLET, FILM COATED ORAL at 11:47

## 2022-06-28 RX ADMIN — Medication 40 MILLIGRAM(S): at 06:40

## 2022-06-28 RX ADMIN — Medication 1 SPRAY(S): at 02:28

## 2022-06-28 RX ADMIN — Medication 1 SPRAY(S): at 17:22

## 2022-06-28 RX ADMIN — Medication 650 MILLIGRAM(S): at 02:29

## 2022-06-28 RX ADMIN — Medication 81 MILLIGRAM(S): at 11:47

## 2022-06-28 RX ADMIN — Medication 1 SPRAY(S): at 21:18

## 2022-06-28 RX ADMIN — ATORVASTATIN CALCIUM 20 MILLIGRAM(S): 80 TABLET, FILM COATED ORAL at 21:18

## 2022-06-28 RX ADMIN — Medication 40 MILLIGRAM(S): at 14:12

## 2022-06-28 RX ADMIN — ZINC OXIDE 1 APPLICATION(S): 200 OINTMENT TOPICAL at 07:34

## 2022-06-28 RX ADMIN — Medication 1 APPLICATION(S): at 06:29

## 2022-06-28 RX ADMIN — Medication 650 MILLIGRAM(S): at 06:18

## 2022-06-28 RX ADMIN — PANTOPRAZOLE SODIUM 40 MILLIGRAM(S): 20 TABLET, DELAYED RELEASE ORAL at 11:47

## 2022-06-28 RX ADMIN — WARFARIN SODIUM 3 MILLIGRAM(S): 2.5 TABLET ORAL at 21:18

## 2022-06-28 RX ADMIN — Medication 1 SPRAY(S): at 08:49

## 2022-06-28 RX ADMIN — Medication 75 MICROGRAM(S): at 06:29

## 2022-06-28 RX ADMIN — Medication 1 SPRAY(S): at 06:41

## 2022-06-28 RX ADMIN — Medication 50 MILLIGRAM(S): at 06:40

## 2022-06-28 RX ADMIN — Medication 1 SPRAY(S): at 23:18

## 2022-06-28 NOTE — PROGRESS NOTE ADULT - ATTENDING COMMENTS
Patient is a 89 y/o F with a pmhx b/l LE edema, HTN, P Afib (on coumadin), and hypothyroidism presenting with diarrhea and mid-sternal CP admitted for CP w/ elevated trops, NSTEMI.  Pt seen, examined, case & care plan d/w pt, residents at detail.  -Cardiology Dr ALEXANDRU Mccord follow up-Lasix 40 mg PO 2x a day., O2 NC  -Nasal saline spray 4-5 x day, Add Flonase nasal spay 2x day  -Renal DR Lara follow up,  -AM labs   -PO diet  -Palliative care -DNR/DNI  -D/W Case management , PT---> [facility]   -Family to discuss  about D/C Plan.  Total care time is 40 minutes.

## 2022-06-28 NOTE — PROGRESS NOTE ADULT - PROBLEM SELECTOR PLAN 1
Acute Systolic CHF excaerbation  -TTE: EF 35-40%, LV hypokinesis 2/2 NSTEMI. [place], mid inferoseptal and mid anterolateral walls severely hypokinetic. Right ventricular enlargement. Biatrial enlargement. Moderate aortic stenosis. Moderate TR.   -proBNP>13k  -Daily assessment of volume status,  -continue home Lasix 40 mg BID, start incentive spirometry, albuterol inhaler x1 dose given this AM for wheezing. Spoke with nurse, will try to wean O2 as tolerated.  -avoid IVF  -I/Os, daily weights  -cardio Dr. Blair  -PT recommending [facility] Acute Systolic CHF excaerbation  -TTE: EF 35-40%, LV hypokinesis 2/2 NSTEMI. [place], mid inferoseptal and mid anterolateral walls severely hypokinetic. Right ventricular enlargement. Biatrial enlargement. Moderate aortic stenosis. Moderate TR.   -proBNP>13k  -Daily assessment of volume status,  -continue home Lasix 40 mg BID, start incentive spirometry, albuterol inhaler x1 dose given this AM for wheezing. Spoke with nurse, will try to wean O2 as tolerated.  -avoid IVF  -I/Os, daily weights  -cardio Dr. Blair- d/c plan  -PT recommending [facility]

## 2022-06-28 NOTE — PROGRESS NOTE ADULT - PROBLEM SELECTOR PLAN 4
LUIS CARLOS on CKD stage 3B?   -Cr 1.5 baseline appears to be 1-1.2  -s/p Mucomyst q 12 hrs x 4 doses for prep for Cardiac Cath   - avoid IVF due to CHF  - s/p Lasix 40mg IV x2 on 6/20 and 6/21, elevated pBNP ~ 05025 for CHF-Lasix 20 mg IVP x 1 dose-6/23  -Lasix 40 mg 2x day PO  -BMP in AM  -Renal follow up

## 2022-06-28 NOTE — PROGRESS NOTE ADULT - SUBJECTIVE AND OBJECTIVE BOX
Patient is a 89y old  Female who presents with a chief complaint of CP w/ elevated trops (27 Jun 2022 16:17)      HPI:  Patient is a 89 y/o F with a pmhx b/l LE edema, HTN, P Afib (on coumadin), and hypothyroidism presenting with sudden onset mid-sternal CP. Pt describes pain as a "prickly feeling", rated 4/10, constant in nature since 4am on 6/17, resolved in the ED. Pain was associated with L arm weakness, now resolved. Pt states the pain woke her up, and she was scared to walk and was unable to use her L arm for support to get out of bed. Pt was also nauseous at this time. No associated diaphoresis, palpitations, chest pressure. No amaurosis fugax, facial droop, garbled speech, hemiparesis, LOC, falls reported. No vomiting, fevers, chills, cough, sore throat, SOB, abd pain, constipation, dysuria. Pt had one loose BM this AM at home, denied hematochezia or melena.       In ED:  VS - HR 71 | /65 | RR 18 | sO2 94 | T 97.6  Labs - HgB 11.2 | INR 1.85 | K 3.3 | Glu 114 | trop 342.6 | CKMB 3.2 | BNP 2396  CTH NC - No acute intracranial hemorrhage. Lacunar infarct R inferior cerebellum  EKG - vent rate 66, QTc 501ms, sinus rhythm, old LBBB, no ischemic changes on personal read  Given -  mg PO. pt seen by cardiology Dr Almazan , pt admitted to [facility] for NSTEMI. (17 Jun 2022 09:52)      INTERVAL HPI:  6/18/22: Seen and examined at bedside. No acute complaints. Denies chest pain overnight and this AM. Overnight pt had episode of dyspnea, troponins inc from 300s to >38283. Trops now downtrended. Received 1 x dose 40mg IV lasix for dyspnea. Pt had statin dose inc to 80mg, had 1 x dose lopressor 25mg, and had her scheduled evening coumadin. Had 10 beats of NSVT this AM. Pt was started on standing 50mg toprol xL PO qD. Started on plavix 75mg qD. Extensive counseling done with the patient on [facility] and her current state of health. Patient states she has "forms at home" that her daughter will bring to [facility]. Patient values quality of life and wants to discuss with her daughter and son-inlaw. Family to discuss potential plan for cardiac cath and will arrive to a decision on 6/19. C/W medical therapy for ACS. HOLD coumadin as pt INR is therapeutic and to facilitate potential for cardiac cath. Will start renal adjusted FD Lovenox on 6/19 if INR is below 2.   6/19/22 Pt seen and examined at bedside. Pt states she slept well last night. Pt denies SOB, CP, palpitations, dizziness .Mildly elevated Cr , INR -Theraputic  Pt states the last time she had chest pain was Friday night. Later in the afternoon again patient was seen at bedside with  daughter HCP and son in law at bedside, decision was made for DNR/ DNI and cardiac cath.  6/20/22: Patient seen and examined at bedside. Patient denies chest pain, palpitations, dyspnea. Noted to be slightly short of breath. proBNP >13k, will give 1 dose of lasix 40mg IV today.  6/21/22: Patient continues to deny anginal symptoms. Will be transferred for cardiac cath, as INR downtrended to 1.6 and Cr improved to 1.3. For LUIS CARLOS on CKD, will defer further IVF hydration at this time due to TTE results and SOB after previous IVF. Will give Mucomyst q12 x4 for renal protection. Given lasix 40mg IVP x1 to improve respirations. Given full dose lovenox x1.   6/22/22: Overnight patient with R sided abdominal discomfort, given maalox. Patient breathing improved s/p lasix 40mg IV yesterday. Patient's INR 1.4 today and Cr increased to 1.5. Plan for cardiac cath If Cr stable. Given additional dose of full dose lovenox.  6/23/22: Patient seen and examined at bedside. Patient denies chest pain, pressure, sob, palpitations. Cr downtrending today,  As per Cardiology NO plan for transfer for cardiac cath as pt is still SOB , Needs Lasix & Cr is elevated.. Low stable H/H. Warfarin 3mg today.  6/24/22: Patient seen and examined at bedside. Patient denies any SOB today, speaking comfortably. Denies chest pain, pressure, sob, palpitations. Restart Lasix & Coumadin  6/25/22: Patient seen and examined at bedside. Patient denies any SOB today, speaking comfortably. Denies chest pain, pressure, sob, palpitations. Continue home lasix dose and coumadin restart. Lasix 20 mg IVP x 1 dose.  6/26/22: Patient seen and examined at bedside. Pt denies any SOB, CP, palpitations dizziness. Pt is on 2 L NC however cannula had fallen out of nose, replaced NC and dropped down to 1L. Lasix 2x day  6/27/22: Pt seen and examined at bedside. Pt states she has noticed blood clots from the nose last night - placed pt on humidified O2 and standing nasal saline spray. Pt denies any shortness of breath, chest pain, or dizziness. Pt is on 2L NC. Currently on lasix BID. INR 1.87 C/O Nose bleed , dry nose, Change to Humidified O2 .  6/28/22: Pt seen and examined at bedside. She states she had blood clots from the nose yesterday, but has improved since adding humidified O2 and nasal spray. Feels like breathing has improved. Did sit on the chair yesterday with PT. Denies any chest pain or dizziness.       OVERNIGHT EVENTS: No acute overnight events      Home Medications:  Lasix 40 mg oral tablet: 1 tab(s) orally 2 times a day (26 Jun 2022 12:41)  Metoprolol Tartrate 25 mg oral tablet: 1 tab(s) orally once a day (26 Jun 2022 12:41)  potassium chloride 10 mEq oral capsule, extended release: 2 cap(s) orally 2 times a day (26 Jun 2022 12:41)  Synthroid 75 mcg (0.075 mg) oral tablet: 1 tab(s) orally once a day (26 Jun 2022 12:41)  warfarin 3 mg oral tablet: 1 tab(s) orally once a day (M, Tu, Th, F, Sa Grider) (26 Jun 2022 12:41)  warfarin 4 mg oral tablet: 1 tab(s) orally Wednesday (26 Jun 2022 12:41)      MEDICATIONS  (STANDING):  aspirin enteric coated 81 milliGRAM(s) Oral daily  atorvastatin 80 milliGRAM(s) Oral at bedtime  BACItracin   Ointment 1 Application(s) Topical two times a day  clopidogrel Tablet 75 milliGRAM(s) Oral daily  fluticasone propionate 50 MICROgram(s)/spray Nasal Spray 1 Spray(s) Both Nostrils two times a day  furosemide    Tablet 40 milliGRAM(s) Oral two times a day  levothyroxine 75 MICROGram(s) Oral daily  metoprolol succinate ER 50 milliGRAM(s) Oral daily  pantoprazole  Injectable 40 milliGRAM(s) IV Push daily  sodium chloride 0.65% Nasal 1 Spray(s) Both Nostrils five times a day  zinc oxide 40% Paste 1 Application(s) Topical two times a day    MEDICATIONS  (PRN):  acetaminophen     Tablet .. 650 milliGRAM(s) Oral every 6 hours PRN Mild Pain (1 - 3)  ALBUTerol    90 MICROgram(s) HFA Inhaler 2 Puff(s) Inhalation every 12 hours PRN Shortness of Breath and/or Wheezing  aluminum hydroxide/magnesium hydroxide/simethicone Suspension 30 milliLiter(s) Oral every 6 hours PRN Dyspepsia      Allergies    No Known Allergies    Intolerances        REVIEW OF SYSTEMS: "I feel fine"  CONSTITUTIONAL: No fever, No chills, No fatigue, No myalgia, No Body ache, No Weakness  EYES: No eye pain,  No visual disturbances, No discharge, NO Redness  ENMT:  No ear pain, No nose bleed, No vertigo; No sinus pain, NO throat pain, No Congestion  NECK: No pain, No stiffness  RESPIRATORY: No cough, NO wheezing, No hemoptysis, No shortness of breath  CARDIOVASCULAR: No chest pain, palpitations  GASTROINTESTINAL: No abdominal pain, NO epigastric pain. No nausea, No vomiting; No diarrhea, No constipation. [ x ] BM  GENITOURINARY: No dysuria, No frequency, No urgency, No hematuria, NO incontinence  NEUROLOGICAL: No headaches, No dizziness, No numbness, No tingling, No tremors, No weakness  EXT: admits chronic Swelling in LE's, No Pain, admits LE Edema  SKIN:  [ x ] No itching, burning, rashes, or lesions   MUSCULOSKELETAL: No joint pain ,No Jt swelling; No muscle pain, No back pain, No extremity pain  PSYCHIATRIC: No depression,  No anxiety,  No mood swings ,No difficulty sleeping at night  PAIN SCALE: [ x ] None  [  ] Other-  ROS Unable to obtain due to - [  ] Dementia  [  ] Lethargy [  ] Drowsy [  ] Sedated [  ] non verbal    Vital Signs Last 24 Hrs  T(C): 36.4 (28 Jun 2022 04:27), Max: 36.8 (27 Jun 2022 11:49)  T(F): 97.5 (28 Jun 2022 04:27), Max: 98.3 (27 Jun 2022 11:49)  HR: 66 (28 Jun 2022 06:24) (66 - 73)  BP: 106/60 (28 Jun 2022 06:24) (96/53 - 112/66)  BP(mean): --  RR: 18 (28 Jun 2022 04:27) (17 - 18)  SpO2: 93% (28 Jun 2022 04:27) (92% - 95%)  Finger Stick        06-27 @ 07:01  -  06-28 @ 07:00  --------------------------------------------------------  IN: 300 mL / OUT: 1175 mL / NET: -875 mL        PHYSICAL EXAM:  PHYSICAL EXAM: O2 NC on 2 L  GENERAL:  [ x ] NAD  [ x ] well appearing, [  ] Agitated, [  ] Drowsy,  [  ] Lethargy, [  ] confused   HEAD:  [ x ] Normal, [  ] Other  EYES:  [ x ] EOMI, [ x ] PERRLA, [ x ] conjunctiva and sclera clear normal, [  ] Other,  [  ] Pallor,[  ] Discharge  ENMT:  [ x ] Normal, [ x ] Moist mucous membranes, [ x ] Good dentition, [ x ] No Thrush  NECK:  [ x ] Supple, [x  ] No JVD, [ x ] Normal thyroid, [  ] Lymphadenopathy [  ] Other  CHEST/LUNG:  [ x ] Clear to auscultation bilaterally, [  ] Breath Sounds equal B/L , [  ] poor effort  [ x ] Right lung base rales, [ x ] No rhonchi  [  ]  slight expiratory wheezing,   HEART:  [ x ] Regular rate and IRREGULARLY IRREGULAR rhythm, [  ] tachycardia, [  ] Bradycardia,  [  ] irregular  [ x ] systolic murmur heard best at RUSB No rubs, No gallops, [  ] PPM in place (Mfr:  )  ABDOMEN:  [ x ] Soft, [ x ] Nontender, [ x ] Nondistended, [ x ] No mass, [ x] Bowel sounds present, [ x ] obese  NERVOUS SYSTEM:  [ x ] Alert & Oriented X3, [ x ] Nonfocal  [  ] Confusion  [  ] Encephalopathic [  ] Sedated [  ] Unable to assess, [  ] Dementia [  ] Other-  EXTREMITIES: [ x ] 2+ Peripheral Pulses, No clubbing, No cyanosis,  [ x ] b/l UE +2 pitting edema legs &  gathering at proximal forearms, trace edema b/l LEs, 2+ radial pulses b/l, extremities warm to touch [ x ] PVD stasis skin changes B/L Lower EXT, [  ] wound  LYMPH: No lymphadenopathy noted  SKIN:  [ x ] No rashes or lesions, [  ] Pressure Ulcers, [  ] ecchymosis, [  ] Skin Tears, [ x ] Other - redness B/L Buttocks      DIET: Diet, DASH/TLC:   Sodium & Cholesterol Restricted  1200mL Fluid Restriction ([id number]) (06-22-22 @ 08:53)      LABS:                        10.4   4.11  )-----------( 205      ( 28 Jun 2022 06:46 )             31.3       Ca    8.3        27 Jun 2022 07:08      PT/INR - ( 28 Jun 2022 06:46 )   PT: 22.6 sec;   INR: 1.92 ratio         PTT - ( 28 Jun 2022 06:46 )  PTT:37.7 sec               Anemia Panel:      Thyroid Panel:            Serum Pro-Brain Natriuretic Peptide: 9883 pg/mL (06-25-22 @ 07:03)      RADIOLOGY & ADDITIONAL TESTS: Personally reviewed.     HEALTH ISSUES - PROBLEM Dx:  Hypothyroid    Hypertension    Lower extremity edema    Paroxysmal atrial fibrillation    Need for prophylactic measure    Anemia    Acute kidney injury superimposed on CKD    NSTEMI (non-ST elevation myocardial infarction)    Acute systolic congestive heart failure            Consultant(s) Notes Reviewed:  [x  ] YES     Care Discussed with [ ] Consultants  [ x ] Patient  [ x ] Family [  ] HCP [  ]   [ x ] Social Service  [ x ] RN, [  ] Physical Therapy,[  ] Palliative care team  DVT PPX: [  ] Lovenox, [  ] S C Heparin, [x  ] Coumadin, [  ] Xarelto, [  ] Eliquis, [  ] Pradaxa, [  ] IV Heparin drip, [  ] SCD [  ] Contraindication 2 to GI Bleed,[  ] Ambulation [  ] Contraindicated 2 to  bleed [  ] Contraindicated 2 to Brain Bleed  Advanced directive: [  ] None, [ x ] DNR/DNI Patient is a 89y old  Female who presents with a chief complaint of CP w/ elevated trops (27 Jun 2022 16:17)      HPI:  Patient is a 89 y/o F with a pmhx b/l LE edema, HTN, P Afib (on coumadin), and hypothyroidism presenting with sudden onset mid-sternal CP. Pt describes pain as a "prickly feeling", rated 4/10, constant in nature since 4am on 6/17, resolved in the ED. Pain was associated with L arm weakness, now resolved. Pt states the pain woke her up, and she was scared to walk and was unable to use her L arm for support to get out of bed. Pt was also nauseous at this time. No associated diaphoresis, palpitations, chest pressure. No amaurosis fugax, facial droop, garbled speech, hemiparesis, LOC, falls reported. No vomiting, fevers, chills, cough, sore throat, SOB, abd pain, constipation, dysuria. Pt had one loose BM this AM at home, denied hematochezia or melena.       In ED:  VS - HR 71 | /65 | RR 18 | sO2 94 | T 97.6  Labs - HgB 11.2 | INR 1.85 | K 3.3 | Glu 114 | trop 342.6 | CKMB 3.2 | BNP 2396  CTH NC - No acute intracranial hemorrhage. Lacunar infarct R inferior cerebellum  EKG - vent rate 66, QTc 501ms, sinus rhythm, old LBBB, no ischemic changes on personal read  Given -  mg PO. pt seen by cardiology Dr Almazan , pt admitted to [facility] for NSTEMI. (17 Jun 2022 09:52)      INTERVAL HPI:  6/18/22: Seen and examined at bedside. No acute complaints. Denies chest pain overnight and this AM. Overnight pt had episode of dyspnea, troponins inc from 300s to >73015. Trops now downtrended. Received 1 x dose 40mg IV lasix for dyspnea. Pt had statin dose inc to 80mg, had 1 x dose lopressor 25mg, and had her scheduled evening coumadin. Had 10 beats of NSVT this AM. Pt was started on standing 50mg toprol xL PO qD. Started on plavix 75mg qD. Extensive counseling done with the patient on [facility] and her current state of health. Patient states she has "forms at home" that her daughter will bring to [facility]. Patient values quality of life and wants to discuss with her daughter and son-inlaw. Family to discuss potential plan for cardiac cath and will arrive to a decision on 6/19. C/W medical therapy for ACS. HOLD coumadin as pt INR is therapeutic and to facilitate potential for cardiac cath. Will start renal adjusted FD Lovenox on 6/19 if INR is below 2.   6/19/22 Pt seen and examined at bedside. Pt states she slept well last night. Pt denies SOB, CP, palpitations, dizziness .Mildly elevated Cr , INR -Theraputic  Pt states the last time she had chest pain was Friday night. Later in the afternoon again patient was seen at bedside with  daughter HCP and son in law at bedside, decision was made for DNR/ DNI and cardiac cath.  6/20/22: Patient seen and examined at bedside. Patient denies chest pain, palpitations, dyspnea. Noted to be slightly short of breath. proBNP >13k, will give 1 dose of lasix 40mg IV today.  6/21/22: Patient continues to deny anginal symptoms. Will be transferred for cardiac cath, as INR downtrended to 1.6 and Cr improved to 1.3. For LUIS CARLOS on CKD, will defer further IVF hydration at this time due to TTE results and SOB after previous IVF. Will give Mucomyst q12 x4 for renal protection. Given lasix 40mg IVP x1 to improve respirations. Given full dose lovenox x1.   6/22/22: Overnight patient with R sided abdominal discomfort, given maalox. Patient breathing improved s/p lasix 40mg IV yesterday. Patient's INR 1.4 today and Cr increased to 1.5. Plan for cardiac cath If Cr stable. Given additional dose of full dose lovenox.  6/23/22: Patient seen and examined at bedside. Patient denies chest pain, pressure, sob, palpitations. Cr downtrending today,  As per Cardiology NO plan for transfer for cardiac cath as pt is still SOB , Needs Lasix & Cr is elevated.. Low stable H/H. Warfarin 3mg today.  6/24/22: Patient seen and examined at bedside. Patient denies any SOB today, speaking comfortably. Denies chest pain, pressure, sob, palpitations. Restart Lasix & Coumadin  6/25/22: Patient seen and examined at bedside. Patient denies any SOB today, speaking comfortably. Denies chest pain, pressure, sob, palpitations. Continue home lasix dose and coumadin restart. Lasix 20 mg IVP x 1 dose.  6/26/22: Patient seen and examined at bedside. Pt denies any SOB, CP, palpitations dizziness. Pt is on 2 L NC however cannula had fallen out of nose, replaced NC and dropped down to 1L. Lasix 2x day  6/27/22: Pt seen and examined at bedside. Pt states she has noticed blood clots from the nose last night - placed pt on humidified O2 and standing nasal saline spray. Pt denies any shortness of breath, chest pain, or dizziness. Pt is on 2L NC. Currently on lasix BID. INR 1.87 C/O Nose bleed , dry nose, Change to Humidified O2 .  6/28/22: Pt seen and examined at bedside. She states she had blood clots from the nose yesterday, but has improved since adding humidified O2 and nasal spray. Feels like breathing has improved. Did sit on the chair yesterday with PT. Denies any chest pain or dizziness.       OVERNIGHT EVENTS: No acute overnight events      Home Medications:  Lasix 40 mg oral tablet: 1 tab(s) orally 2 times a day (26 Jun 2022 12:41)  Metoprolol Tartrate 25 mg oral tablet: 1 tab(s) orally once a day (26 Jun 2022 12:41)  potassium chloride 10 mEq oral capsule, extended release: 2 cap(s) orally 2 times a day (26 Jun 2022 12:41)  Synthroid 75 mcg (0.075 mg) oral tablet: 1 tab(s) orally once a day (26 Jun 2022 12:41)  warfarin 3 mg oral tablet: 1 tab(s) orally once a day (M, Tu, Th, F, Sa Grider) (26 Jun 2022 12:41)  warfarin 4 mg oral tablet: 1 tab(s) orally Wednesday (26 Jun 2022 12:41)      MEDICATIONS  (STANDING):  aspirin enteric coated 81 milliGRAM(s) Oral daily  atorvastatin 80 milliGRAM(s) Oral at bedtime  BACItracin   Ointment 1 Application(s) Topical two times a day  clopidogrel Tablet 75 milliGRAM(s) Oral daily  fluticasone propionate 50 MICROgram(s)/spray Nasal Spray 1 Spray(s) Both Nostrils two times a day  furosemide    Tablet 40 milliGRAM(s) Oral two times a day  levothyroxine 75 MICROGram(s) Oral daily  metoprolol succinate ER 50 milliGRAM(s) Oral daily  pantoprazole  Injectable 40 milliGRAM(s) IV Push daily  sodium chloride 0.65% Nasal 1 Spray(s) Both Nostrils five times a day  zinc oxide 40% Paste 1 Application(s) Topical two times a day    MEDICATIONS  (PRN):  acetaminophen     Tablet .. 650 milliGRAM(s) Oral every 6 hours PRN Mild Pain (1 - 3)  ALBUTerol    90 MICROgram(s) HFA Inhaler 2 Puff(s) Inhalation every 12 hours PRN Shortness of Breath and/or Wheezing  aluminum hydroxide/magnesium hydroxide/simethicone Suspension 30 milliLiter(s) Oral every 6 hours PRN Dyspepsia      Allergies    No Known Allergies    Intolerances        REVIEW OF SYSTEMS: "I feel fine"  CONSTITUTIONAL: No fever, No chills, No fatigue, No myalgia, No Body ache, No Weakness  EYES: No eye pain,  No visual disturbances, No discharge, NO Redness  ENMT:  No ear pain, No nose bleed, No vertigo; No sinus pain, NO throat pain, No Congestion  NECK: No pain, No stiffness  RESPIRATORY: No cough, NO wheezing, No hemoptysis, No shortness of breath  CARDIOVASCULAR: No chest pain, palpitations  GASTROINTESTINAL: No abdominal pain, NO epigastric pain. No nausea, No vomiting; No diarrhea, No constipation. [ x ] BM  GENITOURINARY: No dysuria, No frequency, No urgency, No hematuria, NO incontinence  NEUROLOGICAL: No headaches, No dizziness, No numbness, No tingling, No tremors, No weakness  EXT: admits chronic Swelling in LE's, No Pain, admits LE Edema  SKIN:  [ x ] No itching, burning, rashes, or lesions   MUSCULOSKELETAL: No joint pain ,No Jt swelling; No muscle pain, No back pain, No extremity pain  PSYCHIATRIC: No depression,  No anxiety,  No mood swings ,No difficulty sleeping at night  PAIN SCALE: [ x ] None  [  ] Other-  ROS Unable to obtain due to - [  ] Dementia  [  ] Lethargy [  ] Drowsy [  ] Sedated [  ] non verbal    Vital Signs Last 24 Hrs  T(C): 36.4 (28 Jun 2022 04:27), Max: 36.8 (27 Jun 2022 11:49)  T(F): 97.5 (28 Jun 2022 04:27), Max: 98.3 (27 Jun 2022 11:49)  HR: 66 (28 Jun 2022 06:24) (66 - 73)  BP: 106/60 (28 Jun 2022 06:24) (96/53 - 112/66)  BP(mean): --  RR: 18 (28 Jun 2022 04:27) (17 - 18)  SpO2: 93% (28 Jun 2022 04:27) (92% - 95%)  Finger Stick        06-27 @ 07:01  -  06-28 @ 07:00  --------------------------------------------------------  IN: 300 mL / OUT: 1175 mL / NET: -875 mL        PHYSICAL EXAM:  PHYSICAL EXAM: O2 NC on 2 L  GENERAL:  [ x ] NAD  [ x ] well appearing, [  ] Agitated, [  ] Drowsy,  [  ] Lethargy, [  ] confused   HEAD:  [ x ] Normal, [  ] Other  EYES:  [ x ] EOMI, [ x ] PERRLA, [ x ] conjunctiva and sclera clear normal, [  ] Other,  [  ] Pallor,[  ] Discharge  ENMT:  [ x ] Normal, [ x ] Moist mucous membranes, [ x ] Good dentition, [ x ] No Thrush  NECK:  [ x ] Supple, [x  ] No JVD, [ x ] Normal thyroid, [  ] Lymphadenopathy [  ] Other  CHEST/LUNG:  [ x ] Clear to auscultation bilaterally, [x  ] Breath Sounds equal B/L , [  ] poor effort  [ x ] Right lung base rales, [ x ] No rhonchi  [  ]  slight expiratory wheezing,   HEART:  [ x ] Regular rate and IRREGULARLY IRREGULAR rhythm, [  ] tachycardia, [  ] Bradycardia,  [  ] irregular  [ x ] systolic murmur heard best at RUSB No rubs, No gallops, [  ] PPM in place (Mfr:  )  ABDOMEN:  [ x ] Soft, [ x ] Nontender, [ x ] Nondistended, [ x ] No mass, [ x] Bowel sounds present, [ x ] obese  NERVOUS SYSTEM:  [ x ] Alert & Oriented X3, [ x ] Nonfocal  [  ] Confusion  [  ] Encephalopathic [  ] Sedated [  ] Unable to assess, [  ] Dementia [  ] Other-  EXTREMITIES: [ x ] 2+ Peripheral Pulses, No clubbing, No cyanosis,  [ x ] b/l UE +2 pitting edema legs &  gathering at proximal forearms, trace edema b/l LEs, 2+ radial pulses b/l, extremities warm to touch [ x ] PVD stasis skin changes B/L Lower EXT, [  ] wound  LYMPH: No lymphadenopathy noted  SKIN:  [ x ] No rashes or lesions, [  ] Pressure Ulcers, [  ] ecchymosis, [  ] Skin Tears, [ x ] Other - redness B/L Buttocks      DIET: Diet, DASH/TLC:   Sodium & Cholesterol Restricted  1200mL Fluid Restriction ([id number]) (06-22-22 @ 08:53)      LABS:                        10.4   4.11  )-----------( 205      ( 28 Jun 2022 06:46 )             31.3       28 Jun 2022 06:46    140    |  103    |  24     ----------------------------<  88     3.8     |  35     |  1.50     Ca    7.7        28 Jun 2022 06:46    TPro  6.4    /  Alb  2.2    /  TBili  1.1    /  DBili  x      /  AST  56     /  ALT  32     /  AlkPhos  124    28 Jun 2022 06:46    Ca    8.3        27 Jun 2022 07:08      PT/INR - ( 28 Jun 2022 06:46 )   PT: 22.6 sec;   INR: 1.92 ratio         PTT - ( 28 Jun 2022 06:46 )  PTT:37.7 sec      Serum Pro-Brain Natriuretic Peptide: 9883 pg/mL (06-25-22 @ 07:03)      RADIOLOGY & ADDITIONAL TESTS: Personally reviewed.     HEALTH ISSUES - PROBLEM Dx:  Hypothyroid    Hypertension    Lower extremity edema    Paroxysmal atrial fibrillation    Need for prophylactic measure    Anemia    Acute kidney injury superimposed on CKD    NSTEMI (non-ST elevation myocardial infarction)    Acute systolic congestive heart failure            Consultant(s) Notes Reviewed:  [x  ] YES     Care Discussed with [ ] Consultants  [ x ] Patient  [ x ] Family [  ] HCP [  ]   [ x ] Social Service  [ x ] RN, [  ] Physical Therapy,[  ] Palliative care team  DVT PPX: [  ] Lovenox, [  ] S C Heparin, [x  ] Coumadin, [  ] Xarelto, [  ] Eliquis, [  ] Pradaxa, [  ] IV Heparin drip, [  ] SCD [  ] Contraindication 2 to GI Bleed,[  ] Ambulation [  ] Contraindicated 2 to  bleed [  ] Contraindicated 2 to Brain Bleed  Advanced directive: [  ] None, [ x ] DNR/DNI

## 2022-06-28 NOTE — PROGRESS NOTE ADULT - PROBLEM SELECTOR PLAN 3
normocytic anemia on admit - baseline ~10-11 appears near baseline  - unclear etiology - likely 2/2 fluid overload  - overnight with blood clots noted from nasal orifice - started standing nasal saline spray and humidified O2   -  iron studies, TFT's, B12, folate -Nl  - monitor for VS and s/s of worsening anemia and trend HgB in AM CBC

## 2022-06-28 NOTE — PROGRESS NOTE ADULT - ASSESSMENT
89 y/o F with a pmhx b/l LE edema, HTN,P Afib on coumadin , hypothyroidism, pw midsternal chest discomfort atypical in nature and left arm discomfort since waking up this morning at  4am, describes it as "creepy feeling" assoc with left arm weakness, general weakness and not feeling well. Patient has no underlying coronary disease.     NSTEMI/PAfib  - EKG: Afib with underlying LBBB; unchanged from previous.    - Denies any anginal symptoms to date  - Ischemic eval and plan has been discussed with patient and daughter, multiple times, aware of risk and benefits of procedure, understand that as of now, medical management would be more favorable given pt's advance age, labile renal function, and volume overload.    - BP, HR, stable and controlled  - Continue GDMT: DAPT (ASA, Plavix), BB, and statin.  Lower statin dose  - Rate-controlled.  Continue daily dose Warfarin to keep INR 2-3  - Would only keep on triple therapy for short period.  Would likely reduce to Coumadin and Plavix x 1 mo of DAPT  - Continue routine hemodynamics    Acute Systolic HF  - TTE:(6/19/2022) Mild to moderate segmental LV systolic dysfunction, est LVEF of 35-40%. The apex, mid inferoseptal and mid anterolateral walls appear severely hypokinetic, RV enlargement with grossly normal function. Biatrial enlargement Calcified trileaflet aortic valve with mild to moderate aortic stenosis, without AI. Mild MR Moderate TR.  - Appears more compensated from HF POV, though, still with sacral edema   - Continue Lasix PO.  If COR worsens, may need to switch to Diamox  - Continue to monitor volume status   - Continue strict I/O's.     - Monitor electrolytes, replete to keep K>4 and Mag>2  - Will continue to follow.    Angeles Kim DNP, NP-C  Cardiology   Spectra #[phone]

## 2022-06-28 NOTE — PROGRESS NOTE ADULT - ASSESSMENT
Patient is a 89 y/o F with a pmhx b/l LE edema, HTN, P Afib (on coumadin), and hypothyroidism presenting with diarrhea and mid-sternal CP admitted for CP w/ elevated trops, NSTEMI. On ACS therapy, re-bridged to coumadin (held for plan for inpatient cath), plan for outpatient cardiac catheterization, dc when optimized in setting of acute systolic CHF exacerbation.

## 2022-06-28 NOTE — PROGRESS NOTE ADULT - SUBJECTIVE AND OBJECTIVE BOX
Patient is a 89y old  Female who presents with a chief complaint of CP w/ elevated trops (19 Jun 2022 09:28)       HPI:  Patient is a 89 y/o F with a pmhx b/l LE edema, HTN, P Afib (on coumadin), and hypothyroidism presenting with sudden onset mid-sternal CP. Pt describes pain as a "prickly feeling", rated 4/10, constant in nature since 4am on 6/17, resolved in the ED. Pain was associated with L arm weakness, now resolved. Pt states the pain woke her up, and she was scared to walk and was unable to use her L arm for support to get out of bed. Pt was also nauseous at this time. No associated diaphoresis, palpitations, chest pressure. No amaurosis fugax, facial droop, garbled speech, hemiparesis, LOC, falls reported. No vomiting, fevers, chills, cough, sore throat, SOB, abd pain, constipation, dysuria. Pt had one loose BM this AM at home, denied hematochezia or melena.   Patient has not seen nephrologist in the past.  Denies any N/V/Urinary complaints.      No acute events noted       PAST MEDICAL & SURGICAL HISTORY:  Hypothyroid      Hypertension      Lower extremity edema      Paroxysmal atrial fibrillation           FAMILY HISTORY:  NC    Social History:Non smoker    MEDICATIONS  (STANDING):  acetylcysteine  Oral Solution 600 milliGRAM(s) Oral every 12 hours  aspirin enteric coated 81 milliGRAM(s) Oral daily  atorvastatin 80 milliGRAM(s) Oral at bedtime  calcium gluconate IVPB 1 Gram(s) IV Intermittent once  clopidogrel Tablet 75 milliGRAM(s) Oral daily  furosemide   Injectable 40 milliGRAM(s) IV Push once  levothyroxine 75 MICROGram(s) Oral daily  metoprolol succinate ER 50 milliGRAM(s) Oral daily  pantoprazole  Injectable 40 milliGRAM(s) IV Push daily  sodium chloride 0.9%. 1000 milliLiter(s) (50 mL/Hr) IV Continuous <Continuous>    MEDICATIONS  (PRN):      Allergies    No Known Allergies    Intolerances         REVIEW OF SYSTEMS:    Review of Systems:   Constitutional: Denies fatigue  HEENT: Denies headaches and dizziness  Respiratory: denies SOB, cough, or wheezing  Cardiovascular: denies CP, palpitations  Gastrointestinal: Denies nausea, denies vomiting, diarrhea, constipation, abdominal pain, or bloody stools  Genitourinary: denies painful urination, increased frequency, urgency, or bloody urine  Skin: denies rashes or itching  Musculoskeletal: denies muscle aches, joint swelling  Neurologic: Denies generalized weakness, denies loss of sensation, numbness, or tingling    Vital Signs Last 24 Hrs  T(C): 36.4 (28 Jun 2022 04:27), Max: 36.8 (27 Jun 2022 11:49)  T(F): 97.5 (28 Jun 2022 04:27), Max: 98.3 (27 Jun 2022 11:49)  HR: 66 (28 Jun 2022 06:24) (66 - 73)  BP: 106/60 (28 Jun 2022 06:24) (96/53 - 112/66)  BP(mean): --  RR: 18 (28 Jun 2022 04:27) (17 - 18)  SpO2: 93% (28 Jun 2022 04:27) (92% - 95%)      PHYSICAL EXAM:    GENERAL: NAD  HEAD:  Atraumatic, Normocephalic  EYES: EOMI, conjunctiva and sclera clear  ENMT: No Drainage from nares, No drainage from ears  NECK: Supple, neck  veins full  NERVOUS SYSTEM:  Awake and Alert  CHEST/LUNG: mildly Decreased BS R No rales, rhonchi, wheezing, or rubs  HEART: Regular rate and rhythm; No murmurs, rubs, or gallops  ABDOMEN: Soft, Nontender, Nondistended; Bowel sounds present  EXTREMITIES:  + Edema  SKIN: No rashes No obvious ecchymosis      LABS:                        10.4   4.11  )-----------( 205      ( 28 Jun 2022 06:46 )             31.3     06-28    140  |  103  |  24<H>  ----------------------------<  88  3.8   |  35<H>  |  1.50<H>    Ca    7.7<L>      28 Jun 2022 06:46    TPro  6.4  /  Alb  2.2<L>  /  TBili  1.1  /  DBili  x   /  AST  56<H>  /  ALT  32  /  AlkPhos  124<H>  06-28    PT/INR - ( 28 Jun 2022 06:46 )   PT: 22.6 sec;   INR: 1.92 ratio         PTT - ( 28 Jun 2022 06:46 )  PTT:37.7 sec

## 2022-06-28 NOTE — PROGRESS NOTE ADULT - PROBLEM SELECTOR PLAN 7
chronic, elevated on admit - likely 2/2 pain  - continue Toprol 50mg qD w/ hold parameters  - Lasix 40 mg 2x day   - routine hemodynamic monitoring

## 2022-06-28 NOTE — PROGRESS NOTE ADULT - SUBJECTIVE AND OBJECTIVE BOX
[facility] -- Reid Astorga, Raheem Almazan, Flex Muniz Savella, Goodger  Office # [phone]    Follow Up:  NSTEMI, SHF, PAF    Subjective/Observations: Asleep but arousable.  c/o not having enough sleep.  c/o being tired and weak.  NC in use but denies SOB or orthopnea.  Denies chest pain or palpitations    REVIEW OF SYSTEMS: All other review of systems is negative unless indicated above  PAST MEDICAL & SURGICAL HISTORY:  Hypothyroid    Hypertension    Lower extremity edema    Paroxysmal atrial fibrillation    MEDICATIONS  (STANDING):  aspirin enteric coated 81 milliGRAM(s) Oral daily  atorvastatin 80 milliGRAM(s) Oral at bedtime  BACItracin   Ointment 1 Application(s) Topical two times a day  clopidogrel Tablet 75 milliGRAM(s) Oral daily  fluticasone propionate 50 MICROgram(s)/spray Nasal Spray 1 Spray(s) Both Nostrils two times a day  furosemide    Tablet 40 milliGRAM(s) Oral two times a day  levothyroxine 75 MICROGram(s) Oral daily  metoprolol succinate ER 50 milliGRAM(s) Oral daily  pantoprazole  Injectable 40 milliGRAM(s) IV Push daily  sodium chloride 0.65% Nasal 1 Spray(s) Both Nostrils five times a day  warfarin 3 milliGRAM(s) Oral once  zinc oxide 40% Paste 1 Application(s) Topical two times a day    MEDICATIONS  (PRN):  acetaminophen     Tablet .. 650 milliGRAM(s) Oral every 6 hours PRN Mild Pain (1 - 3)  ALBUTerol    90 MICROgram(s) HFA Inhaler 2 Puff(s) Inhalation every 12 hours PRN Shortness of Breath and/or Wheezing  aluminum hydroxide/magnesium hydroxide/simethicone Suspension 30 milliLiter(s) Oral every 6 hours PRN Dyspepsia    Allergies    No Known Allergies    Intolerances    Vital Signs Last 24 Hrs  T(C): 36.4 (28 Jun 2022 04:27), Max: 36.8 (27 Jun 2022 19:33)  T(F): 97.5 (28 Jun 2022 04:27), Max: 98.2 (27 Jun 2022 19:33)  HR: 66 (28 Jun 2022 06:24) (66 - 70)  BP: 106/60 (28 Jun 2022 06:24) (96/53 - 112/66)  BP(mean): --  RR: 18 (28 Jun 2022 04:27) (17 - 18)  SpO2: 93% (28 Jun 2022 04:27) (93% - 95%)  I&O's Summary    27 Jun 2022 07:01  -  28 Jun 2022 07:00  --------------------------------------------------------  IN: 300 mL / OUT: 1175 mL / NET: -875 mL      PHYSICAL EXAM:  TELE: Not on tele  Constitutional: NAD, awake but lethargic, well-developed  HEENT: Moist Mucous Membranes, Anicteric  Pulmonary: Non-labored, breath sounds are clear bilaterally, No wheezing, rales or rhonchi  Cardiovascular: Regular, S1 and S2, +murmurs, no rubs, gallops or clicks  Gastrointestinal: Bowel Sounds present, soft, nontender.   Lymph: +sacral edema. No lymphadenopathy.  Skin: No visible rashes or ulcers.  Psych:  Mood & affect: Flat  LABS: All Labs Reviewed:                        10.4   4.11  )-----------( 205      ( 28 Jun 2022 06:46 )             31.3                         10.9   5.02  )-----------( 230      ( 27 Jun 2022 07:08 )             33.8                         10.5   5.08  )-----------( 216      ( 26 Jun 2022 07:08 )             32.0     28 Jun 2022 06:46    140    |  103    |  24     ----------------------------<  88     3.8     |  35     |  1.50   27 Jun 2022 07:08    141    |  104    |  22     ----------------------------<  103    3.8     |  35     |  1.30   26 Jun 2022 07:08    140    |  104    |  22     ----------------------------<  101    3.4     |  31     |  1.20     Ca    7.7        28 Jun 2022 06:46  Ca    8.3        27 Jun 2022 07:08  Ca    8.1        26 Jun 2022 07:08  Phos  2.6       26 Jun 2022 07:08  Mg     2.4       26 Jun 2022 07:08    TPro  6.4    /  Alb  2.2    /  TBili  1.1    /  DBili  x      /  AST  56     /  ALT  32     /  AlkPhos  124    28 Jun 2022 06:46  TPro  6.1    /  Alb  2.3    /  TBili  1.0    /  DBili  x      /  AST  52     /  ALT  28     /  AlkPhos  113    27 Jun 2022 07:08  TPro  6.0    /  Alb  2.3    /  TBili  0.8    /  DBili  x      /  AST  44     /  ALT  25     /  AlkPhos  101    26 Jun 2022 07:08    PT/INR - ( 28 Jun 2022 06:46 )   PT: 22.6 sec;   INR: 1.92 ratio     PTT - ( 28 Jun 2022 06:46 )  PTT:37.7 sec      ACC: [id number] EXAM:  ECHO TTE WO CON COMP W DOPP                          PROCEDURE DATE:  06/19/2022          INTERPRETATION:  INDICATION: Chest pain  Sonographer LK    Blood Pressure 147/84    Height 165.1 cm     Weight 72.6 kg       BSA 1.8   sqm    Dimensions:  LA 3.8       Normal Values: 2.0 - 4.0 cm  Ao 3.2        Normal Values: 2.0 - 3.8 cm  SEPTUM 1.7       Normal Values: 0.6 - 1.2 cm  PWT 1.2       Normal Values: 0.6 - 1.1 cm  LVIDd 4.8         Normal Values: 3.0 - 5.6 cm  LVIDs 3.2      Normal Values: 1.8 - 4.0 cm      OBSERVATIONS:  Mitral Valve: Mitral annular calcification with thickened leaflets, mild   MR.  Aortic Valve/Aorta: Calcified trileaflet aortic valve with decreased   opening. Peak transaortic valve gradient equals 20.4 mmHg with a mean   transaortic valve gradient 13.2 mmHg. By visual estimation there appears   to be mild to moderate aortic stenosis  Tricuspid Valve: Moderate TR.  Pulmonic Valve: Trace PI  Left Atrium: Enlarged  Right Atrium: Enlarged  Left Ventricle: Mild to moderate segmental left ventricular systolic   dysfunction, estimated LVEF of 35-40%. The apex, mid inferoseptal and mid   anterolateral walls appear severely hypokinetic  Right Ventricle: Right ventricular enlargement with grossly normal   function  Pericardium: no significant pericardial effusion.  Pulmonary/RV Pressure: estimated PA systolic pressure of 36 mmHg  IVC measures 1.47 cm          IMPRESSION:  Mild to moderate segmental left ventricular systolic dysfunction,   estimated LVEF of 35-40%. The apex, mid inferoseptal and mid   anterolateral walls appear severely hypokinetic  Right ventricular enlargement with grossly normal function  Biatrial enlargement  Calcified trileaflet aortic valve with mild to moderate aortic stenosis,   without AI.  Mild MR  Moderate TR.  No significant pericardial effusion.    --- End of Report ---    JEAN CARLOS HENRY MD; Attending Cardiologist  This document has been electronically signed. Jun 20 2022 12:56PM       ACC: [id number] EXAM:  XR CHEST PORTABLE ROUTINE 1V                          PROCEDURE DATE:  06/25/2022          INTERPRETATION:  Chest portable.    Clinical History: Chest pain. CHF.    Comparison: 6/17/2022.    Single AP view submitted.    The evaluation of the cardiomediastinal silhouette is limited on portable   technique.  Atherosclerotic calcification aorta.    Again noted is elevation of right hemidiaphragm.  There are prominent bronchovascular markings bilaterally, without lobar   lung consolidation.    Persistent blunting at the right costophrenic angle may represent small   pleural effusion and/or pleural thickening.    Impression:    Findings as discussed above.    --- End of Report ---    MERRY DELVALLE MD; Attending Radiologist  This document has been electronically signed. Jun 27 2022  4:08PM    Ventricular Rate 74 BPM    Atrial Rate 71 BPM    QRS Duration 154 ms    Q-T Interval 458 ms    QTC Calculation(Bazett) 508 ms    R Axis 251 degrees    T Axis 225 degrees    Diagnosis Line Atrial fibrillation  Left bundle branch block  Confirmed by KEVIN MUNIZ ([id number]) on 6/22/2022 12:20:29 PM

## 2022-06-29 ENCOUNTER — TRANSCRIPTION ENCOUNTER (OUTPATIENT)
Age: 87
End: 2022-06-29

## 2022-06-29 DIAGNOSIS — B35.1 TINEA UNGUIUM: ICD-10-CM

## 2022-06-29 LAB
ALBUMIN SERPL ELPH-MCNC: 2.2 G/DL — LOW (ref 3.3–5)
ALP SERPL-CCNC: 129 U/L — HIGH (ref 40–120)
ALT FLD-CCNC: 40 U/L — SIGNIFICANT CHANGE UP (ref 12–78)
ANION GAP SERPL CALC-SCNC: 5 MMOL/L — SIGNIFICANT CHANGE UP (ref 5–17)
APTT BLD: 41 SEC — HIGH (ref 27.5–35.5)
AST SERPL-CCNC: 83 U/L — HIGH (ref 15–37)
BILIRUB SERPL-MCNC: 1 MG/DL — SIGNIFICANT CHANGE UP (ref 0.2–1.2)
BUN SERPL-MCNC: 25 MG/DL — HIGH (ref 7–23)
CALCIUM SERPL-MCNC: 8.1 MG/DL — LOW (ref 8.5–10.1)
CHLORIDE SERPL-SCNC: 102 MMOL/L — SIGNIFICANT CHANGE UP (ref 96–108)
CO2 SERPL-SCNC: 32 MMOL/L — HIGH (ref 22–31)
CREAT SERPL-MCNC: 1.6 MG/DL — HIGH (ref 0.5–1.3)
EGFR: 30 ML/MIN/1.73M2 — LOW
GLUCOSE SERPL-MCNC: 140 MG/DL — HIGH (ref 70–99)
HCT VFR BLD CALC: 34.9 % — SIGNIFICANT CHANGE UP (ref 34.5–45)
HGB BLD-MCNC: 11.3 G/DL — LOW (ref 11.5–15.5)
INR BLD: 2.25 RATIO — HIGH (ref 0.88–1.16)
MCHC RBC-ENTMCNC: 29.7 PG — SIGNIFICANT CHANGE UP (ref 27–34)
MCHC RBC-ENTMCNC: 32.4 GM/DL — SIGNIFICANT CHANGE UP (ref 32–36)
MCV RBC AUTO: 91.8 FL — SIGNIFICANT CHANGE UP (ref 80–100)
NRBC # BLD: 0 /100 WBCS — SIGNIFICANT CHANGE UP (ref 0–0)
PLATELET # BLD AUTO: 218 K/UL — SIGNIFICANT CHANGE UP (ref 150–400)
POTASSIUM SERPL-MCNC: 3.3 MMOL/L — LOW (ref 3.5–5.3)
POTASSIUM SERPL-SCNC: 3.3 MMOL/L — LOW (ref 3.5–5.3)
PROT SERPL-MCNC: 5.9 G/DL — LOW (ref 6–8.3)
PROTHROM AB SERPL-ACNC: 26.5 SEC — HIGH (ref 10.5–13.4)
RBC # BLD: 3.8 M/UL — SIGNIFICANT CHANGE UP (ref 3.8–5.2)
RBC # FLD: 18.8 % — HIGH (ref 10.3–14.5)
SARS-COV-2 RNA SPEC QL NAA+PROBE: SIGNIFICANT CHANGE UP
SODIUM SERPL-SCNC: 139 MMOL/L — SIGNIFICANT CHANGE UP (ref 135–145)
WBC # BLD: 4.05 K/UL — SIGNIFICANT CHANGE UP (ref 3.8–10.5)
WBC # FLD AUTO: 4.05 K/UL — SIGNIFICANT CHANGE UP (ref 3.8–10.5)

## 2022-06-29 PROCEDURE — 99232 SBSQ HOSP IP/OBS MODERATE 35: CPT

## 2022-06-29 RX ORDER — WARFARIN SODIUM 2.5 MG/1
3 TABLET ORAL ONCE
Refills: 0 | Status: DISCONTINUED | OUTPATIENT
Start: 2022-06-29 | End: 2022-06-29

## 2022-06-29 RX ORDER — WARFARIN SODIUM 2.5 MG/1
4 TABLET ORAL ONCE
Refills: 0 | Status: DISCONTINUED | OUTPATIENT
Start: 2022-06-29 | End: 2022-06-29

## 2022-06-29 RX ORDER — METOPROLOL TARTRATE 50 MG
1 TABLET ORAL
Qty: 0 | Refills: 0 | DISCHARGE

## 2022-06-29 RX ORDER — CLOPIDOGREL BISULFATE 75 MG/1
1 TABLET, FILM COATED ORAL
Qty: 0 | Refills: 0 | DISCHARGE
Start: 2022-06-29

## 2022-06-29 RX ORDER — METOPROLOL TARTRATE 50 MG
1 TABLET ORAL
Qty: 0 | Refills: 0 | DISCHARGE
Start: 2022-06-29

## 2022-06-29 RX ORDER — FLUTICASONE PROPIONATE 50 MCG
1 SPRAY, SUSPENSION NASAL
Qty: 0 | Refills: 0 | DISCHARGE
Start: 2022-06-29

## 2022-06-29 RX ORDER — ATORVASTATIN CALCIUM 80 MG/1
1 TABLET, FILM COATED ORAL
Qty: 0 | Refills: 0 | DISCHARGE
Start: 2022-06-29

## 2022-06-29 RX ORDER — POTASSIUM CHLORIDE 20 MEQ
40 PACKET (EA) ORAL EVERY 4 HOURS
Refills: 0 | Status: COMPLETED | OUTPATIENT
Start: 2022-06-29 | End: 2022-06-29

## 2022-06-29 RX ORDER — POTASSIUM CHLORIDE 20 MEQ
2 PACKET (EA) ORAL
Qty: 0 | Refills: 0 | DISCHARGE

## 2022-06-29 RX ORDER — ASPIRIN/CALCIUM CARB/MAGNESIUM 324 MG
1 TABLET ORAL
Qty: 0 | Refills: 0 | DISCHARGE
Start: 2022-06-29

## 2022-06-29 RX ORDER — WARFARIN SODIUM 2.5 MG/1
2.5 TABLET ORAL ONCE
Refills: 0 | Status: COMPLETED | OUTPATIENT
Start: 2022-06-29 | End: 2022-06-29

## 2022-06-29 RX ADMIN — Medication 1 SPRAY(S): at 17:10

## 2022-06-29 RX ADMIN — Medication 40 MILLIEQUIVALENT(S): at 11:37

## 2022-06-29 RX ADMIN — Medication 1 APPLICATION(S): at 05:38

## 2022-06-29 RX ADMIN — Medication 50 MILLIGRAM(S): at 05:53

## 2022-06-29 RX ADMIN — WARFARIN SODIUM 2.5 MILLIGRAM(S): 2.5 TABLET ORAL at 21:13

## 2022-06-29 RX ADMIN — ZINC OXIDE 1 APPLICATION(S): 200 OINTMENT TOPICAL at 17:09

## 2022-06-29 RX ADMIN — Medication 1 APPLICATION(S): at 17:09

## 2022-06-29 RX ADMIN — Medication 81 MILLIGRAM(S): at 11:37

## 2022-06-29 RX ADMIN — Medication 1 SPRAY(S): at 05:39

## 2022-06-29 RX ADMIN — CLOPIDOGREL BISULFATE 75 MILLIGRAM(S): 75 TABLET, FILM COATED ORAL at 11:37

## 2022-06-29 RX ADMIN — Medication 650 MILLIGRAM(S): at 02:10

## 2022-06-29 RX ADMIN — Medication 40 MILLIEQUIVALENT(S): at 17:09

## 2022-06-29 RX ADMIN — Medication 1 SPRAY(S): at 08:31

## 2022-06-29 RX ADMIN — PANTOPRAZOLE SODIUM 40 MILLIGRAM(S): 20 TABLET, DELAYED RELEASE ORAL at 11:37

## 2022-06-29 RX ADMIN — Medication 75 MICROGRAM(S): at 05:38

## 2022-06-29 RX ADMIN — Medication 650 MILLIGRAM(S): at 03:00

## 2022-06-29 RX ADMIN — ZINC OXIDE 1 APPLICATION(S): 200 OINTMENT TOPICAL at 06:18

## 2022-06-29 RX ADMIN — Medication 40 MILLIGRAM(S): at 05:38

## 2022-06-29 RX ADMIN — ATORVASTATIN CALCIUM 20 MILLIGRAM(S): 80 TABLET, FILM COATED ORAL at 21:13

## 2022-06-29 RX ADMIN — Medication 1 SPRAY(S): at 22:29

## 2022-06-29 NOTE — PROGRESS NOTE ADULT - ATTENDING COMMENTS
Patient is a 89 y/o F with a pmhx b/l LE edema, HTN, P Afib (on coumadin), and hypothyroidism presenting with diarrhea and mid-sternal CP admitted for CP w/ elevated trops, NSTEMI.  Pt seen, examined, case & care plan d/w pt, residents at detail.  -Cardiology Dr Almazan  follow up  -Nasal saline spray 4-5 x day,   -Renal  follow up,  -AM labs   -PO diet  -Palliative care -DNR/DNI  -D/W Social Service , PT---> [facility]    D/W Family -Dtr  about D/C Plan---> [facility].  Total care time is 40 minutes.

## 2022-06-29 NOTE — DISCHARGE NOTE NURSING/CASE MANAGEMENT/SOCIAL WORK - PATIENT PORTAL LINK FT
You can access the FollowMyHealth Patient Portal offered by [facility] by registering at the following website: [email or web address]. By joining [facility]’s FollowMyHealth portal, you will also be able to view your health information using other applications (apps) compatible with our system.

## 2022-06-29 NOTE — PROGRESS NOTE ADULT - PROBLEM SELECTOR PLAN 9
DVT ppx coumadin 3 mg tonight, no plan for cath   s/p full dose Lovenox x2 renally dosed on 6/21 and 6/22 DVT ppx coumadin 3 mg tonight, INR 2.2 today, no plan for cath   s/p full dose Lovenox x2 renally dosed on 6/21 and 6/22 DVT ppx coumadin 4 mg tonight, INR 2.2 today, no plan for cath   s/p full dose Lovenox x2 renally dosed on 6/21 and 6/22

## 2022-06-29 NOTE — CONSULT NOTE ADULT - SUBJECTIVE AND OBJECTIVE BOX
HPI:  Patient is a 89 y/o F with a pmhx b/l LE edema, HTN, P Afib (on coumadin), and hypothyroidism presenting with sudden onset mid-sternal CP. Pt describes pain as a "prickly feeling", rated 4/10, constant in nature since 4am on 6/17, resolved in the ED. Pain was associated with L arm weakness, now resolved. Pt states the pain woke her up, and she was scared to walk and was unable to use her L arm for support to get out of bed. Pt was also nauseous at this time. No associated diaphoresis, palpitations, chest pressure. No amaurosis fugax, facial droop, garbled speech, hemiparesis, LOC, falls reported. No vomiting, fevers, chills, cough, sore throat, SOB, abd pain, constipation, dysuria. Pt had one loose BM this AM at home, denied hematochezia or melena.       In ED:  VS - HR 71 | /65 | RR 18 | sO2 94 | T 97.6  Labs - HgB 11.2 | INR 1.85 | K 3.3 | Glu 114 | trop 342.6 | CKMB 3.2 | BNP 2396  CTH NC - No acute intracranial hemorrhage. Lacunar infarct R inferior cerebellum  EKG - vent rate 66, QTc 501ms, sinus rhythm, old LBBB, no ischemic changes on personal read  Given -  mg PO. pt seen by cardiology Dr Almazan , pt admitted to tele for NSTEMI. (17 Jun 2022 09:52)      89y year old Female seen at [facility] TELE 311 W1 for ----------.  Denies any fever, chills, nausea, vomiting, chest pain, shortness of breath, or calf pain at this time.    REVIEW OF SYSTEMS    PAST MEDICAL & SURGICAL HISTORY:  Hypothyroid      Hypertension      Lower extremity edema      Paroxysmal atrial fibrillation          Allergies    No Known Allergies    Intolerances        MEDICATIONS  (STANDING):  aspirin enteric coated 81 milliGRAM(s) Oral daily  atorvastatin 20 milliGRAM(s) Oral at bedtime  BACItracin   Ointment 1 Application(s) Topical two times a day  clopidogrel Tablet 75 milliGRAM(s) Oral daily  fluticasone propionate 50 MICROgram(s)/spray Nasal Spray 1 Spray(s) Both Nostrils two times a day  furosemide    Tablet 40 milliGRAM(s) Oral two times a day  levothyroxine 75 MICROGram(s) Oral daily  metoprolol succinate ER 50 milliGRAM(s) Oral daily  pantoprazole  Injectable 40 milliGRAM(s) IV Push daily  potassium chloride    Tablet ER 40 milliEquivalent(s) Oral every 4 hours  sodium chloride 0.65% Nasal 1 Spray(s) Both Nostrils five times a day  warfarin 3 milliGRAM(s) Oral once  zinc oxide 40% Paste 1 Application(s) Topical two times a day    MEDICATIONS  (PRN):  acetaminophen     Tablet .. 650 milliGRAM(s) Oral every 6 hours PRN Mild Pain (1 - 3)  ALBUTerol    90 MICROgram(s) HFA Inhaler 2 Puff(s) Inhalation every 12 hours PRN Shortness of Breath and/or Wheezing  aluminum hydroxide/magnesium hydroxide/simethicone Suspension 30 milliLiter(s) Oral every 6 hours PRN Dyspepsia      Social History:  Home: lives alone  Smoke: none  Alcohol: none  Recreational drug use: none  Ambulate: independent, uses support from furniture and cane occasionally  Activities of daily living: independent  Job: retired  COVID - Vaccine (17 Jun 2022 09:52)      FAMILY HISTORY:      Vital Signs Last 24 Hrs  T(C): 36.8 (29 Jun 2022 13:33), Max: 36.9 (28 Jun 2022 19:26)  T(F): 98.3 (29 Jun 2022 13:33), Max: 98.5 (28 Jun 2022 19:26)  HR: 105 (29 Jun 2022 13:33) (73 - 105)  BP: 94/53 (29 Jun 2022 13:33) (94/53 - 107/61)  BP(mean): --  RR: 18 (29 Jun 2022 05:17) (18 - 18)  SpO2: 93% (29 Jun 2022 13:33) (91% - 96%)    PHYSICAL EXAM:  Vascular: DP & PT palpable bilaterally, Capillary refill 3 seconds, Digital hair present bilaterally  Neurological: Light touch sensation intact bilaterally  Musculoskeletal: 5/5 strength in all quadrants bilaterally, AJ & STJ ROM intact  Dermatological: ---------- cm ulceration noted to the ----------, granular wound bed, no probe to bone, no periwound erythema, no fluctuance, no malodor, no proximal streaking at this time    CBC Full  -  ( 29 Jun 2022 10:25 )  WBC Count : 4.05 K/uL  RBC Count : 3.80 M/uL  Hemoglobin : 11.3 g/dL  Hematocrit : 34.9 %  Platelet Count - Automated : 218 K/uL  Mean Cell Volume : 91.8 fl  Mean Cell Hemoglobin : 29.7 pg  Mean Cell Hemoglobin Concentration : 32.4 gm/dL  Auto Neutrophil # : x  Auto Lymphocyte # : x  Auto Monocyte # : x  Auto Eosinophil # : x  Auto Basophil # : x  Auto Neutrophil % : x  Auto Lymphocyte % : x  Auto Monocyte % : x  Auto Eosinophil % : x  Auto Basophil % : x      ----------CHEM PANEL----------    PT/INR - ( 29 Jun 2022 06:37 )   PT: 26.5 sec;   INR: 2.25 ratio         PTT - ( 29 Jun 2022 06:37 )  PTT:41.0 sec        Imaging: ----------   HPI:  Patient is a 89 y/o F with a pmhx b/l LE edema, HTN, P Afib (on coumadin), and hypothyroidism presenting with sudden onset mid-sternal CP. Pt describes pain as a "prickly feeling", rated 4/10, constant in nature since 4am on 6/17, resolved in the ED. Pain was associated with L arm weakness, now resolved. Pt states the pain woke her up, and she was scared to walk and was unable to use her L arm for support to get out of bed. Pt was also nauseous at this time. No associated diaphoresis, palpitations, chest pressure. No amaurosis fugax, facial droop, garbled speech, hemiparesis, LOC, falls reported. No vomiting, fevers, chills, cough, sore throat, SOB, abd pain, constipation, dysuria. Pt had one loose BM this AM at home, denied hematochezia or melena.       In ED:  VS - HR 71 | /65 | RR 18 | sO2 94 | T 97.6  Labs - HgB 11.2 | INR 1.85 | K 3.3 | Glu 114 | trop 342.6 | CKMB 3.2 | BNP 2396  CTH NC - No acute intracranial hemorrhage. Lacunar infarct R inferior cerebellum  EKG - vent rate 66, QTc 501ms, sinus rhythm, old LBBB, no ischemic changes on personal read  Given -  mg PO. pt seen by cardiology Dr Almazan , pt admitted to tele for NSTEMI. (17 Jun 2022 09:52)      89y year old Female seen at [facility] TELE 311 W1 for nail care by podiatry.  Denies any fever, chills, nausea, vomiting, chest pain, shortness of breath, or calf pain at this time.    REVIEW OF SYSTEMS    PAST MEDICAL & SURGICAL HISTORY:  Hypothyroid      Hypertension      Lower extremity edema      Paroxysmal atrial fibrillation          Allergies    No Known Allergies    Intolerances        MEDICATIONS  (STANDING):  aspirin enteric coated 81 milliGRAM(s) Oral daily  atorvastatin 20 milliGRAM(s) Oral at bedtime  BACItracin   Ointment 1 Application(s) Topical two times a day  clopidogrel Tablet 75 milliGRAM(s) Oral daily  fluticasone propionate 50 MICROgram(s)/spray Nasal Spray 1 Spray(s) Both Nostrils two times a day  furosemide    Tablet 40 milliGRAM(s) Oral two times a day  levothyroxine 75 MICROGram(s) Oral daily  metoprolol succinate ER 50 milliGRAM(s) Oral daily  pantoprazole  Injectable 40 milliGRAM(s) IV Push daily  potassium chloride    Tablet ER 40 milliEquivalent(s) Oral every 4 hours  sodium chloride 0.65% Nasal 1 Spray(s) Both Nostrils five times a day  warfarin 3 milliGRAM(s) Oral once  zinc oxide 40% Paste 1 Application(s) Topical two times a day    MEDICATIONS  (PRN):  acetaminophen     Tablet .. 650 milliGRAM(s) Oral every 6 hours PRN Mild Pain (1 - 3)  ALBUTerol    90 MICROgram(s) HFA Inhaler 2 Puff(s) Inhalation every 12 hours PRN Shortness of Breath and/or Wheezing  aluminum hydroxide/magnesium hydroxide/simethicone Suspension 30 milliLiter(s) Oral every 6 hours PRN Dyspepsia      Social History:  Home: lives alone  Smoke: none  Alcohol: none  Recreational drug use: none  Ambulate: independent, uses support from furniture and cane occasionally  Activities of daily living: independent  Job: retired  COVID - Vaccine (17 Jun 2022 09:52)      FAMILY HISTORY:      Vital Signs Last 24 Hrs  T(C): 36.8 (29 Jun 2022 13:33), Max: 36.9 (28 Jun 2022 19:26)  T(F): 98.3 (29 Jun 2022 13:33), Max: 98.5 (28 Jun 2022 19:26)  HR: 105 (29 Jun 2022 13:33) (73 - 105)  BP: 94/53 (29 Jun 2022 13:33) (94/53 - 107/61)  BP(mean): --  RR: 18 (29 Jun 2022 05:17) (18 - 18)  SpO2: 93% (29 Jun 2022 13:33) (91% - 96%)    PHYSICAL EXAM:  Vascular: DP & PT palpable bilaterally, Capillary refill 3 seconds, Digital hair absent bilaterally  Neurological: Light touch sensation intact bilaterally  Musculoskeletal: 5/5 strength in all quadrants bilaterally, AJ & STJ ROM intact  Dermatological: nails x 10 thickened and elonagted    CBC Full  -  ( 29 Jun 2022 10:25 )  WBC Count : 4.05 K/uL  RBC Count : 3.80 M/uL  Hemoglobin : 11.3 g/dL  Hematocrit : 34.9 %  Platelet Count - Automated : 218 K/uL  Mean Cell Volume : 91.8 fl  Mean Cell Hemoglobin : 29.7 pg  Mean Cell Hemoglobin Concentration : 32.4 gm/dL  Auto Neutrophil # : x  Auto Lymphocyte # : x  Auto Monocyte # : x  Auto Eosinophil # : x  Auto Basophil # : x  Auto Neutrophil % : x  Auto Lymphocyte % : x  Auto Monocyte % : x  Auto Eosinophil % : x  Auto Basophil % : x      ----------CHEM PANEL----------    PT/INR - ( 29 Jun 2022 06:37 )   PT: 26.5 sec;   INR: 2.25 ratio         PTT - ( 29 Jun 2022 06:37 )  PTT:41.0 sec        Imaging: ----------

## 2022-06-29 NOTE — PROGRESS NOTE ADULT - PROBLEM SELECTOR PLAN 1
Acute Systolic CHF excaerbation  -TTE: EF 35-40%, LV hypokinesis 2/2 NSTEMI. [place], mid inferoseptal and mid anterolateral walls severely hypokinetic. Right ventricular enlargement. Biatrial enlargement. Moderate aortic stenosis. Moderate TR.   -proBNP>13k  -Daily assessment of volume status,  -continue home Lasix 40 mg BID, start incentive spirometry, albuterol inhaler x1 dose given this AM for wheezing. Spoke with nurse, will try to wean O2 as tolerated.  -avoid IVF  -I/Os, daily weights  -cardio Dr. Blair- d/c plan  -PT recommending S[facility]AN

## 2022-06-29 NOTE — PHARMACOTHERAPY INTERVENTION NOTE - COMMENTS
MI Patient Transitions of Care Counseling  Counseled by (formerly [facility] name): Serge Cottrell, Pharm D  Person(s) counseled: Patient, at bedside  Counseling materials provided/counseling aids used:  [employer] [place]  Time spent Counseling: 15 minutes  Counseling provided according to [facility] guidelines including side effects  Notes:  No meds to beds, since possible transfer for cath? Daughter manages medications, at home and sets up weekly pillminder.  
Patient currently ordered for warfarin 3 mg x1 for hx of afib. Discussed with Dr. Keagan Villegas and recommended a lower dose due to INR trend and increase by >0.3. MD agreed to adjust dose to 2.5 mg x1. Order updated. Will continue to monitor INR trend.
Patient is a 89y F ordered for warfarin 3mg x1 for A fib. Discussed dose modification with Dr. Keagan Villegas given that INR increased from 1.46 yesterday to 1.87 today (delta INR 0.41) and recommended reducing dose tonight. MD agreed to reduce to warfarin 2mg tonight, entered order per discussion.

## 2022-06-29 NOTE — PROGRESS NOTE ADULT - ASSESSMENT
89 y/o F with a pmhx b/l LE edema, HTN,P Afib on coumadin , hypothyroidism, pw midsternal chest discomfort atypical in nature and left arm discomfort since waking up this morning at  4am, describes it as "creepy feeling" assoc with left arm weakness, general weakness and not feeling well. Patient has no underlying coronary disease.     NSTEMI/PAfib  - EKG: Afib with underlying LBBB; unchanged from previous.    - Denies any anginal symptoms to date  - Ischemic eval and plan has been discussed with patient and daughter, multiple times, aware of risk and benefits of procedure, understand that as of now, medical management would be more favorable given pt's advance age, labile renal function, and volume overload.    - BP, HR, stable and controlled  - Continue GDMT: DAPT (ASA, Plavix), BB, and statin.  Lower statin dose  - Still requiring NC and overloaded on exam.  Would give Lasix 40 gm IV x 1 today  - Remains rate-controlled per flow sheet.  Continue daily dose Warfarin to keep INR 2-3  - Would only keep on triple therapy for short period.  Would likely reduce to Coumadin and Plavix x 1 mo of DAPT  - Continue routine hemodynamics  - Monitor electrolytes, replete to keep K>4 and Mag>2    Acute Systolic HF  - TTE:(6/19/2022) Mild to moderate segmental LV systolic dysfunction, est LVEF of 35-40%. The apex, mid inferoseptal and mid anterolateral walls appear severely hypokinetic, RV enlargement with grossly normal function. Biatrial enlargement Calcified trileaflet aortic valve with mild to moderate aortic stenosis, without AI. Mild MR Moderate TR.  - Appears more compensated from HF POV, though, still with sacral edema   - Continue Lasix PO.  If Creat worsens, may need to switch to Diamox  - Continue to monitor volume status   - Continue strict I/O's.     - Will continue to follow.    Angeles Kim DNP, NP-C  Cardiology   Spectra #[phone]

## 2022-06-29 NOTE — PROGRESS NOTE ADULT - SUBJECTIVE AND OBJECTIVE BOX
[facility] -- Reid Astorga, Raheem Almazan, Flex Muniz Savella, [place]  Office # [phone]    Follow Up:  NSTEMI, Afib    Subjective/Observations: Seen sitting on the chair, NC in use.  Alert and oriented.  c/o of being weak but denies any respiratory or cardiac discomfort    REVIEW OF SYSTEMS: All other review of systems is negative unless indicated above  PAST MEDICAL & SURGICAL HISTORY:  Hypothyroid    Hypertension    Lower extremity edema    Paroxysmal atrial fibrillation    MEDICATIONS  (STANDING):  aspirin enteric coated 81 milliGRAM(s) Oral daily  atorvastatin 20 milliGRAM(s) Oral at bedtime  BACItracin   Ointment 1 Application(s) Topical two times a day  clopidogrel Tablet 75 milliGRAM(s) Oral daily  fluticasone propionate 50 MICROgram(s)/spray Nasal Spray 1 Spray(s) Both Nostrils two times a day  furosemide    Tablet 40 milliGRAM(s) Oral two times a day  levothyroxine 75 MICROGram(s) Oral daily  metoprolol succinate ER 50 milliGRAM(s) Oral daily  pantoprazole  Injectable 40 milliGRAM(s) IV Push daily  sodium chloride 0.65% Nasal 1 Spray(s) Both Nostrils five times a day  warfarin 3 milliGRAM(s) Oral once  zinc oxide 40% Paste 1 Application(s) Topical two times a day    MEDICATIONS  (PRN):  acetaminophen     Tablet .. 650 milliGRAM(s) Oral every 6 hours PRN Mild Pain (1 - 3)  ALBUTerol    90 MICROgram(s) HFA Inhaler 2 Puff(s) Inhalation every 12 hours PRN Shortness of Breath and/or Wheezing  aluminum hydroxide/magnesium hydroxide/simethicone Suspension 30 milliLiter(s) Oral every 6 hours PRN Dyspepsia    Allergies    No Known Allergies    Intolerances    Vital Signs Last 24 Hrs  T(C): 36.3 (29 Jun 2022 05:17), Max: 36.9 (28 Jun 2022 19:26)  T(F): 97.4 (29 Jun 2022 05:17), Max: 98.5 (28 Jun 2022 19:26)  HR: 78 (29 Jun 2022 05:17) (65 - 78)  BP: 107/61 (29 Jun 2022 05:17) (102/50 - 111/71)  BP(mean): --  RR: 18 (29 Jun 2022 05:17) (17 - 18)  SpO2: 96% (29 Jun 2022 05:17) (91% - 96%)  I&O's Summary    28 Jun 2022 07:01  -  29 Jun 2022 07:00  --------------------------------------------------------  IN: 200 mL / OUT: 650 mL / NET: -450 mL      PHYSICAL EXAM:  TELE: Not on tele  Constitutional: NAD, awake and alert, well-developed  HEENT: Moist Mucous Membranes, Anicteric  Pulmonary: Non-labored, breath sounds are diminished bilaterally, right less than right, No wheezing, rales or rhonchi  Cardiovascular: IRRR, S1 and S2, +murmurs, no rubs, gallops or clicks  Gastrointestinal: Bowel Sounds present, soft, nontender.   Lymph: Trace peripheral edema. No lymphadenopathy.  Skin: No visible rashes or ulcers.  Psych:  Mood & affect appropriate  LABS: All Labs Reviewed:                        10.4   4.11  )-----------( 205      ( 28 Jun 2022 06:46 )             31.3                         10.9   5.02  )-----------( 230      ( 27 Jun 2022 07:08 )             33.8     28 Jun 2022 06:46    140    |  103    |  24     ----------------------------<  88     3.8     |  35     |  1.50   27 Jun 2022 07:08    141    |  104    |  22     ----------------------------<  103    3.8     |  35     |  1.30     Ca    7.7        28 Jun 2022 06:46  Ca    8.3        27 Jun 2022 07:08    TPro  6.4    /  Alb  2.2    /  TBili  1.1    /  DBili  x      /  AST  56     /  ALT  32     /  AlkPhos  124    28 Jun 2022 06:46  TPro  6.1    /  Alb  2.3    /  TBili  1.0    /  DBili  x      /  AST  52     /  ALT  28     /  AlkPhos  113    27 Jun 2022 07:08    PT/INR - ( 29 Jun 2022 06:37 )   PT: 26.5 sec;   INR: 2.25 ratio      PTT - ( 29 Jun 2022 06:37 )  PTT:41.0 sec    ACC: [id number] EXAM:  ECHO TTE WO CON COMP W DOPP                          PROCEDURE DATE:  06/19/2022          INTERPRETATION:  INDICATION: Chest pain  Sonographer LK    Blood Pressure 147/84    Height 165.1 cm     Weight 72.6 kg       BSA 1.8   sqm    Dimensions:  LA 3.8       Normal Values: 2.0 - 4.0 cm  Ao 3.2        Normal Values: 2.0 - 3.8 cm  SEPTUM 1.7       Normal Values: 0.6 - 1.2 cm  PWT 1.2       Normal Values: 0.6 - 1.1 cm  LVIDd 4.8         Normal Values: 3.0 - 5.6 cm  LVIDs 3.2      Normal Values: 1.8 - 4.0 cm      OBSERVATIONS:  Mitral Valve: Mitral annular calcification with thickened leaflets, mild   MR.  Aortic Valve/Aorta: Calcified trileaflet aortic valve with decreased   opening. Peak transaortic valve gradient equals 20.4 mmHg with a mean   transaortic valve gradient 13.2 mmHg. By visual estimation there appears   to be mild to moderate aortic stenosis  Tricuspid Valve: Moderate TR.  Pulmonic Valve: Trace PI  Left Atrium: Enlarged  Right Atrium: Enlarged  Left Ventricle: Mild to moderate segmental left ventricular systolic   dysfunction, estimated LVEF of 35-40%. The apex, mid inferoseptal and mid   anterolateral walls appear severely hypokinetic  Right Ventricle: Right ventricular enlargement with grossly normal   function  Pericardium: no significant pericardial effusion.  Pulmonary/RV Pressure: estimated PA systolic pressure of 36 mmHg  IVC measures 1.47 cm          IMPRESSION:  Mild to moderate segmental left ventricular systolic dysfunction,   estimated LVEF of 35-40%. The apex, mid inferoseptal and mid   anterolateral walls appear severely hypokinetic  Right ventricular enlargement with grossly normal function  Biatrial enlargement  Calcified trileaflet aortic valve with mild to moderate aortic stenosis,   without AI.  Mild MR  Moderate TR.  No significant pericardial effusion.    --- End of Report ---    JEAN CARLOS HENRY MD; Attending Cardiologist  This document has been electronically signed. Jun 20 2022 12:56PM       ACC: [id number] EXAM:  XR CHEST PORTABLE ROUTINE 1V                          PROCEDURE DATE:  06/25/2022          INTERPRETATION:  Chest portable.    Clinical History: Chest pain. CHF.    Comparison: 6/17/2022.    Single AP view submitted.    The evaluation of the cardiomediastinal silhouette is limited on portable   technique.  Atherosclerotic calcification aorta.    Again noted is elevation of right hemidiaphragm.  There are prominent bronchovascular markings bilaterally, without lobar   lung consolidation.    Persistent blunting at the right costophrenic angle may represent small   pleural effusion and/or pleural thickening.    Impression:    Findings as discussed above.    --- End of Report ---    MERRY DELVALLE MD; Attending Radiologist  This document has been electronically signed. Jun 27 2022  4:08PM    Ventricular Rate 74 BPM    Atrial Rate 71 BPM    QRS Duration 154 ms    Q-T Interval 458 ms    QTC Calculation(Bazett) 508 ms    R Axis 251 degrees    T Axis 225 degrees    Diagnosis Line Atrial fibrillation  Left bundle branch block  Confirmed by KEVIN MUNIZ ([id number]) on 6/22/2022 12:20:29 PM

## 2022-06-29 NOTE — CONSULT NOTE ADULT - PROBLEM SELECTOR RECOMMENDATION 9
Pt seen and evaluated  - nails debrided  - pt to follow- up with private podiatrist for nail care in the future  - pt stable for discharge    Wound Care Instructions - post op  1. Patient to follow-up in [facility] within 3-5 days  with Dr. Shipman or Dr. Nevarez. Patient to call [phone] to make an appointment after discharge. Pt seen and evaluated  - nails x 10 debrided  - pt to follow- up with private podiatrist for nail care in the future  - pt stable for discharge. Podiatry sign off    Podiatry Discharge Care Instructions   1. Patient to follow-up in Wound Care Clinic within 3-5 days  with Dr. Shipman or Dr. Nevarez. Patient to call [phone] to make an appointment after discharge.

## 2022-06-29 NOTE — PROGRESS NOTE ADULT - SUBJECTIVE AND OBJECTIVE BOX
Patient is a 89y old  Female who presents with a chief complaint of CP w/ elevated trops (19 Jun 2022 09:28)       HPI:  Patient is a 89 y/o F with a pmhx b/l LE edema, HTN, P Afib (on coumadin), and hypothyroidism presenting with sudden onset mid-sternal CP. Pt describes pain as a "prickly feeling", rated 4/10, constant in nature since 4am on 6/17, resolved in the ED. Pain was associated with L arm weakness, now resolved. Pt states the pain woke her up, and she was scared to walk and was unable to use her L arm for support to get out of bed. Pt was also nauseous at this time. No associated diaphoresis, palpitations, chest pressure. No amaurosis fugax, facial droop, garbled speech, hemiparesis, LOC, falls reported. No vomiting, fevers, chills, cough, sore throat, SOB, abd pain, constipation, dysuria. Pt had one loose BM this AM at home, denied hematochezia or melena.   Patient has not seen nephrologist in the past.  Denies any N/V/Urinary complaints.      No acute events noted       PAST MEDICAL & SURGICAL HISTORY:  Hypothyroid      Hypertension      Lower extremity edema      Paroxysmal atrial fibrillation           FAMILY HISTORY:  NC    Social History:Non smoker    MEDICATIONS  (STANDING):  acetylcysteine  Oral Solution 600 milliGRAM(s) Oral every 12 hours  aspirin enteric coated 81 milliGRAM(s) Oral daily  atorvastatin 80 milliGRAM(s) Oral at bedtime  calcium gluconate IVPB 1 Gram(s) IV Intermittent once  clopidogrel Tablet 75 milliGRAM(s) Oral daily  furosemide   Injectable 40 milliGRAM(s) IV Push once  levothyroxine 75 MICROGram(s) Oral daily  metoprolol succinate ER 50 milliGRAM(s) Oral daily  pantoprazole  Injectable 40 milliGRAM(s) IV Push daily  sodium chloride 0.9%. 1000 milliLiter(s) (50 mL/Hr) IV Continuous <Continuous>    MEDICATIONS  (PRN):      Allergies    No Known Allergies    Intolerances         REVIEW OF SYSTEMS:    Review of Systems:   Constitutional: Denies fatigue  HEENT: Denies headaches and dizziness  Respiratory: denies SOB, cough, or wheezing  Cardiovascular: denies CP, palpitations  Gastrointestinal: Denies nausea, denies vomiting, diarrhea, constipation, abdominal pain, or bloody stools  Genitourinary: denies painful urination, increased frequency, urgency, or bloody urine  Skin: denies rashes or itching  Musculoskeletal: denies muscle aches, joint swelling  Neurologic: Denies generalized weakness, denies loss of sensation, numbness, or tingling    Vital Signs Last 24 Hrs  T(C): 36.3 (29 Jun 2022 05:17), Max: 36.9 (28 Jun 2022 19:26)  T(F): 97.4 (29 Jun 2022 05:17), Max: 98.5 (28 Jun 2022 19:26)  HR: 78 (29 Jun 2022 05:17) (65 - 78)  BP: 107/61 (29 Jun 2022 05:17) (102/50 - 111/71)  BP(mean): --  RR: 18 (29 Jun 2022 05:17) (17 - 18)  SpO2: 96% (29 Jun 2022 05:17) (91% - 96%)    PHYSICAL EXAM:    GENERAL: NAD  HEAD:  Atraumatic, Normocephalic  EYES: EOMI, conjunctiva and sclera clear  ENMT: No Drainage from nares, No drainage from ears  NECK: Supple, neck  veins full  NERVOUS SYSTEM:  Awake and Alert  CHEST/LUNG: mildly Decreased BS R No rales, rhonchi, wheezing, or rubs  HEART: Regular rate and rhythm; No murmurs, rubs, or gallops  ABDOMEN: Soft, Nontender, Nondistended; Bowel sounds present  EXTREMITIES:  + Edema  SKIN: No rashes No obvious ecchymosis      LABS:  6/29/22: pending                        10.4   4.11  )-----------( 205      ( 28 Jun 2022 06:46 )             31.3     06-28    140  |  103  |  24<H>  ----------------------------<  88  3.8   |  35<H>  |  1.50<H>    Ca    7.7<L>      28 Jun 2022 06:46    TPro  6.4  /  Alb  2.2<L>  /  TBili  1.1  /  DBili  x   /  AST  56<H>  /  ALT  32  /  AlkPhos  124<H>  06-28    PT/INR - ( 28 Jun 2022 06:46 )   PT: 22.6 sec;   INR: 1.92 ratio         PTT - ( 28 Jun 2022 06:46 )  PTT:37.7 sec

## 2022-06-29 NOTE — PROGRESS NOTE ADULT - PROBLEM SELECTOR PLAN 4
LUIS CARLOS on CKD stage 3B?   -Cr 1.5 baseline appears to be 1-1.2   -avoid IVF due to CHF  -Lasix 40 mg 2x day PO  -Renal follow up

## 2022-06-29 NOTE — DISCHARGE NOTE NURSING/CASE MANAGEMENT/SOCIAL WORK - NSDCPEFALRISK_GEN_ALL_CORE
For information on Fall & Injury Prevention, visit: [email or web address] OR  [email or web address] OR  https://www.cdc.gov/steadi/patient.html

## 2022-06-29 NOTE — PROGRESS NOTE ADULT - SUBJECTIVE AND OBJECTIVE BOX
Patient is a 89y old  Female who presents with a chief complaint of CP w/ elevated trops (28 Jun 2022 12:08)      HPI:  Patient is a 89 y/o F with a pmhx b/l LE edema, HTN, P Afib (on coumadin), and hypothyroidism presenting with sudden onset mid-sternal CP. Pt describes pain as a "prickly feeling", rated 4/10, constant in nature since 4am on 6/17, resolved in the ED. Pain was associated with L arm weakness, now resolved. Pt states the pain woke her up, and she was scared to walk and was unable to use her L arm for support to get out of bed. Pt was also nauseous at this time. No associated diaphoresis, palpitations, chest pressure. No amaurosis fugax, facial droop, garbled speech, hemiparesis, LOC, falls reported. No vomiting, fevers, chills, cough, sore throat, SOB, abd pain, constipation, dysuria. Pt had one loose BM this AM at home, denied hematochezia or melena.       In ED:  VS - HR 71 | /65 | RR 18 | sO2 94 | T 97.6  Labs - HgB 11.2 | INR 1.85 | K 3.3 | Glu 114 | trop 342.6 | CKMB 3.2 | BNP 2396  CTH NC - No acute intracranial hemorrhage. Lacunar infarct R inferior cerebellum  EKG - vent rate 66, QTc 501ms, sinus rhythm, old LBBB, no ischemic changes on personal read  Given -  mg PO. pt seen by cardiology Dr Almazan , pt admitted to [facility] for NSTEMI. (17 Jun 2022 09:52)      INTERVAL HPI:  6/18/22: Seen and examined at bedside. No acute complaints. Denies chest pain overnight and this AM. Overnight pt had episode of dyspnea, troponins inc from 300s to >31571. Trops now downtrended. Received 1 x dose 40mg IV lasix for dyspnea. Pt had statin dose inc to 80mg, had 1 x dose lopressor 25mg, and had her scheduled evening coumadin. Had 10 beats of NSVT this AM. Pt was started on standing 50mg toprol xL PO qD. Started on plavix 75mg qD. Extensive counseling done with the patient on [facility] and her current state of health. Patient states she has "forms at home" that her daughter will bring to [facility]. Patient values quality of life and wants to discuss with her daughter and son-inlaw. Family to discuss potential plan for cardiac cath and will arrive to a decision on 6/19. C/W medical therapy for ACS. HOLD coumadin as pt INR is therapeutic and to facilitate potential for cardiac cath. Will start renal adjusted FD Lovenox on 6/19 if INR is below 2.   6/19/22 Pt seen and examined at bedside. Pt states she slept well last night. Pt denies SOB, CP, palpitations, dizziness .Mildly elevated Cr , INR -Theraputic  Pt states the last time she had chest pain was Friday night. Later in the afternoon again patient was seen at bedside with  daughter HCP and son in law at bedside, decision was made for DNR/ DNI and cardiac cath.  6/20/22: Patient seen and examined at bedside. Patient denies chest pain, palpitations, dyspnea. Noted to be slightly short of breath. proBNP >13k, will give 1 dose of lasix 40mg IV today.  6/21/22: Patient continues to deny anginal symptoms. Will be transferred for cardiac cath, as INR downtrended to 1.6 and Cr improved to 1.3. For LUIS CARLOS on CKD, will defer further IVF hydration at this time due to TTE results and SOB after previous IVF. Will give Mucomyst q12 x4 for renal protection. Given lasix 40mg IVP x1 to improve respirations. Given full dose lovenox x1.   6/22/22: Overnight patient with R sided abdominal discomfort, given maalox. Patient breathing improved s/p lasix 40mg IV yesterday. Patient's INR 1.4 today and Cr increased to 1.5. Plan for cardiac cath If Cr stable. Given additional dose of full dose lovenox.  6/23/22: Patient seen and examined at bedside. Patient denies chest pain, pressure, sob, palpitations. Cr downtrending today,  As per Cardiology NO plan for transfer for cardiac cath as pt is still SOB , Needs Lasix & Cr is elevated.. Low stable H/H. Warfarin 3mg today.  6/24/22: Patient seen and examined at bedside. Patient denies any SOB today, speaking comfortably. Denies chest pain, pressure, sob, palpitations. Restart Lasix & Coumadin  6/25/22: Patient seen and examined at bedside. Patient denies any SOB today, speaking comfortably. Denies chest pain, pressure, sob, palpitations. Continue home lasix dose and coumadin restart. Lasix 20 mg IVP x 1 dose.  6/26/22: Patient seen and examined at bedside. Pt denies any SOB, CP, palpitations dizziness. Pt is on 2 L NC however cannula had fallen out of nose, replaced NC and dropped down to 1L. Lasix 2x day  6/27/22: Pt seen and examined at bedside. Pt states she has noticed blood clots from the nose last night - placed pt on humidified O2 and standing nasal saline spray. Pt denies any shortness of breath, chest pain, or dizziness. Pt is on 2L NC. Currently on lasix BID. INR 1.87 C/O Nose bleed , dry nose, Change to Humidified O2 .  6/28/22: Pt seen and examined at bedside. She states she had blood clots from the nose yesterday, but has improved since adding humidified O2 and nasal spray. Feels like breathing has improved. Did sit on the chair yesterday with PT. Denies any chest pain or dizziness.   6/29/22: Pt seen and examined at bedside. She states she is no longer having blood clots from the nose. Denies any shortness of breath, chest pain, or dizziness.       OVERNIGHT EVENTS: No acute overnight events.     Home Medications:  Lasix 40 mg oral tablet: 1 tab(s) orally 2 times a day (26 Jun 2022 12:41)  Metoprolol Tartrate 25 mg oral tablet: 1 tab(s) orally once a day (26 Jun 2022 12:41)  potassium chloride 10 mEq oral capsule, extended release: 2 cap(s) orally 2 times a day (26 Jun 2022 12:41)  Synthroid 75 mcg (0.075 mg) oral tablet: 1 tab(s) orally once a day (26 Jun 2022 12:41)  warfarin 3 mg oral tablet: 1 tab(s) orally once a day (M, Tu, Th, F, Sa Grider) (26 Jun 2022 12:41)  warfarin 4 mg oral tablet: 1 tab(s) orally Wednesday (26 Jun 2022 12:41)      MEDICATIONS  (STANDING):  aspirin enteric coated 81 milliGRAM(s) Oral daily  atorvastatin 20 milliGRAM(s) Oral at bedtime  BACItracin   Ointment 1 Application(s) Topical two times a day  clopidogrel Tablet 75 milliGRAM(s) Oral daily  fluticasone propionate 50 MICROgram(s)/spray Nasal Spray 1 Spray(s) Both Nostrils two times a day  furosemide    Tablet 40 milliGRAM(s) Oral two times a day  levothyroxine 75 MICROGram(s) Oral daily  metoprolol succinate ER 50 milliGRAM(s) Oral daily  pantoprazole  Injectable 40 milliGRAM(s) IV Push daily  sodium chloride 0.65% Nasal 1 Spray(s) Both Nostrils five times a day  warfarin 3 milliGRAM(s) Oral once  zinc oxide 40% Paste 1 Application(s) Topical two times a day    MEDICATIONS  (PRN):  acetaminophen     Tablet .. 650 milliGRAM(s) Oral every 6 hours PRN Mild Pain (1 - 3)  ALBUTerol    90 MICROgram(s) HFA Inhaler 2 Puff(s) Inhalation every 12 hours PRN Shortness of Breath and/or Wheezing  aluminum hydroxide/magnesium hydroxide/simethicone Suspension 30 milliLiter(s) Oral every 6 hours PRN Dyspepsia      Allergies    No Known Allergies    Intolerances        REVIEW OF SYSTEMS: "I feel fine"  CONSTITUTIONAL: No fever, No chills, No fatigue, No myalgia, No Body ache, No Weakness  EYES: No eye pain,  No visual disturbances, No discharge, NO Redness  ENMT:  No ear pain, No nose bleed, No vertigo; No sinus pain, NO throat pain, No Congestion  NECK: No pain, No stiffness  RESPIRATORY: No cough, NO wheezing, No hemoptysis, No shortness of breath  CARDIOVASCULAR: No chest pain, palpitations  GASTROINTESTINAL: No abdominal pain, NO epigastric pain. No nausea, No vomiting; No diarrhea, No constipation. [ x ] BM  GENITOURINARY: No dysuria, No frequency, No urgency, No hematuria, NO incontinence  NEUROLOGICAL: No headaches, No dizziness, No numbness, No tingling, No tremors, No weakness  EXT: admits chronic Swelling in LE's, No Pain, admits LE Edema  SKIN:  [ x ] No itching, burning, rashes, or lesions   MUSCULOSKELETAL: No joint pain ,No Jt swelling; No muscle pain, No back pain, No extremity pain  PSYCHIATRIC: No depression,  No anxiety,  No mood swings ,No difficulty sleeping at night  PAIN SCALE: [ x ] None  [  ] Other-      Vital Signs Last 24 Hrs  T(C): 36.3 (29 Jun 2022 05:17), Max: 36.9 (28 Jun 2022 19:26)  T(F): 97.4 (29 Jun 2022 05:17), Max: 98.5 (28 Jun 2022 19:26)  HR: 78 (29 Jun 2022 05:17) (65 - 78)  BP: 107/61 (29 Jun 2022 05:17) (102/50 - 111/71)  BP(mean): --  RR: 18 (29 Jun 2022 05:17) (17 - 18)  SpO2: 96% (29 Jun 2022 05:17) (91% - 96%)  Finger Stick        06-28 @ 07:01  -  06-29 @ 07:00  --------------------------------------------------------  IN: 200 mL / OUT: 650 mL / NET: -450 mL        PHYSICAL EXAM: O2 NC on 2 L  GENERAL:  [ x ] NAD  [ x ] well appearing, [  ] Agitated, [  ] Drowsy,  [  ] Lethargy, [  ] confused   HEAD:  [ x ] Normal, [  ] Other  EYES:  [ x ] EOMI, [ x ] PERRLA, [ x ] conjunctiva and sclera clear normal, [  ] Other,  [  ] Pallor,[  ] Discharge  ENMT:  [ x ] Normal, [ x ] Moist mucous membranes, [ x ] Good dentition, [ x ] No Thrush  NECK:  [ x ] Supple, [x  ] No JVD, [ x ] Normal thyroid, [  ] Lymphadenopathy [  ] Other  CHEST/LUNG:  [ x ] Clear to auscultation bilaterally, [x  ] Breath Sounds equal B/L , [  ] poor effort  [ x ] No rales, [ x ] No rhonchi  [  ]  slight expiratory wheezing,   HEART:  [ x ] Regular rate and IRREGULARLY IRREGULAR rhythm, [  ] tachycardia, [  ] Bradycardia,  [  ] irregular  [ x ] systolic murmur heard best at RUSB No rubs, No gallops, [  ] PPM in place (Mfr:  )  ABDOMEN:  [ x ] Soft, [ x ] Nontender, [ x ] Nondistended, [ x ] No mass, [ x] Bowel sounds present, [ x ] obese  NERVOUS SYSTEM:  [ x ] Alert & Oriented X3, [ x ] Nonfocal  [  ] Confusion  [  ] Encephalopathic [  ] Sedated [  ] Unable to assess, [  ] Dementia [  ] Other-  EXTREMITIES: [ x ] 2+ Peripheral Pulses, No clubbing, No cyanosis,  [ x ] b/l UE +2 pitting edema legs &  gathering at proximal forearms, trace edema b/l LEs, 2+ radial pulses b/l, extremities warm to touch [ x ] PVD stasis skin changes B/L Lower EXT, [  ] wound  LYMPH: No lymphadenopathy noted  SKIN:  [ x ] No rashes or lesions, [  ] Pressure Ulcers, [  ] ecchymosis, [  ] Skin Tears, [ x ] Other - redness B/L Buttocks    DIET: Diet, DASH/TLC:   Sodium & Cholesterol Restricted  1200mL Fluid Restriction ([id number]) (06-22-22 @ 08:53)      LABS:      Ca    7.7        28 Jun 2022 06:46      PT/INR - ( 28 Jun 2022 06:46 )   PT: 22.6 sec;   INR: 1.92 ratio         PTT - ( 28 Jun 2022 06:46 )  PTT:37.7 sec               Anemia Panel:      Thyroid Panel:            Serum Pro-Brain Natriuretic Peptide: 9883 pg/mL (06-25-22 @ 07:03)      RADIOLOGY & ADDITIONAL TESTS: Personally reviewed.     HEALTH ISSUES - PROBLEM Dx:  Hypothyroid    Hypertension    Lower extremity edema    Paroxysmal atrial fibrillation    Need for prophylactic measure    Anemia    Acute kidney injury superimposed on CKD    NSTEMI (non-ST elevation myocardial infarction)    Acute systolic congestive heart failure            Consultant(s) Notes Reviewed:  [x  ] YES     Care Discussed with [ ] Consultants  [ x ] Patient  [ x ] Family [  ] HCP [  ]   [ x ] Social Service  [ x ] RN, [  ] Physical Therapy,[  ] Palliative care team  DVT PPX: [  ] Lovenox, [  ] S C Heparin, [x  ] Coumadin, [  ] Xarelto, [  ] Eliquis, [  ] Pradaxa, [  ] IV Heparin drip, [  ] SCD [  ] Contraindication 2 to GI Bleed,[  ] Ambulation [  ] Contraindicated 2 to  bleed [  ] Contraindicated 2 to Brain Bleed  Advanced directive: [  ] None, [ x ] DNR/DNI

## 2022-06-29 NOTE — PROGRESS NOTE ADULT - PROBLEM SELECTOR PLAN 5
chronic, stable - rate-controlled - on coumadin  - Toprol  XL 50mg PO qd w/ hold parameters  - TTE: EF 35-40%, LV hypokinesis  - continuous tele monitoring  - cardio Dr. Blair -- D/W cardio NP, On Coumadin

## 2022-06-29 NOTE — PROGRESS NOTE ADULT - ASSESSMENT
LUIS CARLOS on CKD 3b  Hypokalemia  HTN  NSTEMI  CHF    -BLCr 1.2  -Urine indices reviewed  -Can continue Lasix 40 mg po q 12 hours   -Renal indices elevated again due to diuresis; monitor for now; Tends to come down. Will follow up repeat labs  -Cardio follow up noted; no clear plan for Cath yet given age and creatinine  -Previously Discussed risks of contrast administration up to and including dialysis, patient and family understand risks; initially wanting to proceed, but now unsure  -Monitor chemistries

## 2022-06-29 NOTE — CHART NOTE - NSCHARTNOTEFT_GEN_A_CORE
Assessment: patient seen for follow up   89y old  Female who presents with a chief complaint of CP w/ elevated trops   K 3.3 KCL given  patient seen with food preferences noted with family at bedside. eating fairly well up to 100% per flow sheet  6/27 BM  denies problems chewing swallowing           Factors impacting intake: [x ] none [ ] nausea  [ ] vomiting [ ] diarrhea [ ] constipation  [ ]chewing problems [ ] swallowing issues  [ ] other:     Diet Prescription: Diet, DASH/TLC:   Sodium & Cholesterol Restricted  1200mL Fluid Restriction ([id number]) (06-22-22 @ 08:53)    Intake: %    Current Weight: 6/29 177.6#       Pertinent Medications: MEDICATIONS  (STANDING):  aspirin enteric coated 81 milliGRAM(s) Oral daily  atorvastatin 20 milliGRAM(s) Oral at bedtime  BACItracin   Ointment 1 Application(s) Topical two times a day  clopidogrel Tablet 75 milliGRAM(s) Oral daily  fluticasone propionate 50 MICROgram(s)/spray Nasal Spray 1 Spray(s) Both Nostrils two times a day  furosemide    Tablet 40 milliGRAM(s) Oral two times a day  levothyroxine 75 MICROGram(s) Oral daily  metoprolol succinate ER 50 milliGRAM(s) Oral daily  pantoprazole  Injectable 40 milliGRAM(s) IV Push daily  potassium chloride    Tablet ER 40 milliEquivalent(s) Oral every 4 hours  sodium chloride 0.65% Nasal 1 Spray(s) Both Nostrils five times a day  warfarin 3 milliGRAM(s) Oral once  zinc oxide 40% Paste 1 Application(s) Topical two times a day    MEDICATIONS  (PRN):  acetaminophen     Tablet .. 650 milliGRAM(s) Oral every 6 hours PRN Mild Pain (1 - 3)  ALBUTerol    90 MICROgram(s) HFA Inhaler 2 Puff(s) Inhalation every 12 hours PRN Shortness of Breath and/or Wheezing  aluminum hydroxide/magnesium hydroxide/simethicone Suspension 30 milliLiter(s) Oral every 6 hours PRN Dyspepsia    Pertinent Labs: K 3.1   Skin: left elbow blister left and right ankle and foot + 1    Estimated Needs:   [x ] no change since previous assessment  [ ] recalculated:     Previous Nutrition Diagnosis:   [x ] decreased nutrient needs     Nutrition Diagnosis is [x ] ongoing  [ ] resolved [ ] not applicable     New Nutrition Diagnosis: [x ] not applicable       Interventions:   Recommend  [ ] Change Diet To:  [ ] Nutrition Supplement  [ ] Nutrition Support  [x ] Other: continue diet as ordered with food preferences , recommend MVI    Monitoring and Evaluation:   [x ] PO intake [ x ] Tolerance to diet prescription [ x ] weights [ x ] labs[ x ] follow up per protocol  [ ] other:

## 2022-06-30 VITALS
TEMPERATURE: 98 F | RESPIRATION RATE: 18 BRPM | OXYGEN SATURATION: 97 % | SYSTOLIC BLOOD PRESSURE: 130 MMHG | DIASTOLIC BLOOD PRESSURE: 90 MMHG | HEART RATE: 76 BPM

## 2022-06-30 DIAGNOSIS — R74.01 ELEVATION OF LEVELS OF LIVER TRANSAMINASE LEVELS: ICD-10-CM

## 2022-06-30 LAB
ANION GAP SERPL CALC-SCNC: 3 MMOL/L — LOW (ref 5–17)
APTT BLD: 43.3 SEC — HIGH (ref 27.5–35.5)
BUN SERPL-MCNC: 28 MG/DL — HIGH (ref 7–23)
CALCIUM SERPL-MCNC: 8.1 MG/DL — LOW (ref 8.5–10.1)
CHLORIDE SERPL-SCNC: 105 MMOL/L — SIGNIFICANT CHANGE UP (ref 96–108)
CO2 SERPL-SCNC: 32 MMOL/L — HIGH (ref 22–31)
CREAT SERPL-MCNC: 1.6 MG/DL — HIGH (ref 0.5–1.3)
EGFR: 30 ML/MIN/1.73M2 — LOW
GLUCOSE SERPL-MCNC: 94 MG/DL — SIGNIFICANT CHANGE UP (ref 70–99)
HCT VFR BLD CALC: 31 % — LOW (ref 34.5–45)
HGB BLD-MCNC: 10.4 G/DL — LOW (ref 11.5–15.5)
INR BLD: 3.11 RATIO — HIGH (ref 0.88–1.16)
MCHC RBC-ENTMCNC: 30.3 PG — SIGNIFICANT CHANGE UP (ref 27–34)
MCHC RBC-ENTMCNC: 33.5 GM/DL — SIGNIFICANT CHANGE UP (ref 32–36)
MCV RBC AUTO: 90.4 FL — SIGNIFICANT CHANGE UP (ref 80–100)
NRBC # BLD: 0 /100 WBCS — SIGNIFICANT CHANGE UP (ref 0–0)
PLATELET # BLD AUTO: 215 K/UL — SIGNIFICANT CHANGE UP (ref 150–400)
POTASSIUM SERPL-MCNC: 4.6 MMOL/L — SIGNIFICANT CHANGE UP (ref 3.5–5.3)
POTASSIUM SERPL-SCNC: 4.6 MMOL/L — SIGNIFICANT CHANGE UP (ref 3.5–5.3)
PROTHROM AB SERPL-ACNC: 36.8 SEC — HIGH (ref 10.5–13.4)
RBC # BLD: 3.43 M/UL — LOW (ref 3.8–5.2)
RBC # FLD: 18.7 % — HIGH (ref 10.3–14.5)
SODIUM SERPL-SCNC: 140 MMOL/L — SIGNIFICANT CHANGE UP (ref 135–145)
WBC # BLD: 4.07 K/UL — SIGNIFICANT CHANGE UP (ref 3.8–10.5)
WBC # FLD AUTO: 4.07 K/UL — SIGNIFICANT CHANGE UP (ref 3.8–10.5)

## 2022-06-30 PROCEDURE — 78226 HEPATOBILIARY SYSTEM IMAGING: CPT

## 2022-06-30 PROCEDURE — 80048 BASIC METABOLIC PNL TOTAL CA: CPT

## 2022-06-30 PROCEDURE — 97530 THERAPEUTIC ACTIVITIES: CPT

## 2022-06-30 PROCEDURE — U0003: CPT

## 2022-06-30 PROCEDURE — U0005: CPT

## 2022-06-30 PROCEDURE — 84540 ASSAY OF URINE/UREA-N: CPT

## 2022-06-30 PROCEDURE — 76705 ECHO EXAM OF ABDOMEN: CPT

## 2022-06-30 PROCEDURE — 93306 TTE W/DOPPLER COMPLETE: CPT

## 2022-06-30 PROCEDURE — 84439 ASSAY OF FREE THYROXINE: CPT

## 2022-06-30 PROCEDURE — 93005 ELECTROCARDIOGRAM TRACING: CPT

## 2022-06-30 PROCEDURE — 78226 HEPATOBILIARY SYSTEM IMAGING: CPT | Mod: 26

## 2022-06-30 PROCEDURE — 70450 CT HEAD/BRAIN W/O DYE: CPT | Mod: MA

## 2022-06-30 PROCEDURE — 83036 HEMOGLOBIN GLYCOSYLATED A1C: CPT

## 2022-06-30 PROCEDURE — 80053 COMPREHEN METABOLIC PANEL: CPT

## 2022-06-30 PROCEDURE — 97162 PT EVAL MOD COMPLEX 30 MIN: CPT

## 2022-06-30 PROCEDURE — 99232 SBSQ HOSP IP/OBS MODERATE 35: CPT

## 2022-06-30 PROCEDURE — 71045 X-RAY EXAM CHEST 1 VIEW: CPT

## 2022-06-30 PROCEDURE — 84481 FREE ASSAY (FT-3): CPT

## 2022-06-30 PROCEDURE — 84484 ASSAY OF TROPONIN QUANT: CPT

## 2022-06-30 PROCEDURE — 82746 ASSAY OF FOLIC ACID SERUM: CPT

## 2022-06-30 PROCEDURE — 99285 EMERGENCY DEPT VISIT HI MDM: CPT | Mod: 25

## 2022-06-30 PROCEDURE — 85610 PROTHROMBIN TIME: CPT

## 2022-06-30 PROCEDURE — 85025 COMPLETE CBC W/AUTO DIFF WBC: CPT

## 2022-06-30 PROCEDURE — 83880 ASSAY OF NATRIURETIC PEPTIDE: CPT

## 2022-06-30 PROCEDURE — 82570 ASSAY OF URINE CREATININE: CPT

## 2022-06-30 PROCEDURE — 84100 ASSAY OF PHOSPHORUS: CPT

## 2022-06-30 PROCEDURE — 84443 ASSAY THYROID STIM HORMONE: CPT

## 2022-06-30 PROCEDURE — 84300 ASSAY OF URINE SODIUM: CPT

## 2022-06-30 PROCEDURE — 82607 VITAMIN B-12: CPT

## 2022-06-30 PROCEDURE — 97116 GAIT TRAINING THERAPY: CPT

## 2022-06-30 PROCEDURE — 82550 ASSAY OF CK (CPK): CPT

## 2022-06-30 PROCEDURE — 87635 SARS-COV-2 COVID-19 AMP PRB: CPT

## 2022-06-30 PROCEDURE — 94640 AIRWAY INHALATION TREATMENT: CPT

## 2022-06-30 PROCEDURE — 83735 ASSAY OF MAGNESIUM: CPT

## 2022-06-30 PROCEDURE — 80076 HEPATIC FUNCTION PANEL: CPT

## 2022-06-30 PROCEDURE — 76705 ECHO EXAM OF ABDOMEN: CPT | Mod: 26

## 2022-06-30 PROCEDURE — 82728 ASSAY OF FERRITIN: CPT

## 2022-06-30 PROCEDURE — 36415 COLL VENOUS BLD VENIPUNCTURE: CPT

## 2022-06-30 PROCEDURE — 80061 LIPID PANEL: CPT

## 2022-06-30 PROCEDURE — 83540 ASSAY OF IRON: CPT

## 2022-06-30 PROCEDURE — 70551 MRI BRAIN STEM W/O DYE: CPT

## 2022-06-30 PROCEDURE — 82553 CREATINE MB FRACTION: CPT

## 2022-06-30 PROCEDURE — 93880 EXTRACRANIAL BILAT STUDY: CPT

## 2022-06-30 PROCEDURE — 84466 ASSAY OF TRANSFERRIN: CPT

## 2022-06-30 PROCEDURE — 83550 IRON BINDING TEST: CPT

## 2022-06-30 PROCEDURE — 97110 THERAPEUTIC EXERCISES: CPT

## 2022-06-30 PROCEDURE — 85730 THROMBOPLASTIN TIME PARTIAL: CPT

## 2022-06-30 PROCEDURE — 99221 1ST HOSP IP/OBS SF/LOW 40: CPT

## 2022-06-30 PROCEDURE — A9537: CPT

## 2022-06-30 PROCEDURE — 83690 ASSAY OF LIPASE: CPT

## 2022-06-30 PROCEDURE — 85027 COMPLETE CBC AUTOMATED: CPT

## 2022-06-30 RX ORDER — WARFARIN SODIUM 2.5 MG/1
1 TABLET ORAL
Qty: 0 | Refills: 0 | DISCHARGE

## 2022-06-30 RX ORDER — WARFARIN SODIUM 2.5 MG/1
2 TABLET ORAL ONCE
Refills: 0 | Status: DISCONTINUED | OUTPATIENT
Start: 2022-06-30 | End: 2022-06-30

## 2022-06-30 RX ORDER — SODIUM CHLORIDE 0.65 %
2 AEROSOL, SPRAY (ML) NASAL
Qty: 0 | Refills: 0 | DISCHARGE
Start: 2022-06-30

## 2022-06-30 RX ORDER — ZINC OXIDE 200 MG/G
1 OINTMENT TOPICAL
Qty: 0 | Refills: 0 | DISCHARGE
Start: 2022-06-30

## 2022-06-30 RX ADMIN — PANTOPRAZOLE SODIUM 40 MILLIGRAM(S): 20 TABLET, DELAYED RELEASE ORAL at 11:09

## 2022-06-30 RX ADMIN — ZINC OXIDE 1 APPLICATION(S): 200 OINTMENT TOPICAL at 06:18

## 2022-06-30 RX ADMIN — CLOPIDOGREL BISULFATE 75 MILLIGRAM(S): 75 TABLET, FILM COATED ORAL at 11:09

## 2022-06-30 RX ADMIN — Medication 1 SPRAY(S): at 08:34

## 2022-06-30 RX ADMIN — Medication 40 MILLIGRAM(S): at 06:17

## 2022-06-30 RX ADMIN — Medication 650 MILLIGRAM(S): at 00:27

## 2022-06-30 RX ADMIN — Medication 1 APPLICATION(S): at 06:18

## 2022-06-30 RX ADMIN — Medication 81 MILLIGRAM(S): at 11:09

## 2022-06-30 RX ADMIN — Medication 650 MILLIGRAM(S): at 01:30

## 2022-06-30 RX ADMIN — Medication 1 SPRAY(S): at 06:18

## 2022-06-30 RX ADMIN — Medication 1 SPRAY(S): at 00:29

## 2022-06-30 RX ADMIN — Medication 75 MICROGRAM(S): at 06:17

## 2022-06-30 NOTE — PROGRESS NOTE ADULT - ATTENDING COMMENTS
Patient is a 89 y/o F with a pmhx b/l LE edema, HTN, P Afib (on coumadin), and hypothyroidism presenting with diarrhea and mid-sternal CP admitted for CP w/ elevated trops, NSTEMI.  Pt seen, examined, case & care plan d/w pt, residents at detail.  -Cardiology Dr Blair D/W -Stable for d/c to [facility].  -Nasal saline spray 4-5 x day,   -Renal DR ALEXANDRU FLORIAN  follow up- Cr -stable   -PO diet  -Palliative care -DNR/DNI  -D/W Social Service , PT---> [facility]    D/W Family -Dtr& Son in Law at detail. about D/C Plan---> [facility].  D/W PMD Dr Barbara mckay at detail.  Total  D/C care time is 60 minutes.

## 2022-06-30 NOTE — PROGRESS NOTE ADULT - PROBLEM SELECTOR PLAN 1
Pt with elevated LFTs x 2 days, unclear etiology, no new meds given recently  - Given rising LFTs and recent abdominal pain, will need to r/o patrice vs choledocholithiasis vs intraductal pathology  - U/S Abdomen showing Cholelithiasis with gallbladder wall thickening and small amount of pericholecystic fluid  - HIDA scan ordered, f/u results  - pain control   - Avoid hepatotoxic meds - may need to hold statin, Trend LFTs  - GI Dr. Hoyt consulted, recs appreciated Pt with elevated LFTs x 2 days, unclear etiology, no new meds given recently, Possible 2/2 heart Failure  - Given rising LFTs and recent abdominal pain,   - U/S Abdomen showing Cholelithiasis with gallbladder wall thickening and small amount of pericholecystic fluid  - HIDA scan - NEG   - No pain control.  - Avoid hepatotoxic meds - may need to hold statin, Trend LFTs  - GI Dr. Hoyt consulted, recs appreciated Pt with elevated LFTs x 2 days, unclear etiology, no new meds given recently, Possible 2/2 heart Failure  - Given rising LFTs and recent abdominal pain,   - U/S Abdomen showing Cholelithiasis with gallbladder wall thickening and small amount of pericholecystic fluid  - HIDA scan - NEG   - No pain control.  - Avoid hepatotoxic meds - may need to hold statin, Trend LFTs at [facility]  - GI Dr. Hoyt consulted, recs appreciated d/w at detail.  Stable for d/c as HIDA scan -Neg

## 2022-06-30 NOTE — PROGRESS NOTE ADULT - PROBLEM SELECTOR PLAN 4
normocytic anemia on admit - baseline ~10-11 appears near baseline  - unclear etiology - likely 2/2 fluid overload  - overnight with blood clots noted from nasal orifice - started standing nasal saline spray and humidified O2   -  iron studies, TFT's, B12, folate -Nl  - monitor for VS and s/s of worsening anemia and trend HgB in AM CBC normocytic anemia on admit - baseline ~10-11 appears near baseline  - unclear etiology - likely 2/2 fluid overload, CHF, Daily Lab draws   - overnight with blood clots noted from nasal orifice - started standing nasal saline spray and humidified O2   -  iron studies, TFT's, B12, folate -Nl  - monitor for VS and s/s of worsening anemia and trend HgB in AM CBC

## 2022-06-30 NOTE — CONSULT NOTE ADULT - ASSESSMENT
89 y/o F with a pmhx b/l LE edema, HTN,P Afib on coumadin , hypothyroidism, pw midsternal chest discomfort atypical in nature and left arm discomfort since waking up this morning at  4am, describes it as "creepy feeling" assoc with left arm weakness, general weakness and not feeling well. Patient has no underlying coronary disease.     Recommendations:  - Admit to tele  - Repeat EKG  - Serial cardiac enzymes   - TTE  - Continue Coumadin, goal INR 2-3  - ASA 81 for underlying ACS  - Consider neuro eval   - Other cardiovascular workup will depend on clinical course.  - All other workup per primary team.  - Will continue to follow.            
LUIS CARLOS on CKD 3b  Hypokalemia  HTN  NSTEMI    -BLCr 1.2  -Check Urine lytes  -Check UA  -Cont IVF  -Mucomyst  -Discussed risks of contrast administration upto and including dialysis, patient and family undertand risks and willing to proceed  -KCl repletion
inc lfts  mutlifactorial  chf vs meds    plan  Avoid all non-essential hepatotoxic drugs	  Obtain Abdominal Ultrasound  Check viral hepatitis panel  f/u hida scan  Check Iron studies   Diet and weight control discussed    Monitor daily liver function test    Advanced care planning was discussed with patient and family.  Advanced care planning forms were reviewed and discussed.  Risks, benefits and alternatives of gastroenterologic procedures were discussed in detail and all questions were answered.    30 minutes spent.  
onychomycosis

## 2022-06-30 NOTE — PROGRESS NOTE ADULT - REASON FOR ADMISSION
CP w/ elevated trops
NSTEMI
CP w/ elevated trops
NSTEMI
CP w/ elevated trops

## 2022-06-30 NOTE — PROGRESS NOTE ADULT - PROBLEM SELECTOR PLAN 7
chronic, swelling LE b/l on admit  -On home lasix 40 mg 2x day  -TTE: EF 35-40%, LV hypokinesis chronic, swelling LE b/l on admit- chronic edema   -On home lasix 40 mg 2x day  -TTE: EF 35-40%, LV hypokinesis

## 2022-06-30 NOTE — PROGRESS NOTE ADULT - ASSESSMENT
LUIS CARLOS on CKD 3b  Hypokalemia  HTN  NSTEMI  CHF    -BLCr 1.2  -Urine indices reviewed  -Can continue Lasix 40 mg po q 12 hours   -Renal indices stable  -Cardio follow up noted; no clear plan for Cath yet given age and creatinine  -Previously Discussed risks of contrast administration up to and including dialysis, patient and family understand risks; initially wanting to proceed, but now unsure  -Monitor chemistries

## 2022-06-30 NOTE — PROGRESS NOTE ADULT - NS ATTEND AMEND GEN_ALL_CORE FT
Chart reviewed    Patient seen and examined    Agree with plan as outlined above    89 y/o F with a pmhx b/l LE edema, HTN,P Afib on coumadin , hypothyroidism, pw midsternal chest discomfort atypical in nature and left arm discomfort since waking up this morning at  4am, describes it as "creepy feeling" assoc with left arm weakness, general weakness and not feeling well. Patient has no underlying coronary disease.     NSTEMI  - EKG showed Afib with underlying LBBB; unchanged from previous  - hsT trended up to >59067, though, remained asymptomatic overnight.  Started to downtrend  - On home Coumadin; Heparin gtt held off for INR 1.85; Today's=2.01  - Continue ASA, BB, and statin.  Start Plavix 75 mg daily  - Follow up TTE  - Monitor for ischemic chest pain.  Ischemic eval discussed with patient.  Unable ot decide; would like to discuss with daughter first    Permanent Afib  - Rates remained controlled  - Continue telemetry monitoring.  Had 10 beats NSVT overnight.  Would increase Toprol XL to 50 mg daily  - Monitor electrolytes, replete to keep K>4 and Mag>2
Chart reviewed    Patient seen and examined    Review of plan as outlined above    89 y/o F with a pmhx b/l LE edema, HTN,P Afib on coumadin , hypothyroidism, pw midsternal chest discomfort atypical in nature and left arm discomfort since waking up this morning at  4am, describes it as "creepy feeling" assoc with left arm weakness, general weakness and not feeling well. Patient has no underlying coronary disease.     NSTEMI/PAfib  - EKG: Afib with underlying LBBB; unchanged from previous.    - Denies any anginal symptoms to date  - Ischemic eval and plan has been discussed with patient and daughter, multiple times, aware of risk and benefits of procedure, understand that as of now, medical management would be more favorable given pt's advance age, labile renal function, and volume overload.    - BP, HR, stable and controlled  - Continue GDMT: DAPT (ASA, Plavix), BB, and statin.  Lower statin dose  - Still requiring NC and overloaded on exam.  Would give Lasix 40 gm IV x 1 today  - Remains rate-controlled per flow sheet.  Continue daily dose Warfarin to keep INR 2-3  - Would only keep on triple therapy for short period.  Would likely reduce to Coumadin and Plavix x 1 mo of DAPT  - Continue routine hemodynamics  - Monitor electrolytes, replete to keep K>4 and Mag>2
Pt seen and examined, agree with above
nstemi  mod cmy  no recurrent sxs  in agreement to pursue cath  vol ol this am and unable to lie flat, to diurese and reassess 6/22
renal function better. now more compensated from vol POV and would continue po lasix. at this time may be advisable given age and LUIS CARLOS to defer angiography if no cp and vol status improved. consider outpt stress and med management. Further cardiac workup will depend on clinical course.
No signs of significant ischemia. Appears better compensated from HF POV. Please continue to maintain strict I/Os, monitor daily weights, Cr, and K. would eventually decrease to plavx and coumadin to avoid triple therapy.
Patient with NSTEMI and LV dysfunction.  Despite advanced age, family and patient wish to proceed with invasive ischemia evaluation.  Would like to see creatinine trending in the right direction prior to undergoing cath.  To give mucomyst, and recheck basic panel tomorrow to see if able to proceed with cath.
remains overloaded, with fluctuating renal function.  would cont to diurese at this time.
short of breath and appears volume overloaded  would stop ivf and give diuretics  watch creatinine closely  hold coumadin  temp plan for cardiac cath in next 1-2 days
No signs of significant ischemia. Appears compensated from HF POV. Please continue to maintain strict I/Os, monitor daily weights, Cr, and K. would eventually decrease to plavx and coumadin to avoid triple therapy
pt with labile Cr. now more compensated from vol POV and would continue po lasix. at this time may be advisable given age and LUIS CARLOS to defer angiography if no cp and vol status improved. consider outpt stress and med management. Further cardiac workup will depend on clinical course.
pt with labile Cr. now more compensated from vol POV though would start po lasix. at this time may be advisable given age and LUIS CARLOS to defer angiography if no cp and vol status improved. consider outpt stress and med management. Further cardiac workup will depend on clinical course.
pt with labile Cr. now more compensated from vol POV. at this time may be advisable given age and LUIS CARLOS to defer angiography if no cp and vol status improved. consider outpt stress and med management. Further cardiac workup will depend on clinical course.

## 2022-06-30 NOTE — PROGRESS NOTE ADULT - NS_MD_PANP_GEN_ALL_CORE

## 2022-06-30 NOTE — PROGRESS NOTE ADULT - SUBJECTIVE AND OBJECTIVE BOX
[facility] -- Reid Astorga, Raheem Almazan, Flex Muniz Savella, Goodger  Office # [phone]    Follow Up:  NSTEMI, Afib    Subjective/Observations: Asleep but easily arousable.  Remains on NC but denies SOB, MAZARIEGOS or orthopnea.  Denies CP or palpitations.  Denies N/V/abdominal pain    REVIEW OF SYSTEMS: All other review of systems is negative unless indicated above  PAST MEDICAL & SURGICAL HISTORY:  Hypothyroid  Hypertension  Lower extremity edema  Paroxysmal atrial fibrillation    MEDICATIONS  (STANDING):  aspirin enteric coated 81 milliGRAM(s) Oral daily  atorvastatin 20 milliGRAM(s) Oral at bedtime  BACItracin   Ointment 1 Application(s) Topical two times a day  clopidogrel Tablet 75 milliGRAM(s) Oral daily  fluticasone propionate 50 MICROgram(s)/spray Nasal Spray 1 Spray(s) Both Nostrils two times a day  furosemide    Tablet 40 milliGRAM(s) Oral two times a day  levothyroxine 75 MICROGram(s) Oral daily  metoprolol succinate ER 50 milliGRAM(s) Oral daily  pantoprazole  Injectable 40 milliGRAM(s) IV Push daily  sodium chloride 0.65% Nasal 1 Spray(s) Both Nostrils five times a day  zinc oxide 40% Paste 1 Application(s) Topical two times a day    MEDICATIONS  (PRN):  acetaminophen     Tablet .. 650 milliGRAM(s) Oral every 6 hours PRN Mild Pain (1 - 3)  ALBUTerol    90 MICROgram(s) HFA Inhaler 2 Puff(s) Inhalation every 12 hours PRN Shortness of Breath and/or Wheezing  aluminum hydroxide/magnesium hydroxide/simethicone Suspension 30 milliLiter(s) Oral every 6 hours PRN Dyspepsia    Allergies    No Known Allergies    Intolerances    Vital Signs Last 24 Hrs  T(C): 36.7 (30 Jun 2022 06:11), Max: 37 (29 Jun 2022 20:27)  T(F): 98 (30 Jun 2022 06:11), Max: 98.6 (29 Jun 2022 20:27)  HR: 96 (30 Jun 2022 06:11) (70 - 105)  BP: 103/64 (30 Jun 2022 06:11) (94/53 - 114/58)  BP(mean): --  RR: 17 (30 Jun 2022 06:11) (17 - 18)  SpO2: 96% (30 Jun 2022 06:11) (93% - 96%)  I&O's Summary    29 Jun 2022 07:01  -  30 Jun 2022 07:00  --------------------------------------------------------  IN: 0 mL / OUT: 350 mL / NET: -350 mL    PHYSICAL EXAM:  TELE: Not on tele  Constitutional: NAD, asleep but arousable  HEENT: Moist Mucous Membranes, Anicteric  Pulmonary: Non-labored, breath sounds are diminished bilaterally, No wheezing, rales or rhonchi  Cardiovascular: IRRR, S1 and S2, +murmurs, no rubs, gallops or clicks  Gastrointestinal: Bowel Sounds present, soft, nontender.   Lymph: sacral and lumbar edema; Trace peripheral edema. No lymphadenopathy.  Skin: No visible rashes or ulcers.  Psych:  Mood & affect: Flat     LABS: All Labs Reviewed:                        10.4   4.07  )-----------( 215      ( 30 Jun 2022 06:18 )             31.0                         11.3   4.05  )-----------( 218      ( 29 Jun 2022 10:25 )             34.9                         10.4   4.11  )-----------( 205      ( 28 Jun 2022 06:46 )             31.3     30 Jun 2022 06:18    140    |  105    |  28     ----------------------------<  94     4.6     |  32     |  1.60   29 Jun 2022 10:25    139    |  102    |  25     ----------------------------<  140    3.3     |  32     |  1.60   28 Jun 2022 06:46    140    |  103    |  24     ----------------------------<  88     3.8     |  35     |  1.50     Ca    8.1        30 Jun 2022 06:18  Ca    8.1        29 Jun 2022 10:25  Ca    7.7        28 Jun 2022 06:46    TPro  5.8    /  Alb  2.1    /  TBili  1.1    /  DBili  0.6    /  AST  91     /  ALT  45     /  AlkPhos  156    30 Jun 2022 06:18  TPro  5.9    /  Alb  2.2    /  TBili  1.0    /  DBili  x      /  AST  83     /  ALT  40     /  AlkPhos  129    29 Jun 2022 10:25  TPro  6.4    /  Alb  2.2    /  TBili  1.1    /  DBili  x      /  AST  56     /  ALT  32     /  AlkPhos  124    28 Jun 2022 06:46    PT/INR - ( 30 Jun 2022 06:18 )   PT: 36.8 sec;   INR: 3.11 ratio    PTT - ( 30 Jun 2022 06:18 )  PTT:43.3 sec    Impression:    Findings as discussed above.    --- End of Report ---    MERRY DELVALLE MD; Attending Radiologist  This document has been electronically signed. Jun 27 2022  4:08PM    Ventricular Rate 74 BPM    Atrial Rate 71 BPM    QRS Duration 154 ms    Q-T Interval 458 ms    QTC Calculation(Bazett) 508 ms    R Axis 251 degrees    T Axis 225 degrees    Diagnosis Line Atrial fibrillation  Left bundle branch block  Confirmed by KEVIN MUNIZ ([id number]) on 6/22/2022 12:20:29 PM

## 2022-06-30 NOTE — PROGRESS NOTE ADULT - PROBLEM SELECTOR PLAN 3
presents with acute CP pain w radiation down L arm - trops elevated w/ no acute EKG changes   - admitted to to tele for NSTEMI , Few beats - NSVT on Tele. Remains non anginal   -As per discussion with cardio, no plan for outpatient cath 2/2 persistent SOB/Fluid overload  -trop initially 342.6, up trended to over 22K, came down to 15 K   - EKG Afib with underlying LBBB; unchanged from previous  -c/w ASA 81mg qD, Toprol xl 50mg qD, PPI - Lowered statin dose to atorvastatin 20mg qd per cardio   - TTE: EF 35-40%, LV hypokinesis  -continuous tele monitoring  -cardio Dr. Blair d/w no Cath as still fluid overload   -continue home Lasix 40 mg BID  -DNR DNI order placed, JAMES in chart presents with acute CP pain w radiation down L arm - trops elevated w/ no acute EKG changes   - admitted to to tele for NSTEMI , Few beats - NSVT on Tele. Remains non anginal   -As per discussion with cardio, no plan for outpatient cath 2/2 persistent SOB/Fluid overload  -trop initially 342.6, up trended to over 22K, came down to 15 K   - EKG Afib with underlying LBBB; unchanged from previous  -c/w ASA 81mg qD, Toprol xl 50mg qD, PPI - Lowered statin dose to atorvastatin 20mg qd per cardio   - TTE: EF 35-40%, LV hypokinesis  -continuous tele monitoring  -cardio Dr. Blair d/w no Cath as  fluid overload/ Mamie/CKD 3   -continue home Lasix 40 mg BID  -DNR DNI order placed, JAMES in chart

## 2022-06-30 NOTE — PROGRESS NOTE ADULT - NS ATTEND BILL GEN_ALL_CORE
Attending to bill

## 2022-06-30 NOTE — CONSULT NOTE ADULT - SUBJECTIVE AND OBJECTIVE BOX
Chief Complaint:  Patient is a 89y old  Female who presents with a chief complaint of CP w/ elevated trops incv lfts multifactorial  Patient is a 89 y/o F with a pmhx b/l LE edema, HTN, P Afib (on coumadin), and hypothyroidism presenting with sudden onset mid-sternal CP. Pt describes pain as a "prickly feeling", rated 4/10, constant in nature since 4am on , resolved in the ED. Pain was associated with L arm weakness, now resolved. Pt states the pain woke her up, and she was scared to walk and was unable to use her L arm for support to get out of bed. Pt was also nauseous at this time. No associated diaphoresis, palpitations, chest pressure. No amaurosis fugax, facial droop, garbled speech, hemiparesis, LOC, falls reported. No vomiting, fevers, chills, cough, sore throat, SOB, abd pain, constipation, dysuria. Pt had one loose BM this AM at home, denied hematochezia or melena.       In ED:  VS - HR 71 | /65 | RR 18 | sO2 94 | T 97.6  Labs - HgB 11.2 | INR 1.85 | K 3.3 | Glu 114 | trop 342.6 | CKMB 3.2 | BNP 2396  CTH NC - No acute intracranial hemorrhage. Lacunar infarct R inferior cerebellum  EKG - vent rate 66, QTc 501ms, sinus rhythm, old LBBB, no ischemic changes on personal read  Given -  mg PO. pt seen by cardiology Dr Almazan , pt admitted to [facility] for NSTEMI.     Review of Systems:  · Negative General Symptoms	no fever; no chills; no anorexia; no fatigue  · General Symptoms	substernal Chest pain  · Skin/Breast	negative  · Ophthalmologic	negative  · ENMT	negative  · Negative Respiratory and Thorax Symptoms	no wheezing; no dyspnea; no cough; no pleuritic chest pain  · Negative Cardiovascular Symptoms	no chest pain; no palpitations; no dyspnea on exertion; no orthopnea; no paroxysmal nocturnal dyspnea; no claudication; admits chest "prickly" sensation  · Negative Gastrointestinal Symptoms	no vomiting; no constipation; no abdominal pain; no melena; no hematochezia; nausea  · Gastrointestinal Symptoms	diarrhea; 1 loose BM on  AM  · Negative Male-Specific Symptoms	not applicable  · Male-Specific Symptoms	not applicable  · Negative Neurological Symptoms	no transient paralysis; no paresthesias; no generalized seizures; no focal seizures; no syncope; no vertigo; no loss of sensation; no difficulty walking; no loss of consciousness; no hemiparesis  · Neurological Symptoms	weakness  · Psychiatric	negative  · Hematology/Lymphatics	negative  · Endocrine	negative  · Allergic/Immunologic	negative  · Additional ROS	Pain Left upper Ext      Allergies:  No Known Allergies      Medications:  acetaminophen     Tablet .. 650 milliGRAM(s) Oral every 6 hours PRN  ALBUTerol    90 MICROgram(s) HFA Inhaler 2 Puff(s) Inhalation every 12 hours PRN  aluminum hydroxide/magnesium hydroxide/simethicone Suspension 30 milliLiter(s) Oral every 6 hours PRN  aspirin enteric coated 81 milliGRAM(s) Oral daily  atorvastatin 20 milliGRAM(s) Oral at bedtime  BACItracin   Ointment 1 Application(s) Topical two times a day  clopidogrel Tablet 75 milliGRAM(s) Oral daily  fluticasone propionate 50 MICROgram(s)/spray Nasal Spray 1 Spray(s) Both Nostrils two times a day  furosemide    Tablet 40 milliGRAM(s) Oral two times a day  levothyroxine 75 MICROGram(s) Oral daily  metoprolol succinate ER 50 milliGRAM(s) Oral daily  pantoprazole  Injectable 40 milliGRAM(s) IV Push daily  sodium chloride 0.65% Nasal 1 Spray(s) Both Nostrils five times a day  zinc oxide 40% Paste 1 Application(s) Topical two times a day      PMHX/PSHX:  Hypothyroid    Hypertension    Lower extremity edema    Paroxysmal atrial fibrillation        Family history:      Social History:   no etoh no cigs no ivda     ROS:     General:  No wt loss, fevers, chills, night sweats, fatigue,   Eyes:  Good vision, no reported pain  ENT:  No sore throat, pain, runny nose, dysphagia  CV:  No pain, palpitations, hypo/hypertension  Resp:  No dyspnea, cough, tachypnea, wheezing  GI:  No pain, No nausea, No vomiting, No diarrhea, No constipation, No weight loss, No fever, No pruritis, No rectal bleeding, No tarry stools, No dysphagia,  :  No pain, bleeding, incontinence, nocturia  Muscle:  No pain, weakness  Neuro:  No weakness, tingling, memory problems  Psych:  No fatigue, insomnia, mood problems, depression  Endocrine:  No polyuria, polydipsia, cold/heat intolerance  Heme:  No petechiae, ecchymosis, easy bruisability  Skin:  No rash, tattoos, scars, edema      PHYSICAL EXAM:   Vital Signs:  Vital Signs Last 24 Hrs  T(C): 36.7 (2022 17:24), Max: 37 (2022 20:27)  T(F): 98 (2022 17:24), Max: 98.6 (2022 20:27)  HR: 70 (2022 17:24) (70 - 96)  BP: 98/45 (2022 17:24) (98/45 - 114/58)  BP(mean): --  RR: 19 (2022 17:24) (17 - 19)  SpO2: 94% (2022 17:24) (93% - 96%)  Daily     Daily Weight in k.7 (2022 06:11)    GENERAL:  Appears stated age, well-groomed, well-nourished, no distress  HEENT:  NC/AT,  conjunctivae clear and pink, no thyromegaly, nodules, adenopathy, no JVD, sclera -anicteric  CHEST:  Full & symmetric excursion, no increased effort, breath sounds clear  HEART:  Regular rhythm, S1, S2, no murmur/rub/S3/S4, no abdominal bruit, no edema  ABDOMEN:  Soft, non-tender, non-distended, normoactive bowel sounds,  no masses ,no hepato-splenomegaly, no signs of chronic liver disease  EXTEREMITIES:  no cyanosis,clubbing or edema  SKIN:  No rash/erythema/ecchymoses/petechiae/wounds/abscess/warm/dry  NEURO:  Alert, oriented, no asterixis, no tremor, no encephalopathy    LABS:                        10.4   4.07  )-----------( 215      ( 2022 06:18 )             31.0     06-30    140  |  105  |  28<H>  ----------------------------<  94  4.6   |  32<H>  |  1.60<H>    Ca    8.1<L>      2022 06:18    TPro  5.8<L>  /  Alb  2.1<L>  /  TBili  1.1  /  DBili  0.6<H>  /  AST  91<H>  /  ALT  45  /  AlkPhos  156<H>      LIVER FUNCTIONS - ( 2022 06:18 )  Alb: 2.1 g/dL / Pro: 5.8 g/dL / ALK PHOS: 156 U/L / ALT: 45 U/L / AST: 91 U/L / GGT: x           PT/INR - ( 2022 06:18 )   PT: 36.8 sec;   INR: 3.11 ratio         PTT - ( 2022 06:18 )  PTT:43.3 sec        Imaging:

## 2022-06-30 NOTE — PROGRESS NOTE ADULT - SUBJECTIVE AND OBJECTIVE BOX
Patient is a 89y old  Female who presents with a chief complaint of CP w/ elevated trops (19 Jun 2022 09:28)       HPI:  Patient is a 89 y/o F with a pmhx b/l LE edema, HTN, P Afib (on coumadin), and hypothyroidism presenting with sudden onset mid-sternal CP. Pt describes pain as a "prickly feeling", rated 4/10, constant in nature since 4am on 6/17, resolved in the ED. Pain was associated with L arm weakness, now resolved. Pt states the pain woke her up, and she was scared to walk and was unable to use her L arm for support to get out of bed. Pt was also nauseous at this time. No associated diaphoresis, palpitations, chest pressure. No amaurosis fugax, facial droop, garbled speech, hemiparesis, LOC, falls reported. No vomiting, fevers, chills, cough, sore throat, SOB, abd pain, constipation, dysuria. Pt had one loose BM this AM at home, denied hematochezia or melena.   Patient has not seen nephrologist in the past.  Denies any N/V/Urinary complaints.      No acute events noted       PAST MEDICAL & SURGICAL HISTORY:  Hypothyroid      Hypertension      Lower extremity edema      Paroxysmal atrial fibrillation           FAMILY HISTORY:  NC    Social History:Non smoker    MEDICATIONS  (STANDING):  acetylcysteine  Oral Solution 600 milliGRAM(s) Oral every 12 hours  aspirin enteric coated 81 milliGRAM(s) Oral daily  atorvastatin 80 milliGRAM(s) Oral at bedtime  calcium gluconate IVPB 1 Gram(s) IV Intermittent once  clopidogrel Tablet 75 milliGRAM(s) Oral daily  furosemide   Injectable 40 milliGRAM(s) IV Push once  levothyroxine 75 MICROGram(s) Oral daily  metoprolol succinate ER 50 milliGRAM(s) Oral daily  pantoprazole  Injectable 40 milliGRAM(s) IV Push daily  sodium chloride 0.9%. 1000 milliLiter(s) (50 mL/Hr) IV Continuous <Continuous>    MEDICATIONS  (PRN):      Allergies    No Known Allergies    Intolerances         REVIEW OF SYSTEMS:    Review of Systems:   Constitutional: Denies fatigue  HEENT: Denies headaches and dizziness  Respiratory: denies SOB, cough, or wheezing  Cardiovascular: denies CP, palpitations  Gastrointestinal: Denies nausea, denies vomiting, diarrhea, constipation, abdominal pain, or bloody stools  Genitourinary: denies painful urination, increased frequency, urgency, or bloody urine  Skin: denies rashes or itching  Musculoskeletal: denies muscle aches, joint swelling  Neurologic: Denies generalized weakness, denies loss of sensation, numbness, or tingling    ICU Vital Signs Last 24 Hrs  T(C): 36.7 (30 Jun 2022 11:19), Max: 37 (29 Jun 2022 20:27)  T(F): 98 (30 Jun 2022 11:19), Max: 98.6 (29 Jun 2022 20:27)  HR: 71 (30 Jun 2022 11:19) (70 - 96)  BP: 99/45 (30 Jun 2022 11:19) (99/45 - 114/58)  BP(mean): --  ABP: --  ABP(mean): --  RR: 18 (30 Jun 2022 11:19) (17 - 18)  SpO2: 95% (30 Jun 2022 11:19) (93% - 96%)    PHYSICAL EXAM:    GENERAL: NAD  HEAD:  Atraumatic, Normocephalic  EYES: EOMI, conjunctiva and sclera clear  ENMT: No Drainage from nares, No drainage from ears  NECK: Supple, neck  veins full  NERVOUS SYSTEM:  Awake and Alert  CHEST/LUNG: mildly Decreased BS R No rales, rhonchi, wheezing, or rubs  HEART: Regular rate and rhythm; No murmurs, rubs, or gallops  ABDOMEN: Soft, Nontender, Nondistended; Bowel sounds present  EXTREMITIES:  + Edema  SKIN: No rashes No obvious ecchymosis      LABS:                        10.4   4.07  )-----------( 215      ( 30 Jun 2022 06:18 )             31.0     06-30    140  |  105  |  28<H>  ----------------------------<  94  4.6   |  32<H>  |  1.60<H>    Ca    8.1<L>      30 Jun 2022 06:18    TPro  5.8<L>  /  Alb  2.1<L>  /  TBili  1.1  /  DBili  0.6<H>  /  AST  91<H>  /  ALT  45  /  AlkPhos  156<H>  06-30    PT/INR - ( 30 Jun 2022 06:18 )   PT: 36.8 sec;   INR: 3.11 ratio         PTT - ( 30 Jun 2022 06:18 )  PTT:43.3 sec

## 2022-06-30 NOTE — PROGRESS NOTE ADULT - PROBLEM SELECTOR PLAN 2
Acute Systolic CHF excaerbation  -TTE: EF 35-40%, LV hypokinesis 2/2 NSTEMI. [place], mid inferoseptal and mid anterolateral walls severely hypokinetic. Right ventricular enlargement. Biatrial enlargement. Moderate aortic stenosis. Moderate TR.   -proBNP>13k  -Daily assessment of volume status  -continue home Lasix 40 mg BID, start incentive spirometry, albuterol inhaler x1 dose given this AM for wheezing. - Spoke with nurse, will try to wean O2 as tolerated.  -avoid IVF  -I/Os, daily weights  -cardio Dr. Blair- d/c plan  -PT recommending [facility]

## 2022-06-30 NOTE — PROGRESS NOTE ADULT - NS ATTEND OPT1 GEN_ALL_CORE
I attest my time as attending is greater than 50% of the total combined time spent on qualifying patient care activities by the PA/NP and attending.

## 2022-06-30 NOTE — PROGRESS NOTE ADULT - SUBJECTIVE AND OBJECTIVE BOX
Patient is a 89y old  Female who presents with a chief complaint of CP w/ elevated trops (30 Jun 2022 13:40)      HPI:  Patient is a 89 y/o F with a pmhx b/l LE edema, HTN, P Afib (on coumadin), and hypothyroidism presenting with sudden onset mid-sternal CP. Pt describes pain as a "prickly feeling", rated 4/10, constant in nature since 4am on 6/17, resolved in the ED. Pain was associated with L arm weakness, now resolved. Pt states the pain woke her up, and she was scared to walk and was unable to use her L arm for support to get out of bed. Pt was also nauseous at this time. No associated diaphoresis, palpitations, chest pressure. No amaurosis fugax, facial droop, garbled speech, hemiparesis, LOC, falls reported. No vomiting, fevers, chills, cough, sore throat, SOB, abd pain, constipation, dysuria. Pt had one loose BM this AM at home, denied hematochezia or melena.       In ED:  VS - HR 71 | /65 | RR 18 | sO2 94 | T 97.6  Labs - HgB 11.2 | INR 1.85 | K 3.3 | Glu 114 | trop 342.6 | CKMB 3.2 | BNP 2396  CTH NC - No acute intracranial hemorrhage. Lacunar infarct R inferior cerebellum  EKG - vent rate 66, QTc 501ms, sinus rhythm, old LBBB, no ischemic changes on personal read  Given -  mg PO. pt seen by cardiology Dr Almazan , pt admitted to [facility] for NSTEMI. (17 Jun 2022 09:52)      INTERVAL HPI:  6/18/22: Seen and examined at bedside. No acute complaints. Denies chest pain overnight and this AM. Overnight pt had episode of dyspnea, troponins inc from 300s to >42404. Trops now downtrended. Received 1 x dose 40mg IV lasix for dyspnea. Pt had statin dose inc to 80mg, had 1 x dose lopressor 25mg, and had her scheduled evening coumadin. Had 10 beats of NSVT this AM. Pt was started on standing 50mg toprol xL PO qD. Started on plavix 75mg qD. Extensive counseling done with the patient on [facility] and her current state of health. Patient states she has "forms at home" that her daughter will bring to [facility]. Patient values quality of life and wants to discuss with her daughter and son-inlaw. Family to discuss potential plan for cardiac cath and will arrive to a decision on 6/19. C/W medical therapy for ACS. HOLD coumadin as pt INR is therapeutic and to facilitate potential for cardiac cath. Will start renal adjusted FD Lovenox on 6/19 if INR is below 2.   6/19/22 Pt seen and examined at bedside. Pt states she slept well last night. Pt denies SOB, CP, palpitations, dizziness .Mildly elevated Cr , INR -Theraputic  Pt states the last time she had chest pain was Friday night. Later in the afternoon again patient was seen at bedside with  daughter HCP and son in law at bedside, decision was made for DNR/ DNI and cardiac cath.  6/20/22: Patient seen and examined at bedside. Patient denies chest pain, palpitations, dyspnea. Noted to be slightly short of breath. proBNP >13k, will give 1 dose of lasix 40mg IV today.  6/21/22: Patient continues to deny anginal symptoms. Will be transferred for cardiac cath, as INR downtrended to 1.6 and Cr improved to 1.3. For LUIS CARLOS on CKD, will defer further IVF hydration at this time due to TTE results and SOB after previous IVF. Will give Mucomyst q12 x4 for renal protection. Given lasix 40mg IVP x1 to improve respirations. Given full dose lovenox x1.   6/22/22: Overnight patient with R sided abdominal discomfort, given maalox. Patient breathing improved s/p lasix 40mg IV yesterday. Patient's INR 1.4 today and Cr increased to 1.5. Plan for cardiac cath If Cr stable. Given additional dose of full dose lovenox.  6/23/22: Patient seen and examined at bedside. Patient denies chest pain, pressure, sob, palpitations. Cr downtrending today,  As per Cardiology NO plan for transfer for cardiac cath as pt is still SOB , Needs Lasix & Cr is elevated.. Low stable H/H. Warfarin 3mg today.  6/24/22: Patient seen and examined at bedside. Patient denies any SOB today, speaking comfortably. Denies chest pain, pressure, sob, palpitations. Restart Lasix & Coumadin  6/25/22: Patient seen and examined at bedside. Patient denies any SOB today, speaking comfortably. Denies chest pain, pressure, sob, palpitations. Continue home lasix dose and coumadin restart. Lasix 20 mg IVP x 1 dose.  6/26/22: Patient seen and examined at bedside. Pt denies any SOB, CP, palpitations dizziness. Pt is on 2 L NC however cannula had fallen out of nose, replaced NC and dropped down to 1L. Lasix 2x day  6/27/22: Pt seen and examined at bedside. Pt states she has noticed blood clots from the nose last night - placed pt on humidified O2 and standing nasal saline spray. Pt denies any shortness of breath, chest pain, or dizziness. Pt is on 2L NC. Currently on lasix BID. INR 1.87 C/O Nose bleed , dry nose, Change to Humidified O2 .  6/28/22: Pt seen and examined at bedside. She states she had blood clots from the nose yesterday, but has improved since adding humidified O2 and nasal spray. Feels like breathing has improved. Did sit on the chair yesterday with PT. Denies any chest pain or dizziness.   6/29/22: Pt seen and examined at bedside. She states she is no longer having blood clots from the nose. Denies any shortness of breath, chest pain, or dizziness.   6/30/22: Pt seen and examined at bedside. She states she has been having abdominal discomfort throughout the entire night and has been unable to get any sleep at all overnight. This morning, she feels as if it's improved significantly, now 3/10. Wasn't able to eat much of her breakfast. Denies any nausea, vomiting, or dizziness. RUQ Ultrasound ordered, which showed Cholelithiasis with gallbladder wall thickening and small amount of pericholecystic fluid. HIDA scan performed today.     OVERNIGHT EVENTS: No acute overnight events.       Home Medications:  aspirin 81 mg oral delayed release tablet: 1 tab(s) orally once a day (29 Jun 2022 10:19)  atorvastatin 20 mg oral tablet: 1 tab(s) orally once a day (29 Jun 2022 10:19)  clopidogrel 75 mg oral tablet: 1 tab(s) orally once a day (29 Jun 2022 10:19)  fluticasone 50 mcg/inh nasal spray: 1 spray(s) nasal 2 times a day (29 Jun 2022 10:03)  Lasix 40 mg oral tablet: 1 tab(s) orally 2 times a day (26 Jun 2022 12:41)  metoprolol succinate 50 mg oral tablet, extended release: 1 tab(s) orally once a day (29 Jun 2022 10:19)  Synthroid 75 mcg (0.075 mg) oral tablet: 1 tab(s) orally once a day (26 Jun 2022 12:41)  warfarin 3 mg oral tablet: 1 tab(s) orally once a day (M, Tu, Th, F, Sa Su) (26 Jun 2022 12:41)  warfarin 4 mg oral tablet: 1 tab(s) orally Wednesday (26 Jun 2022 12:41)      MEDICATIONS  (STANDING):  aspirin enteric coated 81 milliGRAM(s) Oral daily  atorvastatin 20 milliGRAM(s) Oral at bedtime  BACItracin   Ointment 1 Application(s) Topical two times a day  clopidogrel Tablet 75 milliGRAM(s) Oral daily  fluticasone propionate 50 MICROgram(s)/spray Nasal Spray 1 Spray(s) Both Nostrils two times a day  furosemide    Tablet 40 milliGRAM(s) Oral two times a day  levothyroxine 75 MICROGram(s) Oral daily  metoprolol succinate ER 50 milliGRAM(s) Oral daily  pantoprazole  Injectable 40 milliGRAM(s) IV Push daily  sodium chloride 0.65% Nasal 1 Spray(s) Both Nostrils five times a day  zinc oxide 40% Paste 1 Application(s) Topical two times a day    MEDICATIONS  (PRN):  acetaminophen     Tablet .. 650 milliGRAM(s) Oral every 6 hours PRN Mild Pain (1 - 3)  ALBUTerol    90 MICROgram(s) HFA Inhaler 2 Puff(s) Inhalation every 12 hours PRN Shortness of Breath and/or Wheezing  aluminum hydroxide/magnesium hydroxide/simethicone Suspension 30 milliLiter(s) Oral every 6 hours PRN Dyspepsia      Allergies    No Known Allergies    Intolerances        REVIEW OF SYSTEMS: "I feel ok"  CONSTITUTIONAL: No fever, No chills, No fatigue, No myalgia, No Body ache, No Weakness  EYES: No eye pain,  No visual disturbances, No discharge, NO Redness  ENMT:  No ear pain, No nose bleed, No vertigo; No sinus pain, NO throat pain, No Congestion  NECK: No pain, No stiffness  RESPIRATORY: No cough, NO wheezing, No hemoptysis, No shortness of breath  CARDIOVASCULAR: No chest pain, palpitations  GASTROINTESTINAL: Admits to improved abdominal pain; No nausea, No vomiting; No diarrhea, No constipation. [ x ] BM  GENITOURINARY: No dysuria, No frequency, No urgency, No hematuria, NO incontinence  NEUROLOGICAL: No headaches, No dizziness, No numbness, No tingling, No tremors, No weakness  EXT: admits chronic Swelling in LE's, No Pain, admits LE Edema  SKIN:  [ x ] No itching, burning, rashes, or lesions   MUSCULOSKELETAL: No joint pain ,No Jt swelling; No muscle pain, No back pain, No extremity pain  PSYCHIATRIC: No depression,  No anxiety,  No mood swings ,No difficulty sleeping at night  PAIN SCALE: [ x ] None  [  ] Other-    Vital Signs Last 24 Hrs  T(C): 36.7 (30 Jun 2022 11:19), Max: 37 (29 Jun 2022 20:27)  T(F): 98 (30 Jun 2022 11:19), Max: 98.6 (29 Jun 2022 20:27)  HR: 71 (30 Jun 2022 11:19) (70 - 96)  BP: 99/45 (30 Jun 2022 11:19) (99/45 - 114/58)  BP(mean): --  RR: 18 (30 Jun 2022 11:19) (17 - 18)  SpO2: 95% (30 Jun 2022 11:19) (93% - 96%)  Finger Stick        06-29 @ 07:01  -  06-30 @ 07:00  --------------------------------------------------------  IN: 0 mL / OUT: 350 mL / NET: -350 mL        PHYSICAL EXAM: O2 NC on 2 L  GENERAL:  [ x ] NAD  [ x ] well appearing, [  ] Agitated, [  ] Drowsy,  [  ] Lethargy, [  ] confused   HEAD:  [ x ] Normal, [  ] Other  EYES:  [ x ] EOMI, [ x ] PERRLA, [ x ] conjunctiva and sclera clear normal, [  ] Other,  [  ] Pallor,[  ] Discharge  ENMT:  [ x ] Normal, [ x ] Moist mucous membranes, [ x ] Good dentition, [ x ] No Thrush  NECK:  [ x ] Supple, [x  ] No JVD, [ x ] Normal thyroid, [  ] Lymphadenopathy [  ] Other  CHEST/LUNG:  [ x ] Clear to auscultation bilaterally, [x  ] Breath Sounds equal B/L , [  ] poor effort  [ x ] No rales, [ x ] No rhonchi  [  ]  slight expiratory wheezing,   HEART:  [ x ] Regular rate and IRREGULARLY IRREGULAR rhythm, [  ] tachycardia, [  ] Bradycardia,  [  ] irregular  [ x ] systolic murmur heard best at RUSB No rubs, No gallops, [  ] PPM in place (Mfr:  )  ABDOMEN:  [ x ] Soft, [ x ] very mild tenderness to palpation of Right upper quadrant, [ x ] Nondistended, [ x ] No mass, [ x] Bowel sounds present, [ x ] obese  NERVOUS SYSTEM:  [ x ] Alert & Oriented X3, [ x ] Nonfocal  [  ] Confusion  [  ] Encephalopathic [  ] Sedated [  ] Unable to assess, [  ] Dementia [  ] Other-  EXTREMITIES: [ x ] 2+ Peripheral Pulses, No clubbing, No cyanosis,  [ x ] b/l UE +2 pitting edema legs &  gathering at proximal forearms, trace edema b/l LEs, 2+ radial pulses b/l, extremities warm to touch [ x ] PVD stasis skin changes B/L Lower EXT, [  ] wound  LYMPH: No lymphadenopathy noted  SKIN:  [ x ] No rashes or lesions, [  ] Pressure Ulcers, [  ] ecchymosis, [  ] Skin Tears, [ x ] Other - redness B/L Buttocks    DIET: Diet, NPO:   Except Medications (06-30-22 @ 10:33)      LABS:                        10.4   4.07  )-----------( 215      ( 30 Jun 2022 06:18 )             31.0     30 Jun 2022 06:18    140    |  105    |  28     ----------------------------<  94     4.6     |  32     |  1.60     Ca    8.1        30 Jun 2022 06:18    TPro  5.8    /  Alb  2.1    /  TBili  1.1    /  DBili  0.6    /  AST  91     /  ALT  45     /  AlkPhos  156    30 Jun 2022 06:18    PT/INR - ( 30 Jun 2022 06:18 )   PT: 36.8 sec;   INR: 3.11 ratio         PTT - ( 30 Jun 2022 06:18 )  PTT:43.3 sec               Anemia Panel:      Thyroid Panel:            Serum Pro-Brain Natriuretic Peptide: 9883 pg/mL (06-25-22 @ 07:03)      RADIOLOGY & ADDITIONAL TESTS: Personally reviewed.     HEALTH ISSUES - PROBLEM Dx:  Hypothyroid    Hypertension    Lower extremity edema    Paroxysmal atrial fibrillation    Need for prophylactic measure    Anemia    Acute kidney injury superimposed on CKD    NSTEMI (non-ST elevation myocardial infarction)    Acute systolic congestive heart failure    Onychomycosis            Consultant(s) Notes Reviewed:  [x  ] YES     Care Discussed with [ ] Consultants  [ x ] Patient  [ x ] Family [  ] HCP [  ]   [ x ] Social Service  [ x ] RN, [  ] Physical Therapy,[  ] Palliative care team  DVT PPX: [  ] Lovenox, [  ] S C Heparin, [x  ] Coumadin, [  ] Xarelto, [  ] Eliquis, [  ] Pradaxa, [  ] IV Heparin drip, [  ] SCD [  ] Contraindication 2 to GI Bleed,[  ] Ambulation [  ] Contraindicated 2 to  bleed [  ] Contraindicated 2 to Brain Bleed  Advanced directive: [  ] None, [ x ] DNR/DNI Patient is a 89y old  Female who presents with a chief complaint of CP w/ elevated trops (30 Jun 2022 13:40)      HPI:  Patient is a 89 y/o F with a pmhx b/l LE edema, HTN, P Afib (on coumadin), and hypothyroidism presenting with sudden onset mid-sternal CP. Pt describes pain as a "prickly feeling", rated 4/10, constant in nature since 4am on 6/17, resolved in the ED. Pain was associated with L arm weakness, now resolved. Pt states the pain woke her up, and she was scared to walk and was unable to use her L arm for support to get out of bed. Pt was also nauseous at this time. No associated diaphoresis, palpitations, chest pressure. No amaurosis fugax, facial droop, garbled speech, hemiparesis, LOC, falls reported. No vomiting, fevers, chills, cough, sore throat, SOB, abd pain, constipation, dysuria. Pt had one loose BM this AM at home, denied hematochezia or melena.       In ED:  VS - HR 71 | /65 | RR 18 | sO2 94 | T 97.6  Labs - HgB 11.2 | INR 1.85 | K 3.3 | Glu 114 | trop 342.6 | CKMB 3.2 | BNP 2396  CTH NC - No acute intracranial hemorrhage. Lacunar infarct R inferior cerebellum  EKG - vent rate 66, QTc 501ms, sinus rhythm, old LBBB, no ischemic changes on personal read  Given -  mg PO. pt seen by cardiology Dr Almazan , pt admitted to [facility] for NSTEMI. (17 Jun 2022 09:52)      INTERVAL HPI:  6/18/22: Seen and examined at bedside. No acute complaints. Denies chest pain overnight and this AM. Overnight pt had episode of dyspnea, troponins inc from 300s to >24736. Trops now downtrended. Received 1 x dose 40mg IV lasix for dyspnea. Pt had statin dose inc to 80mg, had 1 x dose lopressor 25mg, and had her scheduled evening coumadin. Had 10 beats of NSVT this AM. Pt was started on standing 50mg toprol xL PO qD. Started on plavix 75mg qD. Extensive counseling done with the patient on [place] [facility] and her current state of health. Patient states she has "forms at home" that her daughter will bring to [facility]. Patient values quality of life and wants to discuss with her daughter and son-inlaw. Family to discuss potential plan for cardiac cath and will arrive to a decision on 6/19. C/W medical therapy for ACS. HOLD coumadin as pt INR is therapeutic and to facilitate potential for cardiac cath. Will start renal adjusted FD Lovenox on 6/19 if INR is below 2.   6/19/22 Pt seen and examined at bedside. Pt states she slept well last night. Pt denies SOB, CP, palpitations, dizziness .Mildly elevated Cr , INR -Theraputic  Pt states the last time she had chest pain was Friday night. Later in the afternoon again patient was seen at bedside with  daughter HCP and son in law at bedside, decision was made for DNR/ DNI and cardiac cath.  6/20/22: Patient seen and examined at bedside. Patient denies chest pain, palpitations, dyspnea. Noted to be slightly short of breath. proBNP >13k, will give 1 dose of lasix 40mg IV today.  6/21/22: Patient continues to deny anginal symptoms. Will be transferred for cardiac cath, as INR downtrended to 1.6 and Cr improved to 1.3. For LUIS CARLOS on CKD, will defer further IVF hydration at this time due to TTE results and SOB after previous IVF. Will give Mucomyst q12 x4 for renal protection. Given lasix 40mg IVP x1 to improve respirations. Given full dose lovenox x1.   6/22/22: Overnight patient with R sided abdominal discomfort, given maalox. Patient breathing improved s/p lasix 40mg IV yesterday. Patient's INR 1.4 today and Cr increased to 1.5. Plan for cardiac cath If Cr stable. Given additional dose of full dose lovenox.  6/23/22: Patient seen and examined at bedside. Patient denies chest pain, pressure, sob, palpitations. Cr downtrending today,  As per Cardiology NO plan for transfer for cardiac cath as pt is still SOB , Needs Lasix & Cr is elevated.. Low stable H/H. Warfarin 3mg today.  6/24/22: Patient seen and examined at bedside. Patient denies any SOB today, speaking comfortably. Denies chest pain, pressure, sob, palpitations. Restart Lasix & Coumadin  6/25/22: Patient seen and examined at bedside. Patient denies any SOB today, speaking comfortably. Denies chest pain, pressure, sob, palpitations. Continue home lasix dose and coumadin restart. Lasix 20 mg IVP x 1 dose.  6/26/22: Patient seen and examined at bedside. Pt denies any SOB, CP, palpitations dizziness. Pt is on 2 L NC however cannula had fallen out of nose, replaced NC and dropped down to 1L. Lasix 2x day  6/27/22: Pt seen and examined at bedside. Pt states she has noticed blood clots from the nose last night - placed pt on humidified O2 and standing nasal saline spray. Pt denies any shortness of breath, chest pain, or dizziness. Pt is on 2L NC. Currently on lasix BID. INR 1.87 C/O Nose bleed , dry nose, Change to Humidified O2 .  6/28/22: Pt seen and examined at bedside. She states she had blood clots from the nose yesterday, but has improved since adding humidified O2 and nasal spray. Feels like breathing has improved. Did sit on the chair yesterday with PT. Denies any chest pain or dizziness.   6/29/22: Pt seen and examined at bedside. She states she is no longer having blood clots from the nose. Denies any shortness of breath, chest pain, or dizziness.   6/30/22: Pt seen and examined at bedside. She states she has been having abdominal discomfort throughout the entire night and has been unable to get any sleep at all overnight. This morning, she feels as if it's improved significantly, now 3/10. Wasn't able to eat much of her breakfast. Denies any nausea, vomiting, or dizziness. RUQ Ultrasound ordered, which showed Cholelithiasis with gallbladder wall thickening and small amount of pericholecystic fluid. HIDA scan performed today. D/c Plan to [facility]. Pt has NO Complaints, Cr is stable,    OVERNIGHT EVENTS: No acute overnight events.       Home Medications:  aspirin 81 mg oral delayed release tablet: 1 tab(s) orally once a day (29 Jun 2022 10:19)  atorvastatin 20 mg oral tablet: 1 tab(s) orally once a day (29 Jun 2022 10:19)  clopidogrel 75 mg oral tablet: 1 tab(s) orally once a day (29 Jun 2022 10:19)  fluticasone 50 mcg/inh nasal spray: 1 spray(s) nasal 2 times a day (29 Jun 2022 10:03)  Lasix 40 mg oral tablet: 1 tab(s) orally 2 times a day (26 Jun 2022 12:41)  metoprolol succinate 50 mg oral tablet, extended release: 1 tab(s) orally once a day (29 Jun 2022 10:19)  Synthroid 75 mcg (0.075 mg) oral tablet: 1 tab(s) orally once a day (26 Jun 2022 12:41)  warfarin 3 mg oral tablet: 1 tab(s) orally once a day (M, Tu, Th, F, Sa Su) (26 Jun 2022 12:41)  warfarin 4 mg oral tablet: 1 tab(s) orally Wednesday (26 Jun 2022 12:41)      MEDICATIONS  (STANDING):  aspirin enteric coated 81 milliGRAM(s) Oral daily  atorvastatin 20 milliGRAM(s) Oral at bedtime  BACItracin   Ointment 1 Application(s) Topical two times a day  clopidogrel Tablet 75 milliGRAM(s) Oral daily  fluticasone propionate 50 MICROgram(s)/spray Nasal Spray 1 Spray(s) Both Nostrils two times a day  furosemide    Tablet 40 milliGRAM(s) Oral two times a day  levothyroxine 75 MICROGram(s) Oral daily  metoprolol succinate ER 50 milliGRAM(s) Oral daily  pantoprazole  Injectable 40 milliGRAM(s) IV Push daily  sodium chloride 0.65% Nasal 1 Spray(s) Both Nostrils five times a day  zinc oxide 40% Paste 1 Application(s) Topical two times a day    MEDICATIONS  (PRN):  acetaminophen     Tablet .. 650 milliGRAM(s) Oral every 6 hours PRN Mild Pain (1 - 3)  ALBUTerol    90 MICROgram(s) HFA Inhaler 2 Puff(s) Inhalation every 12 hours PRN Shortness of Breath and/or Wheezing  aluminum hydroxide/magnesium hydroxide/simethicone Suspension 30 milliLiter(s) Oral every 6 hours PRN Dyspepsia      Allergies    No Known Allergies    Intolerances        REVIEW OF SYSTEMS: "I feel ok"  CONSTITUTIONAL: No fever, No chills, No fatigue, No myalgia, No Body ache, No Weakness  EYES: No eye pain,  No visual disturbances, No discharge, NO Redness  ENMT:  No ear pain, No nose bleed, No vertigo; No sinus pain, NO throat pain, No Congestion  NECK: No pain, No stiffness  RESPIRATORY: No cough, NO wheezing, No hemoptysis, No shortness of breath  CARDIOVASCULAR: No chest pain, palpitations  GASTROINTESTINAL: Admits to improved abdominal pain; No nausea, No vomiting; No diarrhea, No constipation. [ x ] BM  GENITOURINARY: No dysuria, No frequency, No urgency, No hematuria, NO incontinence  NEUROLOGICAL: No headaches, No dizziness, No numbness, No tingling, No tremors, No weakness  EXT: admits chronic Swelling in LE's, No Pain, admits LE Edema  SKIN:  [ x ] No itching, burning, rashes, or lesions   MUSCULOSKELETAL: No joint pain ,No Jt swelling; No muscle pain, No back pain, No extremity pain  PSYCHIATRIC: No depression,  No anxiety,  No mood swings ,No difficulty sleeping at night  PAIN SCALE: [ x ] None  [  ] Other-    Vital Signs Last 24 Hrs  T(C): 36.7 (30 Jun 2022 11:19), Max: 37 (29 Jun 2022 20:27)  T(F): 98 (30 Jun 2022 11:19), Max: 98.6 (29 Jun 2022 20:27)  HR: 71 (30 Jun 2022 11:19) (70 - 96)  BP: 99/45 (30 Jun 2022 11:19) (99/45 - 114/58)  BP(mean): --  RR: 18 (30 Jun 2022 11:19) (17 - 18)  SpO2: 95% (30 Jun 2022 11:19) (93% - 96%)  Finger Stick        06-29 @ 07:01  -  06-30 @ 07:00  --------------------------------------------------------  IN: 0 mL / OUT: 350 mL / NET: -350 mL        PHYSICAL EXAM: O2 NC on 2 L  GENERAL:  [ x ] NAD  [ x ] well appearing, [  ] Agitated, [  ] Drowsy,  [  ] Lethargy, [  ] confused   HEAD:  [ x ] Normal, [  ] Other  EYES:  [ x ] EOMI, [ x ] PERRLA, [ x ] conjunctiva and sclera clear normal, [  ] Other,  [  ] Pallor,[  ] Discharge  ENMT:  [ x ] Normal, [ x ] Moist mucous membranes, [ x ] Good dentition, [ x ] No Thrush  NECK:  [ x ] Supple, [x  ] No JVD, [ x ] Normal thyroid, [  ] Lymphadenopathy [  ] Other  CHEST/LUNG:  [ x ] Clear to auscultation bilaterally, [x  ] Breath Sounds equal B/L , [  ] poor effort  [ x ] No rales, [ x ] No rhonchi  [  ]  slight expiratory wheezing,   HEART:  [ x ] Regular rate and IRREGULARLY IRREGULAR rhythm, [  ] tachycardia, [  ] Bradycardia,  [  ] irregular  [ x ] systolic murmur heard best at RUSB No rubs, No gallops, [  ] PPM in place (Mfr:  )  ABDOMEN:  [ x ] Soft, [ x ] very mild tenderness to palpation of Right upper quadrant, [ x ] Nondistended, [ x ] No mass, [ x] Bowel sounds present, [ x ] obese  NERVOUS SYSTEM:  [ x ] Alert & Oriented X3, [ x ] Nonfocal  [  ] Confusion  [  ] Encephalopathic [  ] Sedated [  ] Unable to assess, [  ] Dementia [  ] Other-  EXTREMITIES: [ x ] 2+ Peripheral Pulses, No clubbing, No cyanosis,  [ x ] b/l UE +2 pitting edema legs &  gathering at proximal forearms, trace edema b/l LEs, 2+ radial pulses b/l, extremities warm to touch [ x ] PVD stasis skin changes B/L Lower EXT, [  ] wound  LYMPH: No lymphadenopathy noted  SKIN:  [ x ] No rashes or lesions, [  ] Pressure Ulcers, [  ] ecchymosis, [  ] Skin Tears, [ x ] Other - redness B/L Buttocks    DIET: Diet, NPO:   Except Medications (06-30-22 @ 10:33)      LABS:                        10.4   4.07  )-----------( 215      ( 30 Jun 2022 06:18 )             31.0     30 Jun 2022 06:18    140    |  105    |  28     ----------------------------<  94     4.6     |  32     |  1.60     Ca    8.1        30 Jun 2022 06:18    TPro  5.8    /  Alb  2.1    /  TBili  1.1    /  DBili  0.6    /  AST  91     /  ALT  45     /  AlkPhos  156    30 Jun 2022 06:18    PT/INR - ( 30 Jun 2022 06:18 )   PT: 36.8 sec;   INR: 3.11 ratio         PTT - ( 30 Jun 2022 06:18 )  PTT:43.3 sec      Serum Pro-Brain Natriuretic Peptide: 9883 pg/mL (06-25-22 @ 07:03)      RADIOLOGY & ADDITIONAL TESTS:   < from: [facility] (06.30.22 @ 15:41) >      INTERPRETATION:  RADIOPHARMACEUTICAL: 3.1 mCi Tc-99m-Mebrofenin, I.V.    CLINICAL INFORMATION: 89 year old female with cholelithiasis with   gallbladder wall thickening and small amount of pericholecystic fluid on   ultrasound; referred to evaluate for acute cholecystitis.    TECHNIQUE:  Dynamic imaging of the anterior abdomen was performed for 1   hour following radiopharmaceutical injection. Static images of the   abdomen in the anterior, right anterior oblique and right lateral views   were obtained immediately thereafter.    COMPARISON: No previous hepatobiliary scans were available for comparison.    FINDINGS: There is prompt, homogeneous uptake of radiopharmaceutical by   the hepatocytes. Activity is first seen in the gallbladder at about 25   minutes and in the bowel at about 50 minutes. There is good clearance of   activity from the liver by the end of the study.    IMPRESSION: Normal hepatobiliary scan. No radionuclide evidence of acute   cholecystitis.    < end of copied text >  < from: US Abdomen Upper Quadrant Right (06.30.22 @ 08:14) >  INTERPRETATION:  CLINICAL INFORMATION: Elevated LFTs. Patient admitted   for chest pain/elevated troponin.    COMPARISON: None available.    TECHNIQUE: Sonography of the right upper quadrant. The examination is   technically limited secondary to overlying bowel gas.    FINDINGS:  Liver: Limited. Visualized portions are unremarkable.  Bile ducts: Normal caliber. Common bile duct measures9 mm.  Gallbladder: Gallstones with gallbladder wall thickening and small amount   of pericholecystic fluid. Patient not tender over the gallbladder at the   time of the study.  Pancreas: Obscured by overlying bowel gas.  Right kidney: 7.5 cm. No hydronephrosis. Nonobstructing calculus in the   upper pole measuring 7 mm.  Ascites: Small amount of ascites.  IVC and aorta: Not well seen secondary to overlying bowel gas.    IMPRESSION:    Examination limited secondary to overlying bowel gas.    Cholelithiasis with gallbladder wall thickening and small amount of   pericholecystic fluid; clinical correlation is recommended for acute   cholecystitis; a HIDA scan could be obtained for further evaluation.    Common bile duct measures 9 mm which is within normal limits for age,   however correlation is recommended with LFTs and MRI/MRCP of the abdomen   should be considered for further evaluation.    Small amount of right upper quadrant ascites    < end of copied text >      HEALTH ISSUES - PROBLEM Dx:  Hypothyroid    Hypertension    Lower extremity edema    Paroxysmal atrial fibrillation    Need for prophylactic measure    Anemia    Acute kidney injury superimposed on CKD    NSTEMI (non-ST elevation myocardial infarction)    Acute systolic congestive heart failure    Onychomycosis        Consultant(s) Notes Reviewed:  [x  ] YES     Care Discussed with [ ] Consultants  [ x ] Patient  [ x ] Family [  ] HCP [  ]   [ x ] Social Service  [ x ] RN, [  ] Physical Therapy,[  ] Palliative care team  DVT PPX: [  ] Lovenox, [  ] S C Heparin, [x  ] Coumadin, [  ] Xarelto, [  ] Eliquis, [  ] Pradaxa, [  ] IV Heparin drip, [  ] SCD [  ] Contraindication 2 to GI Bleed,[  ] Ambulation [  ] Contraindicated 2 to  bleed [  ] Contraindicated 2 to Brain Bleed  Advanced directive: [  ] None, [ x ] DNR/DNI

## 2022-06-30 NOTE — PROGRESS NOTE ADULT - ATTENDING SUPERVISION STATEMENT
Resident
Resident/Fellow

## 2022-06-30 NOTE — PROGRESS NOTE ADULT - PROVIDER SPECIALTY LIST ADULT
Cardiology
Nephrology
Cardiology
Cardiology
Nephrology
Neurology
Cardiology
Nephrology
Nephrology
Neurology
Cardiology
Internal Medicine
Nephrology
Internal Medicine
Internal Medicine
Hospitalist
Internal Medicine
Hospitalist
Internal Medicine

## 2022-06-30 NOTE — PROGRESS NOTE ADULT - PROBLEM SELECTOR PLAN 5
LUIS CARLOS on CKD stage 3B?   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

## 2022-06-30 NOTE — PROGRESS NOTE ADULT - PROBLEM SELECTOR PLAN 10
DVT ppx: will give coumadin _____ today, INR 3.11 elevated, no plan for cath   s/p full dose Lovenox x2 renally dosed on 6/21 and 6/22 DVT ppx: will give coumadin 2mg today, INR 3.11 elevated, no plan for cath   s/p full dose Lovenox x2 renally dosed on 6/21 and 6/22 DVT ppx: will hold coumadin dose today as INR 3.11, no plan for cath   s/p full dose Lovenox x2 renally dosed on 6/21 and 6/22

## 2022-06-30 NOTE — PROGRESS NOTE ADULT - ASSESSMENT
89 y/o F with a pmhx b/l LE edema, HTN,P Afib on coumadin , hypothyroidism, pw midsternal chest discomfort atypical in nature and left arm discomfort since waking up this morning at  4am, describes it as "creepy feeling" assoc with left arm weakness, general weakness and not feeling well. Patient has no underlying coronary disease.     NSTEMI/PAfib  - EKG: Afib with underlying LBBB; unchanged from previous.    - No further anginal complaints.    - Continue medical mgt.  Patient is not a candidate for ischemic eval.  Daughter has been infortmed  - BP, HR, stable and controlled  - Continue GDMT: DAPT (ASA, Plavix), BB, and statin.  Would only keep on triple therapy for short period.  Would only keep on Coumadin and Plavix after 1 month of DAPT.  D/C ASA, thereafter  - Remains rate-controlled per flow sheet.  Continue daily dose Warfarin to keep INR 2-3.  Today's INR 3.11  - Continue routine hemodynamics  - Monitor electrolytes, replete to keep K>4 and Mag>2    Acute Systolic HF  - TTE:(6/19/2022) Mild to moderate segmental LV systolic dysfunction, est LVEF of 35-40%. The apex, mid inferoseptal and mid anterolateral walls appear severely hypokinetic, RV enlargement with grossly normal function. Biatrial enlargement Calcified trileaflet aortic valve with mild to moderate aortic stenosis, without AI. Mild MR Moderate TR.  - Still requiring NC and overloaded on exam.  s/p Lasix 40 mg IV x 1 6/29.  Continue PO q12H.  Renal function stable  - Continue to monitor volume status   - Continue strict I/O's and daily weights    Alk phos uptrending.  Abd USD noted.  Care per Primary    - Increase activity as tolerated  - Will continue to follow.    Angeles Kim DNP, NP-C  Cardiology   Spectra #[phone]

## 2022-07-13 ENCOUNTER — INPATIENT (INPATIENT)
Facility: HOSPITAL | Age: 87
LOS: 6 days | Discharge: HOME | DRG: 812 | End: 2022-07-20
Attending: INTERNAL MEDICINE | Admitting: INTERNAL MEDICINE
Payer: MEDICARE

## 2022-07-13 VITALS
HEIGHT: 65 IN | OXYGEN SATURATION: 95 % | HEART RATE: 78 BPM | RESPIRATION RATE: 16 BRPM | SYSTOLIC BLOOD PRESSURE: 103 MMHG | DIASTOLIC BLOOD PRESSURE: 63 MMHG | TEMPERATURE: 97 F

## 2022-07-13 PROCEDURE — 99285 EMERGENCY DEPT VISIT HI MDM: CPT

## 2022-07-13 NOTE — ED ADULT TRIAGE NOTE - CHIEF COMPLAINT QUOTE
per ems from [facility] SNF, had low H/H at facility. on 7/11 fall in the bathroom, right sided hematoma present, on coumadin

## 2022-07-14 DIAGNOSIS — Z29.9 ENCOUNTER FOR PROPHYLACTIC MEASURES, UNSPECIFIED: ICD-10-CM

## 2022-07-14 DIAGNOSIS — D64.9 ANEMIA, UNSPECIFIED: ICD-10-CM

## 2022-07-14 DIAGNOSIS — I10 ESSENTIAL (PRIMARY) HYPERTENSION: ICD-10-CM

## 2022-07-14 DIAGNOSIS — I48.91 UNSPECIFIED ATRIAL FIBRILLATION: ICD-10-CM
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
## 2022-07-14 DIAGNOSIS — I50.20 UNSPECIFIED SYSTOLIC (CONGESTIVE) HEART FAILURE: ICD-10-CM

## 2022-07-14 DIAGNOSIS — E03.9 HYPOTHYROIDISM, UNSPECIFIED: ICD-10-CM

## 2022-07-14 DIAGNOSIS — R74.01 ELEVATION OF LEVELS OF LIVER TRANSAMINASE LEVELS: ICD-10-CM

## 2022-07-14 DIAGNOSIS — R60.0 LOCALIZED EDEMA: ICD-10-CM

## 2022-07-14 DIAGNOSIS — I25.10 ATHEROSCLEROTIC HEART DISEASE OF NATIVE CORONARY ARTERY WITHOUT ANGINA PECTORIS: ICD-10-CM

## 2022-07-14 DIAGNOSIS — T14.8XXA OTHER INJURY OF UNSPECIFIED BODY REGION, INITIAL ENCOUNTER: ICD-10-CM

## 2022-07-14 DIAGNOSIS — D62 ACUTE POSTHEMORRHAGIC ANEMIA: ICD-10-CM

## 2022-07-14 DIAGNOSIS — N17.9 ACUTE KIDNEY FAILURE, UNSPECIFIED: ICD-10-CM

## 2022-07-14 PROBLEM — I48.0 PAROXYSMAL ATRIAL FIBRILLATION: Chronic | Status: ACTIVE | Noted: 2022-06-17

## 2022-07-14 LAB
ALBUMIN SERPL ELPH-MCNC: 1.8 G/DL — LOW (ref 3.3–5)
ALBUMIN SERPL ELPH-MCNC: 1.9 G/DL — LOW (ref 3.3–5)
ALP SERPL-CCNC: 315 U/L — HIGH (ref 40–120)
ALP SERPL-CCNC: 326 U/L — HIGH (ref 40–120)
ALT FLD-CCNC: 94 U/L — HIGH (ref 12–78)
ALT FLD-CCNC: 99 U/L — HIGH (ref 12–78)
ANION GAP SERPL CALC-SCNC: 2 MMOL/L — LOW (ref 5–17)
ANION GAP SERPL CALC-SCNC: 6 MMOL/L — SIGNIFICANT CHANGE UP (ref 5–17)
ANION GAP SERPL CALC-SCNC: 8 MMOL/L — SIGNIFICANT CHANGE UP (ref 5–17)
APTT BLD: 34.8 SEC — SIGNIFICANT CHANGE UP (ref 27.5–35.5)
AST SERPL-CCNC: 163 U/L — HIGH (ref 15–37)
AST SERPL-CCNC: 188 U/L — HIGH (ref 15–37)
BASOPHILS # BLD AUTO: 0.02 K/UL — SIGNIFICANT CHANGE UP (ref 0–0.2)
BASOPHILS # BLD AUTO: 0.02 K/UL — SIGNIFICANT CHANGE UP (ref 0–0.2)
BASOPHILS NFR BLD AUTO: 0.4 % — SIGNIFICANT CHANGE UP (ref 0–2)
BASOPHILS NFR BLD AUTO: 0.5 % — SIGNIFICANT CHANGE UP (ref 0–2)
BILIRUB SERPL-MCNC: 3 MG/DL — HIGH (ref 0.2–1.2)
BILIRUB SERPL-MCNC: 3.2 MG/DL — HIGH (ref 0.2–1.2)
BUN SERPL-MCNC: 34 MG/DL — HIGH (ref 7–23)
BUN SERPL-MCNC: 37 MG/DL — HIGH (ref 7–23)
BUN SERPL-MCNC: 37 MG/DL — HIGH (ref 7–23)
CALCIUM SERPL-MCNC: 7.7 MG/DL — LOW (ref 8.5–10.1)
CALCIUM SERPL-MCNC: 7.9 MG/DL — LOW (ref 8.5–10.1)
CALCIUM SERPL-MCNC: 8 MG/DL — LOW (ref 8.5–10.1)
CHLORIDE SERPL-SCNC: 104 MMOL/L — SIGNIFICANT CHANGE UP (ref 96–108)
CHLORIDE SERPL-SCNC: 105 MMOL/L — SIGNIFICANT CHANGE UP (ref 96–108)
CHLORIDE SERPL-SCNC: 105 MMOL/L — SIGNIFICANT CHANGE UP (ref 96–108)
CO2 SERPL-SCNC: 28 MMOL/L — SIGNIFICANT CHANGE UP (ref 22–31)
CO2 SERPL-SCNC: 30 MMOL/L — SIGNIFICANT CHANGE UP (ref 22–31)
CO2 SERPL-SCNC: 34 MMOL/L — HIGH (ref 22–31)
CREAT SERPL-MCNC: 1.6 MG/DL — HIGH (ref 0.5–1.3)
CREAT SERPL-MCNC: 1.7 MG/DL — HIGH (ref 0.5–1.3)
CREAT SERPL-MCNC: 1.7 MG/DL — HIGH (ref 0.5–1.3)
EGFR: 27 ML/MIN/1.73M2 — LOW
EGFR: 27 ML/MIN/1.73M2 — LOW
EGFR: 30 ML/MIN/1.73M2 — LOW
EOSINOPHIL # BLD AUTO: 0.19 K/UL — SIGNIFICANT CHANGE UP (ref 0–0.5)
EOSINOPHIL # BLD AUTO: 0.21 K/UL — SIGNIFICANT CHANGE UP (ref 0–0.5)
EOSINOPHIL NFR BLD AUTO: 4.3 % — SIGNIFICANT CHANGE UP (ref 0–6)
EOSINOPHIL NFR BLD AUTO: 4.7 % — SIGNIFICANT CHANGE UP (ref 0–6)
GLUCOSE SERPL-MCNC: 112 MG/DL — HIGH (ref 70–99)
GLUCOSE SERPL-MCNC: 87 MG/DL — SIGNIFICANT CHANGE UP (ref 70–99)
GLUCOSE SERPL-MCNC: 93 MG/DL — SIGNIFICANT CHANGE UP (ref 70–99)
HCT VFR BLD CALC: 22.1 % — LOW (ref 34.5–45)
HCT VFR BLD CALC: 22.8 % — LOW (ref 34.5–45)
HCT VFR BLD CALC: 23.1 % — LOW (ref 34.5–45)
HGB BLD-MCNC: 7.6 G/DL — LOW (ref 11.5–15.5)
HGB BLD-MCNC: 7.7 G/DL — LOW (ref 11.5–15.5)
HGB BLD-MCNC: 7.7 G/DL — LOW (ref 11.5–15.5)
IMM GRANULOCYTES NFR BLD AUTO: 0.2 % — SIGNIFICANT CHANGE UP (ref 0–1.5)
IMM GRANULOCYTES NFR BLD AUTO: 0.5 % — SIGNIFICANT CHANGE UP (ref 0–1.5)
INR BLD: 3.06 RATIO — HIGH (ref 0.88–1.16)
INR BLD: 3.28 RATIO — HIGH (ref 0.88–1.16)
LYMPHOCYTES # BLD AUTO: 1.13 K/UL — SIGNIFICANT CHANGE UP (ref 1–3.3)
LYMPHOCYTES # BLD AUTO: 1.36 K/UL — SIGNIFICANT CHANGE UP (ref 1–3.3)
LYMPHOCYTES # BLD AUTO: 25.5 % — SIGNIFICANT CHANGE UP (ref 13–44)
LYMPHOCYTES # BLD AUTO: 30.4 % — SIGNIFICANT CHANGE UP (ref 13–44)
MAGNESIUM SERPL-MCNC: 2.3 MG/DL — SIGNIFICANT CHANGE UP (ref 1.6–2.6)
MCHC RBC-ENTMCNC: 28.8 PG — SIGNIFICANT CHANGE UP (ref 27–34)
MCHC RBC-ENTMCNC: 30 PG — SIGNIFICANT CHANGE UP (ref 27–34)
MCHC RBC-ENTMCNC: 33.3 GM/DL — SIGNIFICANT CHANGE UP (ref 32–36)
MCHC RBC-ENTMCNC: 34.4 GM/DL — SIGNIFICANT CHANGE UP (ref 32–36)
MCV RBC AUTO: 86.5 FL — SIGNIFICANT CHANGE UP (ref 80–100)
MCV RBC AUTO: 87.4 FL — SIGNIFICANT CHANGE UP (ref 80–100)
MONOCYTES # BLD AUTO: 0.57 K/UL — SIGNIFICANT CHANGE UP (ref 0–0.9)
MONOCYTES # BLD AUTO: 0.72 K/UL — SIGNIFICANT CHANGE UP (ref 0–0.9)
MONOCYTES NFR BLD AUTO: 12.9 % — SIGNIFICANT CHANGE UP (ref 2–14)
MONOCYTES NFR BLD AUTO: 16.1 % — HIGH (ref 2–14)
NEUTROPHILS # BLD AUTO: 2.17 K/UL — SIGNIFICANT CHANGE UP (ref 1.8–7.4)
NEUTROPHILS # BLD AUTO: 2.48 K/UL — SIGNIFICANT CHANGE UP (ref 1.8–7.4)
NEUTROPHILS NFR BLD AUTO: 48.6 % — SIGNIFICANT CHANGE UP (ref 43–77)
NEUTROPHILS NFR BLD AUTO: 55.9 % — SIGNIFICANT CHANGE UP (ref 43–77)
NRBC # BLD: 0 /100 WBCS — SIGNIFICANT CHANGE UP (ref 0–0)
NRBC # BLD: 0 /100 WBCS — SIGNIFICANT CHANGE UP (ref 0–0)
PHOSPHATE SERPL-MCNC: 4.2 MG/DL — SIGNIFICANT CHANGE UP (ref 2.5–4.5)
PLATELET # BLD AUTO: 208 K/UL — SIGNIFICANT CHANGE UP (ref 150–400)
PLATELET # BLD AUTO: 225 K/UL — SIGNIFICANT CHANGE UP (ref 150–400)
POTASSIUM SERPL-MCNC: 3.8 MMOL/L — SIGNIFICANT CHANGE UP (ref 3.5–5.3)
POTASSIUM SERPL-MCNC: 3.9 MMOL/L — SIGNIFICANT CHANGE UP (ref 3.5–5.3)
POTASSIUM SERPL-MCNC: 4.2 MMOL/L — SIGNIFICANT CHANGE UP (ref 3.5–5.3)
POTASSIUM SERPL-SCNC: 3.8 MMOL/L — SIGNIFICANT CHANGE UP (ref 3.5–5.3)
POTASSIUM SERPL-SCNC: 3.9 MMOL/L — SIGNIFICANT CHANGE UP (ref 3.5–5.3)
POTASSIUM SERPL-SCNC: 4.2 MMOL/L — SIGNIFICANT CHANGE UP (ref 3.5–5.3)
PROT SERPL-MCNC: 6.3 G/DL — SIGNIFICANT CHANGE UP (ref 6–8.3)
PROT SERPL-MCNC: 6.8 G/DL — SIGNIFICANT CHANGE UP (ref 6–8.3)
PROTHROM AB SERPL-ACNC: 36.2 SEC — HIGH (ref 10.5–13.4)
PROTHROM AB SERPL-ACNC: 38.8 SEC — HIGH (ref 10.5–13.4)
RBC # BLD: 2.53 M/UL — LOW (ref 3.8–5.2)
RBC # BLD: 2.67 M/UL — LOW (ref 3.8–5.2)
RBC # FLD: 20.7 % — HIGH (ref 10.3–14.5)
RBC # FLD: 21.4 % — HIGH (ref 10.3–14.5)
SARS-COV-2 RNA SPEC QL NAA+PROBE: SIGNIFICANT CHANGE UP
SODIUM SERPL-SCNC: 140 MMOL/L — SIGNIFICANT CHANGE UP (ref 135–145)
SODIUM SERPL-SCNC: 141 MMOL/L — SIGNIFICANT CHANGE UP (ref 135–145)
SODIUM SERPL-SCNC: 141 MMOL/L — SIGNIFICANT CHANGE UP (ref 135–145)
TSH SERPL-MCNC: 2.14 UIU/ML — SIGNIFICANT CHANGE UP (ref 0.36–3.74)
WBC # BLD: 4.43 K/UL — SIGNIFICANT CHANGE UP (ref 3.8–10.5)
WBC # BLD: 4.47 K/UL — SIGNIFICANT CHANGE UP (ref 3.8–10.5)
WBC # FLD AUTO: 4.43 K/UL — SIGNIFICANT CHANGE UP (ref 3.8–10.5)
WBC # FLD AUTO: 4.47 K/UL — SIGNIFICANT CHANGE UP (ref 3.8–10.5)

## 2022-07-14 PROCEDURE — 71045 X-RAY EXAM CHEST 1 VIEW: CPT | Mod: 26

## 2022-07-14 PROCEDURE — 72125 CT NECK SPINE W/O DYE: CPT | Mod: 26,MA

## 2022-07-14 PROCEDURE — 99223 1ST HOSP IP/OBS HIGH 75: CPT

## 2022-07-14 PROCEDURE — 73502 X-RAY EXAM HIP UNI 2-3 VIEWS: CPT | Mod: 26,RT

## 2022-07-14 PROCEDURE — 73700 CT LOWER EXTREMITY W/O DYE: CPT | Mod: 26,RT

## 2022-07-14 PROCEDURE — 73080 X-RAY EXAM OF ELBOW: CPT | Mod: 26,RT

## 2022-07-14 PROCEDURE — 74176 CT ABD & PELVIS W/O CONTRAST: CPT | Mod: 26,MA

## 2022-07-14 PROCEDURE — 71250 CT THORAX DX C-: CPT | Mod: 26,MA

## 2022-07-14 PROCEDURE — 93970 EXTREMITY STUDY: CPT | Mod: 26

## 2022-07-14 PROCEDURE — 70450 CT HEAD/BRAIN W/O DYE: CPT | Mod: 26,MA

## 2022-07-14 PROCEDURE — 76376 3D RENDER W/INTRP POSTPROCES: CPT | Mod: 26

## 2022-07-14 PROCEDURE — 93010 ELECTROCARDIOGRAM REPORT: CPT

## 2022-07-14 RX ORDER — FUROSEMIDE 40 MG
40 TABLET ORAL EVERY 12 HOURS
Refills: 0 | Status: DISCONTINUED | OUTPATIENT
Start: 2022-07-14 | End: 2022-07-14

## 2022-07-14 RX ORDER — ACETAMINOPHEN 500 MG
650 TABLET ORAL EVERY 6 HOURS
Refills: 0 | Status: DISCONTINUED | OUTPATIENT
Start: 2022-07-14 | End: 2022-07-14

## 2022-07-14 RX ORDER — ACETAMINOPHEN 500 MG
650 TABLET ORAL EVERY 6 HOURS
Refills: 0 | Status: DISCONTINUED | OUTPATIENT
Start: 2022-07-14 | End: 2022-07-20

## 2022-07-14 RX ORDER — FUROSEMIDE 40 MG
40 TABLET ORAL
Refills: 0 | Status: DISCONTINUED | OUTPATIENT
Start: 2022-07-14 | End: 2022-07-14

## 2022-07-14 RX ORDER — ALBUMIN HUMAN 25 %
50 VIAL (ML) INTRAVENOUS ONCE
Refills: 0 | Status: COMPLETED | OUTPATIENT
Start: 2022-07-14 | End: 2022-07-14

## 2022-07-14 RX ORDER — METOPROLOL TARTRATE 50 MG
50 TABLET ORAL DAILY
Refills: 0 | Status: DISCONTINUED | OUTPATIENT
Start: 2022-07-14 | End: 2022-07-20

## 2022-07-14 RX ORDER — FUROSEMIDE 40 MG
20 TABLET ORAL ONCE
Refills: 0 | Status: COMPLETED | OUTPATIENT
Start: 2022-07-14 | End: 2022-07-14

## 2022-07-14 RX ORDER — FUROSEMIDE 40 MG
40 TABLET ORAL
Refills: 0 | Status: DISCONTINUED | OUTPATIENT
Start: 2022-07-14 | End: 2022-07-15

## 2022-07-14 RX ORDER — LANOLIN ALCOHOL/MO/W.PET/CERES
3 CREAM (GRAM) TOPICAL AT BEDTIME
Refills: 0 | Status: DISCONTINUED | OUTPATIENT
Start: 2022-07-14 | End: 2022-07-20

## 2022-07-14 RX ORDER — ONDANSETRON 8 MG/1
4 TABLET, FILM COATED ORAL EVERY 8 HOURS
Refills: 0 | Status: DISCONTINUED | OUTPATIENT
Start: 2022-07-14 | End: 2022-07-20

## 2022-07-14 RX ORDER — LEVOTHYROXINE SODIUM 125 MCG
75 TABLET ORAL DAILY
Refills: 0 | Status: DISCONTINUED | OUTPATIENT
Start: 2022-07-14 | End: 2022-07-20

## 2022-07-14 RX ADMIN — Medication 20 MILLIGRAM(S): at 13:23

## 2022-07-14 RX ADMIN — Medication 20 MILLIGRAM(S): at 08:57

## 2022-07-14 RX ADMIN — Medication 50 MILLILITER(S): at 20:51

## 2022-07-14 RX ADMIN — Medication 75 MICROGRAM(S): at 06:50

## 2022-07-14 NOTE — CONSULT NOTE ADULT - SUBJECTIVE AND OBJECTIVE BOX
Patient is a 89y old  Female who presents with a chief complaint of Fall, ABLA (14 Jul 2022 10:06)      HPI:  89 y/o F with a pmhx b/l LE edema, HTN, P Afib (on coumadin), HFref (EF35-40%) and hypothyroidism presents to the ED s/p fall. Patient was admitted to [facility] 6/17-6/30/22 for NSTEMI, hospital course c/b iatrogenic flash pulmonary edema, cath canceled due to vol overload, discharged to [facility]. She was doing well at [facility] until today. She went to bathroom for BM, had difficulty to clean herself. She was left unattended by [facility] staff in bathroom, she stood up to take few steps, lost balance, and fell on right side striking her right hip, right arm, right elbow, right side of neck. She denies any prodromal symptoms. No lightheadedness, chest pain, warm sensation. No dyspnea. Patient never lost consciousness. She was found to have Hg 6.9 at [facility] facility, sent to [facility] ED for further evaluation. She denies any significant pain right now. Only complains of right thigh twitching, swelling.  No fevers, chills, cp, dypsnea, abdominal pain. Admits lower ext swelling which is chronic for her.     In the ED  VS: T97.3 Hr78 /63 RR 16 SPO2 95% on 3L NC  Labs: WBC 4.43, HH 7.6/22.1, PLTS 225, Na 140, K 4.2, Cr 1.7, Gluc 112, Alk phos 326, AST//99  Chest Xray: lungs clear  Head CT: Stable exam. No acute intracranial hemorrhage, vasogenic edema, extra-axial collection or calvarial fracture.  Cervical spine CT: No acute cervical spine fracture or evidence of traumatic malalignment. Cervical spondylosis  CT A/P non con: Extensive diffuse soft tissue edema/anasarca. Small right, trace of bilateral pleural fluid. Small-to-moderate amount of simple free fluid around the liver and spleen. No definite intraperitoneal hemorrhage.No noncontrast evidence of acute injury to the chest, abdomen or pelvis.Concern for hematoma around the right hip soft tissues, partially visualized. No evidence of hip fracture.Mild intralobular septal thickening, may reflect pulmonary edema. Areas of atelectatic changes and possible airways impaction  EKG: ordered, pending  Given in ED: 1 unit prbc   (14 Jul 2022 04:06)       ROS:  Negative except for:    PAST MEDICAL & SURGICAL HISTORY:  Hypothyroid      Hypertension      Lower extremity edema      Paroxysmal atrial fibrillation      No significant past surgical history          SOCIAL HISTORY:    FAMILY HISTORY:  No pertinent family history in first degree relatives        MEDICATIONS  (STANDING):  levothyroxine 75 MICROGram(s) Oral daily  metoprolol succinate ER 50 milliGRAM(s) Oral daily    MEDICATIONS  (PRN):  aluminum hydroxide/magnesium hydroxide/simethicone Suspension 30 milliLiter(s) Oral every 4 hours PRN Dyspepsia  melatonin 3 milliGRAM(s) Oral at bedtime PRN Insomnia  ondansetron Injectable 4 milliGRAM(s) IV Push every 8 hours PRN Nausea and/or Vomiting      Allergies    No Known Allergies    Intolerances        Vital Signs Last 24 Hrs  T(C): 36.7 (14 Jul 2022 10:47), Max: 36.7 (14 Jul 2022 10:47)  T(F): 98 (14 Jul 2022 10:47), Max: 98 (14 Jul 2022 10:47)  HR: 67 (14 Jul 2022 10:47) (63 - 78)  BP: 97/55 (14 Jul 2022 10:47) (97/55 - 112/61)  BP(mean): --  RR: 17 (14 Jul 2022 10:47) (16 - 17)  SpO2: 97% (14 Jul 2022 10:47) (95% - 98%)    Parameters below as of 14 Jul 2022 10:47  Patient On (Oxygen Delivery Method): nasal cannula        PHYSICAL EXAM  General: adult in NAD  HEENT: clear oropharynx, anicteric sclera, pink conjunctivae  Neck: supple  CV: normal S1S2 with no murmur rubs or gallops  Lungs: clear to auscultation, no wheezes, no rhales  Abdomen: soft non-tender non-distended, no hepato/splenomegaly  Ext: no clubbing cyanosis or edema  Skin: no rashes and no petichiae  Neuro: alert and oriented X3 no focal deficits      LABS:    CBC Full  -  ( 14 Jul 2022 08:40 )  WBC Count : 4.47 K/uL  RBC Count : 2.67 M/uL  Hemoglobin : 7.7 g/dL  Hematocrit : 23.1 %  Platelet Count - Automated : 208 K/uL  Mean Cell Volume : 86.5 fl  Mean Cell Hemoglobin : 28.8 pg  Mean Cell Hemoglobin Concentration : 33.3 gm/dL  Auto Neutrophil # : 2.17 K/uL  Auto Lymphocyte # : 1.36 K/uL  Auto Monocyte # : 0.72 K/uL  Auto Eosinophil # : 0.19 K/uL  Auto Basophil # : 0.02 K/uL  Auto Neutrophil % : 48.6 %  Auto Lymphocyte % : 30.4 %  Auto Monocyte % : 16.1 %  Auto Eosinophil % : 4.3 %  Auto Basophil % : 0.4 %    07-14    141  |  105  |  34<H>  ----------------------------<  87  3.9   |  34<H>  |  1.70<H>    Ca    7.9<L>      14 Jul 2022 08:40  Phos  4.2     07-14  Mg     2.3     07-14    TPro  6.3  /  Alb  1.8<L>  /  TBili  3.2<H>  /  DBili  x   /  AST  163<H>  /  ALT  94<H>  /  AlkPhos  315<H>  07-14    PT/INR - ( 14 Jul 2022 08:40 )   PT: 38.8 sec;   INR: 3.28 ratio         PTT - ( 14 Jul 2022 00:15 )  PTT:34.8 sec          BLOOD SMEAR INTERPRETATION:    RADIOLOGY & ADDITIONAL STUDIES:      < from: CT Hip No Cont, Right (07.14.22 @ 06:24) >  ACC: [id number] EXAM:  CT 3D RECONSTRUCT SAMARIA CANALES                        ACC: [id number] EXAM:  CT HIP ONLY RT                          PROCEDURE DATE:  07/14/2022          INTERPRETATION:  INDICATION: Fall    TECHNIQUE: CT of the pelvis/right hip without contrast with axial,   sagittal, and coronal multiplanar reformats. 3D advanced post-processing   was performed.    Comparison:Same day abdomen/pelvis CT.    FINDINGS:    No acute displaced fracture or dislocation. Mild compression deformity of   L4 with vertebroplasty changes. Degenerative changes within the lower   lumbar spine, sacroiliac joints, and pubic symphysis. Mild bilateral hip   arthrosis.    Subcutaneous hematoma along the lateral aspect of the right hip,   measuring 8 x 3.4 cm transaxially. Diffuse subcutaneous edema about the   pelvis.    Small volume ascites, partially evaluated. Colonic diverticulosis.   Atherosclerotic calcifications are noted.    IMPRESSION:    No acute displaced fracture or dislocation within the pelvis.    Subcutaneous hematoma along the lateral aspect of the right hip.    Mild compression deformity of L4 with vertebroplasty changes.    Small volume ascites, partially evaluated.    Anasarca.    --- End of Report ---            KIMBERLY DIAZ M.D., ATTENDING RADIOLOGIST  This document has been electronically signed. Jul 14 2022  9:35AM    < end of copied text >

## 2022-07-14 NOTE — ED ADULT NURSE NOTE - CHIEF COMPLAINT QUOTE
per ems from [facility] SN[facility], had low H/H at facility. on 7/11 fall in the bathroom, right sided hematoma present, on coumadin

## 2022-07-14 NOTE — H&P ADULT - CARDIOVASCULAR
negative +3 pitting edema b/l lower ext with chronic venous stasis changes/normal/regular rate and rhythm/S1 S2 present/no gallops/no rub/murmur/peripheral edema +3 pitting edema b/l lower ext with chronic venous stasis changes/normal/regular rate and rhythm/S1 S2 present/no gallops/no rub/no JVD/murmur/peripheral edema

## 2022-07-14 NOTE — H&P ADULT - RESPIRATORY
normal/clear to auscultation bilaterally/no wheezes/no rales/no rhonchi normal/clear to auscultation bilaterally/no wheezes/no rales/no rhonchi/no respiratory distress/good air movement/respirations non-labored

## 2022-07-14 NOTE — PATIENT PROFILE ADULT - NSPRESCRALCSCORE_GEN_A_NUR_CAL
"Anesthesia Pre-Procedure Evaluation    Patient: Nick Zamudio   MRN: [id number] : 1954        Preoperative Diagnosis: Open abdominal wall wound [S31.109A]   Procedure : Procedure(s):  EXPLORATORY LAPAROTOMY, POSSIBLE BOWEL RESECTION, POSSIBLE OSTOMY, POSSIBLE CLOSURE  POSSIBLE BOWEL RESECTION, POSSIBBLE OSTOMY, POSSIBLE CLOSURE     HPI:  Per surgery note 21   \"Gino Zamudio is a 67 yo male with h/o testicular cancer s/p radiation therapy, paraparesis, biliary stenosis, duodenal stenosis, failure to thrive s/p PEG-J c/b GI bleed, chronic pancreatitis, prior lap augustine c/b intussusception s/p small bowel resection, now with intraabdominal sepsis s/p small bowel resection on , in discontinuity with open abdomen. Plan for return to OR today.\"    History reviewed. No pertinent past medical history.   Past Surgical History:   Procedure Laterality Date     LAPAROTOMY EXPLORATORY N/A 2021    Procedure: LAPAROTOMY, POSSIBLE BOWEL RESECTION, temporary abdominal closure with abthera;  Surgeon: Stanton Huang MD;  Location: [facility] OR      Allergies   Allergen Reactions     Ibuprofen      Penicillins Unknown      Social History     Tobacco Use     Smoking status: Not on file   Substance Use Topics     Alcohol use: Not Currently      Wt Readings from Last 1 Encounters:   21 59 kg (130 lb 1.1 oz)        Anesthesia Evaluation   Pt has had prior anesthetic.         ROS/MED HX  ENT/Pulmonary:       Neurologic:     (+) Spinal cord injury,     Cardiovascular:     (+) hypertension-Peripheral Vascular Disease-- Other and Symptomatic. ---    METS/Exercise Tolerance:     Hematologic:     (+) anemia,     Musculoskeletal:       GI/Hepatic: Comment: S/p ex-lap , remains with open abdomen, intubated and sedated      Renal/Genitourinary:       Endo:     (+) type II DM,     Psychiatric/Substance Use:       Infectious Disease:       Malignancy:   (+) Malignancy, History of Other.Other CA Remission status post " Surgery, Chemo and Radiation.    Other:            Physical Exam    Airway   unable to assess          Respiratory Devices and Support    ETT:      Dental    unable to assess        Cardiovascular             Pulmonary                   OUTSIDE LABS:  CBC:   Lab Results   Component Value Date    WBC 21.9 (H) 07/06/2021    WBC 13.0 (H) 07/05/2021    HGB 8.6 (L) 07/06/2021    HGB 8.6 (L) 07/05/2021    HCT 26.6 (L) 07/06/2021    HCT 26.3 (L) 07/05/2021     07/06/2021     07/05/2021     BMP:   Lab Results   Component Value Date     07/06/2021     07/05/2021    POTASSIUM 4.2 07/06/2021    POTASSIUM 4.0 07/05/2021    CHLORIDE 109 07/06/2021    CHLORIDE 108 07/05/2021    CO2 26 07/06/2021    CO2 25 07/05/2021    BUN 37 (H) 07/06/2021    BUN 35 (H) 07/05/2021    CR 0.99 07/06/2021    CR 0.84 07/05/2021     (H) 07/06/2021     (H) 07/05/2021     COAGS:   Lab Results   Component Value Date    INR 1.30 (H) 07/05/2021     POC:   Lab Results   Component Value Date     (H) 07/06/2021     HEPATIC:   Lab Results   Component Value Date    ALBUMIN 2.4 (L) 07/05/2021    PROTTOTAL 6.9 07/05/2021    ALT 14 07/05/2021    AST 8 07/05/2021    ALKPHOS 91 07/05/2021    BILITOTAL 0.2 07/05/2021     OTHER:   Lab Results   Component Value Date    PH 7.41 07/05/2021    LACT 1.3 07/05/2021    A1C 6.4 (H) 07/05/2021    ELODIA 9.1 07/06/2021    PHOS 4.7 (H) 07/06/2021    MAG 1.8 07/06/2021    LIPASE <10 (L) 07/05/2021       Anesthesia Plan    ASA Status:  3   NPO Status:  NPO Appropriate    Anesthesia Type: General.     - Airway: ETT   Induction: Inhalation.   Maintenance: Balanced.   Techniques and Equipment:     - Lines/Monitors: CVL in situ, Arterial Line     - Blood: PRBC     Consents    Anesthesia Plan(s) and associated risks, benefits, and realistic alternatives discussed. Questions answered and patient/representative(s) expressed understanding.     - Discussed with:  Implied consent/emergency          Postoperative Care    Pain management: IV analgesics.   PONV prophylaxis: Ondansetron (or other 5HT-3), Background Propofol Infusion     Comments:                Alejandro Dhaliwal III, MD   3

## 2022-07-14 NOTE — CONSULT NOTE ADULT - ASSESSMENT
elevated lfts  chf  anemia    CT noted  anemia related to hematoma  diet as tolerated  prior hida negative  suspect lfts elevated 2/2 statin and/or passive congestion  will follow

## 2022-07-14 NOTE — CONSULT NOTE ADULT - ASSESSMENT
INCOMPLETE NOTE IN PROGRESS  The patient is a 89 y/o F with pmhx PAF (on coumadin), HFrEF (35-40%), CKD, HTN, aortic stenosis, TR, chronic LE edema, hypothyroidism, with recent admission to [facility] for CAD in mid June, found to have NSTEMI with subsequent flash pulmonary edema, unable to go for cath due to volume overload, discharged to [facility], now presenting s/p mechanical unwitnessed fall at [facility]. Found to have R hip hematoma, Coumadin, asa, plavix being held due to hg 6.9 in setting of acute blood loss anemia.     #ischemia  Hx CAD with recent NSTEMI (mid June), unable to undergo cath at that time  -Pt denies hx precipitating cardiac symptoms prior to [facility] fall and no cardiac symptoms at this time  -EKG Afib with LAD, LBBB, no acute ischemic changes  -Doubt ACS given above  -Would continue to hold aspirin, plavix in setting of acute blood loss anemia 2/2 acute R hip hematoma  -Would hold home atrovastatin 40mg po qd in setting of transaminitis    #arrhythmia  Hx PAF (on Coumadin)  -EKG as above  -INR 3.28 this AM  -INR goal 2-3  -would hold Coumadin as patient with acute R hip hematoma with acute blood loss anemia and with supratherapetic INR  -consider resuming when anemia improved and when INR decreases to <3  -would continue metoprolol succinate ER 50mg po qd    #volume status  Hx HFrEF as TTE 6/2022 with EF 35-40% with mod AS, mod TR  -patient with chronic LE edema, currently 3+ pitting edema, appears to be at baseline  -no appreciable rales on exam  -would continue patient's home lasix 40mg po bid with close monitoring of renal function (baseline Cr 1.2-1.3) and BP   -can continue supplemental O2 as needed, currently on 3L SpO2 > 90% (not on home O2), would wean as tolerated  -strict I/Os, daily weights.    #valvular disease  Hx aortic stenosis, tricuspid reg based on TTE 6/22  - grade III systolic murmur appreciated on exam best at 2nd L ICS  - patient without acute symptoms, syncope/dyspnea/angina, at this time      - BP well controlled, monitor routine hemodynamics.  - Monitor and replete lytes, keep K>4, Mg>2.    - Other cardiovascular workup will depend on clinical course.  - All other workup per primary team.  - Will continue to follow.             The patient is a 89 y/o F with pmhx PAF (on coumadin), HFrEF (35-40%), CKD, HTN, aortic stenosis, TR, chronic LE edema, hypothyroidism, with recent admission to [facility] for [facility] in mid June, found to have NSTEMI with subsequent flash pulmonary edema, unable to go for cath due to volume overload, discharged to [facility], now presenting s/p mechanical unwitnessed fall at [facility]. Found to have R hip hematoma, Coumadin, asa, plavix being held due to hg 6.9 in setting of acute blood loss anemia.     #ischemia  Hx CAD with recent NSTEMI (mid June), unable to undergo cath at that time  -Pt denies hx precipitating cardiac symptoms prior to [facility] fall and no cardiac symptoms at this time  -EKG Afib with LAD, LBBB, no acute ischemic changes  -Doubt ACS given above  -Would continue to hold aspirin, plavix in setting of acute blood loss anemia 2/2 acute R hip hematoma  -When resuming, would stop plavix and only continue aspirin  -Would hold home atorvastatin 40mg po qd in setting of transaminitis    #arrhythmia  Hx PAF (on Coumadin)  -EKG as above  -INR 3.28 this AM  -INR goal 2-3  -would hold Coumadin as patient with acute R hip hematoma with acute blood loss anemia and with supratherapetic INR  -consider resuming when anemia improved and when INR decreases to <3  -would continue metoprolol succinate ER 50mg po qd with hold parameters    #volume status  Hx HFrEF as TTE 6/2022 with EF 35-40% with mod AS, mod TR  -patient with chronic LE edema, currently 3+ pitting edema, +JVD  -would give Lasix 40mg IV STAT as patient is s/p 1u pRBC, appears volume overloaded  -would diurese with Lasix IV with additional blood transfusions with close monitoring of renal function (baseline Cr 1.2-1.3) and BP   -maintain negative   -can continue supplemental O2 as needed, currently on 3L SpO2 > 90% (not on home O2), would wean as tolerated  -strict I/Os, daily weights.    #valvular disease  Hx aortic stenosis, tricuspid reg based on TTE 6/22  - grade III systolic murmur appreciated on exam best at 2nd L ICS  - patient without acute symptoms, syncope/dyspnea/angina, at this time      - BP well controlled, monitor routine hemodynamics.  - Monitor and replete lytes, keep K>4, Mg>2.    - Other cardiovascular workup will depend on clinical course.  - All other workup per primary team.  - Will continue to follow.             The patient is a 89 y/o F with pmhx PAF (on coumadin), HFrEF (35-40%), CKD, HTN, aortic stenosis, TR, chronic LE edema, hypothyroidism, with recent admission to [facility] for CAD in mid June, found to have NSTEMI with subsequent flash pulmonary edema, unable to go for cath due to volume overload, discharged to [facility], now presenting s/p mechanical unwitnessed fall at [facility]. Found to have R hip hematoma, Coumadin, asa, plavix being held due to hg 6.9 in setting of acute blood loss anemia.     #ischemia  Hx CAD with recent NSTEMI (mid June), unable to undergo cath at that time  -Pt denies hx precipitating cardiac symptoms prior to [facility] fall and no cardiac symptoms at this time  -EKG Afib with LAD, LBBB, no acute ischemic changes  -Doubt ACS given above  -Would continue to hold aspirin, plavix in setting of acute blood loss anemia 2/2 acute R hip hematoma  -When resuming, would stop plavix and only continue aspirin  -Would hold home atorvastatin 40mg po qd in setting of transaminitis    #arrhythmia  Hx PAF (on Coumadin)  -EKG as above  -INR 3.28 this AM  -INR goal 2-3  -would hold Coumadin as patient with acute R hip hematoma with acute blood loss anemia and with supratherapetic INR  -consider resuming when anemia improved and when INR decreases to <3  -would continue metoprolol succinate ER 50mg po qd with hold parameters    #volume status  Hx HFrEF as TTE 6/2022 with EF 35-40% with mod AS, mod TR  -patient with chronic LE edema, currently 3+ pitting edema, +JVD  -pt received Lasix 20mg IV x 1 following transfusion of 1u pRBC  -would give Lasix 20mg IV STAT as patient appears volume overloaded  -would hold home po Lasix; dose Lasix IV daily pending fluid status  -would consider diuresing with Lasix IV with additional blood transfusions with close monitoring of renal function (baseline Cr 1.2-1.3) and BP   -maintain net negative balance; strict I/Os, daily weights  -can continue supplemental O2 as needed, currently on 3L SpO2 > 90% (not on home O2), would wean as tolerated    #valvular disease  Hx aortic stenosis, tricuspid reg based on TTE 6/22  - grade III systolic murmur appreciated on exam best at 2nd L ICS  - patient without acute symptoms, syncope/dyspnea/angina, at this time      - BP well controlled, monitor routine hemodynamics.  - Monitor and replete lytes, keep K>4, Mg>2.    - Other cardiovascular workup will depend on clinical course.  - All other workup per primary team.  - Will continue to follow.             The patient is a 89 y/o F with pmhx PAF (on coumadin), HFrEF (35-40%), CKD, HTN, aortic stenosis, TR, chronic LE edema, hypothyroidism, with recent admission to [facility] for CAD in mid June, found to have NSTEMI with subsequent flash pulmonary edema, unable to go for cath due to volume overload, discharged to [facility], now presenting s/p mechanical unwitnessed fall at [facility]. Found to have R hip hematoma, Coumadin, asa, plavix being held due to hg 6.9 in setting of acute blood loss anemia.     #ischemia  Hx CAD with recent NSTEMI (mid June), unable to undergo cath at that time  -Pt denies hx precipitating cardiac symptoms prior to [facility] fall and no cardiac symptoms at this time  -EKG Afib with LAD, LBBB, no acute ischemic changes  -Doubt ACS given above  -Would continue to hold aspirin, plavix in setting of acute blood loss anemia 2/2 acute R hip hematoma  -When resuming, would stop plavix and only continue aspirin  -Would hold home atorvastatin 40mg po qd in setting of transaminitis.  Resume when able    #arrhythmia  Hx PAF (on Coumadin)  -EKG as above  -INR 3.28 this AM  -INR goal 2-3  -would hold Coumadin as patient with acute R hip hematoma with acute blood loss anemia and with supratherapetic INR  -consider resuming when anemia improved and when INR decreases to <3  -would continue metoprolol succinate ER 50mg po qd with hold parameters    #volume status  Hx HFrEF as TTE 6/2022 with EF 35-40% with mod AS, mod TR  -patient with chronic LE edema, currently 3+ pitting edema, +JVD  -pt received Lasix 20mg IV x 1 following transfusion of 1u pRBC  -would give Lasix 20mg IV STAT as patient appears volume overloaded  -would hold home po Lasix; dose Lasix IV daily pending fluid status  -would consider diuresing with Lasix IV with additional blood transfusions with close monitoring of renal function (baseline Cr 1.2-1.3) and BP   -maintain net negative balance; strict I/Os, daily weights  -can continue supplemental O2 as needed, currently on 3L SpO2 > 90% (not on home O2), would wean as tolerated    #valvular disease  Hx aortic stenosis, tricuspid reg based on TTE 6/22  - grade III systolic murmur appreciated on exam best at 2nd L ICS  - patient without acute symptoms, syncope/dyspnea/angina, at this time      - BP well controlled, monitor routine hemodynamics.  - Monitor and replete lytes, keep K>4, Mg>2.    - Other cardiovascular workup will depend on clinical course.  - All other workup per primary team.  - Will continue to follow.

## 2022-07-14 NOTE — CONSULT NOTE ADULT - ATTENDING COMMENTS
Acute blood loss anemia secondary to trauma on triple therapy.  Hold all ac for now.  When resuming, would not resume PLavix.  Trend H/H and transfuse as needed. Patient vol  OL.  Would increase Lasix to 40 IV QD, and evaluate daily for the need for IV Lasix.  Monitor I, O, K, creatinine closely.  To follow.  At risk for abrupt decompensation.
Patient seen and examined.  Discussed assessment and plan with resident.  Agree with above except where changed by me

## 2022-07-14 NOTE — PROGRESS NOTE ADULT - PROBLEM SELECTOR PLAN 3
Right hip hematoma s/p mechanical fall at [facility], CT right hip shows subcutaneous hematoma along the lateral aspect of the right hip, no acute displaced fracture or dislocation within the pelvis.  - HH low but stable. no longer actively bleeding. no need for coumadin reversal agents for now  - continue to monitor  - cool compresses prn -TTE: EF 35-40%, LV hypokinesis 2/2 NSTEMI. [place], mid inferoseptal and mid anterolateral walls severely hypokinetic. Right ventricular enlargement. Biatrial enlargement. Moderate aortic stenosis. Moderate TR.  - patient with chronic LE edema, currently 3+ pitting edema, +JVD  -pt received Lasix 20mg IV x 1 following transfusion of 1u pRBC  - Hold po Lasix; dose Lasix 40 mg  IV q 12 hrs daily pending fluid status  - Consider diuresing with Lasix IV with additional blood transfusions with close monitoring of renal function (baseline Cr 1.2-1.3) and BP   - maintain net negative balance; strict I/Os, daily weights  - can continue supplemental O2 as needed, currently on 3L SpO2 > 90% (not on home O2), would wean as tolerated  - would avoid IVF for now  - continue metoprolol succinate 50 mg qd  - [facility] following

## 2022-07-14 NOTE — PHYSICAL THERAPY INITIAL EVALUATION ADULT - ADDITIONAL COMMENTS
Patient is a questionable historian. States that she lives alone in a private house and that she still drives. Per H&P, patient admitted from [facility]. To be confirmed.

## 2022-07-14 NOTE — ED ADULT NURSE REASSESSMENT NOTE - NS ED NURSE REASSESS COMMENT FT1
1556:  second unit continues to infuse, no adverse reactions noted.  patient resting comfortably.  call bell w/in reach.  will continue to monitor.  carrie maya.

## 2022-07-14 NOTE — PATIENT PROFILE ADULT - FALL HARM RISK - HARM RISK INTERVENTIONS

## 2022-07-14 NOTE — CONSULT NOTE ADULT - ASSESSMENT
Anemia multifactorial in etiology related to chronic disease including renal disease complicated by a fall on coumadin with Ct scan of right hip showing a hematoma.  INR mildly elevated 3.28.  asa plavix and coumadin all held    Recommendations:  1.  follow CBC and transfuse as indicated  2.  continue renal followup  3.  hold asa plavix and coumadin until hematologically stable  4.  cardiology evaluation  5.  further heme recommendations pending above  discussed with Dr. Villegas

## 2022-07-14 NOTE — INPATIENT CERTIFICATION FOR MEDICARE PATIENTS - NS ICMP TWO DAYS INPATIENT
Fax received from [facility] for vimpat 200 mg tab bid.  Previously filled from outside provider on med list.  Note to Dr PARIS Moyer for approval on 3-13 upon his return.  Patient has follow up on 3-16-1-8.   Yes

## 2022-07-14 NOTE — H&P ADULT - NSICDXPASTMEDICALHX_GEN_ALL_CORE_FT
PAST MEDICAL HISTORY:  Hypertension     Hypothyroid     Lower extremity edema     Paroxysmal atrial fibrillation      PAST MEDICAL HISTORY:  Anemia of chronic disease     Chronic systolic congestive heart failure     Hypertension     Hypothyroid     Lower extremity edema     NSTEMI (non-ST elevation myocardial infarction) june 2022    Paroxysmal atrial fibrillation     Stage 3 chronic kidney disease

## 2022-07-14 NOTE — CONSULT NOTE ADULT - ASSESSMENT
Acute on CDK IIIb  -Likely prerenal 2/2 acute blood loss and fluid overload  -Baseline creatinine ~1.2 as per recent admission  -FeNa labs  -Continue home lasix PO 40mg BID  -Albumin?  -Trend creatinine, BMP q12 89 y/o F with a pmhx b/l LE edema, HTN, P Afib (on coumadin), HFref (EF35-40%) and hypothyroidism presents to the ED s/p fall found to have increased creatinine.    Acute on CDK IIIb  -Likely prerenal 2/2 acute blood loss and fluid overload  -Baseline creatinine ~1.2 as per recent admission  -FeUrea labs  -Continue home lasix PO 40mg BID  -Albumin?  -Trend creatinine, BMP q12 89 y/o F with a pmhx b/l LE edema, HTN, P Afib (on coumadin), HFref (EF35-40%) and hypothyroidism presents to the ED s/p fall found to have increased creatinine.    Acute on CDK IIIb  -Likely prerenal 2/2 acute blood loss and fluid overload  -Baseline creatinine ~1.2 as per recent admission  -FeUrea labs  -UA  -Continue home lasix PO 40mg BID  -Albumin?  -Trend creatinine, BMP q12 89 y/o F with a pmhx b/l LE edema, HTN, P Afib (on coumadin), HFref (EF35-40%) and hypothyroidism presents to the ED s/p fall found to have increased creatinine.    Acute on CDK IIIb  -Likely prerenal 2/2 acute blood loss and fluid overload  -Baseline creatinine ~1.2 as per recent admission  -FeUrea labs  -UA  -Stop oral lasix  -Start Bumex 0.5mg BID  -IV albumin 12.5g  -BMP daily, trend creatinine 89 y/o F with a pmhx b/l LE edema, HTN, P Afib (on coumadin), HFref (EF35-40%) and hypothyroidism presents to the ED s/p fall found to have increased creatinine.    Acute on CDK IIIb  -Likely prerenal 2/2 acute blood loss and fluid overload  -Baseline creatinine ~1.2 as per recent admission  -FeUrea labs  -UA  -Stop oral lasix  -Start Bumex 0.5mg BID  -IV albumin 25% 50mL  -BMP daily, trend creatinine 89 y/o F with a pmhx b/l LE edema, HTN, P Afib (on coumadin), HFref (EF35-40%) and hypothyroidism presents to the ED s/p fall found to have increased creatinine.    Acute on CDK IIIb  -Likely prerenal 2/2 acute blood loss and fluid overload  -Baseline creatinine ~1.2 as per recent admission  -FeUrea labs  -UA  -Stop oral lasix  -Start Bumex 0.5mg BID  -IV albumin 25% 50mL  -BMP daily, trend creatinine  -Continue low salt diet w/ 1L fluid restriction 89 y/o F with a pmhx b/l LE edema, HTN, P Afib (on coumadin), HFref (EF35-40%) and hypothyroidism presents to the ED s/p fall found to have increased creatinine.    Acute on CDK IIIb  -Likely prerenal 2/2 acute blood loss and fluid overload  -Baseline creatinine ~1.2 as per recent admission  -FeUrea labs  -UA  -Stop oral lasix  -Start lasix 40mg BID  -IV albumin 25% 50mL  -BMP daily, trend creatinine  -Continue low salt diet w/ 1L fluid restriction 89 y/o F with a pmhx b/l LE edema, HTN, P Afib (on coumadin), HFref (EF35-40%) and hypothyroidism presents to the ED s/p fall found to have increased creatinine.    LUIS CARLOS on CKD IIIb  -Likely prerenal 2/2 acute blood loss  and hypoxemic injury  -Baseline creatinine ~1.2 as per recent admission  -FeUrea labs  -UA  -Stop oral lasix  -Start lasix 40mg BID  -IV albumin 25% 50mL  -BMP daily, trend creatinine  -Continue low salt diet w/ 1L fluid restriction    HTN  Hold ACE/ARB    Volume Overload  Cont diuretic    Anemia/Hematoma  S/P PRBC x 2

## 2022-07-14 NOTE — ED ADULT NURSE REASSESSMENT NOTE - NS ED NURSE REASSESS COMMENT FT1
Pt received from [facility], report attempted to [facility] prior.  Pt admitted to [facility].  Resting comfortably in bed at this time, offers no complaints.  No chest pain or SOB.  No n/v/d.  Ecchymottic areas noted to right arm and right hip.  Fall precautions maintained.  Safety and comfort maintained.

## 2022-07-14 NOTE — PHYSICAL THERAPY INITIAL EVALUATION ADULT - SITTING BALANCE: STATIC
Orly message to pt    Pt did home COVID test 2x this week the second was positive    Antionette Slater RN   [facility]         to be assessed

## 2022-07-14 NOTE — PROGRESS NOTE ADULT - PROBLEM SELECTOR PLAN 10
chronic, elevated on admit - likely 2/2 pain  - continue Toprol 50mg qD w/ hold parameters  - dose Lasix IV daily pending fluid status  - routine hemodynamic monitoring.

## 2022-07-14 NOTE — H&P ADULT - HISTORY OF PRESENT ILLNESS
89 y/o F with a pmhx b/l LE edema, HTN, P Afib (on coumadin), HFref (EF35-40%) and hypothyroidism presents to the ED s/p fall. Patient was admitted to [facility] 6/17-6/30/22 for NSTEMI, hospital course c/b iatrogenic flash pulmonary edema, cath canceled due to vol overload, discharged to [facility]. She was doing well at [facility] until today. She went to bathroom for BM, had difficulty to clean herself. She was left unattended by [facility] staff in bathroom, she stood up to take few steps, lost balance, and fell on right side striking her right hip, right arm, right elbow, right side of neck. She denies any prodromal symptoms. No lightheadedness, chest pain, warm sensation. No dyspnea. Patient never lost consciousness. She was found to have Hg 6.9 at [facility] facility, sent to [facility] ED for further evaluation. She denies any significant pain right now. Only complains of right thigh twitching, swelling.  No fevers, chills, cp, dypsnea, abdominal pain. Admits lower ext swelling which is chronic for her.     In the ED  VS: T97.3 Hr78 /63 RR 16 SPO2 95% on 3L NC  Labs: WBC 4.43, HH 7.6/22.1, PLTS 225, Na 140, K 4.2, Cr 1.7, Gluc 112, Alk phos 326, AST//99  Chest Xray: lungs clear  Head CT: Stable exam. No acute intracranial hemorrhage, vasogenic edema, extra-axial collection or calvarial fracture.  Cervical spine CT: No acute cervical spine fracture or evidence of traumatic malalignment. Cervical spondylosis  EKG: ordered, pending  Given in ED: 1 unit prbc   89 y/o F with a pmhx b/l LE edema, HTN, P Afib (on coumadin), HFref (EF35-40%) and hypothyroidism presents to the ED s/p fall. Patient was admitted to [facility] 6/17-6/30/22 for NSTEMI, hospital course c/b iatrogenic flash pulmonary edema, cath canceled due to vol overload, discharged to [facility]. She was doing well at [facility] until today. She went to bathroom for BM, had difficulty to clean herself. She was left unattended by [facility] staff in bathroom, she stood up to take few steps, lost balance, and fell on right side striking her right hip, right arm, right elbow, right side of neck. She denies any prodromal symptoms. No lightheadedness, chest pain, warm sensation. No dyspnea. Patient never lost consciousness. She was found to have Hg 6.9 at [facility] facility, sent to [facility] ED for further evaluation. She denies any significant pain right now. Only complains of right thigh twitching, swelling.  No fevers, chills, cp, dypsnea, abdominal pain. Admits lower ext swelling which is chronic for her.     In the ED  VS: T97.3 Hr78 /63 RR 16 SPO2 95% on 3L NC  Labs: WBC 4.43, HH 7.6/22.1, PLTS 225, Na 140, K 4.2, Cr 1.7, Gluc 112, Alk phos 326, AST//99  Chest Xray: lungs clear  Head CT: Stable exam. No acute intracranial hemorrhage, vasogenic edema, extra-axial collection or calvarial fracture.  Cervical spine CT: No acute cervical spine fracture or evidence of traumatic malalignment. Cervical spondylosis  CT A/P non con: Extensive diffuse soft tissue edema/anasarca. Small right, trace of bilateral pleural fluid. Small-to-moderate amount of simple free fluid around the liver and spleen. No definite intraperitoneal hemorrhage.No noncontrast evidence of acute injury to the chest, abdomen or pelvis.Concern for hematoma around the right hip soft tissues, partially visualized. No evidence of hip fracture.Mild intralobular septal thickening, may reflect pulmonary edema. Areas of atelectatic changes and possible airways impaction  EKG: ordered, pending  Given in ED: 1 unit prbc

## 2022-07-14 NOTE — PROGRESS NOTE ADULT - PROBLEM SELECTOR PLAN 8
chronic, swelling LE b/l on admit- chronic edema   -dose Lasix IV daily pending fluid status  -TTE: EF 35-40%, LV hypokinesis. chronic, swelling LE b/l on admit- chronic edema   -dose Lasix IV 40 mg daily pending fluid status  -TTE: EF 35-40%, LV hypokinesis.

## 2022-07-14 NOTE — ED ADULT NURSE NOTE - OBJECTIVE STATEMENT
patient a/o x 4 with a calm affect send from [facility] for low H/H (see sent paperwork).  patient has large hematoma of right hip from 7/11 fall while on coumadin at the facility and c/o right elbow pain.  lower extremities are edematous bilaterally.

## 2022-07-14 NOTE — H&P ADULT - SKIN
warm and dry/color normal/swelling/normal/no ulcers warm and dry/color normal/erythema/swelling/normal/no ulcers

## 2022-07-14 NOTE — ED ADULT NURSE REASSESSMENT NOTE - NS ED NURSE REASSESS COMMENT FT1
1814:  attempted to call for report, was told they are going over paperwork and will call me back.  carrie maya.

## 2022-07-14 NOTE — H&P ADULT - PROBLEM SELECTOR PLAN 9
chronic, elevated on admit - likely 2/2 pain  - continue Toprol 50mg qD w/ hold parameters  - Lasix 40 mg 2x day   - routine hemodynamic monitoring. -Cr 1.7, baseline appears to be 1-1.2   - likely pre-renal in setting of bleed, CHF  -avoiding IVF due to HFref  -Lasix 40 mg 2x day PO  -Renal follow up- Dr MORENO CKD 4 LUIS CARLOS/CKD 3 -Cr 1.7, baseline appears to be 1-1.2   - likely pre-renal in setting of bleed,  chronic systolic CHF  -avoiding IVF due to HFref  -Lasix 40 mg 2x day PO  -Renal follow up- Dr MORENO CKD 4

## 2022-07-14 NOTE — H&P ADULT - PROBLEM SELECTOR PLAN 10
- continue home synthroid  - check TSH chronic, elevated on admit - likely 2/2 pain  - continue Toprol 50mg qD w/ hold parameters  - Lasix 40 mg 2x day   - routine hemodynamic monitoring.

## 2022-07-14 NOTE — PATIENT PROFILE ADULT - VISION (WITH CORRECTIVE LENSES IF THE PATIENT USUALLY WEARS THEM):
Reading-glasses/Partially impaired: cannot see medication labels or newsprint, but can see obstacles in path, and the surrounding layout; can count fingers at arm's length

## 2022-07-14 NOTE — H&P ADULT - PROBLEM SELECTOR PLAN 8
-Cr 1.7, baseline appears to be 1-1.2   - likely pre-renal in setting of bleed  -avoiding IVF due to HFref  -Lasix 40 mg 2x day PO  -Renal follow up- Dr MORENO CKD 4 chronic, swelling LE b/l on admit- chronic edema   -On home lasix 40 mg 2x day, continue  -TTE: EF 35-40%, LV hypokinesis. chronic, swelling LE b/l on admit- chronic edema - Increase   -On home Lasix 40 mg 2x day, continue  -TTE: EF 35-40%, LV hypokinesis.

## 2022-07-14 NOTE — PROGRESS NOTE ADULT - PROBLEM SELECTOR PLAN 9
-Cr 1.7, baseline appears to be 1-1.2   - likely pre-renal in setting of bleed, CHF  -avoiding IVF due to HFref  -dose Lasix IV daily pending fluid status  -Renal following - Dr MORENO CKD 4 -Cr 1.7, baseline appears to be 1-1.2   - likely pre-renal in setting of bleed, CHF  -NO IV Fluids as CHF   -dose Lasix IV daily pending fluid status  -Renal following - Dr MORENO CKD 4

## 2022-07-14 NOTE — H&P ADULT - MUSCULOSKELETAL
details… normal/ROM intact/normal gait/strength 5/5 bilateral upper extremities/strength 5/5 bilateral lower extremities normal/ROM intact/no calf tenderness/normal gait/strength 5/5 bilateral upper extremities/strength 5/5 bilateral lower extremities

## 2022-07-14 NOTE — H&P ADULT - PROBLEM SELECTOR PLAN 11
- hold coumadin in setting of ABLA, follow daily INR. SCD's for now. - continue home synthroid  - check TSH

## 2022-07-14 NOTE — H&P ADULT - PROBLEM SELECTOR PLAN 6
chronic, stable - rate-controlled   - on coumadin, holding for ABLA  - Toprol  XL 50mg PO qd w/ hold parameters  - TTE: EF 35-40%, LV hypokinesis  - continuous tele monitoring  - cardio Dr. araceli hager - unclear etiology. had workup during last admission. presumably multifactorial due to CHF vs meds. Continues to uptrend.  - RUQ us 6/30: Examination limited secondary to overlying bowel gas. Cholelithiasis with gallbladder wall thickening and small amount of pericholecystic fluid; clinical correlation is recommended for acute cholecystitis; a HIDA scan could be obtained for further evaluation.Common bile duct measures 9 mm which is within normal limits for age, however correlation is recommended with LFTs and MRI/MRCP of the abdomen   should be considered for further evaluation.Small amount of right upper quadrant ascites  - HIDA 6/20/22: Normal hepatobiliary scan. No radionuclide evidence of acute   cholecystitis.  - avoid hepatotoxins  - GI consulted recs appreciated

## 2022-07-14 NOTE — PROGRESS NOTE ADULT - SUBJECTIVE AND OBJECTIVE BOX
Patient is a 89y old  Female who presents with a chief complaint of Fall, ABLA (14 Jul 2022 10:06)    HPI:  89 y/o F with a pmhx b/l LE edema, HTN, P Afib (on coumadin), HFref (EF35-40%) and hypothyroidism presents to the ED s/p fall. Patient was admitted to [facility] 6/17-6/30/22 for NSTEMI, hospital course c/b iatrogenic flash pulmonary edema, cath canceled due to vol overload, discharged to [facility]. She was doing well at [facility] until today. She went to bathroom for BM, had difficulty to clean herself. She was left unattended by [facility] staff in bathroom, she stood up to take few steps, lost balance, and fell on right side striking her right hip, right arm, right elbow, right side of neck. She denies any prodromal symptoms. No lightheadedness, chest pain, warm sensation. No dyspnea. Patient never lost consciousness. She was found to have Hg 6.9 at [facility] facility, sent to [facility] ED for further evaluation. She denies any significant pain right now. Only complains of right thigh twitching, swelling.  No fevers, chills, cp, dypsnea, abdominal pain. Admits lower ext swelling which is chronic for her.     In the ED  VS: T97.3 Hr78 /63 RR 16 SPO2 95% on 3L NC  Labs: WBC 4.43, HH 7.6/22.1, PLTS 225, Na 140, K 4.2, Cr 1.7, Gluc 112, Alk phos 326, AST//99  Chest Xray: lungs clear  Head CT: Stable exam. No acute intracranial hemorrhage, vasogenic edema, extra-axial collection or calvarial fracture.  Cervical spine CT: No acute cervical spine fracture or evidence of traumatic malalignment. Cervical spondylosis  CT A/P non con: Extensive diffuse soft tissue edema/anasarca. Small right, trace of bilateral pleural fluid. Small-to-moderate amount of simple free fluid around the liver and spleen. No definite intraperitoneal hemorrhage.No noncontrast evidence of acute injury to the chest, abdomen or pelvis.Concern for hematoma around the right hip soft tissues, partially visualized. No evidence of hip fracture.Mild intralobular septal thickening, may reflect pulmonary edema. Areas of atelectatic changes and possible airways impaction  EKG: ordered, pending  Given in ED: 1 unit prbc   (14 Jul 2022 04:06)    INTERVAL HPI:  7/14: Patient seen and examined at bedside. Is feeling okay. Has no acute complaints but is feeling slightly agitated. Denies sob, cp, n/v/d.    OVERNIGHT EVENTS: none    Home Medications:  aspirin 81 mg oral delayed release tablet: 1 tab(s) orally once a day (14 Jul 2022 04:17)  atorvastatin 20 mg oral tablet: 1 tab(s) orally once a day (14 Jul 2022 04:17)  clopidogrel 75 mg oral tablet: 1 tab(s) orally once a day (14 Jul 2022 04:17)  fluticasone 50 mcg/inh nasal spray: 1 spray(s) nasal 2 times a day (14 Jul 2022 04:17)  Lasix 40 mg oral tablet: 1 tab(s) orally 2 times a day (14 Jul 2022 04:17)  metoprolol succinate 50 mg oral tablet, extended release: 1 tab(s) orally once a day (14 Jul 2022 04:17)  sodium chloride 0.65% nasal spray: 2 spray(s) nasal 4 times a day (14 Jul 2022 04:17)  Synthroid 75 mcg (0.075 mg) oral tablet: 1 tab(s) orally once a day (14 Jul 2022 04:17)  warfarin 3 mg oral tablet: DO NOT Give Coumadin Today as INR 3.11, Check PT/INR on 7/1/22.Adjust Coumadin dose as per INR. start with Coumadin 2 mg   Keep INR 2-3    Pt&#x27;s HOME Coumadin schedule is as below  1 tab(s) orally once a day (M, Tu, Th, F, Sa Grider) at Bed time   -Pt use to Take Coumadin 4 mg on every Wednesday (14 Jul 2022 04:17)  zinc oxide 40% topical ointment: Apply topically to affected area 2 times a day (14 Jul 2022 04:17)      MEDICATIONS  (STANDING):  furosemide    Tablet 40 milliGRAM(s) Oral every 12 hours  levothyroxine 75 MICROGram(s) Oral daily  metoprolol succinate ER 50 milliGRAM(s) Oral daily    MEDICATIONS  (PRN):  aluminum hydroxide/magnesium hydroxide/simethicone Suspension 30 milliLiter(s) Oral every 4 hours PRN Dyspepsia  melatonin 3 milliGRAM(s) Oral at bedtime PRN Insomnia  ondansetron Injectable 4 milliGRAM(s) IV Push every 8 hours PRN Nausea and/or Vomiting      Allergies    No Known Allergies    Intolerances        Social History:  Lives at home alone. Former smoker. Denies etoh and other rec drug use. Ambulates with walker. Daughter is HCP. DNR/DNI (14 Jul 2022 04:06)      REVIEW OF SYSTEMS:  CONSTITUTIONAL: No fever, No chills, No fatigue, No myalgia, No Body ache, No Weakness  EYES: No eye pain,  No visual disturbances, No discharge, NO Redness  ENMT:  No ear pain, No nose bleed, No vertigo; No sinus pain, NO throat pain, No Congestion  NECK: No pain, No stiffness  RESPIRATORY: No cough, NO wheezing, No  hemoptysis, NO  shortness of breath  CARDIOVASCULAR: No chest pain, palpitations  GASTROINTESTINAL: No abdominal pain, NO epigastric pain. No nausea, No vomiting; No diarrhea, No constipation. [  ] BM  GENITOURINARY: No dysuria, No frequency, No urgency, No hematuria, NO incontinence  NEUROLOGICAL: No headaches, No dizziness, No numbness, No tingling, No tremors, No weakness  EXT: + Hematoma R side. +peripheral edema  SKIN: +bruising over right elbow, right leg 2/2 fall [ x ] No itching, burning, rashes   MUSCULOSKELETAL: No joint pain ,No Jt swelling; No muscle pain, No back pain, No extremity pain  PSYCHIATRIC: No depression,  No anxiety,  No mood swings ,No difficulty sleeping at night  PAIN SCALE: [ x ] None  [  ] Other-  ROS Unable to obtain due to - [  ] Dementia  [  ] Lethargy [  ] Drowsy [  ] Sedated [  ] non verbal  REST OF REVIEW Of SYSTEM - [x  ] Normal     Vital Signs Last 24 Hrs  T(C): 36.7 (14 Jul 2022 10:47), Max: 36.7 (14 Jul 2022 10:47)  T(F): 98 (14 Jul 2022 10:47), Max: 98 (14 Jul 2022 10:47)  HR: 67 (14 Jul 2022 10:47) (63 - 78)  BP: 97/55 (14 Jul 2022 10:47) (97/55 - 112/61)  BP(mean): --  RR: 17 (14 Jul 2022 10:47) (16 - 17)  SpO2: 97% (14 Jul 2022 10:47) (95% - 98%)    Parameters below as of 14 Jul 2022 10:47  Patient On (Oxygen Delivery Method): nasal cannula      Finger Stick          PHYSICAL EXAM:  GENERAL:  [x  ] NAD , [  ] well appearing, [ x ] Agitated, [  ] Drowsy,  [  ] Lethargy, [  ] confused   HEAD:  [  x] Normal, [  ] Other  EYES:  [ x ] EOMI, [  ] PERRLA, [ x ] conjunctiva and sclera clear normal, [  ] Other,  [  ] Pallor,[  ] Discharge  ENMT:  [x  ] Normal, [x  ] Moist mucous membranes, [  ] Good dentition, [x  ] No Thrush  NECK:  [ x ] Supple, [  x] + JVD, [  ] Normal thyroid, [  ] Lymphadenopathy [  ] Other  CHEST/LUNG:  [ x ] Clear to auscultation bilaterally, [ x ] Breath Sounds equal B/L / , [  ] poor effort  [ x ] No rales, [ x ] No rhonchi  [ x ]  No wheezing,   HEART:  [  ] Regular rate and rhythm, [  ] tachycardia, [  ] Bradycardia,  [  ] irregular  [x  ] + murmurs, No rubs, No gallops, [  ] PPM in place (Mfr:  ) [x] anasarca  ABDOMEN:  [x  ] Soft, [ x ] Nontender, [  x] Nondistended, [  ] No mass, [ x ] Bowel sounds present, [  ] obese  NERVOUS SYSTEM:  [ x ] Alert & Oriented X3, [  ] Nonfocal  [  ] Confusion  [  ] Encephalopathic [  ] Sedated [  ] Unable to assess, [  ] Dementia [  ] Other-  EXTREMITIES: [x  ] 2+ Peripheral Pulses, No clubbing, No cyanosis,  [ x ] edema B/L lower EXT. [  ] PVD stasis skin changes B/L Lower EXT, [  ] wound  LYMPH: No lymphadenopathy noted  SKIN:  [x  ] +R hematoma hip, bruising over right elbow, right leg 2/2 fall, [  ] Pressure Ulcers, [  ] ecchymosis, [  ] Skin Tears, [  ] Other    DIET: Diet, DASH/TLC:   Sodium & Cholesterol Restricted  1000mL Fluid Restriction ([id number]) (07-14-22 @ 08:48)      LABS:                        7.7    4.47  )-----------( 208      ( 14 Jul 2022 08:40 )             23.1     14 Jul 2022 08:40    141    |  105    |  34     ----------------------------<  87     3.9     |  34     |  1.70     Ca    7.9        14 Jul 2022 08:40  Phos  4.2       14 Jul 2022 08:40  Mg     2.3       14 Jul 2022 08:40    TPro  6.3    /  Alb  1.8    /  TBili  3.2    /  DBili  x      /  AST  163    /  ALT  94     /  AlkPhos  315    14 Jul 2022 08:40    PT/INR - ( 14 Jul 2022 08:40 )   PT: 38.8 sec;   INR: 3.28 ratio         PTT - ( 14 Jul 2022 00:15 )  PTT:34.8 sec                         Anemia Panel:      Thyroid Panel:  Thyroid Stimulating Hormone, Serum: 2.14 uIU/mL (07-14-22 @ 08:40)                RADIOLOGY & ADDITIONAL TESTS:      HEALTH ISSUES - PROBLEM Dx:  Fall    Anemia due to acute blood loss    Hematoma    Transaminitis    Afib    Lower extremity edema    Acute kidney injury superimposed on CKD    HTN (hypertension)    Hypothyroidism    HFrEF (heart failure with reduced ejection fraction)    Need for prophylactic measure    CAD (coronary artery disease)            Consultant(s) Notes Reviewed:  [ x ] YES     Care Discussed with [X] Consultants  [x  ] Patient  [x  ] Family [  ] HCP [x  ]   [  ] Social Service  [  ] RN, [  ] Physical Therapy,[  ] Palliative care team  DVT PPX: [  ] Lovenox, [  ] S C Heparin, [  ] Coumadin, [  ] Xarelto, [  ] Eliquis, [  ] Pradaxa, [  ] IV Heparin drip, [x  ] SCD [  ] Contraindication 2 to GI Bleed,[  ] Ambulation [  ] Contraindicated 2 to  bleed [  ] Contraindicated 2 to Brain Bleed  Advanced directive: [  ] None, [  ] DNR/DNI Patient is a 89y old  Female who presents with a chief complaint of Fall, ABLA (14 Jul 2022 10:06)    HPI:  89 y/o F with a pmhx b/l LE edema, HTN, P Afib (on coumadin), HFref (EF35-40%) and hypothyroidism presents to the ED s/p fall. Patient was admitted to [facility] 6/17-6/30/22 for NSTEMI, hospital course c/b iatrogenic flash pulmonary edema, cath canceled due to vol overload, discharged to [facility]. She was doing well at [facility] until today. She went to bathroom for BM, had difficulty to clean herself. She was left unattended by [facility] staff in bathroom, she stood up to take few steps, lost balance, and fell on right side striking her right hip, right arm, right elbow, right side of neck. She denies any prodromal symptoms. No lightheadedness, chest pain, warm sensation. No dyspnea. Patient never lost consciousness. She was found to have Hg 6.9 at [facility] facility, sent to [facility] ED for further evaluation. She denies any significant pain right now. Only complains of right thigh twitching, swelling.  No fevers, chills, cp, dypsnea, abdominal pain. Admits lower ext swelling which is chronic for her.     In the ED  VS: T97.3 Hr78 /63 RR 16 SPO2 95% on 3L NC  Labs: WBC 4.43, HH 7.6/22.1, PLTS 225, Na 140, K 4.2, Cr 1.7, Gluc 112, Alk phos 326, AST//99  Chest Xray: lungs clear  Head CT: Stable exam. No acute intracranial hemorrhage, vasogenic edema, extra-axial collection or calvarial fracture.  Cervical spine CT: No acute cervical spine fracture or evidence of traumatic malalignment. Cervical spondylosis  CT A/P non con: Extensive diffuse soft tissue edema/anasarca. Small right, trace of bilateral pleural fluid. Small-to-moderate amount of simple free fluid around the liver and spleen. No definite intraperitoneal hemorrhage.No noncontrast evidence of acute injury to the chest, abdomen or pelvis.Concern for hematoma around the right hip soft tissues, partially visualized. No evidence of hip fracture.Mild intralobular septal thickening, may reflect pulmonary edema. Areas of atelectatic changes and possible airways impaction  EKG: ordered, pending  Given in ED: 1 unit prbc   (14 Jul 2022 04:06)    INTERVAL HPI:  7/14: Patient seen and examined at bedside. Is feeling okay. Has no acute complaints but is feeling slightly agitated. Denies sob, cp, n/v/d. Total 2 u pRBC total , IV Lasix     OVERNIGHT EVENTS: none    Home Medications:  aspirin 81 mg oral delayed release tablet: 1 tab(s) orally once a day (14 Jul 2022 04:17)  atorvastatin 20 mg oral tablet: 1 tab(s) orally once a day (14 Jul 2022 04:17)  clopidogrel 75 mg oral tablet: 1 tab(s) orally once a day (14 Jul 2022 04:17)  fluticasone 50 mcg/inh nasal spray: 1 spray(s) nasal 2 times a day (14 Jul 2022 04:17)  Lasix 40 mg oral tablet: 1 tab(s) orally 2 times a day (14 Jul 2022 04:17)  metoprolol succinate 50 mg oral tablet, extended release: 1 tab(s) orally once a day (14 Jul 2022 04:17)  sodium chloride 0.65% nasal spray: 2 spray(s) nasal 4 times a day (14 Jul 2022 04:17)  Synthroid 75 mcg (0.075 mg) oral tablet: 1 tab(s) orally once a day (14 Jul 2022 04:17)  warfarin 3 mg oral tablet: DO NOT Give Coumadin Today as INR 3.11, Check PT/INR on 7/1/22.Adjust Coumadin dose as per INR. start with Coumadin 2 mg   Keep INR 2-3    Pt&#x27;s HOME Coumadin schedule is as below  1 tab(s) orally once a day (M, Tu, Th, F, Sa Grider) at Bed time   -Pt use to Take Coumadin 4 mg on every Wednesday (14 Jul 2022 04:17)  zinc oxide 40% topical ointment: Apply topically to affected area 2 times a day (14 Jul 2022 04:17)      MEDICATIONS  (STANDING):  furosemide    Tablet 40 milliGRAM(s) Oral every 12 hours  levothyroxine 75 MICROGram(s) Oral daily  metoprolol succinate ER 50 milliGRAM(s) Oral daily    MEDICATIONS  (PRN):  aluminum hydroxide/magnesium hydroxide/simethicone Suspension 30 milliLiter(s) Oral every 4 hours PRN Dyspepsia  melatonin 3 milliGRAM(s) Oral at bedtime PRN Insomnia  ondansetron Injectable 4 milliGRAM(s) IV Push every 8 hours PRN Nausea and/or Vomiting      Allergies    No Known Allergies    Intolerances        Social History:  Lives at home alone. Former smoker. Denies etoh and other rec drug use. Ambulates with walker. Daughter is HCP. DNR/DNI (14 Jul 2022 04:06)      REVIEW OF SYSTEMS: i am OK , feel better now  CONSTITUTIONAL: No fever, No chills, No fatigue, No myalgia, No Body ache, No Weakness  EYES: No eye pain,  No visual disturbances, No discharge, NO Redness  ENMT:  No ear pain, No nose bleed, No vertigo; No sinus pain, NO throat pain, No Congestion  NECK: No pain, No stiffness  RESPIRATORY: No cough, NO wheezing, No  hemoptysis, NO  shortness of breath  CARDIOVASCULAR: No chest pain, palpitations  GASTROINTESTINAL: No abdominal pain, NO epigastric pain. No nausea, No vomiting; No diarrhea, No constipation. [  ] BM  GENITOURINARY: No dysuria, No frequency, No urgency, No hematuria, NO incontinence  NEUROLOGICAL: No headaches, No dizziness, No numbness, No tingling, No tremors, No weakness  EXT: + Hematoma R side. +peripheral edema  SKIN: +bruising over right elbow, right leg 2/2 fall [ x ] No itching, burning, rashes   MUSCULOSKELETAL: No joint pain ,No Jt swelling; No muscle pain, No back pain, No extremity pain  PSYCHIATRIC: No depression,  No anxiety,  No mood swings ,No difficulty sleeping at night  PAIN SCALE: [ x ] None  [  ] Other-  ROS Unable to obtain due to - [  ] Dementia  [  ] Lethargy [  ] Drowsy [  ] Sedated [  ] non verbal  REST OF REVIEW Of SYSTEM - [x  ] Normal     Vital Signs Last 24 Hrs  T(C): 36.7 (14 Jul 2022 10:47), Max: 36.7 (14 Jul 2022 10:47)  T(F): 98 (14 Jul 2022 10:47), Max: 98 (14 Jul 2022 10:47)  HR: 67 (14 Jul 2022 10:47) (63 - 78)  BP: 97/55 (14 Jul 2022 10:47) (97/55 - 112/61)  BP(mean): --  RR: 17 (14 Jul 2022 10:47) (16 - 17)  SpO2: 97% (14 Jul 2022 10:47) (95% - 98%)    Parameters below as of 14 Jul 2022 10:47  Patient On (Oxygen Delivery Method): nasal cannula      Finger Stick    PHYSICAL EXAM:  GENERAL:  [x  ] NAD , [x] well appearing, [ x ] Agitated, [  ] Drowsy,  [  ] Lethargy, [  ] confused   HEAD:  [  x] Normal, [  ] Other  EYES:  [ x ] EOMI, [  ] PERRLA, [ x ] conjunctiva and sclera clear normal, [  ] Other,  [  ] Pallor,[  ] Discharge  ENMT:  [x  ] Normal, [x  ] Moist mucous membranes, [  ] Good dentition, [x  ] No Thrush  NECK:  [ x ] Supple, [  x] + JVD, [x  ] Normal thyroid, [  ] Lymphadenopathy [  ] Other  CHEST/LUNG:  [ x ] Clear to auscultation bilaterally, [ x ] Breath Sounds equal B/L / , [  ] poor effort  [ x ] No rales, [ x ] No rhonchi  [ x ]  No wheezing,   HEART:  [  ] Regular rate and rhythm, [  ] tachycardia, [  ] Bradycardia,  [  ] irregular  [x  ] + murmurs, No rubs, No gallops, [  ] PPM in place (Mfr:  ) [x] anasarca  ABDOMEN:  [x  ] Soft, [ x ] Nontender, [ x] Nondistended, [ x ] No mass, [ x ] Bowel sounds present, [ x ] obese  NERVOUS SYSTEM:  [ x ] Alert & Oriented X3, [x  ] Nonfocal  [  ] Confusion  [  ] Encephalopathic [  ] Sedated [  ] Unable to assess, [  ] Dementia [  ] Other-  EXTREMITIES: [x  ] 2+ Peripheral Pulses, No clubbing, No cyanosis,  [ x ]  3+ edema B/L lower EXT. [ x ] PVD stasis skin changes B/L Lower EXT, erythema B/L Lower ext , small blister  [  ] wound  LYMPH: No lymphadenopathy noted  SKIN:  [x  ] +R hematoma hip, bruising over right elbow, right leg 2/2 fall, [  ] Pressure Ulcers, [  ] ecchymosis, [  ] Skin Tears, [  ] Other    DIET: Diet, DASH/TLC:   Sodium & Cholesterol Restricted  1000mL Fluid Restriction ([id number]) (07-14-22 @ 08:48)      LABS:                        7.7    4.47  )-----------( 208      ( 14 Jul 2022 08:40 )             23.1     14 Jul 2022 08:40    141    |  105    |  34     ----------------------------<  87     3.9     |  34     |  1.70     Ca    7.9        14 Jul 2022 08:40  Phos  4.2       14 Jul 2022 08:40  Mg     2.3       14 Jul 2022 08:40    TPro  6.3    /  Alb  1.8    /  TBili  3.2    /  DBili  x      /  AST  163    /  ALT  94     /  AlkPhos  315    14 Jul 2022 08:40    PT/INR - ( 14 Jul 2022 08:40 )   PT: 38.8 sec;   INR: 3.28 ratio         PTT - ( 14 Jul 2022 00:15 )  PTT:34.8 sec  Anemia Panel:      Thyroid Panel:  Thyroid Stimulating Hormone, Serum: 2.14 uIU/mL (07-14-22 @ 08:40)    RADIOLOGY & ADDITIONAL TESTS:   < from: US Duplex Venous Lower Ext Complete, Bilateral (07.14.22 @ 12:50) >  IMPRESSION:  Limited evaluation, as described above. Within the well-visualized venous   segments no evidence for DVT.        < end of copied text >  < from: CT Hip No Cont, Right (07.14.22 @ 06:24) >  IMPRESSION:    No acute displaced fracture or dislocation within the pelvis.    Subcutaneous hematoma along the lateral aspect of the right hip.    Mild compression deformity of L4 with vertebroplasty changes.    Small volume ascites, partially evaluated.    Anasarca.    < end of copied text >        HEALTH ISSUES - PROBLEM Dx:  Fall    Anemia due to acute blood loss    Hematoma    Transaminitis    Afib    Lower extremity edema    Acute kidney injury superimposed on CKD    HTN (hypertension)    Hypothyroidism    HFrEF (heart failure with reduced ejection fraction)    Need for prophylactic measure    CAD (coronary artery disease)      Consultant(s) Notes Reviewed:  [ x ] YES     Care Discussed with [X] Consultants  [x  ] Patient  [x  ] Family [  ] HCP [x  ]   [  ] Social Service  [ x ] RN, [  ] Physical Therapy,[  ] Palliative care team  DVT PPX: [  ] Lovenox, [  ] S C Heparin, [  ] Coumadin, [  ] Xarelto, [  ] Eliquis, [  ] Pradaxa, [  ] IV Heparin drip, [x  ] SCD x[  ] Contraindication 2 to Anemia/ Hematoma ,[  ] Ambulation [  ] Contraindicated 2 to  bleed [  ] Contraindicated 2 to Brain Bleed  Advanced directive: [  ] None, [ x ] DNR/DNI

## 2022-07-14 NOTE — CONSULT NOTE ADULT - SUBJECTIVE AND OBJECTIVE BOX
Patient is a 89y old  Female who presents with a chief complaint of Fall, ABLA (14 Jul 2022 04:06)       HPI:  89 y/o F with a pmhx b/l LE edema, HTN, P Afib (on coumadin), HFref (EF35-40%) and hypothyroidism presents to the ED s/p fall. Patient was admitted to [facility] 6/17-6/30/22 for NSTEMI, hospital course c/b iatrogenic flash pulmonary edema, cath canceled due to vol overload, discharged to [facility]. She was doing well at [facility] until today. She went to bathroom for BM, had difficulty to clean herself. She was left unattended by [facility] staff in bathroom, she stood up to take few steps, lost balance, and fell on right side striking her right hip, right arm, right elbow, right side of neck. She denies any prodromal symptoms. No lightheadedness, chest pain, warm sensation. No dyspnea. Patient never lost consciousness. She was found to have Hg 6.9 at [facility] facility, sent to [facility] ED for further evaluation. She denies any significant pain right now. Only complains of right thigh twitching, swelling.  No fevers, chills, cp, dypsnea, abdominal pain. Admits lower ext swelling which is chronic for her.     In the ED  VS: T97.3 Hr78 /63 RR 16 SPO2 95% on 3L NC  Labs: WBC 4.43, HH 7.6/22.1, PLTS 225, Na 140, K 4.2, Cr 1.7, Gluc 112, Alk phos 326, AST//99  Chest Xray: lungs clear  Head CT: Stable exam. No acute intracranial hemorrhage, vasogenic edema, extra-axial collection or calvarial fracture.  Cervical spine CT: No acute cervical spine fracture or evidence of traumatic malalignment. Cervical spondylosis  CT A/P non con: Extensive diffuse soft tissue edema/anasarca. Small right, trace of bilateral pleural fluid. Small-to-moderate amount of simple free fluid around the liver and spleen. No definite intraperitoneal hemorrhage.No noncontrast evidence of acute injury to the chest, abdomen or pelvis.Concern for hematoma around the right hip soft tissues, partially visualized. No evidence of hip fracture.Mild intralobular septal thickening, may reflect pulmonary edema. Areas of atelectatic changes and possible airways impaction  EKG: ordered, pending  Given in ED: 1 unit prbc   (14 Jul 2022 04:06)       PAST MEDICAL & SURGICAL HISTORY:  Hypothyroid      Hypertension      Lower extremity edema      Paroxysmal atrial fibrillation      No significant past surgical history           FAMILY HISTORY:  No pertinent family history in first degree relatives    [place]    Social History:Non smoker    MEDICATIONS  (STANDING):  furosemide    Tablet 40 milliGRAM(s) Oral every 12 hours  levothyroxine 75 MICROGram(s) Oral daily  metoprolol succinate ER 50 milliGRAM(s) Oral daily    MEDICATIONS  (PRN):  aluminum hydroxide/magnesium hydroxide/simethicone Suspension 30 milliLiter(s) Oral every 4 hours PRN Dyspepsia  melatonin 3 milliGRAM(s) Oral at bedtime PRN Insomnia  ondansetron Injectable 4 milliGRAM(s) IV Push every 8 hours PRN Nausea and/or Vomiting   Meds reviewed    Allergies    No Known Allergies    Intolerances         REVIEW OF SYSTEMS:    Review of Systems:   Constitutional: Denies fatigue  HEENT: Denies headaches and dizziness  Respiratory: denies SOB, cough, or wheezing  Cardiovascular: denies CP, palpitations  Gastrointestinal: Denies nausea, denies vomiting, diarrhea, constipation, abdominal pain, or bloody stools  Genitourinary: denies painful urination, increased frequency, urgency, or bloody urine  Skin: denies rashes or itching  Musculoskeletal: denies muscle aches, joint swelling  Neurologic: Denies generalized weakness, denies loss of sensation, numbness, or tingling      Vital Signs Last 24 Hrs  T(C): 36.6 (14 Jul 2022 08:10), Max: 36.6 (14 Jul 2022 06:49)  T(F): 97.9 (14 Jul 2022 08:10), Max: 97.9 (14 Jul 2022 08:10)  HR: 63 (14 Jul 2022 08:10) (63 - 78)  BP: 109/53 (14 Jul 2022 08:10) (103/63 - 112/61)  BP(mean): --  RR: 17 (14 Jul 2022 08:10) (16 - 17)  SpO2: 95% (14 Jul 2022 08:10) (95% - 98%)    Parameters below as of 14 Jul 2022 08:10  Patient On (Oxygen Delivery Method): nasal cannula  O2 Flow (L/min): 3    Daily Height in cm: 165.1 (13 Jul 2022 23:24)    Daily     PHYSICAL EXAM:    GENERAL: NAD  HEAD:  Atraumatic, Normocephalic  EYES: EOMI, conjunctiva and sclera clear  ENMT: No Drainage from nares, No drainage from ears  NECK: Supple, neck  veins full  NERVOUS SYSTEM:  Awake and Alert  CHEST/LUNG: Clear to percussion bilaterally; No rales, rhonchi, wheezing, or rubs  HEART: Regular rate and rhythm; No murmurs, rubs, or gallops  ABDOMEN: Soft, Nontender, Nondistended; Bowel sounds present  EXTREMITIES:  No Edema  SKIN: No rashes No obvious ecchymosis      LABS:                        7.7    4.47  )-----------( 208      ( 14 Jul 2022 08:40 )             23.1     07-14    141  |  105  |  34<H>  ----------------------------<  87  3.9   |  34<H>  |  1.70<H>    Ca    7.9<L>      14 Jul 2022 08:40  Phos  4.2     07-14  Mg     2.3     07-14    TPro  6.3  /  Alb  1.8<L>  /  TBili  3.2<H>  /  DBili  x   /  AST  163<H>  /  ALT  94<H>  /  AlkPhos  315<H>  07-14  PT/INR - ( 14 Jul 2022 08:40 )   PT: 38.8 sec;   INR: 3.28 ratio    PTT - ( 14 Jul 2022 00:15 )  PTT:34.8 sec    Magnesium, Serum: 2.3 mg/dL (07-14 @ 08:40)  Phosphorus Level, Serum: 4.2 mg/dL (07-14 @ 08:40)      RADIOLOGY & ADDITIONAL TESTS:   Patient is a 89y old  Female who presents with a chief complaint of Fall, ABLA (14 Jul 2022 04:06)       HPI:  89 y/o F with a pmhx b/l LE edema, HTN, P Afib (on coumadin), HFref (EF35-40%) and hypothyroidism presents to the ED s/p fall. Patient was admitted to [facility] 6/17-6/30/22 for NSTEMI, hospital course c/b iatrogenic flash pulmonary edema, cath canceled due to vol overload, discharged to [facility] [place]. She was doing well at [facility] until today. She went to bathroom for BM, had difficulty to clean herself. She was left unattended by [facility] staff in bathroom, she stood up to take few steps, lost balance, and fell on right side striking her right hip, right arm, right elbow, right side of neck. She denies any prodromal symptoms. No lightheadedness, chest pain, warm sensation. No dyspnea. Patient never lost consciousness. She was found to have Hg 6.9 at [facility] [place] facility, sent to [facility] ED for further evaluation. She denies any significant pain right now. Only complains of right thigh twitching, swelling.  No fevers, chills, cp, dypsnea, abdominal pain. Admits lower ext swelling which is chronic for her.     In the ED  VS: T97.3 Hr78 /63 RR 16 SPO2 95% on 3L NC  Labs: WBC 4.43, HH 7.6/22.1, PLTS 225, Na 140, K 4.2, Cr 1.7, Gluc 112, Alk phos 326, AST//99  Chest Xray: lungs clear  Head CT: Stable exam. No acute intracranial hemorrhage, vasogenic edema, extra-axial collection or calvarial fracture.  Cervical spine CT: No acute cervical spine fracture or evidence of traumatic malalignment. Cervical spondylosis  CT A/P non con: Extensive diffuse soft tissue edema/anasarca. Small right, trace of bilateral pleural fluid. Small-to-moderate amount of simple free fluid around the liver and spleen. No definite intraperitoneal hemorrhage.No noncontrast evidence of acute injury to the chest, abdomen or pelvis.Concern for hematoma around the right hip soft tissues, partially visualized. No evidence of hip fracture.Mild intralobular septal thickening, may reflect pulmonary edema. Areas of atelectatic changes and possible airways impaction  EKG: ordered, pending  Given in ED: 1 unit prbc   (14 Jul 2022 04:06)       PAST MEDICAL & SURGICAL HISTORY:  Hypothyroid      Hypertension      Lower extremity edema      Paroxysmal atrial fibrillation      No significant past surgical history           FAMILY HISTORY:  No pertinent family history in first degree relatives    [place]    Social History:Non smoker    MEDICATIONS  (STANDING):  furosemide    Tablet 40 milliGRAM(s) Oral every 12 hours  levothyroxine 75 MICROGram(s) Oral daily  metoprolol succinate ER 50 milliGRAM(s) Oral daily    MEDICATIONS  (PRN):  aluminum hydroxide/magnesium hydroxide/simethicone Suspension 30 milliLiter(s) Oral every 4 hours PRN Dyspepsia  melatonin 3 milliGRAM(s) Oral at bedtime PRN Insomnia  ondansetron Injectable 4 milliGRAM(s) IV Push every 8 hours PRN Nausea and/or Vomiting   Meds reviewed    Allergies    No Known Allergies    Intolerances         REVIEW OF SYSTEMS:    Review of Systems:   Constitutional: Denies fatigue  HEENT: Denies headaches and dizziness  Respiratory: denies SOB, cough, or wheezing  Cardiovascular: denies CP, palpitations  Gastrointestinal: Denies nausea, denies vomiting, diarrhea, constipation, abdominal pain, or bloody stools  Genitourinary: denies painful urination, increased frequency, urgency, or bloody urine  Skin: denies rashes or itching  Musculoskeletal: denies muscle aches, joint swelling  Neurologic: Denies generalized weakness, denies loss of sensation, numbness, or tingling      Vital Signs Last 24 Hrs  T(C): 36.6 (14 Jul 2022 08:10), Max: 36.6 (14 Jul 2022 06:49)  T(F): 97.9 (14 Jul 2022 08:10), Max: 97.9 (14 Jul 2022 08:10)  HR: 63 (14 Jul 2022 08:10) (63 - 78)  BP: 109/53 (14 Jul 2022 08:10) (103/63 - 112/61)  BP(mean): --  RR: 17 (14 Jul 2022 08:10) (16 - 17)  SpO2: 95% (14 Jul 2022 08:10) (95% - 98%)    Parameters below as of 14 Jul 2022 08:10  Patient On (Oxygen Delivery Method): nasal cannula  O2 Flow (L/min): 3    Daily Height in cm: 165.1 (13 Jul 2022 23:24)    Daily     PHYSICAL EXAM:    GENERAL: NAD  HEAD:  Atraumatic, Normocephalic  EYES: EOMI, conjunctiva and sclera clear  ENMT: No Drainage from nares, No drainage from ears  NECK: Supple, neck  veins full  NERVOUS SYSTEM:  Awake and Alert  CHEST/LUNG: Clear to percussion bilaterally; No rales, rhonchi, wheezing, or rubs  HEART: Regular rate and rhythm; No murmurs, rubs, or gallops  ABDOMEN: Soft, Nontender, Nondistended; Bowel sounds present  EXTREMITIES:  3+ pitting edema up to above the knees  SKIN: UE ecchymosis, large r hip/buttocks hematoma      LABS:                        7.7    4.47  )-----------( 208      ( 14 Jul 2022 08:40 )             23.1     07-14    141  |  105  |  34<H>  ----------------------------<  87  3.9   |  34<H>  |  1.70<H>    Ca    7.9<L>      14 Jul 2022 08:40  Phos  4.2     07-14  Mg     2.3     07-14    TPro  6.3  /  Alb  1.8<L>  /  TBili  3.2<H>  /  DBili  x   /  AST  163<H>  /  ALT  94<H>  /  AlkPhos  315<H>  07-14  PT/INR - ( 14 Jul 2022 08:40 )   PT: 38.8 sec;   INR: 3.28 ratio    PTT - ( 14 Jul 2022 00:15 )  PTT:34.8 sec    Magnesium, Serum: 2.3 mg/dL (07-14 @ 08:40)  Phosphorus Level, Serum: 4.2 mg/dL (07-14 @ 08:40)      RADIOLOGY & ADDITIONAL TESTS:   Patient is a 89y old  Female who presents with a chief complaint of Fall, ABLA (14 Jul 2022 04:06)       HPI:  89 y/o F with a pmhx b/l LE edema, HTN, P Afib (on coumadin), HFref (EF35-40%) and hypothyroidism presents to the ED s/p fall. Patient was admitted to [facility] 6/17-6/30/22 for NSTEMI, hospital course c/b iatrogenic flash pulmonary edema, cath canceled due to vol overload, discharged to [facility]. She was doing well at [facility] until today. She went to bathroom for BM, had difficulty to clean herself. She was left unattended by [facility] staff in bathroom, she stood up to take few steps, lost balance, and fell on right side striking her right hip, right arm, right elbow, right side of neck. She denies any prodromal symptoms. No lightheadedness, chest pain, warm sensation. No dyspnea. Patient never lost consciousness. She was found to have Hg 6.9 at [facility] facility, sent to [facility] ED for further evaluation. She denies any significant pain right now. Only complains of right thigh twitching, swelling.  No fevers, chills, cp, dypsnea, abdominal pain. Admits lower ext swelling which is chronic for her.     In the ED  VS: T97.3 Hr78 /63 RR 16 SPO2 95% on 3L NC  Labs: WBC 4.43, HH 7.6/22.1, PLTS 225, Na 140, K 4.2, Cr 1.7, Gluc 112, Alk phos 326, AST//99  Chest Xray: lungs clear  Head CT: Stable exam. No acute intracranial hemorrhage, vasogenic edema, extra-axial collection or calvarial fracture.  Cervical spine CT: No acute cervical spine fracture or evidence of traumatic malalignment. Cervical spondylosis  CT A/P non con: Extensive diffuse soft tissue edema/anasarca. Small right, trace of bilateral pleural fluid. Small-to-moderate amount of simple free fluid around the liver and spleen. No definite intraperitoneal hemorrhage.No noncontrast evidence of acute injury to the chest, abdomen or pelvis.Concern for hematoma around the right hip soft tissues, partially visualized. No evidence of hip fracture.Mild intralobular septal thickening, may reflect pulmonary edema. Areas of atelectatic changes and possible airways impaction  EKG: ordered, pending  Given in ED: 1 unit prbc   (14 Jul 2022 04:06)       PAST MEDICAL & SURGICAL HISTORY:  Hypothyroid      Hypertension      Lower extremity edema      Paroxysmal atrial fibrillation      No significant past surgical history           FAMILY HISTORY:  No pertinent family history in first degree relatives    [place]    Social History:Non smoker    MEDICATIONS  (STANDING):  furosemide    Tablet 40 milliGRAM(s) Oral every 12 hours  levothyroxine 75 MICROGram(s) Oral daily  metoprolol succinate ER 50 milliGRAM(s) Oral daily    MEDICATIONS  (PRN):  aluminum hydroxide/magnesium hydroxide/simethicone Suspension 30 milliLiter(s) Oral every 4 hours PRN Dyspepsia  melatonin 3 milliGRAM(s) Oral at bedtime PRN Insomnia  ondansetron Injectable 4 milliGRAM(s) IV Push every 8 hours PRN Nausea and/or Vomiting   Meds reviewed    Allergies    No Known Allergies    Intolerances         REVIEW OF SYSTEMS:    Review of Systems:   Constitutional: Denies fatigue  HEENT: Denies headaches and dizziness  Respiratory: denies SOB, cough, or wheezing  Cardiovascular: denies CP, palpitations  Gastrointestinal: Denies nausea, denies vomiting, diarrhea, constipation, abdominal pain, or bloody stools  Genitourinary: denies painful urination, increased frequency, urgency, or bloody urine  Skin: denies rashes or itching  Musculoskeletal: denies muscle aches, joint swelling  Neurologic: Denies generalized weakness, denies loss of sensation, numbness, or tingling      Vital Signs Last 24 Hrs  T(C): 36.6 (14 Jul 2022 08:10), Max: 36.6 (14 Jul 2022 06:49)  T(F): 97.9 (14 Jul 2022 08:10), Max: 97.9 (14 Jul 2022 08:10)  HR: 63 (14 Jul 2022 08:10) (63 - 78)  BP: 109/53 (14 Jul 2022 08:10) (103/63 - 112/61)  BP(mean): --  RR: 17 (14 Jul 2022 08:10) (16 - 17)  SpO2: 95% (14 Jul 2022 08:10) (95% - 98%)    Parameters below as of 14 Jul 2022 08:10  Patient On (Oxygen Delivery Method): nasal cannula  O2 Flow (L/min): 3    Daily Height in cm: 165.1 (13 Jul 2022 23:24)    Daily     PHYSICAL EXAM:    GENERAL: NAD  HEAD:  Atraumatic, Normocephalic  EYES: EOMI, conjunctiva and sclera clear  ENMT: No Drainage from nares, No drainage from ears  NECK: Supple, neck  veins full  NERVOUS SYSTEM:  Awake and Alert  CHEST/LUNG: Clear to percussion bilaterally; No rales, rhonchi, wheezing, or rubs  HEART: Regular rate and rhythm; No murmurs, rubs, or gallops  ABDOMEN: Soft, Nontender, Nondistended; Bowel sounds present  EXTREMITIES:  3+ pitting edema up to above the knees  SKIN: UE ecchymosis      LABS:                        7.7    4.47  )-----------( 208      ( 14 Jul 2022 08:40 )             23.1     07-14    141  |  105  |  34<H>  ----------------------------<  87  3.9   |  34<H>  |  1.70<H>    Ca    7.9<L>      14 Jul 2022 08:40  Phos  4.2     07-14  Mg     2.3     07-14    TPro  6.3  /  Alb  1.8<L>  /  TBili  3.2<H>  /  DBili  x   /  AST  163<H>  /  ALT  94<H>  /  AlkPhos  315<H>  07-14  PT/INR - ( 14 Jul 2022 08:40 )   PT: 38.8 sec;   INR: 3.28 ratio    PTT - ( 14 Jul 2022 00:15 )  PTT:34.8 sec    Magnesium, Serum: 2.3 mg/dL (07-14 @ 08:40)  Phosphorus Level, Serum: 4.2 mg/dL (07-14 @ 08:40)      RADIOLOGY & ADDITIONAL TESTS:

## 2022-07-14 NOTE — H&P ADULT - ASSESSMENT
89 y/o F with a pmhx b/l LE edema, HTN, P Afib (on coumadin), HFref (EF35-40%), hypothyroidism, recent admission for NSTEMI presents to the ED s/p mechanical fall at [facility] [place] on coumadin. Found to have ABLA with right hematoma. Admit for prbc transfusion.

## 2022-07-14 NOTE — ED ADULT NURSE REASSESSMENT NOTE - NS ED NURSE REASSESS COMMENT FT1
0718:  blood continues to infuse.  no adverse reactions noted.  patient denies any cp or sob.  remains on 3L nc.  comfort measures provided.  Ultrasound to be performed on both lower extremities, and because of current transfusion, recommend bedside.  patient is alert / oriented x4.  vitals recorded.  carrie maya 0718:  blood continues to infuse.  no adverse reactions noted.  patient denies any cp or sob.  remains on 3L nc.  comfort measures provided.  noted to have a large hematoma on the lateral aspect right hip.  legs swollen / discolored.  ecchy / swelling noted to right arm.   Ultrasound to be performed on both lower extremities, and because of current transfusion, recommend bedside.  patient is alert / oriented x4.  vitals recorded.  carrie maya

## 2022-07-14 NOTE — CONSULT NOTE ADULT - PROBLEM/RECOMMENDATION-1
Pt DC per MD's order 

DC instructions and medications given to pt 

Pt verbalized understandings

VSS

Able to make needs known 

pt exited ED in stable gait DISPLAY PLAN FREE TEXT

## 2022-07-14 NOTE — ED ADULT NURSE REASSESSMENT NOTE - NS ED NURSE REASSESS COMMENT FT1
1700:  second unit PRBC's complete.  entire unit infused.  no adverse reactions.  vitals recorded.  call bell w/in reach.  will continue to monitor.  awaiting bed placement.  carrie maya.

## 2022-07-14 NOTE — H&P ADULT - PROBLEM SELECTOR PLAN 4
-TTE: EF 35-40%, LV hypokinesis 2/2 NSTEMI. [place], mid inferoseptal and mid anterolateral walls severely hypokinetic. Right ventricular enlargement. Biatrial enlargement. Moderate aortic stenosis. Moderate TR.  - patient with clear lungs exam but severe 3+ pitting edema b/l  - continue lasix 40mg BID with mindful watching of cr.   - would be cautious - recent NSTEMI 6/2022  - hold home asa, plavix for now in setting of bleeding s/p fall on coumadin  - hold statin for transaminitis  - [facility] consulted - recent NSTEMI 6/2022  - hold home asa, Plavix for now in setting of bleeding s/p fall on coumadin  - hold statin for transaminitis  -On BB continue   - [facility] consulted

## 2022-07-14 NOTE — ED PROVIDER NOTE - OBJECTIVE STATEMENT
89-year-old female history of  afib on Coumadin 2.5 mg hypertension hypothyroid sent from [facility] nursing [place] for a low H&H hemoglobin reported as 6.9 hematocrit 20.7 tonight's Coumadin was held. Patient also fell in the bathroom on July 11 had x-rays this morning he is complaining about right hip and right elbow pain from the fall no results of the x-ray were given. patient admits to hit head injuryr during fall as well.

## 2022-07-14 NOTE — PROGRESS NOTE ADULT - PROBLEM SELECTOR PLAN 6
Unclear etiology. had workup during last admission. presumably multifactorial due to CHF vs meds. Trending down.  - RUQ us 6/30: Examination limited secondary to overlying bowel gas. Cholelithiasis with gallbladder wall thickening and small amount of pericholecystic fluid; clinical correlation is recommended for acute cholecystitis; a HIDA scan could be obtained for further evaluation. Common bile duct measures 9 mm which is within normal limits for age, however correlation is recommended with LFTs and MRI/MRCP of the abdomen   should be considered for further evaluation.Small amount of right upper quadrant ascites  - HIDA 6/20/22: Normal hepatobiliary scan. No radionuclide evidence of acute   cholecystitis.  - avoid hepatotoxins  - GI consulted recs appreciated - recent NSTEMI 6/2022  - hold home asa, Plavix for now in setting of bleeding s/p fall on coumadin  - hold statin for transaminitis  - Card[facility] consulted

## 2022-07-14 NOTE — H&P ADULT - PROBLEM SELECTOR PLAN 3
- right hip hematoma s/p mechanical fall at [facility]  - HH low but stable. no longer actively bleeding. no need for coumadin reversal agents for now  - check CT right hip non con  - continue to monitor  - cool compresses prn - Traumatic right hip hematoma s/p mechanical fall at [facility]  -  low but stable. no longer actively bleeding. no need for coumadin reversal agents for now  - check CT right hip non con  - continue to monitor  - cool compresses prn

## 2022-07-14 NOTE — H&P ADULT - PROBLEM SELECTOR PLAN 1
- presenting from [facility] s/p mechanical fall on right side while in bathroom striking right hip, right arm, right elbow, right anterior neck. Patient on coumadin for hx of afib.  - Head CT: Stable exam. No acute intracranial hemorrhage, vasogenic edema, extra-axial collection or calvarial fracture.  - Cervical spine CT: No acute cervical spine fracture or evidence of traumatic malalignment. Cervical spondylosis  - CT A/P non con: Extensive diffuse soft tissue edema/anasarca. Small right, trace of bilateral pleural fluid. Small-to-moderate amount of simple free fluid around the liver and spleen. No definite intraperitoneal hemorrhage.No noncontrast evidence of acute injury to the chest, abdomen or pelvis.Concern for hematoma around the right hip soft tissues, partially visualized. No evidence of hip fracture.Mild intralobular septal thickening, may reflect pulmonary edema. Areas of atelectatic changes and possible airways impaction  - check CT right hip non con  - hold coumadin in setting of bleed  - xray hip, right elbow ordered, f/u  - PT consult - HH 7.6/22 on admission, HH 6.9 at [facility] prior to admission. stable. Last discharge was 10.4/31 on 6/30/22  - likely due to ABLA s/p mechanical fall  - bleed appears stabilized. no need for coumadin reversal agents for now  - patient on coumadin for afib. Hold in setting of bleeding   - daily INR  - type and screen, blood consent in chart  - 1 u prbc transfusing in ED. with Lasix 20mg IVP to follow. f/u repeat cbc  - heme/onc Dr. Lundy consulted

## 2022-07-14 NOTE — H&P ADULT - GASTROINTESTINAL
negative normal/soft/nontender/nondistended/normal active bowel sounds normal/soft/nontender/nondistended/normal active bowel sounds/no guarding/no rigidity/no organomegaly/no palpable saul/no masses palpable

## 2022-07-14 NOTE — ED PROVIDER NOTE - NS ED ROS FT
Constitutional: - Fever, - Chills, - Anorexia, - Fatigue, - Night sweats  Eyes: - Discharge, - Irritation, - Redness, - Visual changes, - Light sensitivity, - Pain  EARS: - Ear Pain, - Tinnitus, - Decreased hearing  NOSE: - Congestion, - Epistaxis  MOUTH/THROAT: - Vocal Changes, - Drooling, - Sore throat  NECK: - Lumps, - Stiffness, - Pain  CV: - Palpitations, - Chest Pain, - Edema, - Syncope  RESP:  - Cough, - Shortness of Breath, - Dyspnea on Exertion, - Trouble speaking, - Pleuritic pain - Wheezing  GI: - Diarrhea, - Constipation, - Bloody stools, - Nausea, - Vomiting, - Abdominal Pain  : - Dysuria, -Frequency, - Hematuria, - Hesitancy, - Incontinence, - Saddle Anesthesia, - Abnormal discharge  MSK: - Myalgias, - Arthralgias, - Weakness, - Deformities, +elbow and hip pain  SKIN: - Color change, - Rash, - Swelling, - Ecchymosis, - Abrasion, - laceration  NEURO: - Change in behavior, - Dec. Alertness, - Headache, - Dizziness, - Change in speech, - Weakness, - Seizure-like activity, - Difficulty ambulating

## 2022-07-14 NOTE — PROGRESS NOTE ADULT - PROBLEM SELECTOR PLAN 4
- recent NSTEMI 6/2022  - hold home asa, plavix for now in setting of bleeding s/p fall on coumadin  - hold statin for transaminitis  - [facility] consulted -From [facility] s/p mechanical fall on right side while in bathroom Patient on coumadin for hx of afib.  - Head CT: Stable exam. No acute intracranial hemorrhage, vasogenic edema, extra-axial collection or calvarial fracture.  - Cervical spine CT: No acute cervical spine fracture or evidence of traumatic malalignment. Cervical spondylosis  - CT A/P non con: Extensive diffuse soft tissue edema/anasarca. Small right, trace of bilateral pleural fluid. Small-to-moderate amount of simple free fluid around the liver and spleen. No definite intraperitoneal hemorrhage.  -No noncontrast evidence of acute injury to the chest, abdomen or pelvis. Concern for hematoma around the right hip soft tissues, partially visualized. No evidence of hip fracture .Mild intralobular septal thickening, may reflect pulmonary edema. Areas of atelectatic changes and possible airways impaction  - CT right hip shows subcutaneous hematoma along the lateral aspect of the right hip, no acute displaced fracture or dislocation within the pelvis.  - hold coumadin in setting of bleed  - xray hip, right elbow performed - f/u results  - PT consult-

## 2022-07-14 NOTE — ED PROVIDER NOTE - PROGRESS NOTE DETAILS
labs from 7/1 at facility showed h/h 10/30 discussed case with Dr. PARTHA Villegas will admit, recommend giving 1 unit PRBC

## 2022-07-14 NOTE — H&P ADULT - PROBLEM SELECTOR PLAN 7
chronic, swelling LE b/l on admit- chronic edema   -On home lasix 40 mg 2x day  -TTE: EF 35-40%, LV hypokinesis. chronic, stable - rate-controlled   - on coumadin, holding for ABLA  - Toprol  XL 50mg PO qd w/ hold parameters  - TTE: EF 35-40%, LV hypokinesis  - cardio Dr. araceli hager chronic, stable - rate-controlled   - on coumadin, holding for acute Anemia   - Toprol  XL 50mg PO qd w/ hold parameters  - TTE: EF 35-40%, LV hypokinesis  - cardio Dr. araceli hager

## 2022-07-14 NOTE — PHYSICAL THERAPY INITIAL EVALUATION ADULT - PERTINENT HX OF CURRENT PROBLEM, REHAB EVAL
89 y/o F with a pmhx b/l LE edema, HTN, P Afib (on coumadin), HFref (EF35-40%), hypothyroidism, recent admission for NSTEMI presents to the ED s/p mechanical fall at [facility] on coumadin. Found to have ABLA with right hematoma. Admit for prbc transfusion.

## 2022-07-14 NOTE — H&P ADULT - NSHPSOCIALHISTORY_GEN_ALL_CORE
Lives at home alone. Former smoker. Denies etoh and other rec drug use. Ambulates with walker. Daughter is HCP. DNR/DNI Lived at home alone. Former smoker. Denies etoh and other rec drug use. Ambulates with walker. Daughter is HCP. DNR/DNI, came from [facility] now

## 2022-07-14 NOTE — CONSULT NOTE ADULT - SUBJECTIVE AND OBJECTIVE BOX
[facility]  Dawson Dolan PA-C  [address] [place] [address] [id number] [phone]      Chief Complaint:  Patient is a 89y old  Female who presents with a chief complaint of Fall, ABLA (14 Jul 2022 11:43)      HPI: 89 y/o F with a pmhx b/l LE edema, HTN, P Afib (on coumadin), HFref (EF35-40%) and hypothyroidism presents to the ED s/p fall. Patient was admitted to hospitals 6/17-6/30/22 for NSTEMI, hospital course c/b iatrogenic flash pulmonary edema, cath canceled due to vol overload, discharged to [facility]. She was doing well at [facility] until today. She went to bathroom for BM, had difficulty to clean herself. She was left unattended by [facility] staff in bathroom, she stood up to take few steps, lost balance, and fell on right side striking her right hip, right arm, right elbow, right side of neck. She denies any prodromal symptoms. No lightheadedness, chest pain, warm sensation. No dyspnea. Patient never lost consciousness. She was found to have Hg 6.9 at [facility] facility, sent to hospitals ED for further evaluation. She denies any significant pain right now. Only complains of right thigh twitching, swelling.  No fevers, chills, cp, dypsnea, abdominal pain. Admits lower ext swelling which is chronic for her.     Allergies:  No Known Allergies      Medications:  albumin human 25% IVPB 50 milliLiter(s) IV Intermittent once  aluminum hydroxide/magnesium hydroxide/simethicone Suspension 30 milliLiter(s) Oral every 4 hours PRN  furosemide   Injectable 40 milliGRAM(s) IV Push two times a day  levothyroxine 75 MICROGram(s) Oral daily  melatonin 3 milliGRAM(s) Oral at bedtime PRN  metoprolol succinate ER 50 milliGRAM(s) Oral daily  ondansetron Injectable 4 milliGRAM(s) IV Push every 8 hours PRN      PMHX/PSHX:  Hypothyroid    Hypertension    Lower extremity edema    Paroxysmal atrial fibrillation    No significant past surgical history        Family history:  No pertinent family history in first degree relatives        Social History:     ROS:     General:  No wt loss, fevers, chills, night sweats, fatigue,   Eyes:  Good vision, no reported pain  ENT:  No sore throat, pain, runny nose, dysphagia  CV:  No pain, palpitations, hypo/hypertension  Resp:  No dyspnea, cough, tachypnea, wheezing  GI:  No pain, No nausea, No vomiting, No diarrhea, No constipation, No weight loss, No fever, No pruritis, No rectal bleeding, No tarry stools, No dysphagia,  :  No pain, bleeding, incontinence, nocturia  Muscle:  No pain, weakness  Neuro:  No weakness, tingling, memory problems  Psych:  No fatigue, insomnia, mood problems, depression  Endocrine:  No polyuria, polydipsia, cold/heat intolerance  Heme:  No petechiae, ecchymosis, easy bruisability  Skin:  No rash, tattoos, scars, edema      PHYSICAL EXAM:   Vital Signs:  Vital Signs Last 24 Hrs  T(C): 36.6 (14 Jul 2022 14:15), Max: 36.7 (14 Jul 2022 10:47)  T(F): 97.9 (14 Jul 2022 14:15), Max: 98 (14 Jul 2022 10:47)  HR: 66 (14 Jul 2022 14:15) (63 - 78)  BP: 106/56 (14 Jul 2022 14:15) (97/53 - 112/61)  BP(mean): --  RR: 16 (14 Jul 2022 14:15) (16 - 18)  SpO2: 98% (14 Jul 2022 14:15) (95% - 98%)    Parameters below as of 14 Jul 2022 14:15  Patient On (Oxygen Delivery Method): nasal cannula  O2 Flow (L/min): 3    Daily Height in cm: 165.1 (13 Jul 2022 23:24)    Daily     GENERAL:  Appears stated age,   HEENT:  NC/AT,    CHEST:  Full & symmetric excursion,   HEART:  Regular rhythm  ABDOMEN:  Soft, non-tender, non-distended,   EXTEREMITIES:  no cyanosis,clubbing or edema  SKIN:  No rash  NEURO:  Alert,    LABS:                        7.7    x     )-----------( x        ( 14 Jul 2022 12:25 )             22.8     07-14    141  |  105  |  34<H>  ----------------------------<  87  3.9   |  34<H>  |  1.70<H>    Ca    7.9<L>      14 Jul 2022 08:40  Phos  4.2     07-14  Mg     2.3     07-14    TPro  6.3  /  Alb  1.8<L>  /  TBili  3.2<H>  /  DBili  x   /  AST  163<H>  /  ALT  94<H>  /  AlkPhos  315<H>  07-14    LIVER FUNCTIONS - ( 14 Jul 2022 08:40 )  Alb: 1.8 g/dL / Pro: 6.3 g/dL / ALK PHOS: 315 U/L / ALT: 94 U/L / AST: 163 U/L / GGT: x           PT/INR - ( 14 Jul 2022 08:40 )   PT: 38.8 sec;   INR: 3.28 ratio         PTT - ( 14 Jul 2022 00:15 )  PTT:34.8 sec        Imaging:

## 2022-07-14 NOTE — ED PROVIDER NOTE - PHYSICAL EXAMINATION
Gen: Alert, NAD  Head/eyes: NC/AT, PERRL  ENT: airway patent  Neck: supple  Pulm/lung: Bilateral clear BS  CV/heart: RRR  GI/Abd: soft, NT/ND, +BS, no guarding/rebound tenderness  Musculoskeletal: no edema/erythema/cyanosis, +ecchymosis right elbow, +ttp right elbow and right hip  Skin: no rash  Neuro: AAOx3, grossly intact

## 2022-07-14 NOTE — PROGRESS NOTE ADULT - PROBLEM SELECTOR PLAN 1
-From [facility] s/p mechanical fall on right side while in bathroom Patient on coumadin for hx of afib.  - Head CT: Stable exam. No acute intracranial hemorrhage, vasogenic edema, extra-axial collection or calvarial fracture.  - Cervical spine CT: No acute cervical spine fracture or evidence of traumatic malalignment. Cervical spondylosis  - CT A/P non con: Extensive diffuse soft tissue edema/anasarca. Small right, trace of bilateral pleural fluid. Small-to-moderate amount of simple free fluid around the liver and spleen. No definite intraperitoneal hemorrhage.No noncontrast evidence of acute injury to the chest, abdomen or pelvis.Concern for hematoma around the right hip soft tissues, partially visualized. No evidence of hip fracture.Mild intralobular septal thickening, may reflect pulmonary edema. Areas of atelectatic changes and possible airways impaction  - CT right hip shows subcutaneous hematoma along the lateral aspect of the right hip, no acute displaced fracture or dislocation within the pelvis.  - hold coumadin in setting of bleed  - xray hip, right elbow performed - f/u results  - PT consult Likely due to ABLA s/p mechanical fall, HH 7.6/22 on admission, HH 6.9 at [facility] prior to admission. stable.   -Last discharge was 10.4/31 on 6/30/22  - S/P 1 u prbc in ED. with Lasix 20mg IVP , 2nd unit pRBC now  - H/H 7.7/23.1 , will repeat HH  - bleed appears stabilized. no need for coumadin reversal agents for now as d/w Dr Lundy -Hematology  - patient on coumadin for afib. Hold in setting of bleed  - daily INR  - type and screen, blood consent in chart  - heme/onc Dr. Lundy following d/w-HOLD AP & AC , CBC in AM

## 2022-07-14 NOTE — PROGRESS NOTE ADULT - PROBLEM SELECTOR PLAN 2
Likely due to ABLA s/p mechanical fall, HH 7.6/22 on admission, HH 6.9 at [facility] prior to admission. stable. Last discharge was 10.4/31 on 6/30/22  - S/P 1 u prbc in ED. with lasix 20mg IVP   - H/H 7.7/23.1 , will repeat HH  - bleed appears stabilized. no need for coumadin reversal agents for now  - patient on coumadin for afib. Hold in setting of bleed  - daily INR  - type and screen, blood consent in chart  - heme/onc Dr. Lundy following Right hip hematoma s/p mechanical fall at [facility], CT right hip shows subcutaneous hematoma along the lateral aspect of the right hip, no acute displaced fracture or dislocation within the pelvis.  - H/H low but stable. no longer actively bleeding. no need for coumadin reversal agents for now  - continue to monitor  - cool compresses prn  -HOLD ASA, Plavix, HOLD AC   2 u pRBC today , CBC in AM

## 2022-07-14 NOTE — H&P ADULT - PROBLEM SELECTOR PLAN 5
- unclear etiology. had workup during last admission. presumably multifactorial due to CHF vs meds.   - RUQ us 6/30: Examination limited secondary to overlying bowel gas. Cholelithiasis with gallbladder wall thickening and small amount of pericholecystic fluid; clinical correlation is recommended for acute cholecystitis; a HIDA scan could be obtained for further evaluation.Common bile duct measures 9 mm which is within normal limits for age, however correlation is recommended with LFTs and MRI/MRCP of the abdomen   should be considered for further evaluation.Small amount of right upper quadrant ascites  - HIDA 6/20/22: Normal hepatobiliary scan. No radionuclide evidence of acute   cholecystitis.  - avoid hepatotoxins  - GI consulted recs appreciated -TTE: EF 35-40%, LV hypokinesis 2/2 NSTEMI. [place], mid inferoseptal and mid anterolateral walls severely hypokinetic. Right ventricular enlargement. Biatrial enlargement. Moderate aortic stenosis. Moderate TR.  - patient with clear lungs exam but severe 3+ pitting edema b/l  - continue lasix 40mg BID with mindful watching of cr.   - would avoid IVF for now  - continue metoprolol succinate 50 mg qd  - Cardio [facility] consulted -TTE: EF 35-40%, LV hypokinesis 2/2 NSTEMI. [place], mid inferoseptal and mid anterolateral walls severely hypokinetic. Right ventricular enlargement. Biatrial enlargement. Moderate aortic stenosis. Moderate TR.  -Chronic Systolic CHF- 2/2 Recent MI  - patient with clear lungs exam but severe 3+ pitting edema b/l  - continue Lasix 40mg BID with mindful watching of cr.   - continue metoprolol succinate 50 mg qd  - Cardio [facility] consulted, I/O

## 2022-07-14 NOTE — ED ADULT NURSE NOTE - NSIMPLEMENTINTERV_GEN_ALL_ED
Implemented All Fall with Harm Risk Interventions:  [place] to call system. Call bell, personal items and telephone within reach. Instruct patient to call for assistance. Room bathroom lighting operational. Non-slip footwear when patient is off stretcher. Physically safe environment: no spills, clutter or unnecessary equipment. Stretcher in lowest position, wheels locked, appropriate side rails in place. Provide visual cue, wrist band, yellow gown, etc. Monitor gait and stability. Monitor for mental status changes and reorient to person, place, and time. Review medications for side effects contributing to fall risk. Reinforce activity limits and safety measures with patient and family. Provide visual clues: red socks.

## 2022-07-14 NOTE — H&P ADULT - PROBLEM SELECTOR PLAN 12
- hold coumadin in setting of ABLA, follow daily INR. SCD's for now. - hold coumadin in setting of Acute Blood Loss Anemia   follow daily INR. SCD's for now.

## 2022-07-14 NOTE — H&P ADULT - ATTENDING COMMENTS
89 y/o F with a pmhx b/l LE edema, HTN, P Afib (on coumadin), HFref (EF35-40%), hypothyroidism, recent admission for NSTEMI presents to the ED s/p mechanical fall at [facility] on coumadin. Found to have ABLA with right hematoma. Admit for pRBC transfusion.  Pt seen, examined, case & care plan d/w pt, residents at detail.  Consults:   Hematology: Dr Lundy for Anemia.  cardiology-Dr Calvert for recent MI, CHF  Nephrology- Dr MORENO for AKL/CKD  GI- DR Rhodes -for increase in LFT's    AM labs   PO diet  PT Eval  Social service Eval.  MOLST renewal for DNR/DNI

## 2022-07-14 NOTE — CONSULT NOTE ADULT - CONSULT REASON
LUIS CARLOS, LE edema
Afib off Coumadin due to ABLA, CAD with recent NSTEMI, HFrEF
elevated lfts
evaluation of anemia D64.89

## 2022-07-14 NOTE — CONSULT NOTE ADULT - SUBJECTIVE AND OBJECTIVE BOX
[facility]         Reid Astorga, Norah, Raheem, Enrike, Flex, Rossy        [phone] (office)    Reason for Consult: Afib off Coumadin due to ABLA, CAD with recent NSTEMI, HFrEF    Interval HPI: Patient seen and examined at bedside. Patient states she has R hip pain after sustaining a fall while at [facility]. Pt lost balance after taking a few steps out of the bathroom when she fell on her R side. Fall unwitnessed but denies LOC. Admits to possible headstrike. Denies precipitating symptoms including chest pain, palpitations, SOB, dizziness.    HPI:  89 y/o F with a pmhx b/l LE edema, HTN, P Afib (on coumadin), HFref (EF35-40%) and hypothyroidism presents to the ED s/p fall. Patient was admitted to hospitals 6/17-6/30/22 for NSTEMI, hospital course c/b iatrogenic flash pulmonary edema, cath canceled due to vol overload, discharged to [facility]. She was doing well at [facility] until today. She went to bathroom for BM, had difficulty to clean herself. She was left unattended by [facility] staff in bathroom, she stood up to take few steps, lost balance, and fell on right side striking her right hip, right arm, right elbow, right side of neck. She denies any prodromal symptoms. No lightheadedness, chest pain, warm sensation. No dyspnea. Patient never lost consciousness. She was found to have Hg 6.9 at [facility] facility, sent to hospitals ED for further evaluation. She denies any significant pain right now. Only complains of right thigh twitching, swelling.  No fevers, chills, cp, dypsnea, abdominal pain. Admits lower ext swelling which is chronic for her.     In the ED  VS: T97.3 Hr78 /63 RR 16 SPO2 95% on 3L NC  Labs: WBC 4.43, HH 7.6/22.1, PLTS 225, Na 140, K 4.2, Cr 1.7, Gluc 112, Alk phos 326, AST//99  Chest Xray: lungs clear  Head CT: Stable exam. No acute intracranial hemorrhage, vasogenic edema, extra-axial collection or calvarial fracture.  Cervical spine CT: No acute cervical spine fracture or evidence of traumatic malalignment. Cervical spondylosis  CT A/P non con: Extensive diffuse soft tissue edema/anasarca. Small right, trace of bilateral pleural fluid. Small-to-moderate amount of simple free fluid around the liver and spleen. No definite intraperitoneal hemorrhage.No noncontrast evidence of acute injury to the chest, abdomen or pelvis.Concern for hematoma around the right hip soft tissues, partially visualized. No evidence of hip fracture.Mild intralobular septal thickening, may reflect pulmonary edema. Areas of atelectatic changes and possible airways impaction  EKG: ordered, pending  Given in ED: 1 unit prbc   (14 Jul 2022 04:06)      PAST MEDICAL & SURGICAL HISTORY:  Hypothyroid      Hypertension      Lower extremity edema      Paroxysmal atrial fibrillation      No significant past surgical history          SOCIAL HISTORY: No active tobacco, alcohol or illicit drug use    FAMILY HISTORY:  No pertinent family history in first degree relatives        Home Medications:  aspirin 81 mg oral delayed release tablet: 1 tab(s) orally once a day (14 Jul 2022 04:17)  atorvastatin 20 mg oral tablet: 1 tab(s) orally once a day (14 Jul 2022 04:17)  clopidogrel 75 mg oral tablet: 1 tab(s) orally once a day (14 Jul 2022 04:17)  fluticasone 50 mcg/inh nasal spray: 1 spray(s) nasal 2 times a day (14 Jul 2022 04:17)  Lasix 40 mg oral tablet: 1 tab(s) orally 2 times a day (14 Jul 2022 04:17)  metoprolol succinate 50 mg oral tablet, extended release: 1 tab(s) orally once a day (14 Jul 2022 04:17)  sodium chloride 0.65% nasal spray: 2 spray(s) nasal 4 times a day (14 Jul 2022 04:17)  Synthroid 75 mcg (0.075 mg) oral tablet: 1 tab(s) orally once a day (14 Jul 2022 04:17)  warfarin 3 mg oral tablet: DO NOT Give Coumadin Today as INR 3.11, Check PT/INR on 7/1/22.Adjust Coumadin dose as per INR. start with Coumadin 2 mg   Keep INR 2-3    Pt&#x27;s HOME Coumadin schedule is as below  1 tab(s) orally once a day (M, Tu, Th, F, Sa Grider) at Bed time   -Pt use to Take Coumadin 4 mg on every Wednesday (14 Jul 2022 04:17)  zinc oxide 40% topical ointment: Apply topically to affected area 2 times a day (14 Jul 2022 04:17)      MEDICATIONS  (STANDING):  furosemide    Tablet 40 milliGRAM(s) Oral every 12 hours  levothyroxine 75 MICROGram(s) Oral daily  metoprolol succinate ER 50 milliGRAM(s) Oral daily    MEDICATIONS  (PRN):  aluminum hydroxide/magnesium hydroxide/simethicone Suspension 30 milliLiter(s) Oral every 4 hours PRN Dyspepsia  melatonin 3 milliGRAM(s) Oral at bedtime PRN Insomnia  ondansetron Injectable 4 milliGRAM(s) IV Push every 8 hours PRN Nausea and/or Vomiting      Allergies    No Known Allergies    Intolerances        REVIEW OF SYSTEMS: Negative except as per HPI.    VITAL SIGNS:   Vital Signs Last 24 Hrs  T(C): 36.6 (14 Jul 2022 08:10), Max: 36.6 (14 Jul 2022 06:49)  T(F): 97.9 (14 Jul 2022 08:10), Max: 97.9 (14 Jul 2022 08:10)  HR: 63 (14 Jul 2022 08:10) (63 - 78)  BP: 109/53 (14 Jul 2022 08:10) (103/63 - 112/61)  BP(mean): --  RR: 17 (14 Jul 2022 08:10) (16 - 17)  SpO2: 95% (14 Jul 2022 08:10) (95% - 98%)    Parameters below as of 14 Jul 2022 08:10  Patient On (Oxygen Delivery Method): nasal cannula  O2 Flow (L/min): 3      I&O's Summary      PHYSICAL EXAM:  Constitutional: NAD, appears stated age  HEENT NC/AT, moist mucous membranes  Pulmonary: Non-labored, breath sounds are clear bilaterally, no wheezing, rales or rhonchi  Cardiovascular: +S1, S2, irregular rhythm, regular rate, grade III crescendo decrescendo murmur best auscultated L 2nd ICS  Gastrointestinal: Soft, nontender, nondistended, normoactive bowel sounds  Extremities: +3 pitting edema b/l LE up to knees  Neurological: Alert, strength and sensitivity are grossly intact  Skin: ecchymotic regions R anterior neck, RUE, R hip  Psych: Mood & affect appropriate    LABS: All Labs Reviewed:                        7.7    4.47  )-----------( 208      ( 14 Jul 2022 08:40 )             23.1                         7.6    4.43  )-----------( 225      ( 14 Jul 2022 00:15 )             22.1     14 Jul 2022 08:40    141    |  105    |  34     ----------------------------<  87     3.9     |  34     |  1.70   14 Jul 2022 00:15    140    |  104    |  37     ----------------------------<  112    4.2     |  28     |  1.70     Ca    7.9        14 Jul 2022 08:40  Ca    7.7        14 Jul 2022 00:15  Phos  4.2       14 Jul 2022 08:40  Mg     2.3       14 Jul 2022 08:40    TPro  6.3    /  Alb  1.8    /  TBili  3.2    /  DBili  x      /  AST  163    /  ALT  94     /  AlkPhos  315    14 Jul 2022 08:40  TPro  6.8    /  Alb  1.9    /  TBili  3.0    /  DBili  x      /  AST  188    /  ALT  99     /  AlkPhos  326    14 Jul 2022 00:15    PT/INR - ( 14 Jul 2022 08:40 )   PT: 38.8 sec;   INR: 3.28 ratio         PTT - ( 14 Jul 2022 00:15 )  PTT:34.8 sec      Blood Culture:     07-14 @ 08:40  TSH: 2.14      EKG: Afib 69, LAD, LBBB    < from: CT Hip No Cont, Right (07.14.22 @ 06:24) >    ACC: [id number] EXAM:  CT 3D RECONSTRUCT SAMARIA CANALES                        ACC: [id number] EXAM:  CT HIP ONLY RT                          PROCEDURE DATE:  07/14/2022          INTERPRETATION:  INDICATION: Fall    TECHNIQUE: CT of the pelvis/right hip without contrast with axial,   sagittal, and coronal multiplanar reformats. 3D advanced post-processing   was performed.    Comparison:Same day abdomen/pelvis CT.    FINDINGS:    No acute displaced fracture or dislocation. Mild compression deformity of   L4 with vertebroplasty changes. Degenerative changes within the lower   lumbar spine, sacroiliac joints, and pubic symphysis. Mild bilateral hip   arthrosis.    Subcutaneous hematoma along the lateral aspect of the right hip,   measuring 8 x 3.4 cm transaxially. Diffuse subcutaneous edema about the   pelvis.    Small volume ascites, partially evaluated. Colonic diverticulosis.   Atherosclerotic calcifications are noted.    IMPRESSION:    No acute displaced fracture or dislocation within the pelvis.    Subcutaneous hematoma along the lateral aspect of the right hip.    Mild compression deformity of L4 with vertebroplasty changes.    Small volume ascites, partially evaluated.    Anasarca.    --- End of Report ---      < end of copied text >  < from: CT Cervical Spine No Cont (07.14.22 @ 01:49) >  ACC: [id number] EXAM:  CT CERVICAL SPINE                        ACC: [id number] EXAM:  CT BRAIN                          PROCEDURE DATE:  07/14/2022          INTERPRETATION:  CLINICAL HISTORY: Trauma. Status post fall.    TECHNIQUE: A noncontrast head CT and a noncontrast cervical spine CT were   performed. The head CT was performed with contiguous 5 mm transaxial   images from the skull base to vertex. Images were reviewed in brain,   stroke, subdural and bone windows.  The cervical spine CT was performed with transaxial thin section images   from the occiput to the T3 level.  Coronal and sagittal reformatted   images were created from the transaxial source data.  Images were   reviewed in bone and soft tissue windows.    COMPARISON: CT head 6/17/2022    FINDINGS:    Head CT:  There is no acute intracranial hemorrhage, vasogenic edema or evidence   for acute large vascular territory infarct. Stable patchy areas of   low-attenuation in the bihemispheric white matter, nonspecific, but   likely the sequela of chronic microvascular change.    The ventricles, sulci and cisternal spaces are stable in size   configuration demonstrating cerebral volume loss. Stable prominence of   bifrontal extra-axial space which could be due to disproportionate   cerebral volume loss. There is no midline shift or abnormal extra-axial   fluid collection.    The calvarium is intact.  There are no osteoblastic or lytic calvarial or   skull base lesions. The paranasal sinuses are clear. Bilateral mastoid   effusions.    Cervical spine CT:  Straightening of the normal cervical lordosis. There are no acute   fractures or evidence of traumatic malalignment. The vertebral body   heights are maintained. Multilevel facet alignment is preserved. The   atlanto-dental interval is within normal limits and the craniocervical   junction is unremarkable. Is generalized osteopenia without suspicious   osteoblastic or lytic lesions.    There is no evidence of prevertebral soft tissue swelling or epidural   hematoma.    Multilevel degenerative changes including intervertebral disc space   narrowing, disc osteophyte complexes and facet arthropathy. Findings   contribute to multilevel canal and foraminal stenosis, the degree of   which is suboptimally assessed on this modality.    No acute finding in the visualized soft tissues of the neck. Biapical   scarring.    IMPRESSION:  Head CT: Stable exam. No acute intracranial hemorrhage, vasogenic edema,   extra-axial collection or calvarial fracture.    Cervical spine CT: No acute cervical spine fracture or evidence of   traumatic malalignment. Cervical spondylosis    --- End of Report ---            < end of copied text >  < from: CT Chest No Cont (07.14.22 @ 01:45) >    ACC: [id number] EXAM:  CT ABDOMEN AND PELVIS                        ACC: [id number] EXAM:  CT CHEST                          PROCEDURE DATE:  07/14/2022          INTERPRETATION:  CLINICAL INFORMATION: Status post fall 7/11/2022. On   Coumadin. Low H/H.    COMPARISON: None.    CONTRAST/COMPLICATIONS:  IV Contrast: NONE  Oral Contrast: NONE  Complications: None reported at time of study completion    PROCEDURE:  CT of the Chest, Abdomen and Pelvis was performed.  Sagittal and coronal reformats were performed. No IV contrast was   administered secondary to patient's renal function.    FINDINGS:  CHEST:  LUNGS AND LARGE AIRWAYS: Patent central airways. 5 mm left upper lobe   pulmonary nodule. Linear atelectasis in the lingula, as well as areas of   dependent atelectasis of the bilateral lower lobes. Mild interlobular   septal thickening, suggestive of pulmonary edema. Tiny branching   opacities in the periphery of the right middle lobe and at the right lung   apex. Mild peripheral consolidationin the right middle lobe, possibly   infectious or inflammatory.  PLEURA: Small right, trace left pleural fluid.  VESSELS: Normal caliber aorta with extensive calcifications. Coronary   artery calcifications.  HEART: Heart size is normal. No pericardial effusion.  MEDIASTINUM AND WILLIAN: Nonspecific subcentimeter mediastinal lymph nodes.  CHEST WALL AND LOWER NECK: Diffuse soft tissue edema.    ABDOMEN AND PELVIS:  Streak artifact from the patient's arms.  LIVER: Grossly unremarkable noncontrast appearance  BILE DUCTS: Normal caliber.  GALLBLADDER: Cholelithiasis.  SPLEEN: Within normal limits.  PANCREAS: Within normal limits.  ADRENALS: Within normal limits.  KIDNEYS/URETERS: Question punctate nonobstructing calculus versus   vascular calcification in the left upper kidney. Otherwise within normal   limits.    BLADDER: Within normal limits.  REPRODUCTIVE ORGANS: Uterus and adnexa within normal limits.   Calcification of the uterine fundus likely fibroid.    BOWEL: No bowel obstruction. Extensive sigmoid diverticula.  PERITONEUM: Moderate amount of simple fluid around the dome of the liver   and spleen extending into the paracolic gutters.  VESSELS: Extensive atherosclerotic calcifications. Grossly normal caliber   of the abdominal aorta.  RETROPERITONEUM/LYMPH NODES: No lymphadenopathy.  ABDOMINAL WALL: Extensive diffuse soft tissue edema/anasarca. More focal   area of hyperdensity in the soft tissues of the lateral aspect of the   right hip is partially visualized, concerning for hematoma, given history   of trauma.  BONES: No acute fractures. Loss of vertebral body height at the L4   superior endplate with associated hyperdensity likely related to prior   kyphoplasty.    IMPRESSION:  Extensive diffuse soft tissue edema/anasarca. Small right, trace of   bilateral pleural fluid. Small-to-moderate amount of simple free fluid   around the liver and spleen. No definite intraperitoneal hemorrhage.    No noncontrast evidence of acute injury to the chest, abdomen or pelvis.    Concern for hematoma around the right hip soft tissues, partially   visualized. No evidence of hip fracture.    Mild intralobular septal thickening, may reflect pulmonary edema. Areas   of atelectatic changes and possible airways impaction.    Findings were discussed with Dr. MARIO ALBERTO MCKEON [phone] 7/14/2022 2:38 AM by   Dr. Castillo with read back confirmation.    --- End of Report ---    < end of copied text >   [facility]         Reid Astorga, Norah, Raheem, Enrike, Flex, Rossy        [phone] (office)    Reason for Consult: Afib off Coumadin due to ABLA, CAD with recent NSTEMI, HFrEF    Interval HPI: Patient seen and examined at bedside. Patient states she has R hip pain after sustaining a fall while at [facility]. Pt lost balance after taking a few steps out of the bathroom when she fell on her R side. Fall unwitnessed but denies LOC. Admits to possible headstrike. Denies precipitating symptoms including chest pain, palpitations, SOB, dizziness.    HPI:  89 y/o F with a pmhx b/l LE edema, HTN, P Afib (on coumadin), HFref (EF35-40%) and hypothyroidism presents to the ED s/p fall. Patient was admitted to [facility] 6/17-6/30/22 for NSTEMI, hospital course c/b iatrogenic flash pulmonary edema, cath canceled due to vol overload, discharged to [facility]. She was doing well at [facility] until today. She went to bathroom for BM, had difficulty to clean herself. She was left unattended by [facility] staff in bathroom, she stood up to take few steps, lost balance, and fell on right side striking her right hip, right arm, right elbow, right side of neck. She denies any prodromal symptoms. No lightheadedness, chest pain, warm sensation. No dyspnea. Patient never lost consciousness. She was found to have Hg 6.9 at [facility] facility, sent to [facility] ED for further evaluation. She denies any significant pain right now. Only complains of right thigh twitching, swelling.  No fevers, chills, cp, dypsnea, abdominal pain. Admits lower ext swelling which is chronic for her.     In the ED  VS: T97.3 Hr78 /63 RR 16 SPO2 95% on 3L NC  Labs: WBC 4.43, HH 7.6/22.1, PLTS 225, Na 140, K 4.2, Cr 1.7, Gluc 112, Alk phos 326, AST//99  Chest Xray: lungs clear  Head CT: Stable exam. No acute intracranial hemorrhage, vasogenic edema, extra-axial collection or calvarial fracture.  Cervical spine CT: No acute cervical spine fracture or evidence of traumatic malalignment. Cervical spondylosis  CT A/P non con: Extensive diffuse soft tissue edema/anasarca. Small right, trace of bilateral pleural fluid. Small-to-moderate amount of simple free fluid around the liver and spleen. No definite intraperitoneal hemorrhage.No noncontrast evidence of acute injury to the chest, abdomen or pelvis.Concern for hematoma around the right hip soft tissues, partially visualized. No evidence of hip fracture.Mild intralobular septal thickening, may reflect pulmonary edema. Areas of atelectatic changes and possible airways impaction  EKG: ordered, pending  Given in ED: 1 unit prbc   (14 Jul 2022 04:06)      PAST MEDICAL & SURGICAL HISTORY:  Hypothyroid      Hypertension      Lower extremity edema      Paroxysmal atrial fibrillation      No significant past surgical history          SOCIAL HISTORY: No active tobacco, alcohol or illicit drug use    FAMILY HISTORY:  No pertinent family history in first degree relatives        Home Medications:  aspirin 81 mg oral delayed release tablet: 1 tab(s) orally once a day (14 Jul 2022 04:17)  atorvastatin 20 mg oral tablet: 1 tab(s) orally once a day (14 Jul 2022 04:17)  clopidogrel 75 mg oral tablet: 1 tab(s) orally once a day (14 Jul 2022 04:17)  fluticasone 50 mcg/inh nasal spray: 1 spray(s) nasal 2 times a day (14 Jul 2022 04:17)  Lasix 40 mg oral tablet: 1 tab(s) orally 2 times a day (14 Jul 2022 04:17)  metoprolol succinate 50 mg oral tablet, extended release: 1 tab(s) orally once a day (14 Jul 2022 04:17)  sodium chloride 0.65% nasal spray: 2 spray(s) nasal 4 times a day (14 Jul 2022 04:17)  Synthroid 75 mcg (0.075 mg) oral tablet: 1 tab(s) orally once a day (14 Jul 2022 04:17)  warfarin 3 mg oral tablet: DO NOT Give Coumadin Today as INR 3.11, Check PT/INR on 7/1/22.Adjust Coumadin dose as per INR. start with Coumadin 2 mg   Keep INR 2-3    Pt&#x27;s HOME Coumadin schedule is as below  1 tab(s) orally once a day (M, Tu, Th, F, Sa Grider) at Bed time   -Pt use to Take Coumadin 4 mg on every Wednesday (14 Jul 2022 04:17)  zinc oxide 40% topical ointment: Apply topically to affected area 2 times a day (14 Jul 2022 04:17)      MEDICATIONS  (STANDING):  furosemide    Tablet 40 milliGRAM(s) Oral every 12 hours  levothyroxine 75 MICROGram(s) Oral daily  metoprolol succinate ER 50 milliGRAM(s) Oral daily    MEDICATIONS  (PRN):  aluminum hydroxide/magnesium hydroxide/simethicone Suspension 30 milliLiter(s) Oral every 4 hours PRN Dyspepsia  melatonin 3 milliGRAM(s) Oral at bedtime PRN Insomnia  ondansetron Injectable 4 milliGRAM(s) IV Push every 8 hours PRN Nausea and/or Vomiting      Allergies    No Known Allergies    Intolerances        REVIEW OF SYSTEMS: Negative except as per HPI.    VITAL SIGNS:   Vital Signs Last 24 Hrs  T(C): 36.6 (14 Jul 2022 08:10), Max: 36.6 (14 Jul 2022 06:49)  T(F): 97.9 (14 Jul 2022 08:10), Max: 97.9 (14 Jul 2022 08:10)  HR: 63 (14 Jul 2022 08:10) (63 - 78)  BP: 109/53 (14 Jul 2022 08:10) (103/63 - 112/61)  BP(mean): --  RR: 17 (14 Jul 2022 08:10) (16 - 17)  SpO2: 95% (14 Jul 2022 08:10) (95% - 98%)    Parameters below as of 14 Jul 2022 08:10  Patient On (Oxygen Delivery Method): nasal cannula  O2 Flow (L/min): 3      I&O's Summary      PHYSICAL EXAM:  Constitutional: NAD, appears stated age  HEENT NC/AT, moist mucous membranes, +JVD  Pulmonary: Non-labored, breath sounds are clear bilaterally, no wheezing, rales or rhonchi  Cardiovascular: +S1, S2, irregular rhythm, regular rate, grade III crescendo decrescendo murmur best auscultated L 2nd ICS  Gastrointestinal: Soft, nontender, nondistended, normoactive bowel sounds  Extremities: +3 pitting edema b/l LE up to knees  Neurological: Alert, strength and sensitivity are grossly intact  Skin: ecchymotic regions R anterior neck, RUE, R hip  Psych: Mood & affect appropriate    LABS: All Labs Reviewed:                        7.7    4.47  )-----------( 208      ( 14 Jul 2022 08:40 )             23.1                         7.6    4.43  )-----------( 225      ( 14 Jul 2022 00:15 )             22.1     14 Jul 2022 08:40    141    |  105    |  34     ----------------------------<  87     3.9     |  34     |  1.70   14 Jul 2022 00:15    140    |  104    |  37     ----------------------------<  112    4.2     |  28     |  1.70     Ca    7.9        14 Jul 2022 08:40  Ca    7.7        14 Jul 2022 00:15  Phos  4.2       14 Jul 2022 08:40  Mg     2.3       14 Jul 2022 08:40    TPro  6.3    /  Alb  1.8    /  TBili  3.2    /  DBili  x      /  AST  163    /  ALT  94     /  AlkPhos  315    14 Jul 2022 08:40  TPro  6.8    /  Alb  1.9    /  TBili  3.0    /  DBili  x      /  AST  188    /  ALT  99     /  AlkPhos  326    14 Jul 2022 00:15    PT/INR - ( 14 Jul 2022 08:40 )   PT: 38.8 sec;   INR: 3.28 ratio         PTT - ( 14 Jul 2022 00:15 )  PTT:34.8 sec      Blood Culture:     07-14 @ 08:40  TSH: 2.14      EKG: Afib 69, LAD, LBBB    < from: CT Hip No Cont, Right (07.14.22 @ 06:24) >    ACC: [id number] EXAM:  CT 3D RECONSTRUCT SAMARIA CANALES                        ACC: [id number] EXAM:  CT HIP ONLY RT                          PROCEDURE DATE:  07/14/2022          INTERPRETATION:  INDICATION: Fall    TECHNIQUE: CT of the pelvis/right hip without contrast with axial,   sagittal, and coronal multiplanar reformats. 3D advanced post-processing   was performed.    Comparison:Same day abdomen/pelvis CT.    FINDINGS:    No acute displaced fracture or dislocation. Mild compression deformity of   L4 with vertebroplasty changes. Degenerative changes within the lower   lumbar spine, sacroiliac joints, and pubic symphysis. Mild bilateral hip   arthrosis.    Subcutaneous hematoma along the lateral aspect of the right hip,   measuring 8 x 3.4 cm transaxially. Diffuse subcutaneous edema about the   pelvis.    Small volume ascites, partially evaluated. Colonic diverticulosis.   Atherosclerotic calcifications are noted.    IMPRESSION:    No acute displaced fracture or dislocation within the pelvis.    Subcutaneous hematoma along the lateral aspect of the right hip.    Mild compression deformity of L4 with vertebroplasty changes.    Small volume ascites, partially evaluated.    Anasarca.    --- End of Report ---      < end of copied text >  < from: CT Cervical Spine No Cont (07.14.22 @ 01:49) >  ACC: [id number] EXAM:  CT CERVICAL SPINE                        ACC: [id number] EXAM:  CT BRAIN                          PROCEDURE DATE:  07/14/2022          INTERPRETATION:  CLINICAL HISTORY: Trauma. Status post fall.    TECHNIQUE: A noncontrast head CT and a noncontrast cervical spine CT were   performed. The head CT was performed with contiguous 5 mm transaxial   images from the skull base to vertex. Images were reviewed in brain,   stroke, subdural and bone windows.  The cervical spine CT was performed with transaxial thin section images   from the occiput to the T3 level.  Coronal and sagittal reformatted   images were created from the transaxial source data.  Images were   reviewed in bone and soft tissue windows.    COMPARISON: CT head 6/17/2022    FINDINGS:    Head CT:  There is no acute intracranial hemorrhage, vasogenic edema or evidence   for acute large vascular territory infarct. Stable patchy areas of   low-attenuation in the bihemispheric white matter, nonspecific, but   likely the sequela of chronic microvascular change.    The ventricles, sulci and cisternal spaces are stable in size   configuration demonstrating cerebral volume loss. Stable prominence of   bifrontal extra-axial space which could be due to disproportionate   cerebral volume loss. There is no midline shift or abnormal extra-axial   fluid collection.    The calvarium is intact.  There are no osteoblastic or lytic calvarial or   skull base lesions. The paranasal sinuses are clear. Bilateral mastoid   effusions.    Cervical spine CT:  Straightening of the normal cervical lordosis. There are no acute   fractures or evidence of traumatic malalignment. The vertebral body   heights are maintained. Multilevel facet alignment is preserved. The   atlanto-dental interval is within normal limits and the craniocervical   junction is unremarkable. Is generalized osteopenia without suspicious   osteoblastic or lytic lesions.    There is no evidence of prevertebral soft tissue swelling or epidural   hematoma.    Multilevel degenerative changes including intervertebral disc space   narrowing, disc osteophyte complexes and facet arthropathy. Findings   contribute to multilevel canal and foraminal stenosis, the degree of   which is suboptimally assessed on this modality.    No acute finding in the visualized soft tissues of the neck. Biapical   scarring.    IMPRESSION:  Head CT: Stable exam. No acute intracranial hemorrhage, vasogenic edema,   extra-axial collection or calvarial fracture.    Cervical spine CT: No acute cervical spine fracture or evidence of   traumatic malalignment. Cervical spondylosis    --- End of Report ---            < end of copied text >  < from: CT Chest No Cont (07.14.22 @ 01:45) >    ACC: [id number] EXAM:  CT ABDOMEN AND PELVIS                        ACC: [id number] EXAM:  CT CHEST                          PROCEDURE DATE:  07/14/2022          INTERPRETATION:  CLINICAL INFORMATION: Status post fall 7/11/2022. On   Coumadin. Low H/H.    COMPARISON: None.    CONTRAST/COMPLICATIONS:  IV Contrast: NONE  Oral Contrast: NONE  Complications: None reported at time of study completion    PROCEDURE:  CT of the Chest, Abdomen and Pelvis was performed.  Sagittal and coronal reformats were performed. No IV contrast was   administered secondary to patient's renal function.    FINDINGS:  CHEST:  LUNGS AND LARGE AIRWAYS: Patent central airways. 5 mm left upper lobe   pulmonary nodule. Linear atelectasis in the lingula, as well as areas of   dependent atelectasis of the bilateral lower lobes. Mild interlobular   septal thickening, suggestive of pulmonary edema. Tiny branching   opacities in the periphery of the right middle lobe and at the right lung   apex. Mild peripheral consolidationin the right middle lobe, possibly   infectious or inflammatory.  PLEURA: Small right, trace left pleural fluid.  VESSELS: Normal caliber aorta with extensive calcifications. Coronary   artery calcifications.  HEART: Heart size is normal. No pericardial effusion.  MEDIASTINUM AND WILLIAN: Nonspecific subcentimeter mediastinal lymph nodes.  CHEST WALL AND LOWER NECK: Diffuse soft tissue edema.    ABDOMEN AND PELVIS:  Streak artifact from the patient's arms.  LIVER: Grossly unremarkable noncontrast appearance  BILE DUCTS: Normal caliber.  GALLBLADDER: Cholelithiasis.  SPLEEN: Within normal limits.  PANCREAS: Within normal limits.  ADRENALS: Within normal limits.  KIDNEYS/URETERS: Question punctate nonobstructing calculus versus   vascular calcification in the left upper kidney. Otherwise within normal   limits.    BLADDER: Within normal limits.  REPRODUCTIVE ORGANS: Uterus and adnexa within normal limits.   Calcification of the uterine fundus likely fibroid.    BOWEL: No bowel obstruction. Extensive sigmoid diverticula.  PERITONEUM: Moderate amount of simple fluid around the dome of the liver   and spleen extending into the paracolic gutters.  VESSELS: Extensive atherosclerotic calcifications. Grossly normal caliber   of the abdominal aorta.  RETROPERITONEUM/LYMPH NODES: No lymphadenopathy.  ABDOMINAL WALL: Extensive diffuse soft tissue edema/anasarca. More focal   area of hyperdensity in the soft tissues of the lateral aspect of the   right hip is partially visualized, concerning for hematoma, given history   of trauma.  BONES: No acute fractures. Loss of vertebral body height at the L4   superior endplate with associated hyperdensity likely related to prior   kyphoplasty.    IMPRESSION:  Extensive diffuse soft tissue edema/anasarca. Small right, trace of   bilateral pleural fluid. Small-to-moderate amount of simple free fluid   around the liver and spleen. No definite intraperitoneal hemorrhage.    No noncontrast evidence of acute injury to the chest, abdomen or pelvis.    Concern for hematoma around the right hip soft tissues, partially   visualized. No evidence of hip fracture.    Mild intralobular septal thickening, may reflect pulmonary edema. Areas   of atelectatic changes and possible airways impaction.    Findings were discussed with Dr. MARIO ALBERTO MCKEON [phone] 7/14/2022 2:38 AM by   Dr. Castillo with read back confirmation.    --- End of Report ---    < end of copied text >

## 2022-07-14 NOTE — PROGRESS NOTE ADULT - PROBLEM SELECTOR PLAN 5
-TTE: EF 35-40%, LV hypokinesis 2/2 NSTEMI. [place], mid inferoseptal and mid anterolateral walls severely hypokinetic. Right ventricular enlargement. Biatrial enlargement. Moderate aortic stenosis. Moderate TR.  - patient with chronic LE edema, currently 3+ pitting edema, +JVD  -pt received Lasix 20mg IV x 1 following transfusion of 1u pRBC  - Hold home po Lasix; dose Lasix IV daily pending fluid status  - Consider diuresing with Lasix IV with additional blood transfusions with close monitoring of renal function (baseline Cr 1.2-1.3) and BP   - maintain net negative balance; strict I/Os, daily weights  - can continue supplemental O2 as needed, currently on 3L SpO2 > 90% (not on home O2), would wean as tolerated  - would avoid IVF for now  - continue metoprolol succinate 50 mg qd  - Cardio Gauri group following chronic, stable - rate-controlled   - on coumadin, holding for Acute Anemia  - Toprol  XL 50mg PO qd w/ hold parameters  - TTE: EF 35-40%, LV hypokinesis  - cardio Dr. charles group following

## 2022-07-14 NOTE — PROGRESS NOTE ADULT - PROBLEM SELECTOR PLAN 7
chronic, stable - rate-controlled   - on coumadin, holding for ABLA  - Toprol  XL 50mg PO qd w/ hold parameters  - TTE: EF 35-40%, LV hypokinesis  - cardio Dr. charles group following Unclear etiology. had workup during last admission. presumably multifactorial due to CHF vs meds. Trending down.  - RUQ us 6/30: Examination limited secondary to overlying bowel gas. Cholelithiasis with gallbladder wall thickening and small amount of pericholecystic fluid; clinical correlation is recommended for acute cholecystitis; a HIDA scan could be obtained for further evaluation. Common bile duct measures 9 mm which is within normal limits for age, however correlation is recommended with LFTs and MRI/MRCP of the abdomen   should be considered for further evaluation.Small amount of right upper quadrant ascites  - HIDA 6/20/22: Normal hepatobiliary scan. No radionuclide evidence of acute   cholecystitis.  - avoid hepatotoxins  - GI consulted recs appreciated

## 2022-07-14 NOTE — H&P ADULT - PROBLEM SELECTOR PLAN 2
- HH 7.6/22 on admission, HH 6.9 at [facility] prior to admission. stable. Last discharge was 10.4/31 on 6/30/22  - likely due to ABLA s/p mechanical fall  - bleed appears stabilized. no need for coumadin reversal agents for now  - patient on coumadin for afib. Hold in setting of bleed  - daily INR  - check FOBT  - check iron studies, b12, folate level  - check retic count  - type and screen, blood consent in chart  - 1 u prbc transfusing in ED. with lasix 20mg IVP to follow. f/u repeat cbc - HH 7.6/22 on admission, HH 6.9 at [facility] prior to admission. stable. Last discharge was 10.4/31 on 6/30/22  - likely due to ABLA s/p mechanical fall  - bleed appears stabilized. no need for coumadin reversal agents for now  - patient on coumadin for afib. Hold in setting of bleed  - daily INR  - check iron studies, b12, folate level  - check retic count  - type and screen, blood consent in chart  - 1 u prbc transfusing in ED. with lasix 20mg IVP to follow. f/u repeat cbc - HH 7.6/22 on admission, HH 6.9 at [facility] prior to admission. stable. Last discharge was 10.4/31 on 6/30/22  - likely due to ABLA s/p mechanical fall  - bleed appears stabilized. no need for coumadin reversal agents for now  - patient on coumadin for afib. Hold in setting of bleed  - daily INR  - type and screen, blood consent in chart  - 1 u prbc transfusing in ED. with lasix 20mg IVP to follow. f/u repeat cbc - HH 7.6/22 on admission, HH 6.9 at [facility] prior to admission. stable. Last discharge was 10.4/31 on 6/30/22  - likely due to ABLA s/p mechanical fall  - bleed appears stabilized. no need for coumadin reversal agents for now  - patient on coumadin for afib. Hold in setting of bleed  - daily INR  - type and screen, blood consent in chart  - 1 u prbc transfusing in ED. with lasix 20mg IVP to follow. f/u repeat cbc  - heme/onc Dr. Lundy consulted - presenting from [facility] s/p mechanical fall on right side while in bathroom striking right hip, right arm, right elbow, right anterior neck. Patient on coumadin for hx of afib.  - Head CT: Stable exam. No acute intracranial hemorrhage, vasogenic edema, extra-axial collection or calvarial fracture.  - Cervical spine CT: No acute cervical spine fracture or evidence of traumatic malalignment. Cervical spondylosis  - CT A/P non con: Extensive diffuse soft tissue edema/anasarca. Small right, trace of bilateral pleural fluid. Small-to-moderate amount of simple free fluid around the liver and spleen. No definite intraperitoneal hemorrhage. No noncontrast evidence of acute injury to the chest, abdomen or pelvis .Concern for hematoma around the right hip soft tissues, partially visualized. No evidence of hip fracture .Mild intralobular septal thickening, may reflect pulmonary edema. Areas of atelectatic changes and possible airways impaction  - check CT right hip non con  - hold coumadin in setting of bleed  - xray hip, right elbow ordered, f/u  - PT consult

## 2022-07-14 NOTE — PROGRESS NOTE ADULT - ASSESSMENT
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 Found to have ABLA with right hematoma. Admit for prbc transfusion.

## 2022-07-14 NOTE — ED ADULT NURSE REASSESSMENT NOTE - NS ED NURSE REASSESS COMMENT FT1
1355:  second unit of PRBC initiated.  vitals taken.  MD at bedside discussing plan of care with patient and daughter.  Patient ate lunch.  she is comfortable at this time.  no distress noted.  will continue to monitor.  carrie maya.

## 2022-07-15 LAB
ALBUMIN SERPL ELPH-MCNC: 2 G/DL — LOW (ref 3.3–5)
ALP SERPL-CCNC: 293 U/L — HIGH (ref 40–120)
ALT FLD-CCNC: 85 U/L — HIGH (ref 12–78)
ANION GAP SERPL CALC-SCNC: 7 MMOL/L — SIGNIFICANT CHANGE UP (ref 5–17)
APPEARANCE UR: CLEAR — SIGNIFICANT CHANGE UP
AST SERPL-CCNC: 149 U/L — HIGH (ref 15–37)
BACTERIA # UR AUTO: ABNORMAL
BASOPHILS # BLD AUTO: 0.02 K/UL — SIGNIFICANT CHANGE UP (ref 0–0.2)
BASOPHILS NFR BLD AUTO: 0.5 % — SIGNIFICANT CHANGE UP (ref 0–2)
BILIRUB SERPL-MCNC: 3.4 MG/DL — HIGH (ref 0.2–1.2)
BILIRUB UR-MCNC: NEGATIVE — SIGNIFICANT CHANGE UP
BUN SERPL-MCNC: 37 MG/DL — HIGH (ref 7–23)
CALCIUM SERPL-MCNC: 8 MG/DL — LOW (ref 8.5–10.1)
CHLORIDE SERPL-SCNC: 105 MMOL/L — SIGNIFICANT CHANGE UP (ref 96–108)
CO2 SERPL-SCNC: 29 MMOL/L — SIGNIFICANT CHANGE UP (ref 22–31)
COLOR SPEC: YELLOW — SIGNIFICANT CHANGE UP
CREAT ?TM UR-MCNC: 65 MG/DL — SIGNIFICANT CHANGE UP
CREAT SERPL-MCNC: 1.5 MG/DL — HIGH (ref 0.5–1.3)
DIFF PNL FLD: NEGATIVE — SIGNIFICANT CHANGE UP
EGFR: 32 ML/MIN/1.73M2 — LOW
EOSINOPHIL # BLD AUTO: 0.15 K/UL — SIGNIFICANT CHANGE UP (ref 0–0.5)
EOSINOPHIL NFR BLD AUTO: 3.8 % — SIGNIFICANT CHANGE UP (ref 0–6)
EPI CELLS # UR: SIGNIFICANT CHANGE UP
GLUCOSE SERPL-MCNC: 83 MG/DL — SIGNIFICANT CHANGE UP (ref 70–99)
GLUCOSE UR QL: NEGATIVE — SIGNIFICANT CHANGE UP
HCT VFR BLD CALC: 25.3 % — LOW (ref 34.5–45)
HGB BLD-MCNC: 8.6 G/DL — LOW (ref 11.5–15.5)
IMM GRANULOCYTES NFR BLD AUTO: 0.5 % — SIGNIFICANT CHANGE UP (ref 0–1.5)
INR BLD: 2.77 RATIO — HIGH (ref 0.88–1.16)
KETONES UR-MCNC: NEGATIVE — SIGNIFICANT CHANGE UP
LEUKOCYTE ESTERASE UR-ACNC: ABNORMAL
LYMPHOCYTES # BLD AUTO: 1.3 K/UL — SIGNIFICANT CHANGE UP (ref 1–3.3)
LYMPHOCYTES # BLD AUTO: 32.7 % — SIGNIFICANT CHANGE UP (ref 13–44)
MAGNESIUM SERPL-MCNC: 2.4 MG/DL — SIGNIFICANT CHANGE UP (ref 1.6–2.6)
MCHC RBC-ENTMCNC: 29.4 PG — SIGNIFICANT CHANGE UP (ref 27–34)
MCHC RBC-ENTMCNC: 34 GM/DL — SIGNIFICANT CHANGE UP (ref 32–36)
MCV RBC AUTO: 86.3 FL — SIGNIFICANT CHANGE UP (ref 80–100)
MONOCYTES # BLD AUTO: 0.61 K/UL — SIGNIFICANT CHANGE UP (ref 0–0.9)
MONOCYTES NFR BLD AUTO: 15.4 % — HIGH (ref 2–14)
NEUTROPHILS # BLD AUTO: 1.87 K/UL — SIGNIFICANT CHANGE UP (ref 1.8–7.4)
NEUTROPHILS NFR BLD AUTO: 47.1 % — SIGNIFICANT CHANGE UP (ref 43–77)
NITRITE UR-MCNC: NEGATIVE — SIGNIFICANT CHANGE UP
NRBC # BLD: 0 /100 WBCS — SIGNIFICANT CHANGE UP (ref 0–0)
PH UR: 5 — SIGNIFICANT CHANGE UP (ref 5–8)
PHOSPHATE SERPL-MCNC: 4.6 MG/DL — HIGH (ref 2.5–4.5)
PLATELET # BLD AUTO: 217 K/UL — SIGNIFICANT CHANGE UP (ref 150–400)
POTASSIUM SERPL-MCNC: 3.7 MMOL/L — SIGNIFICANT CHANGE UP (ref 3.5–5.3)
POTASSIUM SERPL-SCNC: 3.7 MMOL/L — SIGNIFICANT CHANGE UP (ref 3.5–5.3)
PROT SERPL-MCNC: 6.5 G/DL — SIGNIFICANT CHANGE UP (ref 6–8.3)
PROT UR-MCNC: 15
PROTHROM AB SERPL-ACNC: 32.7 SEC — HIGH (ref 10.5–13.4)
RBC # BLD: 2.93 M/UL — LOW (ref 3.8–5.2)
RBC # FLD: 20 % — HIGH (ref 10.3–14.5)
RBC CASTS # UR COMP ASSIST: SIGNIFICANT CHANGE UP /HPF (ref 0–4)
SODIUM SERPL-SCNC: 141 MMOL/L — SIGNIFICANT CHANGE UP (ref 135–145)
SODIUM UR-SCNC: 38 MMOL/L — SIGNIFICANT CHANGE UP
SP GR SPEC: 1.01 — SIGNIFICANT CHANGE UP (ref 1.01–1.02)
UROBILINOGEN FLD QL: 1
UUN UR-MCNC: 465 MG/DL — SIGNIFICANT CHANGE UP
WBC # BLD: 3.97 K/UL — SIGNIFICANT CHANGE UP (ref 3.8–10.5)
WBC # FLD AUTO: 3.97 K/UL — SIGNIFICANT CHANGE UP (ref 3.8–10.5)
WBC UR QL: SIGNIFICANT CHANGE UP

## 2022-07-15 PROCEDURE — 99232 SBSQ HOSP IP/OBS MODERATE 35: CPT

## 2022-07-15 RX ORDER — FUROSEMIDE 40 MG
20 TABLET ORAL EVERY 12 HOURS
Refills: 0 | Status: DISCONTINUED | OUTPATIENT
Start: 2022-07-15 | End: 2022-07-15

## 2022-07-15 RX ORDER — FUROSEMIDE 40 MG
40 TABLET ORAL
Refills: 0 | Status: DISCONTINUED | OUTPATIENT
Start: 2022-07-15 | End: 2022-07-19

## 2022-07-15 RX ADMIN — Medication 650 MILLIGRAM(S): at 13:19

## 2022-07-15 RX ADMIN — Medication 40 MILLIGRAM(S): at 13:33

## 2022-07-15 RX ADMIN — Medication 650 MILLIGRAM(S): at 14:19

## 2022-07-15 RX ADMIN — Medication 75 MICROGRAM(S): at 06:16

## 2022-07-15 NOTE — PROGRESS NOTE ADULT - PROBLEM SELECTOR PLAN 4
s/p mechanical fall   - CT right hip shows subcutaneous hematoma along the lateral aspect of the right hip, no acute displaced fracture or dislocation within the pelvis.  - PT recommending [facility]  - Fall and aspiration precautions

## 2022-07-15 NOTE — PROGRESS NOTE ADULT - SUBJECTIVE AND OBJECTIVE BOX
HPI:  89 y/o F with a pmhx b/l LE edema, HTN, P Afib (on coumadin), HFref (EF35-40%) and hypothyroidism presents to the ED s/p fall. Patient was admitted to [facility] -22 for NSTEMI, hospital course c/b iatrogenic flash pulmonary edema, cath canceled due to vol overload, discharged to [facility]. She was doing well at [facility] until today. She went to bathroom for BM, had difficulty to clean herself. She was left unattended by [facility] staff in bathroom, she stood up to take few steps, lost balance, and fell on right side striking her right hip, right arm, right elbow, right side of neck. She denies any prodromal symptoms. No lightheadedness, chest pain, warm sensation. No dyspnea. Patient never lost consciousness. She was found to have Hg 6.9 at [facility] facility, sent to [facility] ED for further evaluation. She denies any significant pain right now. Only complains of right thigh twitching, swelling.  No fevers, chills, cp, dypsnea, abdominal pain. Admits lower ext swelling which is chronic for her.     In the ED  VS: T97.3 Hr78 /63 RR 16 SPO2 95% on 3L NC  Labs: WBC 4.43, HH 7.6/22.1, PLTS 225, Na 140, K 4.2, Cr 1.7, Gluc 112, Alk phos 326, AST//99  Chest Xray: lungs clear  Head CT: Stable exam. No acute intracranial hemorrhage, vasogenic edema, extra-axial collection or calvarial fracture.  Cervical spine CT: No acute cervical spine fracture or evidence of traumatic malalignment. Cervical spondylosis  CT A/P non con: Extensive diffuse soft tissue edema/anasarca. Small right, trace of bilateral pleural fluid. Small-to-moderate amount of simple free fluid around the liver and spleen. No definite intraperitoneal hemorrhage.No noncontrast evidence of acute injury to the chest, abdomen or pelvis.Concern for hematoma around the right hip soft tissues, partially visualized. No evidence of hip fracture.Mild intralobular septal thickening, may reflect pulmonary edema. Areas of atelectatic changes and possible airways impaction  EKG: ordered, pending  Given in ED: 1 unit prbc   (2022 04:06)    INTERVAL HPI:  : Patient seen and examined at bedside. Is feeling okay. Has no acute complaints but is feeling slightly agitated. Denies sob, cp, n/v/d. Total 2 u pRBC total , IV Lasix   7/15 Pt seen and examined at bedside. Soft BP this AM, given 1 pRBC this AM, on Lasix 40 BID.     OVERNIGHT EVENTS: none    Home Medications:  Home Medications:  aspirin 81 mg oral delayed release tablet: 1 tab(s) orally once a day (2022 04:17)  atorvastatin 20 mg oral tablet: 1 tab(s) orally once a day (2022 04:17)  clopidogrel 75 mg oral tablet: 1 tab(s) orally once a day (2022 04:17)  fluticasone 50 mcg/inh nasal spray: 1 spray(s) nasal 2 times a day (2022 04:17)  Lasix 40 mg oral tablet: 1 tab(s) orally 2 times a day (2022 04:17)  metoprolol succinate 50 mg oral tablet, extended release: 1 tab(s) orally once a day (2022 04:17)  sodium chloride 0.65% nasal spray: 2 spray(s) nasal 4 times a day (2022 04:17)  Synthroid 75 mcg (0.075 mg) oral tablet: 1 tab(s) orally once a day (2022 04:17)  warfarin 3 mg oral tablet: DO NOT Give Coumadin Today as INR 3.11, Check PT/INR on 22.Adjust Coumadin dose as per INR. start with Coumadin 2 mg   Keep INR 2-3    Pt&#x27;s HOME Coumadin schedule is as below  1 tab(s) orally once a day (, , , F, Sa Grider) at Bed time   -Pt use to Take Coumadin 4 mg on every Wednesday (2022 04:17)  zinc oxide 40% topical ointment: Apply topically to affected area 2 times a day (2022 04:17)    Pt&#x27;s HOME Coumadin schedule is as below  1 tab(s) orally once a day (, , , F, Sa Grider) at Bed time   -Pt use to Take Coumadin 4 mg on every Wednesday (2022 04:17)  zinc oxide 40% topical ointment: Apply topically to affected area 2 times a day (2022 04:17)      MEDICATIONS  (STANDING):  furosemide    Tablet 40 milliGRAM(s) Oral every 12 hours  levothyroxine 75 MICROGram(s) Oral daily  metoprolol succinate ER 50 milliGRAM(s) Oral daily    MEDICATIONS  (PRN):  aluminum hydroxide/magnesium hydroxide/simethicone Suspension 30 milliLiter(s) Oral every 4 hours PRN Dyspepsia  melatonin 3 milliGRAM(s) Oral at bedtime PRN Insomnia  ondansetron Injectable 4 milliGRAM(s) IV Push every 8 hours PRN Nausea and/or Vomiting      Allergies    No Known Allergies    Intolerances        Social History:  Lives at home alone. Former smoker. Denies etoh and other rec drug use. Ambulates with walker. Daughter is HCP. DNR/DNI (2022 04:06)      REVIEW OF SYSTEMS: i am OK , feel better now  CONSTITUTIONAL: No fever, No chills, No fatigue, No myalgia, No Body ache, No Weakness  EYES: No eye pain,  No visual disturbances, No discharge, NO Redness  ENMT:  No ear pain, No nose bleed, No vertigo; No sinus pain, NO throat pain, No Congestion  NECK: No pain, No stiffness  RESPIRATORY: No cough, NO wheezing, No  hemoptysis, NO  shortness of breath  CARDIOVASCULAR: No chest pain, palpitations  GASTROINTESTINAL: - abdominal pain, NO epigastric pain. No nausea, No vomiting; No diarrhea, No constipation. [  ] BM  GENITOURINARY: No dysuria, No frequency, No urgency, No hematuria, NO incontinence  NEUROLOGICAL: No headaches, No dizziness, No numbness, No tingling, No tremors, No weakness  EXT: + Hematoma R side.   SKIN: +bruising over right elbow, right leg 2/2 fall [ x ] No itching, burning, rashes   MUSCULOSKELETAL: No joint pain ,No Jt swelling; No muscle pain, No back pain, No extremity pain  PSYCHIATRIC: No depression,  No anxiety,  No mood swings ,No difficulty sleeping at night  PAIN SCALE: [ x ] None  [  ] Other-  ROS Unable to obtain due to - [  ] Dementia  [  ] Lethargy [  ] Drowsy [  ] Sedated [  ] non verbal  REST OF REVIEW Of SYSTEM - [x  ] Normal     Vital Signs Last 24 Hrs  Vital Signs Last 24 Hrs  T(C): 36.4 (15 Jul 2022 05:16), Max: 37 (2022 17:01)  T(F): 97.6 (15 Jul 2022 05:16), Max: 98.6 (2022 17:01)  HR: 71 (15 Jul 2022 06:09) (64 - 71)  BP: 98/42 (15 Jul 2022 09:25) (93/53 - 114/60)  BP(mean): --  RR: 18 (15 Jul 2022 06:09) (16 - 18)  SpO2: 96% (15 Jul 2022 06:09) (95% - 98%)    Parameters below as of 15 Jul 2022 06:09  Patient On (Oxygen Delivery Method): nasal cannula  O2 Flow (L/min): 3      Parameters below as of 2022 10:47  Patient On (Oxygen Delivery Method): nasal cannula      Finger Stick    PHYSICAL EXAM:  GENERAL:  [x  ] NAD , [x] well appearing, [ x ] Agitated, [  ] Drowsy,  [  ] Lethargy, [  ] confused   HEAD:  [  x] Normal, [  ] Other  EYES:  [ x ] EOMI, [  ] PERRLA, [ x ] conjunctiva and sclera clear normal, [  ] Other,  [  ] Pallor,[  ] Discharge  ENMT:  [x  ] Normal, [x  ] Moist mucous membranes, [  ] Good dentition, [x  ] No Thrush  NECK:  [ x ] Supple, [  x] + JVD, [x  ] Normal thyroid, [  ] Lymphadenopathy [  ] Other  CHEST/LUNG:  [ x ] Clear to auscultation bilaterally, [ x ] Breath Sounds equal B/L / , [  ] poor effort  [ x ] No rales, [ x ] No rhonchi  [ x ]  No wheezing,   HEART:  [  ] Regular rate and rhythm, [  ] tachycardia, [  ] Bradycardia,  [  ] irregular  [x  ] + murmurs, No rubs, No gallops, [  ] PPM in place (Mfr:  ) [x] anasarca  ABDOMEN:  [x  ] Soft, [ x ] Nontender, [ x] Nondistended, [ x ] No mass, [ x ] Bowel sounds present, [ x ] obese  NERVOUS SYSTEM:  [ x ] Alert & Oriented X3, [x  ] Nonfocal  [  ] Confusion  [  ] Encephalopathic [  ] Sedated [  ] Unable to assess, [  ] Dementia [  ] Other-  EXTREMITIES: [x  ] 2+ Peripheral Pulses, No clubbing, No cyanosis,  [ x ]  3+ edema B/L lower EXT. [ x ] PVD stasis skin changes B/L Lower EXT, erythema B/L Lower ext , small blister  [  ] wound  LYMPH: No lymphadenopathy noted  SKIN:  [x  ] +R hematoma hip, bruising over right elbow, right leg 2/2 fall, [  ] Pressure Ulcers, [  ] ecchymosis, [  ] Skin Tears, [  ] Other    DIET: Diet, DASH/T.6    3.97  )-----------( 217      ( 15 Jul 2022 06:50 )             25.3   Sodium & Cholestero    07-15    141  |  105  |  37<H>  ----------------------------<  83  3.7   |  29  |  1.50<H>    Ca    8.0<L>      15 Jul 2022 06:50  Phos  4.6     -15  Mg     2.4     -15    TPro  6.5  /  Alb  2.0<L>  /  TBili  3.4<H>  /  DBili  x   /  AST  149<H>  /  ALT  85<H>  /  AlkPhos  293<H>  07-15        PT/INR - ( 15 Jul 2022 06:50 )   PT: 32.7 sec;   INR: 2.77 ratio         PTT - ( 2022 00:15 )  PTT:34.8 sec    Diet, DASH/TLC:   Sodium & Cholesterol Restricted  1000mL Fluid Restriction ([id number]) (22 @ 08:48) [Active]          LABS:                        Thyroid Panel:  Thyroid Stimulating Hormone, Serum: 2.14 uIU/mL (22 @ 08:40)    RADIOLOGY & ADDITIONAL TESTS:   < from: US Duplex Venous Lower Ext Complete, Bilateral (22 @ 12:50) >  IMPRESSION:  Limited evaluation, as described above. Within the well-visualized venous   segments no evidence for DVT.        < end of copied text >  < from: CT Hip No Cont, Right (22 @ 06:24) >  IMPRESSION:    No acute displaced fracture or dislocation within the pelvis.    Subcutaneous hematoma along the lateral aspect of the right hip.    Mild compression deformity of L4 with vertebroplasty changes.    Small volume ascites, partially evaluated.    Anasarca.    < end of copied text >        HEALTH ISSUES - PROBLEM Dx:  Fall    Anemia due to acute blood loss    Hematoma    Transaminitis    Afib    Lower extremity edema    Acute kidney injury superimposed on CKD    HTN (hypertension)    Hypothyroidism    HFrEF (heart failure with reduced ejection fraction)    Need for prophylactic measure    CAD (coronary artery disease)      Consultant(s) Notes Reviewed:  [ x ] YES     Care Discussed with [X] Consultants  [x  ] Patient  [x  ] Family [  ] HCP [x  ]   [  ] Social Service  [ x ] RN, [  ] Physical Therapy,[  ] Palliative care team  DVT PPX: [  ] Lovenox, [  ] S C Heparin, [  ] Coumadin, [  ] Xarelto, [  ] Eliquis, [  ] Pradaxa, [  ] IV Heparin drip, [x  ] SCD x[  ] Contraindication 2 to Anemia/ Hematoma ,[  ] Ambulation [  ] Contraindicated 2 to  bleed [  ] Contraindicated 2 to Brain Bleed  Advanced directive: [  ] None, [ x ] DNR/DNI   HPI:  89 y/o F with a pmhx b/l LE edema, HTN, P Afib (on coumadin), HFref (EF35-40%) and hypothyroidism presents to the ED s/p fall. Patient was admitted to [facility] -22 for NSTEMI, hospital course c/b iatrogenic flash pulmonary edema, cath canceled due to vol overload, discharged to [facility]. She was doing well at [facility] until today. She went to bathroom for BM, had difficulty to clean herself. She was left unattended by [facility] staff in bathroom, she stood up to take few steps, lost balance, and fell on right side striking her right hip, right arm, right elbow, right side of neck. She denies any prodromal symptoms. No lightheadedness, chest pain, warm sensation. No dyspnea. Patient never lost consciousness. She was found to have Hg 6.9 at [facility] facility, sent to [facility] ED for further evaluation. She denies any significant pain right now. Only complains of right thigh twitching, swelling.  No fevers, chills, cp, dypsnea, abdominal pain. Admits lower ext swelling which is chronic for her.     In the ED  VS: T97.3 Hr78 /63 RR 16 SPO2 95% on 3L NC  Labs: WBC 4.43, HH 7.6/22.1, PLTS 225, Na 140, K 4.2, Cr 1.7, Gluc 112, Alk phos 326, AST//99  Chest Xray: lungs clear  Head CT: Stable exam. No acute intracranial hemorrhage, vasogenic edema, extra-axial collection or calvarial fracture.  Cervical spine CT: No acute cervical spine fracture or evidence of traumatic malalignment. Cervical spondylosis  CT A/P non con: Extensive diffuse soft tissue edema/anasarca. Small right, trace of bilateral pleural fluid. Small-to-moderate amount of simple free fluid around the liver and spleen. No definite intraperitoneal hemorrhage.No noncontrast evidence of acute injury to the chest, abdomen or pelvis.Concern for hematoma around the right hip soft tissues, partially visualized. No evidence of hip fracture.Mild intralobular septal thickening, may reflect pulmonary edema. Areas of atelectatic changes and possible airways impaction  EKG: ordered, pending  Given in ED: 1 unit prbc   (2022 04:06)    INTERVAL HPI:  : Patient seen and examined at bedside. Is feeling okay. Has no acute complaints but is feeling slightly agitated. Denies sob, cp, n/v/d. Total 2 u pRBC total , IV Lasix   7/15 Pt seen and examined at bedside. Soft BP this AM, given 1 pRBC this AM, on Lasix 40 BID.     OVERNIGHT EVENTS: none    Home Medications:  Home Medications:  aspirin 81 mg oral delayed release tablet: 1 tab(s) orally once a day (2022 04:17)  atorvastatin 20 mg oral tablet: 1 tab(s) orally once a day (2022 04:17)  clopidogrel 75 mg oral tablet: 1 tab(s) orally once a day (2022 04:17)  fluticasone 50 mcg/inh nasal spray: 1 spray(s) nasal 2 times a day (2022 04:17)  Lasix 40 mg oral tablet: 1 tab(s) orally 2 times a day (2022 04:17)  metoprolol succinate 50 mg oral tablet, extended release: 1 tab(s) orally once a day (2022 04:17)  sodium chloride 0.65% nasal spray: 2 spray(s) nasal 4 times a day (2022 04:17)  Synthroid 75 mcg (0.075 mg) oral tablet: 1 tab(s) orally once a day (2022 04:17)  warfarin 3 mg oral tablet: DO NOT Give Coumadin Today as INR 3.11, Check PT/INR on 22.Adjust Coumadin dose as per INR. start with Coumadin 2 mg   Keep INR 2-3    Pt&#x27;s HOME Coumadin schedule is as below  1 tab(s) orally once a day (, , , F, Sa Grider) at Bed time   -Pt use to Take Coumadin 4 mg on every Wednesday (2022 04:17)  zinc oxide 40% topical ointment: Apply topically to affected area 2 times a day (2022 04:17)    Pt&#x27;s HOME Coumadin schedule is as below  1 tab(s) orally once a day (, , , F, Sa Grider) at Bed time   -Pt use to Take Coumadin 4 mg on every Wednesday (2022 04:17)  zinc oxide 40% topical ointment: Apply topically to affected area 2 times a day (2022 04:17)      MEDICATIONS  (STANDING):  furosemide    Tablet 40 milliGRAM(s) Oral every 12 hours  levothyroxine 75 MICROGram(s) Oral daily  metoprolol succinate ER 50 milliGRAM(s) Oral daily    MEDICATIONS  (PRN):  aluminum hydroxide/magnesium hydroxide/simethicone Suspension 30 milliLiter(s) Oral every 4 hours PRN Dyspepsia  melatonin 3 milliGRAM(s) Oral at bedtime PRN Insomnia  ondansetron Injectable 4 milliGRAM(s) IV Push every 8 hours PRN Nausea and/or Vomiting      Allergies    No Known Allergies    Intolerances        Social History:  Lives at home alone. Former smoker. Denies etoh and other rec drug use. Ambulates with walker. Daughter is HCP. DNR/DNI (2022 04:06)      REVIEW OF SYSTEMS: i am OK , feel better now  CONSTITUTIONAL: No fever, No chills, No fatigue, No myalgia, No Body ache, No Weakness  EYES: No eye pain,  No visual disturbances, No discharge, NO Redness  ENMT:  No ear pain, No nose bleed, No vertigo; No sinus pain, NO throat pain, No Congestion  NECK: No pain, No stiffness  RESPIRATORY: No cough, NO wheezing, No  hemoptysis, NO  shortness of breath  CARDIOVASCULAR: No chest pain, palpitations  GASTROINTESTINAL: - abdominal pain, NO epigastric pain. No nausea, No vomiting; No diarrhea, No constipation. [  ] BM  GENITOURINARY: No dysuria, No frequency, No urgency, No hematuria, NO incontinence  NEUROLOGICAL: No headaches, No dizziness, No numbness, No tingling, No tremors, No weakness  EXT: + Hematoma R side.   SKIN: +bruising over right elbow, right leg 2/2 fall [ x ] No itching, burning, rashes   MUSCULOSKELETAL: No joint pain ,No Jt swelling; No muscle pain, No back pain, No extremity pain  PSYCHIATRIC: No depression,  No anxiety,  No mood swings ,No difficulty sleeping at night  PAIN SCALE: [ x ] None  [  ] Other-  ROS Unable to obtain due to - [  ] Dementia  [  ] Lethargy [  ] Drowsy [  ] Sedated [  ] non verbal  REST OF REVIEW Of SYSTEM - [x  ] Normal     Vital Signs Last 24 Hrs  Vital Signs Last 24 Hrs  T(C): 36.4 (15 Jul 2022 05:16), Max: 37 (2022 17:01)  T(F): 97.6 (15 Jul 2022 05:16), Max: 98.6 (2022 17:01)  HR: 71 (15 Jul 2022 06:09) (64 - 71)  BP: 98/42 (15 Jul 2022 09:25) (93/53 - 114/60)  BP(mean): --  RR: 18 (15 Jul 2022 06:09) (16 - 18)  SpO2: 96% (15 Jul 2022 06:09) (95% - 98%)    Parameters below as of 15 Jul 2022 06:09  Patient On (Oxygen Delivery Method): nasal cannula  O2 Flow (L/min): 3      Parameters below as of 2022 10:47  Patient On (Oxygen Delivery Method): nasal cannula      Finger Stick    PHYSICAL EXAM:  GENERAL:  [x  ] NAD , [x] well appearing, [ x ] Agitated, [  ] Drowsy,  [  ] Lethargy, [  ] confused   HEAD:  [  x] Normal, [  ] Other  EYES:  [ x ] EOMI, [  ] PERRLA, [ x ] conjunctiva and sclera clear normal, [  ] Other,  [  ] Pallor,[  ] Discharge  ENMT:  [x  ] Normal, [x  ] Moist mucous membranes, [  ] Good dentition, [x  ] No Thrush  NECK:  [ x ] Supple, [  x] + JVD, [x  ] Normal thyroid, [  ] Lymphadenopathy [  ] Other  CHEST/LUNG:  [ x ] Clear to auscultation bilaterally, [ x ] Breath Sounds equal B/L / , [  ] poor effort  [ x ] No rales, [ x ] No rhonchi  [ x ]  No wheezing,   HEART:  [  ] Regular rate and rhythm, [  ] tachycardia, [  ] Bradycardia,  [  ] irregular  [x  ] + murmurs, No rubs, No gallops, [  ] PPM in place (Mfr:  ) [x] anasarca  ABDOMEN:  [x  ] Soft, [ x ] Nontender, [ x] Nondistended, [ x ] No mass, [ x ] Bowel sounds present, [ x ] obese  NERVOUS SYSTEM:  [ x ] Alert & Oriented X3, [x  ] Nonfocal  [  ] Confusion  [  ] Encephalopathic [  ] Sedated [  ] Unable to assess, [  ] Dementia [  ] Other-  EXTREMITIES: [x  ] 2+ Peripheral Pulses, No clubbing, No cyanosis,  [ x ]  2+ edema B/L lower EXT. [ x ] PVD stasis skin changes B/L Lower EXT, erythema B/L Lower ext , small blister- Improved   [  ] wound  LYMPH: No lymphadenopathy noted  SKIN:  [x  ] +R hematoma hip, bruising over right elbow, right leg 2/2 fall, [  ] Pressure Ulcers, [  ] ecchymosis, [  ] Skin Tears, [  ] Other    DIET: Diet, DASH/T.6    3.97  )-----------( 217      ( 15 Jul 2022 06:50 )             25.3   Sodium & Cholestero    -15    141  |  105  |  37<H>  ----------------------------<  83  3.7   |  29  |  1.50<H>    Ca    8.0<L>      15 Jul 2022 06:50  Phos  4.6     07-15  Mg     2.4     -15    TPro  6.5  /  Alb  2.0<L>  /  TBili  3.4<H>  /  DBili  x   /  AST  149<H>  /  ALT  85<H>  /  AlkPhos  293<H>  -15        PT/INR - ( 15 Jul 2022 06:50 )   PT: 32.7 sec;   INR: 2.77 ratio         PTT - ( 2022 00:15 )  PTT:34.8 sec    Diet, DASH/TLC:   Sodium & Cholesterol Restricted  1000mL Fluid Restriction ([id number]) (22 @ 08:48) [Active]          LABS:                        Thyroid Panel:  Thyroid Stimulating Hormone, Serum: 2.14 uIU/mL (22 @ 08:40)    RADIOLOGY & ADDITIONAL TESTS:   < from: US Duplex Venous Lower Ext Complete, Bilateral (22 @ 12:50) >  IMPRESSION:  Limited evaluation, as described above. Within the well-visualized venous   segments no evidence for DVT.        < end of copied text >  < from: CT Hip No Cont, Right (22 @ 06:24) >  IMPRESSION:    No acute displaced fracture or dislocation within the pelvis.    Subcutaneous hematoma along the lateral aspect of the right hip.    Mild compression deformity of L4 with vertebroplasty changes.    Small volume ascites, partially evaluated.    Anasarca.    < end of copied text >        HEALTH ISSUES - PROBLEM Dx:  Fall    Anemia due to acute blood loss    Hematoma    Transaminitis    Afib    Lower extremity edema    Acute kidney injury superimposed on CKD    HTN (hypertension)    Hypothyroidism    HFrEF (heart failure with reduced ejection fraction)    Need for prophylactic measure    CAD (coronary artery disease)      Consultant(s) Notes Reviewed:  [ x ] YES     Care Discussed with [X] Consultants  [x  ] Patient  [x  ] Family [  ] HCP [x  ]   [  ] Social Service  [ x ] RN, [  ] Physical Therapy,[  ] Palliative care team  DVT PPX: [  ] Lovenox, [  ] S C Heparin, [  ] Coumadin, [  ] Xarelto, [  ] Eliquis, [  ] Pradaxa, [  ] IV Heparin drip, [x  ] SCD x[  ] Contraindication 2 to Anemia/ Hematoma ,[  ] Ambulation [  ] Contraindicated 2 to  bleed [  ] Contraindicated 2 to Brain Bleed  Advanced directive: [  ] None, [ x ] DNR/DNI

## 2022-07-15 NOTE — PROGRESS NOTE ADULT - SUBJECTIVE AND OBJECTIVE BOX
===[INTERVAL HX: ]===  Patient seen and examined;  Chart reviewed and events noted;     no CP, no SOB    slight confusion.   Dtr at bedside.         ===[HEALTH ISSUES]===      HEALTH ISSUES - PROBLEM Dx:  Fall  Anemia due to acute blood loss  Hematoma  Transaminitis  Afib  Lower extremity edema  Acute kidney injury superimposed on CKD  HTN (hypertension)  Hypothyroidism  HFrEF (heart failure with reduced ejection fraction)  Need for prophylactic measure  CAD (coronary artery disease)    ===[MEDICATIONS]===  MEDICATIONS  (STANDING):  furosemide    Tablet 40 milliGRAM(s) Oral two times a day  levothyroxine 75 MICROGram(s) Oral daily  metoprolol succinate ER 50 milliGRAM(s) Oral daily    MEDICATIONS  (PRN):  acetaminophen     Tablet .. 650 milliGRAM(s) Oral every 6 hours PRN Mild Pain (1 - 3)  aluminum hydroxide/magnesium hydroxide/simethicone Suspension 30 milliLiter(s) Oral every 4 hours PRN Dyspepsia  melatonin 3 milliGRAM(s) Oral at bedtime PRN Insomnia  ondansetron Injectable 4 milliGRAM(s) IV Push every 8 hours PRN Nausea and/or Vomiting      ===[VITALS]===  Vital Signs Last 24 Hrs  T(C): 36.4 (15 Jul 2022 05:16), Max: 37 (2022 17:01)  T(F): 97.6 (15 Jul 2022 05:16), Max: 98.6 (2022 17:01)  HR: 71 (15 Jul 2022 06:09) (64 - 71)  BP: 98/42 (15 Jul 2022 09:25) (93/53 - 114/60)  BP(mean): --  RR: 18 (15 Jul 2022 06:09) (16 - 18)  SpO2: 96% (15 Jul 2022 06:09) (95% - 98%)    Parameters below as of 15 Jul 2022 06:09  Patient On (Oxygen Delivery Method): nasal cannula  O2 Flow (L/min): 3    [WT/HT]  Daily     Daily Weight in k (2022 20:43)  [VENT]    ===[PHYSICAL EXAM]===  General: WN,  WD adult in NAD.  HEENT:  anicteric sclera, pink conjunctivae, clear oropharynx  Neck: supple, no masses.  CV: normal S1S2, no murmur, no rubs, no gallops.  Lungs: clear to auscultation, no wheezes, no rales, no rhonchi.  Abdomen: soft, non-tender, non-distended, no hepatosplenomegaly, normal BS, no guarding.  Ext: no clubbing, no cyanosis, no edema.  Skin: no rashes,  no petechiae, no venous stasis changes.  Neuro: alert and oriented X 2, no focal motor deficits.  LN: no SC ANDRES.        ===[LABS:]===                        8.6    3.97  )-----------( 217      ( 15 Jul 2022 06:50 )             25.3     07-15    141  |  105  |  37<H>  ----------------------------<  83  3.7   |  29  |  1.50<H>    Ca    8.0<L>      15 Jul 2022 06:50  Phos  4.6     07-15  Mg     2.4     07-15    TPro  6.5  /  Alb  2.0<L>  /  TBili  3.4<H>  /  DBili  x   /  AST  149<H>  /  ALT  85<H>  /  AlkPhos  293<H>  07-15    PT/INR - ( 15 Jul 2022 06:50 )   PT: 32.7 sec;   INR: 2.77 ratio         PTT - ( 2022 00:15 )  PTT:34.8 sec        Urinalysis Basic - ( 15 Jul 2022 06:30 )    Color: Yellow / Appearance: Clear / S.015 / pH: x  Gluc: x / Ketone: Negative  / Bili: Negative / Urobili: 1   Blood: x / Protein: 15 / Nitrite: Negative   Leuk Esterase: Trace / RBC: 0-2 /HPF / WBC 3-5   Sq Epi: x / Non Sq Epi: Occasional / Bacteria: Few        COVID-19 PCR: NotDetec (2022 02:44)  COVID-19 PCR: NotDetec (2022 12:30)  COVID-19 PCR: NotDetec (2022 10:34)  COVID-19 PCR: NotDetec (2022 08:48)              ===[RADIOLOGY STUDIES:]===

## 2022-07-15 NOTE — PROGRESS NOTE ADULT - PROBLEM SELECTOR PLAN 3
On last TTE showed EF 35-40%,  - On Toprol  mg with holding parameters   - Lasix  PO 40 mg BID   - maintain net negative balance; strict I/Os, daily weight  - Cardio Gauri group following

## 2022-07-15 NOTE — PROGRESS NOTE ADULT - ASSESSMENT
89 y/o F with a pmhx b/l LE edema, HTN, P Afib (on coumadin), HFref (EF35-40%), hypothyroidism, recent admission for NSTEMI presents to the ED s/p mechanical fall at [facility] on coumadin. Found to have ABLA with right hematoma.

## 2022-07-15 NOTE — PROGRESS NOTE ADULT - ASSESSMENT
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    LUIS CARLOS on CKD IIIb  -Likely prerenal 2/2 acute blood loss  and hypoxemic injury  -Baseline creatinine ~1.2 as per recent admission  -Urine indices are pending  -Renal indices are improving; monitor trend for now  -Continue low salt diet w/ 1L fluid restriction    HTN with CKD  Hold ACE/ARB; hypotension noted as well    Volume Overload  Diuretic on hold due to hypotension  Restart when able  IV Albumin as needed    Anemia/Hematoma  S/P PRBC x 2

## 2022-07-15 NOTE — PROGRESS NOTE ADULT - PROBLEM SELECTOR PLAN 7
Unclear etiology. had workup during last admission. presumably multifactorial due to CHF vs meds. Trending down.  - avoid hepatotoxins  - GI Dr. Lockwood following

## 2022-07-15 NOTE — PROGRESS NOTE ADULT - SUBJECTIVE AND OBJECTIVE BOX
[facility]  Dawson Dolan PA-C  [address] [place] [address]  [phone]      INTERVAL HPI/OVERNIGHT EVENTS:  Pt s/e  Pt reports no abdominal pain, N/V/D  LFTs noted    MEDICATIONS  (STANDING):  furosemide    Tablet 40 milliGRAM(s) Oral two times a day  levothyroxine 75 MICROGram(s) Oral daily  metoprolol succinate ER 50 milliGRAM(s) Oral daily    MEDICATIONS  (PRN):  acetaminophen     Tablet .. 650 milliGRAM(s) Oral every 6 hours PRN Mild Pain (1 - 3)  aluminum hydroxide/magnesium hydroxide/simethicone Suspension 30 milliLiter(s) Oral every 4 hours PRN Dyspepsia  melatonin 3 milliGRAM(s) Oral at bedtime PRN Insomnia  ondansetron Injectable 4 milliGRAM(s) IV Push every 8 hours PRN Nausea and/or Vomiting      Allergies    No Known Allergies      PHYSICAL EXAM:   Vital Signs:  Vital Signs Last 24 Hrs  T(C): 36.4 (15 Jul 2022 05:16), Max: 37 (2022 17:01)  T(F): 97.6 (15 Jul 2022 05:16), Max: 98.6 (2022 17:01)  HR: 71 (15 Jul 2022 06:09) (64 - 71)  BP: 98/42 (15 Jul 2022 09:25) (93/53 - 114/60)  BP(mean): --  RR: 18 (15 Jul 2022 06:09) (16 - 18)  SpO2: 96% (15 Jul 2022 06:09) (95% - 98%)    Parameters below as of 15 Jul 2022 06:09  Patient On (Oxygen Delivery Method): nasal cannula  O2 Flow (L/min): 3    Daily     Daily Weight in k (2022 20:43)    GENERAL:  Appears stated age  ABDOMEN:  Soft, non-tender, non-distended  NEURO:  Alert      LABS:                        8.6    3.97  )-----------( 217      ( 15 Jul 2022 06:50 )             25.3     07-15    141  |  105  |  37<H>  ----------------------------<  83  3.7   |  29  |  1.50<H>    Ca    8.0<L>      15 Jul 2022 06:50  Phos  4.6     07-15  Mg     2.4     07-15    TPro  6.5  /  Alb  2.0<L>  /  TBili  3.4<H>  /  DBili  x   /  AST  149<H>  /  ALT  85<H>  /  AlkPhos  293<H>  07-15    PT/INR - ( 15 Jul 2022 06:50 )   PT: 32.7 sec;   INR: 2.77 ratio         PTT - ( 2022 00:15 )  PTT:34.8 sec  Urinalysis Basic - ( 15 Jul 2022 06:30 )    Color: Yellow / Appearance: Clear / S.015 / pH: x  Gluc: x / Ketone: Negative  / Bili: Negative / Urobili: 1   Blood: x / Protein: 15 / Nitrite: Negative   Leuk Esterase: Trace / RBC: 0-2 /HPF / WBC 3-5   Sq Epi: x / Non Sq Epi: Occasional / Bacteria: Few

## 2022-07-15 NOTE — PROGRESS NOTE ADULT - ASSESSMENT
89 y/o F with pmhx PAF (on coumadin), HFrEF (35-40%), CKD, HTN, aortic stenosis, TR, chronic LE edema, hypothyroidism, with recent admission to [facility] for [facility] in mid June, found to have NSTEMI with subsequent flash pulmonary edema, unable to go for cath due to volume overload, discharged to [facility], now presenting s/p mechanical unwitnessed fall at [facility]. Found to have R hip hematoma, Coumadin, asa, plavix being held due to hg 6.9 in setting of acute blood loss anemia.     Hx CAD with recent NSTEMI (mid June), unable to undergo cath at that time  -Pt denies hx precipitating cardiac symptoms prior to [facility] fall and no cardiac symptoms at this time  - EKG:  Afib with LAD, LBBB, no acute ischemic changes  - Doubt ACS given above  - continue to hold AC for now in setting of ABLA 2/2 acute R hip hematoma on triple therapy   - when able to resume, continue ASA, stop Plavix   - continue to hold statin d/t transaminitis.  Resume when able    - hx PAF (on Coumadin)  - rate controlled per flow sheet   - continue to hold coumadin 2/2 acute R hip hematoma with acute blood loss anemia   - therapeutic INR 2.77 this AM, continue to trend INR goal 2-3   - consider resuming when anemia improved and when INR decreases to <3  - BP soft/ stable   - continue metoprolol succinate ER 50mg po qd with hold parameters  - continue to monitor routine hemodynamics   - Monitor and replete lytes, keep K>4, Mg>2.    - hx HFrEF   - TTE 6/2022 with EF 35-40% with mod AS, mod TR  - vol overload on presentation, patient with chronic LE edema, currently 3+ pitting edema, +JVD, s/p PRBC transfusion with iv Lasix dose  - volume status improving, remains on O2 supplementation, wean as tolerated  - continue home dose Lasix,   - monitor strict I/O's, goal: net neg fluid balance    - H/H acceptable, continue to trend  - transfuse PRN for goal Hgb > 8, give Lasix dose post transfusion     - at risk for abrupt decompensation   - Will continue to follow.    Melany Howe [facility]  Nurse Practitioner - Cardiology   Spectra #[id number] 89 y/o F with pmhx PAF (on coumadin), HFrEF (35-40%), CKD, HTN, aortic stenosis, TR, chronic LE edema, hypothyroidism, with recent admission to [facility] for CAD in mid June, found to have NSTEMI with subsequent flash pulmonary edema, unable to go for cath due to volume overload, discharged to [facility], now presenting s/p mechanical unwitnessed fall at [facility]. Found to have R hip hematoma, Coumadin, asa, plavix being held due to hg 6.9 in setting of acute blood loss anemia.     Hx CAD with recent NSTEMI (mid June), unable to undergo cath at that time  -Pt denies hx precipitating cardiac symptoms prior to [facility] fall and no cardiac symptoms at this time  - EKG:  Afib with LAD, LBBB, no acute ischemic changes  - Doubt ACS given above  - continue to hold AC for now in setting of ABLA 2/2 acute R hip hematoma on triple therapy   - when able to resume ASA, stop Plavix   - continue to hold statin d/t transaminitis.  Resume when able    - hx PAF (on Coumadin)  - rate controlled per flow sheet   - continue to hold coumadin 2/2 acute R hip hematoma with acute blood loss anemia   - therapeutic INR 2.77 this AM, continue to trend INR goal 2-3   - consider resuming when anemia stable and when INR decreases to <3  - BP soft/ stable   - continue metoprolol succinate ER 50mg po qd with hold parameters  - continue to monitor routine hemodynamics   - Monitor and replete lytes, keep K>4, Mg>2.    - hx HFrEF   - TTE 6/2022 with EF 35-40% with mod AS, mod TR  - vol overload on presentation, patient with chronic LE edema, currently 3+ pitting edema, +JVD, s/p PRBC transfusion with iv Lasix dose  - volume status improving, remains on O2 supplementation, wean as tolerated  - continue home dose Lasix,   - monitor strict I/O's, goal: net neg fluid balance    - H/H acceptable, continue to trend  - transfuse PRN for goal Hgb > 8, give Lasix dose post transfusion     - at risk for abrupt decompensation   - Will continue to follow.    Melany Howe, [facility]  Nurse Practitioner - Cardiology   Spectra #[phone]

## 2022-07-15 NOTE — PROVIDER CONTACT NOTE (OTHER) - ACTION/TREATMENT ORDERED:
Dr. Mcneil notified. No new orders. As per Dr. Mcneil hold metoprolol and lasix and reschedule them for 8 AM.

## 2022-07-15 NOTE — PROGRESS NOTE ADULT - PROBLEM SELECTOR PLAN 5
chronic, stable - rate-controlled   - INR 2.7 this AM   - on coumadin, holding for Acute Anemia  - Toprol  XL 50mg PO qd w/ hold parameters  - cardio Dr. charles group following

## 2022-07-15 NOTE — PROGRESS NOTE ADULT - PROBLEM SELECTOR PLAN 2
Right hip hematoma s/p mechanical fall at [facility], CT right hip shows subcutaneous hematoma along the lateral aspect of the right hip, no acute displaced fracture or dislocation within the pelvis.  - continue to monitor  - cool compresses prn  -HOLD ASA, Plavix, HOLD AC Right hip hematoma s/p mechanical fall at [facility], CT right hip shows subcutaneous hematoma along the lateral aspect of the right hip, no acute displaced fracture or dislocation within the pelvis.  -- no need for coumadin reversal agents for now as d/w Dr Lundy -Hematology  - 3 u pRBC given in total   - cool compresses prn  -HOLD ASA, Plavix, HOLD AC

## 2022-07-15 NOTE — PROGRESS NOTE ADULT - PROBLEM SELECTOR PLAN 1
s/p 2 pRBCs on 07/1, Hb improved this AM   - Given 1 pRBCs this AM  - Holding AC, AP   - no need for coumadin reversal agents for now as d/w Dr Lundy -Hematology  - patient on coumadin for afib. Hold in setting of bleed  - daily INR  - type and screen, blood consent in chart  - Trend CBC and signs or symptoms of active bleeding s/p 2 pRBCs on 07/1, Hb improved this AM   - Given 1 pRBCs this AM, Total 3 u pRBC given   - Holding AC, AP , Coumadin   - patient on coumadin for afib. Hold in setting of bleed  - daily INR  - type and screen, blood consent in chart  - Trend CBC and signs or symptoms of active bleeding

## 2022-07-15 NOTE — PROGRESS NOTE ADULT - ASSESSMENT
elevated lfts  chf  anemia    CT noted  anemia related to hematoma  diet as tolerated  prior hida negative  suspect lfts elevated 2/2 statin and/or passive congestion  will follow    I reviewed the overnight course of events on the unit, re-confirming the patient history. I discussed the care with the patient and their family  Differential diagnosis and plan of care discussed with patient after the evaluation  35 minutes spent on total encounter of which more than fifty percent of the encounter was spent counseling and/or coordinating care by the attending physician.  Advanced care planning was discussed with patient and family.  Advanced care planning forms were reviewed and discussed.  Risks, benefits and alternatives of gastroenterologic procedures were discussed in detail and all questions were answered.

## 2022-07-15 NOTE — PROGRESS NOTE ADULT - SUBJECTIVE AND OBJECTIVE BOX
Patient is a 89y old  Female who presents with a chief complaint of Fall, ABLA (2022 04:06)       HPI:  89 y/o F with a pmhx b/l LE edema, HTN, P Afib (on coumadin), HFref (EF35-40%) and hypothyroidism presents to the ED s/p fall. Patient was admitted to [facility] -22 for NSTEMI, hospital course c/b iatrogenic flash pulmonary edema, cath canceled due to vol overload, discharged to [facility]. She was doing well at [facility] until today. She went to bathroom for BM, had difficulty to clean herself. She was left unattended by [facility] staff in bathroom, she stood up to take few steps, lost balance, and fell on right side striking her right hip, right arm, right elbow, right side of neck. She denies any prodromal symptoms. No lightheadedness, chest pain, warm sensation. No dyspnea. Patient never lost consciousness. She was found to have Hg 6.9 at [facility] facility, sent to [facility] ED for further evaluation. She denies any significant pain right now. Only complains of right thigh twitching, swelling.  No fevers, chills, cp, dypsnea, abdominal pain. Admits lower ext swelling which is chronic for her.     In the ED  VS: T97.3 Hr78 /63 RR 16 SPO2 95% on 3L NC  Labs: WBC 4.43, HH 7.6/22.1, PLTS 225, Na 140, K 4.2, Cr 1.7, Gluc 112, Alk phos 326, AST//99  Chest Xray: lungs clear  Head CT: Stable exam. No acute intracranial hemorrhage, vasogenic edema, extra-axial collection or calvarial fracture.  Cervical spine CT: No acute cervical spine fracture or evidence of traumatic malalignment. Cervical spondylosis  CT A/P non con: Extensive diffuse soft tissue edema/anasarca. Small right, trace of bilateral pleural fluid. Small-to-moderate amount of simple free fluid around the liver and spleen. No definite intraperitoneal hemorrhage.No noncontrast evidence of acute injury to the chest, abdomen or pelvis.Concern for hematoma around the right hip soft tissues, partially visualized. No evidence of hip fracture.Mild intralobular septal thickening, may reflect pulmonary edema. Areas of atelectatic changes and possible airways impaction  EKG: ordered, pending  Given in ED: 1 unit prbc   (2022 04:06)       PAST MEDICAL & SURGICAL HISTORY:  Hypothyroid      Hypertension      Lower extremity edema      Paroxysmal atrial fibrillation      No significant past surgical history           FAMILY HISTORY:  No pertinent family history in first degree relatives    [place]    Social History:Non smoker    MEDICATIONS  (STANDING):  furosemide    Tablet 40 milliGRAM(s) Oral every 12 hours  levothyroxine 75 MICROGram(s) Oral daily  metoprolol succinate ER 50 milliGRAM(s) Oral daily    MEDICATIONS  (PRN):  aluminum hydroxide/magnesium hydroxide/simethicone Suspension 30 milliLiter(s) Oral every 4 hours PRN Dyspepsia  melatonin 3 milliGRAM(s) Oral at bedtime PRN Insomnia  ondansetron Injectable 4 milliGRAM(s) IV Push every 8 hours PRN Nausea and/or Vomiting   Meds reviewed    Allergies    No Known Allergies    Intolerances         REVIEW OF SYSTEMS:    Review of Systems:   Constitutional: Denies fatigue  HEENT: Denies headaches and dizziness  Respiratory: denies SOB, cough, or wheezing  Cardiovascular: denies CP, palpitations  Gastrointestinal: Denies nausea, denies vomiting, diarrhea, constipation, abdominal pain, or bloody stools  Genitourinary: denies painful urination, increased frequency, urgency, or bloody urine  Skin: denies rashes or itching  Musculoskeletal: denies muscle aches, joint swelling  Neurologic: Denies generalized weakness, denies loss of sensation, numbness, or tingling      Vital Signs Last 24 Hrs  T(C): 36.4 (15 Jul 2022 05:16), Max: 37 (2022 17:01)  T(F): 97.6 (15 Jul 2022 05:16), Max: 98.6 (2022 17:01)  HR: 71 (15 Jul 2022 06:09) (64 - 71)  BP: 94/56 (15 Jul 2022 06:09) (93/53 - 114/60)  BP(mean): --  RR: 18 (15 Jul 2022 06:09) (16 - 18)  SpO2: 96% (15 Jul 2022 06:09) (95% - 98%)    Parameters below as of 15 Jul 2022 06:09  Patient On (Oxygen Delivery Method): nasal cannula  O2 Flow (L/min): 3      Daily Height in cm: 165.1 (2022 23:24)    Daily     PHYSICAL EXAM:    GENERAL: NAD  HEAD:  Atraumatic, Normocephalic  EYES: EOMI, conjunctiva and sclera clear  ENMT: No Drainage from nares, No drainage from ears  NECK: Supple, neck  veins full  NERVOUS SYSTEM:  Awake and Alert  CHEST/LUNG: Clear to percussion bilaterally; No rales, rhonchi, wheezing, or rubs  HEART: Regular rate and rhythm; No murmurs, rubs, or gallops  ABDOMEN: Soft, Nontender, Nondistended; Bowel sounds present  EXTREMITIES:  2+ pitting edema  SKIN: UE ecchymosis      LABS:                                          8.6    3.97  )-----------( 217      ( 15 Jul 2022 06:50 )             25.3     07-15    x   |  x   |  37<H>  ----------------------------<  83  x    |  29  |  1.50<H>    Ca    8.0<L>      15 Jul 2022 06:50  Phos  4.2     07-14  Mg     2.3     07-14    TPro  x   /  Alb  2.0<L>  /  TBili  x   /  DBili  x   /  AST  149<H>  /  ALT  85<H>  /  AlkPhos  x   07-15    PT/INR - ( 15 Jul 2022 06:50 )   PT: 32.7 sec;   INR: 2.77 ratio         PTT - ( 2022 00:15 )  PTT:34.8 sec  Urinalysis Basic - ( 15 Jul 2022 06:30 )    Color: Yellow / Appearance: Clear / S.015 / pH: x  Gluc: x / Ketone: Negative  / Bili: Negative / Urobili: 1   Blood: x / Protein: 15 / Nitrite: Negative   Leuk Esterase: Trace / RBC: 0-2 /HPF / WBC 3-5   Sq Epi: x / Non Sq Epi: Occasional / Bacteria: Few

## 2022-07-15 NOTE — PROGRESS NOTE ADULT - SUBJECTIVE AND OBJECTIVE BOX
[facility] -- Reid Astorga, Norah, Raheem, Flex Muniz, Jacob Blair: Office # [phone]    Follow Up:  Afib off Coumadin due to ABLA, CAD with recent NSTEMI, HFrEF    Subjective/Observations: Patient seen and examined, awake, alert, resting comfortably in bed, denies chest pain, dyspnea, palpitations or dizziness. Tolerating O2 via NC c/o b/l heel pain, + boots     REVIEW OF SYSTEMS: All review of systems is negative for eye, ENT, GI, , allergic, dermatologic, musculoskeletal and neurologic except as described above    PAST MEDICAL & SURGICAL HISTORY:  Hypothyroid      Hypertension      Lower extremity edema      Paroxysmal atrial fibrillation      NSTEMI (non-ST elevation myocardial infarction)  june 2022      Chronic systolic congestive heart failure      Stage 3 chronic kidney disease      Anemia of chronic disease      No significant past surgical history          MEDICATIONS  (STANDING):  furosemide    Tablet 40 milliGRAM(s) Oral two times a day  levothyroxine 75 MICROGram(s) Oral daily  metoprolol succinate ER 50 milliGRAM(s) Oral daily    MEDICATIONS  (PRN):  acetaminophen     Tablet .. 650 milliGRAM(s) Oral every 6 hours PRN Mild Pain (1 - 3)  aluminum hydroxide/magnesium hydroxide/simethicone Suspension 30 milliLiter(s) Oral every 4 hours PRN Dyspepsia  melatonin 3 milliGRAM(s) Oral at bedtime PRN Insomnia  ondansetron Injectable 4 milliGRAM(s) IV Push every 8 hours PRN Nausea and/or Vomiting    Allergies    No Known Allergies    Intolerances      Vital Signs Last 24 Hrs  T(C): 36.4 (15 Jul 2022 05:16), Max: 37 (14 Jul 2022 17:01)  T(F): 97.6 (15 Jul 2022 05:16), Max: 98.6 (14 Jul 2022 17:01)  HR: 71 (15 Jul 2022 06:09) (64 - 71)  BP: 98/42 (15 Jul 2022 09:25) (93/53 - 114/60)  BP(mean): --  RR: 18 (15 Jul 2022 06:09) (16 - 18)  SpO2: 96% (15 Jul 2022 06:09) (95% - 98%)    Parameters below as of 15 Jul 2022 06:09  Patient On (Oxygen Delivery Method): nasal cannula  O2 Flow (L/min): 3    I&O's Summary        TELE: Not on telemetry   PHYSICAL EXAM:  Constitutional: NAD, awake and alert, well-developed  HEENT: Moist Mucous Membranes, Anicteric  Pulmonary: Non-labored, breath sounds are clear bilaterally, No wheezing, rales or rhonchi  Cardiovascular: irregular, S1 and S2, + murmurs, rubs, gallops or clicks  Gastrointestinal: Bowel Sounds present, soft, nontender.   Lymph: + b/l peripheral edema. No lymphadenopathy.  Skin: + ecchymosis RUE, R HIp  Psych:  Mood & affect appropriate for situation    LABS: All Labs Reviewed:                        8.6    3.97  )-----------( 217      ( 15 Jul 2022 06:50 )             25.3                         7.7    x     )-----------( x        ( 14 Jul 2022 12:25 )             22.8                         7.7    4.47  )-----------( 208      ( 14 Jul 2022 08:40 )             23.1     15 Jul 2022 06:50    141    |  105    |  37     ----------------------------<  83     3.7     |  29     |  1.50   14 Jul 2022 20:02    141    |  105    |  37     ----------------------------<  93     3.8     |  30     |  1.60   14 Jul 2022 08:40    141    |  105    |  34     ----------------------------<  87     3.9     |  34     |  1.70     Ca    8.0        15 Jul 2022 06:50  Ca    8.0        14 Jul 2022 20:02  Ca    7.9        14 Jul 2022 08:40  Phos  4.6       15 Jul 2022 06:50  Phos  4.2       14 Jul 2022 08:40  Mg     2.4       15 Jul 2022 06:50  Mg     2.3       14 Jul 2022 08:40    TPro  6.5    /  Alb  2.0    /  TBili  3.4    /  DBili  x      /  AST  149    /  ALT  85     /  AlkPhos  293    15 Jul 2022 06:50  TPro  6.3    /  Alb  1.8    /  TBili  3.2    /  DBili  x      /  AST  163    /  ALT  94     /  AlkPhos  315    14 Jul 2022 08:40  TPro  6.8    /  Alb  1.9    /  TBili  3.0    /  DBili  x      /  AST  188    /  ALT  99     /  AlkPhos  326    14 Jul 2022 00:15    PT/INR - ( 15 Jul 2022 06:50 )   PT: 32.7 sec;   INR: 2.77 ratio         PTT - ( 14 Jul 2022 00:15 )  PTT:34.8 sec            Cholesterol, Serum: 144 mg/dL (06-18-22 @ 10:09)  HDL Cholesterol, Serum: 44 mg/dL (06-18-22 @ 10:09)  Triglycerides, Serum: 65 mg/dL (06-18-22 @ 10:09)      12 Lead ECG:   Ventricular Rate 68 BPM    QRS Duration 150 ms    Q-T Interval 490 ms    QTC Calculation(Bazett) 521 ms    R Axis -35 degrees    T Axis 206 degrees    Diagnosis Line Atrial fibrillation  Left axis deviation  Left bundle branch block  Confirmed by KEVIN MUNIZ ([id number]) on 7/14/2022 11:34:42 AM (07-14-22 @ 08:32)    < from: Xray Chest 1 View AP/PA (07.14.22 @ 00:29) >    ACC: [id number] EXAM:  XR ELBOW COMP MIN 3V RT                        ACC: [id number] EXAM:  XR HIP WITH PELV 2-3V RT                        ACC: [id number] EXAM:  XR CHEST AP OR PA 1V                          PROCEDURE DATE:  07/14/2022          INTERPRETATION:  Chest, right elbow, and right hip with pelvis. Patient   had a fall.    AP chest on July 14, 2022 at 12:31 AM.    Heart likely enlarged. Elevated right hemidiaphragm again noted.    Mild basilar fluid again noted.    No fracture.    Chest similar to June 25 of this year.    Right elbow. 3 views. No effusion. Bones intact.    Right hip with pelvis. 3 views.    Mild symmetric hip degeneration.    Heart appears to be a slight vertebroplasty density in a partially   collapsed L4 body noted.    No acute fracture.    IMPRESSION: No acute bony finding. Persistent mild basilar effusions.    --- End of Report ---            RUSSELL MONTES MD; Attending Radiologist  This document has been electronically signed. Jul 14 2022  1:59PM    < end of copied text >  < from: TTE Echo Complete w/o Contrast w/ Doppler (06.19.22 @ 10:00) >    ACC: [id number] EXAM:  ECHO TTE WO CON COMP W DOPP                          PROCEDURE DATE:  06/19/2022          INTERPRETATION:  INDICATION: Chest pain  Sonographer LK    Blood Pressure 147/84    Height 165.1 cm     Weight 72.6 kg       BSA 1.8   sqm    Dimensions:  LA 3.8       Normal Values: 2.0 - 4.0 cm  Ao 3.2        Normal Values: 2.0 - 3.8 cm  SEPTUM 1.7       Normal Values: 0.6 - 1.2 cm  PWT 1.2       Normal Values: 0.6 - 1.1 cm  LVIDd 4.8         Normal Values: 3.0 - 5.6 cm  LVIDs 3.2      Normal Values: 1.8 - 4.0 cm      OBSERVATIONS:  Mitral Valve: Mitral annular calcification with thickened leaflets, mild   MR.  Aortic Valve/Aorta: Calcified trileaflet aortic valve with decreased   opening. Peak transaortic valve gradient equals 20.4 mmHg with a mean   transaortic valve gradient 13.2 mmHg. By visual estimation there appears   to be mild to moderate aortic stenosis  Tricuspid Valve: Moderate TR.  Pulmonic Valve: Trace PI  Left Atrium: Enlarged  Right Atrium: Enlarged  Left Ventricle: Mild to moderate segmental left ventricular systolic   dysfunction, estimated LVEF of 35-40%. The apex, mid inferoseptal and mid   anterolateral walls appear severely hypokinetic  Right Ventricle: Right ventricular enlargement with grossly normal   function  Pericardium: no significant pericardial effusion.  Pulmonary/RV Pressure: estimated PA systolic pressure of 36 mmHg  IVC measures 1.47 cm          IMPRESSION:  Mild to moderate segmental left ventricular systolic dysfunction,   estimated LVEF of 35-40%. The apex, mid inferoseptal and mid   anterolateral walls appear severely hypokinetic  Right ventricular enlargement with grossly normal function  Biatrial enlargement  Calcified trileaflet aortic valve with mild to moderate aortic stenosis,   without AI.  Mild MR  Moderate TR.  No significant pericardial effusion.    --- End of Report ---            JEAN CARLOS HENRY MD; Attending Cardiologist  This document has been electronically signed. Jun 20 2022 12:56PM    < end of copied text >

## 2022-07-15 NOTE — PROGRESS NOTE ADULT - ASSESSMENT
Anemia multifactorial in etiology related to chronic disease including renal disease complicated by a fall on coumadin with Ct scan of right hip showing a hematoma.  INR mildly elevated 3.28.  asa plavix and coumadin all held    RECOMMENDATION  Transfuse 1UP RPBC  Followup CBC  hold asa plavix and coumadin until hematologically stable     continue renal followup   cardiology evaluation     further heme recommendations pending above  discussed with Dr. Villegas    d/w dtr and pt, RN staff

## 2022-07-15 NOTE — PROGRESS NOTE ADULT - PROBLEM SELECTOR PLAN 9
LUIS CARLOS on CKD III, likely prerenal secondary to active bleeding   - Creatinine baseline 1.2, this morning down trended   - Avoid nephrotoxic medications   - Low salt diet w/ 1 lt fluid restriction   - Nephro Following LUIS CARLOS on CKD III, likely prerenal secondary to active bleeding   - Creatinine baseline 1.2, this morning down trended   - Avoid nephrotoxic medications   - Low salt diet w/ 1 lt fluid restriction   - Nephro Following  -Lasix 40 mg q 12 hrs PO

## 2022-07-16 ENCOUNTER — TRANSCRIPTION ENCOUNTER (OUTPATIENT)
Age: 87
End: 2022-07-16

## 2022-07-16 LAB
ALBUMIN SERPL ELPH-MCNC: 1.9 G/DL — LOW (ref 3.3–5)
ALP SERPL-CCNC: 337 U/L — HIGH (ref 40–120)
ALT FLD-CCNC: 78 U/L — SIGNIFICANT CHANGE UP (ref 12–78)
ANION GAP SERPL CALC-SCNC: 4 MMOL/L — LOW (ref 5–17)
AST SERPL-CCNC: 120 U/L — HIGH (ref 15–37)
BASOPHILS # BLD AUTO: 0.02 K/UL — SIGNIFICANT CHANGE UP (ref 0–0.2)
BASOPHILS NFR BLD AUTO: 0.4 % — SIGNIFICANT CHANGE UP (ref 0–2)
BILIRUB SERPL-MCNC: 3.2 MG/DL — HIGH (ref 0.2–1.2)
BUN SERPL-MCNC: 36 MG/DL — HIGH (ref 7–23)
CALCIUM SERPL-MCNC: 8.1 MG/DL — LOW (ref 8.5–10.1)
CHLORIDE SERPL-SCNC: 105 MMOL/L — SIGNIFICANT CHANGE UP (ref 96–108)
CO2 SERPL-SCNC: 31 MMOL/L — SIGNIFICANT CHANGE UP (ref 22–31)
CREAT SERPL-MCNC: 1.6 MG/DL — HIGH (ref 0.5–1.3)
EGFR: 30 ML/MIN/1.73M2 — LOW
EOSINOPHIL # BLD AUTO: 0.17 K/UL — SIGNIFICANT CHANGE UP (ref 0–0.5)
EOSINOPHIL NFR BLD AUTO: 3.8 % — SIGNIFICANT CHANGE UP (ref 0–6)
GLUCOSE SERPL-MCNC: 83 MG/DL — SIGNIFICANT CHANGE UP (ref 70–99)
HCT VFR BLD CALC: 26.8 % — LOW (ref 34.5–45)
HGB BLD-MCNC: 9.1 G/DL — LOW (ref 11.5–15.5)
IMM GRANULOCYTES NFR BLD AUTO: 0.4 % — SIGNIFICANT CHANGE UP (ref 0–1.5)
INR BLD: 2.7 RATIO — HIGH (ref 0.88–1.16)
LYMPHOCYTES # BLD AUTO: 1.38 K/UL — SIGNIFICANT CHANGE UP (ref 1–3.3)
LYMPHOCYTES # BLD AUTO: 30.7 % — SIGNIFICANT CHANGE UP (ref 13–44)
MAGNESIUM SERPL-MCNC: 2.4 MG/DL — SIGNIFICANT CHANGE UP (ref 1.6–2.6)
MCHC RBC-ENTMCNC: 29.4 PG — SIGNIFICANT CHANGE UP (ref 27–34)
MCHC RBC-ENTMCNC: 34 GM/DL — SIGNIFICANT CHANGE UP (ref 32–36)
MCV RBC AUTO: 86.5 FL — SIGNIFICANT CHANGE UP (ref 80–100)
MONOCYTES # BLD AUTO: 0.62 K/UL — SIGNIFICANT CHANGE UP (ref 0–0.9)
MONOCYTES NFR BLD AUTO: 13.8 % — SIGNIFICANT CHANGE UP (ref 2–14)
NEUTROPHILS # BLD AUTO: 2.28 K/UL — SIGNIFICANT CHANGE UP (ref 1.8–7.4)
NEUTROPHILS NFR BLD AUTO: 50.9 % — SIGNIFICANT CHANGE UP (ref 43–77)
NRBC # BLD: 0 /100 WBCS — SIGNIFICANT CHANGE UP (ref 0–0)
PHOSPHATE SERPL-MCNC: 3.6 MG/DL — SIGNIFICANT CHANGE UP (ref 2.5–4.5)
PLATELET # BLD AUTO: 211 K/UL — SIGNIFICANT CHANGE UP (ref 150–400)
POTASSIUM SERPL-MCNC: 3.5 MMOL/L — SIGNIFICANT CHANGE UP (ref 3.5–5.3)
POTASSIUM SERPL-SCNC: 3.5 MMOL/L — SIGNIFICANT CHANGE UP (ref 3.5–5.3)
PROT SERPL-MCNC: 6.4 G/DL — SIGNIFICANT CHANGE UP (ref 6–8.3)
PROTHROM AB SERPL-ACNC: 31.9 SEC — HIGH (ref 10.5–13.4)
RBC # BLD: 3.1 M/UL — LOW (ref 3.8–5.2)
RBC # FLD: 19.5 % — HIGH (ref 10.3–14.5)
SODIUM SERPL-SCNC: 140 MMOL/L — SIGNIFICANT CHANGE UP (ref 135–145)
WBC # BLD: 4.49 K/UL — SIGNIFICANT CHANGE UP (ref 3.8–10.5)
WBC # FLD AUTO: 4.49 K/UL — SIGNIFICANT CHANGE UP (ref 3.8–10.5)

## 2022-07-16 PROCEDURE — 71045 X-RAY EXAM CHEST 1 VIEW: CPT | Mod: 26

## 2022-07-16 PROCEDURE — 99232 SBSQ HOSP IP/OBS MODERATE 35: CPT

## 2022-07-16 RX ORDER — ASPIRIN/CALCIUM CARB/MAGNESIUM 324 MG
81 TABLET ORAL DAILY
Refills: 0 | Status: DISCONTINUED | OUTPATIENT
Start: 2022-07-16 | End: 2022-07-20

## 2022-07-16 RX ORDER — POTASSIUM CHLORIDE 20 MEQ
40 PACKET (EA) ORAL ONCE
Refills: 0 | Status: COMPLETED | OUTPATIENT
Start: 2022-07-16 | End: 2022-07-16

## 2022-07-16 RX ORDER — FUROSEMIDE 40 MG
20 TABLET ORAL ONCE
Refills: 0 | Status: COMPLETED | OUTPATIENT
Start: 2022-07-16 | End: 2022-07-16

## 2022-07-16 RX ORDER — SODIUM CHLORIDE 0.65 %
1 AEROSOL, SPRAY (ML) NASAL
Refills: 0 | Status: DISCONTINUED | OUTPATIENT
Start: 2022-07-16 | End: 2022-07-20

## 2022-07-16 RX ADMIN — Medication 1 SPRAY(S): at 21:43

## 2022-07-16 RX ADMIN — Medication 40 MILLIGRAM(S): at 20:03

## 2022-07-16 RX ADMIN — Medication 3 MILLIGRAM(S): at 21:43

## 2022-07-16 RX ADMIN — Medication 650 MILLIGRAM(S): at 06:36

## 2022-07-16 RX ADMIN — Medication 650 MILLIGRAM(S): at 05:36

## 2022-07-16 RX ADMIN — Medication 81 MILLIGRAM(S): at 20:03

## 2022-07-16 RX ADMIN — Medication 40 MILLIEQUIVALENT(S): at 13:30

## 2022-07-16 RX ADMIN — Medication 20 MILLIGRAM(S): at 13:29

## 2022-07-16 RX ADMIN — Medication 75 MICROGRAM(S): at 05:36

## 2022-07-16 RX ADMIN — Medication 50 MILLIGRAM(S): at 05:36

## 2022-07-16 RX ADMIN — Medication 40 MILLIGRAM(S): at 05:36

## 2022-07-16 NOTE — DIETITIAN INITIAL EVALUATION ADULT - PERTINENT MEDS FT
MEDICATIONS  (STANDING):  aspirin enteric coated 81 milliGRAM(s) Oral daily  furosemide    Tablet 40 milliGRAM(s) Oral two times a day  levothyroxine 75 MICROGram(s) Oral daily  metoprolol succinate ER 50 milliGRAM(s) Oral daily    MEDICATIONS  (PRN):  acetaminophen     Tablet .. 650 milliGRAM(s) Oral every 6 hours PRN Mild Pain (1 - 3)  aluminum hydroxide/magnesium hydroxide/simethicone Suspension 30 milliLiter(s) Oral every 4 hours PRN Dyspepsia  melatonin 3 milliGRAM(s) Oral at bedtime PRN Insomnia  ondansetron Injectable 4 milliGRAM(s) IV Push every 8 hours PRN Nausea and/or Vomiting

## 2022-07-16 NOTE — PROGRESS NOTE ADULT - ASSESSMENT
89 y/o F with a pmhx b/l LE edema, HTN, P Afib (on coumadin), HFref (EF35-40%) and hypothyroidism presents to the ED s/p fall found to have increased creatinine.    LUIS CARLOS on CKD IIIb  -Likely prerenal 2/2 acute blood loss  and hypoxemic injury  -Baseline creatinine ~1.2 as per recent admission  -Urine indices reviewed   -Renal indices are improving; monitor trend for now  -Continue low salt diet w/ 1L fluid restriction  -Conservative renal management     HTN with CKD  -Hold ACE/ARB; hypotension noted as well    Volume Overload  -Lasix 40 mg po bid. Renal wise tolerating   -IV Albumin as needed    Anemia/Hematoma  -S/P PRBC x 2

## 2022-07-16 NOTE — PROGRESS NOTE ADULT - SUBJECTIVE AND OBJECTIVE BOX
Patient is a 89y old  Female who presents with a chief complaint of Fall, with Anemia (2022 08:57)    HPI:  89 y/o F with a pmhx b/l LE edema, HTN, P Afib (on coumadin), HFref (EF35-40%) and hypothyroidism presents to the ED s/p fall. Patient was admitted to [facility] -22 for NSTEMI, hospital course c/b iatrogenic flash pulmonary edema, cath canceled due to vol overload, discharged to [facility]. She was doing well at [facility] until today. She went to bathroom for BM, had difficulty to clean herself. She was left unattended by [facility] staff in bathroom, she stood up to take few steps, lost balance, and fell on right side striking her right hip, right arm, right elbow, right side of neck. She denies any prodromal symptoms. No lightheadedness, chest pain, warm sensation. No dyspnea. Patient never lost consciousness. She was found to have Hg 6.9 at [facility] facility, sent to [facility] ED for further evaluation. She denies any significant pain right now. Only complains of right thigh twitching, swelling.  No fevers, chills, cp, dypsnea, abdominal pain. Admits lower ext swelling which is chronic for her.     In the ED  VS: T97.3 Hr78 /63 RR 16 SPO2 95% on 3L NC  Labs: WBC 4.43, HH 7.6/22.1, PLTS 225, Na 140, K 4.2, Cr 1.7, Gluc 112, Alk phos 326, AST//99  Chest Xray: lungs clear  Head CT: Stable exam. No acute intracranial hemorrhage, vasogenic edema, extra-axial collection or calvarial fracture.  Cervical spine CT: No acute cervical spine fracture or evidence of traumatic malalignment. Cervical spondylosis  CT A/P non con: Extensive diffuse soft tissue edema/anasarca. Small right, trace of bilateral pleural fluid. Small-to-moderate amount of simple free fluid around the liver and spleen. No definite intraperitoneal hemorrhage.No noncontrast evidence of acute injury to the chest, abdomen or pelvis.Concern for hematoma around the right hip soft tissues, partially visualized. No evidence of hip fracture.Mild intralobular septal thickening, may reflect pulmonary edema. Areas of atelectatic changes and possible airways impaction  EKG: ordered, pending  Given in ED: 1 unit prbc   (2022 04:06)    INTERVAL HPI:  : Patient seen and examined at bedside. Is feeling okay. Has no acute complaints but is feeling slightly agitated. Denies sob, cp, n/v/d. Total 2 u pRBC total , IV Lasix   7/15 Pt seen and examined at bedside. Soft BP this AM, given 1 pRBC this AM, on Lasix 40 BID.   : Patient seen and assessed at bedside. Complaining of cramping in her bilateral feet otherwise feeling better. Still feels pain on her right side but is controlled with Tylenol PRN. Denies sob on 3L NC, chest pain, nausea, vomiting, diarrhea. H/H improving s/p 3PRBC. B/L LE edema improving.    OVERNIGHT EVENTS: none    Home Medications:  aspirin 81 mg oral delayed release tablet: 1 tab(s) orally once a day (2022 04:17)  atorvastatin 20 mg oral tablet: 1 tab(s) orally once a day (2022 04:17)  clopidogrel 75 mg oral tablet: 1 tab(s) orally once a day (2022 04:17)  fluticasone 50 mcg/inh nasal spray: 1 spray(s) nasal 2 times a day (2022 04:17)  Lasix 40 mg oral tablet: 1 tab(s) orally 2 times a day (2022 04:17)  metoprolol succinate 50 mg oral tablet, extended release: 1 tab(s) orally once a day (2022 04:17)  sodium chloride 0.65% nasal spray: 2 spray(s) nasal 4 times a day (2022 04:17)  Synthroid 75 mcg (0.075 mg) oral tablet: 1 tab(s) orally once a day (2022 04:17)  warfarin 3 mg oral tablet: DO NOT Give Coumadin Today as INR 3.11, Check PT/INR on 22.Adjust Coumadin dose as per INR. start with Coumadin 2 mg   Keep INR 2-3    Pt&#x27;s HOME Coumadin schedule is as below  1 tab(s) orally once a day (, , , F, Sa Grider) at Bed time   -Pt use to Take Coumadin 4 mg on every Wednesday (2022 04:17)  zinc oxide 40% topical ointment: Apply topically to affected area 2 times a day (2022 04:17)      MEDICATIONS  (STANDING):  furosemide    Tablet 40 milliGRAM(s) Oral two times a day  levothyroxine 75 MICROGram(s) Oral daily  metoprolol succinate ER 50 milliGRAM(s) Oral daily    MEDICATIONS  (PRN):  acetaminophen     Tablet .. 650 milliGRAM(s) Oral every 6 hours PRN Mild Pain (1 - 3)  aluminum hydroxide/magnesium hydroxide/simethicone Suspension 30 milliLiter(s) Oral every 4 hours PRN Dyspepsia  melatonin 3 milliGRAM(s) Oral at bedtime PRN Insomnia  ondansetron Injectable 4 milliGRAM(s) IV Push every 8 hours PRN Nausea and/or Vomiting      Allergies    No Known Allergies    Intolerances        Social History:  Lived at home alone. Former smoker. Denies etoh and other rec drug use. Ambulates with walker. Daughter is HCP. DNR/DNI, came from [facility] now (2022 04:06)      REVIEW OF SYSTEMS:  CONSTITUTIONAL: No fever, No chills, No fatigue, No myalgia, No Body ache, No Weakness  EYES: No eye pain,  No visual disturbances, No discharge, NO Redness  ENMT:  No ear pain, No nose bleed, No vertigo; No sinus pain, NO throat pain, No Congestion  NECK: No pain, No stiffness  RESPIRATORY: No cough, No wheezing, No  hemoptysis, NO  shortness of breath on 3L NC  CARDIOVASCULAR: No chest pain, palpitations  GASTROINTESTINAL: No abdominal pain, NO epigastric pain. No nausea, No vomiting; No diarrhea, No constipation. [  ] BM  GENITOURINARY: No dysuria, No frequency, No urgency, No hematuria, NO incontinence  NEUROLOGICAL: No headaches, No dizziness, No numbness, No tingling, No tremors, No weakness  EXT: + Hematoma R side.   SKIN: +bruising over right elbow, right leg 2/2 fall [ x ] No itching, burning, rashes   MUSCULOSKELETAL: No joint pain ,No Jt swelling; No muscle pain, No back pain, No extremity pain  PSYCHIATRIC: No depression,  No anxiety,  No mood swings ,No difficulty sleeping at night  PAIN SCALE: [ x ] None  [  ] Other-  ROS Unable to obtain due to - [  ] Dementia  [  ] Lethargy [  ] Drowsy [  ] Sedated [  ] non verbal  REST OF REVIEW Of SYSTEM - [x  ] Normal       Vital Signs Last 24 Hrs  T(C): 36.7 (2022 04:38), Max: 36.8 (15 Jul 2022 20:58)  T(F): 98.1 (2022 04:38), Max: 98.2 (15 Jul 2022 20:58)  HR: 73 (2022 04:38) (70 - 73)  BP: 116/68 (2022 04:38) (96/48 - 117/65)  BP(mean): --  RR: 18 (:38) (18 - 18)  SpO2: 96% (:38) (96% - 98%)    Parameters below as of 2022 04:38  Patient On (Oxygen Delivery Method): nasal cannula      Finger Stick        - @ 07:01  -  16 @ 10:10  --------------------------------------------------------  IN: 240 mL / OUT: 0 mL / NET: 240 mL        PHYSICAL EXAM:  GENERAL:  [x  ] NAD , [x] well appearing, [  ] Agitated, [  ] Drowsy,  [  ] Lethargy, [  ] confused   HEAD:  [  x] Normal, [  ] Other  EYES:  [ x ] EOMI, [  ] PERRLA, [ x ] conjunctiva and sclera clear normal, [  ] Other,  [  ] Pallor,[  ] Discharge  ENMT:  [x  ] Normal, [x  ] Moist mucous membranes, [  ] Good dentition, [x  ] No Thrush  NECK:  [ x ] Supple, [  x] + JVD, [x  ] Normal thyroid, [  ] Lymphadenopathy [  ] Other  CHEST/LUNG:  [ x ] Clear to auscultation bilaterally, [ x ] Breath Sounds equal B/L / , [  ] poor effort  [ x ] No rales, [ x ] No rhonchi  [ x ]  Mild wheezing,   HEART:  [x  ] Regular rate and rhythm, [  ] tachycardia, [  ] Bradycardia,  [  ] irregular  [x  ] + murmurs, No rubs, No gallops, [  ] PPM in place (Mfr:  ) [x] anasarca -IMPROVING  ABDOMEN:  [x  ] Soft, [ x ] Nontender, [ x] Nondistended, [ x ] No mass, [ x ] Bowel sounds present, [ x ] obese  NERVOUS SYSTEM:  [ x ] Alert & Oriented X3, [x  ] Nonfocal  [  ] Confusion  [  ] Encephalopathic [  ] Sedated [  ] Unable to assess, [  ] Dementia [  ] Other-  EXTREMITIES: [x  ] 2+ Peripheral Pulses, No clubbing, No cyanosis,  [ x ]  2+ edema B/L lower EXT. [ x ] PVD stasis skin changes B/L Lower EXT, erythema B/L Lower ext , small blister- Improved   [  ] wound  LYMPH: No lymphadenopathy noted  SKIN:  [x  ] +R hematoma hip, bruising over right elbow, right leg 2/2 fall, [  ] Pressure Ulcers, [  ] ecchymosis, [  ] Skin Tears, [  ] Other    DIET: Diet, DASH/TLC:   Sodium & Cholesterol Restricted  1000mL Fluid Restriction ([id number]) (22 @ 08:48)      LABS:                        9.1    4.49  )-----------( 211      ( 2022 07:05 )             26.8     2022 07:05    140    |  105    |  36     ----------------------------<  83     3.5     |  31     |  1.60     Ca    8.1        2022 07:05  Phos  3.6       2022 07:05  Mg     2.4       2022 07:05    TPro  6.4    /  Alb  1.9    /  TBili  3.2    /  DBili  x      /  AST  120    /  ALT  78     /  AlkPhos  337    2022 07:05    PT/INR - ( 2022 07:05 )   PT: 31.9 sec;   INR: 2.70 ratio           Urinalysis Basic - ( 15 Jul 2022 06:30 )    Color: Yellow / Appearance: Clear / S.015 / pH: x  Gluc: x / Ketone: Negative  / Bili: Negative / Urobili: 1   Blood: x / Protein: 15 / Nitrite: Negative   Leuk Esterase: Trace / RBC: 0-2 /HPF / WBC 3-5   Sq Epi: x / Non Sq Epi: Occasional / Bacteria: Few                           Anemia Panel:      Thyroid Panel:  Thyroid Stimulating Hormone, Serum: 2.14 uIU/mL (22 @ 08:40)                RADIOLOGY & ADDITIONAL TESTS:      HEALTH ISSUES - PROBLEM Dx:  Fall    Anemia due to acute blood loss    Hematoma    Transaminitis    Afib    Lower extremity edema    Acute kidney injury superimposed on CKD    HTN (hypertension)    Hypothyroidism    HFrEF (heart failure with reduced ejection fraction)    Need for prophylactic measure    CAD (coronary artery disease)            Consultant(s) Notes Reviewed:  [  x] YES     Care Discussed with [X] Consultants  [  x] Patient  [ x ] Family [  ] HCP [ x ]   [  ] Social Service  [ x ] RN, [  ] Physical Therapy,[  ] Palliative care team  DVT PPX: [  ] Lovenox, [  ] S C Heparin, [  ] Coumadin, [  ] Xarelto, [  ] Eliquis, [  ] Pradaxa, [  ] IV Heparin drip, [ x ] SCD [  ] Contraindication 2 to GI Bleed,[  ] Ambulation [  ] Contraindicated 2 to  bleed [  ] Contraindicated 2 to Brain Bleed  Advanced directive: [  ] None, [ x ] DNR/DNI Patient is a 89y old  Female who presents with a chief complaint of Fall, with Anemia (2022 08:57)    HPI:  89 y/o F with a pmhx b/l LE edema, HTN, P Afib (on coumadin), HFref (EF35-40%) and hypothyroidism presents to the ED s/p fall. Patient was admitted to [facility] for NSTEMI, hospital course c/b iatrogenic flash pulmonary edema, cath canceled due to vol overload, discharged to [facility]. She was doing well at [facility] until today. She went to bathroom for BM, had difficulty to clean herself. She was left unattended by [facility] staff in bathroom, she stood up to take few steps, lost balance, and fell on right side striking her right hip, right arm, right elbow, right side of neck. She denies any prodromal symptoms. No lightheadedness, chest pain, warm sensation. No dyspnea. Patient never lost consciousness. She was found to have Hg 6.9 at [facility] facility, sent to [facility] ED for further evaluation. She denies any significant pain right now. Only complains of right thigh twitching, swelling.  No fevers, chills, cp, dypsnea, abdominal pain. Admits lower ext swelling which is chronic for her.     In the ED  VS: T97.3 Hr78 /63 RR 16 SPO2 95% on 3L NC  Labs: WBC 4.43, HH 7.6/22.1, PLTS 225, Na 140, K 4.2, Cr 1.7, Gluc 112, Alk phos 326, AST//99  Chest Xray: lungs clear  Head CT: Stable exam. No acute intracranial hemorrhage, vasogenic edema, extra-axial collection or calvarial fracture.  Cervical spine CT: No acute cervical spine fracture or evidence of traumatic malalignment. Cervical spondylosis  CT A/P non con: Extensive diffuse soft tissue edema/anasarca. Small right, trace of bilateral pleural fluid. Small-to-moderate amount of simple free fluid around the liver and spleen. No definite intraperitoneal hemorrhage.No noncontrast evidence of acute injury to the chest, abdomen or pelvis.Concern for hematoma around the right hip soft tissues, partially visualized. No evidence of hip fracture.Mild intralobular septal thickening, may reflect pulmonary edema. Areas of atelectatic changes and possible airways impaction  EKG: ordered, pending  Given in ED: 1 unit prbc   (2022 04:06)    INTERVAL HPI:  : Patient seen and examined at bedside. Is feeling okay. Has no acute complaints but is feeling slightly agitated. Denies sob, cp, n/v/d. Total 2 u pRBC total , IV Lasix   7/15 Pt seen and examined at bedside. Soft BP this AM, given 1 pRBC this AM, on Lasix 40 BID.   : Patient seen and assessed at bedside. Complaining of cramping in her bilateral feet otherwise feeling better. Still feels pain on her right side but is controlled with Tylenol PRN. Denies sob on 3L NC, chest pain, nausea, vomiting, diarrhea. H/H improving s/p 3PRBC. B/L LE edema improving.    OVERNIGHT EVENTS: none    Home Medications:  aspirin 81 mg oral delayed release tablet: 1 tab(s) orally once a day (2022 04:17)  atorvastatin 20 mg oral tablet: 1 tab(s) orally once a day (2022 04:17)  clopidogrel 75 mg oral tablet: 1 tab(s) orally once a day (2022 04:17)  fluticasone 50 mcg/inh nasal spray: 1 spray(s) nasal 2 times a day (2022 04:17)  Lasix 40 mg oral tablet: 1 tab(s) orally 2 times a day (2022 04:17)  metoprolol succinate 50 mg oral tablet, extended release: 1 tab(s) orally once a day (2022 04:17)  sodium chloride 0.65% nasal spray: 2 spray(s) nasal 4 times a day (2022 04:17)  Synthroid 75 mcg (0.075 mg) oral tablet: 1 tab(s) orally once a day (2022 04:17)  warfarin 3 mg oral tablet: DO NOT Give Coumadin Today as INR 3.11, Check PT/INR on 22.Adjust Coumadin dose as per INR. start with Coumadin 2 mg   Keep INR 2-3    Pt&#x27;s HOME Coumadin schedule is as below  1 tab(s) orally once a day (, , , F, Sa Grider) at Bed time   -Pt use to Take Coumadin 4 mg on every Wednesday (2022 04:17)  zinc oxide 40% topical ointment: Apply topically to affected area 2 times a day (2022 04:17)      MEDICATIONS  (STANDING):  furosemide    Tablet 40 milliGRAM(s) Oral two times a day  levothyroxine 75 MICROGram(s) Oral daily  metoprolol succinate ER 50 milliGRAM(s) Oral daily    MEDICATIONS  (PRN):  acetaminophen     Tablet .. 650 milliGRAM(s) Oral every 6 hours PRN Mild Pain (1 - 3)  aluminum hydroxide/magnesium hydroxide/simethicone Suspension 30 milliLiter(s) Oral every 4 hours PRN Dyspepsia  melatonin 3 milliGRAM(s) Oral at bedtime PRN Insomnia  ondansetron Injectable 4 milliGRAM(s) IV Push every 8 hours PRN Nausea and/or Vomiting      Allergies    No Known Allergies    Intolerances        Social History:  Lived at home alone. Former smoker. Denies etoh and other rec drug use. Ambulates with walker. Daughter is HCP. DNR/DNI, came from [facility] now (2022 04:06)      REVIEW OF SYSTEMS: i am Feeling better   CONSTITUTIONAL: No fever, No chills, No fatigue, No myalgia, No Body ache, No Weakness  EYES: No eye pain,  No visual disturbances, No discharge, NO Redness  ENMT:  No ear pain, No nose bleed, No vertigo; No sinus pain, NO throat pain, No Congestion  NECK: No pain, No stiffness  RESPIRATORY: No cough, No wheezing, No  hemoptysis, NO  shortness of breath on 3L NC  CARDIOVASCULAR: No chest pain, palpitations  GASTROINTESTINAL: No abdominal pain, NO epigastric pain. No nausea, No vomiting; No diarrhea, No constipation. [  ] BM  GENITOURINARY: No dysuria, No frequency, No urgency, No hematuria, NO incontinence  NEUROLOGICAL: No headaches, No dizziness, No numbness, No tingling, No tremors, No weakness  EXT: + Hematoma R side.   SKIN: +bruising over right elbow, right leg 2/2 fall [ x ] No itching, burning, rashes   MUSCULOSKELETAL: No joint pain ,No Jt swelling; No muscle pain, No back pain, No extremity pain  PSYCHIATRIC: No depression,  No anxiety,  No mood swings ,No difficulty sleeping at night  PAIN SCALE: [ x ] None  [  ] Other-  ROS Unable to obtain due to - [  ] Dementia  [  ] Lethargy [  ] Drowsy [  ] Sedated [  ] non verbal  REST OF REVIEW Of SYSTEM - [x  ] Normal       Vital Signs Last 24 Hrs  T(C): 36.7 (2022 04:38), Max: 36.8 (15 Jul 2022 20:58)  T(F): 98.1 (2022 04:38), Max: 98.2 (15 Jul 2022 20:58)  HR: 73 (2022 04:38) (70 - 73)  BP: 116/68 (2022 04:38) (96/48 - 117/65)  BP(mean): --  RR: 18 (2022 04:38) (18 - 18)  SpO2: 96% (2022 04:38) (96% - 98%)    Parameters below as of 2022 04:38  Patient On (Oxygen Delivery Method): nasal cannula      Finger Stick        - @ 07:01  -  -16 @ 10:10  --------------------------------------------------------  IN: 240 mL / OUT: 0 mL / NET: 240 mL        PHYSICAL EXAM:  GENERAL:  [x  ] NAD , [x] well appearing, [  ] Agitated, [  ] Drowsy,  [  ] Lethargy, [  ] confused   HEAD:  [  x] Normal, [  ] Other  EYES:  [ x ] EOMI, [  ] PERRLA, [ x ] conjunctiva and sclera clear normal, [  ] Other,  [  ] Pallor,[  ] Discharge  ENMT:  [x  ] Normal, [x  ] Moist mucous membranes, [  ] Good dentition, [x  ] No Thrush  NECK:  [ x ] Supple, [  x] + JVD, [x  ] Normal thyroid, [  ] Lymphadenopathy [  ] Other  CHEST/LUNG:  [ x ] Clear to auscultation bilaterally, [ x ] Breath Sounds equal B/L / , [  ] poor effort  [ x ] No rales, [ x ] No rhonchi  [ x ]  Mild wheezing,   HEART:  [x  ] Regular rate and rhythm, [  ] tachycardia, [  ] Bradycardia,  [  ] irregular  [x  ] + murmurs, No rubs, No gallops, [  ] PPM in place (Mfr:  ) [x] anasarca -IMPROVING  ABDOMEN:  [x  ] Soft, [ x ] Nontender, [ x] Nondistended, [ x ] No mass, [ x ] Bowel sounds present, [ x ] obese  NERVOUS SYSTEM:  [ x ] Alert & Oriented X3, [x  ] Nonfocal  [  ] Confusion  [  ] Encephalopathic [  ] Sedated [  ] Unable to assess, [  ] Dementia [  ] Other-  EXTREMITIES: [x  ] 2+ Peripheral Pulses, No clubbing, No cyanosis,  [ x ]  1+ edema B/L lower EXT. [ x ] PVD stasis skin changes B/L Lower EXT, erythema B/L Lower ext , small blister- Improved   [  ] wound  LYMPH: No lymphadenopathy noted  SKIN:  [x  ] large +R hematoma hip & Thigh , bruising over right elbow, right leg 2/2 fall, [  ] Pressure Ulcers, [ x ] ecchymosis, [  ] Skin Tears, [  ] Other    DIET: Diet, DASH/TLC:   Sodium & Cholesterol Restricted  1000mL Fluid Restriction ([id number]) (22 @ 08:48)      LABS:                        9.1    4.49  )-----------( 211      ( 2022 07:05 )             26.8     2022 07:05    140    |  105    |  36     ----------------------------<  83     3.5     |  31     |  1.60     Ca    8.1        2022 07:05  Phos  3.6       2022 07:05  Mg     2.4       2022 07:05    TPro  6.4    /  Alb  1.9    /  TBili  3.2    /  DBili  x      /  AST  120    /  ALT  78     /  AlkPhos  337    2022 07:05    PT/INR - ( 2022 07:05 )   PT: 31.9 sec;   INR: 2.70 ratio           Urinalysis Basic - ( 15 Jul 2022 06:30 )    Color: Yellow / Appearance: Clear / S.015 / pH: x  Gluc: x / Ketone: Negative  / Bili: Negative / Urobili: 1   Blood: x / Protein: 15 / Nitrite: Negative   Leuk Esterase: Trace / RBC: 0-2 /HPF / WBC 3-5   Sq Epi: x / Non Sq Epi: Occasional / Bacteria: Few      Thyroid Panel:  Thyroid Stimulating Hormone, Serum: 2.14 uIU/mL (22 @ 08:40)    RADIOLOGY & ADDITIONAL TESTS:      HEALTH ISSUES - PROBLEM Dx:  Fall    Anemia due to acute blood loss    Hematoma    Transaminitis    Afib    Lower extremity edema    Acute kidney injury superimposed on CKD    HTN (hypertension)    Hypothyroidism    HFrEF (heart failure with reduced ejection fraction)    Need for prophylactic measure    CAD (coronary artery disease)      Consultant(s) Notes Reviewed:  [  x] YES     Care Discussed with [X] Consultants  [  x] Patient  [ x ] Family - dtr [  ] HCP [ x ]   [  ] Social Service  [ x ] RN, [  ] Physical Therapy,[  ] Palliative care team  DVT PPX: [  ] Lovenox, [  ] S C Heparin, [  ] Coumadin, [  ] Xarelto, [  ] Eliquis, [  ] Pradaxa, [  ] IV Heparin drip, [ x ] SCD [  ] Contraindication 2 to GI Bleed,[  ] Ambulation [  ] Contraindicated 2 to  bleed [  ] Contraindicated 2 to Brain Bleed  Advanced directive: [  ] None, [ x ] DNR/DNI

## 2022-07-16 NOTE — DIETITIAN INITIAL EVALUATION ADULT - ADD RECOMMEND
Diabetes Mellitus and Nutrition, Adult  When you have diabetes, or diabetes mellitus, it is very important to have healthy eating habits because your blood sugar (glucose) levels are greatly affected by what you eat and drink. Eating healthy foods in the right amounts, at about the same times every day, can help you:  · Control your blood glucose.  · Lower your risk of heart disease.  · Improve your blood pressure.  · Reach or maintain a healthy weight.  What can affect my meal plan?  Every person with diabetes is different, and each person has different needs for a meal plan. Your health care provider may recommend that you work with a dietitian to make a meal plan that is best for you. Your meal plan may vary depending on factors such as:  · The calories you need.  · The medicines you take.  · Your weight.  · Your blood glucose, blood pressure, and cholesterol levels.  · Your activity level.  · Other health conditions you have, such as heart or kidney disease.  How do carbohydrates affect me?  Carbohydrates, also called carbs, affect your blood glucose level more than any other type of food. Eating carbs naturally raises the amount of glucose in your blood. Carb counting is a method for keeping track of how many carbs you eat. Counting carbs is important to keep your blood glucose at a healthy level, especially if you use insulin or take certain oral diabetes medicines.  It is important to know how many carbs you can safely have in each meal. This is different for every person. Your dietitian can help you calculate how many carbs you should have at each meal and for each snack.  How does alcohol affect me?  Alcohol can cause a sudden decrease in blood glucose (hypoglycemia), especially if you use insulin or take certain oral diabetes medicines. Hypoglycemia can be a life-threatening condition. Symptoms of hypoglycemia, such as sleepiness, dizziness, and confusion, are similar to symptoms of having too much  "alcohol.  · Do not drink alcohol if:  ? Your health care provider tells you not to drink.  ? You are pregnant, may be pregnant, or are planning to become pregnant.  · If you drink alcohol:  ? Do not drink on an empty stomach.  ? Limit how much you use to:  § 0-1 drink a day for women.  § 0-2 drinks a day for men.  ? Be aware of how much alcohol is in your drink. In the [place] one drink equals one 12 oz bottle of beer (355 mL), one 5 oz glass of wine (148 mL), or one 1½ oz glass of hard liquor (44 mL).  ? Keep yourself hydrated with water, diet soda, or unsweetened iced tea.  § Keep in mind that regular soda, juice, and other mixers may contain a lot of sugar and must be counted as carbs.  What are tips for following this plan?    Reading food labels  · Start by checking the serving size on the \"Nutrition Facts\" label of packaged foods and drinks. The amount of calories, carbs, fats, and other nutrients listed on the label is based on one serving of the item. Many items contain more than one serving per package.  · Check the total grams (g) of carbs in one serving. You can calculate the number of servings of carbs in one serving by dividing the total carbs by 15. For example, if a food has 30 g of total carbs per serving, it would be equal to 2 servings of carbs.  · Check the number of grams (g) of saturated fats and trans fats in one serving. Choose foods that have a low amount or none of these fats.  · Check the number of milligrams (mg) of salt (sodium) in one serving. Most people should limit total sodium intake to less than 2,300 mg per day.  · Always check the nutrition information of foods labeled as \"low-fat\" or \"nonfat.\" These foods may be higher in added sugar or refined carbs and should be avoided.  · Talk to your dietitian to identify your daily goals for nutrients listed on the label.  Shopping  · Avoid buying canned, pre-made, or processed foods. These foods tend to be high in fat, sodium, and added " sugar.  · Shop around the outside edge of the grocery store. This is where you will most often find fresh fruits and vegetables, bulk grains, fresh meats, and fresh dairy.  Cooking  · Use low-heat cooking methods, such as baking, instead of high-heat cooking methods like deep frying.  · Cook using healthy oils, such as olive, canola, or sunflower oil.  · Avoid cooking with butter, cream, or high-fat meats.  Meal planning  · Eat meals and snacks regularly, preferably at the same times every day. Avoid going long periods of time without eating.  · Eat foods that are high in fiber, such as fresh fruits, vegetables, beans, and whole grains. Talk with your dietitian about how many servings of carbs you can eat at each meal.  · Eat 4-6 oz (112-168 g) of lean protein each day, such as lean meat, chicken, fish, eggs, or tofu. One ounce (oz) of lean protein is equal to:  ? 1 oz (28 g) of meat, chicken, or fish.  ? 1 egg.  ? ¼ cup (62 g) of tofu.  · Eat some foods each day that contain healthy fats, such as avocado, nuts, seeds, and fish.  What foods should I eat?  Fruits  Berries. Apples. Oranges. Peaches. Apricots. Plums. Grapes. Hugh. Papaya. Pomegranate. Kiwi. Cherries.  Vegetables  Lettuce. Spinach. Leafy greens, including kale, chard, barbara greens, and mustard greens. Beets. Cauliflower. Cabbage. Broccoli. Carrots. Green beans. Tomatoes. Peppers. Onions. Cucumbers. [place] sprouts.  Grains  Whole grains, such as whole-wheat or whole-grain bread, crackers, tortillas, cereal, and pasta. Unsweetened oatmeal. Quinoa. Brown or wild rice.  Meats and other proteins  Seafood. Poultry without skin. Lean cuts of poultry and beef. Tofu. Nuts. Seeds.  Dairy  Low-fat or fat-free dairy products such as milk, yogurt, and cheese.  The items listed above may not be a complete list of foods and beverages you can eat. Contact a dietitian for more information.  What foods should I avoid?  Fruits  Fruits canned with  syrup.  Vegetables  Canned vegetables. Frozen vegetables with butter or cream sauce.  Grains  Refined white flour and flour products such as bread, pasta, snack foods, and cereals. Avoid all processed foods.  Meats and other proteins  Fatty cuts of meat. Poultry with skin. Breaded or fried meats. Processed meat. Avoid saturated fats.  Dairy  Full-fat yogurt, cheese, or milk.  Beverages  Sweetened drinks, such as soda or iced tea.  The items listed above may not be a complete list of foods and beverages you should avoid. Contact a dietitian for more information.  Questions to ask a health care provider  · Do I need to meet with a diabetes educator?  · Do I need to meet with a dietitian?  · What number can I call if I have questions?  · When are the best times to check my blood glucose?  Where to find more information:  · American Diabetes Association: diabetes.org  · Academy of Nutrition and Dietetics: www.eatright.org  · National [place] of Diabetes and Digestive and Kidney Diseases: www.niddk.nih.gov  · Association of Diabetes Care and Education Specialists: www.diabeteseducator.org  Summary  · It is important to have healthy eating habits because your blood sugar (glucose) levels are greatly affected by what you eat and drink.  · A healthy meal plan will help you control your blood glucose and maintain a healthy lifestyle.  · Your health care provider may recommend that you work with a dietitian to make a meal plan that is best for you.  · Keep in mind that carbohydrates (carbs) and alcohol have immediate effects on your blood glucose levels. It is important to count carbs and to use alcohol carefully.  This information is not intended to replace advice given to you by your health care provider. Make sure you discuss any questions you have with your health care provider.  Document Revised: 11/24/2020 Document Reviewed: 11/24/2020  Elsevier Patient Education © 2021 [employer]     1. recommend add MVI 2. trend weight (reweigh) recommended

## 2022-07-16 NOTE — PROGRESS NOTE ADULT - PROBLEM SELECTOR PLAN 9
LUIS CARLOS on CKD III, likely prerenal secondary to active bleeding   - Creatinine baseline 1.2, this morning trended up to 1.6 from 1.5  - Avoid nephrotoxic medications   - Low salt diet w/ 1 lt fluid restriction   - Nephro Following  -Lasix 40 mg q 12 hrs PO

## 2022-07-16 NOTE — PROGRESS NOTE ADULT - SUBJECTIVE AND OBJECTIVE BOX
Patient is a 89y old  Female who presents with a chief complaint of Fall, ABLA (2022 04:06)       HPI:  89 y/o F with a pmhx b/l LE edema, HTN, P Afib (on coumadin), HFref (EF35-40%) and hypothyroidism presents to the ED s/p fall. Patient was admitted to [facility] -22 for NSTEMI, hospital course c/b iatrogenic flash pulmonary edema, cath canceled due to vol overload, discharged to [facility]. She was doing well at [facility] until today. She went to bathroom for BM, had difficulty to clean herself. She was left unattended by [facility] staff in bathroom, she stood up to take few steps, lost balance, and fell on right side striking her right hip, right arm, right elbow, right side of neck. She denies any prodromal symptoms. No lightheadedness, chest pain, warm sensation. No dyspnea. Patient never lost consciousness. She was found to have Hg 6.9 at [facility] facility, sent to [facility] ED for further evaluation. She denies any significant pain right now. Only complains of right thigh twitching, swelling.  No fevers, chills, cp, dypsnea, abdominal pain. Admits lower ext swelling which is chronic for her.     In the ED  VS: T97.3 Hr78 /63 RR 16 SPO2 95% on 3L NC  Labs: WBC 4.43, HH 7.6/22.1, PLTS 225, Na 140, K 4.2, Cr 1.7, Gluc 112, Alk phos 326, AST//99  Chest Xray: lungs clear  Head CT: Stable exam. No acute intracranial hemorrhage, vasogenic edema, extra-axial collection or calvarial fracture.  Cervical spine CT: No acute cervical spine fracture or evidence of traumatic malalignment. Cervical spondylosis  CT A/P non con: Extensive diffuse soft tissue edema/anasarca. Small right, trace of bilateral pleural fluid. Small-to-moderate amount of simple free fluid around the liver and spleen. No definite intraperitoneal hemorrhage.No noncontrast evidence of acute injury to the chest, abdomen or pelvis.Concern for hematoma around the right hip soft tissues, partially visualized. No evidence of hip fracture.Mild intralobular septal thickening, may reflect pulmonary edema. Areas of atelectatic changes and possible airways impaction  EKG: ordered, pending  Given in ED: 1 unit prbc   (2022 04:06)       PAST MEDICAL & SURGICAL HISTORY:  Hypothyroid      Hypertension      Lower extremity edema      Paroxysmal atrial fibrillation      No significant past surgical history           FAMILY HISTORY:  No pertinent family history in first degree relatives    [place]    Social History:Non smoker    MEDICATIONS  (STANDING):  furosemide    Tablet 40 milliGRAM(s) Oral every 12 hours  levothyroxine 75 MICROGram(s) Oral daily  metoprolol succinate ER 50 milliGRAM(s) Oral daily    MEDICATIONS  (PRN):  aluminum hydroxide/magnesium hydroxide/simethicone Suspension 30 milliLiter(s) Oral every 4 hours PRN Dyspepsia  melatonin 3 milliGRAM(s) Oral at bedtime PRN Insomnia  ondansetron Injectable 4 milliGRAM(s) IV Push every 8 hours PRN Nausea and/or Vomiting   Meds reviewed    Allergies    No Known Allergies    Intolerances         REVIEW OF SYSTEMS:    Review of Systems:   Constitutional: Denies fatigue  HEENT: Denies headaches and dizziness  Respiratory: denies SOB, cough, or wheezing  Cardiovascular: denies CP, palpitations  Gastrointestinal: Denies nausea, denies vomiting, diarrhea, constipation, abdominal pain, or bloody stools  Genitourinary: denies painful urination, increased frequency, urgency, or bloody urine  Skin: denies rashes or itching  Musculoskeletal: denies muscle aches, joint swelling  Neurologic: Denies generalized weakness, denies loss of sensation, numbness, or tingling      Vital Signs Last 24 Hrs  T(C): 36.7 (2022 04:38), Max: 36.8 (15 Jul 2022 20:58)  T(F): 98.1 (2022 04:38), Max: 98.2 (15 Jul 2022 20:58)  HR: 73 (2022 04:38) (70 - 73)  BP: 116/68 (2022 04:38) (96/48 - 117/65)  BP(mean): --  RR: 18 (2022 04:38) (18 - 18)  SpO2: 96% (2022 04:38) (96% - 98%)    Parameters below as of 2022 04:38  Patient On (Oxygen Delivery Method): nasal cannula          Daily Height in cm: 165.1 (2022 23:24)    Daily     PHYSICAL EXAM:    GENERAL: NAD  HEAD:  Atraumatic, Normocephalic  EYES: EOMI, conjunctiva and sclera clear  ENMT: No Drainage from nares, No drainage from ears  NECK: Supple, neck  veins full  NERVOUS SYSTEM:  Awake and Alert  CHEST/LUNG: Clear to percussion bilaterally; No rales, rhonchi, wheezing, or rubs  HEART: Regular rate and rhythm; No murmurs, rubs, or gallops  ABDOMEN: Soft, Nontender, Nondistended; Bowel sounds present  EXTREMITIES:  2+ pitting edema  SKIN: UE ecchymosis      LABS:                        9.1    4.49  )-----------( 211      ( 2022 07:05 )             26.8     07-16    x   |  x   |  36<H>  ----------------------------<  83  x    |  31  |  1.60<H>    Ca    8.1<L>      2022 07:05  Phos  4.6     07-15  Mg     2.4     07-15    TPro  x   /  Alb  1.9<L>  /  TBili  x   /  DBili  x   /  AST  120<H>  /  ALT  78  /  AlkPhos  x   07-16    PT/INR - ( 15 Jul 2022 06:50 )   PT: 32.7 sec;   INR: 2.77 ratio           Urinalysis Basic - ( 15 Jul 2022 06:30 )    Color: Yellow / Appearance: Clear / S.015 / pH: x  Gluc: x / Ketone: Negative  / Bili: Negative / Urobili: 1   Blood: x / Protein: 15 / Nitrite: Negative   Leuk Esterase: Trace / RBC: 0-2 /HPF / WBC 3-5   Sq Epi: x / Non Sq Epi: Occasional / Bacteria: Few

## 2022-07-16 NOTE — DISCHARGE NOTE PROVIDER - NSDCCPCAREPLAN_GEN_ALL_CORE_FT
PRINCIPAL DISCHARGE DIAGNOSIS  Diagnosis: Anemia  Assessment and Plan of Treatment: You were admitted for acute blood loss anemia after having a fall at rehab.  You were found to have a right hip hematoma where you fell, which was the source of your anemia.  You required 3 units of packed red blood cells throughout your admission.  Your aspirin, Plavix and Coumadin were initially held on admission to avoid further bleeding.  Your aspirin and Coumadin were eventually restarted throughout hospital course.   STOP Plavix for now. Follow up with your cardiologist on discharge from the hospital.  CONTINUE Aspirin 81mg daily and Coumadin ***3*** mg nightly. Check INR three days after discharge from hospital to ensure therapeutic INR.   You were seen and evaluated by physical therapy who recommended discharge back to subacute rehab.   Follow up with your primary doctor in 1-2 weeks of discharge from subacute rehab.      SECONDARY DISCHARGE DIAGNOSES  Diagnosis: HFrEF (heart failure with reduced ejection fraction)  Assessment and Plan of Treatment: You have a history of chronic systolic heart failure.   Echocardiogram from your previous admission showed EF 35-40%  You were found to have bilateral lower extremity edema with a moderate right sided pleural effusion.  You were given Lasix throughout your hospital course.  Continue Lasix 40mg BID*****  You have required oxygen throughout your hospital stay and will continue using this at sub acute rehab.    Diagnosis: Afib  Assessment and Plan of Treatment: You have a history of atrial fibrillation.  Continue your home Toprol 50mg daily on discharge from the hospital.  Continue Coumadin ******      Diagnosis: Transaminitis  Assessment and Plan of Treatment: You were found to have elevated liver enzymes during this hospitalization, which was likely a combination of your heart failure and statin use.  ***HOLD/RESUME statin on discharge.    Diagnosis: Hypothyroidism  Assessment and Plan of Treatment:      PRINCIPAL DISCHARGE DIAGNOSIS  Diagnosis: Anemia  Assessment and Plan of Treatment: You were admitted for acute blood loss anemia after having a fall at rehab.  You were found to have a right hip hematoma where you fell, which was the source of your anemia.  You required 3 units of packed red blood cells throughout your admission.  Your aspirin, Plavix and Coumadin were initially held on admission to avoid further bleeding.  Your aspirin and Coumadin were eventually restarted throughout hospital course.   STOP Plavix for now. Follow up with your cardiologist on discharge from the hospital.  CONTINUE Aspirin 81mg daily and Coumadin ***3*** mg nightly. Check INR three days after discharge from hospital to ensure therapeutic INR.   You were seen and evaluated by physical therapy who recommended discharge back to subacute rehab.   Follow up with your primary doctor in 1-2 weeks of discharge from subacute rehab.      SECONDARY DISCHARGE DIAGNOSES  Diagnosis: HFrEF (heart failure with reduced ejection fraction)  Assessment and Plan of Treatment: You have a history of chronic systolic heart failure.   Echocardiogram from your previous admission showed EF 35-40%  You were found to have bilateral lower extremity edema with a moderate right sided pleural effusion.  You were given Lasix throughout your hospital course.  Continue Lasix 40mg BID.  You have required oxygen throughout your hospital stay and will continue using this at sub acute rehab.    Diagnosis: Afib  Assessment and Plan of Treatment: You have a history of atrial fibrillation.  Continue your home Toprol 50mg daily on discharge from the hospital.  Continue Coumadin ******      Diagnosis: Transaminitis  Assessment and Plan of Treatment: You were found to have elevated liver enzymes during this hospitalization, which was likely a combination of your heart failure and statin use.  ***HOLD/RESUME statin on discharge.    Diagnosis: Hypothyroidism  Assessment and Plan of Treatment: Continue home syntrhoid as directed.    Diagnosis: Fall  Assessment and Plan of Treatment: You had a fall prior to admission and were experiencing right ankle and foot pain during admission.   X-Rays of your right foot and ankle were negative for acute fracture.   Continue Tylenol as needed for pain control.    Diagnosis: Acute kidney injury superimposed on CKD  Assessment and Plan of Treatment: You have a history of chronic kidney disease. While you were admitted to the hospital, your creatinine levels were elevated. Your creatinine eventually improved.   Follow with your primary doctor to monitor your renal function.    Diagnosis: CAD (coronary artery disease)  Assessment and Plan of Treatment: You have a history of NSTEMI in June 2022. Your Aspirin and Plavix were held on admission due to anemia.  HOLD Plavix, RESTART Aspirin.     PRINCIPAL DISCHARGE DIAGNOSIS  Diagnosis: Anemia  Assessment and Plan of Treatment: You were admitted for acute blood loss anemia after having a fall at rehab.  You were found to have a right hip hematoma where you fell, which was the source of your anemia.  You required 3 units of packed red blood cells throughout your admission.  Your aspirin, Plavix and Coumadin were initially held on admission to avoid further bleeding.  Your aspirin and Coumadin were eventually restarted throughout hospital course.   STOP Plavix for now. Follow up with your cardiologist on discharge from the hospital.  CONTINUE Aspirin 81mg daily and Coumadin 3 mg nightly Monday, Wednesday, Friday and Coumadin 3 mg nightly Tuesday, Thursday, Friday, Saturday.  Check INR daily after discharge and ensure it stays in the 2-2.5 range.   You were seen and evaluated by physical therapy who recommended discharge back to subacute rehab.   Follow up with your primary doctor in 1-2 weeks of discharge from subacute rehab.      SECONDARY DISCHARGE DIAGNOSES  Diagnosis: Transaminitis  Assessment and Plan of Treatment: You were found to have elevated liver enzymes during this hospitalization, which was likely a combination of your heart failure and statin use.  Continue to hold your atorvastatin for now.   Repeat CMP in one week to monitor LFTs. Follow up with your cardiologist to determine when to restart atorvastatin.    Diagnosis: Hematoma  Assessment and Plan of Treatment: You were found to have a right hip hematoma after your fall in [facility].   Continue tylenol and ice compress as needed for pain management.      Diagnosis: Afib  Assessment and Plan of Treatment: You have a history of atrial fibrillation.  Continue your home Toprol 50mg daily on discharge from the hospital.  Continue Coumadin 3mg Monday, Wednesday, Friday and 2.5 mg Tuesday, Thursday, Saturday and Sunday.   Check INR daily maintain at 2-2.5    Diagnosis: HFrEF (heart failure with reduced ejection fraction)  Assessment and Plan of Treatment: You have a history of chronic systolic heart failure.   Echocardiogram from your previous admission showed EF 35-40%  You were found to have bilateral lower extremity edema with a moderate right sided pleural effusion.  You were given Lasix throughout your hospital course.  Continue Lasix 40mg BID.  You have required oxygen throughout your hospital stay and will continue using this at sub acute rehab.    Diagnosis: Hypothyroidism  Assessment and Plan of Treatment: Continue home syntrhoid as directed.    Diagnosis: Fall  Assessment and Plan of Treatment: You had a fall prior to admission and were experiencing right ankle and foot pain during admission.   X-Rays of your right foot and ankle were negative for acute fracture.   Continue Tylenol as needed for pain control.    Diagnosis: Acute kidney injury superimposed on CKD  Assessment and Plan of Treatment: You have a history of chronic kidney disease. While you were admitted to the hospital, your creatinine levels were elevated. Your creatinine eventually improved.   Follow with your primary doctor to monitor your renal function.    Diagnosis: CAD (coronary artery disease)  Assessment and Plan of Treatment: You have a history of NSTEMI in June 2022. Your Aspirin and Plavix were held on admission due to anemia.  HOLD Plavix, RESTART Aspirin.     PRINCIPAL DISCHARGE DIAGNOSIS  Diagnosis: Anemia  Assessment and Plan of Treatment: You were admitted for acute blood loss anemia after having a fall at rehab.  You were found to have a right hip hematoma where you fell, which was the source of your anemia.  You required 3 units of packed red blood cells throughout your admission.  Your aspirin, Plavix and Coumadin were initially held on admission to avoid further bleeding.  Your aspirin and Coumadin were eventually restarted throughout hospital course.   STOP Plavix for now. Follow up with your cardiologist on discharge from the hospital.  CONTINUE Aspirin 81mg daily and Coumadin 3 mg nightly Monday, Wednesday, Friday and Coumadin 3 mg nightly Tuesday, Thursday, Friday, Saturday.  Check INR daily after discharge and ensure it stays in the 2-2.5 range.   You were seen and evaluated by physical therapy who recommended discharge back to subacute rehab.   Follow up with your primary doctor in 1-2 weeks of discharge from subacute rehab.      SECONDARY DISCHARGE DIAGNOSES  Diagnosis: HFrEF (heart failure with reduced ejection fraction)  Assessment and Plan of Treatment: You have a history of chronic systolic heart failure.   Echocardiogram from your previous admission showed EF 35-40%  You were found to have bilateral lower extremity edema with a moderate right sided pleural effusion.  You were given Lasix throughout your hospital course.  CONTINUE Lasix 40mg BID, with Lasix 20mg every OTHER day in the afternoon.  CONTINUE Potassium 20mEq 2 tabs daily   You have required oxygen throughout your hospital stay and will continue using this at sub acute rehab.    Diagnosis: Afib  Assessment and Plan of Treatment: You have a history of atrial fibrillation.  Continue your home Toprol 50mg daily on discharge from the hospital.  Continue Coumadin 3mg Monday, Wednesday, Friday and 2.5 mg Tuesday, Thursday, Saturday and Sunday.   Check INR daily maintain at 2-2.5    Diagnosis: Transaminitis  Assessment and Plan of Treatment: You were found to have elevated liver enzymes during this hospitalization, which was likely a combination of your heart failure and statin use.  Continue to hold your atorvastatin for now.   Repeat CMP in one week to monitor LFTs. Follow up with your cardiologist to determine when to restart atorvastatin.    Diagnosis: Hypothyroidism  Assessment and Plan of Treatment: Continue home syntrhoid as directed.    Diagnosis: Fall  Assessment and Plan of Treatment: You had a fall prior to admission and were experiencing right ankle and foot pain during admission.   X-Rays of your right foot and ankle were negative for acute fracture.   Continue Tylenol as needed for pain control.    Diagnosis: Acute kidney injury superimposed on CKD  Assessment and Plan of Treatment: You have a history of chronic kidney disease. While you were admitted to the hospital, your creatinine levels were elevated. Your creatinine eventually improved.   Follow with your primary doctor to monitor your renal function.    Diagnosis: CAD (coronary artery disease)  Assessment and Plan of Treatment: You have a history of NSTEMI in June 2022. Your Aspirin and Plavix were held on admission due to anemia.  HOLD Plavix, RESTART Aspirin.    Diagnosis: Hematoma  Assessment and Plan of Treatment: You were found to have a right hip hematoma after your fall in [facility].   Continue tylenol and ice compress as needed for pain management.       PRINCIPAL DISCHARGE DIAGNOSIS  Diagnosis: Anemia  Assessment and Plan of Treatment: You were admitted for  acute on chronic ANemia  Acute acute blood loss anemia  2/ Rt Thigh Hematoma after having a fall at rehab. Hb is 9.5  You were found to have a right hip hematoma where you fell, which was the source of your anemia. Apply Ice pk Rt Thigh hematoma area  You required 3 units of packed red blood cells throughout your admission.  Your aspirin, Plavix and Coumadin were initially held on admission to avoid further bleeding.  Your aspirin and Coumadin were eventually restarted throughout hospital course.   STOP Plavix for now. Follow up with your cardiologist on discharge from the hospital.  CONTINUE Aspirin EC  81mg daily and Coumadin 3 mg nightly Monday, Wednesday, Friday and Coumadin 2.5 mg nightly Tuesday, Thursday, Friday, Saturday.  Check INR daily after discharge and ensure it stays in the 2-2.5 range.   You were seen and evaluated by physical therapy who recommended discharge back to subacute rehab.   Follow up with your primary doctor in 1-2 weeks of discharge from subacute rehab.      SECONDARY DISCHARGE DIAGNOSES  Diagnosis: Afib  Assessment and Plan of Treatment: You have a history of atrial fibrillation.  Continue your home Toprol XL 50mg daily on discharge from the hospital.  Continue Coumadin 3mg Monday, Wednesday, Friday and 2.5 mg Tuesday, Thursday, Saturday and Sunday.   Check INR daily maintain at 2-2.5    Diagnosis: HFrEF (heart failure with reduced ejection fraction)  Assessment and Plan of Treatment: You have a history of chronic systolic heart failure afte NSTEML last month   Echocardiogram from your previous admission showed EF 35-40%  You were found to have bilateral lower extremity edema with a moderate right sided pleural effusion.  You were given Lasix throughout your hospital course.  CONTINUE Lasix 40mg BID daily , with Lasix 20 mg 1 tab -EXTRA dose every OTHER day in the afternoon around 1 PM for leg edema  .  CONTINUE Potassium 20 mEq 2 tabs daily   You have required oxygen throughout your hospital stay and will continue using this at sub acute rehab.    Diagnosis: Transaminitis  Assessment and Plan of Treatment: You were found to have elevated liver enzymes during this hospitalization, which was likely a combination of your heart failure and statin use.  -Your last HIDA Scan was -Negative, seen by GI DR Rhodes   Continue to hold your atorvastatin for now.   Repeat CMP in one week to monitor LFTs. Follow up with your cardiologist to determine when to restart atorvastatin.    Diagnosis: CAD (coronary artery disease)  Assessment and Plan of Treatment: You have a history of NSTEMI in June 2022.   -Your Aspirin and Plavix were held on admission due to anemia.  , RESTART EC Aspirin 81 mg daily   -STOP Plavix as per Cardiology.    Diagnosis: Hypothyroidism  Assessment and Plan of Treatment: Continue home syntrhoid as directed.    Diagnosis: Acute kidney injury superimposed on CKD  Assessment and Plan of Treatment: You have a history of chronic kidney disease CKD 3 . While you were admitted to the hospital, your creatinine levels were elevated. Your creatinine eventually improved. CMP next week Cr 1.6-  Follow with your primary doctor to monitor your renal function.    Diagnosis: Fall  Assessment and Plan of Treatment: You had a fall prior to admission and were experiencing right ankle and foot pain during admission.   X-Rays of your right foot and ankle were negative for acute fracture.   Continue Tylenol as needed for pain control.    Diagnosis: Hematoma  Assessment and Plan of Treatment: You were found to have a right hip hematoma after your fall in [facility].   Continue tylenol and ice compress as needed for pain management.

## 2022-07-16 NOTE — PROGRESS NOTE ADULT - SUBJECTIVE AND OBJECTIVE BOX
[facility] -- Reid Astorga, Norah, Raheem, Flex Muniz Savella, Goodger  Office # [phone]    Follow Up:  S/P Fall, HFrEF, Afib    Subjective/Observations: Awake and alert, denies dizziness or lightheadedness.  Comfortable on 2L/NC.  c/o right hip pain.  Denies any form of respiratory or cardiac discomfort    REVIEW OF SYSTEMS: All other review of systems is negative unless indicated above  PAST MEDICAL & SURGICAL HISTORY:  Hypothyroid  Hypertension  Lower extremity edema  Paroxysmal atrial fibrillation  NSTEMI (non-ST elevation myocardial infarction)  june 2022  Chronic systolic congestive heart failure  Stage 3 chronic kidney disease  Anemia of chronic disease  No significant past surgical history    MEDICATIONS  (STANDING):  aspirin enteric coated 81 milliGRAM(s) Oral daily  furosemide    Tablet 40 milliGRAM(s) Oral two times a day  levothyroxine 75 MICROGram(s) Oral daily  metoprolol succinate ER 50 milliGRAM(s) Oral daily    MEDICATIONS  (PRN):  acetaminophen     Tablet .. 650 milliGRAM(s) Oral every 6 hours PRN Mild Pain (1 - 3)  aluminum hydroxide/magnesium hydroxide/simethicone Suspension 30 milliLiter(s) Oral every 4 hours PRN Dyspepsia  melatonin 3 milliGRAM(s) Oral at bedtime PRN Insomnia  ondansetron Injectable 4 milliGRAM(s) IV Push every 8 hours PRN Nausea and/or Vomiting    Allergies    No Known Allergies    Intolerances    Vital Signs Last 24 Hrs  T(C): 37.1 (16 Jul 2022 13:22), Max: 37.1 (16 Jul 2022 13:22)  T(F): 98.7 (16 Jul 2022 13:22), Max: 98.7 (16 Jul 2022 13:22)  HR: 72 (16 Jul 2022 13:22) (70 - 73)  BP: 131/76 (16 Jul 2022 13:22) (96/48 - 133/57)  BP(mean): --  RR: 18 (16 Jul 2022 13:22) (18 - 18)  SpO2: 93% (16 Jul 2022 13:22) (93% - 96%)    Parameters below as of 16 Jul 2022 13:22  Patient On (Oxygen Delivery Method): nasal cannula    I&O's Summary    16 Jul 2022 07:01  -  16 Jul 2022 14:54  --------------------------------------------------------  IN: 480 mL / OUT: 0 mL / NET: 480 mL    PHYSICAL EXAM:  TELE: Not on tele  Constitutional: NAD, awake and alert, obese  HEENT: Moist Mucous Membranes, Anicteric  Pulmonary: Non-labored, breath sounds are clear bilaterally but diminished at bases, No wheezing, rales or rhonchi  Cardiovascular: IRRR, S1 and S2, No murmurs, rubs, gallops or clicks  Gastrointestinal: Bowel Sounds present, soft, nontender.   Lymph: No peripheral edema. No lymphadenopathy.  Skin: No visible rashes or ulcers.  +ecchymoses and inflammation on right hip  Psych:  Mood & affect appropriate  LABS: All Labs Reviewed:                        9.1    4.49  )-----------( 211      ( 16 Jul 2022 07:05 )             26.8                         8.6    3.97  )-----------( 217      ( 15 Jul 2022 06:50 )             25.3                         7.7    x     )-----------( x        ( 14 Jul 2022 12:25 )             22.8     16 Jul 2022 07:05    140    |  105    |  36     ----------------------------<  83     3.5     |  31     |  1.60   15 Jul 2022 06:50    141    |  105    |  37     ----------------------------<  83     3.7     |  29     |  1.50   14 Jul 2022 20:02    141    |  105    |  37     ----------------------------<  93     3.8     |  30     |  1.60     Ca    8.1        16 Jul 2022 07:05  Ca    8.0        15 Jul 2022 06:50  Ca    8.0        14 Jul 2022 20:02  Phos  3.6       16 Jul 2022 07:05  Phos  4.6       15 Jul 2022 06:50  Phos  4.2       14 Jul 2022 08:40  Mg     2.4       16 Jul 2022 07:05  Mg     2.4       15 Jul 2022 06:50  Mg     2.3       14 Jul 2022 08:40    TPro  6.4    /  Alb  1.9    /  TBili  3.2    /  DBili  x      /  AST  120    /  ALT  78     /  AlkPhos  337    16 Jul 2022 07:05  TPro  6.5    /  Alb  2.0    /  TBili  3.4    /  DBili  x      /  AST  149    /  ALT  85     /  AlkPhos  293    15 Jul 2022 06:50  TPro  6.3    /  Alb  1.8    /  TBili  3.2    /  DBili  x      /  AST  163    /  ALT  94     /  AlkPhos  315    14 Jul 2022 08:40    PT/INR - ( 16 Jul 2022 07:05 )   PT: 31.9 sec;   INR: 2.70 ratio        ACC: [id number] EXAM:  ECHO TTE WO CON COMP W DOPP                          PROCEDURE DATE:  06/19/2022          INTERPRETATION:  INDICATION: Chest pain  Sonographer LK    Blood Pressure 147/84    Height 165.1 cm     Weight 72.6 kg       BSA 1.8   sqm    Dimensions:  LA 3.8       Normal Values: 2.0 - 4.0 cm  Ao 3.2        Normal Values: 2.0 - 3.8 cm  SEPTUM 1.7       Normal Values: 0.6 - 1.2 cm  PWT 1.2       Normal Values: 0.6 - 1.1 cm  LVIDd 4.8         Normal Values: 3.0 - 5.6 cm  LVIDs 3.2      Normal Values: 1.8 - 4.0 cm      OBSERVATIONS:  Mitral Valve: Mitral annular calcification with thickened leaflets, mild   MR.  Aortic Valve/Aorta: Calcified trileaflet aortic valve with decreased   opening. Peak transaortic valve gradient equals 20.4 mmHg with a mean   transaortic valve gradient 13.2 mmHg. By visual estimation there appears   to be mild to moderate aortic stenosis  Tricuspid Valve: Moderate TR.  Pulmonic Valve: Trace PI  Left Atrium: Enlarged  Right Atrium: Enlarged  Left Ventricle: Mild to moderate segmental left ventricular systolic   dysfunction, estimated LVEF of 35-40%. The apex, mid inferoseptal and mid   anterolateral walls appear severely hypokinetic  Right Ventricle: Right ventricular enlargement with grossly normal   function  Pericardium: no significant pericardial effusion.  Pulmonary/RV Pressure: estimated PA systolic pressure of 36 mmHg  IVC measures 1.47 cm    IMPRESSION:  Mild to moderate segmental left ventricular systolic dysfunction,   estimated LVEF of 35-40%. The apex, mid inferoseptal and mid   anterolateral walls appear severely hypokinetic  Right ventricular enlargement with grossly normal function  Biatrial enlargement  Calcified trileaflet aortic valve with mild to moderate aortic stenosis,   without AI.  Mild MR  Moderate TR.  No significant pericardial effusion.    --- End of Report ---    JEAN CARLOS HENRY MD; Attending Cardiologist  This document has been electronically signed. Jun 20 2022 12:56PM      ACC: [id number] EXAM:  CT ABDOMEN AND PELVIS                        ACC: [id number] EXAM:  CT CHEST                          PROCEDURE DATE:  07/14/2022          INTERPRETATION:  CLINICAL INFORMATION: Status post fall 7/11/2022. On   Coumadin. Low H/H.    COMPARISON: None.    CONTRAST/COMPLICATIONS:  IV Contrast: NONE  Oral Contrast: NONE  Complications: None reported at time of study completion    PROCEDURE:  CT of the Chest, Abdomen and Pelvis was performed.  Sagittal and coronal reformats were performed. No IV contrast was   administered secondary to patient's renal function.    FINDINGS:  CHEST:  LUNGS AND LARGE AIRWAYS: Patent central airways. 5 mm left upper lobe   pulmonary nodule. Linear atelectasis in the lingula, as well as areas of   dependent atelectasis of the bilateral lower lobes. Mild interlobular   septal thickening, suggestive of pulmonary edema. Tiny branching   opacities in the periphery of the right middle lobe and at the right lung   apex. Mild peripheral consolidationin the right middle lobe, possibly   infectious or inflammatory.  PLEURA: Small right, trace left pleural fluid.  VESSELS: Normal caliber aorta with extensive calcifications. Coronary   artery calcifications.  HEART: Heart size is normal. No pericardial effusion.  MEDIASTINUM AND WILLIAN: Nonspecific subcentimeter mediastinal lymph nodes.  CHEST WALL AND LOWER NECK: Diffuse soft tissue edema.    ABDOMEN AND PELVIS:  Streak artifact from the patient's arms.  LIVER: Grossly unremarkable noncontrast appearance  BILE DUCTS: Normal caliber.  GALLBLADDER: Cholelithiasis.  SPLEEN: Within normal limits.  PANCREAS: Within normal limits.  ADRENALS: Within normal limits.  KIDNEYS/URETERS: Question punctate nonobstructing calculus versus   vascular calcification in the left upper kidney. Otherwise within normal   limits.    BLADDER: Within normal limits.  REPRODUCTIVE ORGANS: Uterus and adnexa within normal limits.   Calcification of the uterine fundus likely fibroid.    BOWEL: No bowel obstruction. Extensive sigmoid diverticula.  PERITONEUM: Moderate amount of simple fluid around the dome of the liver   and spleen extending into the paracolic gutters.  VESSELS: Extensive atherosclerotic calcifications. Grossly normal caliber   of the abdominal aorta.  RETROPERITONEUM/LYMPH NODES: No lymphadenopathy.  ABDOMINAL WALL: Extensive diffuse soft tissue edema/anasarca. More focal   area of hyperdensity in the soft tissues of the lateral aspect of the   right hip is partially visualized, concerning for hematoma, given history   of trauma.  BONES: No acute fractures. Loss of vertebral body height at the L4   superior endplate with associated hyperdensity likely related to prior   kyphoplasty.    IMPRESSION:  Extensive diffuse soft tissue edema/anasarca. Small right, trace of   bilateral pleural fluid. Small-to-moderate amount of simple free fluid   around the liver and spleen. No definite intraperitoneal hemorrhage.    No noncontrast evidence of acute injury to the chest, abdomen or pelvis.    Concern for hematoma around the right hip soft tissues, partially   visualized. No evidence of hip fracture.    Mild intralobular septal thickening, may reflect pulmonary edema. Areas   of atelectatic changes and possible airways impaction.    Findings were discussed with Dr. MARIO ALBERTO MCKEON [phone] 7/14/2022 2:38 AM by   Dr. Choudhary with read back confirmation.    --- End of Report ---     JACQUELINE CHOUDHARY MD; Attending Radiologist  This document has been electronically signed. Jul 14 2022  3:17AM    Ventricular Rate 68 BPM    QRS Duration 150 ms    Q-T Interval 490 ms    QTC Calculation(Bazett) 521 ms    R Axis -35 degrees    T Axis 206 degrees    Diagnosis Line Atrial fibrillation  Left axis deviation  Left bundle branch block  Confirmed by KEVIN MUNIZ ([id number]) on 7/14/2022 11:34:42 AM

## 2022-07-16 NOTE — DISCHARGE NOTE PROVIDER - HOSPITAL COURSE
HPI:  89 y/o F with a pmhx b/l LE edema, HTN, P Afib (on coumadin), HFref (EF35-40%) and hypothyroidism presents to the ED s/p fall. Patient was admitted to [facility] 6/17-6/30/22 for NSTEMI, hospital course c/b iatrogenic flash pulmonary edema, cath canceled due to vol overload, discharged to [facility] She was doing well at [facility] until today. She went to bathroom for BM, had difficulty to clean herself. She was left unattended by [facility] staff in bathroom, she stood up to take few steps, lost balance, and fell on right side striking her right hip, right arm, right elbow, right side of neck. She denies any prodromal symptoms. No lightheadedness, chest pain, warm sensation. No dyspnea. Patient never lost consciousness. She was found to have Hg 6.9 at [facility] facility, sent to [facility] ED for further evaluation. She denies any significant pain right now. Only complains of right thigh twitching, swelling.  No fevers, chills, cp, dypsnea, abdominal pain. Admits lower ext swelling which is chronic for her.     In the ED  VS: T97.3 Hr78 /63 RR 16 SPO2 95% on 3L NC  Labs: WBC 4.43, HH 7.6/22.1, PLTS 225, Na 140, K 4.2, Cr 1.7, Gluc 112, Alk phos 326, AST//99  Chest Xray: lungs clear  Head CT: Stable exam. No acute intracranial hemorrhage, vasogenic edema, extra-axial collection or calvarial fracture.  Cervical spine CT: No acute cervical spine fracture or evidence of traumatic malalignment. Cervical spondylosis  CT A/P non con: Extensive diffuse soft tissue edema/anasarca. Small right, trace of bilateral pleural fluid. Small-to-moderate amount of simple free fluid around the liver and spleen. No definite intraperitoneal hemorrhage.No noncontrast evidence of acute injury to the chest, abdomen or pelvis.Concern for hematoma around the right hip soft tissues, partially visualized. No evidence of hip fracture.Mild intralobular septal thickening, may reflect pulmonary edema. Areas of atelectatic changes and possible airways impaction  EKG: ordered, pending  Given in ED: 1 unit prbc   (14 Jul 2022 04:06)      ---  HOSPITAL COURSE:   Patient was admitted to general medicine floors for acute blood loss anemia 2/2 mechanical fall at [facility]   Was also found to have right sided hematoma 2/2 fall. Patient received  1 pRBC during hospital stay and had received  2pRBCs on 07/01. Patient hemoglobin improved and hematoma also improving.   Patient had an extensive cardiac history which included HFrEF, CAD (recent NSTEMI 06/2022), peripheral edema, chronic afib which was stable on coumadin. Coumadin was held in setting of anemia.   INR was within therapeutic levels. Home asa, plavix for CAD were also held for anemia.   Lasix 40 mg BID PO was continued for peripheral edema. Toprol XL 100mg with holding parameters was continued for HFrEF.   Cardiology (GAURI hager) was consulted and recommended restarting asa first followed by coumadin the next day as tolerated.  Peripheral edema improved throughout hospital stay.   Patient was found to have transaminitis presumably multifactorial due to CHF. Statin was held. GI was consulted. Transaminitis likely 2/2 statin and/or passive congestion.   LFTs now stable***  Patient found to have LUIS CARLOS superimposed on CKD III. Likely prerenal 2/2 active bleeding. Baseline cr 1.2 which trended up to 1.7.   Nephrology was consulted and patient was placed on low salt diet with 1 liter fluid restriction. Cr. is now stable at _____.       ---  CONSULTANTS:   Cardio- Gauri Hager  GI- Dr. Lockwood group  Nephrology- Dr. Lara    ---  TIME SPENT:  I, the attending physician, was physically present for the key portions of the evaluation and management (E/M) service provided. The total amount of time spent reviewing the hospital notes, laboratory values, imaging findings, assessing/counseling the patient, discussing with consultant physicians, social work, nursing staff was -- minutes    ---  Primary care provider was made aware of plan for discharge:      [  ] NO     [  ] YES   HPI:  89 y/o F with a pmhx b/l LE edema, HTN, P Afib (on coumadin), HFref (EF35-40%) and hypothyroidism presents to the ED s/p fall. Patient was admitted to [facility] 6/17-6/30/22 for NSTEMI, hospital course c/b iatrogenic flash pulmonary edema, cath canceled due to vol overload, discharged to [facility]. She was doing well at [facility] until today. She went to bathroom for BM, had difficulty to clean herself. She was left unattended by [facility] staff in bathroom, she stood up to take few steps, lost balance, and fell on right side striking her right hip, right arm, right elbow, right side of neck. She denies any prodromal symptoms. No lightheadedness, chest pain, warm sensation. No dyspnea. Patient never lost consciousness. She was found to have Hg 6.9 at [facility] facility, sent to [facility] ED for further evaluation. She denies any significant pain right now. Only complains of right thigh twitching, swelling.  No fevers, chills, cp, dypsnea, abdominal pain. Admits lower ext swelling which is chronic for her.     In the ED  VS: T97.3 Hr78 /63 RR 16 SPO2 95% on 3L NC  Labs: WBC 4.43, HH 7.6/22.1, PLTS 225, Na 140, K 4.2, Cr 1.7, Gluc 112, Alk phos 326, AST//99  Chest Xray: lungs clear  Head CT: Stable exam. No acute intracranial hemorrhage, vasogenic edema, extra-axial collection or calvarial fracture.  Cervical spine CT: No acute cervical spine fracture or evidence of traumatic malalignment. Cervical spondylosis  CT A/P non con: Extensive diffuse soft tissue edema/anasarca. Small right, trace of bilateral pleural fluid. Small-to-moderate amount of simple free fluid around the liver and spleen. No definite intraperitoneal hemorrhage.No noncontrast evidence of acute injury to the chest, abdomen or pelvis.Concern for hematoma around the right hip soft tissues, partially visualized. No evidence of hip fracture.Mild intralobular septal thickening, may reflect pulmonary edema. Areas of atelectatic changes and possible airways impaction  EKG: ordered, pending  Given in ED: 1 unit prbc   (14 Jul 2022 04:06)      ---  HOSPITAL COURSE:   Patient was admitted to general medicine floors for acute blood loss anemia 2/2 mechanical fall at [facility].   Was also found to have right sided hematoma 2/2 fall. Patient received  1 pRBC during hospital stay and had received  2pRBCs on 07/01. Patient hemoglobin improved and hematoma also improving.   Patient had an extensive cardiac history which included HFrEF, CAD (recent NSTEMI 06/2022), peripheral edema, chronic afib which was stable on coumadin. Coumadin was held in setting of anemia.   INR was within therapeutic levels. Home asa, plavix for CAD were also held for anemia.   Lasix 40 mg BID PO was continued for peripheral edema. Toprol XL 100mg with holding parameters was continued for HFrEF.   Cardiology (GAURI hager) was consulted and recommended restarting asa first followed by coumadin the next day as tolerated.  Peripheral edema improved throughout hospital stay.   Patient was found to have transaminitis presumably multifactorial due to CHF. Statin was held. GI was consulted. Transaminitis likely 2/2 statin and/or passive congestion.   LFTs now stable***  Patient found to have LUIS CARLOS superimposed on CKD III. Likely prerenal 2/2 active bleeding. Baseline cr 1.2 which trended up to 1.7.   Nephrology was consulted and patient was placed on low salt diet with 1 liter fluid restriction. Cr. is now stable at _____.       ---  CONSULTANTS:   Cardio- Gauri Hager  GI- Dr. Lockwood group  Nephrology- Dr. Lara    ---  TIME SPENT:  I, the attending physician, was physically present for the key portions of the evaluation and management (E/M) service provided. The total amount of time spent reviewing the hospital notes, laboratory values, imaging findings, assessing/counseling the patient, discussing with consultant physicians, social work, nursing staff was -- minutes    ---  Primary care provider was made aware of plan for discharge:      [  ] NO     [  ] YES   HPI:  89 y/o F with a pmhx b/l LE edema, HTN, P Afib (on coumadin), HFref (EF35-40%) and hypothyroidism presents to the ED s/p fall. Patient was admitted to [facility] 6/17-6/30/22 for NSTEMI, hospital course c/b iatrogenic flash pulmonary edema, cath canceled due to vol overload, discharged to [facility]. She was doing well at [facility] until today. She went to bathroom for BM, had difficulty to clean herself. She was left unattended by [facility] staff in bathroom, she stood up to take few steps, lost balance, and fell on right side striking her right hip, right arm, right elbow, right side of neck. She denies any prodromal symptoms. No lightheadedness, chest pain, warm sensation. No dyspnea. Patient never lost consciousness. She was found to have Hg 6.9 at [facility] facility, sent to [facility] ED for further evaluation. She denies any significant pain right now. Only complains of right thigh twitching, swelling.  No fevers, chills, cp, dypsnea, abdominal pain. Admits lower ext swelling which is chronic for her.     In the ED  VS: T97.3 Hr78 /63 RR 16 SPO2 95% on 3L NC  Labs: WBC 4.43, HH 7.6/22.1, PLTS 225, Na 140, K 4.2, Cr 1.7, Gluc 112, Alk phos 326, AST//99  Chest Xray: lungs clear  Head CT: Stable exam. No acute intracranial hemorrhage, vasogenic edema, extra-axial collection or calvarial fracture.  Cervical spine CT: No acute cervical spine fracture or evidence of traumatic malalignment. Cervical spondylosis  CT A/P non con: Extensive diffuse soft tissue edema/anasarca. Small right, trace of bilateral pleural fluid. Small-to-moderate amount of simple free fluid around the liver and spleen. No definite intraperitoneal hemorrhage.No noncontrast evidence of acute injury to the chest, abdomen or pelvis.Concern for hematoma around the right hip soft tissues, partially visualized. No evidence of hip fracture.Mild intralobular septal thickening, may reflect pulmonary edema. Areas of atelectatic changes and possible airways impaction  EKG: ordered, pending  Given in ED: 1 unit prbc   (14 Jul 2022 04:06)      ---  HOSPITAL COURSE:   Patient was admitted to general medicine floors for acute blood loss anemia 2/2 mechanical fall at [facility]. CT abdomen and pelvis on admission demonstrated large right sided hematoma of right hip. Patient received 3units of pRBCs total during hospital stay with hemoglobin stabilizing. Patient's aspirin, Plavix and Coumadin were held for anemia. Hematology consulted, once hemoglobin had stabilized, recommended restarting aspirin followed by Coumadin 24-48 hours after reintroduction of aspirin. Cardiology consulted, agreed with continuing aspirin only and holding Plavix in the setting of patient's recent NSTEMI. Lasix 40mg PO BID was started for patient's peripheral edema. Patient was found to have moderate right sided pleural effusion, grossly unchanged throughout hospital course. Patient was seen by GI due to transaminitis and elevated lipase. Statin held during admission. Transaminitis suspected to be secondary to congestive hepatopathy from heart failure vs statin-induced. Patient with LUIS CARLOS superimposed on CKD3. Nephrology consulted, recommended low salt diet, fluid restriciton. Baseline creatinine during prior hospitalization 1.4-1.6, likely new baseline. Patient was seen and evaluated by physical therapy who recommended discharge to subacute rehabilitation.     Patient's medical condition improved throughout hospital course and she is medically stable for discharge to [facility].     ---  CONSULTANTS:   Cardio- [facility]  GI- Dr. Rhodes group  Nephrology- Dr. Marco Cosme/Onc- Dr. Lundy    ---  TIME SPENT:  I, the attending physician, was physically present for the key portions of the evaluation and management (E/M) service provided. The total amount of time spent reviewing the hospital notes, laboratory values, imaging findings, assessing/counseling the patient, discussing with consultant physicians, social work, nursing staff was 38 minutes    ---  Primary care provider was made aware of plan for discharge:      [  ] NO     [ x ] YES   HPI:  89 y/o F with a pmhx b/l LE edema, HTN, P Afib (on coumadin), HFref (EF35-40%) and hypothyroidism presents to the ED s/p fall. Patient was admitted to [facility] 6/17-6/30/22 for NSTEMI, hospital course c/b iatrogenic flash pulmonary edema, cath canceled due to vol overload, discharged to [facility]. She was doing well at [facility] until today. She went to bathroom for BM, had difficulty to clean herself. She was left unattended by [facility] staff in bathroom, she stood up to take few steps, lost balance, and fell on right side striking her right hip, right arm, right elbow, right side of neck. She denies any prodromal symptoms. No lightheadedness, chest pain, warm sensation. No dyspnea. Patient never lost consciousness. She was found to have Hg 6.9 at [facility] facility, sent to [facility] ED for further evaluation. She denies any significant pain right now. Only complains of right thigh twitching, swelling.  No fevers, chills, cp, dypsnea, abdominal pain. Admits lower ext swelling which is chronic for her.     In the ED  VS: T97.3 Hr78 /63 RR 16 SPO2 95% on 3L NC  Labs: WBC 4.43, HH 7.6/22.1, PLTS 225, Na 140, K 4.2, Cr 1.7, Gluc 112, Alk phos 326, AST//99  Chest Xray: lungs clear  Head CT: Stable exam. No acute intracranial hemorrhage, vasogenic edema, extra-axial collection or calvarial fracture.  Cervical spine CT: No acute cervical spine fracture or evidence of traumatic malalignment. Cervical spondylosis  CT A/P non con: Extensive diffuse soft tissue edema/anasarca. Small right, trace of bilateral pleural fluid. Small-to-moderate amount of simple free fluid around the liver and spleen. No definite intraperitoneal hemorrhage.No noncontrast evidence of acute injury to the chest, abdomen or pelvis.Concern for hematoma around the right hip soft tissues, partially visualized. No evidence of hip fracture.Mild intralobular septal thickening, may reflect pulmonary edema. Areas of atelectatic changes and possible airways impaction  EKG: ordered, pending  Given in ED: 1 unit prbc   (14 Jul 2022 04:06)      ---  HOSPITAL COURSE:   Patient was admitted to general medicine floors for acute blood loss anemia 2/2 mechanical fall at [facility]. CT abdomen and pelvis on admission demonstrated large right sided hematoma of right hip. Patient received 3units of pRBCs total during hospital stay with hemoglobin stabilizing. Patient's aspirin, Plavix and Coumadin were held for anemia. Hematology consulted, once hemoglobin had stabilized, recommended restarting aspirin followed by Coumadin 24-48 hours after reintroduction of aspirin. Cardiology consulted, agreed with continuing aspirin only and holding Plavix in the setting of patient's recent NSTEMI. Lasix 40mg PO BID was started for patient's peripheral edema. Patient was found to have moderate right sided pleural effusion, grossly unchanged throughout hospital course. Patient was seen by GI due to transaminitis and elevated lipase. Statin held during admission. Transaminitis suspected to be secondary to congestive hepatopathy from heart failure vs statin-induced. Patient with LUIS CARLOS superimposed on CKD3. Nephrology consulted, recommended low salt diet, fluid restriciton. Baseline creatinine during prior hospitalization 1.4-1.6, likely new baseline. Patient complained of right ankle pain from where she fell prior to admission. X-Ray right foot and ankle performed showing no acute fracture. Patient was seen and evaluated by physical therapy who recommended discharge to subacute rehabilitation.     Patient's medical condition improved throughout hospital course and she is medically stable for discharge to [facility].     ---  CONSULTANTS:   Cardio- Gauri Hector  GI- Dr. Rhodes group  Nephrology- Dr. Marco Cosme/Onc- Dr. Lundy    ---  TIME SPENT:  I, the attending physician, was physically present for the key portions of the evaluation and management (E/M) service provided. The total amount of time spent reviewing the hospital notes, laboratory values, imaging findings, assessing/counseling the patient, discussing with consultant physicians, social work, nursing staff was 38 minutes    ---  Primary care provider was made aware of plan for discharge:      [  ] NO     [ x ] YES   HPI:  89 y/o F with a pmhx b/l LE edema, HTN, P Afib (on coumadin), HFref (EF35-40%) and hypothyroidism presents to the ED s/p fall. Patient was admitted to [facility] 6/17-6/30/22 for NSTEMI, hospital course c/b iatrogenic flash pulmonary edema, cath canceled due to vol overload, discharged to [facility]. She was left unattended by [facility] staff in bathroom, she stood up to take few steps, lost balance, and fell on right side striking her right hip, right arm, right elbow, right side of neck. She denied any prodromal symptoms. No lightheadedness, chest pain, warm sensation. No dyspnea. Patient never lost consciousness. She was found to have Hg 6.9 at [facility] facility, sent to [facility] ED for further evaluation.       In the ED  VS: T97.3 Hr78 /63 RR 16 SPO2 95% on 3L NC  Labs: WBC 4.43, HH 7.6/22.1, PLTS 225, Na 140, K 4.2, Cr 1.7, Gluc 112, Alk phos 326, AST//99  Chest Xray: lungs clear  Head CT: Stable exam. No acute intracranial hemorrhage, vasogenic edema, extra-axial collection or calvarial fracture.  Cervical spine CT: No acute cervical spine fracture or evidence of traumatic malalignment. Cervical spondylosis  CT A/P non con: Extensive diffuse soft tissue edema/anasarca. Small right, trace of bilateral pleural fluid. Small-to-moderate amount of simple free fluid around the liver and spleen. No definite intraperitoneal hemorrhage.No noncontrast evidence of acute injury to the chest, abdomen or pelvis.Concern for hematoma around the right hip soft tissues, partially visualized. No evidence of hip fracture.Mild intralobular septal thickening, may reflect pulmonary edema. Areas of atelectatic changes and possible airways impaction  EKG: ordered, pending  Given in ED: 1 unit prbc   (14 Jul 2022 04:06)      ---  HOSPITAL COURSE:   Patient was admitted to general medicine floors for acute blood loss anemia 2/2 mechanical fall at [facility]. CT abdomen and pelvis on admission demonstrated large right sided hematoma of right hip. Patient received 3units of pRBCs total during hospital stay with hemoglobin stabilizing. Patient's aspirin, Plavix and Coumadin were held for anemia. Hematology consulted, once hemoglobin had stabilized, aspirin was restarted followed by coumadin with daily INR monitoring. Cardiology consulted, agreed with continuing aspirin and warfarin, holding Plavix in the setting of patient's recent NSTEMI. Lasix 40mg PO BID was started for patient's peripheral edema. Patient was found to have moderate right sided pleural effusion, grossly unchanged throughout hospital course. Patient was seen by GI due to transaminitis and elevated lipase. Statin held during admission. Transaminitis suspected to be secondary to congestive hepatopathy from heart failure vs statin-induced. Patient with LUIS CARLOS superimposed on CKD3. Nephrology consulted, recommended low salt diet, fluid restriciton. Baseline creatinine during prior hospitalization 1.4-1.6, likely new baseline. Patient complained of right ankle pain from where she fell prior to admission. X-Ray right foot and ankle performed showing no acute fracture. Patient was seen and evaluated by physical therapy who recommended discharge to subacute rehabilitation.     Patient's medical condition improved throughout hospital course and she is medically stable for discharge to [facility].     ---  CONSULTANTS:   Cardio- [facility]  GI- Dr. Rhodes group  Nephrology- Dr. Marco Cosme/Onc- Dr. Lundy    ---  TIME SPENT:  I, the attending physician, was physically present for the key portions of the evaluation and management (E/M) service provided. The total amount of time spent reviewing the hospital notes, laboratory values, imaging findings, assessing/counseling the patient, discussing with consultant physicians, social work, nursing staff was 38 minutes    ---  Primary care provider was made aware of plan for discharge:      [  ] NO     [ x ] YES   HPI:  89 y/o F with a pmhx b/l LE edema, HTN, P Afib (on coumadin), HFref (EF35-40%) and hypothyroidism presents to the ED s/p fall. Patient was [facility] 6/17-6/30/22 for NSTEMI, hospital course c/b iatrogenic flash pulmonary edema, cath canceled due to vol overload, discharged to [facility]. She was left unattended by [facility] staff in bathroom, she stood up to take few steps, lost balance, and fell on right side striking her right hip, right arm, right elbow, right side of neck. She denied any prodromal symptoms. No lightheadedness, chest pain, warm sensation. No dyspnea. Patient never lost consciousness. She was found to have Hg 6.9 at [facility] facility, sent to [facility] ED for further evaluation.       In the ED  VS: T97.3 Hr78 /63 RR 16 SPO2 95% on 3L NC  Labs: WBC 4.43, HH 7.6/22.1, PLTS 225, Na 140, K 4.2, Cr 1.7, Gluc 112, Alk phos 326, AST//99  Chest Xray: lungs clear  Head CT: Stable exam. No acute intracranial hemorrhage, vasogenic edema, extra-axial collection or calvarial fracture.  Cervical spine CT: No acute cervical spine fracture or evidence of traumatic malalignment. Cervical spondylosis  CT A/P non con: Extensive diffuse soft tissue edema/anasarca. Small right, trace of bilateral pleural fluid. Small-to-moderate amount of simple free fluid around the liver and spleen. No definite intraperitoneal hemorrhage.No noncontrast evidence of acute injury to the chest, abdomen or pelvis.Concern for hematoma around the right hip soft tissues, partially visualized. No evidence of hip fracture.Mild intralobular septal thickening, may reflect pulmonary edema. Areas of atelectatic changes and possible airways impaction  EKG: ordered, pending  Given in ED: 1 unit prbc   (14 Jul 2022 04:06)      ---  HOSPITAL COURSE:   Patient was admitted to general medicine floors for acute blood loss anemia 2/2 mechanical fall at [facility]. CT abdomen and pelvis on admission demonstrated large right sided hematoma of right hip. Patient received 3units of pRBCs total during hospital stay with hemoglobin stabilizing. Patient's aspirin, Plavix and Coumadin were held for anemia. Hematology consulted, once hemoglobin had stabilized, aspirin was restarted followed by coumadin with daily INR monitoring. Cardiology consulted, agreed with continuing aspirin and warfarin, holding Plavix in the setting of patient's recent NSTEMI. Lasix 40mg PO BID was started for patient's peripheral edema. Patient was found to have moderate right sided pleural effusion, grossly unchanged throughout hospital course. Patient was seen by GI due to transaminitis and elevated lipase. Statin held during admission as per Dr Rhodes  Transaminitis suspected to be secondary to congestive hepatopathy from heart failure vs statin-induced. Patient with LUIS CARLOS superimposed on CKD3. Nephrology consulted, -Dr Verde group recommended low salt diet, fluid restriciton. Baseline creatinine during prior hospitalization 1.4-1.6, likely new baseline. Patient complained of right ankle pain from where she fell prior to admission. X-Ray right foot and ankle performed showing no acute fracture. Patient was seen and evaluated by physical therapy who recommended discharge to subacute rehabilitation. as d/w Cardiology STOP Palvix, continue ASA  & Coumadin , repeat CBC, INR & CMP at [facility] d/w Dtr at detail, Pt is DNR/DNI.    Patient's medical condition improved throughout hospital course and she is medically stable for discharge to [facility].     ---  CONSULTANTS:   Cardio- Gauripaul TIAN- Dr. Rhodes group  Nephrology- Dr. Marco Cosme/Onc- Dr. Lundy    ---  TIME SPENT:  I, the attending physician, was physically present for the key portions of the evaluation and management (E/M) service provided. The total amount of time spent reviewing the hospital notes, laboratory values, imaging findings, assessing/counseling the patient, discussing with consultant physicians, social work, nursing staff was 60 minutes    ---  Primary care provider was made aware of plan for discharge:      [  ] NO     [ x ] YES

## 2022-07-16 NOTE — DISCHARGE NOTE PROVIDER - PROVIDER TOKENS
PROVIDER:[TOKEN:[[id number]]] PROVIDER:[TOKEN:[[id number]],FOLLOWUP:[2 weeks]],PROVIDER:[TOKEN:[[id number]],FOLLOWUP:[2 weeks]] PROVIDER:[TOKEN:[[id number]],FOLLOWUP:[2 weeks]],PROVIDER:[TOKEN:[[id number]],FOLLOWUP:[2 weeks]],PROVIDER:[TOKEN:[[id number]]],PROVIDER:[TOKEN:[[id number]]]

## 2022-07-16 NOTE — PROGRESS NOTE ADULT - ASSESSMENT
89 y/o F with a pmhx b/l LE edema, HTN, P Afib (on coumadin), HFref (EF35-40%), hypothyroidism, recent admission for NSTEMI presents to the ED s/p mechanical fall at [facility] on coumadin. Found to have ABLA with right hematoma.

## 2022-07-16 NOTE — PROGRESS NOTE ADULT - PROBLEM SELECTOR PLAN 6
- recent NSTEMI 6/2022  - hold home asa, Plavix for now in setting of bleeding s/p fall on coumadin  - hold statin for transaminitis  - Cardio Gauri group consulted

## 2022-07-16 NOTE — DISCHARGE NOTE PROVIDER - CARE PROVIDER_API CALL
[place] [address]  Phone: [phone]  Fax: [phone]  Follow Up Time:    Barbara Mccord [address] [address]  Phone: [phone]  Fax: [phone]  Follow Up Time: 2 weeks    Jhonny Almazan)  Cardiovascular Disease  [address] [place] [address]  Phone: [phone]  Fax: [phone]  Follow Up Time: 2 weeks   Barbara Mccord [address] [address]  Phone: [phone]  Fax: [phone]  Follow Up Time: 2 weeks    Jhonny Almazan)  Cardiovascular Disease  [address] [place] [address]  Phone: [phone]  Fax: [phone]  Follow Up Time: 2 weeks    Donna Valadez)  Hematology; Medical Oncology  [address], [address]  [place] [address]  Phone: [phone]  Fax: [phone]  Follow Up Time:     Erick Verde)  Nephrology  [address]  [place] [address]  Phone: [phone]  Fax: [phone]  Follow Up Time:

## 2022-07-16 NOTE — PROGRESS NOTE ADULT - SUBJECTIVE AND OBJECTIVE BOX
Patient seen and examined;  Chart reviewed and events noted;   Sitting in bed; eating lunch; family at bedside    MEDICATIONS  (STANDING):  aspirin enteric coated 81 milliGRAM(s) Oral daily  furosemide    Tablet 40 milliGRAM(s) Oral two times a day  levothyroxine 75 MICROGram(s) Oral daily  metoprolol succinate ER 50 milliGRAM(s) Oral daily    MEDICATIONS  (PRN):  acetaminophen     Tablet .. 650 milliGRAM(s) Oral every 6 hours PRN Mild Pain (1 - 3)  aluminum hydroxide/magnesium hydroxide/simethicone Suspension 30 milliLiter(s) Oral every 4 hours PRN Dyspepsia  melatonin 3 milliGRAM(s) Oral at bedtime PRN Insomnia  ondansetron Injectable 4 milliGRAM(s) IV Push every 8 hours PRN Nausea and/or Vomiting      Vital Signs Last 24 Hrs  T(C): 37.1 (16 Jul 2022 13:22), Max: 37.1 (16 Jul 2022 13:22)  T(F): 98.7 (16 Jul 2022 13:22), Max: 98.7 (16 Jul 2022 13:22)  HR: 72 (16 Jul 2022 13:22) (70 - 73)  BP: 131/76 (16 Jul 2022 13:22) (96/48 - 133/57)  BP(mean): --  RR: 18 (16 Jul 2022 13:22) (18 - 18)  SpO2: 93% (16 Jul 2022 13:22) (93% - 96%)    Parameters below as of 16 Jul 2022 13:22  Patient On (Oxygen Delivery Method): nasal cannula        PHYSICAL EXAM  General: adult elderly in NAD  HEENT: clear oropharynx, anicteric sclera, pink conjunctivae  Neck: supple  CV: normal S1S2 with no murmur rubs or gallops  Lungs: clear to auscultation, no wheezes, no rhales  Abdomen: soft non-tender non-distended, no hepato/splenomegaly  Ext: right hip/thigh hematoma; trace edema in bilateral LEs  Skin: senile purpura on arms  Neuro: alert and oriented X3 no focal deficits      LABS:                        9.1    4.49  )-----------( 211      ( 16 Jul 2022 07:05 )             26.8     Hemoglobin: 9.1 g/dL (07-16 @ 07:05)  Hemoglobin: 8.6 g/dL (07-15 @ 06:50)  Hemoglobin: 7.7 g/dL (07-14 @ 12:25)  Hemoglobin: 7.7 g/dL (07-14 @ 08:40)  Hemoglobin: 7.6 g/dL (07-14 @ 00:15)      07-16    140  |  105  |  36<H>  ----------------------------<  83  3.5   |  31  |  1.60<H>    Ca    8.1<L>      16 Jul 2022 07:05  Phos  3.6     07-16  Mg     2.4     07-16    TPro  6.4  /  Alb  1.9<L>  /  TBili  3.2<H>  /  DBili  x   /  AST  120<H>  /  ALT  78  /  AlkPhos  337<H>  07-16    PT/INR - ( 16 Jul 2022 07:05 )   PT: 31.9 sec;   INR: 2.70 ratio

## 2022-07-16 NOTE — DIETITIAN INITIAL EVALUATION ADULT - OTHER INFO
89 year old female Dx anemia fall from facility PMH CKD 3 HTN hypothyroid   weight last admit 160# [place] no weight noted from transfer papers . per patient weight ~ 174#   no N/V/D per GI tolerating diet

## 2022-07-16 NOTE — PROGRESS NOTE ADULT - ASSESSMENT
89 y/o F with pmhx PAF (on coumadin), HFrEF (35-40%), CKD, HTN, aortic stenosis, TR, chronic LE edema, hypothyroidism, with recent admission to [facility] for CAD in mid June, found to have NSTEMI with subsequent flash pulmonary edema, unable to go for cath due to volume overload, discharged to [facility], now presenting s/p mechanical unwitnessed fall at [facility]. Found to have R hip hematoma, Coumadin, asa, plavix being held due to hg 6.9 in setting of acute blood loss anemia.     CAD with recent NSTEMI (mid June), unable to undergo cath at that time  - EKG:  Afib with LAD, LBBB, no acute ischemic changes  - No anginal complaints  - Continue ASA.  No need to resume Plavix  - Continue to hold statin d/t transaminitis.  Resume when able    Permanent Afib  - Rate-controlled Afib.  Continue BB  - Continue to hold AC for now in setting of ABLA 2/2 acute R hip hematoma on triple therapy   - INR remains therapeutic at 2.7.  Resume Coumadin once INR 2  - Monitor and replete lytes, keep K>4, Mg>2.    HFrEF   - TTE 6/2022 with EF 35-40% with mod AS, mod TR  - Euvolemic on exam, though, remains on 2L NC.  Titrate off as tolerated  - Continue home dose Lasix,   - Monitor volume status closely.  Mantain strict I/O's    - Will continue to follow.    Angeles Kim DNP, NP-C  Cardiology   Spectra #[id number]

## 2022-07-16 NOTE — PROGRESS NOTE ADULT - PROBLEM SELECTOR PLAN 1
s/p 2 pRBCs on 07/1, Hb improved this AM   - Given 1 pRBCs 07/15, Total 3 u pRBC given   - Holding AC, AP , Coumadin , f/u cardio recs  - patient on coumadin for afib. Hold in setting of bleed  - daily INR  - type and screen, blood consent in chart  - Trend CBC and signs or symptoms of active bleeding -2/2 Traumatic Rt Thigh Hematoma s/p fall at [facility], Acute & Chronic Anemia   -s/p 2 pRBCs on 07/1, Hb improved this AM   - Given 1 pRBCs 07/15, Total 3 u pRBC given   - Holding AC, AP , Coumadin , f/u cardio recs  - patient on coumadin for afib. Hold in setting of bleed  - daily INR  - type and screen, blood consent in chart  - Trend CBC and signs or symptoms of active bleeding

## 2022-07-16 NOTE — PROGRESS NOTE ADULT - SUBJECTIVE AND OBJECTIVE BOX
INTERVAL HPI/OVERNIGHT EVENTS:        Allergies    No Known Allergies    Intolerances          General:  No wt loss, fevers, chills, night sweats, fatigue,   Eyes:  Good vision, no reported pain  ENT:  No sore throat, pain, runny nose, dysphagia  CV:  No pain, palpitations, hypo/hypertension  Resp:  No dyspnea, cough, tachypnea, wheezing  GI:  No pain, No nausea, No vomiting, No diarrhea, No constipation, No weight loss, No fever, No pruritis, No rectal bleeding, No tarry stools, No dysphagia,  :  No pain, bleeding, incontinence, nocturia  Muscle:  No pain, weakness  Neuro:  No weakness, tingling, memory problems  Psych:  No fatigue, insomnia, mood problems, depression  Endocrine:  No polyuria, polydipsia, cold/heat intolerance  Heme:  No petechiae, ecchymosis, easy bruisability  Skin:  No rash, tattoos, scars, edema      PHYSICAL EXAM:   Vital Signs:  Vital Signs Last 24 Hrs  T(C): 36.7 (2022 04:38), Max: 36.8 (15 Jul 2022 20:58)  T(F): 98.1 (2022 04:38), Max: 98.2 (15 Jul 2022 20:58)  HR: 73 (2022 04:38) (70 - 73)  BP: 116/68 (2022 04:38) (96/48 - 117/65)  BP(mean): --  RR: 18 (2022 04:38) (18 - 18)  SpO2: 96% (2022 04:38) (96% - 98%)    Parameters below as of 2022 04:38  Patient On (Oxygen Delivery Method): nasal cannula      Daily     Daily I&O's Summary    2022 07:01  -  2022 11:06  --------------------------------------------------------  IN: 240 mL / OUT: 0 mL / NET: 240 mL        GENERAL:  Appears stated age, well-groomed, well-nourished, no distress  HEENT:  NC/AT,  conjunctivae clear and pink, no thyromegaly, nodules, adenopathy, no JVD, sclera -anicteric  CHEST:  Full & symmetric excursion, no increased effort, breath sounds clear  HEART:  Regular rhythm, S1, S2, no murmur/rub/S3/S4, no abdominal bruit, no edema  ABDOMEN:  Soft, non-tender, non-distended, normoactive bowel sounds,  no masses ,no hepato-splenomegaly, no signs of chronic liver disease  EXTEREMITIES:  no cyanosis,clubbing or edema  SKIN:  No rash/erythema/ecchymoses/petechiae/wounds/abscess/warm/dry  NEURO:  Alert, oriented, no asterixis, no tremor, no encephalopathy      LABS:                        9.1    4.49  )-----------( 211      ( 2022 07:05 )             26.8     07-16    140  |  105  |  36<H>  ----------------------------<  83  3.5   |  31  |  1.60<H>    Ca    8.1<L>      2022 07:05  Phos  3.6     07-16  Mg     2.4     07-16    TPro  6.4  /  Alb  1.9<L>  /  TBili  3.2<H>  /  DBili  x   /  AST  120<H>  /  ALT  78  /  AlkPhos  337<H>  07-16    PT/INR - ( 2022 07:05 )   PT: 31.9 sec;   INR: 2.70 ratio           Urinalysis Basic - ( 15 Jul 2022 06:30 )    Color: Yellow / Appearance: Clear / S.015 / pH: x  Gluc: x / Ketone: Negative  / Bili: Negative / Urobili: 1   Blood: x / Protein: 15 / Nitrite: Negative   Leuk Esterase: Trace / RBC: 0-2 /HPF / WBC 3-5   Sq Epi: x / Non Sq Epi: Occasional / Bacteria: Few      amylase   lipase  RADIOLOGY & ADDITIONAL TESTS:   INTERVAL HPI/OVERNIGHT EVENTS:  No new overnight event.  No N/V/D.  Tolerating diet.  LFTs downtrending      Allergies    No Known Allergies    Intolerances          General:  No wt loss, fevers, chills, night sweats, fatigue,   Eyes:  Good vision, no reported pain  ENT:  No sore throat, pain, runny nose, dysphagia  CV:  No pain, palpitations, hypo/hypertension  Resp:  No dyspnea, cough, tachypnea, wheezing  GI:  No pain, No nausea, No vomiting, No diarrhea, No constipation, No weight loss, No fever, No pruritis, No rectal bleeding, No tarry stools, No dysphagia,  :  No pain, bleeding, incontinence, nocturia  Muscle:  No pain, weakness  Neuro:  No weakness, tingling, memory problems  Psych:  No fatigue, insomnia, mood problems, depression  Endocrine:  No polyuria, polydipsia, cold/heat intolerance  Heme:  No petechiae, ecchymosis, easy bruisability  Skin:  No rash, tattoos, scars, edema      PHYSICAL EXAM:   Vital Signs:  Vital Signs Last 24 Hrs  T(C): 36.7 (2022 04:38), Max: 36.8 (15 Jul 2022 20:58)  T(F): 98.1 (2022 04:38), Max: 98.2 (15 Jul 2022 20:58)  HR: 73 (2022 04:38) (70 - 73)  BP: 116/68 (2022 04:38) (96/48 - 117/65)  BP(mean): --  RR: 18 (2022 04:38) (18 - 18)  SpO2: 96% (2022 04:38) (96% - 98%)    Parameters below as of 2022 04:38  Patient On (Oxygen Delivery Method): nasal cannula      Daily     Daily I&O's Summary    2022 07:01  -  2022 11:06  --------------------------------------------------------  IN: 240 mL / OUT: 0 mL / NET: 240 mL        GENERAL:  Appears stated age, well-groomed, well-nourished, no distress  HEENT:  NC/AT,  conjunctivae clear and pink, no thyromegaly, nodules, adenopathy, no JVD, sclera -anicteric  CHEST:  Full & symmetric excursion, no increased effort, breath sounds clear  HEART:  Regular rhythm, S1, S2, no murmur/rub/S3/S4, no abdominal bruit, no edema  ABDOMEN:  Soft, non-tender, non-distended, normoactive bowel sounds,  no masses ,no hepato-splenomegaly, no signs of chronic liver disease  EXTEREMITIES:  no cyanosis,clubbing or edema  SKIN:  No rash/erythema/ecchymoses/petechiae/wounds/abscess/warm/dry  NEURO:  Alert, oriented, no asterixis, no tremor, no encephalopathy      LABS:                        9.1    4.49  )-----------( 211      ( 2022 07:05 )             26.8     07-16    140  |  105  |  36<H>  ----------------------------<  83  3.5   |  31  |  1.60<H>    Ca    8.1<L>      2022 07:05  Phos  3.6     07-16  Mg     2.4     -16    TPro  6.4  /  Alb  1.9<L>  /  TBili  3.2<H>  /  DBili  x   /  AST  120<H>  /  ALT  78  /  AlkPhos  337<H>  07-16    PT/INR - ( 2022 07:05 )   PT: 31.9 sec;   INR: 2.70 ratio           Urinalysis Basic - ( 15 Jul 2022 06:30 )    Color: Yellow / Appearance: Clear / S.015 / pH: x  Gluc: x / Ketone: Negative  / Bili: Negative / Urobili: 1   Blood: x / Protein: 15 / Nitrite: Negative   Leuk Esterase: Trace / RBC: 0-2 /HPF / WBC 3-5   Sq Epi: x / Non Sq Epi: Occasional / Bacteria: Few      amylase   lipase  RADIOLOGY & ADDITIONAL TESTS:

## 2022-07-16 NOTE — PROGRESS NOTE ADULT - PROBLEM SELECTOR PLAN 3
On last TTE showed EF 35-40%,  - On Toprol  mg with holding parameters   - Lasix  PO 40 mg BID   - maintain net negative balance; strict I/Os, daily weight  - Cardio Gauri group following On last TTE showed EF 35-40%,  - On Toprol  mg with holding parameters   - Lasix  PO 40 mg BID , Give Lasix 20 mg IVP x 1 dose Extra  - maintain net negative balance; strict I/Os, daily weight  - Cardio Gauri group following

## 2022-07-16 NOTE — PROGRESS NOTE ADULT - PROBLEM SELECTOR PLAN 10
Soft BP this AM   - continue Toprol 50mg qD w/ hold parameters  - routine hemodynamic monitoring. stated

## 2022-07-16 NOTE — DIETITIAN INITIAL EVALUATION ADULT - PHYSCIAL ASSESSMENT
Patient recommended for home therapy  CM to discuss home therapy options at bedside  Patient recommended for walker  CM has script for provider to sign  CM met with patient at bedside to discuss recommendation for [facility]  Patient does not have preference on home care provider  CM sent referrals to all 4 choices to determine which [place] [facility] [facility] has availability  CM sent referrals  Shanta reviewing patient's insurance  [facility]  Shanta accepted patient for care  CM reported to [facility]  AVS faxed to Shanta @ [phone]  BMI 28.9 based on given wt and ht/overweight/other (specify) patient with no overt muscle /fat loss

## 2022-07-16 NOTE — PROGRESS NOTE ADULT - ASSESSMENT
89 yo woman with multifactorial anemia due to chronic disease including renal disease complicated by a fall on 7/14/22 on coumadin with Ct scan of right hip showing a hematoma.  INR mildly elevated 3.28. Patient was also on aspirin and plavix due to recent NSTEMI    RECOMMENDATION  - initially aspirin/plavix and coumadin held  - supportive transfusions as indicated  - noted on 7/16/22 to have stable H/H  - per cardiology recommendations plavix will be d/c'd and aspirin and coumadin can be re-initiated; advise caution in this li elderly woman  - follow CBC  - case discussed with Dr. Keagan Villegas (hospitalist), patient and her daughter at bedside

## 2022-07-16 NOTE — PROGRESS NOTE ADULT - PROBLEM SELECTOR PLAN 2
Right hip hematoma s/p mechanical fall at [facility], CT right hip shows subcutaneous hematoma along the lateral aspect of the right hip, no acute displaced fracture or dislocation within the pelvis.  -- no need for coumadin reversal agents for now as d/w Dr Lundy -Hematology  - 3 u pRBC given in total   - cool compresses prn  -HOLD ASA, Plavix, HOLD AC Right hip hematoma s/p mechanical fall at [facility], CT right hip shows subcutaneous hematoma along the lateral aspect of the right hip, no acute displaced fracture or dislocation within the pelvis.  -- no need for coumadin reversal agents for now as d/w Dr Lundy -Hematology  - 3 u pRBC given in total   - cool compresses prn  -HOLD Plavix, HOLD AC- Start ASA,

## 2022-07-16 NOTE — DISCHARGE NOTE PROVIDER - NSDCMRMEDTOKEN_GEN_ALL_CORE_FT
aspirin 81 mg oral delayed release tablet: 1 tab(s) orally once a day  atorvastatin 20 mg oral tablet: 1 tab(s) orally once a day  clopidogrel 75 mg oral tablet: 1 tab(s) orally once a day  fluticasone 50 mcg/inh nasal spray: 1 spray(s) nasal 2 times a day  Lasix 40 mg oral tablet: 1 tab(s) orally 2 times a day  metoprolol succinate 50 mg oral tablet, extended release: 1 tab(s) orally once a day  sodium chloride 0.65% nasal spray: 2 spray(s) nasal 4 times a day  Synthroid 75 mcg (0.075 mg) oral tablet: 1 tab(s) orally once a day  warfarin 3 mg oral tablet: DO NOT Give Coumadin Today as INR 3.11, Check PT/INR on 7/1/22.Adjust Coumadin dose as per INR. start with Coumadin 2 mg   Keep INR 2-3    Pt&#x27;s HOME Coumadin schedule is as below  1 tab(s) orally once a day (M, Tu, Th, F, Sa Su) at Bed time   -Pt use to Take Coumadin 4 mg on every Wednesday  zinc oxide 40% topical ointment: Apply topically to affected area 2 times a day   acetaminophen 325 mg oral tablet: 2 tab(s) orally every 6 hours, As needed, Mild Pain (1 - 3)  aluminum hydroxide-magnesium hydroxide 200 mg-200 mg/5 mL oral suspension: 30 milliliter(s) orally every 4 hours, As needed, Dyspepsia  aspirin 81 mg oral delayed release tablet: 1 tab(s) orally once a day  atorvastatin 20 mg oral tablet: 1 tab(s) orally once a day  fluticasone 50 mcg/inh nasal spray: 1 spray(s) nasal 2 times a day  furosemide 40 mg oral tablet: 1 tab(s) orally every 12 hours  levothyroxine 75 mcg (0.075 mg) oral tablet: 1 tab(s) orally once a day  metoprolol succinate 50 mg oral tablet, extended release: 1 tab(s) orally once a day  sodium chloride 0.65% nasal spray: 2 spray(s) nasal 4 times a day  warfarin 3 mg oral tablet: DO NOT Give Coumadin Today as INR 3.11, Check PT/INR on 7/1/22.Adjust Coumadin dose as per INR. start with Coumadin 2 mg   Keep INR 2-3    Pt&#x27;s HOME Coumadin schedule is as below  1 tab(s) orally once a day (M, Tu, Th, F, Sa Grider) at Bed time   -Pt use to Take Coumadin 4 mg on every Wednesday   acetaminophen 325 mg oral tablet: 2 tab(s) orally every 6 hours, As needed, Mild Pain (1 - 3)  aluminum hydroxide-magnesium hydroxide 200 mg-200 mg/5 mL oral suspension: 30 milliliter(s) orally every 4 hours, As needed, Dyspepsia  aspirin 81 mg oral delayed release tablet: 1 tab(s) orally once a day  fluticasone 50 mcg/inh nasal spray: 1 spray(s) nasal 2 times a day  furosemide 40 mg oral tablet: 1 tab(s) orally every 12 hours  levothyroxine 75 mcg (0.075 mg) oral tablet: 1 tab(s) orally once a day  metoprolol succinate 50 mg oral tablet, extended release: 1 tab(s) orally once a day  sodium chloride 0.65% nasal spray: 2 spray(s) nasal 4 times a day  warfarin 2.5 mg oral tablet: 1 tab(s) orally once a day  Give 2.5mg Warfarin on Tue/Thu/Sat/Sun   Give 3mg Warfan on Mon/Wed/Fri  Please check INR daily and keep INR within 2-2.5 given hematoma.    acetaminophen 325 mg oral tablet: 2 tab(s) orally every 6 hours, As needed, Mild Pain (1 - 3)  aluminum hydroxide-magnesium hydroxide 200 mg-200 mg/5 mL oral suspension: 30 milliliter(s) orally every 4 hours, As needed, Dyspepsia  aspirin 81 mg oral delayed release tablet: 1 tab(s) orally once a day  fluticasone 50 mcg/inh nasal spray: 1 spray(s) nasal 2 times a day  furosemide 40 mg oral tablet: 1 tab(s) orally every 12 hours  Lasix 20 mg oral tablet: 1 tab(s) orally every other day at 1PM  levothyroxine 75 mcg (0.075 mg) oral tablet: 1 tab(s) orally once a day  metoprolol succinate 50 mg oral tablet, extended release: 1 tab(s) orally once a day  potassium chloride 20 mEq oral tablet, extended release: 1 tab(s) orally 2 times a day  sodium chloride 0.65% nasal spray: 2 spray(s) nasal 4 times a day  warfarin 2.5 mg oral tablet: 1 tab(s) orally once a day  Give 2.5mg Warfarin on Tue/Thu/Sat/Sun   Give 3mg Warfan on Mon/Wed/Fri  Please check INR daily and keep INR within 2-2.5 given hematoma.

## 2022-07-17 LAB
ALBUMIN SERPL ELPH-MCNC: 1.7 G/DL — LOW (ref 3.3–5)
ALP SERPL-CCNC: 302 U/L — HIGH (ref 40–120)
ALT FLD-CCNC: 63 U/L — SIGNIFICANT CHANGE UP (ref 12–78)
ANION GAP SERPL CALC-SCNC: 4 MMOL/L — LOW (ref 5–17)
AST SERPL-CCNC: 84 U/L — HIGH (ref 15–37)
BASOPHILS # BLD AUTO: 0.02 K/UL — SIGNIFICANT CHANGE UP (ref 0–0.2)
BASOPHILS NFR BLD AUTO: 0.5 % — SIGNIFICANT CHANGE UP (ref 0–2)
BILIRUB SERPL-MCNC: 2.8 MG/DL — HIGH (ref 0.2–1.2)
BUN SERPL-MCNC: 35 MG/DL — HIGH (ref 7–23)
CALCIUM SERPL-MCNC: 8.2 MG/DL — LOW (ref 8.5–10.1)
CHLORIDE SERPL-SCNC: 105 MMOL/L — SIGNIFICANT CHANGE UP (ref 96–108)
CO2 SERPL-SCNC: 32 MMOL/L — HIGH (ref 22–31)
CREAT SERPL-MCNC: 1.3 MG/DL — SIGNIFICANT CHANGE UP (ref 0.5–1.3)
EGFR: 38 ML/MIN/1.73M2 — LOW
EOSINOPHIL # BLD AUTO: 0.15 K/UL — SIGNIFICANT CHANGE UP (ref 0–0.5)
EOSINOPHIL NFR BLD AUTO: 3.6 % — SIGNIFICANT CHANGE UP (ref 0–6)
GLUCOSE SERPL-MCNC: 88 MG/DL — SIGNIFICANT CHANGE UP (ref 70–99)
HCT VFR BLD CALC: 27.1 % — LOW (ref 34.5–45)
HGB BLD-MCNC: 8.9 G/DL — LOW (ref 11.5–15.5)
IMM GRANULOCYTES NFR BLD AUTO: 0.2 % — SIGNIFICANT CHANGE UP (ref 0–1.5)
INR BLD: 2.57 RATIO — HIGH (ref 0.88–1.16)
LYMPHOCYTES # BLD AUTO: 1.3 K/UL — SIGNIFICANT CHANGE UP (ref 1–3.3)
LYMPHOCYTES # BLD AUTO: 31.3 % — SIGNIFICANT CHANGE UP (ref 13–44)
MAGNESIUM SERPL-MCNC: 2.4 MG/DL — SIGNIFICANT CHANGE UP (ref 1.6–2.6)
MCHC RBC-ENTMCNC: 29 PG — SIGNIFICANT CHANGE UP (ref 27–34)
MCHC RBC-ENTMCNC: 32.8 GM/DL — SIGNIFICANT CHANGE UP (ref 32–36)
MCV RBC AUTO: 88.3 FL — SIGNIFICANT CHANGE UP (ref 80–100)
MONOCYTES # BLD AUTO: 0.6 K/UL — SIGNIFICANT CHANGE UP (ref 0–0.9)
MONOCYTES NFR BLD AUTO: 14.5 % — HIGH (ref 2–14)
NEUTROPHILS # BLD AUTO: 2.07 K/UL — SIGNIFICANT CHANGE UP (ref 1.8–7.4)
NEUTROPHILS NFR BLD AUTO: 49.9 % — SIGNIFICANT CHANGE UP (ref 43–77)
NRBC # BLD: 0 /100 WBCS — SIGNIFICANT CHANGE UP (ref 0–0)
PHOSPHATE SERPL-MCNC: 3.7 MG/DL — SIGNIFICANT CHANGE UP (ref 2.5–4.5)
PLATELET # BLD AUTO: 204 K/UL — SIGNIFICANT CHANGE UP (ref 150–400)
POTASSIUM SERPL-MCNC: 3.6 MMOL/L — SIGNIFICANT CHANGE UP (ref 3.5–5.3)
POTASSIUM SERPL-SCNC: 3.6 MMOL/L — SIGNIFICANT CHANGE UP (ref 3.5–5.3)
PROT SERPL-MCNC: 6.3 G/DL — SIGNIFICANT CHANGE UP (ref 6–8.3)
PROTHROM AB SERPL-ACNC: 30.4 SEC — HIGH (ref 10.5–13.4)
RBC # BLD: 3.07 M/UL — LOW (ref 3.8–5.2)
RBC # FLD: 20.3 % — HIGH (ref 10.3–14.5)
SODIUM SERPL-SCNC: 141 MMOL/L — SIGNIFICANT CHANGE UP (ref 135–145)
WBC # BLD: 4.15 K/UL — SIGNIFICANT CHANGE UP (ref 3.8–10.5)
WBC # FLD AUTO: 4.15 K/UL — SIGNIFICANT CHANGE UP (ref 3.8–10.5)

## 2022-07-17 PROCEDURE — 99232 SBSQ HOSP IP/OBS MODERATE 35: CPT

## 2022-07-17 RX ORDER — POTASSIUM CHLORIDE 20 MEQ
20 PACKET (EA) ORAL ONCE
Refills: 0 | Status: COMPLETED | OUTPATIENT
Start: 2022-07-17 | End: 2022-07-17

## 2022-07-17 RX ORDER — CALAMINE AND ZINC OXIDE AND PHENOL 160; 10 MG/ML; MG/ML
1 LOTION TOPICAL DAILY
Refills: 0 | Status: DISCONTINUED | OUTPATIENT
Start: 2022-07-17 | End: 2022-07-20

## 2022-07-17 RX ADMIN — Medication 81 MILLIGRAM(S): at 15:14

## 2022-07-17 RX ADMIN — Medication 40 MILLIGRAM(S): at 05:28

## 2022-07-17 RX ADMIN — Medication 75 MICROGRAM(S): at 05:28

## 2022-07-17 RX ADMIN — Medication 50 MILLIGRAM(S): at 05:27

## 2022-07-17 RX ADMIN — CALAMINE AND ZINC OXIDE AND PHENOL 1 APPLICATION(S): 160; 10 LOTION TOPICAL at 15:14

## 2022-07-17 RX ADMIN — Medication 20 MILLIEQUIVALENT(S): at 15:19

## 2022-07-17 RX ADMIN — Medication 1 SPRAY(S): at 15:14

## 2022-07-17 RX ADMIN — Medication 40 MILLIGRAM(S): at 15:14

## 2022-07-17 NOTE — PROGRESS NOTE ADULT - SUBJECTIVE AND OBJECTIVE BOX
Patient seen and examined;  Chart reviewed and events noted;   reports feeling better; requests ice pack for right hip/thigh      MEDICATIONS  (STANDING):  aspirin enteric coated 81 milliGRAM(s) Oral daily  calamine/zinc oxide Lotion 1 Application(s) Topical daily  furosemide    Tablet 40 milliGRAM(s) Oral two times a day  levothyroxine 75 MICROGram(s) Oral daily  metoprolol succinate ER 50 milliGRAM(s) Oral daily  potassium chloride    Tablet ER 20 milliEquivalent(s) Oral once    MEDICATIONS  (PRN):  acetaminophen     Tablet .. 650 milliGRAM(s) Oral every 6 hours PRN Mild Pain (1 - 3)  aluminum hydroxide/magnesium hydroxide/simethicone Suspension 30 milliLiter(s) Oral every 4 hours PRN Dyspepsia  melatonin 3 milliGRAM(s) Oral at bedtime PRN Insomnia  ondansetron Injectable 4 milliGRAM(s) IV Push every 8 hours PRN Nausea and/or Vomiting  sodium chloride 0.65% Nasal 1 Spray(s) Both Nostrils five times a day PRN Nasal Congestion      Vital Signs Last 24 Hrs  T(C): 36.4 (17 Jul 2022 05:11), Max: 37.1 (16 Jul 2022 13:22)  T(F): 97.5 (17 Jul 2022 05:11), Max: 98.7 (16 Jul 2022 13:22)  HR: 72 (17 Jul 2022 05:11) (70 - 72)  BP: 123/64 (17 Jul 2022 05:11) (117/57 - 131/76)  RR: 18 (17 Jul 2022 05:11) (18 - 18)  SpO2: 95% (17 Jul 2022 05:11) (92% - 95%)    Parameters below as of 17 Jul 2022 05:11  Patient On (Oxygen Delivery Method): nasal cannula  O2 Flow (L/min): 3      PHYSICAL EXAM  General: elderly adult in NAD  HEENT: clear oropharynx, anicteric sclera, pink conjunctivae  Neck: supple  CV: normal S1S2; 3/6 RUTH ANN over precordium   Lungs: clear to auscultation on anterior exam  Abdomen: soft non-tender non-distended, no hepato/splenomegaly  Ext: right thigh/hip/buttock hematoma; unchanged  Neuro: alert and oriented X3 no focal deficits      LABS:                        8.9    4.15  )-----------( 204      ( 17 Jul 2022 07:43 )             27.1     Hemoglobin: 8.9 g/dL (07-17 @ 07:43)  Hemoglobin: 9.1 g/dL (07-16 @ 07:05)  Hemoglobin: 8.6 g/dL (07-15 @ 06:50)  Hemoglobin: 7.7 g/dL (07-14 @ 12:25)  Hemoglobin: 7.7 g/dL (07-14 @ 08:40)      07-17    141  |  105  |  35<H>  ----------------------------<  88  3.6   |  32<H>  |  1.30    Ca    8.2<L>      17 Jul 2022 07:43  Phos  3.7     07-17  Mg     2.4     07-17    TPro  6.3  /  Alb  1.7<L>  /  TBili  2.8<H>  /  DBili  x   /  AST  84<H>  /  ALT  63  /  AlkPhos  302<H>  07-17    PT/INR - ( 17 Jul 2022 07:43 )   PT: 30.4 sec;   INR: 2.57 ratio

## 2022-07-17 NOTE — PROGRESS NOTE ADULT - SUBJECTIVE AND OBJECTIVE BOX
*incomplete , waiting for labs*    7/14: Patient seen and examined at bedside. Is feeling okay. Has no acute complaints but is feeling slightly agitated. Denies sob, cp, n/v/d. Total 2 u pRBC total , IV Lasix   7/15 Pt seen and examined at bedside. Soft BP this AM, given 1 pRBC this AM, on Lasix 40 BID.   7/16: Patient seen and assessed at bedside. Complaining of cramping in her bilateral feet otherwise feeling better. Still feels pain on her right side but is controlled with Tylenol PRN. Denies sob on 3L NC, chest pain, nausea, vomiting, diarrhea. H/H improving s/p 3PRBC. B/L LE edema improving.  7/17: Patient seen and examined at bedside. Complaining of itching over her right sided hematoma and still experiencing cramping in her feet. Denies sob, chest pain, nausea, vomiting, diarrhea.  H/H improving s/p 3PRBC. B/L LE edema improving. Patient is a 89y old  Female who presents with a chief complaint of Fall, with Anemia (17 Jul 2022 09:21)    HPI:  89 y/o F with a pmhx b/l LE edema, HTN, P Afib (on coumadin), HFref (EF35-40%) and hypothyroidism presents to the ED s/p fall. Patient was admitted to [facility] 6/17-6/30/22 for NSTEMI, hospital course c/b iatrogenic flash pulmonary edema, cath canceled due to vol overload, discharged to [facility]. She was doing well at [facility] until today. She went to bathroom for BM, had difficulty to clean herself. She was left unattended by [facility] staff in bathroom, she stood up to take few steps, lost balance, and fell on right side striking her right hip, right arm, right elbow, right side of neck. She denies any prodromal symptoms. No lightheadedness, chest pain, warm sensation. No dyspnea. Patient never lost consciousness. She was found to have Hg 6.9 at [facility] facility, sent to [facility] ED for further evaluation. She denies any significant pain right now. Only complains of right thigh twitching, swelling.  No fevers, chills, cp, dypsnea, abdominal pain. Admits lower ext swelling which is chronic for her.     In the ED  VS: T97.3 Hr78 /63 RR 16 SPO2 95% on 3L NC  Labs: WBC 4.43, HH 7.6/22.1, PLTS 225, Na 140, K 4.2, Cr 1.7, Gluc 112, Alk phos 326, AST//99  Chest Xray: lungs clear  Head CT: Stable exam. No acute intracranial hemorrhage, vasogenic edema, extra-axial collection or calvarial fracture.  Cervical spine CT: No acute cervical spine fracture or evidence of traumatic malalignment. Cervical spondylosis  CT A/P non con: Extensive diffuse soft tissue edema/anasarca. Small right, trace of bilateral pleural fluid. Small-to-moderate amount of simple free fluid around the liver and spleen. No definite intraperitoneal hemorrhage.No noncontrast evidence of acute injury to the chest, abdomen or pelvis.Concern for hematoma around the right hip soft tissues, partially visualized. No evidence of hip fracture.Mild intralobular septal thickening, may reflect pulmonary edema. Areas of atelectatic changes and possible airways impaction  EKG: ordered, pending  Given in ED: 1 unit prbc   (14 Jul 2022 04:06)    INTERVAL HPI:  7/14: Patient seen and examined at bedside. Is feeling okay. Has no acute complaints but is feeling slightly agitated. Denies sob, cp, n/v/d. Total 2 u pRBC total , IV Lasix   7/15 Pt seen and examined at bedside. Soft BP this AM, given 1 pRBC this AM, on Lasix 40 BID.   7/16: Patient seen and assessed at bedside. Complaining of cramping in her bilateral feet otherwise feeling better. Still feels pain on her right side but is controlled with Tylenol PRN. Denies sob on 3L NC, chest pain, nausea, vomiting, diarrhea. H/H improving s/p 3PRBC. B/L LE edema improving.  7/17: Patient seen and examined at bedside. Complaining of itching over her right sided hematoma and still experiencing cramping in her feet. Denies sob, chest pain, nausea, vomiting, diarrhea.  H/H improving s/p 3PRBC. B/L LE edema improving.    OVERNIGHT EVENTS: none    Home Medications:  aspirin 81 mg oral delayed release tablet: 1 tab(s) orally once a day (14 Jul 2022 04:17)  atorvastatin 20 mg oral tablet: 1 tab(s) orally once a day (14 Jul 2022 04:17)  clopidogrel 75 mg oral tablet: 1 tab(s) orally once a day (14 Jul 2022 04:17)  fluticasone 50 mcg/inh nasal spray: 1 spray(s) nasal 2 times a day (14 Jul 2022 04:17)  Lasix 40 mg oral tablet: 1 tab(s) orally 2 times a day (14 Jul 2022 04:17)  metoprolol succinate 50 mg oral tablet, extended release: 1 tab(s) orally once a day (14 Jul 2022 04:17)  sodium chloride 0.65% nasal spray: 2 spray(s) nasal 4 times a day (14 Jul 2022 04:17)  Synthroid 75 mcg (0.075 mg) oral tablet: 1 tab(s) orally once a day (14 Jul 2022 04:17)  warfarin 3 mg oral tablet: DO NOT Give Coumadin Today as INR 3.11, Check PT/INR on 7/1/22.Adjust Coumadin dose as per INR. start with Coumadin 2 mg   Keep INR 2-3    Pt&#x27;s HOME Coumadin schedule is as below  1 tab(s) orally once a day (M, Tu, Th, F, Sa Grider) at Bed time   -Pt use to Take Coumadin 4 mg on every Wednesday (14 Jul 2022 04:17)  zinc oxide 40% topical ointment: Apply topically to affected area 2 times a day (14 Jul 2022 04:17)      MEDICATIONS  (STANDING):  aspirin enteric coated 81 milliGRAM(s) Oral daily  calamine/zinc oxide Lotion 1 Application(s) Topical daily  furosemide    Tablet 40 milliGRAM(s) Oral two times a day  levothyroxine 75 MICROGram(s) Oral daily  metoprolol succinate ER 50 milliGRAM(s) Oral daily    MEDICATIONS  (PRN):  acetaminophen     Tablet .. 650 milliGRAM(s) Oral every 6 hours PRN Mild Pain (1 - 3)  aluminum hydroxide/magnesium hydroxide/simethicone Suspension 30 milliLiter(s) Oral every 4 hours PRN Dyspepsia  melatonin 3 milliGRAM(s) Oral at bedtime PRN Insomnia  ondansetron Injectable 4 milliGRAM(s) IV Push every 8 hours PRN Nausea and/or Vomiting  sodium chloride 0.65% Nasal 1 Spray(s) Both Nostrils five times a day PRN Nasal Congestion      Allergies    No Known Allergies    Intolerances        Social History:  Lived at home alone. Former smoker. Denies etoh and other rec drug use. Ambulates with walker. Daughter is HCP. DNR/DNI, came from [facility] now (14 Jul 2022 04:06)      REVIEW OF SYSTEMS: i am Feeling better   CONSTITUTIONAL: No fever, No chills, No fatigue, No myalgia, No Body ache, No Weakness  EYES: No eye pain,  No visual disturbances, No discharge, NO Redness  ENMT:  No ear pain, No nose bleed, No vertigo; No sinus pain, NO throat pain, No Congestion  NECK: No pain, No stiffness  RESPIRATORY: No cough, No wheezing, No  hemoptysis, NO  shortness of breath on 3L NC  CARDIOVASCULAR: No chest pain, palpitations  GASTROINTESTINAL: No abdominal pain, NO epigastric pain. No nausea, No vomiting; No diarrhea, No constipation. [ x ] BM  GENITOURINARY: No dysuria, No frequency, No urgency, No hematuria, NO incontinence  NEUROLOGICAL: No headaches, No dizziness, No numbness, No tingling, No tremors, No weakness  EXT: + Hematoma R side.   SKIN: +bruising over right elbow, right leg 2/2 fall [ x ] No itching, burning, rashes   MUSCULOSKELETAL: No joint pain ,No Jt swelling; No muscle pain, No back pain, No extremity pain  PSYCHIATRIC: No depression,  No anxiety,  No mood swings ,No difficulty sleeping at night  PAIN SCALE: [ x ] None  [  ] Other-  ROS Unable to obtain due to - [  ] Dementia  [  ] Lethargy [  ] Drowsy [  ] Sedated [  ] non verbal  REST OF REVIEW Of SYSTEM - [x  ] Normal         Vital Signs Last 24 Hrs  T(C): 36.4 (17 Jul 2022 05:11), Max: 37.1 (16 Jul 2022 13:22)  T(F): 97.5 (17 Jul 2022 05:11), Max: 98.7 (16 Jul 2022 13:22)  HR: 72 (17 Jul 2022 05:11) (70 - 72)  BP: 123/64 (17 Jul 2022 05:11) (117/57 - 133/57)  BP(mean): --  RR: 18 (17 Jul 2022 05:11) (18 - 18)  SpO2: 95% (17 Jul 2022 05:11) (92% - 95%)    Parameters below as of 17 Jul 2022 05:11  Patient On (Oxygen Delivery Method): nasal cannula  O2 Flow (L/min): 3    Finger Stick        07-16 @ 07:01  -  07-17 @ 07:00  --------------------------------------------------------  IN: 480 mL / OUT: 550 mL / NET: -70 mL        PHYSICAL EXAM:  GENERAL:  [x  ] NAD , [x] well appearing, [  ] Agitated, [  ] Drowsy,  [  ] Lethargy, [  ] confused   HEAD:  [  x] Normal, [  ] Other  EYES:  [ x ] EOMI, [  ] PERRLA, [ x ] conjunctiva and sclera clear normal, [  ] Other,  [  ] Pallor,[  ] Discharge  ENMT:  [x  ] Normal, [x  ] Moist mucous membranes, [  ] Good dentition, [x  ] No Thrush  NECK:  [ x ] Supple, [  x] + JVD, [x  ] Normal thyroid, [  ] Lymphadenopathy [  ] Other  CHEST/LUNG:  [ x ] Clear to auscultation bilaterally, [ x ] Breath Sounds equal B/L / , [  ] poor effort  [ x ] No rales, [ x ] No rhonchi  [ x ]  Mild wheezing,   HEART:  [x  ] Regular rate and rhythm, [  ] tachycardia, [  ] Bradycardia,  [  ] irregular  [x  ] + murmurs, No rubs, No gallops, [  ] PPM in place (Mfr:  ) [x] anasarca -IMPROVING  ABDOMEN:  [x  ] Soft, [ x ] Nontender, [ x] Nondistended, [ x ] No mass, [ x ] Bowel sounds present, [ x ] obese  NERVOUS SYSTEM:  [ x ] Alert & Oriented X3, [x  ] Nonfocal  [  ] Confusion  [  ] Encephalopathic [  ] Sedated [  ] Unable to assess, [  ] Dementia [  ] Other-  EXTREMITIES: [x  ] 2+ Peripheral Pulses, No clubbing, No cyanosis,  [ x ]  1+ edema B/L lower EXT. [ x ] PVD stasis skin changes B/L Lower EXT, erythema B/L Lower ext , small blister- Improved   [  ] wound  LYMPH: No lymphadenopathy noted  SKIN:  [x  ] large +R hematoma hip & Thigh , bruising over right elbow, right leg 2/2 fall, [  ] Pressure Ulcers, [ x ] ecchymosis, [  ] Skin Tears, [  ] Other    DIET: Diet, DASH/TLC:   Sodium & Cholesterol Restricted  1000mL Fluid Restriction ([id number]) (07-14-22 @ 08:48)      LABS:                        8.9    4.15  )-----------( 204      ( 17 Jul 2022 07:43 )             27.1     17 Jul 2022 07:43    141    |  105    |  35     ----------------------------<  88     3.6     |  32     |  1.30     Ca    8.2        17 Jul 2022 07:43  Phos  3.7       17 Jul 2022 07:43  Mg     2.4       17 Jul 2022 07:43    TPro  6.3    /  Alb  1.7    /  TBili  2.8    /  DBili  x      /  AST  84     /  ALT  63     /  AlkPhos  302    17 Jul 2022 07:43    PT/INR - ( 17 Jul 2022 07:43 )   PT: 30.4 sec;   INR: 2.57 ratio                                  Anemia Panel:      Thyroid Panel:  Thyroid Stimulating Hormone, Serum: 2.14 uIU/mL (07-14-22 @ 08:40)                RADIOLOGY & ADDITIONAL TESTS:      HEALTH ISSUES - PROBLEM Dx:  Fall    Anemia due to acute blood loss    Hematoma    Transaminitis    Afib    Lower extremity edema    Acute kidney injury superimposed on CKD    HTN (hypertension)    Hypothyroidism    HFrEF (heart failure with reduced ejection fraction)    Need for prophylactic measure    CAD (coronary artery disease)            Consultant(s) Notes Reviewed:  [  x] YES     Care Discussed with [X] Consultants  [  x] Patient  [ x ] Family - dtr [  ] HCP [ x ]   [  ] Social Service  [ x ] RN, [  ] Physical Therapy,[  ] Palliative care team  DVT PPX: [  ] Lovenox, [  ] S C Heparin, [  ] Coumadin, [  ] Xarelto, [  ] Eliquis, [  ] Pradaxa, [  ] IV Heparin drip, [ x ] SCD [  ] Contraindication 2 to GI Bleed,[  ] Ambulation [  ] Contraindicated 2 to  bleed [  ] Contraindicated 2 to Brain Bleed  Advanced directive: [  ] None, [ x ] DNR/DNI Patient is a 89y old  Female who presents with a chief complaint of Fall, with Anemia (17 Jul 2022 09:21)    HPI:  89 y/o F with a pmhx b/l LE edema, HTN, P Afib (on coumadin), HFref (EF35-40%) and hypothyroidism presents to the ED s/p fall. Patient was admitted to [facility] 6/17-6/30/22 for NSTEMI, hospital course c/b iatrogenic flash pulmonary edema, cath canceled due to vol overload, discharged to [facility]. She was doing well at [facility] until today. She went to bathroom for BM, had difficulty to clean herself. She was left unattended by [facility] staff in bathroom, she stood up to take few steps, lost balance, and fell on right side striking her right hip, right arm, right elbow, right side of neck. She denies any prodromal symptoms. No lightheadedness, chest pain, warm sensation. No dyspnea. Patient never lost consciousness. She was found to have Hg 6.9 at [facility] facility, sent to [facility] ED for further evaluation. She denies any significant pain right now. Only complains of right thigh twitching, swelling.  No fevers, chills, cp, dypsnea, abdominal pain. Admits lower ext swelling which is chronic for her.     In the ED  VS: T97.3 Hr78 /63 RR 16 SPO2 95% on 3L NC  Labs: WBC 4.43, HH 7.6/22.1, PLTS 225, Na 140, K 4.2, Cr 1.7, Gluc 112, Alk phos 326, AST//99  Chest Xray: lungs clear  Head CT: Stable exam. No acute intracranial hemorrhage, vasogenic edema, extra-axial collection or calvarial fracture.  Cervical spine CT: No acute cervical spine fracture or evidence of traumatic malalignment. Cervical spondylosis  CT A/P non con: Extensive diffuse soft tissue edema/anasarca. Small right, trace of bilateral pleural fluid. Small-to-moderate amount of simple free fluid around the liver and spleen. No definite intraperitoneal hemorrhage.No noncontrast evidence of acute injury to the chest, abdomen or pelvis.Concern for hematoma around the right hip soft tissues, partially visualized. No evidence of hip fracture.Mild intralobular septal thickening, may reflect pulmonary edema. Areas of atelectatic changes and possible airways impaction  EKG: ordered, pending  Given in ED: 1 unit prbc   (14 Jul 2022 04:06)    INTERVAL HPI:  7/14: Patient seen and examined at bedside. Is feeling okay. Has no acute complaints but is feeling slightly agitated. Denies sob, cp, n/v/d. Total 2 u pRBC total , IV Lasix   7/15 Pt seen and examined at bedside. Soft BP this AM, given 1 pRBC this AM, on Lasix 40 BID.   7/16: Patient seen and assessed at bedside. Complaining of cramping in her bilateral feet otherwise feeling better. Still feels pain on her right side but is controlled with Tylenol PRN. Denies sob on 3L NC, chest pain, nausea, vomiting, diarrhea. H/H improving s/p 3PRBC. B/L LE edema improving.  7/17: Patient seen and examined at bedside. Complaining of itching over her right sided hematoma and still experiencing cramping in her feet. Denies sob, chest pain, nausea, vomiting, diarrhea.  H/H improving s/p 3PRBC. B/L LE edema improving.    OVERNIGHT EVENTS: none    Home Medications:  aspirin 81 mg oral delayed release tablet: 1 tab(s) orally once a day (14 Jul 2022 04:17)  atorvastatin 20 mg oral tablet: 1 tab(s) orally once a day (14 Jul 2022 04:17)  clopidogrel 75 mg oral tablet: 1 tab(s) orally once a day (14 Jul 2022 04:17)  fluticasone 50 mcg/inh nasal spray: 1 spray(s) nasal 2 times a day (14 Jul 2022 04:17)  Lasix 40 mg oral tablet: 1 tab(s) orally 2 times a day (14 Jul 2022 04:17)  metoprolol succinate 50 mg oral tablet, extended release: 1 tab(s) orally once a day (14 Jul 2022 04:17)  sodium chloride 0.65% nasal spray: 2 spray(s) nasal 4 times a day (14 Jul 2022 04:17)  Synthroid 75 mcg (0.075 mg) oral tablet: 1 tab(s) orally once a day (14 Jul 2022 04:17)  warfarin 3 mg oral tablet: DO NOT Give Coumadin Today as INR 3.11, Check PT/INR on 7/1/22.Adjust Coumadin dose as per INR. start with Coumadin 2 mg   Keep INR 2-3    Pt&#x27;s HOME Coumadin schedule is as below  1 tab(s) orally once a day (M, Tu, Th, F, Sa Grider) at Bed time   -Pt use to Take Coumadin 4 mg on every Wednesday (14 Jul 2022 04:17)  zinc oxide 40% topical ointment: Apply topically to affected area 2 times a day (14 Jul 2022 04:17)      MEDICATIONS  (STANDING):  aspirin enteric coated 81 milliGRAM(s) Oral daily  calamine/zinc oxide Lotion 1 Application(s) Topical daily  furosemide    Tablet 40 milliGRAM(s) Oral two times a day  levothyroxine 75 MICROGram(s) Oral daily  metoprolol succinate ER 50 milliGRAM(s) Oral daily    MEDICATIONS  (PRN):  acetaminophen     Tablet .. 650 milliGRAM(s) Oral every 6 hours PRN Mild Pain (1 - 3)  aluminum hydroxide/magnesium hydroxide/simethicone Suspension 30 milliLiter(s) Oral every 4 hours PRN Dyspepsia  melatonin 3 milliGRAM(s) Oral at bedtime PRN Insomnia  ondansetron Injectable 4 milliGRAM(s) IV Push every 8 hours PRN Nausea and/or Vomiting  sodium chloride 0.65% Nasal 1 Spray(s) Both Nostrils five times a day PRN Nasal Congestion      Allergies    No Known Allergies    Intolerances        Social History:  Lived at home alone. Former smoker. Denies etoh and other rec drug use. Ambulates with walker. Daughter is HCP. DNR/DNI, came from [facility] now (14 Jul 2022 04:06)      REVIEW OF SYSTEMS: i am Feeling better   CONSTITUTIONAL: No fever, No chills, No fatigue, No myalgia, No Body ache, No Weakness  EYES: No eye pain,  No visual disturbances, No discharge, NO Redness  ENMT:  No ear pain, No nose bleed, No vertigo; No sinus pain, NO throat pain, No Congestion  NECK: No pain, No stiffness  RESPIRATORY: No cough, No wheezing, No  hemoptysis, NO  shortness of breath on 3L NC  CARDIOVASCULAR: No chest pain, palpitations  GASTROINTESTINAL: No abdominal pain, NO epigastric pain. No nausea, No vomiting; No diarrhea, No constipation. [ x ] BM  GENITOURINARY: No dysuria, No frequency, No urgency, No hematuria, NO incontinence  NEUROLOGICAL: No headaches, No dizziness, No numbness, No tingling, No tremors, No weakness  EXT: + Hematoma R side.   SKIN: +bruising over right elbow, right leg 2/2 fall [ x ] No itching, burning, rashes   MUSCULOSKELETAL: No joint pain ,No Jt swelling; No muscle pain, No back pain, No extremity pain  PSYCHIATRIC: No depression,  No anxiety,  No mood swings ,No difficulty sleeping at night  PAIN SCALE: [ x ] None  [  ] Other-  ROS Unable to obtain due to - [  ] Dementia  [  ] Lethargy [  ] Drowsy [  ] Sedated [  ] non verbal  REST OF REVIEW Of SYSTEM - [x  ] Normal         Vital Signs Last 24 Hrs  T(C): 36.4 (17 Jul 2022 05:11), Max: 37.1 (16 Jul 2022 13:22)  T(F): 97.5 (17 Jul 2022 05:11), Max: 98.7 (16 Jul 2022 13:22)  HR: 72 (17 Jul 2022 05:11) (70 - 72)  BP: 123/64 (17 Jul 2022 05:11) (117/57 - 133/57)  BP(mean): --  RR: 18 (17 Jul 2022 05:11) (18 - 18)  SpO2: 95% (17 Jul 2022 05:11) (92% - 95%)    Parameters below as of 17 Jul 2022 05:11  Patient On (Oxygen Delivery Method): nasal cannula  O2 Flow (L/min): 3    Finger Stick        07-16 @ 07:01  -  07-17 @ 07:00  --------------------------------------------------------  IN: 480 mL / OUT: 550 mL / NET: -70 mL        PHYSICAL EXAM:  GENERAL:  [x  ] NAD , [x] well appearing, [  ] Agitated, [  ] Drowsy,  [  ] Lethargy, [  ] confused   HEAD:  [  x] Normal, [  ] Other  EYES:  [ x ] EOMI, [  ] PERRLA, [ x ] conjunctiva and sclera clear normal, [  ] Other,  [  ] Pallor,[  ] Discharge  ENMT:  [x  ] Normal, [x  ] Moist mucous membranes, [  ] Good dentition, [x  ] No Thrush  NECK:  [ x ] Supple, [  x] + JVD, [x  ] Normal thyroid, [  ] Lymphadenopathy [  ] Other  CHEST/LUNG:  [ x ] Clear to auscultation bilaterally, [ x ] Breath Sounds equal B/L / , [  ] poor effort  [ x ] No rales, [ x ] No rhonchi  [ x ]  Mild wheezing,   HEART:  [x ] Regular rate and rhythm, [  ] tachycardia, [  ] Bradycardia,  [  ] irregular  [x  ] + murmurs, No rubs, No gallops, [  ] PPM in place (Mfr:  ) [x] anasarca -IMPROVING  ABDOMEN:  [x  ] Soft, [ x ] Nontender, [ x] Nondistended, [ x ] No mass, [ x ] Bowel sounds present, [ x ] obese  NERVOUS SYSTEM:  [ x ] Alert & Oriented X3, [x  ] Nonfocal  [  ] Confusion  [  ] Encephalopathic [  ] Sedated [  ] Unable to assess, [  ] Dementia [  ] Other-  EXTREMITIES: [x  ] 2+ Peripheral Pulses, No clubbing, No cyanosis,  [ x ]  1+ edema B/L lower EXT. [ x ] PVD stasis skin changes B/L Lower EXT, erythema B/L Lower ext , small blister- Improved   [  ] wound  LYMPH: No lymphadenopathy noted  SKIN:  [x  ] large +R hematoma hip & Thigh , bruising over right elbow, right leg 2/2 fall, [  ] Pressure Ulcers, [ x ] ecchymosis, [  ] Skin Tears, [  ] Other    DIET: Diet, DASH/TLC:   Sodium & Cholesterol Restricted  1000mL Fluid Restriction ([id number]) (07-14-22 @ 08:48)      LABS:                        8.9    4.15  )-----------( 204      ( 17 Jul 2022 07:43 )             27.1     17 Jul 2022 07:43    141    |  105    |  35     ----------------------------<  88     3.6     |  32     |  1.30     Ca    8.2        17 Jul 2022 07:43  Phos  3.7       17 Jul 2022 07:43  Mg     2.4       17 Jul 2022 07:43    TPro  6.3    /  Alb  1.7    /  TBili  2.8    /  DBili  x      /  AST  84     /  ALT  63     /  AlkPhos  302    17 Jul 2022 07:43    PT/INR - ( 17 Jul 2022 07:43 )   PT: 30.4 sec;   INR: 2.57 ratio       Thyroid Panel:  Thyroid Stimulating Hormone, Serum: 2.14 uIU/mL (07-14-22 @ 08:40)    RADIOLOGY & ADDITIONAL TESTS:  none    HEALTH ISSUES - PROBLEM Dx:  Fall    Anemia due to acute blood loss    Hematoma    Transaminitis    Afib    Lower extremity edema    Acute kidney injury superimposed on CKD    HTN (hypertension)    Hypothyroidism    HFrEF (heart failure with reduced ejection fraction)    Need for prophylactic measure    CAD (coronary artery disease)            Consultant(s) Notes Reviewed:  [  x] YES     Care Discussed with [X] Consultants  [  x] Patient  [ x ] Family - dtr [  ] HCP [ x ]   [  ] Social Service  [ x ] RN, [  ] Physical Therapy,[  ] Palliative care team  DVT PPX: [  ] Lovenox, [  ] S C Heparin, [  ] Coumadin, [  ] Xarelto, [  ] Eliquis, [  ] Pradaxa, [  ] IV Heparin drip, [ x ] SCD [  ] Contraindication 2 to GI Bleed,[  ] Ambulation [  ] Contraindicated 2 to  bleed [  ] Contraindicated 2 to Brain Bleed  Advanced directive: [  ] None, [ x ] DNR/DNI

## 2022-07-17 NOTE — PROGRESS NOTE ADULT - PROBLEM SELECTOR PLAN 3
On last TTE showed EF 35-40%,  - On Toprol  mg with holding parameters   - Lasix  PO 40 mg BID   - maintain net negative balance; strict I/Os, daily weight  - Cardio Gauri group following On last TTE showed EF 35-40%,-Chronic Systolic CHF  - On Toprol  mg with holding parameters   - Lasix  PO 40 mg BID   - maintain net negative balance; strict I/Os, daily weight  - Cardio Gauri group following

## 2022-07-17 NOTE — PROGRESS NOTE ADULT - SUBJECTIVE AND OBJECTIVE BOX
Patient is a 89y old  Female who presents with a chief complaint of Fall, ABLA (14 Jul 2022 04:06)       HPI:  89 y/o F with a pmhx b/l LE edema, HTN, P Afib (on coumadin), HFref (EF35-40%) and hypothyroidism presents to the ED s/p fall. Patient was admitted to [facility] 6/17-6/30/22 for NSTEMI, hospital course c/b iatrogenic flash pulmonary edema, cath canceled due to vol overload, discharged to [facility]. She was doing well at [facility] until today. She went to bathroom for BM, had difficulty to clean herself. She was left unattended by [facility] staff in bathroom, she stood up to take few steps, lost balance, and fell on right side striking her right hip, right arm, right elbow, right side of neck. She denies any prodromal symptoms. No lightheadedness, chest pain, warm sensation. No dyspnea. Patient never lost consciousness. She was found to have Hg 6.9 at [facility] facility, sent to [facility] ED for further evaluation. She denies any significant pain right now. Only complains of right thigh twitching, swelling.  No fevers, chills, cp, dypsnea, abdominal pain. Admits lower ext swelling which is chronic for her.     Follow up LUIS CARLOS/CKD    PAST MEDICAL & SURGICAL HISTORY:  Hypothyroid      Hypertension      Lower extremity edema      Paroxysmal atrial fibrillation      No significant past surgical history           FAMILY HISTORY:  No pertinent family history in first degree relatives    [place]    Social History:Non smoker    MEDICATIONS  (STANDING):  furosemide    Tablet 40 milliGRAM(s) Oral every 12 hours  levothyroxine 75 MICROGram(s) Oral daily  metoprolol succinate ER 50 milliGRAM(s) Oral daily    MEDICATIONS  (PRN):  aluminum hydroxide/magnesium hydroxide/simethicone Suspension 30 milliLiter(s) Oral every 4 hours PRN Dyspepsia  melatonin 3 milliGRAM(s) Oral at bedtime PRN Insomnia  ondansetron Injectable 4 milliGRAM(s) IV Push every 8 hours PRN Nausea and/or Vomiting   Meds reviewed    Allergies    No Known Allergies    Intolerances         REVIEW OF SYSTEMS:    Review of Systems:   Constitutional: Denies fatigue  HEENT: Denies headaches and dizziness  Respiratory: denies SOB, cough, or wheezing  Cardiovascular: denies CP, palpitations  Gastrointestinal: Denies nausea, denies vomiting, diarrhea, constipation, abdominal pain, or bloody stools  Genitourinary: denies painful urination, increased frequency, urgency, or bloody urine  Skin: denies rashes or itching  Musculoskeletal: denies muscle aches, joint swelling  Neurologic: Denies generalized weakness, denies loss of sensation, numbness, or tingling      Vital Signs Last 24 Hrs  T(C): 36.4 (17 Jul 2022 05:11), Max: 37.1 (16 Jul 2022 13:22)  T(F): 97.5 (17 Jul 2022 05:11), Max: 98.7 (16 Jul 2022 13:22)  HR: 72 (17 Jul 2022 05:11) (70 - 72)  BP: 123/64 (17 Jul 2022 05:11) (117/57 - 133/57)  BP(mean): --  RR: 18 (17 Jul 2022 05:11) (18 - 18)  SpO2: 95% (17 Jul 2022 05:11) (92% - 95%)    Parameters below as of 17 Jul 2022 05:11  Patient On (Oxygen Delivery Method): nasal cannula  O2 Flow (L/min): 3      Daily Height in cm: 165.1 (13 Jul 2022 23:24)    Daily     PHYSICAL EXAM:    GENERAL: NAD  HEAD:  Atraumatic, Normocephalic  EYES: EOMI, conjunctiva and sclera clear  ENMT: No Drainage from nares, No drainage from ears  NECK: Supple, neck  veins full  NERVOUS SYSTEM:  Awake and Alert  CHEST/LUNG: Clear to percussion bilaterally; No rales, rhonchi, wheezing, or rubs  HEART: Regular rate and rhythm; No murmurs, rubs, or gallops  ABDOMEN: Soft, Nontender, Nondistended; Bowel sounds present  EXTREMITIES:  2+ pitting edema  SKIN: UE ecchymosis      LABS:                        8.9    4.15  )-----------( 204      ( 17 Jul 2022 07:43 )             27.1     07-17    141  |  105  |  35<H>  ----------------------------<  88  3.6   |  32<H>  |  1.30    Ca    8.2<L>      17 Jul 2022 07:43  Phos  3.7     07-17  Mg     2.4     07-17    TPro  6.3  /  Alb  1.7<L>  /  TBili  2.8<H>  /  DBili  x   /  AST  84<H>  /  ALT  63  /  AlkPhos  302<H>  07-17    PT/INR - ( 17 Jul 2022 07:43 )   PT: 30.4 sec;   INR: 2.57 ratio

## 2022-07-17 NOTE — PROGRESS NOTE ADULT - PROBLEM SELECTOR PLAN 2
Right hip hematoma s/p mechanical fall at [facility], CT right hip shows subcutaneous hematoma along the lateral aspect of the right hip, no acute displaced fracture or dislocation within the pelvis.  -- no need for coumadin reversal agents for now as d/w Dr Lundy -Hematology  - 3 u pRBC given in total   - cool compresses prn  - calamine lotion for itch  -HOLD Plavix, HOLD AC- Started ASA (7/16/22)

## 2022-07-17 NOTE — PROGRESS NOTE ADULT - ASSESSMENT
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    LUIS CARLOS on CKD IIIb  -Likely prerenal 2/2 acute blood loss  and hypoxemic injury  -Baseline creatinine ~1.2 as per recent admission  -Urine indices reviewed   -Renal indices are improving; monitor trend for now  -Continue low salt diet w/ 1L fluid restriction  -Conservative renal management     HTN with CKD  -Controlled. Can restart PURVI blockade if BP tolerates     Volume Overload  -Lasix 40 mg po bid. Renal wise tolerating   -IV Albumin as needed    Anemia/Hematoma  -S/P PRBC x 2

## 2022-07-17 NOTE — PROGRESS NOTE ADULT - PROBLEM SELECTOR PLAN 6
- recent NSTEMI 6/2022  - restarted home asa (7/16/22), stable H/H  - Per cardio hold Plavix for now in setting of bleeding s/p fall on coumadin  - hold statin for transaminitis  - Cardio G[facility] group consulted

## 2022-07-17 NOTE — PROGRESS NOTE ADULT - PROBLEM SELECTOR PLAN 1
-2/2 Traumatic Rt Thigh Hematoma s/p fall at [facility], Acute & Chronic Anemia   -s/p 2 pRBCs on 07/1, Hb improved this AM   - Given 1 pRBCs 07/15, Total 3 u pRBC given   - Holding AC, AP , Coumadin , f/u cardio recs  - patient on coumadin for afib. Hold in setting of bleed  - daily INR  - type and screen, blood consent in chart  - Trend CBC and signs or symptoms of active bleeding -2/2 Traumatic Rt Thigh Hematoma s/p fall at [facility], Acute & Chronic Anemia   -s/p 2 pRBCs on 07/1, Hb improved this AM   - Given 1 pRBCs 07/15, Total 3 u pRBC given   - Holding AC-Coumadin &  Plavix , f/u cardio -Restart ASA , OK with hematology  - patient on coumadin for afib. Hold in setting of bleed  - daily INR  - type and screen, blood consent in chart  - Trend CBC and signs or symptoms of active bleeding

## 2022-07-17 NOTE — PROGRESS NOTE ADULT - SUBJECTIVE AND OBJECTIVE BOX
[facility] -- Reid Astorga, Norah, Raheem, Flex Muniz Savella, Goodger  Office # [phone]    Follow Up:  S/P Fall, HFrEF, Afib     Subjective/Observations: Asleep but easily awakened.  Denies any respiratory or cardiac  discomfort.  C/o right thigh tenderness.    REVIEW OF SYSTEMS: All other review of systems is negative unless indicated above  PAST MEDICAL & SURGICAL HISTORY:  Hypothyroid  Hypertension  Lower extremity edema  Paroxysmal atrial fibrillation  NSTEMI (non-ST elevation myocardial infarction)  june 2022  Chronic systolic congestive heart failure  Stage 3 chronic kidney disease  Anemia of chronic disease  No significant past surgical history    MEDICATIONS  (STANDING):  aspirin enteric coated 81 milliGRAM(s) Oral daily  calamine/zinc oxide Lotion 1 Application(s) Topical daily  furosemide    Tablet 40 milliGRAM(s) Oral two times a day  levothyroxine 75 MICROGram(s) Oral daily  metoprolol succinate ER 50 milliGRAM(s) Oral daily    MEDICATIONS  (PRN):  acetaminophen     Tablet .. 650 milliGRAM(s) Oral every 6 hours PRN Mild Pain (1 - 3)  aluminum hydroxide/magnesium hydroxide/simethicone Suspension 30 milliLiter(s) Oral every 4 hours PRN Dyspepsia  melatonin 3 milliGRAM(s) Oral at bedtime PRN Insomnia  ondansetron Injectable 4 milliGRAM(s) IV Push every 8 hours PRN Nausea and/or Vomiting  sodium chloride 0.65% Nasal 1 Spray(s) Both Nostrils five times a day PRN Nasal Congestion    Allergies    No Known Allergies    Intolerances    Vital Signs Last 24 Hrs  T(C): 36.8 (17 Jul 2022 12:53), Max: 36.8 (16 Jul 2022 21:21)  T(F): 98.2 (17 Jul 2022 12:53), Max: 98.2 (16 Jul 2022 21:21)  HR: 71 (17 Jul 2022 12:53) (70 - 72)  BP: 133/65 (17 Jul 2022 12:53) (117/57 - 133/65)  BP(mean): --  RR: 16 (17 Jul 2022 12:53) (16 - 18)  SpO2: 97% (17 Jul 2022 12:53) (92% - 97%)    Parameters below as of 17 Jul 2022 12:53  Patient On (Oxygen Delivery Method): room air      I&O's Summary    16 Jul 2022 07:01  -  17 Jul 2022 07:00  --------------------------------------------------------  IN: 480 mL / OUT: 550 mL / NET: -70 mL    PHYSICAL EXAM:  TELE: Not on tele  Constitutional: NAD, awake and alert, obese  HEENT: Moist Mucous Membranes, Anicteric  Pulmonary: Non-labored, breath sounds are clear bilaterally but diminished at bases, No wheezing, rales or rhonchi  Cardiovascular: IRRR, S1 and S2, No murmurs, rubs, gallops or clicks  Gastrointestinal: Bowel Sounds present, soft, nontender.   Lymph: No peripheral edema. No lymphadenopathy.  Skin: No visible rashes or ulcers.  +ecchymoses and inflammation on right hip  Psych:  Mood & affect appropriate      LABS: All Labs Reviewed:                        8.9    4.15  )-----------( 204      ( 17 Jul 2022 07:43 )             27.1                         9.1    4.49  )-----------( 211      ( 16 Jul 2022 07:05 )             26.8                         8.6    3.97  )-----------( 217      ( 15 Jul 2022 06:50 )             25.3     17 Jul 2022 07:43    141    |  105    |  35     ----------------------------<  88     3.6     |  32     |  1.30   16 Jul 2022 07:05    140    |  105    |  36     ----------------------------<  83     3.5     |  31     |  1.60   15 Jul 2022 06:50    141    |  105    |  37     ----------------------------<  83     3.7     |  29     |  1.50     Ca    8.2        17 Jul 2022 07:43  Ca    8.1        16 Jul 2022 07:05  Ca    8.0        15 Jul 2022 06:50  Phos  3.7       17 Jul 2022 07:43  Phos  3.6       16 Jul 2022 07:05  Phos  4.6       15 Jul 2022 06:50  Mg     2.4       17 Jul 2022 07:43  Mg     2.4       16 Jul 2022 07:05  Mg     2.4       15 Jul 2022 06:50    TPro  6.3    /  Alb  1.7    /  TBili  2.8    /  DBili  x      /  AST  84     /  ALT  63     /  AlkPhos  302    17 Jul 2022 07:43  TPro  6.4    /  Alb  1.9    /  TBili  3.2    /  DBili  x      /  AST  120    /  ALT  78     /  AlkPhos  337    16 Jul 2022 07:05  TPro  6.5    /  Alb  2.0    /  TBili  3.4    /  DBili  x      /  AST  149    /  ALT  85     /  AlkPhos  293    15 Jul 2022 06:50    PT/INR - ( 17 Jul 2022 07:43 )   PT: 30.4 sec;   INR: 2.57 ratio         ACC: [id number] EXAM:  ECHO TTE WO CON COMP W DOPP                          PROCEDURE DATE:  06/19/2022          INTERPRETATION:  INDICATION: Chest pain  Sonographer LK    Blood Pressure 147/84    Height 165.1 cm     Weight 72.6 kg       BSA 1.8   sqm    Dimensions:  LA 3.8       Normal Values: 2.0 - 4.0 cm  Ao 3.2        Normal Values: 2.0 - 3.8 cm  SEPTUM 1.7       Normal Values: 0.6 - 1.2 cm  PWT 1.2       Normal Values: 0.6 - 1.1 cm  LVIDd 4.8         Normal Values: 3.0 - 5.6 cm  LVIDs 3.2      Normal Values: 1.8 - 4.0 cm      OBSERVATIONS:  Mitral Valve: Mitral annular calcification with thickened leaflets, mild   MR.  Aortic Valve/Aorta: Calcified trileaflet aortic valve with decreased   opening. Peak transaortic valve gradient equals 20.4 mmHg with a mean   transaortic valve gradient 13.2 mmHg. By visual estimation there appears   to be mild to moderate aortic stenosis  Tricuspid Valve: Moderate TR.  Pulmonic Valve: Trace PI  Left Atrium: Enlarged  Right Atrium: Enlarged  Left Ventricle: Mild to moderate segmental left ventricular systolic   dysfunction, estimated LVEF of 35-40%. The apex, mid inferoseptal and mid   anterolateral walls appear severely hypokinetic  Right Ventricle: Right ventricular enlargement with grossly normal   function  Pericardium: no significant pericardial effusion.  Pulmonary/RV Pressure: estimated PA systolic pressure of 36 mmHg  IVC measures 1.47 cm    IMPRESSION:  Mild to moderate segmental left ventricular systolic dysfunction,   estimated LVEF of 35-40%. The apex, mid inferoseptal and mid   anterolateral walls appear severely hypokinetic  Right ventricular enlargement with grossly normal function  Biatrial enlargement  Calcified trileaflet aortic valve with mild to moderate aortic stenosis,   without AI.  Mild MR  Moderate TR.  No significant pericardial effusion.    --- End of Report ---    JEAN CARLOS HENRY MD; Attending Cardiologist  This document has been electronically signed. Jun 20 2022 12:56PM      ACC: [id number] EXAM:  CT ABDOMEN AND PELVIS                        ACC: [id number] EXAM:  CT CHEST                          PROCEDURE DATE:  07/14/2022          INTERPRETATION:  CLINICAL INFORMATION: Status post fall 7/11/2022. On   Coumadin. Low H/H.    COMPARISON: None.    CONTRAST/COMPLICATIONS:  IV Contrast: NONE  Oral Contrast: NONE  Complications: None reported at time of study completion    PROCEDURE:  CT of the Chest, Abdomen and Pelvis was performed.  Sagittal and coronal reformats were performed. No IV contrast was   administered secondary to patient's renal function.    FINDINGS:  CHEST:  LUNGS AND LARGE AIRWAYS: Patent central airways. 5 mm left upper lobe   pulmonary nodule. Linear atelectasis in the lingula, as well as areas of   dependent atelectasis of the bilateral lower lobes. Mild interlobular   septal thickening, suggestive of pulmonary edema. Tiny branching   opacities in the periphery of the right middle lobe and at the right lung   apex. Mild peripheral consolidationin the right middle lobe, possibly   infectious or inflammatory.  PLEURA: Small right, trace left pleural fluid.  VESSELS: Normal caliber aorta with extensive calcifications. Coronary   artery calcifications.  HEART: Heart size is normal. No pericardial effusion.  MEDIASTINUM AND WILLIAN: Nonspecific subcentimeter mediastinal lymph nodes.  CHEST WALL AND LOWER NECK: Diffuse soft tissue edema.    ABDOMEN AND PELVIS:  Streak artifact from the patient's arms.  LIVER: Grossly unremarkable noncontrast appearance  BILE DUCTS: Normal caliber.  GALLBLADDER: Cholelithiasis.  SPLEEN: Within normal limits.  PANCREAS: Within normal limits.  ADRENALS: Within normal limits.  KIDNEYS/URETERS: Question punctate nonobstructing calculus versus   vascular calcification in the left upper kidney. Otherwise within normal   limits.    BLADDER: Within normal limits.  REPRODUCTIVE ORGANS: Uterus and adnexa within normal limits.   Calcification of the uterine fundus likely fibroid.    BOWEL: No bowel obstruction. Extensive sigmoid diverticula.  PERITONEUM: Moderate amount of simple fluid around the dome of the liver   and spleen extending into the paracolic gutters.  VESSELS: Extensive atherosclerotic calcifications. Grossly normal caliber   of the abdominal aorta.  RETROPERITONEUM/LYMPH NODES: No lymphadenopathy.  ABDOMINAL WALL: Extensive diffuse soft tissue edema/anasarca. More focal   area of hyperdensity in the soft tissues of the lateral aspect of the   right hip is partially visualized, concerning for hematoma, given history   of trauma.  BONES: No acute fractures. Loss of vertebral body height at the L4   superior endplate with associated hyperdensity likely related to prior   kyphoplasty.    IMPRESSION:  Extensive diffuse soft tissue edema/anasarca. Small right, trace of   bilateral pleural fluid. Small-to-moderate amount of simple free fluid   around the liver and spleen. No definite intraperitoneal hemorrhage.    No noncontrast evidence of acute injury to the chest, abdomen or pelvis.    Concern for hematoma around the right hip soft tissues, partially   visualized. No evidence of hip fracture.    Mild intralobular septal thickening, may reflect pulmonary edema. Areas   of atelectatic changes and possible airways impaction.    Findings were discussed with Dr. MARIO ALBERTO MCKEON [phone] 7/14/2022 2:38 AM by   Dr. Choudhary with read back confirmation.    --- End of Report ---     JACQUELINE CHOUDHARY MD; Attending Radiologist  This document has been electronically signed. Jul 14 2022  3:17AM    Ventricular Rate 68 BPM    QRS Duration 150 ms    Q-T Interval 490 ms    QTC Calculation(Bazett) 521 ms    R Axis -35 degrees    T Axis 206 degrees    Diagnosis Line Atrial fibrillation  Left axis deviation  Left bundle branch block  Confirmed by KEVIN MUNIZ ([id number]) on 7/14/2022 11:34:42 AM

## 2022-07-17 NOTE — PROGRESS NOTE ADULT - ASSESSMENT
89 yo woman with multifactorial anemia due to chronic disease including renal disease complicated by a fall on 7/14/22 on coumadin with Ct scan of right hip showing a hematoma.  INR mildly elevated 3.28. Patient was also on aspirin and plavix due to recent NSTEMI    RECOMMENDATION  - initially aspirin/plavix and coumadin held  - supportive transfusions as indicated  - noted on 7/16/22 to have stable H/H and aspirin resumed  - per cardiology recommendations plavix will be d/c'd;  coumadin can be re-initiated within next 24 to 48 hours if Hg remains stable;  advise caution in this li elderly woman  - follow CBC  - case discussed with Dr. Keagan Villegas (hospitalist) and patient

## 2022-07-17 NOTE — PROGRESS NOTE ADULT - ASSESSMENT
89 y/o F with pmhx PAF (on coumadin), HFrEF (35-40%), CKD, HTN, aortic stenosis, TR, chronic LE edema, hypothyroidism, with recent admission to [facility] for [facility] in mid June, found to have NSTEMI with subsequent flash pulmonary edema, unable to go for cath due to volume overload, discharged to [facility], now presenting s/p mechanical unwitnessed fall at [facility]. Found to have R hip hematoma, Coumadin, asa, plavix being held due to hg 6.9 in setting of acute blood loss anemia.     CAD with recent NSTEMI (mid June), unable to undergo cath at that time  - EKG:  Afib with LAD, LBBB, no acute ischemic changes  - No anginal complaints to date  - Continue ASA.  No need to resume Plavix  - Continue to hold statin d/t transaminitis.  Resume when able    Permanent Afib  - Remains rate-controlled Afib.  D/C tele  - Continue BB  - Continue to hold AC for now in setting of ABLA 2/2 acute R hip hematoma (on triple therapy)   - INR remains therapeutic at 2.7.  Resume Coumadin once INR 2.  Risk vs benefits need to be adresssed  - Monitor and replete lytes, keep K>4, Mg>2.    HFrEF   - TTE 6/2022 with EF 35-40% with mod AS, mod TR  - Remains euvolemic on exam.  Titrate off NC as tolerated  - Continue home dose Lasix  - Monitor volume status closely.  Maintain strict I/O's    - Will continue to follow.    Angeles Kim DNP, NP-C  Cardiology   Spectra #[phone]

## 2022-07-17 NOTE — PROGRESS NOTE ADULT - PROBLEM SELECTOR PLAN 5
chronic, stable - rate-controlled   - INR ____ this AM  - on coumadin, holding for Acute Anemia ****  - Toprol  XL 50mg PO qd w/ hold parameters  - cardio Dr. charles group following chronic, stable - rate-controlled   - INR 2.57 this AM  - on coumadin, holding for Acute Anemia ****  - Toprol  XL 50mg PO qd w/ hold parameters  - cardio Dr. charles group following chronic, stable - rate-controlled   - INR 2.57 this AM  - on coumadin, holding for Acute Anemia  - Toprol  XL 50mg PO qd w/ hold parameters  - cardio Dr. charles group following

## 2022-07-17 NOTE — PROGRESS NOTE ADULT - PROBLEM SELECTOR PLAN 12
- *******hold coumadin in setting of ABLA, follow daily INR. SCD's for now. - hold coumadin in setting of ABLA, follow daily INR. SCD's for now.

## 2022-07-17 NOTE — PROGRESS NOTE ADULT - ASSESSMENT
89 y/o F with a pmhx b/l LE edema, HTN, P Afib (on coumadin), HFref (EF35-40%), hypothyroidism, recent admission for NSTEMI presents to the ED s/p mechanical fall at [facility] on coumadin. Found to have ABLA with right hematoma.

## 2022-07-17 NOTE — PROGRESS NOTE ADULT - SUBJECTIVE AND OBJECTIVE BOX
INTERVAL HPI/OVERNIGHT EVENTS:  LFTs continue to downtrend    Allergies    No Known Allergies    Intolerances          General:  No wt loss, fevers, chills, night sweats, fatigue,   Eyes:  Good vision, no reported pain  ENT:  No sore throat, pain, runny nose, dysphagia  CV:  No pain, palpitations, hypo/hypertension  Resp:  No dyspnea, cough, tachypnea, wheezing  GI:  No pain, No nausea, No vomiting, No diarrhea, No constipation, No weight loss, No fever, No pruritis, No rectal bleeding, No tarry stools, No dysphagia,  :  No pain, bleeding, incontinence, nocturia  Muscle:  No pain, weakness  Neuro:  No weakness, tingling, memory problems  Psych:  No fatigue, insomnia, mood problems, depression  Endocrine:  No polyuria, polydipsia, cold/heat intolerance  Heme:  No petechiae, ecchymosis, easy bruisability  Skin:  No rash, tattoos, scars, edema      PHYSICAL EXAM:   Vital Signs:  Vital Signs Last 24 Hrs  T(C): 36.4 (2022 05:11), Max: 37.1 (2022 13:22)  T(F): 97.5 (2022 05:11), Max: 98.7 (2022 13:22)  HR: 72 (2022 05:11) (70 - 72)  BP: 123/64 (2022 05:11) (117/57 - 133/57)  BP(mean): --  RR: 18 (2022 05:11) (18 - 18)  SpO2: 95% (2022 05:11) (92% - 95%)    Parameters below as of 2022 05:11  Patient On (Oxygen Delivery Method): nasal cannula  O2 Flow (L/min): 3    Daily     Daily Weight in k.5 (2022 05:11)I&O's Summary    2022 07:01  -  2022 07:00  --------------------------------------------------------  IN: 480 mL / OUT: 550 mL / NET: -70 mL        GENERAL:  Appears stated age, well-groomed, well-nourished, no distress  HEENT:  NC/AT,  conjunctivae clear and pink, no thyromegaly, nodules, adenopathy, no JVD, sclera -anicteric  CHEST:  Full & symmetric excursion, no increased effort, breath sounds clear  HEART:  Regular rhythm, S1, S2, no murmur/rub/S3/S4, no abdominal bruit, no edema  ABDOMEN:  Soft, non-tender, non-distended, normoactive bowel sounds,  no masses ,no hepato-splenomegaly, no signs of chronic liver disease  EXTEREMITIES:  no cyanosis,clubbing or edema  SKIN:  No rash/erythema/ecchymoses/petechiae/wounds/abscess/warm/dry  NEURO:  Alert, oriented, no asterixis, no tremor, no encephalopathy      LABS:                        8.9    4.15  )-----------( 204      ( 2022 07:43 )             27.1         141  |  105  |  35<H>  ----------------------------<  88  3.6   |  32<H>  |  1.30    Ca    8.2<L>      2022 07:43  Phos  3.7       Mg     2.4         TPro  6.3  /  Alb  1.7<L>  /  TBili  2.8<H>  /  DBili  x   /  AST  84<H>  /  ALT  63  /  AlkPhos  302<H>      PT/INR - ( 2022 07:43 )   PT: 30.4 sec;   INR: 2.57 ratio             amylase   lipase  RADIOLOGY & ADDITIONAL TESTS:   INTERVAL HPI/OVERNIGHT EVENTS:  No new overnight event.  No N/V/D.  Tolerating diet.  LFTs continue to downtrend    Allergies    No Known Allergies    Intolerances          General:  No wt loss, fevers, chills, night sweats, fatigue,   Eyes:  Good vision, no reported pain  ENT:  No sore throat, pain, runny nose, dysphagia  CV:  No pain, palpitations, hypo/hypertension  Resp:  No dyspnea, cough, tachypnea, wheezing  GI:  No pain, No nausea, No vomiting, No diarrhea, No constipation, No weight loss, No fever, No pruritis, No rectal bleeding, No tarry stools, No dysphagia,  :  No pain, bleeding, incontinence, nocturia  Muscle:  No pain, weakness  Neuro:  No weakness, tingling, memory problems  Psych:  No fatigue, insomnia, mood problems, depression  Endocrine:  No polyuria, polydipsia, cold/heat intolerance  Heme:  No petechiae, ecchymosis, easy bruisability  Skin:  No rash, tattoos, scars, edema      PHYSICAL EXAM:   Vital Signs:  Vital Signs Last 24 Hrs  T(C): 36.4 (2022 05:11), Max: 37.1 (2022 13:22)  T(F): 97.5 (2022 05:11), Max: 98.7 (2022 13:22)  HR: 72 (2022 05:11) (70 - 72)  BP: 123/64 (2022 05:11) (117/57 - 133/57)  BP(mean): --  RR: 18 (2022 05:11) (18 - 18)  SpO2: 95% (2022 05:11) (92% - 95%)    Parameters below as of 2022 05:11  Patient On (Oxygen Delivery Method): nasal cannula  O2 Flow (L/min): 3    Daily     Daily Weight in k.5 (2022 05:11)I&O's Summary    2022 07:01  -  2022 07:00  --------------------------------------------------------  IN: 480 mL / OUT: 550 mL / NET: -70 mL        GENERAL:  Appears stated age, well-groomed, well-nourished, no distress  HEENT:  NC/AT,  conjunctivae clear and pink, no thyromegaly, nodules, adenopathy, no JVD, sclera -anicteric  CHEST:  Full & symmetric excursion, no increased effort, breath sounds clear  HEART:  Regular rhythm, S1, S2, no murmur/rub/S3/S4, no abdominal bruit, no edema  ABDOMEN:  Soft, non-tender, non-distended, normoactive bowel sounds,  no masses ,no hepato-splenomegaly, no signs of chronic liver disease  EXTEREMITIES:  no cyanosis,clubbing or edema  SKIN:  No rash/erythema/ecchymoses/petechiae/wounds/abscess/warm/dry  NEURO:  Alert, oriented, no asterixis, no tremor, no encephalopathy      LABS:                        8.9    4.15  )-----------( 204      ( 2022 07:43 )             27.1         141  |  105  |  35<H>  ----------------------------<  88  3.6   |  32<H>  |  1.30    Ca    8.2<L>      2022 07:43  Phos  3.7       Mg     2.4         TPro  6.3  /  Alb  1.7<L>  /  TBili  2.8<H>  /  DBili  x   /  AST  84<H>  /  ALT  63  /  AlkPhos  302<H>      PT/INR - ( 2022 07:43 )   PT: 30.4 sec;   INR: 2.57 ratio             amylase   lipase  RADIOLOGY & ADDITIONAL TESTS:

## 2022-07-17 NOTE — PROGRESS NOTE ADULT - PROBLEM SELECTOR PLAN 10
BP wnl this AM (123/64)  - continue Toprol 50mg qD w/ hold parameters  - routine hemodynamic monitoring.

## 2022-07-17 NOTE — PROGRESS NOTE ADULT - PROBLEM SELECTOR PLAN 9
LUIS CARLOS on CKD III, likely prerenal secondary to active bleeding   - Creatinine baseline 1.2, this morning___________ trended up to 1.6 from 1.5  - Avoid nephrotoxic medications   - Low salt diet w/ 1 lt fluid restriction   - Nephro Following  -Lasix 40 mg q 12 hrs PO LUIS CARLOS on CKD III, likely prerenal secondary to active bleeding   - Creatinine baseline 1.2, this morning trended down to 1.30 from 1.6   - Avoid nephrotoxic medications   - Low salt diet w/ 1 lt fluid restriction   - Nephro Following  -Lasix 40 mg q 12 hrs PO

## 2022-07-18 LAB
ALBUMIN SERPL ELPH-MCNC: 1.8 G/DL — LOW (ref 3.3–5)
ALP SERPL-CCNC: 309 U/L — HIGH (ref 40–120)
ALT FLD-CCNC: 58 U/L — SIGNIFICANT CHANGE UP (ref 12–78)
ANION GAP SERPL CALC-SCNC: 4 MMOL/L — LOW (ref 5–17)
AST SERPL-CCNC: 72 U/L — HIGH (ref 15–37)
BASOPHILS # BLD AUTO: 0.02 K/UL — SIGNIFICANT CHANGE UP (ref 0–0.2)
BASOPHILS NFR BLD AUTO: 0.4 % — SIGNIFICANT CHANGE UP (ref 0–2)
BILIRUB SERPL-MCNC: 3 MG/DL — HIGH (ref 0.2–1.2)
BUN SERPL-MCNC: 34 MG/DL — HIGH (ref 7–23)
CALCIUM SERPL-MCNC: 8 MG/DL — LOW (ref 8.5–10.1)
CHLORIDE SERPL-SCNC: 103 MMOL/L — SIGNIFICANT CHANGE UP (ref 96–108)
CO2 SERPL-SCNC: 33 MMOL/L — HIGH (ref 22–31)
CREAT SERPL-MCNC: 1.3 MG/DL — SIGNIFICANT CHANGE UP (ref 0.5–1.3)
EGFR: 38 ML/MIN/1.73M2 — LOW
EOSINOPHIL # BLD AUTO: 0.11 K/UL — SIGNIFICANT CHANGE UP (ref 0–0.5)
EOSINOPHIL NFR BLD AUTO: 2.3 % — SIGNIFICANT CHANGE UP (ref 0–6)
GLUCOSE SERPL-MCNC: 85 MG/DL — SIGNIFICANT CHANGE UP (ref 70–99)
HCT VFR BLD CALC: 30.7 % — LOW (ref 34.5–45)
HGB BLD-MCNC: 9.8 G/DL — LOW (ref 11.5–15.5)
IMM GRANULOCYTES NFR BLD AUTO: 0.2 % — SIGNIFICANT CHANGE UP (ref 0–1.5)
INR BLD: 2.13 RATIO — HIGH (ref 0.88–1.16)
LYMPHOCYTES # BLD AUTO: 1.86 K/UL — SIGNIFICANT CHANGE UP (ref 1–3.3)
LYMPHOCYTES # BLD AUTO: 39.1 % — SIGNIFICANT CHANGE UP (ref 13–44)
MAGNESIUM SERPL-MCNC: 2.3 MG/DL — SIGNIFICANT CHANGE UP (ref 1.6–2.6)
MCHC RBC-ENTMCNC: 28.9 PG — SIGNIFICANT CHANGE UP (ref 27–34)
MCHC RBC-ENTMCNC: 31.9 GM/DL — LOW (ref 32–36)
MCV RBC AUTO: 90.6 FL — SIGNIFICANT CHANGE UP (ref 80–100)
MONOCYTES # BLD AUTO: 0.65 K/UL — SIGNIFICANT CHANGE UP (ref 0–0.9)
MONOCYTES NFR BLD AUTO: 13.7 % — SIGNIFICANT CHANGE UP (ref 2–14)
NEUTROPHILS # BLD AUTO: 2.11 K/UL — SIGNIFICANT CHANGE UP (ref 1.8–7.4)
NEUTROPHILS NFR BLD AUTO: 44.3 % — SIGNIFICANT CHANGE UP (ref 43–77)
NRBC # BLD: 0 /100 WBCS — SIGNIFICANT CHANGE UP (ref 0–0)
PHOSPHATE SERPL-MCNC: 3.7 MG/DL — SIGNIFICANT CHANGE UP (ref 2.5–4.5)
PLATELET # BLD AUTO: 222 K/UL — SIGNIFICANT CHANGE UP (ref 150–400)
POTASSIUM SERPL-MCNC: 3.6 MMOL/L — SIGNIFICANT CHANGE UP (ref 3.5–5.3)
POTASSIUM SERPL-SCNC: 3.6 MMOL/L — SIGNIFICANT CHANGE UP (ref 3.5–5.3)
PROT SERPL-MCNC: 6.8 G/DL — SIGNIFICANT CHANGE UP (ref 6–8.3)
PROTHROM AB SERPL-ACNC: 25.1 SEC — HIGH (ref 10.5–13.4)
RBC # BLD: 3.39 M/UL — LOW (ref 3.8–5.2)
RBC # FLD: 20.8 % — HIGH (ref 10.3–14.5)
SODIUM SERPL-SCNC: 140 MMOL/L — SIGNIFICANT CHANGE UP (ref 135–145)
WBC # BLD: 4.76 K/UL — SIGNIFICANT CHANGE UP (ref 3.8–10.5)
WBC # FLD AUTO: 4.76 K/UL — SIGNIFICANT CHANGE UP (ref 3.8–10.5)

## 2022-07-18 PROCEDURE — 99232 SBSQ HOSP IP/OBS MODERATE 35: CPT

## 2022-07-18 RX ORDER — BACITRACIN ZINC 500 UNIT/G
1 OINTMENT IN PACKET (EA) TOPICAL
Refills: 0 | Status: DISCONTINUED | OUTPATIENT
Start: 2022-07-18 | End: 2022-07-20

## 2022-07-18 RX ORDER — WARFARIN SODIUM 2.5 MG/1
3 TABLET ORAL ONCE
Refills: 0 | Status: COMPLETED | OUTPATIENT
Start: 2022-07-18 | End: 2022-07-18

## 2022-07-18 RX ORDER — POTASSIUM CHLORIDE 20 MEQ
40 PACKET (EA) ORAL ONCE
Refills: 0 | Status: COMPLETED | OUTPATIENT
Start: 2022-07-18 | End: 2022-07-18

## 2022-07-18 RX ORDER — IPRATROPIUM/ALBUTEROL SULFATE 18-103MCG
3 AEROSOL WITH ADAPTER (GRAM) INHALATION EVERY 8 HOURS
Refills: 0 | Status: DISCONTINUED | OUTPATIENT
Start: 2022-07-18 | End: 2022-07-20

## 2022-07-18 RX ADMIN — Medication 3 MILLILITER(S): at 14:38

## 2022-07-18 RX ADMIN — Medication 1 APPLICATION(S): at 22:39

## 2022-07-18 RX ADMIN — Medication 40 MILLIGRAM(S): at 05:36

## 2022-07-18 RX ADMIN — Medication 75 MICROGRAM(S): at 05:36

## 2022-07-18 RX ADMIN — Medication 3 MILLILITER(S): at 19:41

## 2022-07-18 RX ADMIN — WARFARIN SODIUM 3 MILLIGRAM(S): 2.5 TABLET ORAL at 22:37

## 2022-07-18 RX ADMIN — Medication 650 MILLIGRAM(S): at 05:38

## 2022-07-18 RX ADMIN — Medication 3 MILLIGRAM(S): at 22:37

## 2022-07-18 RX ADMIN — Medication 40 MILLIEQUIVALENT(S): at 11:39

## 2022-07-18 RX ADMIN — Medication 81 MILLIGRAM(S): at 11:39

## 2022-07-18 RX ADMIN — Medication 50 MILLIGRAM(S): at 05:36

## 2022-07-18 RX ADMIN — Medication 650 MILLIGRAM(S): at 02:03

## 2022-07-18 RX ADMIN — Medication 40 MILLIGRAM(S): at 14:56

## 2022-07-18 RX ADMIN — CALAMINE AND ZINC OXIDE AND PHENOL 1 APPLICATION(S): 160; 10 LOTION TOPICAL at 11:39

## 2022-07-18 NOTE — PROGRESS NOTE ADULT - PROBLEM SELECTOR PLAN 5
chronic, stable - rate-controlled   - INR 2.13 this AM  - on coumadin, holding for Acute Anemia  - Toprol  XL 50mg PO qd w/ hold parameters  - cardio Dr. charles group following chronic, stable - rate-controlled   - INR 2.13 this AM  - restarting coumadin today   - Toprol  XL 50mg PO qd w/ hold parameters  - cardio Dr. charles group following

## 2022-07-18 NOTE — PROGRESS NOTE ADULT - PROBLEM SELECTOR PLAN 10
BP wnl this AM (116/64)  - continue Toprol 50mg qD w/ hold parameters  - routine hemodynamic monitoring.

## 2022-07-18 NOTE — PROGRESS NOTE ADULT - ASSESSMENT
89 y/o F with pmhx PAF (on coumadin), HFrEF (35-40%), CKD, HTN, aortic stenosis, TR, chronic LE edema, hypothyroidism, with recent admission to [facility] for CAD in mid June, found to have NSTEMI with subsequent flash pulmonary edema, unable to go for cath due to volume overload, discharged to [facility], now presenting s/p mechanical unwitnessed fall at [facility]. Found to have R hip hematoma, Coumadin, asa, plavix being held due to hg 6.9 in setting of acute blood loss anemia.     CAD with recent NSTEMI (mid June), unable to undergo cath at that time  - EKG:  Afib with LAD, LBBB, no acute ischemic changes  - No anginal complaints to date  - Continue ASA.  No need to resume Plavix  - Continue to hold statin d/t transaminitis.  Resume when able    - BP, HR controlled and stable   - Continue BB  - Continue to hold AC for now in setting of ABLA 2/2 acute R hip hematoma (d/t triple therapy)   - INR remains therapeutic at 2.13.  Resume Coumadin once INR 2.  Risk vs benefits need to be addressed  - Monitor and replete lytes, keep K>4, Mg>2.    - TTE 6/2022 with EF 35-40% with mod AS, mod TR  - Remains euvolemic on exam.  Titrate off NC as tolerated  - Continue Lasix  - Monitor volume status closely.  Maintain strict I/O's    - Will continue to follow.    Melany Howe, AMELIA  Nurse Practitioner - Cardiology   Spectra #[phone] 89 y/o F with pmhx PAF (on coumadin), HFrEF (35-40%), CKD, HTN, aortic stenosis, TR, chronic LE edema, hypothyroidism, with recent admission to [facility] for [facility] in mid June, found to have NSTEMI with subsequent flash pulmonary edema, unable to go for cath due to volume overload, discharged to [facility], now presenting s/p mechanical unwitnessed fall at [facility]. Found to have R hip hematoma, Coumadin, asa, plavix being held due to hg 6.9 in setting of acute blood loss anemia.     CAD with recent NSTEMI (mid June), unable to undergo cath at that time  - EKG:  Afib with LAD, LBBB, no acute ischemic changes  - No anginal complaints to date  - Continue ASA.  No need to resume Plavix  - Continue to hold statin d/t transaminitis.  Resume when able  - Monitor and replete lytes, keep K>4, Mg>2.    - BP, HR controlled and stable   - Continue BB  - resume daily dose coumadin for goal INR 2-3 when able, INR 2.13 today, Risk vs benefits need to be addressed    - TTE 6/2022 with EF 35-40% with mod AS, mod TR  - Remains euvolemic on exam.  Titrate off NC as tolerated  - Continue Lasix  - Monitor volume status closely.  Maintain strict I/O's    - Will continue to follow.    Melany Howe, [facility]  Nurse Practitioner - Cardiology   Spectra #[phone] 89 y/o F with pmhx PAF (on coumadin), HFrEF (35-40%), CKD, HTN, aortic stenosis, TR, chronic LE edema, hypothyroidism, with recent admission to [facility] for [facility] in mid June, found to have NSTEMI with subsequent flash pulmonary edema, unable to go for cath due to volume overload, discharged to [facility], now presenting s/p mechanical unwitnessed fall at [facility]. Found to have R hip hematoma, Coumadin, asa, plavix being held due to hg 6.9 in setting of acute blood loss anemia.     CAD with recent NSTEMI (mid June), unable to undergo cath at that time  - EKG:  Afib with LAD, LBBB, no acute ischemic changes  - No anginal complaints to date  - Continue ASA.  No need to resume Plavix  - Continue to hold statin d/t transaminitis.  Resume when able  - Monitor and replete lytes, keep K>4, Mg>2.    - BP, HR controlled and stable   - Continue BB  - resume daily dose coumadin for goal INR 2-3 when able, INR 2.13 today, Risk vs benefits need to be addressed    - TTE 6/2022 with EF 35-40% with mod AS, mod TR  - mild overload on exam.  Titrate off NC as tolerated  - Continue Lasix  - Monitor volume status closely.  Maintain strict I/O's    - Will continue to follow.    Melany Howe, [facility]  Nurse Practitioner - Cardiology   Spectra #[phone]

## 2022-07-18 NOTE — PROGRESS NOTE ADULT - PROBLEM SELECTOR PLAN 2
Right hip hematoma s/p mechanical fall at [facility], CT right hip shows subcutaneous hematoma along the lateral aspect of the right hip, no acute displaced fracture or dislocation within the pelvis.  -- no need for coumadin reversal agents for now as d/w Dr Lundy -Hematology  - 3 u pRBC given in total   - cool compresses prn  - calamine lotion for itch  -HOLD Plavix, HOLD AC- Started ASA (7/16/22)

## 2022-07-18 NOTE — PROGRESS NOTE ADULT - PROBLEM SELECTOR PLAN 9
LUIS CARLOS on CKD III, likely prerenal secondary to active bleeding   - Creatinine baseline 1.2, this morning trended down to 1.30 from 1.6   - Avoid nephrotoxic medications   - Low salt diet w/ 1 lt fluid restriction   - Nephro Following  -Lasix 40 mg q 12 hrs PO

## 2022-07-18 NOTE — PROGRESS NOTE ADULT - PROBLEM SELECTOR PLAN 3
On last TTE showed EF 35-40%,-Chronic Systolic CHF  - On Toprol 50 mg with holding parameters   - Lasix  PO 40 mg BID   - maintain net negative balance; strict I/Os, daily weight  - titrate off NC   - Cardio Gauri group following

## 2022-07-18 NOTE — PROGRESS NOTE ADULT - SUBJECTIVE AND OBJECTIVE BOX
[facility] -- Reid Astorga, Norah, Raheme, Flex Muniz Savella, Goodger: Office # [phone]    Follow Up:   S/P Fall, HFrEF, Afib    Subjective/Observations: Patient seen and examined, awake, alert, resting comfortably in bed, denies chest pain, dyspnea, palpitations or dizziness, c/o right hip pain,  Tolerating O2 via NC     REVIEW OF SYSTEMS: All review of systems is negative for eye, ENT, GI, , allergic, dermatologic, musculoskeletal and neurologic except as described above    PAST MEDICAL & SURGICAL HISTORY:  Hypothyroid      Hypertension      Lower extremity edema      Paroxysmal atrial fibrillation      NSTEMI (non-ST elevation myocardial infarction)  june 2022      Chronic systolic congestive heart failure      Stage 3 chronic kidney disease      Anemia of chronic disease      No significant past surgical history          MEDICATIONS  (STANDING):  albuterol/ipratropium for Nebulization 3 milliLiter(s) Nebulizer every 8 hours  aspirin enteric coated 81 milliGRAM(s) Oral daily  calamine/zinc oxide Lotion 1 Application(s) Topical daily  furosemide    Tablet 40 milliGRAM(s) Oral two times a day  levothyroxine 75 MICROGram(s) Oral daily  metoprolol succinate ER 50 milliGRAM(s) Oral daily  potassium chloride   Powder 40 milliEquivalent(s) Oral once  warfarin 3 milliGRAM(s) Oral once    MEDICATIONS  (PRN):  acetaminophen     Tablet .. 650 milliGRAM(s) Oral every 6 hours PRN Mild Pain (1 - 3)  aluminum hydroxide/magnesium hydroxide/simethicone Suspension 30 milliLiter(s) Oral every 4 hours PRN Dyspepsia  melatonin 3 milliGRAM(s) Oral at bedtime PRN Insomnia  ondansetron Injectable 4 milliGRAM(s) IV Push every 8 hours PRN Nausea and/or Vomiting  sodium chloride 0.65% Nasal 1 Spray(s) Both Nostrils five times a day PRN Nasal Congestion    Allergies    No Known Allergies    Intolerances      Vital Signs Last 24 Hrs  T(C): 36.4 (18 Jul 2022 05:33), Max: 36.8 (17 Jul 2022 12:53)  T(F): 97.6 (18 Jul 2022 05:33), Max: 98.2 (17 Jul 2022 12:53)  HR: 64 (18 Jul 2022 05:33) (64 - 71)  BP: 116/64 (18 Jul 2022 05:33) (116/64 - 133/65)  BP(mean): --  RR: 18 (18 Jul 2022 05:33) (16 - 18)  SpO2: 96% (18 Jul 2022 05:33) (96% - 97%)    Parameters below as of 18 Jul 2022 05:33  Patient On (Oxygen Delivery Method): nasal cannula      I&O's Summary        TELE: Not on telemetry   PHYSICAL EXAM:  Constitutional: NAD, awake and alert, obese  HEENT: Moist Mucous Membranes, Anicteric  Pulmonary: Non-labored, breath sounds are clear bilaterally, No wheezing, rales or rhonchi  Cardiovascular: irregular, S1 and S2, + murmurs, rubs, gallops or clicks  Gastrointestinal: Bowel Sounds present, soft, nontender.   Lymph: No peripheral edema. No lymphadenopathy.  Skin: No visible rashes or ulcers. R hip ecchymosis   Psych:  Mood & affect appropriate for situation    LABS: All Labs Reviewed:                        9.8    4.76  )-----------( 222      ( 18 Jul 2022 06:18 )             30.7                         8.9    4.15  )-----------( 204      ( 17 Jul 2022 07:43 )             27.1                         9.1    4.49  )-----------( 211      ( 16 Jul 2022 07:05 )             26.8     18 Jul 2022 06:18    140    |  103    |  34     ----------------------------<  85     3.6     |  33     |  1.30   17 Jul 2022 07:43    141    |  105    |  35     ----------------------------<  88     3.6     |  32     |  1.30   16 Jul 2022 07:05    140    |  105    |  36     ----------------------------<  83     3.5     |  31     |  1.60     Ca    8.0        18 Jul 2022 06:18  Ca    8.2        17 Jul 2022 07:43  Ca    8.1        16 Jul 2022 07:05  Phos  3.7       18 Jul 2022 06:18  Phos  3.7       17 Jul 2022 07:43  Phos  3.6       16 Jul 2022 07:05  Mg     2.3       18 Jul 2022 06:18  Mg     2.4       17 Jul 2022 07:43  Mg     2.4       16 Jul 2022 07:05    TPro  6.8    /  Alb  1.8    /  TBili  3.0    /  DBili  x      /  AST  72     /  ALT  58     /  AlkPhos  309    18 Jul 2022 06:18  TPro  6.3    /  Alb  1.7    /  TBili  2.8    /  DBili  x      /  AST  84     /  ALT  63     /  AlkPhos  302    17 Jul 2022 07:43  TPro  6.4    /  Alb  1.9    /  TBili  3.2    /  DBili  x      /  AST  120    /  ALT  78     /  AlkPhos  337    16 Jul 2022 07:05    PT/INR - ( 18 Jul 2022 06:18 )   PT: 25.1 sec;   INR: 2.13 ratio                     Cholesterol, Serum: 144 mg/dL (06-18-22 @ 10:09)  HDL Cholesterol, Serum: 44 mg/dL (06-18-22 @ 10:09)  Triglycerides, Serum: 65 mg/dL (06-18-22 @ 10:09)      12 Lead ECG:   Ventricular Rate 68 BPM    QRS Duration 150 ms    Q-T Interval 490 ms    QTC Calculation(Bazett) 521 ms    R Axis -35 degrees    T Axis 206 degrees    Diagnosis Line Atrial fibrillation  Left axis deviation  Left bundle branch block  Confirmed by KEVIN MUNIZ ([id number]) on 7/14/2022 11:34:42 AM (07-14-22 @ 08:32)    < from: Xray Chest 1 View AP/PA (07.14.22 @ 00:29) >    ACC: [id number] EXAM:  XR ELBOW COMP MIN 3V RT                        ACC: [id number] EXAM:  XR HIP WITH PELV 2-3V RT                        ACC: [id number] EXAM:  XR CHEST AP OR PA 1V                          PROCEDURE DATE:  07/14/2022          INTERPRETATION:  Chest, right elbow, and right hip with pelvis. Patient   had a fall.    AP chest on July 14, 2022 at 12:31 AM.    Heart likely enlarged. Elevated right hemidiaphragm again noted.    Mild basilar fluid again noted.    No fracture.    Chest similar to June 25 of this year.    Right elbow. 3 views. No effusion. Bones intact.    Right hip with pelvis. 3 views.    Mild symmetric hip degeneration.    Heart appears to be a slight vertebroplasty density in a partially   collapsed L4 body noted.    No acute fracture.    IMPRESSION: No acute bony finding. Persistent mild basilar effusions.    --- End of Report ---            RUSSELL MONTES MD; Attending Radiologist  This document has been electronically signed. Jul 14 2022  1:59PM    < end of copied text >  < from: TTE Echo Complete w/o Contrast w/ Doppler (06.19.22 @ 10:00) >    ACC: [id number] EXAM:  ECHO TTE WO CON COMP W DOPP                          PROCEDURE DATE:  06/19/2022          INTERPRETATION:  INDICATION: Chest pain  Sonographer LK    Blood Pressure 147/84    Height 165.1 cm     Weight 72.6 kg       BSA 1.8   sqm    Dimensions:  LA 3.8       Normal Values: 2.0 - 4.0 cm  Ao 3.2        Normal Values: 2.0 - 3.8 cm  SEPTUM 1.7       Normal Values: 0.6 - 1.2 cm  PWT 1.2       Normal Values: 0.6 - 1.1 cm  LVIDd 4.8         Normal Values: 3.0 - 5.6 cm  LVIDs 3.2      Normal Values: 1.8 - 4.0 cm      OBSERVATIONS:  Mitral Valve: Mitral annular calcification with thickened leaflets, mild   MR.  Aortic Valve/Aorta: Calcified trileaflet aortic valve with decreased   opening. Peak transaortic valve gradient equals 20.4 mmHg with a mean   transaortic valve gradient 13.2 mmHg. By visual estimation there appears   to be mild to moderate aortic stenosis  Tricuspid Valve: Moderate TR.  Pulmonic Valve: Trace PI  Left Atrium: Enlarged  Right Atrium: Enlarged  Left Ventricle: Mild to moderate segmental left ventricular systolic   dysfunction, estimated LVEF of 35-40%. The apex, mid inferoseptal and mid   anterolateral walls appear severely hypokinetic  Right Ventricle: Right ventricular enlargement with grossly normal   function  Pericardium: no significant pericardial effusion.  Pulmonary/RV Pressure: estimated PA systolic pressure of 36 mmHg  IVC measures 1.47 cm          IMPRESSION:  Mild to moderate segmental left ventricular systolic dysfunction,   estimated LVEF of 35-40%. The apex, mid inferoseptal and mid   anterolateral walls appear severely hypokinetic  Right ventricular enlargement with grossly normal function  Biatrial enlargement  Calcified trileaflet aortic valve with mild to moderate aortic stenosis,   without AI.  Mild MR  Moderate TR.  No significant pericardial effusion.    --- End of Report ---            JEAN CARLOS HENRY MD; Attending Cardiologist  This document has been electronically signed. Jun 20 2022 12:56PM    < end of copied text >

## 2022-07-18 NOTE — PROGRESS NOTE ADULT - ASSESSMENT
89 yo woman with multifactorial anemia due to chronic disease including renal disease complicated by a fall on 7/14/22 on coumadin with Ct scan of right hip showing a hematoma.  INR mildly elevated 3.28. Patient was also on aspirin and plavix due to recent NSTEMI    RECOMMENDATION  - initially aspirin/plavix and coumadin held  - supportive transfusions as indicated  - noted on 7/16/22 to have stable H/H and aspirin resumed;   - blood indices today stable and agree to resume warfarin; would be cautious with elevated LFTs and potential for rapid rise in INR  - per cardiology recommendations plavix will be d/c'd;    - follow CBC  - will discuss with Dr. Keagan Villegas (hospitalist)

## 2022-07-18 NOTE — PROGRESS NOTE ADULT - SUBJECTIVE AND OBJECTIVE BOX
Patient is a 89y old  Female who presents with a chief complaint of Fall, with Anemia (18 Jul 2022 08:06)    HPI:  89 y/o F with a pmhx b/l LE edema, HTN, chronic Afib (on coumadin), HFref (EF35-40%) and hypothyroidism presents to the ED s/p fall. Patient was admitted to [facility] 6/17-6/30/22 for NSTEMI, hospital course c/b iatrogenic flash pulmonary edema, cath canceled due to vol overload, discharged to [facility]. She was doing well at [facility] until today. She went to bathroom for BM, had difficulty to clean herself. She was left unattended by [facility] staff in bathroom, she stood up to take few steps, lost balance, and fell on right side striking her right hip, right arm, right elbow, right side of neck. She denies any prodromal symptoms. No lightheadedness, chest pain, warm sensation. No dyspnea. Patient never lost consciousness. She was found to have Hg 6.9 at [facility] facility, sent to [facility] ED for further evaluation. She denies any significant pain right now. Only complains of right thigh twitching, swelling.  No fevers, chills, cp, dypsnea, abdominal pain. Admits lower ext swelling which is chronic for her.     In the ED  VS: T97.3 Hr78 /63 RR 16 SPO2 95% on 3L NC  Labs: WBC 4.43, HH 7.6/22.1, PLTS 225, Na 140, K 4.2, Cr 1.7, Gluc 112, Alk phos 326, AST//99  Chest Xray: lungs clear  Head CT: Stable exam. No acute intracranial hemorrhage, vasogenic edema, extra-axial collection or calvarial fracture.  Cervical spine CT: No acute cervical spine fracture or evidence of traumatic malalignment. Cervical spondylosis  CT A/P non con: Extensive diffuse soft tissue edema/anasarca. Small right, trace of bilateral pleural fluid. Small-to-moderate amount of simple free fluid around the liver and spleen. No definite intraperitoneal hemorrhage.No noncontrast evidence of acute injury to the chest, abdomen or pelvis.Concern for hematoma around the right hip soft tissues, partially visualized. No evidence of hip fracture.Mild intralobular septal thickening, may reflect pulmonary edema. Areas of atelectatic changes and possible airways impaction  EKG: ordered, pending  Given in ED: 1 unit prbc   (14 Jul 2022 04:06)    INTERVAL HPI:  7/14: Patient seen and examined at bedside. Is feeling okay. Has no acute complaints but is feeling slightly agitated. Denies sob, cp, n/v/d. Total 2 u pRBC total , IV Lasix   7/15 Pt seen and examined at bedside. Soft BP this AM, given 1 pRBC this AM, on Lasix 40 BID.   7/16: Patient seen and assessed at bedside. Complaining of cramping in her bilateral feet otherwise feeling better. Still feels pain on her right side but is controlled with Tylenol PRN. Denies sob on 3L NC, chest pain, nausea, vomiting, diarrhea. H/H improving s/p 3PRBC. B/L LE edema improving.  7/17: Patient seen and examined at bedside. Complaining of itching over her right sided hematoma and still experiencing cramping in her feet. Denies sob, chest pain, nausea, vomiting, diarrhea.  H/H improving s/p 3PRBC. B/L LE edema improving.  7/18: Patient seen and examined at bedside. Complained of itching over her right sided hematoma. She had pain in her right sided hematoma and back that was relieved with ice packs overnight. Denies sob, chest pain, nausea, vomitting, diarrhea. H/H continues to immprove. B/L edema present.     OVERNIGHT EVENTS: experienced pain on her right hip and back that was relieved with ice packs    Home Medications:  aspirin 81 mg oral delayed release tablet: 1 tab(s) orally once a day (14 Jul 2022 04:17)  atorvastatin 20 mg oral tablet: 1 tab(s) orally once a day (14 Jul 2022 04:17)  clopidogrel 75 mg oral tablet: 1 tab(s) orally once a day (14 Jul 2022 04:17)  fluticasone 50 mcg/inh nasal spray: 1 spray(s) nasal 2 times a day (14 Jul 2022 04:17)  Lasix 40 mg oral tablet: 1 tab(s) orally 2 times a day (14 Jul 2022 04:17)  metoprolol succinate 50 mg oral tablet, extended release: 1 tab(s) orally once a day (14 Jul 2022 04:17)  sodium chloride 0.65% nasal spray: 2 spray(s) nasal 4 times a day (14 Jul 2022 04:17)  Synthroid 75 mcg (0.075 mg) oral tablet: 1 tab(s) orally once a day (14 Jul 2022 04:17)  warfarin 3 mg oral tablet: DO NOT Give Coumadin Today as INR 3.11, Check PT/INR on 7/1/22.Adjust Coumadin dose as per INR. start with Coumadin 2 mg   Keep INR 2-3    Pt&#x27;s HOME Coumadin schedule is as below  1 tab(s) orally once a day (M, Tu, Th, F, Sa Su) at Bed time   -Pt use to Take Coumadin 4 mg on every Wednesday (14 Jul 2022 04:17)  zinc oxide 40% topical ointment: Apply topically to affected area 2 times a day (14 Jul 2022 04:17)      MEDICATIONS  (STANDING):  albuterol/ipratropium for Nebulization 3 milliLiter(s) Nebulizer every 8 hours  aspirin enteric coated 81 milliGRAM(s) Oral daily  calamine/zinc oxide Lotion 1 Application(s) Topical daily  furosemide    Tablet 40 milliGRAM(s) Oral two times a day  levothyroxine 75 MICROGram(s) Oral daily  metoprolol succinate ER 50 milliGRAM(s) Oral daily  potassium chloride   Powder 40 milliEquivalent(s) Oral once  warfarin 3 milliGRAM(s) Oral once    MEDICATIONS  (PRN):  acetaminophen     Tablet .. 650 milliGRAM(s) Oral every 6 hours PRN Mild Pain (1 - 3)  aluminum hydroxide/magnesium hydroxide/simethicone Suspension 30 milliLiter(s) Oral every 4 hours PRN Dyspepsia  melatonin 3 milliGRAM(s) Oral at bedtime PRN Insomnia  ondansetron Injectable 4 milliGRAM(s) IV Push every 8 hours PRN Nausea and/or Vomiting  sodium chloride 0.65% Nasal 1 Spray(s) Both Nostrils five times a day PRN Nasal Congestion      Allergies    No Known Allergies    Intolerances        Social History:  Lived at home alone. Former smoker. Denies etoh and other rec drug use. Ambulates with walker. Daughter is HCP. DNR/DNI, came from [facility] now (14 Jul 2022 04:06)      REVIEW OF SYSTEMS:  CONSTITUTIONAL: No fever, No chills, No fatigue, No myalgia, right hip and back Body ache, No Weakness  EYES: No eye pain,  No visual disturbances, No discharge, NO Redness  ENMT:  No ear pain, No nose bleed, No vertigo; No sinus pain, NO throat pain, No Congestion  NECK: No pain, No stiffness  RESPIRATORY: No cough, NO wheezing, No  hemoptysis, NO  shortness of breath  CARDIOVASCULAR: No chest pain, palpitations  GASTROINTESTINAL: No abdominal pain, NO epigastric pain. No nausea, No vomiting; No diarrhea, No constipation. [X] BM  GENITOURINARY: No dysuria, No frequency, No urgency, No hematuria, NO incontinence  NEUROLOGICAL: No headaches, No dizziness, No numbness, No tingling, No tremors, No weakness  EXT: No Swelling, No Pain, No Edema  SKIN:  [X ]  itching on right hematoma, burning, rashes, or lesions   MUSCULOSKELETAL: No joint pain ,No Jt swelling; No muscle pain, No back pain, No extremity pain  PSYCHIATRIC: No depression,  No anxiety,  No mood swings ,No difficulty sleeping at night  PAIN SCALE: [ X ] None at the moment, had some pain last night releived with ice packs  [  ] Other-  ROS Unable to obtain due to - [  ] Dementia  [  ] Lethargy [  ] Drowsy [  ] Sedated [  ] non verbal  REST OF REVIEW Of SYSTEM - [ X ] Normal     Vital Signs Last 24 Hrs  T(C): 36.4 (18 Jul 2022 05:33), Max: 36.8 (17 Jul 2022 12:53)  T(F): 97.6 (18 Jul 2022 05:33), Max: 98.2 (17 Jul 2022 12:53)  HR: 64 (18 Jul 2022 05:33) (64 - 71)  BP: 116/64 (18 Jul 2022 05:33) (116/64 - 133/65)  BP(mean): --  RR: 18 (18 Jul 2022 05:33) (16 - 18)  SpO2: 96% (18 Jul 2022 05:33) (96% - 97%)    Parameters below as of 18 Jul 2022 05:33  Patient On (Oxygen Delivery Method): nasal cannula      Finger Stick          PHYSICAL EXAM:  GENERAL:  [x  ] NAD , [x] well appearing, [  ] Agitated, [  ] Drowsy,  [  ] Lethargy, [  ] confused   HEAD:  [  x] Normal, [  ] Other  EYES:  [ x ] EOMI, [  ] PERRLA, [ x ] conjunctiva and sclera clear normal, [  ] Other,  [  ] Pallor,[  ] Discharge  ENMT:  [x  ] Normal, [x  ] Moist mucous membranes, [  ] Good dentition, [x  ] No Thrush  NECK:  [ x ] Supple, [  x] + JVD, [x  ] Normal thyroid, [  ] Lymphadenopathy [  ] Other  CHEST/LUNG:  [ x ] Clear to auscultation bilaterally, [ x ] Breath Sounds equal B/L / , [  ] poor effort  [ x ] No rales, [ x ] No rhonchi  [  ]  Mild wheezing,   HEART:  [x ] Regular rate and rhythm, [  ] tachycardia, [  ] Bradycardia,  [  ] irregular  [x  ] + murmurs, No rubs, No gallops, [  ] PPM in place (Mfr:  ) [x] anasarca -IMPROVING  ABDOMEN:  [x  ] Soft, [ x ] Nontender, [ x] Nondistended, [ x ] No mass, [ x ] Bowel sounds present, [ x ] obese  NERVOUS SYSTEM:  [ x ] Alert & Oriented X3, [x  ] Nonfocal  [  ] Confusion  [  ] Encephalopathic [  ] Sedated [  ] Unable to assess, [  ] Dementia [  ] Other-  EXTREMITIES: [x  ] 2+ Peripheral Pulses, No clubbing, No cyanosis,  [ x ]  1+ edema B/L lower EXT. [ x ] PVD stasis skin changes B/L Lower EXT, erythema B/L Lower ext , small blister- Improved   [  ] wound  LYMPH: No lymphadenopathy noted  SKIN:  [x  ] large +R hematoma hip & Thigh , bruising over right elbow, right leg 2/2 fall, [  ] Pressure Ulcers, [ x ] ecchymosis, [  ] Skin Tears, [  ] Other      LABS:                        9.8    4.76  )-----------( 222      ( 18 Jul 2022 06:18 )             30.7     18 Jul 2022 06:18    140    |  103    |  34     ----------------------------<  85     3.6     |  33     |  1.30     Ca    8.0        18 Jul 2022 06:18  Phos  3.7       18 Jul 2022 06:18  Mg     2.3       18 Jul 2022 06:18    TPro  6.8    /  Alb  1.8    /  TBili  3.0    /  DBili  x      /  AST  72     /  ALT  58     /  AlkPhos  309    18 Jul 2022 06:18    PT/INR - ( 18 Jul 2022 06:18 )   PT: 25.1 sec;   INR: 2.13 ratio                                  Anemia Panel:      Thyroid Panel:  Thyroid Stimulating Hormone, Serum: 2.14 uIU/mL (07-14-22 @ 08:40)                RADIOLOGY & ADDITIONAL TESTS:      HEALTH ISSUES - PROBLEM Dx:  Fall    Anemia due to acute blood loss    Hematoma    Transaminitis    Afib    Lower extremity edema    Acute kidney injury superimposed on CKD    HTN (hypertension)    Hypothyroidism    HFrEF (heart failure with reduced ejection fraction)    Need for prophylactic measure    CAD (coronary artery disease)            Consultant(s) Notes Reviewed:  [X ] YES     Care Discussed with [X] Consultants  [ X] Patient  [X ] Family [  ] HCP [  ]   [  ] Social Service  [  ] RN, [  ] Physical Therapy,[  ] Palliative care team  DVT PPX: [  ] Lovenox, [  ] S C Heparin, [  ] Coumadin, [  ] Xarelto, [  ] Eliquis, [  ] Pradaxa, [  ] IV Heparin drip, [X ] SCD [  ] Contraindication 2 to GI Bleed,[  ] Ambulation [  ] Contraindicated 2 to  bleed [  ] Contraindicated 2 to Brain Bleed  Advanced directive: [  ] None, [x] DNR/DNI Patient is a 89y old  Female who presents with a chief complaint of Fall, with Anemia (18 Jul 2022 08:06)    HPI:  89 y/o F with a pmhx b/l LE edema, HTN, chronic Afib (on coumadin), HFref (EF35-40%) and hypothyroidism presents to the ED s/p fall. Patient was admitted to [facility] 6/17-6/30/22 for NSTEMI, hospital course c/b iatrogenic flash pulmonary edema, cath canceled due to vol overload, discharged to [facility]. She was doing well at [facility] until today. She went to bathroom for BM, had difficulty to clean herself. She was left unattended by [facility] staff in bathroom, she stood up to take few steps, lost balance, and fell on right side striking her right hip, right arm, right elbow, right side of neck. She denies any prodromal symptoms. No lightheadedness, chest pain, warm sensation. No dyspnea. Patient never lost consciousness. She was found to have Hg 6.9 at [facility] facility, sent to [facility] ED for further evaluation. She denies any significant pain right now. Only complains of right thigh twitching, swelling.  No fevers, chills, cp, dypsnea, abdominal pain. Admits lower ext swelling which is chronic for her.     In the ED  VS: T97.3 Hr78 /63 RR 16 SPO2 95% on 3L NC  Labs: WBC 4.43, HH 7.6/22.1, PLTS 225, Na 140, K 4.2, Cr 1.7, Gluc 112, Alk phos 326, AST//99  Chest Xray: lungs clear  Head CT: Stable exam. No acute intracranial hemorrhage, vasogenic edema, extra-axial collection or calvarial fracture.  Cervical spine CT: No acute cervical spine fracture or evidence of traumatic malalignment. Cervical spondylosis  CT A/P non con: Extensive diffuse soft tissue edema/anasarca. Small right, trace of bilateral pleural fluid. Small-to-moderate amount of simple free fluid around the liver and spleen. No definite intraperitoneal hemorrhage.No noncontrast evidence of acute injury to the chest, abdomen or pelvis.Concern for hematoma around the right hip soft tissues, partially visualized. No evidence of hip fracture.Mild intralobular septal thickening, may reflect pulmonary edema. Areas of atelectatic changes and possible airways impaction  EKG: ordered, pending  Given in ED: 1 unit prbc   (14 Jul 2022 04:06)    INTERVAL HPI:  7/14: Patient seen and examined at bedside. Is feeling okay. Has no acute complaints but is feeling slightly agitated. Denies sob, cp, n/v/d. Total 2 u pRBC total , IV Lasix   7/15 Pt seen and examined at bedside. Soft BP this AM, given 1 pRBC this AM, on Lasix 40 BID.   7/16: Patient seen and assessed at bedside. Complaining of cramping in her bilateral feet otherwise feeling better. Still feels pain on her right side but is controlled with Tylenol PRN. Denies sob on 3L NC, chest pain, nausea, vomiting, diarrhea. H/H improving s/p 3PRBC. B/L LE edema improving.  7/17: Patient seen and examined at bedside. Complaining of itching over her right sided hematoma and still experiencing cramping in her feet. Denies sob, chest pain, nausea, vomiting, diarrhea.  H/H improving s/p 3PRBC. B/L LE edema improving.  7/18: Patient seen and examined at bedside. Complained of itching over her right sided hematoma. She had pain in her right sided hematoma and back that was relieved with ice packs overnight. Denies sob, chest pain, nausea, vomitting, diarrhea. H/H continues to immprove. B/L edema present. Start Coumadin from today , Low stable H/H     OVERNIGHT EVENTS: experienced pain on her right hip and back that was relieved with ice packs    Home Medications:  aspirin 81 mg oral delayed release tablet: 1 tab(s) orally once a day (14 Jul 2022 04:17)  atorvastatin 20 mg oral tablet: 1 tab(s) orally once a day (14 Jul 2022 04:17)  clopidogrel 75 mg oral tablet: 1 tab(s) orally once a day (14 Jul 2022 04:17)  fluticasone 50 mcg/inh nasal spray: 1 spray(s) nasal 2 times a day (14 Jul 2022 04:17)  Lasix 40 mg oral tablet: 1 tab(s) orally 2 times a day (14 Jul 2022 04:17)  metoprolol succinate 50 mg oral tablet, extended release: 1 tab(s) orally once a day (14 Jul 2022 04:17)  sodium chloride 0.65% nasal spray: 2 spray(s) nasal 4 times a day (14 Jul 2022 04:17)  Synthroid 75 mcg (0.075 mg) oral tablet: 1 tab(s) orally once a day (14 Jul 2022 04:17)  warfarin 3 mg oral tablet: DO NOT Give Coumadin Today as INR 3.11, Check PT/INR on 7/1/22.Adjust Coumadin dose as per INR. start with Coumadin 2 mg   Keep INR 2-3    Pt&#x27;s HOME Coumadin schedule is as below  1 tab(s) orally once a day (M, Tu, Th, F, Sa Su) at Bed time   -Pt use to Take Coumadin 4 mg on every Wednesday (14 Jul 2022 04:17)  zinc oxide 40% topical ointment: Apply topically to affected area 2 times a day (14 Jul 2022 04:17)      MEDICATIONS  (STANDING):  albuterol/ipratropium for Nebulization 3 milliLiter(s) Nebulizer every 8 hours  aspirin enteric coated 81 milliGRAM(s) Oral daily  calamine/zinc oxide Lotion 1 Application(s) Topical daily  furosemide    Tablet 40 milliGRAM(s) Oral two times a day  levothyroxine 75 MICROGram(s) Oral daily  metoprolol succinate ER 50 milliGRAM(s) Oral daily  potassium chloride   Powder 40 milliEquivalent(s) Oral once  warfarin 3 milliGRAM(s) Oral once    MEDICATIONS  (PRN):  acetaminophen     Tablet .. 650 milliGRAM(s) Oral every 6 hours PRN Mild Pain (1 - 3)  aluminum hydroxide/magnesium hydroxide/simethicone Suspension 30 milliLiter(s) Oral every 4 hours PRN Dyspepsia  melatonin 3 milliGRAM(s) Oral at bedtime PRN Insomnia  ondansetron Injectable 4 milliGRAM(s) IV Push every 8 hours PRN Nausea and/or Vomiting  sodium chloride 0.65% Nasal 1 Spray(s) Both Nostrils five times a day PRN Nasal Congestion      Allergies    No Known Allergies    Intolerances        Social History:  Lived at home alone. Former smoker. Denies etoh and other rec drug use. Ambulates with walker. Daughter is HCP. DNR/DNI, came from [facility] now (14 Jul 2022 04:06)      REVIEW OF SYSTEMS: i am tired  CONSTITUTIONAL: No fever, No chills, No fatigue, No myalgia, right hip and back Body ache, No Weakness  EYES: No eye pain,  No visual disturbances, No discharge, NO Redness  ENMT:  No ear pain, No nose bleed, No vertigo; No sinus pain, NO throat pain, No Congestion  NECK: No pain, No stiffness  RESPIRATORY: No cough, NO wheezing, No  hemoptysis, NO  shortness of breath  CARDIOVASCULAR: No chest pain, palpitations  GASTROINTESTINAL: No abdominal pain, NO epigastric pain. No nausea, No vomiting; No diarrhea, No constipation. [X] BM  GENITOURINARY: No dysuria, No frequency, No urgency, No hematuria, NO incontinence  NEUROLOGICAL: No headaches, No dizziness, No numbness, No tingling, No tremors, No weakness  EXT: No Swelling, No Pain, No Edema  SKIN:  [X ]  itching on right hematoma, burning, rashes, or lesions   MUSCULOSKELETAL: No joint pain ,No Jt swelling; No muscle pain, No back pain, No extremity pain  PSYCHIATRIC: No depression,  No anxiety,  No mood swings ,No difficulty sleeping at night  PAIN SCALE: [ X ] None at the moment, had some pain last night releived with ice packs  [  ] Other-  ROS Unable to obtain due to - [  ] Dementia  [  ] Lethargy [  ] Drowsy [  ] Sedated [  ] non verbal  REST OF REVIEW Of SYSTEM - [ X ] Normal     Vital Signs Last 24 Hrs  T(C): 36.4 (18 Jul 2022 05:33), Max: 36.8 (17 Jul 2022 12:53)  T(F): 97.6 (18 Jul 2022 05:33), Max: 98.2 (17 Jul 2022 12:53)  HR: 64 (18 Jul 2022 05:33) (64 - 71)  BP: 116/64 (18 Jul 2022 05:33) (116/64 - 133/65)  BP(mean): --  RR: 18 (18 Jul 2022 05:33) (16 - 18)  SpO2: 96% (18 Jul 2022 05:33) (96% - 97%)    Parameters below as of 18 Jul 2022 05:33  Patient On (Oxygen Delivery Method): nasal cannula      Finger Stick      PHYSICAL EXAM: O2 NC  GENERAL:  [x  ] NAD , [x] well appearing, [  ] Agitated, [  ] Drowsy,  [  ] Lethargy, [  ] confused   HEAD:  [  x] Normal, [  ] Other  EYES:  [ x ] EOMI, [ x ] PERRLA, [ x ] conjunctiva and sclera clear normal, [  ] Other,  [  ] Pallor,[  ] Discharge  ENMT:  [x  ] Normal, [x ] dry  mucous membranes, [ x ] Good dentition, [x  ] No Thrush  NECK:  [ x ] Supple, [  x] + JVD, [x  ] Normal thyroid, [  ] Lymphadenopathy [  ] Other  CHEST/LUNG:  [ x ] Clear to auscultation bilaterally, [ x ] Breath Sounds equal B/L / Decrease B/L  , [x  ] poor effort  [ x ] No rales, [ x ] No rhonchi  [  ]  Mild wheezing,   HEART:  [x ] Regular rate and rhythm, [  ] tachycardia, [  ] Bradycardia,  [  ] irregular  [x  ] + murmurs, No rubs, No gallops, [  ] PPM in place (Mfr:  ) [x] anasarca -IMPROVING  ABDOMEN:  [x  ] Soft, [ x ] Nontender, [ x] Nondistended, [ x ] No mass, [ x ] Bowel sounds present, [ x ] obese  NERVOUS SYSTEM:  [ x ] Alert & Oriented X3, [x  ] Nonfocal  [  ] Confusion  [  ] Encephalopathic [  ] Sedated [  ] Unable to assess, [  ] Dementia [  ] Other-  EXTREMITIES: [x  ] 2+ Peripheral Pulses, No clubbing, No cyanosis,  [ x ]  2+ edema B/L lower EXT. [ x ] PVD stasis skin changes B/L Lower EXT, erythema B/L Lower ext , small blister- Improved   [  ] wound  LYMPH: No lymphadenopathy noted  SKIN:  [x  ] large +R hematoma hip & Thigh , bruising over right elbow, right leg 2/2 fall, [  ] Pressure Ulcers, [ x ] ecchymosis, [  ] Skin Tears, [  ] Other      LABS:                        9.8    4.76  )-----------( 222      ( 18 Jul 2022 06:18 )             30.7     18 Jul 2022 06:18    140    |  103    |  34     ----------------------------<  85     3.6     |  33     |  1.30     Ca    8.0        18 Jul 2022 06:18  Phos  3.7       18 Jul 2022 06:18  Mg     2.3       18 Jul 2022 06:18    TPro  6.8    /  Alb  1.8    /  TBili  3.0    /  DBili  x      /  AST  72     /  ALT  58     /  AlkPhos  309    18 Jul 2022 06:18    PT/INR - ( 18 Jul 2022 06:18 )   PT: 25.1 sec;   INR: 2.13 ratio           Thyroid Panel:  Thyroid Stimulating Hormone, Serum: 2.14 uIU/mL (07-14-22 @ 08:40)      RADIOLOGY & ADDITIONAL TESTS:  < from: Xray Chest 1 View- PORTABLE-Routine (Xray Chest 1 View- PORTABLE-Routine .) (07.16.22 @ 10:37) >    INTERPRETATION:  Portable chest radiograph    CLINICAL INFORMATION: Dyspnea, shortness of breath.    TECHNIQUE:  Portable  AP chest radiograph.    COMPARISON: CT chest 7/14/2022 .    FINDINGS:  CATHETERS AND TUBES: None    PULMONARY: Moderate bilateral pleural effusions and/or basilar airspace   disease obscuring RIGHT diaphragm contours. Upper zones clear. No   pneumothorax.    HEART/VASCULAR: The  heart is enlarged in transverse diameter.   Atherosclerotic calcified intimal wall plaques within nondilated thoracic   aorta.     BONES: Visualized osseous structures are intact.      < end of copied text >  < from: US Duplex Venous Lower Ext Complete, Bilateral (07.14.22 @ 12:50) >    IMPRESSION:  Limited evaluation, as described above. Within the well-visualized venous   segments no evidence for DVT.      < end of copied text >        HEALTH ISSUES - PROBLEM Dx:  Fall    Anemia due to acute blood loss    Hematoma    Transaminitis    Afib    Lower extremity edema    Acute kidney injury superimposed on CKD    HTN (hypertension)    Hypothyroidism    HFrEF (heart failure with reduced ejection fraction)    Need for prophylactic measure    CAD (coronary artery disease)            Consultant(s) Notes Reviewed:  [X ] YES     Care Discussed with [X] Consultants  [ X] Patient  [X ] Family- dtr  [  ] HCP [  ]   [ x ] Social Service  [ x ] RN, [  ] Physical Therapy,[  ] Palliative care team  DVT PPX: [  ] Lovenox, [  ] S C Heparin, [ x ] Coumadin, [  ] Xarelto, [  ] Eliquis, [  ] Pradaxa, [  ] IV Heparin drip, [X ] SCD [  ] Contraindication 2 to GI Bleed,[  ] Ambulation [  ] Contraindicated 2 to  bleed [  ] Contraindicated 2 to Brain Bleed  Advanced directive: [  ] None, [x] DNR/DNI

## 2022-07-18 NOTE — PROGRESS NOTE ADULT - SUBJECTIVE AND OBJECTIVE BOX
Patient is a 89y old  Female who presents with a chief complaint of Fall, ABLA (14 Jul 2022 04:06)       HPI:  89 y/o F with a pmhx b/l LE edema, HTN, P Afib (on coumadin), HFref (EF35-40%) and hypothyroidism presents to the ED s/p fall. Patient was admitted to [facility] 6/17-6/30/22 for NSTEMI, hospital course c/b iatrogenic flash pulmonary edema, cath canceled due to vol overload, discharged to [facility]. She was doing well at [facility] until today. She went to bathroom for BM, had difficulty to clean herself. She was left unattended by [facility] staff in bathroom, she stood up to take few steps, lost balance, and fell on right side striking her right hip, right arm, right elbow, right side of neck. She denies any prodromal symptoms. No lightheadedness, chest pain, warm sensation. No dyspnea. Patient never lost consciousness. She was found to have Hg 6.9 at [facility] facility, sent to [facility] ED for further evaluation. She denies any significant pain right now. Only complains of right thigh twitching, swelling.  No fevers, chills, cp, dypsnea, abdominal pain. Admits lower ext swelling which is chronic for her.     Follow up LUIS CARLOS/CKD    PAST MEDICAL & SURGICAL HISTORY:  Hypothyroid      Hypertension      Lower extremity edema      Paroxysmal atrial fibrillation      No significant past surgical history           FAMILY HISTORY:  No pertinent family history in first degree relatives    [place]    Social History:Non smoker    MEDICATIONS  (STANDING):  furosemide    Tablet 40 milliGRAM(s) Oral every 12 hours  levothyroxine 75 MICROGram(s) Oral daily  metoprolol succinate ER 50 milliGRAM(s) Oral daily    MEDICATIONS  (PRN):  aluminum hydroxide/magnesium hydroxide/simethicone Suspension 30 milliLiter(s) Oral every 4 hours PRN Dyspepsia  melatonin 3 milliGRAM(s) Oral at bedtime PRN Insomnia  ondansetron Injectable 4 milliGRAM(s) IV Push every 8 hours PRN Nausea and/or Vomiting   Meds reviewed    Allergies    No Known Allergies    Intolerances         REVIEW OF SYSTEMS:    Review of Systems:   Constitutional: Denies fatigue  HEENT: Denies headaches and dizziness  Respiratory: denies SOB, cough, or wheezing  Cardiovascular: denies CP, palpitations  Gastrointestinal: Denies nausea, denies vomiting, diarrhea, constipation, abdominal pain, or bloody stools  Genitourinary: denies painful urination, increased frequency, urgency, or bloody urine  Skin: denies rashes or itching  Musculoskeletal: denies muscle aches, joint swelling  Neurologic: Denies generalized weakness, denies loss of sensation, numbness, or tingling      Vital Signs Last 24 Hrs  T(C): 36.4 (18 Jul 2022 05:33), Max: 36.8 (17 Jul 2022 12:53)  T(F): 97.6 (18 Jul 2022 05:33), Max: 98.2 (17 Jul 2022 12:53)  HR: 64 (18 Jul 2022 05:33) (64 - 71)  BP: 116/64 (18 Jul 2022 05:33) (116/64 - 133/65)  BP(mean): --  RR: 18 (18 Jul 2022 05:33) (16 - 18)  SpO2: 96% (18 Jul 2022 05:33) (96% - 97%)    Parameters below as of 18 Jul 2022 05:33  Patient On (Oxygen Delivery Method): nasal cannula        Daily Height in cm: 165.1 (13 Jul 2022 23:24)    Daily     PHYSICAL EXAM:    GENERAL: NAD  HEAD:  Atraumatic, Normocephalic  EYES: EOMI, conjunctiva and sclera clear  ENMT: No Drainage from nares, No drainage from ears  NECK: Supple, neck  veins full  NERVOUS SYSTEM:  Awake and Alert  CHEST/LUNG: Clear to percussion bilaterally; No rales, rhonchi, wheezing, or rubs  HEART: Regular rate and rhythm; No murmurs, rubs, or gallops  ABDOMEN: Soft, Nontender, Nondistended; Bowel sounds present  EXTREMITIES:  1+ pitting edema  SKIN: UE ecchymosis      LABS:                        8.9    4.15  )-----------( 204      ( 17 Jul 2022 07:43 )             27.1     07-17    141  |  105  |  35<H>  ----------------------------<  88  3.6   |  32<H>  |  1.30    Ca    8.2<L>      17 Jul 2022 07:43  Phos  3.7     07-17  Mg     2.4     07-17    TPro  6.3  /  Alb  1.7<L>  /  TBili  2.8<H>  /  DBili  x   /  AST  84<H>  /  ALT  63  /  AlkPhos  302<H>  07-17    PT/INR - ( 17 Jul 2022 07:43 )   PT: 30.4 sec;   INR: 2.57 ratio

## 2022-07-18 NOTE — PROGRESS NOTE ADULT - SUBJECTIVE AND OBJECTIVE BOX
[facility]  Dawson Dolan PA-C  [address] [place] [address]  [phone]      INTERVAL HPI/OVERNIGHT EVENTS:  Pt s/e  Pt reports no abdominal pain, N/V/D or further GI complaints  Tolerating diet    MEDICATIONS  (STANDING):  albuterol/ipratropium for Nebulization 3 milliLiter(s) Nebulizer every 8 hours  aspirin enteric coated 81 milliGRAM(s) Oral daily  calamine/zinc oxide Lotion 1 Application(s) Topical daily  furosemide    Tablet 40 milliGRAM(s) Oral two times a day  levothyroxine 75 MICROGram(s) Oral daily  metoprolol succinate ER 50 milliGRAM(s) Oral daily  potassium chloride   Powder 40 milliEquivalent(s) Oral once  warfarin 3 milliGRAM(s) Oral once    MEDICATIONS  (PRN):  acetaminophen     Tablet .. 650 milliGRAM(s) Oral every 6 hours PRN Mild Pain (1 - 3)  aluminum hydroxide/magnesium hydroxide/simethicone Suspension 30 milliLiter(s) Oral every 4 hours PRN Dyspepsia  melatonin 3 milliGRAM(s) Oral at bedtime PRN Insomnia  ondansetron Injectable 4 milliGRAM(s) IV Push every 8 hours PRN Nausea and/or Vomiting  sodium chloride 0.65% Nasal 1 Spray(s) Both Nostrils five times a day PRN Nasal Congestion      Allergies    No Known Allergies      PHYSICAL EXAM:   Vital Signs:  Vital Signs Last 24 Hrs  T(C): 36.4 (2022 05:33), Max: 36.8 (2022 12:53)  T(F): 97.6 (2022 05:33), Max: 98.2 (2022 12:53)  HR: 64 (2022 05:33) (64 - 71)  BP: 116/64 (2022 05:33) (116/64 - 133/65)  BP(mean): --  RR: 18 (2022 05:33) (16 - 18)  SpO2: 96% (2022 05:33) (96% - 97%)    Parameters below as of 2022 05:33  Patient On (Oxygen Delivery Method): nasal cannula      Daily     Daily Weight in k.1 (2022 05:33)    GENERAL:  Appears stated age  ABDOMEN:  Soft, non-tender, non-distended  NEURO:  Alert    LABS:                        9.8    4.76  )-----------( 222      ( 2022 06:18 )             30.7     07-18    140  |  103  |  34<H>  ----------------------------<  85  3.6   |  33<H>  |  1.30    Ca    8.0<L>      2022 06:18  Phos  3.7     18  Mg     2.3     18    TPro  6.8  /  Alb  1.8<L>  /  TBili  3.0<H>  /  DBili  x   /  AST  72<H>  /  ALT  58  /  AlkPhos  309<H>  18    PT/INR - ( 2022 06:18 )   PT: 25.1 sec;   INR: 2.13 ratio

## 2022-07-18 NOTE — PROGRESS NOTE ADULT - ASSESSMENT
89 y/o F with a pmhx b/l LE edema, HTN, P Afib (on coumadin), HFref (EF35-40%), hypothyroidism, recent admission for NSTEMI presents to the ED s/p mechanical fall at [facility] on coumadin. Found to have ABLA with right hematoma.

## 2022-07-18 NOTE — PROGRESS NOTE ADULT - PROBLEM SELECTOR PLAN 1
-2/2 Traumatic Rt Thigh Hematoma s/p fall at [facility], Acute & Chronic Anemia   -s/p 2 pRBCs on 07/1, Hb improved this AM   - Given 1 pRBCs 07/15, Total 3 u pRBC given   - Holding AC-Plavix , f/u cardio -  - Restart 81 mg ASA , OK with hematology  - restart warfarin 3 mg, OK with hematology (INR 2.13 today)   - daily INR  - type and screen, blood consent in chart  - Trend CBC and signs or symptoms of active bleeding -2/2 Traumatic Rt Thigh Hematoma s/p fall at [facility], Acute & Chronic Anemia   -s/p 3 u pRBC given   - 81 mg ASA restarted 7/16, discussed with cardiology will hold plavix   - restart warfarin 3 mg, OK with hematology (INR 2.13 today)   - daily INR  - type and screen, blood consent in chart  - Trend CBC and signs or symptoms of active bleeding -2/2 Traumatic Rt Thigh Hematoma s/p fall at [facility], Acute & Chronic Anemia   -s/p 3 u pRBC given   - 81 mg ASA restarted 7/16, discussed with cardiology -STOP  Plavix   - restart warfarin 3 mg, OK with hematology (INR 2.13 today) as d/w Hematology   - daily INR  - type and screen, blood consent in chart  - Trend CBC and signs or symptoms of active bleeding

## 2022-07-18 NOTE — PROGRESS NOTE ADULT - ASSESSMENT
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    LUIS CARLOS on CKD IIIb  -Likely prerenal 2/2 acute blood loss  and hypoxemic injury  -Baseline creatinine ~1.2 as per recent admission  -Urine indices reviewed   -Renal indices are improving; monitor trend for now  -Continue low salt diet w/ 1L fluid restriction  -Conservative renal management   -Awaiting today's lab result     HTN with CKD  -Controlled. Can restart PURVI blockade if BP tolerates     Volume Overload  -Lasix 40 mg po bid. Renal wise tolerating   -IV Albumin as needed    Anemia/Hematoma  -S/P PRBC x 2

## 2022-07-18 NOTE — PROGRESS NOTE ADULT - SUBJECTIVE AND OBJECTIVE BOX
Patient seen and examined;  Chart reviewed and events noted;   sitting in bed; eating  resumed on warfarin as well      MEDICATIONS  (STANDING):  albuterol/ipratropium for Nebulization 3 milliLiter(s) Nebulizer every 8 hours  aspirin enteric coated 81 milliGRAM(s) Oral daily  calamine/zinc oxide Lotion 1 Application(s) Topical daily  furosemide    Tablet 40 milliGRAM(s) Oral two times a day  levothyroxine 75 MICROGram(s) Oral daily  metoprolol succinate ER 50 milliGRAM(s) Oral daily  potassium chloride   Powder 40 milliEquivalent(s) Oral once  warfarin 3 milliGRAM(s) Oral once    MEDICATIONS  (PRN):  acetaminophen     Tablet .. 650 milliGRAM(s) Oral every 6 hours PRN Mild Pain (1 - 3)  aluminum hydroxide/magnesium hydroxide/simethicone Suspension 30 milliLiter(s) Oral every 4 hours PRN Dyspepsia  melatonin 3 milliGRAM(s) Oral at bedtime PRN Insomnia  ondansetron Injectable 4 milliGRAM(s) IV Push every 8 hours PRN Nausea and/or Vomiting  sodium chloride 0.65% Nasal 1 Spray(s) Both Nostrils five times a day PRN Nasal Congestion      Vital Signs Last 24 Hrs  T(C): 36.4 (18 Jul 2022 05:33), Max: 36.8 (17 Jul 2022 12:53)  T(F): 97.6 (18 Jul 2022 05:33), Max: 98.2 (17 Jul 2022 12:53)  HR: 64 (18 Jul 2022 05:33) (64 - 71)  BP: 116/64 (18 Jul 2022 05:33) (116/64 - 133/65)  RR: 18 (18 Jul 2022 05:33) (16 - 18)  SpO2: 96% (18 Jul 2022 05:33) (96% - 97%)    Parameters below as of 18 Jul 2022 05:33  Patient On (Oxygen Delivery Method): nasal cannula        PHYSICAL EXAM  General: adult in NAD  HEENT: clear oropharynx, anicteric sclera, pink conjunctivae  Neck: supple  CV: normal S1S2 with no murmur rubs or gallops  Lungs: clear to auscultation, no wheezes, no rhales  Abdomen: soft non-tender non-distended, no hepato/splenomegaly  Ext: right thigh/hip/buttock hematoma unchanged  Neuro: alert and oriented X3 no focal deficits      LABS:                        9.8    4.76  )-----------( 222      ( 18 Jul 2022 06:18 )             30.7     Hemoglobin: 9.8 g/dL (07-18 @ 06:18)  Hemoglobin: 8.9 g/dL (07-17 @ 07:43)  Hemoglobin: 9.1 g/dL (07-16 @ 07:05)  Hemoglobin: 8.6 g/dL (07-15 @ 06:50)  Hemoglobin: 7.7 g/dL (07-14 @ 12:25)      07-18    140  |  103  |  34<H>  ----------------------------<  85  3.6   |  33<H>  |  1.30    Ca    8.0<L>      18 Jul 2022 06:18  Phos  3.7     07-18  Mg     2.3     07-18    TPro  6.8  /  Alb  1.8<L>  /  TBili  3.0<H>  /  DBili  x   /  AST  72<H>  /  ALT  58  /  AlkPhos  309<H>  07-18    PT/INR - ( 18 Jul 2022 06:18 )   PT: 25.1 sec;   INR: 2.13 ratio

## 2022-07-19 DIAGNOSIS — M79.671 PAIN IN RIGHT FOOT: ICD-10-CM

## 2022-07-19 LAB
ALBUMIN SERPL ELPH-MCNC: 1.7 G/DL — LOW (ref 3.3–5)
ALP SERPL-CCNC: 305 U/L — HIGH (ref 40–120)
ALT FLD-CCNC: 52 U/L — SIGNIFICANT CHANGE UP (ref 12–78)
ANION GAP SERPL CALC-SCNC: 2 MMOL/L — LOW (ref 5–17)
AST SERPL-CCNC: 60 U/L — HIGH (ref 15–37)
BILIRUB DIRECT SERPL-MCNC: 1.8 MG/DL — HIGH (ref 0–0.3)
BILIRUB INDIRECT FLD-MCNC: 1 MG/DL — SIGNIFICANT CHANGE UP (ref 0.2–1)
BILIRUB SERPL-MCNC: 2.8 MG/DL — HIGH (ref 0.2–1.2)
BILIRUB SERPL-MCNC: 3 MG/DL — HIGH (ref 0.2–1.2)
BUN SERPL-MCNC: 31 MG/DL — HIGH (ref 7–23)
CALCIUM SERPL-MCNC: 7.9 MG/DL — LOW (ref 8.5–10.1)
CHLORIDE SERPL-SCNC: 102 MMOL/L — SIGNIFICANT CHANGE UP (ref 96–108)
CO2 SERPL-SCNC: 36 MMOL/L — HIGH (ref 22–31)
CREAT SERPL-MCNC: 1.4 MG/DL — HIGH (ref 0.5–1.3)
EGFR: 35 ML/MIN/1.73M2 — LOW
GLUCOSE SERPL-MCNC: 81 MG/DL — SIGNIFICANT CHANGE UP (ref 70–99)
HCT VFR BLD CALC: 28.6 % — LOW (ref 34.5–45)
HGB BLD-MCNC: 9.5 G/DL — LOW (ref 11.5–15.5)
INR BLD: 2.02 RATIO — HIGH (ref 0.88–1.16)
MAGNESIUM SERPL-MCNC: 2.1 MG/DL — SIGNIFICANT CHANGE UP (ref 1.6–2.6)
MCHC RBC-ENTMCNC: 30.2 PG — SIGNIFICANT CHANGE UP (ref 27–34)
MCHC RBC-ENTMCNC: 33.2 GM/DL — SIGNIFICANT CHANGE UP (ref 32–36)
MCV RBC AUTO: 90.8 FL — SIGNIFICANT CHANGE UP (ref 80–100)
NRBC # BLD: 0 /100 WBCS — SIGNIFICANT CHANGE UP (ref 0–0)
PHOSPHATE SERPL-MCNC: 3.5 MG/DL — SIGNIFICANT CHANGE UP (ref 2.5–4.5)
PLATELET # BLD AUTO: 219 K/UL — SIGNIFICANT CHANGE UP (ref 150–400)
POTASSIUM SERPL-MCNC: 3.7 MMOL/L — SIGNIFICANT CHANGE UP (ref 3.5–5.3)
POTASSIUM SERPL-SCNC: 3.7 MMOL/L — SIGNIFICANT CHANGE UP (ref 3.5–5.3)
PROT SERPL-MCNC: 6.7 G/DL — SIGNIFICANT CHANGE UP (ref 6–8.3)
PROTHROM AB SERPL-ACNC: 23.8 SEC — HIGH (ref 10.5–13.4)
RBC # BLD: 3.15 M/UL — LOW (ref 3.8–5.2)
RBC # FLD: 21.2 % — HIGH (ref 10.3–14.5)
SARS-COV-2 RNA SPEC QL NAA+PROBE: SIGNIFICANT CHANGE UP
SODIUM SERPL-SCNC: 140 MMOL/L — SIGNIFICANT CHANGE UP (ref 135–145)
WBC # BLD: 4.99 K/UL — SIGNIFICANT CHANGE UP (ref 3.8–10.5)
WBC # FLD AUTO: 4.99 K/UL — SIGNIFICANT CHANGE UP (ref 3.8–10.5)

## 2022-07-19 PROCEDURE — 73610 X-RAY EXAM OF ANKLE: CPT | Mod: 26,RT

## 2022-07-19 PROCEDURE — 73630 X-RAY EXAM OF FOOT: CPT | Mod: 26,RT

## 2022-07-19 PROCEDURE — 99232 SBSQ HOSP IP/OBS MODERATE 35: CPT

## 2022-07-19 RX ORDER — FUROSEMIDE 40 MG
40 TABLET ORAL EVERY 12 HOURS
Refills: 0 | Status: DISCONTINUED | OUTPATIENT
Start: 2022-07-20 | End: 2022-07-20

## 2022-07-19 RX ORDER — WARFARIN SODIUM 2.5 MG/1
2.5 TABLET ORAL ONCE
Refills: 0 | Status: COMPLETED | OUTPATIENT
Start: 2022-07-19 | End: 2022-07-19

## 2022-07-19 RX ORDER — FUROSEMIDE 40 MG
20 TABLET ORAL ONCE
Refills: 0 | Status: COMPLETED | OUTPATIENT
Start: 2022-07-19 | End: 2022-07-19

## 2022-07-19 RX ADMIN — WARFARIN SODIUM 2.5 MILLIGRAM(S): 2.5 TABLET ORAL at 22:24

## 2022-07-19 RX ADMIN — Medication 40 MILLIGRAM(S): at 13:03

## 2022-07-19 RX ADMIN — Medication 75 MICROGRAM(S): at 06:17

## 2022-07-19 RX ADMIN — Medication 3 MILLILITER(S): at 07:56

## 2022-07-19 RX ADMIN — Medication 1 APPLICATION(S): at 06:17

## 2022-07-19 RX ADMIN — Medication 81 MILLIGRAM(S): at 11:59

## 2022-07-19 RX ADMIN — CALAMINE AND ZINC OXIDE AND PHENOL 1 APPLICATION(S): 160; 10 LOTION TOPICAL at 11:59

## 2022-07-19 RX ADMIN — Medication 20 MILLIGRAM(S): at 21:29

## 2022-07-19 RX ADMIN — Medication 40 MILLIGRAM(S): at 06:17

## 2022-07-19 RX ADMIN — Medication 3 MILLILITER(S): at 15:07

## 2022-07-19 RX ADMIN — Medication 1 APPLICATION(S): at 18:26

## 2022-07-19 RX ADMIN — Medication 50 MILLIGRAM(S): at 06:17

## 2022-07-19 NOTE — PROGRESS NOTE ADULT - SUBJECTIVE AND OBJECTIVE BOX
Patient is a 89y old  Female who presents with a chief complaint of Fall, with Anemia (18 Jul 2022 11:16)    HPI:  89 y/o F with a pmhx b/l LE edema, HTN, P Afib (on coumadin), HFref (EF35-40%) and hypothyroidism presents to the ED s/p fall. Patient was admitted to [facility] 6/17-6/30/22 for NSTEMI, hospital course c/b iatrogenic flash pulmonary edema, cath canceled due to vol overload, discharged to [facility]. She was doing well at [facility] until today. She went to bathroom for BM, had difficulty to clean herself. She was left unattended by [facility] staff in bathroom, she stood up to take few steps, lost balance, and fell on right side striking her right hip, right arm, right elbow, right side of neck. She denies any prodromal symptoms. No lightheadedness, chest pain, warm sensation. No dyspnea. Patient never lost consciousness. She was found to have Hg 6.9 at [facility] facility, sent to [facility] ED for further evaluation. She denies any significant pain right now. Only complains of right thigh twitching, swelling.  No fevers, chills, cp, dypsnea, abdominal pain. Admits lower ext swelling which is chronic for her.     In the ED  VS: T97.3 Hr78 /63 RR 16 SPO2 95% on 3L NC  Labs: WBC 4.43, HH 7.6/22.1, PLTS 225, Na 140, K 4.2, Cr 1.7, Gluc 112, Alk phos 326, AST//99  Chest Xray: lungs clear  Head CT: Stable exam. No acute intracranial hemorrhage, vasogenic edema, extra-axial collection or calvarial fracture.  Cervical spine CT: No acute cervical spine fracture or evidence of traumatic malalignment. Cervical spondylosis  CT A/P non con: Extensive diffuse soft tissue edema/anasarca. Small right, trace of bilateral pleural fluid. Small-to-moderate amount of simple free fluid around the liver and spleen. No definite intraperitoneal hemorrhage.No noncontrast evidence of acute injury to the chest, abdomen or pelvis.Concern for hematoma around the right hip soft tissues, partially visualized. No evidence of hip fracture.Mild intralobular septal thickening, may reflect pulmonary edema. Areas of atelectatic changes and possible airways impaction  EKG: ordered, pending  Given in ED: 1 unit prbc   (14 Jul 2022 04:06)    INTERVAL HPI:  7/14: Patient seen and examined at bedside. Is feeling okay. Has no acute complaints but is feeling slightly agitated. Denies sob, cp, n/v/d. Total 2 u pRBC total , IV Lasix   7/15 Pt seen and examined at bedside. Soft BP this AM, given 1 pRBC this AM, on Lasix 40 BID.   7/16: Patient seen and assessed at bedside. Complaining of cramping in her bilateral feet otherwise feeling better. Still feels pain on her right side but is controlled with Tylenol PRN. Denies sob on 3L NC, chest pain, nausea, vomiting, diarrhea. H/H improving s/p 3PRBC. B/L LE edema improving.  7/17: Patient seen and examined at bedside. Complaining of itching over her right sided hematoma and still experiencing cramping in her feet. Denies sob, chest pain, nausea, vomiting, diarrhea.  H/H improving s/p 3PRBC. B/L LE edema improving.  7/18: Patient seen and examined at bedside. Complained of itching over her right sided hematoma. She had pain in her right sided hematoma and back that was relieved with ice packs overnight. Denies sob, chest pain, nausea, vomitting, diarrhea. H/H continues to improve. B/L edema present. Start Coumadin from today , Low stable H/H   7/19 Patient seen and examined at bedside- she was receiving albuterol at the time of examination. Complained of pain and sensitivity over the right hematoma (alleviated with cold compress). Also complains of right foot pain and increased sensitivity, shooting pains Both LE/ feet are swollen. Denies sob, nausea, vomitting, diarrhea. Hemoglobin 9.5 today (9.8 yesterday). Started coumadin yesterday - INR 2.02 this AM. Denies any BM- she hasn't been eating much bc she doesn't like the food.     OVERNIGHT EVENTS:    Home Medications:  aspirin 81 mg oral delayed release tablet: 1 tab(s) orally once a day (14 Jul 2022 04:17)  atorvastatin 20 mg oral tablet: 1 tab(s) orally once a day (14 Jul 2022 04:17)  clopidogrel 75 mg oral tablet: 1 tab(s) orally once a day (14 Jul 2022 04:17)  fluticasone 50 mcg/inh nasal spray: 1 spray(s) nasal 2 times a day (14 Jul 2022 04:17)  Lasix 40 mg oral tablet: 1 tab(s) orally 2 times a day (14 Jul 2022 04:17)  metoprolol succinate 50 mg oral tablet, extended release: 1 tab(s) orally once a day (14 Jul 2022 04:17)  sodium chloride 0.65% nasal spray: 2 spray(s) nasal 4 times a day (14 Jul 2022 04:17)  Synthroid 75 mcg (0.075 mg) oral tablet: 1 tab(s) orally once a day (14 Jul 2022 04:17)  warfarin 3 mg oral tablet: DO NOT Give Coumadin Today as INR 3.11, Check PT/INR on 7/1/22.Adjust Coumadin dose as per INR. start with Coumadin 2 mg   Keep INR 2-3    Pt&#x27;s HOME Coumadin schedule is as below  1 tab(s) orally once a day (M, Tu, Th, F, Sa Grider) at Bed time   -Pt use to Take Coumadin 4 mg on every Wednesday (14 Jul 2022 04:17)  zinc oxide 40% topical ointment: Apply topically to affected area 2 times a day (14 Jul 2022 04:17)      MEDICATIONS  (STANDING):  albuterol/ipratropium for Nebulization 3 milliLiter(s) Nebulizer every 8 hours  aspirin enteric coated 81 milliGRAM(s) Oral daily  BACItracin   Ointment 1 Application(s) Topical two times a day  calamine/zinc oxide Lotion 1 Application(s) Topical daily  furosemide    Tablet 40 milliGRAM(s) Oral two times a day  levothyroxine 75 MICROGram(s) Oral daily  metoprolol succinate ER 50 milliGRAM(s) Oral daily    MEDICATIONS  (PRN):  acetaminophen     Tablet .. 650 milliGRAM(s) Oral every 6 hours PRN Mild Pain (1 - 3)  aluminum hydroxide/magnesium hydroxide/simethicone Suspension 30 milliLiter(s) Oral every 4 hours PRN Dyspepsia  melatonin 3 milliGRAM(s) Oral at bedtime PRN Insomnia  ondansetron Injectable 4 milliGRAM(s) IV Push every 8 hours PRN Nausea and/or Vomiting  sodium chloride 0.65% Nasal 1 Spray(s) Both Nostrils five times a day PRN Nasal Congestion      Allergies    No Known Allergies    Intolerances        Social History:  Lived at home alone. Former smoker. Denies etoh and other rec drug use. Ambulates with walker. Daughter is HCP. DNR/DNI, came from [facility] now (14 Jul 2022 04:06)      REVIEW OF SYSTEMS:  CONSTITUTIONAL: No fever, No chills, No fatigue, No myalgia, right hip and back Body ache, No Weakness  EYES: No eye pain,  No visual disturbances, No discharge, NO Redness  ENMT:  No ear pain, No nose bleed, No vertigo; No sinus pain, NO throat pain, No Congestion  NECK: No pain, No stiffness  RESPIRATORY: No cough, NO wheezing, No  hemoptysis, NO  shortness of breath  CARDIOVASCULAR: No chest pain, palpitations  GASTROINTESTINAL: No abdominal pain, NO epigastric pain. No nausea, No vomiting; No diarrhea, No constipation. [] BM  GENITOURINARY: No dysuria, No frequency, No urgency, No hematuria, NO incontinence  NEUROLOGICAL: No headaches, No dizziness, No numbness, No tingling, No tremors, No weakness  EXT: No Swelling, No Pain, No Edema  SKIN:  [X ]  itching on right hematoma, burning, rashes, or lesions   MUSCULOSKELETAL: No joint pain ,No Jt swelling; No muscle pain, No back pain, No extremity pain.   PSYCHIATRIC: No depression,  No anxiety,  No mood swings ,No difficulty sleeping at night  PAIN SCALE: [ ] None  [X ] Other- pain on right hip hematoma and right foot pain and sensitivity   ROS Unable to obtain due to - [  ] Dementia  [  ] Lethargy [  ] Drowsy [  ] Sedated [  ] non verbal  REST OF REVIEW Of SYSTEM - [ X ] Normal      Vital Signs Last 24 Hrs  T(C): 36.8 (19 Jul 2022 05:30), Max: 36.9 (18 Jul 2022 20:15)  T(F): 98.3 (19 Jul 2022 05:30), Max: 98.4 (18 Jul 2022 20:15)  HR: 70 (19 Jul 2022 05:30) (68 - 78)  BP: 109/55 (19 Jul 2022 05:30) (109/55 - 114/62)  BP(mean): --  RR: 18 (19 Jul 2022 05:30) (18 - 18)  SpO2: 93% (19 Jul 2022 05:30) (92% - 96%)    Parameters below as of 19 Jul 2022 05:30  Patient On (Oxygen Delivery Method): nasal cannula      Finger Stick        07-18 @ 07:01  -  07-19 @ 07:00  --------------------------------------------------------  IN: 0 mL / OUT: 1500 mL / NET: -1500 mL        PHYSICAL EXAM:  GENERAL:  [x  ] NAD , [x] well appearing, [  ] Agitated, [  ] Drowsy,  [  ] Lethargy, [  ] confused   HEAD:  [  x] Normal, [  ] Other  EYES:  [ x ] EOMI, [ x ] PERRLA, [ x ] conjunctiva and sclera clear normal, [  ] Other,  [  ] Pallor,[  ] Discharge  ENMT:  [x  ] Normal, [x ] dry  mucous membranes, [ x ] Good dentition, [x  ] No Thrush  NECK:  [ x ] Supple, [  x] + JVD, [x  ] Normal thyroid, [  ] Lymphadenopathy [  ] Other  CHEST/LUNG:  [ ] Clear to auscultation bilaterally, [ x ] Breath Sounds equal B/L / Decrease B/L  , [x  ] poor effort  [ x ] No rales, [ x ] No rhonchi  [ x]  Mild wheezing,   HEART:  [x ] Regular rate and rhythm, [  ] tachycardia, [  ] Bradycardia,  [  ] irregular  [x  ] + murmurs, No rubs, No gallops, [  ] PPM in place (Mfr:  ) [x] anasarca -IMPROVING  ABDOMEN:  [x  ] Soft, [ x ] Nontender, [ x] Nondistended, [ x ] No mass, [ x ] Bowel sounds present, [ x ] obese  NERVOUS SYSTEM:  [ x ] Alert & Oriented X3, [x  ] Nonfocal  [  ] Confusion  [  ] Encephalopathic [  ] Sedated [  ] Unable to assess, [  ] Dementia [  ] Other-  EXTREMITIES: [x  ] 2+ Peripheral Pulses, No clubbing, No cyanosis,  [ x ]  2+ edema B/L lower EXT. [ x ] PVD stasis skin changes B/L Lower EXT, erythema B/L Lower ext , small blister- Improved   [  ] wound  LYMPH: No lymphadenopathy noted  SKIN:  [x  ] large +R hematoma hip & Thigh , bruising over right elbow, right leg 2/2 fall, [  ] Pressure Ulcers, [ x ] ecchymosis, [  ] Skin Tears, [  ] Other    DIET: Diet, DASH/TLC:   Sodium & Cholesterol Restricted  1000mL Fluid Restriction ([id number]) (07-14-22 @ 08:48)      LABS:                        9.5    4.99  )-----------( 219      ( 19 Jul 2022 06:35 )             28.6       Ca    8.0        18 Jul 2022 06:18      PT/INR - ( 19 Jul 2022 06:35 )   PT: 23.8 sec;   INR: 2.02 ratio                                  Anemia Panel:      Thyroid Panel:  Thyroid Stimulating Hormone, Serum: 2.14 uIU/mL (07-14-22 @ 08:40)                RADIOLOGY & ADDITIONAL TESTS:      HEALTH ISSUES - PROBLEM Dx:  Fall    Anemia due to acute blood loss    Hematoma    Transaminitis    Afib    Lower extremity edema    Acute kidney injury superimposed on CKD    HTN (hypertension)    Hypothyroidism    HFrEF (heart failure with reduced ejection fraction)    Need for prophylactic measure    CAD (coronary artery disease)            Consultant(s) Notes Reviewed:  [X ] YES     Care Discussed with [X] Consultants  [ X] Patient  [ X] Family [  ] HCP [  ]   [  ] Social Service  [  ] RN, [  ] Physical Therapy,[  ] Palliative care team  DVT PPX: [  ] Lovenox, [  ] S C Heparin, [  ] Coumadin, [  ] Xarelto, [  ] Eliquis, [  ] Pradaxa, [  ] IV Heparin drip, [X] SCD [  ] Contraindication 2 to GI Bleed,[  ] Ambulation [  ] Contraindicated 2 to  bleed [  ] Contraindicated 2 to Brain Bleed  Advanced directive: [  ] None, [  ] DNR/DNI Patient is a 89y old  Female who presents with a chief complaint of Fall, with Anemia (18 Jul 2022 11:16)    HPI:  89 y/o F with a pmhx b/l LE edema, HTN, P Afib (on coumadin), HFref (EF35-40%) and hypothyroidism presents to the ED s/p fall. Patient was admitted to [facility] 6/17-6/30/22 for NSTEMI, hospital course c/b iatrogenic flash pulmonary edema, cath canceled due to vol overload, discharged to [facility]. She was doing well at [facility] until today. She went to bathroom for BM, had difficulty to clean herself. She was left unattended by [facility] staff in bathroom, she stood up to take few steps, lost balance, and fell on right side striking her right hip, right arm, right elbow, right side of neck. She denies any prodromal symptoms. No lightheadedness, chest pain, warm sensation. No dyspnea. Patient never lost consciousness. She was found to have Hg 6.9 at [facility] facility, sent to [facility] ED for further evaluation. She denies any significant pain right now. Only complains of right thigh twitching, swelling.  No fevers, chills, cp, dypsnea, abdominal pain. Admits lower ext swelling which is chronic for her.     In the ED  VS: T97.3 Hr78 /63 RR 16 SPO2 95% on 3L NC  Labs: WBC 4.43, HH 7.6/22.1, PLTS 225, Na 140, K 4.2, Cr 1.7, Gluc 112, Alk phos 326, AST//99  Chest Xray: lungs clear  Head CT: Stable exam. No acute intracranial hemorrhage, vasogenic edema, extra-axial collection or calvarial fracture.  Cervical spine CT: No acute cervical spine fracture or evidence of traumatic malalignment. Cervical spondylosis  CT A/P non con: Extensive diffuse soft tissue edema/anasarca. Small right, trace of bilateral pleural fluid. Small-to-moderate amount of simple free fluid around the liver and spleen. No definite intraperitoneal hemorrhage.No noncontrast evidence of acute injury to the chest, abdomen or pelvis.Concern for hematoma around the right hip soft tissues, partially visualized. No evidence of hip fracture.Mild intralobular septal thickening, may reflect pulmonary edema. Areas of atelectatic changes and possible airways impaction  EKG: ordered, pending  Given in ED: 1 unit prbc   (14 Jul 2022 04:06)    INTERVAL HPI:  7/14: Patient seen and examined at bedside. Is feeling okay. Has no acute complaints but is feeling slightly agitated. Denies sob, cp, n/v/d. Total 2 u pRBC total , IV Lasix   7/15 Pt seen and examined at bedside. Soft BP this AM, given 1 pRBC this AM, on Lasix 40 BID.   7/16: Patient seen and assessed at bedside. Complaining of cramping in her bilateral feet otherwise feeling better. Still feels pain on her right side but is controlled with Tylenol PRN. Denies sob on 3L NC, chest pain, nausea, vomiting, diarrhea. H/H improving s/p 3PRBC. B/L LE edema improving.  7/17: Patient seen and examined at bedside. Complaining of itching over her right sided hematoma and still experiencing cramping in her feet. Denies sob, chest pain, nausea, vomiting, diarrhea.  H/H improving s/p 3PRBC. B/L LE edema improving.  7/18: Patient seen and examined at bedside. Complained of itching over her right sided hematoma. She had pain in her right sided hematoma and back that was relieved with ice packs overnight. Denies sob, chest pain, nausea, vomitting, diarrhea. H/H continues to improve. B/L edema present. Start Coumadin from today , Low stable H/H   7/19 Patient seen and examined at bedside- she was receiving albuterol at the time of examination. Complained of pain and sensitivity over the right hematoma (alleviated with cold compress). Also complains of right foot pain and increased sensitivity, shooting pains Both LE/ feet are swollen. Denies sob, nausea, vomitting, diarrhea. Hemoglobin 9.5 today (9.8 yesterday). Started coumadin yesterday - INR 2.02 this AM. Denies any BM- she hasn't been eating much bc she doesn't like the food. Chest imaging from the weekend shows some pleural effusions, will consider an IV push of lasix this afternoon if BP can tolerate.     OVERNIGHT EVENTS:    Home Medications:  aspirin 81 mg oral delayed release tablet: 1 tab(s) orally once a day (14 Jul 2022 04:17)  atorvastatin 20 mg oral tablet: 1 tab(s) orally once a day (14 Jul 2022 04:17)  clopidogrel 75 mg oral tablet: 1 tab(s) orally once a day (14 Jul 2022 04:17)  fluticasone 50 mcg/inh nasal spray: 1 spray(s) nasal 2 times a day (14 Jul 2022 04:17)  Lasix 40 mg oral tablet: 1 tab(s) orally 2 times a day (14 Jul 2022 04:17)  metoprolol succinate 50 mg oral tablet, extended release: 1 tab(s) orally once a day (14 Jul 2022 04:17)  sodium chloride 0.65% nasal spray: 2 spray(s) nasal 4 times a day (14 Jul 2022 04:17)  Synthroid 75 mcg (0.075 mg) oral tablet: 1 tab(s) orally once a day (14 Jul 2022 04:17)  warfarin 3 mg oral tablet: DO NOT Give Coumadin Today as INR 3.11, Check PT/INR on 7/1/22.Adjust Coumadin dose as per INR. start with Coumadin 2 mg   Keep INR 2-3    Pt&#x27;s HOME Coumadin schedule is as below  1 tab(s) orally once a day (M, Tu, Th, F, Sa Su) at Bed time   -Pt use to Take Coumadin 4 mg on every Wednesday (14 Jul 2022 04:17)  zinc oxide 40% topical ointment: Apply topically to affected area 2 times a day (14 Jul 2022 04:17)      MEDICATIONS  (STANDING):  albuterol/ipratropium for Nebulization 3 milliLiter(s) Nebulizer every 8 hours  aspirin enteric coated 81 milliGRAM(s) Oral daily  BACItracin   Ointment 1 Application(s) Topical two times a day  calamine/zinc oxide Lotion 1 Application(s) Topical daily  furosemide    Tablet 40 milliGRAM(s) Oral two times a day  levothyroxine 75 MICROGram(s) Oral daily  metoprolol succinate ER 50 milliGRAM(s) Oral daily    MEDICATIONS  (PRN):  acetaminophen     Tablet .. 650 milliGRAM(s) Oral every 6 hours PRN Mild Pain (1 - 3)  aluminum hydroxide/magnesium hydroxide/simethicone Suspension 30 milliLiter(s) Oral every 4 hours PRN Dyspepsia  melatonin 3 milliGRAM(s) Oral at bedtime PRN Insomnia  ondansetron Injectable 4 milliGRAM(s) IV Push every 8 hours PRN Nausea and/or Vomiting  sodium chloride 0.65% Nasal 1 Spray(s) Both Nostrils five times a day PRN Nasal Congestion      Allergies    No Known Allergies    Intolerances        Social History:  Lived at home alone. Former smoker. Denies etoh and other rec drug use. Ambulates with walker. Daughter is HCP. DNR/DNI, came from [facility] now (14 Jul 2022 04:06)      REVIEW OF SYSTEMS:  CONSTITUTIONAL: No fever, No chills, No fatigue, No myalgia, right hip and back Body ache, No Weakness  EYES: No eye pain,  No visual disturbances, No discharge, NO Redness  ENMT:  No ear pain, No nose bleed, No vertigo; No sinus pain, NO throat pain, No Congestion  NECK: No pain, No stiffness  RESPIRATORY: No cough, NO wheezing, No  hemoptysis, NO  shortness of breath  CARDIOVASCULAR: No chest pain, palpitations  GASTROINTESTINAL: No abdominal pain, NO epigastric pain. No nausea, No vomiting; No diarrhea, No constipation. [] BM  GENITOURINARY: No dysuria, No frequency, No urgency, No hematuria, NO incontinence  NEUROLOGICAL: No headaches, No dizziness, No numbness, No tingling, No tremors, No weakness  EXT: No Swelling, No Pain, No Edema  SKIN:  [X ]  itching on right hematoma, burning, rashes, or lesions   MUSCULOSKELETAL: No joint pain ,No Jt swelling; No muscle pain, No back pain, No extremity pain.   PSYCHIATRIC: No depression,  No anxiety,  No mood swings ,No difficulty sleeping at night  PAIN SCALE: [ ] None  [X ] Other- pain on right hip hematoma and right foot pain and sensitivity   ROS Unable to obtain due to - [  ] Dementia  [  ] Lethargy [  ] Drowsy [  ] Sedated [  ] non verbal  REST OF REVIEW Of SYSTEM - [ X ] Normal      Vital Signs Last 24 Hrs  T(C): 36.8 (19 Jul 2022 05:30), Max: 36.9 (18 Jul 2022 20:15)  T(F): 98.3 (19 Jul 2022 05:30), Max: 98.4 (18 Jul 2022 20:15)  HR: 70 (19 Jul 2022 05:30) (68 - 78)  BP: 109/55 (19 Jul 2022 05:30) (109/55 - 114/62)  BP(mean): --  RR: 18 (19 Jul 2022 05:30) (18 - 18)  SpO2: 93% (19 Jul 2022 05:30) (92% - 96%)    Parameters below as of 19 Jul 2022 05:30  Patient On (Oxygen Delivery Method): nasal cannula      Finger Stick        07-18 @ 07:01  -  07-19 @ 07:00  --------------------------------------------------------  IN: 0 mL / OUT: 1500 mL / NET: -1500 mL        PHYSICAL EXAM:  GENERAL:  [x  ] NAD , [x] well appearing, [  ] Agitated, [  ] Drowsy,  [  ] Lethargy, [  ] confused   HEAD:  [  x] Normal, [  ] Other  EYES:  [ x ] EOMI, [ x ] PERRLA, [ x ] conjunctiva and sclera clear normal, [  ] Other,  [  ] Pallor,[  ] Discharge  ENMT:  [x  ] Normal, [x ] dry  mucous membranes, [ x ] Good dentition, [x  ] No Thrush  NECK:  [ x ] Supple, [  x] + JVD, [x  ] Normal thyroid, [  ] Lymphadenopathy [  ] Other  CHEST/LUNG:  [ ] Clear to auscultation bilaterally, [ x ] Breath Sounds equal B/L / Decrease B/L  , [x  ] poor effort  [ x ] No rales, [ x ] No rhonchi  [ x]  Mild wheezing,   HEART:  [x ] Regular rate and rhythm, [  ] tachycardia, [  ] Bradycardia,  [  ] irregular  [x  ] + murmurs, No rubs, No gallops, [  ] PPM in place (Mfr:  ) [x] anasarca -IMPROVING  ABDOMEN:  [x  ] Soft, [ x ] Nontender, [ x] Nondistended, [ x ] No mass, [ x ] Bowel sounds present, [ x ] obese  NERVOUS SYSTEM:  [ x ] Alert & Oriented X3, [x  ] Nonfocal  [  ] Confusion  [  ] Encephalopathic [  ] Sedated [  ] Unable to assess, [  ] Dementia [  ] Other-  EXTREMITIES: [x  ] 2+ Peripheral Pulses, No clubbing, No cyanosis,  [ x ]  2+ edema B/L lower EXT. [ x ] PVD stasis skin changes B/L Lower EXT, erythema B/L Lower ext , small blister- Improved   [  ] wound  LYMPH: No lymphadenopathy noted  SKIN:  [x  ] large +R hematoma hip & Thigh , bruising over right elbow, right leg 2/2 fall, [  ] Pressure Ulcers, [ x ] ecchymosis, [  ] Skin Tears, [  ] Other    DIET: Diet, DASH/TLC:   Sodium & Cholesterol Restricted  1000mL Fluid Restriction ([id number]) (07-14-22 @ 08:48)      LABS:                        9.5    4.99  )-----------( 219      ( 19 Jul 2022 06:35 )             28.6       Ca    8.0        18 Jul 2022 06:18      PT/INR - ( 19 Jul 2022 06:35 )   PT: 23.8 sec;   INR: 2.02 ratio                                  Anemia Panel:      Thyroid Panel:  Thyroid Stimulating Hormone, Serum: 2.14 uIU/mL (07-14-22 @ 08:40)                RADIOLOGY & ADDITIONAL TESTS:      HEALTH ISSUES - PROBLEM Dx:  Fall    Anemia due to acute blood loss    Hematoma    Transaminitis    Afib    Lower extremity edema    Acute kidney injury superimposed on CKD    HTN (hypertension)    Hypothyroidism    HFrEF (heart failure with reduced ejection fraction)    Need for prophylactic measure    CAD (coronary artery disease)            Consultant(s) Notes Reviewed:  [X ] YES     Care Discussed with [X] Consultants  [ X] Patient  [ X] Family [  ] HCP [  ]   [  ] Social Service  [  ] RN, [  ] Physical Therapy,[  ] Palliative care team  DVT PPX: [  ] Lovenox, [  ] S C Heparin, [  ] Coumadin, [  ] Xarelto, [  ] Eliquis, [  ] Pradaxa, [  ] IV Heparin drip, [X] SCD [  ] Contraindication 2 to GI Bleed,[  ] Ambulation [  ] Contraindicated 2 to  bleed [  ] Contraindicated 2 to Brain Bleed  Advanced directive: [  ] None, [  ] DNR/DNI Patient is a 89y old  Female who presents with a chief complaint of Fall, with Anemia (18 Jul 2022 11:16)    HPI:  89 y/o F with a pmhx b/l LE edema, HTN, P Afib (on coumadin), HFref (EF35-40%) and hypothyroidism presents to the ED s/p fall. Patient was admitted to [facility] 6/17-6/30/22 for NSTEMI, hospital course c/b iatrogenic flash pulmonary edema, cath canceled due to vol overload, discharged to [facility]. She was doing well at [facility] until today. She went to bathroom for BM, had difficulty to clean herself. She was left unattended by [facility] staff in bathroom, she stood up to take few steps, lost balance, and fell on right side striking her right hip, right arm, right elbow, right side of neck. She denies any prodromal symptoms. No lightheadedness, chest pain, warm sensation. No dyspnea. Patient never lost consciousness. She was found to have Hg 6.9 at [facility] facility, sent to [facility] ED for further evaluation. She denies any significant pain right now. Only complains of right thigh twitching, swelling.  No fevers, chills, cp, dypsnea, abdominal pain. Admits lower ext swelling which is chronic for her.     In the ED  VS: T97.3 Hr78 /63 RR 16 SPO2 95% on 3L NC  Labs: WBC 4.43, HH 7.6/22.1, PLTS 225, Na 140, K 4.2, Cr 1.7, Gluc 112, Alk phos 326, AST//99  Chest Xray: lungs clear  Head CT: Stable exam. No acute intracranial hemorrhage, vasogenic edema, extra-axial collection or calvarial fracture.  Cervical spine CT: No acute cervical spine fracture or evidence of traumatic malalignment. Cervical spondylosis  CT A/P non con: Extensive diffuse soft tissue edema/anasarca. Small right, trace of bilateral pleural fluid. Small-to-moderate amount of simple free fluid around the liver and spleen. No definite intraperitoneal hemorrhage.No noncontrast evidence of acute injury to the chest, abdomen or pelvis.Concern for hematoma around the right hip soft tissues, partially visualized. No evidence of hip fracture.Mild intralobular septal thickening, may reflect pulmonary edema. Areas of atelectatic changes and possible airways impaction  EKG: ordered, pending  Given in ED: 1 unit prbc   (14 Jul 2022 04:06)    INTERVAL HPI:  7/14: Patient seen and examined at bedside. Is feeling okay. Has no acute complaints but is feeling slightly agitated. Denies sob, cp, n/v/d. Total 2 u pRBC total , IV Lasix   7/15 Pt seen and examined at bedside. Soft BP this AM, given 1 pRBC this AM, on Lasix 40 BID.   7/16: Patient seen and assessed at bedside. Complaining of cramping in her bilateral feet otherwise feeling better. Still feels pain on her right side but is controlled with Tylenol PRN. Denies sob on 3L NC, chest pain, nausea, vomiting, diarrhea. H/H improving s/p 3PRBC. B/L LE edema improving.  7/17: Patient seen and examined at bedside. Complaining of itching over her right sided hematoma and still experiencing cramping in her feet. Denies sob, chest pain, nausea, vomiting, diarrhea.  H/H improving s/p 3PRBC. B/L LE edema improving.  7/18: Patient seen and examined at bedside. Complained of itching over her right sided hematoma. She had pain in her right sided hematoma and back that was relieved with ice packs overnight. Denies sob, chest pain, nausea, vomitting, diarrhea. H/H continues to improve. B/L edema present. Start Coumadin from today , Low stable H/H   7/19 Patient seen and examined at bedside- she was receiving albuterol at the time of examination. Complained of pain and sensitivity over the right hematoma (alleviated with cold compress). Also complains of right foot pain- ordered foot x ray to look for any fractures. Both LE/ feet are swollen. Denies sob, nausea, vomitting, diarrhea. Hemoglobin 9.5 today (9.8 yesterday). Started coumadin yesterday - INR 2.02 this AM. Denies any BM- she hasn't been eating much bc she doesn't like the food. Chest imaging from the weekend shows some pleural effusions, will consider an IV push of lasix this afternoon if BP can tolerate.     OVERNIGHT EVENTS:    Home Medications:  aspirin 81 mg oral delayed release tablet: 1 tab(s) orally once a day (14 Jul 2022 04:17)  atorvastatin 20 mg oral tablet: 1 tab(s) orally once a day (14 Jul 2022 04:17)  clopidogrel 75 mg oral tablet: 1 tab(s) orally once a day (14 Jul 2022 04:17)  fluticasone 50 mcg/inh nasal spray: 1 spray(s) nasal 2 times a day (14 Jul 2022 04:17)  Lasix 40 mg oral tablet: 1 tab(s) orally 2 times a day (14 Jul 2022 04:17)  metoprolol succinate 50 mg oral tablet, extended release: 1 tab(s) orally once a day (14 Jul 2022 04:17)  sodium chloride 0.65% nasal spray: 2 spray(s) nasal 4 times a day (14 Jul 2022 04:17)  Synthroid 75 mcg (0.075 mg) oral tablet: 1 tab(s) orally once a day (14 Jul 2022 04:17)  warfarin 3 mg oral tablet: DO NOT Give Coumadin Today as INR 3.11, Check PT/INR on 7/1/22.Adjust Coumadin dose as per INR. start with Coumadin 2 mg   Keep INR 2-3    Pt&#x27;s HOME Coumadin schedule is as below  1 tab(s) orally once a day (M, Tu, Th, F, Sa Su) at Bed time   -Pt use to Take Coumadin 4 mg on every Wednesday (14 Jul 2022 04:17)  zinc oxide 40% topical ointment: Apply topically to affected area 2 times a day (14 Jul 2022 04:17)      MEDICATIONS  (STANDING):  albuterol/ipratropium for Nebulization 3 milliLiter(s) Nebulizer every 8 hours  aspirin enteric coated 81 milliGRAM(s) Oral daily  BACItracin   Ointment 1 Application(s) Topical two times a day  calamine/zinc oxide Lotion 1 Application(s) Topical daily  furosemide    Tablet 40 milliGRAM(s) Oral two times a day  levothyroxine 75 MICROGram(s) Oral daily  metoprolol succinate ER 50 milliGRAM(s) Oral daily    MEDICATIONS  (PRN):  acetaminophen     Tablet .. 650 milliGRAM(s) Oral every 6 hours PRN Mild Pain (1 - 3)  aluminum hydroxide/magnesium hydroxide/simethicone Suspension 30 milliLiter(s) Oral every 4 hours PRN Dyspepsia  melatonin 3 milliGRAM(s) Oral at bedtime PRN Insomnia  ondansetron Injectable 4 milliGRAM(s) IV Push every 8 hours PRN Nausea and/or Vomiting  sodium chloride 0.65% Nasal 1 Spray(s) Both Nostrils five times a day PRN Nasal Congestion      Allergies    No Known Allergies    Intolerances        Social History:  Lived at home alone. Former smoker. Denies etoh and other rec drug use. Ambulates with walker. Daughter is HCP. DNR/DNI, came from [facility] now (14 Jul 2022 04:06)      REVIEW OF SYSTEMS:  CONSTITUTIONAL: No fever, No chills, No fatigue, No myalgia, right hip and back Body ache, No Weakness  EYES: No eye pain,  No visual disturbances, No discharge, NO Redness  ENMT:  No ear pain, No nose bleed, No vertigo; No sinus pain, NO throat pain, No Congestion  NECK: No pain, No stiffness  RESPIRATORY: No cough, NO wheezing, No  hemoptysis, NO  shortness of breath  CARDIOVASCULAR: No chest pain, palpitations  GASTROINTESTINAL: No abdominal pain, NO epigastric pain. No nausea, No vomiting; No diarrhea, No constipation. [] BM  GENITOURINARY: No dysuria, No frequency, No urgency, No hematuria, NO incontinence  NEUROLOGICAL: No headaches, No dizziness, No numbness, No tingling, No tremors, No weakness  EXT: No Swelling, No Pain, No Edema  SKIN:  [X ]  itching on right hematoma, burning, rashes, or lesions   MUSCULOSKELETAL: No joint pain ,No Jt swelling; No muscle pain, No back pain, No extremity pain.   PSYCHIATRIC: No depression,  No anxiety,  No mood swings ,No difficulty sleeping at night  PAIN SCALE: [ ] None  [X ] Other- pain on right hip hematoma and right foot pain and sensitivity   ROS Unable to obtain due to - [  ] Dementia  [  ] Lethargy [  ] Drowsy [  ] Sedated [  ] non verbal  REST OF REVIEW Of SYSTEM - [ X ] Normal      Vital Signs Last 24 Hrs  T(C): 36.8 (19 Jul 2022 05:30), Max: 36.9 (18 Jul 2022 20:15)  T(F): 98.3 (19 Jul 2022 05:30), Max: 98.4 (18 Jul 2022 20:15)  HR: 70 (19 Jul 2022 05:30) (68 - 78)  BP: 109/55 (19 Jul 2022 05:30) (109/55 - 114/62)  BP(mean): --  RR: 18 (19 Jul 2022 05:30) (18 - 18)  SpO2: 93% (19 Jul 2022 05:30) (92% - 96%)    Parameters below as of 19 Jul 2022 05:30  Patient On (Oxygen Delivery Method): nasal cannula      Finger Stick        07-18 @ 07:01  -  07-19 @ 07:00  --------------------------------------------------------  IN: 0 mL / OUT: 1500 mL / NET: -1500 mL        PHYSICAL EXAM:  GENERAL:  [x  ] NAD , [x] well appearing, [  ] Agitated, [  ] Drowsy,  [  ] Lethargy, [  ] confused   HEAD:  [  x] Normal, [  ] Other  EYES:  [ x ] EOMI, [ x ] PERRLA, [ x ] conjunctiva and sclera clear normal, [  ] Other,  [  ] Pallor,[  ] Discharge  ENMT:  [x  ] Normal, [x ] dry  mucous membranes, [ x ] Good dentition, [x  ] No Thrush  NECK:  [ x ] Supple, [  x] + JVD, [x  ] Normal thyroid, [  ] Lymphadenopathy [  ] Other  CHEST/LUNG:  [ ] Clear to auscultation bilaterally, [ x ] Breath Sounds equal B/L / Decrease B/L  , [x  ] poor effort  [ x ] No rales, [ x ] No rhonchi  [ x]  Mild wheezing,   HEART:  [x ] Regular rate and rhythm, [  ] tachycardia, [  ] Bradycardia,  [  ] irregular  [x  ] + murmurs, No rubs, No gallops, [  ] PPM in place (Mfr:  ) [x] anasarca -IMPROVING  ABDOMEN:  [x  ] Soft, [ x ] Nontender, [ x] Nondistended, [ x ] No mass, [ x ] Bowel sounds present, [ x ] obese  NERVOUS SYSTEM:  [ x ] Alert & Oriented X3, [x  ] Nonfocal  [  ] Confusion  [  ] Encephalopathic [  ] Sedated [  ] Unable to assess, [  ] Dementia [  ] Other-  EXTREMITIES: [x  ] 2+ Peripheral Pulses, No clubbing, No cyanosis,  [ x ]  2+ edema B/L lower EXT. [ x ] PVD stasis skin changes B/L Lower EXT, erythema B/L Lower ext , small blister- Improved   [  ] wound  LYMPH: No lymphadenopathy noted  SKIN:  [x  ] large +R hematoma hip & Thigh , bruising over right elbow, right leg 2/2 fall, [  ] Pressure Ulcers, [ x ] ecchymosis, [  ] Skin Tears, [  ] Other    DIET: Diet, DASH/TLC:   Sodium & Cholesterol Restricted  1000mL Fluid Restriction ([id number]) (07-14-22 @ 08:48)      LABS:                        9.5    4.99  )-----------( 219      ( 19 Jul 2022 06:35 )             28.6       Ca    8.0        18 Jul 2022 06:18      PT/INR - ( 19 Jul 2022 06:35 )   PT: 23.8 sec;   INR: 2.02 ratio                                  Anemia Panel:      Thyroid Panel:  Thyroid Stimulating Hormone, Serum: 2.14 uIU/mL (07-14-22 @ 08:40)                RADIOLOGY & ADDITIONAL TESTS:      HEALTH ISSUES - PROBLEM Dx:  Fall    Anemia due to acute blood loss    Hematoma    Transaminitis    Afib    Lower extremity edema    Acute kidney injury superimposed on CKD    HTN (hypertension)    Hypothyroidism    HFrEF (heart failure with reduced ejection fraction)    Need for prophylactic measure    CAD (coronary artery disease)            Consultant(s) Notes Reviewed:  [X ] YES     Care Discussed with [X] Consultants  [ X] Patient  [ X] Family [  ] HCP [  ]   [  ] Social Service  [  ] RN, [  ] Physical Therapy,[  ] Palliative care team  DVT PPX: [  ] Lovenox, [  ] S C Heparin, [  ] Coumadin, [  ] Xarelto, [  ] Eliquis, [  ] Pradaxa, [  ] IV Heparin drip, [X] SCD [  ] Contraindication 2 to GI Bleed,[  ] Ambulation [  ] Contraindicated 2 to  bleed [  ] Contraindicated 2 to Brain Bleed  Advanced directive: [  ] None, [  ] DNR/DNI Patient is a 89y old  Female who presents with a chief complaint of Fall, with Anemia (18 Jul 2022 11:16)    HPI:  89 y/o F with a pmhx b/l LE edema, HTN, P Afib (on coumadin), HFref (EF35-40%) and hypothyroidism presents to the ED s/p fall. Patient was admitted to [facility] 6/17-6/30/22 for NSTEMI, hospital course c/b iatrogenic flash pulmonary edema, cath canceled due to vol overload, discharged to [facility]. She was doing well at [facility] until today. She went to bathroom for BM, had difficulty to clean herself. She was left unattended by [facility] staff in bathroom, she stood up to take few steps, lost balance, and fell on right side striking her right hip, right arm, right elbow, right side of neck. She denies any prodromal symptoms. No lightheadedness, chest pain, warm sensation. No dyspnea. Patient never lost consciousness. She was found to have Hg 6.9 at [facility] facility, sent to [facility] ED for further evaluation. She denies any significant pain right now. Only complains of right thigh twitching, swelling.  No fevers, chills, cp, dypsnea, abdominal pain. Admits lower ext swelling which is chronic for her.     In the ED  VS: T97.3 Hr78 /63 RR 16 SPO2 95% on 3L NC  Labs: WBC 4.43, HH 7.6/22.1, PLTS 225, Na 140, K 4.2, Cr 1.7, Gluc 112, Alk phos 326, AST//99  Chest Xray: lungs clear  Head CT: Stable exam. No acute intracranial hemorrhage, vasogenic edema, extra-axial collection or calvarial fracture.  Cervical spine CT: No acute cervical spine fracture or evidence of traumatic malalignment. Cervical spondylosis  CT A/P non con: Extensive diffuse soft tissue edema/anasarca. Small right, trace of bilateral pleural fluid. Small-to-moderate amount of simple free fluid around the liver and spleen. No definite intraperitoneal hemorrhage.No noncontrast evidence of acute injury to the chest, abdomen or pelvis.Concern for hematoma around the right hip soft tissues, partially visualized. No evidence of hip fracture.Mild intralobular septal thickening, may reflect pulmonary edema. Areas of atelectatic changes and possible airways impaction  EKG: ordered, pending  Given in ED: 1 unit prbc   (14 Jul 2022 04:06)    INTERVAL HPI:  7/14: Patient seen and examined at bedside. Is feeling okay. Has no acute complaints but is feeling slightly agitated. Denies sob, cp, n/v/d. Total 2 u pRBC total , IV Lasix   7/15 Pt seen and examined at bedside. Soft BP this AM, given 1 pRBC this AM, on Lasix 40 BID.   7/16: Patient seen and assessed at bedside. Complaining of cramping in her bilateral feet otherwise feeling better. Still feels pain on her right side but is controlled with Tylenol PRN. Denies sob on 3L NC, chest pain, nausea, vomiting, diarrhea. H/H improving s/p 3PRBC. B/L LE edema improving.  7/17: Patient seen and examined at bedside. Complaining of itching over her right sided hematoma and still experiencing cramping in her feet. Denies sob, chest pain, nausea, vomiting, diarrhea.  H/H improving s/p 3PRBC. B/L LE edema improving.  7/18: Patient seen and examined at bedside. Complained of itching over her right sided hematoma. She had pain in her right sided hematoma and back that was relieved with ice packs overnight. Denies sob, chest pain, nausea, vomitting, diarrhea. H/H continues to improve. B/L edema present. Start Coumadin from today , Low stable H/H   7/19 Patient seen and examined at bedside- she was receiving albuterol at the time of examination. Complained of pain and sensitivity over the right hematoma (alleviated with cold compress). Also complains of right foot pain- ordered foot x ray to look for any fractures. Both LE/ feet are swollen. Denies sob, nausea, vomitting, diarrhea. Hemoglobin 9.5 today (9.8 yesterday). Started coumadin yesterday - INR 2.02 this AM. Denies any BM- she hasn't been eating much bc she doesn't like the food. Chest imaging from the weekend shows some pleural effusions, will consider an IV push of lasix this afternoon if BP can tolerate.     OVERNIGHT EVENTS:    Home Medications:  aspirin 81 mg oral delayed release tablet: 1 tab(s) orally once a day (14 Jul 2022 04:17)  atorvastatin 20 mg oral tablet: 1 tab(s) orally once a day (14 Jul 2022 04:17)  clopidogrel 75 mg oral tablet: 1 tab(s) orally once a day (14 Jul 2022 04:17)  fluticasone 50 mcg/inh nasal spray: 1 spray(s) nasal 2 times a day (14 Jul 2022 04:17)  Lasix 40 mg oral tablet: 1 tab(s) orally 2 times a day (14 Jul 2022 04:17)  metoprolol succinate 50 mg oral tablet, extended release: 1 tab(s) orally once a day (14 Jul 2022 04:17)  sodium chloride 0.65% nasal spray: 2 spray(s) nasal 4 times a day (14 Jul 2022 04:17)  Synthroid 75 mcg (0.075 mg) oral tablet: 1 tab(s) orally once a day (14 Jul 2022 04:17)  warfarin 3 mg oral tablet: DO NOT Give Coumadin Today as INR 3.11, Check PT/INR on 7/1/22.Adjust Coumadin dose as per INR. start with Coumadin 2 mg   Keep INR 2-3    Pt&#x27;s HOME Coumadin schedule is as below  1 tab(s) orally once a day (M, Tu, Th, F, Sa Su) at Bed time   -Pt use to Take Coumadin 4 mg on every Wednesday (14 Jul 2022 04:17)  zinc oxide 40% topical ointment: Apply topically to affected area 2 times a day (14 Jul 2022 04:17)      MEDICATIONS  (STANDING):  albuterol/ipratropium for Nebulization 3 milliLiter(s) Nebulizer every 8 hours  aspirin enteric coated 81 milliGRAM(s) Oral daily  BACItracin   Ointment 1 Application(s) Topical two times a day  calamine/zinc oxide Lotion 1 Application(s) Topical daily  furosemide    Tablet 40 milliGRAM(s) Oral two times a day  levothyroxine 75 MICROGram(s) Oral daily  metoprolol succinate ER 50 milliGRAM(s) Oral daily    MEDICATIONS  (PRN):  acetaminophen     Tablet .. 650 milliGRAM(s) Oral every 6 hours PRN Mild Pain (1 - 3)  aluminum hydroxide/magnesium hydroxide/simethicone Suspension 30 milliLiter(s) Oral every 4 hours PRN Dyspepsia  melatonin 3 milliGRAM(s) Oral at bedtime PRN Insomnia  ondansetron Injectable 4 milliGRAM(s) IV Push every 8 hours PRN Nausea and/or Vomiting  sodium chloride 0.65% Nasal 1 Spray(s) Both Nostrils five times a day PRN Nasal Congestion      Allergies    No Known Allergies    Intolerances        Social History:  Lived at home alone. Former smoker. Denies etoh and other rec drug use. Ambulates with walker. Daughter is HCP. DNR/DNI, came from [facility] now (14 Jul 2022 04:06)      REVIEW OF SYSTEMS: i am better today   CONSTITUTIONAL: No fever, No chills, No fatigue, No myalgia, right hip and back Body ache, No Weakness  EYES: No eye pain,  No visual disturbances, No discharge, NO Redness  ENMT:  No ear pain, No nose bleed, No vertigo; No sinus pain, NO throat pain, No Congestion  NECK: No pain, No stiffness  RESPIRATORY: No cough, NO wheezing, No  hemoptysis, NO  shortness of breath  CARDIOVASCULAR: No chest pain, palpitations  GASTROINTESTINAL: No abdominal pain, NO epigastric pain. No nausea, No vomiting; No diarrhea, No constipation. [] BM  GENITOURINARY: No dysuria, No frequency, No urgency, No hematuria, NO incontinence  NEUROLOGICAL: No headaches, No dizziness, No numbness, No tingling, No tremors, No weakness  EXT: No Swelling, No Pain, No Edema  SKIN:  [X ]  itching on right hematoma, burning, rashes, or lesions   MUSCULOSKELETAL: No joint pain ,No Jt swelling; No muscle pain, No back pain, No extremity pain.   PSYCHIATRIC: No depression,  No anxiety,  No mood swings ,No difficulty sleeping at night  PAIN SCALE: [ ] None  [X ] Other- pain on right hip hematoma and right foot pain and sensitivity   ROS Unable to obtain due to - [  ] Dementia  [  ] Lethargy [  ] Drowsy [  ] Sedated [  ] non verbal  REST OF REVIEW Of SYSTEM - [ X ] Normal      Vital Signs Last 24 Hrs  T(C): 36.8 (19 Jul 2022 05:30), Max: 36.9 (18 Jul 2022 20:15)  T(F): 98.3 (19 Jul 2022 05:30), Max: 98.4 (18 Jul 2022 20:15)  HR: 70 (19 Jul 2022 05:30) (68 - 78)  BP: 109/55 (19 Jul 2022 05:30) (109/55 - 114/62)  BP(mean): --  RR: 18 (19 Jul 2022 05:30) (18 - 18)  SpO2: 93% (19 Jul 2022 05:30) (92% - 96%)    Parameters below as of 19 Jul 2022 05:30  Patient On (Oxygen Delivery Method): nasal cannula      Finger Stick        07-18 @ 07:01  -  07-19 @ 07:00  --------------------------------------------------------  IN: 0 mL / OUT: 1500 mL / NET: -1500 mL        PHYSICAL EXAM:  GENERAL:  [x  ] NAD , [x] well appearing, [  ] Agitated, [  ] Drowsy,  [  ] Lethargy, [  ] confused   HEAD:  [  x] Normal, [  ] Other  EYES:  [ x ] EOMI, [ x ] PERRLA, [ x ] conjunctiva and sclera clear normal, [  ] Other,  [  ] Pallor,[  ] Discharge  ENMT:  [x  ] Normal, [x ] dry  mucous membranes, [ x ] Good dentition, [x  ] No Thrush  NECK:  [ x ] Supple, [  x] + JVD, [x  ] Normal thyroid, [  ] Lymphadenopathy [  ] Other  CHEST/LUNG:  [ ] Clear to auscultation bilaterally, [ x ] Breath Sounds equal B/L / Decrease B/L  , [x  ] poor effort  [ x ] No rales, [ x ] No rhonchi  [ x]  Mild wheezing,   HEART:  [x ] Regular rate and rhythm, [  ] tachycardia, [  ] Bradycardia,  [  ] irregular  [x  ] + murmurs, No rubs, No gallops, [  ] PPM in place (Mfr:  ) [x] anasarca -IMPROVING  ABDOMEN:  [x  ] Soft, [ x ] Nontender, [ x] Nondistended, [ x ] No mass, [ x ] Bowel sounds present, [ x ] obese  NERVOUS SYSTEM:  [ x ] Alert & Oriented X3, [x  ] Nonfocal  [  ] Confusion  [  ] Encephalopathic [  ] Sedated [  ] Unable to assess, [  ] Dementia [  ] Other-  EXTREMITIES: [x  ] 2+ Peripheral Pulses, No clubbing, No cyanosis,  [ x ]  2+ edema B/L lower EXT. [ x ] PVD stasis skin changes B/L Lower EXT, erythema B/L Lower ext , small blister- Improved   [  ] wound  LYMPH: No lymphadenopathy noted  SKIN:  [x  ] large +R hematoma hip & Thigh , bruising over right elbow, right leg 2/2 fall, [  ] Pressure Ulcers, [ x ] ecchymosis, [  ] Skin Tears, [  ] Other    DIET: Diet, DASH/TLC:   Sodium & Cholesterol Restricted  1000mL Fluid Restriction ([id number]) (07-14-22 @ 08:48)      LABS:                        9.5    4.99  )-----------( 219      ( 19 Jul 2022 06:35 )             28.6     19 Jul 2022 06:35    140    |  102    |  31     ----------------------------<  81     3.7     |  36     |  1.40     Ca    7.9        19 Jul 2022 06:35  Phos  3.5       19 Jul 2022 06:35  Mg     2.1       19 Jul 2022 06:35    TPro  x      /  Alb  x      /  TBili  2.8    /  DBili  1.8    /  AST  x      /  ALT  x      /  AlkPhos  x      19 Jul 2022 12:20    Ca    8.0        18 Jul 2022 06:18      PT/INR - ( 19 Jul 2022 06:35 )   PT: 23.8 sec;   INR: 2.02 ratio           Anemia Panel:      Thyroid Panel:  Thyroid Stimulating Hormone, Serum: 2.14 uIU/mL (07-14-22 @ 08:40)    RADIOLOGY & ADDITIONAL TESTS:NONE      HEALTH ISSUES - PROBLEM Dx:  Fall    Anemia due to acute blood loss    Hematoma    Transaminitis    Afib    Lower extremity edema    Acute kidney injury superimposed on CKD    HTN (hypertension)    Hypothyroidism    HFrEF (heart failure with reduced ejection fraction)    Need for prophylactic measure    CAD (coronary artery disease)            Consultant(s) Notes Reviewed:  [X ] YES     Care Discussed with [X] Consultants  [ X] Patient  [ X] Family- dtr  [  ] HCP [  ]   [x  ] Social Service  [x  ] RN, [  ] Physical Therapy,[ x ] Palliative care team  DVT PPX: [  ] Lovenox, [  ] S C Heparin, [ x ] Coumadin, [  ] Xarelto, [  ] Eliquis, [  ] Pradaxa, [  ] IV Heparin drip, [X] SCD [  ] Contraindication 2 to GI Bleed,[  ] Ambulation [  ] Contraindicated 2 to  bleed [  ] Contraindicated 2 to Brain Bleed  Advanced directive: [  ] None, [ x ] DNR/DNI

## 2022-07-19 NOTE — PROGRESS NOTE ADULT - PROBLEM SELECTOR PLAN 7
Unclear etiology. had workup during last admission. presumably multifactorial due to CHF vs meds. Trending down.  - avoid hepatotoxins  - GI Dr. Lockwood following - recent NSTEMI 6/2022  - restarted home asa (7/16/22), stable H/H  - Per cardio hold Plavix for now in setting of bleeding s/p fall on coumadin  - hold statin for transaminitis  - [facility] consulted

## 2022-07-19 NOTE — PROGRESS NOTE ADULT - ASSESSMENT
89 yo woman with multifactorial anemia due to chronic disease including renal disease complicated by a fall on 7/14/22 on coumadin with Ct scan of right hip showing a hematoma.  INR mildly elevated 3.28. Patient was also on aspirin and plavix due to recent NSTEMI    -restarted on ASA and coumadin  -CBC stable, clinically no incr bleed or bruse  -cont coumadinization, monitor serial CBC    discussed w pt

## 2022-07-19 NOTE — PROGRESS NOTE ADULT - SUBJECTIVE AND OBJECTIVE BOX
[facility]  Dawson Dolan PA-C  [address] [place] [address]  [phone]      INTERVAL HPI/OVERNIGHT EVENTS:  Pt s/e  Reports some pain in R hip where she fell  Denies abdominal pain, N/V/D or further GI complaints    MEDICATIONS  (STANDING):  albuterol/ipratropium for Nebulization 3 milliLiter(s) Nebulizer every 8 hours  aspirin enteric coated 81 milliGRAM(s) Oral daily  BACItracin   Ointment 1 Application(s) Topical two times a day  calamine/zinc oxide Lotion 1 Application(s) Topical daily  furosemide    Tablet 40 milliGRAM(s) Oral two times a day  levothyroxine 75 MICROGram(s) Oral daily  metoprolol succinate ER 50 milliGRAM(s) Oral daily  warfarin 2.5 milliGRAM(s) Oral once    MEDICATIONS  (PRN):  acetaminophen     Tablet .. 650 milliGRAM(s) Oral every 6 hours PRN Mild Pain (1 - 3)  aluminum hydroxide/magnesium hydroxide/simethicone Suspension 30 milliLiter(s) Oral every 4 hours PRN Dyspepsia  melatonin 3 milliGRAM(s) Oral at bedtime PRN Insomnia  ondansetron Injectable 4 milliGRAM(s) IV Push every 8 hours PRN Nausea and/or Vomiting  sodium chloride 0.65% Nasal 1 Spray(s) Both Nostrils five times a day PRN Nasal Congestion      Allergies  No Known Allergies      PHYSICAL EXAM:   Vital Signs:  Vital Signs Last 24 Hrs  T(C): 36.8 (2022 05:30), Max: 36.9 (2022 20:15)  T(F): 98.3 (2022 05:30), Max: 98.4 (2022 20:15)  HR: 71 (2022 08:00) (68 - 78)  BP: 109/55 (2022 05:30) (109/55 - 114/62)  BP(mean): --  RR: 18 (2022 05:30) (18 - 18)  SpO2: 93% (2022 05:30) (92% - 96%)    Parameters below as of 2022 08:00  Patient On (Oxygen Delivery Method): nasal cannula      Daily     Daily Weight in k.3 (2022 05:30)    GENERAL:  Appears stated age  ABDOMEN:  Soft, non-tender, non-distended  NEURO:  Alert    LABS:                        9.5    4.99  )-----------( 219      ( 2022 06:35 )             28.6         140  |  102  |  31<H>  ----------------------------<  81  3.7   |  36<H>  |  1.40<H>    Ca    7.9<L>      2022 06:35  Phos  3.5       Mg     2.1         TPro  6.7  /  Alb  1.7<L>  /  TBili  3.0<H>  /  DBili  x   /  AST  60<H>  /  ALT  52  /  AlkPhos  305<H>      PT/INR - ( 2022 06:35 )   PT: 23.8 sec;   INR: 2.02 ratio

## 2022-07-19 NOTE — PROGRESS NOTE ADULT - PROBLEM SELECTOR PLAN 9
LUIS CARLOS on CKD III, likely prerenal secondary to active bleeding   - Creatinine baseline 1.2, this morning 1.4   - Avoid nephrotoxic medications   - Low salt diet w/ 1 lt fluid restriction   - Nephro Following  -Lasix 40 mg q 12 hrs PO chronic, swelling LE b/l on admit- chronic edema   - On Lasix 40 mg BID PO

## 2022-07-19 NOTE — PROGRESS NOTE ADULT - PROBLEM SELECTOR PLAN 2
Right hip hematoma s/p mechanical fall at [facility], CT right hip shows subcutaneous hematoma along the lateral aspect of the right hip, no acute displaced fracture or dislocation within the pelvis.  -- no need for coumadin reversal agents for now as d/w Dr Lundy -Hematology  - 3 u pRBC given in total   - cool compresses prn  - calamine lotion for itch  -HOLD Plavix, HOLD AC- Started ASA (7/16/22) Right hip hematoma s/p mechanical fall at [facility], CT right hip shows subcutaneous hematoma along the lateral aspect of the right hip, no acute displaced fracture or dislocation within the pelvis.  -- no need for coumadin reversal agents for now as d/w Dr Lundy -Hematology  - 3 u pRBC given in total   - cool compresses prn  - calamine lotion for itch  -HOLD Plavix, HOLD AC- Started ASA (7/16/22)  -Pt with traumatic Rt Thigh hematoma 2/2 Fall POA , pt also on Anticoagulants

## 2022-07-19 NOTE — PROGRESS NOTE ADULT - SUBJECTIVE AND OBJECTIVE BOX
Patient is a 89y old  Female who presents with a chief complaint of Fall, ABLA (14 Jul 2022 04:06)       HPI:  89 y/o F with a pmhx b/l LE edema, HTN, P Afib (on coumadin), HFref (EF35-40%) and hypothyroidism presents to the ED s/p fall. Patient was admitted to [facility] [place] 6/17-6/30/22 for NSTEMI, hospital course c/b iatrogenic flash pulmonary edema, cath canceled due to vol overload, discharged to [facility]. She was doing well at [facility] until today. She went to bathroom for BM, had difficulty to clean herself. She was left unattended by [facility] staff in bathroom, she stood up to take few steps, lost balance, and fell on right side striking her right hip, right arm, right elbow, right side of neck. She denies any prodromal symptoms. No lightheadedness, chest pain, warm sensation. No dyspnea. Patient never lost consciousness. She was found to have Hg 6.9 at [facility] facility, sent to [facility] [place] ED for further evaluation. She denies any significant pain right now. Only complains of right thigh twitching, swelling.  No fevers, chills, cp, dypsnea, abdominal pain. Admits lower ext swelling which is chronic for her.     Follow up LUIS CARLOS/CKD  No acute complaints this morning    PAST MEDICAL & SURGICAL HISTORY:  Hypothyroid      Hypertension      Lower extremity edema      Paroxysmal atrial fibrillation      No significant past surgical history           FAMILY HISTORY:  No pertinent family history in first degree relatives    [place]    Social History:Non smoker    MEDICATIONS  (STANDING):  furosemide    Tablet 40 milliGRAM(s) Oral every 12 hours  levothyroxine 75 MICROGram(s) Oral daily  metoprolol succinate ER 50 milliGRAM(s) Oral daily    MEDICATIONS  (PRN):  aluminum hydroxide/magnesium hydroxide/simethicone Suspension 30 milliLiter(s) Oral every 4 hours PRN Dyspepsia  melatonin 3 milliGRAM(s) Oral at bedtime PRN Insomnia  ondansetron Injectable 4 milliGRAM(s) IV Push every 8 hours PRN Nausea and/or Vomiting   Meds reviewed    Allergies    No Known Allergies    Intolerances         REVIEW OF SYSTEMS:    Review of Systems:   Constitutional: Denies fatigue  HEENT: Denies headaches and dizziness  Respiratory: denies SOB, cough, or wheezing  Cardiovascular: denies CP, palpitations  Gastrointestinal: Denies nausea, denies vomiting, diarrhea, constipation, abdominal pain, or bloody stools  Genitourinary: denies painful urination, increased frequency, urgency, or bloody urine  Skin: denies rashes or itching  Musculoskeletal: denies muscle aches, joint swelling  Neurologic: Denies generalized weakness, denies loss of sensation, numbness, or tingling      Vital Signs Last 24 Hrs  T(C): 36.8 (19 Jul 2022 05:30), Max: 36.9 (18 Jul 2022 20:15)  T(F): 98.3 (19 Jul 2022 05:30), Max: 98.4 (18 Jul 2022 20:15)  HR: 71 (19 Jul 2022 08:00) (68 - 78)  BP: 109/55 (19 Jul 2022 05:30) (109/55 - 114/62)  BP(mean): --  RR: 18 (19 Jul 2022 05:30) (18 - 18)  SpO2: 93% (19 Jul 2022 05:30) (92% - 96%)    Parameters below as of 19 Jul 2022 08:00  Patient On (Oxygen Delivery Method): nasal cannula            Daily Height in cm: 165.1 (13 Jul 2022 23:24)    Daily     PHYSICAL EXAM:    GENERAL: NAD  HEAD:  Atraumatic, Normocephalic  EYES: EOMI, conjunctiva and sclera clear  ENMT: No Drainage from nares, No drainage from ears  NECK: Supple, neck  veins full  NERVOUS SYSTEM:  Awake and Alert  CHEST/LUNG: Clear to percussion bilaterally; No rales, rhonchi, wheezing, or rubs  HEART: Regular rate and rhythm; No murmurs, rubs, or gallops  ABDOMEN: Soft, Nontender, Nondistended; Bowel sounds present  EXTREMITIES:  1+ pitting edema  SKIN: UE ecchymosis      LABS:                                             9.5    4.99  )-----------( 219      ( 19 Jul 2022 06:35 )             28.6     07-19    140  |  102  |  31<H>  ----------------------------<  81  3.7   |  36<H>  |  1.40<H>    Ca    7.9<L>      19 Jul 2022 06:35  Phos  3.5     07-19  Mg     2.1     07-19    TPro  6.7  /  Alb  1.7<L>  /  TBili  3.0<H>  /  DBili  x   /  AST  60<H>  /  ALT  52  /  AlkPhos  305<H>  07-19    PT/INR - ( 19 Jul 2022 06:35 )   PT: 23.8 sec;   INR: 2.02 ratio

## 2022-07-19 NOTE — PROGRESS NOTE ADULT - PROBLEM SELECTOR PLAN 4
s/p mechanical fall   - CT right hip shows subcutaneous hematoma along the lateral aspect of the right hip, no acute displaced fracture or dislocation within the pelvis.  - PT recommending [facility]  - Fall and aspiration precautions On last TTE showed EF 35-40%,-Chronic Systolic CHF  - chest imaging from the weekend shows pleural effusion and fluid overload, will consider IV lasix this PM if BP can tolerate (/55 in the AM)   - Lasix 40 mg BID   - On Toprol 50 mg with holding parameters   - maintain net negative balance; strict I/Os, daily weight  - titrate off NC   - Cardio [facility] group following On last TTE showed EF 35-40%,-Chronic Systolic CHF  - chest imaging from the weekend shows pleural effusion and fluid overload, will consider IV lasix this PM if BP can tolerate (/55 in the AM)   - Lasix 40 mg BID , 1 dose EXTR IV Lasix 20 mg x 1 today as d/w cardiology today  - On Toprol 50 xl  mg with holding parameters   - maintain net negative balance; strict I/Os, daily weight  - titrate off NC   - Cardio [facility] following  -D/W [facility] -Dr ALEXANDRU mckay about CXR with b/l pleural effusions -Unchanged from previous CXR  2/2 pt's age & CKD as well as soft BP will continu Lasix 40 mg 2x day on d/c

## 2022-07-19 NOTE — PROGRESS NOTE ADULT - PROBLEM SELECTOR PLAN 10
BP wnl this AM (109/55)  - continue Toprol 50mg qD w/ hold parameters  - routine hemodynamic monitoring. LUIS CARLOS on CKD III, likely prerenal secondary to active bleeding   - Creatinine baseline 1.2, this morning 1.4   - Avoid nephrotoxic medications   - Low salt diet w/ 1 lt fluid restriction   - Nephro Following  -Lasix 40 mg q 12 hrs PO

## 2022-07-19 NOTE — PROGRESS NOTE ADULT - ASSESSMENT
89 y/o F with pmhx PAF (on coumadin), HFrEF (35-40%), CKD, HTN, aortic stenosis, TR, chronic LE edema, hypothyroidism, with recent admission to [facility] for CAD in mid June, found to have NSTEMI with subsequent flash pulmonary edema, unable to go for cath due to volume overload, discharged to [facility], now presenting s/p mechanical unwitnessed fall at [facility]. Found to have R hip hematoma, Coumadin, asa, plavix being held due to hg 6.9 in setting of acute blood loss anemia.     CAD with recent NSTEMI (mid June), unable to undergo cath at that time  - EKG:  Afib with LAD, LBBB, no acute ischemic changes  - No anginal complaints to date  - Continue ASA.  No need to resume Plavix  - Continue to hold statin d/t transaminitis.  Resume when able  - Monitor and replete lytes, keep K>4, Mg>2.    - BP, HR controlled and stable   - Continue BB  - continue daily dose coumadin for goal INR 2-3,  INR 2.03 today, Risk vs benefits need to be addressed    - TTE 6/2022 with EF 35-40% with mod AS, mod TR  - mild overload on exam.  Titrate off NC as tolerated  - Continue Lasix  - Monitor volume status closely.  Maintain strict I/O's    - Will continue to follow.    AMELIA Gar  Nurse Practitioner - Cardiology   Spectra #[phone]   89 y/o F with pmhx PAF (on coumadin), HFrEF (35-40%), CKD, HTN, aortic stenosis, TR, chronic LE edema, hypothyroidism, with recent admission to [facility] for CAD in mid June, found to have NSTEMI with subsequent flash pulmonary edema, unable to go for cath due to volume overload, discharged to [facility], now presenting s/p mechanical unwitnessed fall at [facility]. Found to have R hip hematoma, Coumadin, asa, plavix being held due to hg 6.9 in setting of acute blood loss anemia.     CAD with recent NSTEMI (mid June), unable to undergo cath at that time  - EKG:  Afib with LAD, LBBB, no acute ischemic changes  - No anginal complaints to date  - Continue ASA.  No need to resume Plavix  - Continue to hold statin d/t transaminitis.  Resume when able  - Monitor and replete lytes, keep K>4, Mg>2.    - BP, HR controlled and stable   - Continue BB  - continue daily dose coumadin for goal INR 2-3,  INR 2.03 today, Risk vs benefits need to be addressed    - TTE 6/2022 with EF 35-40% with mod AS, mod TR  - mild overload on exam.  Titrate off NC as tolerated  - Continue Lasix. can give extra 20mg IV today  - Monitor volume status closely.  Maintain strict I/O's    - Will continue to follow.    AMELIA Gar  Nurse Practitioner - Cardiology   Spectra #[phone]

## 2022-07-19 NOTE — PROGRESS NOTE ADULT - ASSESSMENT
89 y/o F with a pmhx b/l LE edema, HTN, P Afib (on coumadin), HFref (EF35-40%), hypothyroidism, recent admission for NSTEMI presents to the ED s/p mechanical fall at [facility] on coumadin. Found to have ABLA with right hematoma.

## 2022-07-19 NOTE — PROGRESS NOTE ADULT - PROBLEM SELECTOR PLAN 11
TSH:  2.14 wnl  - continue home synthroid BP wnl this AM (109/55)  - continue Toprol 50mg qD w/ hold parameters  - routine hemodynamic monitoring.

## 2022-07-19 NOTE — PROGRESS NOTE ADULT - SUBJECTIVE AND OBJECTIVE BOX
All interim records and events noted.    restarted on coumadin, tolerating well, no incr bleed or bruise  no SOB or pain      MEDICATIONS  (STANDING):  albuterol/ipratropium for Nebulization 3 milliLiter(s) Nebulizer every 8 hours  aspirin enteric coated 81 milliGRAM(s) Oral daily  BACItracin   Ointment 1 Application(s) Topical two times a day  calamine/zinc oxide Lotion 1 Application(s) Topical daily  furosemide    Tablet 40 milliGRAM(s) Oral two times a day  levothyroxine 75 MICROGram(s) Oral daily  metoprolol succinate ER 50 milliGRAM(s) Oral daily  warfarin 2.5 milliGRAM(s) Oral once    MEDICATIONS  (PRN):  acetaminophen     Tablet .. 650 milliGRAM(s) Oral every 6 hours PRN Mild Pain (1 - 3)  aluminum hydroxide/magnesium hydroxide/simethicone Suspension 30 milliLiter(s) Oral every 4 hours PRN Dyspepsia  melatonin 3 milliGRAM(s) Oral at bedtime PRN Insomnia  ondansetron Injectable 4 milliGRAM(s) IV Push every 8 hours PRN Nausea and/or Vomiting  sodium chloride 0.65% Nasal 1 Spray(s) Both Nostrils five times a day PRN Nasal Congestion      Vital Signs Last 24 Hrs  T(C): 36.8 (19 Jul 2022 05:30), Max: 36.9 (18 Jul 2022 20:15)  T(F): 98.3 (19 Jul 2022 05:30), Max: 98.4 (18 Jul 2022 20:15)  HR: 71 (19 Jul 2022 08:00) (68 - 78)  BP: 109/55 (19 Jul 2022 05:30) (109/55 - 114/62)  BP(mean): --  RR: 18 (19 Jul 2022 05:30) (18 - 18)  SpO2: 93% (19 Jul 2022 05:30) (92% - 96%)    Parameters below as of 19 Jul 2022 08:00  Patient On (Oxygen Delivery Method): nasal cannula        PHYSICAL EXAM  General: well developed  well nourished, but frail elderly woman in bed, in no acute distress  Head: atraumatic, normocephalic  ENT: sclera anicteric, buccal mucosa moist  Neck: supple, trachea midline  CV: S1 S2, regular rate and rhythm  Lungs: clear to auscultation, no wheezes/rhonchi  Abdomen: soft, nontender, bowel sounds present, no palpable masses  Extrem: no clubbing/cyanosis/edema  Skin: no significant increased ecchymosis/petechiae  Neuro: alert and oriented X3,  no focal deficits      LABS:             9.5    4.99  )-----------( 219      ( 07-19 @ 06:35 )             28.6                9.8    4.76  )-----------( 222      ( 07-18 @ 06:18 )             30.7                8.9    4.15  )-----------( 204      ( 07-17 @ 07:43 )             27.1       07-19    140  |  102  |  31<H>  ----------------------------<  81  3.7   |  36<H>  |  1.40<H>    Ca    7.9<L>      19 Jul 2022 06:35  Phos  3.5     07-19  Mg     2.1     07-19    TPro  6.7  /  Alb  1.7<L>  /  TBili  3.0<H>  /  DBili  x   /  AST  60<H>  /  ALT  52  /  AlkPhos  305<H>  07-19 07-19 @ 06:35  PT23.8 INR2.02  PTT--  07-18 @ 06:18  PT25.1 INR2.13  PTT--  07-17 @ 07:43  PT30.4 INR2.57  PTT--  07-16 @ 07:05  PT31.9 INR2.70  PTT--  07-15 @ 06:50  PT32.7 INR2.77  PTT--      RADIOLOGY & ADDITIONAL STUDIES:    IMPRESSION/RECOMMENDATIONS:

## 2022-07-19 NOTE — PROGRESS NOTE ADULT - PROBLEM SELECTOR PLAN 5
chronic, stable - rate-controlled   - INR 2.02 this AM  - 3mg  coumadin, continue to monitor INR   - Toprol  XL 50mg PO qd w/ hold parameters  - cardio Dr. charles group following s/p mechanical fall   - CT right hip shows subcutaneous hematoma along the lateral aspect of the right hip, no acute displaced fracture or dislocation within the pelvis.  - PT recommending [facility]  - Fall and aspiration precautions

## 2022-07-19 NOTE — PROGRESS NOTE ADULT - PROBLEM SELECTOR PLAN 8
chronic, swelling LE b/l on admit- chronic edema   - On Lasix 40 mg BID PO Unclear etiology. had workup during last admission. presumably multifactorial due to CHF vs meds. Trending down.  - avoid hepatotoxins  - GI Dr. Lockwood following

## 2022-07-19 NOTE — PROGRESS NOTE ADULT - PROBLEM SELECTOR PLAN 3
On last TTE showed EF 35-40%,-Chronic Systolic CHF  - On Toprol 50 mg with holding parameters   - Lasix  PO 40 mg BID   - maintain net negative balance; strict I/Os, daily weight  - titrate off NC   - Cardio [facility] group following On last TTE showed EF 35-40%,-Chronic Systolic CHF  - chest imaging from the weekend shows pleural effusion and fluid overload, will consider IV lasix this PM if BP can tolerate (/55 in the AM)   - Lasix 40 mg BID   - On Toprol 50 mg with holding parameters   - maintain net negative balance; strict I/Os, daily weight  - titrate off NC   - Cardio [facility] group following s/p mechanical fall at [facility], CT right hip shows subcutaneous hematoma along lateral aspect of right hip   pt now endorses pain and swelling of right foot, suspects ankle fracture  foot Xray ordered to evaluate for any fractures, f/u on results s/p mechanical fall at [facility], CT right hip shows subcutaneous hematoma along lateral aspect of right hip   pt now endorses pain and swelling of right foot, suspects ankle fracture  foot Xray  [facility] ordered to evaluate for any fractures, f/u on results

## 2022-07-19 NOTE — CHART NOTE - NSCHARTNOTEFT_GEN_A_CORE
RN called to notify /59 prior to scheduled dose of Lasix 20mg IV. On eval patient with b/l LE edema and crackles RN called to notify /59 prior to scheduled dose of Lasix 20mg IV. On eval patient with b/l LE edema and bibasilar crackles on auscultation of lung fields. Appears to be volume overloaded, will proceed with scheduled Lasix.

## 2022-07-19 NOTE — PROGRESS NOTE ADULT - PROBLEM SELECTOR PLAN 1
-2/2 Traumatic Rt Thigh Hematoma s/p fall at [facility], Acute & Chronic Anemia   -s/p 3 u pRBC given   - 81 mg ASA restarted 7/16, discussed with cardiology -STOP  Plavix   - restarted warfarin 3 mg, OK with hematology (INR 2.02 today) as d/w Hematology   - continue daily INR  - type and screen, blood consent in chart  - Trend CBC and signs or symptoms of active bleeding -2/2 Traumatic Rt Thigh Hematoma s/p fall at [facility], Acute & Chronic Anemia   -s/p 3 u pRBC given   - 81 mg ASA restarted 7/16, discussed with cardiology -STOP  Plavix   - restarted warfarin 3 mg, OK with hematology (INR 2.02 today) as d/w Hematology   - continue daily INR  - type and screen, blood consent in chart  - Trend CBC and signs or symptoms of active bleeding  -

## 2022-07-19 NOTE — PROGRESS NOTE ADULT - ASSESSMENT
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    LUIS CARLOS on CKD IIIb  -Likely prerenal 2/2 acute blood loss  and hypoxemic injury  -Baseline creatinine ~1.2 as per recent admission  -Urine indices reviewed   -Renal indices are stable near baseline with mild degree of fluctuation  -Continue low salt diet w/ 1L fluid restriction  -Conservative renal management       HTN with CKD  -Controlled. Can restart PURVI blockade if BP tolerates     Volume Overload  -Lasix 40 mg po bid. Renal wise tolerating     Anemia/Hematoma  -S/P PRBC x 2

## 2022-07-19 NOTE — PROGRESS NOTE ADULT - PROBLEM SELECTOR PLAN 6
- recent NSTEMI 6/2022  - restarted home asa (7/16/22), stable H/H  - Per cardio hold Plavix for now in setting of bleeding s/p fall on coumadin  - hold statin for transaminitis  - Cardio [facility] group consulted chronic, stable - rate-controlled   - INR 2.02 this AM  - 3mg  coumadin, continue to monitor INR   - Toprol  XL 50mg PO qd w/ hold parameters  - cardio Dr. charles group following

## 2022-07-19 NOTE — PROGRESS NOTE ADULT - SUBJECTIVE AND OBJECTIVE BOX
[facility] -- Reid Astorga, Norah, Raheem, Flex Muniz, Jaocb Blair: Office # [phone]    Follow Up:   S/P Fall, HFrEF, Afib    Subjective/Observations: Patient seen and examined, awake, alert, resting comfortably in bed, denies chest pain, dyspnea, palpitations or dizziness, c/o right hip pain. Tolerating O2 via NC     REVIEW OF SYSTEMS: All review of systems is negative for eye, ENT, GI, , allergic, dermatologic, musculoskeletal and neurologic except as described above    PAST MEDICAL & SURGICAL HISTORY:  Hypothyroid      Hypertension      Lower extremity edema      Paroxysmal atrial fibrillation      NSTEMI (non-ST elevation myocardial infarction)  june 2022      Chronic systolic congestive heart failure      Stage 3 chronic kidney disease      Anemia of chronic disease      No significant past surgical history          MEDICATIONS  (STANDING):  albuterol/ipratropium for Nebulization 3 milliLiter(s) Nebulizer every 8 hours  aspirin enteric coated 81 milliGRAM(s) Oral daily  BACItracin   Ointment 1 Application(s) Topical two times a day  calamine/zinc oxide Lotion 1 Application(s) Topical daily  furosemide    Tablet 40 milliGRAM(s) Oral two times a day  levothyroxine 75 MICROGram(s) Oral daily  metoprolol succinate ER 50 milliGRAM(s) Oral daily  warfarin 2.5 milliGRAM(s) Oral once    MEDICATIONS  (PRN):  acetaminophen     Tablet .. 650 milliGRAM(s) Oral every 6 hours PRN Mild Pain (1 - 3)  aluminum hydroxide/magnesium hydroxide/simethicone Suspension 30 milliLiter(s) Oral every 4 hours PRN Dyspepsia  melatonin 3 milliGRAM(s) Oral at bedtime PRN Insomnia  ondansetron Injectable 4 milliGRAM(s) IV Push every 8 hours PRN Nausea and/or Vomiting  sodium chloride 0.65% Nasal 1 Spray(s) Both Nostrils five times a day PRN Nasal Congestion    Allergies    No Known Allergies    Intolerances      Vital Signs Last 24 Hrs  T(C): 36.8 (19 Jul 2022 05:30), Max: 36.9 (18 Jul 2022 20:15)  T(F): 98.3 (19 Jul 2022 05:30), Max: 98.4 (18 Jul 2022 20:15)  HR: 71 (19 Jul 2022 08:00) (68 - 78)  BP: 109/55 (19 Jul 2022 05:30) (109/55 - 114/62)  BP(mean): --  RR: 18 (19 Jul 2022 05:30) (18 - 18)  SpO2: 93% (19 Jul 2022 05:30) (92% - 96%)    Parameters below as of 19 Jul 2022 08:00  Patient On (Oxygen Delivery Method): nasal cannula      I&O's Summary    18 Jul 2022 07:01  -  19 Jul 2022 07:00  --------------------------------------------------------  IN: 0 mL / OUT: 1500 mL / NET: -1500 mL        TELE: Not on telemetry   PHYSICAL EXAM:  Constitutional: NAD, awake and alert, obese  HEENT: Moist Mucous Membranes, Anicteric  Pulmonary: Non-labored, breath sounds are clear bilaterally, No wheezing, rales or rhonchi  Cardiovascular: irregular, S1 and S2, + murmurs, rubs, gallops or clicks  Gastrointestinal: Bowel Sounds present, soft, nontender.   Lymph: No peripheral edema. No lymphadenopathy.  Skin: No visible rashes or ulcers. R hip ecchymosis   Psych:  Mood & affect appropriate for situation      LABS: All Labs Reviewed:                        9.5    4.99  )-----------( 219      ( 19 Jul 2022 06:35 )             28.6                         9.8    4.76  )-----------( 222      ( 18 Jul 2022 06:18 )             30.7                         8.9    4.15  )-----------( 204      ( 17 Jul 2022 07:43 )             27.1     19 Jul 2022 06:35    140    |  102    |  31     ----------------------------<  81     3.7     |  36     |  1.40   18 Jul 2022 06:18    140    |  103    |  34     ----------------------------<  85     3.6     |  33     |  1.30   17 Jul 2022 07:43    141    |  105    |  35     ----------------------------<  88     3.6     |  32     |  1.30     Ca    7.9        19 Jul 2022 06:35  Ca    8.0        18 Jul 2022 06:18  Ca    8.2        17 Jul 2022 07:43  Phos  3.5       19 Jul 2022 06:35  Phos  3.7       18 Jul 2022 06:18  Phos  3.7       17 Jul 2022 07:43  Mg     2.1       19 Jul 2022 06:35  Mg     2.3       18 Jul 2022 06:18  Mg     2.4       17 Jul 2022 07:43    TPro  6.7    /  Alb  1.7    /  TBili  3.0    /  DBili  x      /  AST  60     /  ALT  52     /  AlkPhos  305    19 Jul 2022 06:35  TPro  6.8    /  Alb  1.8    /  TBili  3.0    /  DBili  x      /  AST  72     /  ALT  58     /  AlkPhos  309    18 Jul 2022 06:18  TPro  6.3    /  Alb  1.7    /  TBili  2.8    /  DBili  x      /  AST  84     /  ALT  63     /  AlkPhos  302    17 Jul 2022 07:43    PT/INR - ( 19 Jul 2022 06:35 )   PT: 23.8 sec;   INR: 2.02 ratio                     Cholesterol, Serum: 144 mg/dL (06-18-22 @ 10:09)  HDL Cholesterol, Serum: 44 mg/dL (06-18-22 @ 10:09)  Triglycerides, Serum: 65 mg/dL (06-18-22 @ 10:09)      12 Lead ECG:   Ventricular Rate 68 BPM    QRS Duration 150 ms    Q-T Interval 490 ms    QTC Calculation(Bazett) 521 ms    R Axis -35 degrees    T Axis 206 degrees    Diagnosis Line Atrial fibrillation  Left axis deviation  Left bundle branch block  Confirmed by KEVIN MUNIZ ([id number]) on 7/14/2022 11:34:42 AM (07-14-22 @ 08:32)    < from: Xray Chest 1 View- PORTABLE-Routine (Xray Chest 1 View- PORTABLE-Routine .) (07.16.22 @ 10:37) >    ACC: [id number] EXAM:  XR CHEST PORTABLE ROUTINE 1V                          PROCEDURE DATE:  07/16/2022          INTERPRETATION:  Portable chest radiograph    CLINICAL INFORMATION: Dyspnea, shortness of breath.    TECHNIQUE:  Portable  AP chest radiograph.    COMPARISON: CT chest 7/14/2022 .    FINDINGS:  CATHETERS AND TUBES: None    PULMONARY: Moderate bilateral pleural effusions and/or basilar airspace   disease obscuring RIGHT diaphragm contours. Upper zones clear. No   pneumothorax.    HEART/VASCULAR: The  heart is enlarged in transverse diameter.   Atherosclerotic calcified intimal wall plaques within nondilated thoracic   aorta.     BONES: Visualized osseous structures are intact.    IMPRESSION:   No significant change..    --- End of Report ---            RUSSELL MONGE MD; Attending Radiologist  This document has been electronically signed. Jul 18 2022  3:49PM    < end of copied text >  < from: TTE Echo Complete w/o Contrast w/ Doppler (06.19.22 @ 10:00) >    ACC: [id number] EXAM:  ECHO TTE WO CON COMP W DOPP                          PROCEDURE DATE:  06/19/2022          INTERPRETATION:  INDICATION: Chest pain  Sonographer LK    Blood Pressure 147/84    Height 165.1 cm     Weight 72.6 kg       BSA 1.8   sqm    Dimensions:  LA 3.8       Normal Values: 2.0 - 4.0 cm  Ao 3.2        Normal Values: 2.0 - 3.8 cm  SEPTUM 1.7       Normal Values: 0.6 - 1.2 cm  PWT 1.2       Normal Values: 0.6 - 1.1 cm  LVIDd 4.8         Normal Values: 3.0 - 5.6 cm  LVIDs 3.2      Normal Values: 1.8 - 4.0 cm      OBSERVATIONS:  Mitral Valve: Mitral annular calcification with thickened leaflets, mild   MR.  Aortic Valve/Aorta: Calcified trileaflet aortic valve with decreased   opening. Peak transaortic valve gradient equals 20.4 mmHg with a mean   transaortic valve gradient 13.2 mmHg. By visual estimation there appears   to be mild to moderate aortic stenosis  Tricuspid Valve: Moderate TR.  Pulmonic Valve: Trace PI  Left Atrium: Enlarged  Right Atrium: Enlarged  Left Ventricle: Mild to moderate segmental left ventricular systolic   dysfunction, estimated LVEF of 35-40%. The apex, mid inferoseptal and mid   anterolateral walls appear severely hypokinetic  Right Ventricle: Right ventricular enlargement with grossly normal   function  Pericardium: no significant pericardial effusion.  Pulmonary/RV Pressure: estimated PA systolic pressure of 36 mmHg  IVC measures 1.47 cm          IMPRESSION:  Mild to moderate segmental left ventricular systolic dysfunction,   estimated LVEF of 35-40%. The apex, mid inferoseptal and mid   anterolateral walls appear severely hypokinetic  Right ventricular enlargement with grossly normal function  Biatrial enlargement  Calcified trileaflet aortic valve with mild to moderate aortic stenosis,   without AI.  Mild MR  Moderate TR.  No significant pericardial effusion.    --- End of Report ---            JEAN CARLOS HENRY MD; Attending Cardiologist  This document has been electronically signed. Jun 20 2022 12:56PM    < end of copied text >

## 2022-07-20 ENCOUNTER — TRANSCRIPTION ENCOUNTER (OUTPATIENT)
Age: 87
End: 2022-07-20

## 2022-07-20 VITALS — OXYGEN SATURATION: 98 %

## 2022-07-20 LAB
ALBUMIN SERPL ELPH-MCNC: 1.7 G/DL — LOW (ref 3.3–5)
ALP SERPL-CCNC: 271 U/L — HIGH (ref 40–120)
ALT FLD-CCNC: 42 U/L — SIGNIFICANT CHANGE UP (ref 12–78)
ANION GAP SERPL CALC-SCNC: 6 MMOL/L — SIGNIFICANT CHANGE UP (ref 5–17)
AST SERPL-CCNC: 51 U/L — HIGH (ref 15–37)
BILIRUB SERPL-MCNC: 2.8 MG/DL — HIGH (ref 0.2–1.2)
BUN SERPL-MCNC: 33 MG/DL — HIGH (ref 7–23)
CALCIUM SERPL-MCNC: 8.1 MG/DL — LOW (ref 8.5–10.1)
CHLORIDE SERPL-SCNC: 101 MMOL/L — SIGNIFICANT CHANGE UP (ref 96–108)
CO2 SERPL-SCNC: 32 MMOL/L — HIGH (ref 22–31)
CREAT SERPL-MCNC: 1.2 MG/DL — SIGNIFICANT CHANGE UP (ref 0.5–1.3)
EGFR: 42 ML/MIN/1.73M2 — LOW
GLUCOSE SERPL-MCNC: 87 MG/DL — SIGNIFICANT CHANGE UP (ref 70–99)
HCT VFR BLD CALC: 29 % — LOW (ref 34.5–45)
HGB BLD-MCNC: 9.4 G/DL — LOW (ref 11.5–15.5)
INR BLD: 2.14 RATIO — HIGH (ref 0.88–1.16)
MAGNESIUM SERPL-MCNC: 2.2 MG/DL — SIGNIFICANT CHANGE UP (ref 1.6–2.6)
MCHC RBC-ENTMCNC: 29.8 PG — SIGNIFICANT CHANGE UP (ref 27–34)
MCHC RBC-ENTMCNC: 32.4 GM/DL — SIGNIFICANT CHANGE UP (ref 32–36)
MCV RBC AUTO: 92.1 FL — SIGNIFICANT CHANGE UP (ref 80–100)
NRBC # BLD: 0 /100 WBCS — SIGNIFICANT CHANGE UP (ref 0–0)
PHOSPHATE SERPL-MCNC: 3.6 MG/DL — SIGNIFICANT CHANGE UP (ref 2.5–4.5)
PLATELET # BLD AUTO: 221 K/UL — SIGNIFICANT CHANGE UP (ref 150–400)
POTASSIUM SERPL-MCNC: 3.6 MMOL/L — SIGNIFICANT CHANGE UP (ref 3.5–5.3)
POTASSIUM SERPL-SCNC: 3.6 MMOL/L — SIGNIFICANT CHANGE UP (ref 3.5–5.3)
PROT SERPL-MCNC: 7 G/DL — SIGNIFICANT CHANGE UP (ref 6–8.3)
PROTHROM AB SERPL-ACNC: 25.2 SEC — HIGH (ref 10.5–13.4)
RBC # BLD: 3.15 M/UL — LOW (ref 3.8–5.2)
RBC # FLD: 21.5 % — HIGH (ref 10.3–14.5)
SODIUM SERPL-SCNC: 139 MMOL/L — SIGNIFICANT CHANGE UP (ref 135–145)
WBC # BLD: 4.77 K/UL — SIGNIFICANT CHANGE UP (ref 3.8–10.5)
WBC # FLD AUTO: 4.77 K/UL — SIGNIFICANT CHANGE UP (ref 3.8–10.5)

## 2022-07-20 PROCEDURE — 86850 RBC ANTIBODY SCREEN: CPT

## 2022-07-20 PROCEDURE — P9047: CPT

## 2022-07-20 PROCEDURE — 85018 HEMOGLOBIN: CPT

## 2022-07-20 PROCEDURE — 36430 TRANSFUSION BLD/BLD COMPNT: CPT

## 2022-07-20 PROCEDURE — 71045 X-RAY EXAM CHEST 1 VIEW: CPT

## 2022-07-20 PROCEDURE — 84100 ASSAY OF PHOSPHORUS: CPT

## 2022-07-20 PROCEDURE — 85610 PROTHROMBIN TIME: CPT

## 2022-07-20 PROCEDURE — 73080 X-RAY EXAM OF ELBOW: CPT

## 2022-07-20 PROCEDURE — 82247 BILIRUBIN TOTAL: CPT

## 2022-07-20 PROCEDURE — 97162 PT EVAL MOD COMPLEX 30 MIN: CPT

## 2022-07-20 PROCEDURE — 85014 HEMATOCRIT: CPT

## 2022-07-20 PROCEDURE — 97116 GAIT TRAINING THERAPY: CPT

## 2022-07-20 PROCEDURE — 87635 SARS-COV-2 COVID-19 AMP PRB: CPT

## 2022-07-20 PROCEDURE — 81001 URINALYSIS AUTO W/SCOPE: CPT

## 2022-07-20 PROCEDURE — 94760 N-INVAS EAR/PLS OXIMETRY 1: CPT

## 2022-07-20 PROCEDURE — 99232 SBSQ HOSP IP/OBS MODERATE 35: CPT

## 2022-07-20 PROCEDURE — 86900 BLOOD TYPING SEROLOGIC ABO: CPT

## 2022-07-20 PROCEDURE — 84540 ASSAY OF URINE/UREA-N: CPT

## 2022-07-20 PROCEDURE — 86901 BLOOD TYPING SEROLOGIC RH(D): CPT

## 2022-07-20 PROCEDURE — 36415 COLL VENOUS BLD VENIPUNCTURE: CPT

## 2022-07-20 PROCEDURE — 83735 ASSAY OF MAGNESIUM: CPT

## 2022-07-20 PROCEDURE — 72125 CT NECK SPINE W/O DYE: CPT | Mod: MA

## 2022-07-20 PROCEDURE — 93970 EXTREMITY STUDY: CPT

## 2022-07-20 PROCEDURE — 73700 CT LOWER EXTREMITY W/O DYE: CPT

## 2022-07-20 PROCEDURE — 73630 X-RAY EXAM OF FOOT: CPT

## 2022-07-20 PROCEDURE — 82248 BILIRUBIN DIRECT: CPT

## 2022-07-20 PROCEDURE — 71250 CT THORAX DX C-: CPT | Mod: MA

## 2022-07-20 PROCEDURE — 82570 ASSAY OF URINE CREATININE: CPT

## 2022-07-20 PROCEDURE — 84443 ASSAY THYROID STIM HORMONE: CPT

## 2022-07-20 PROCEDURE — 85027 COMPLETE CBC AUTOMATED: CPT

## 2022-07-20 PROCEDURE — 73502 X-RAY EXAM HIP UNI 2-3 VIEWS: CPT

## 2022-07-20 PROCEDURE — 86923 COMPATIBILITY TEST ELECTRIC: CPT

## 2022-07-20 PROCEDURE — 85025 COMPLETE CBC W/AUTO DIFF WBC: CPT

## 2022-07-20 PROCEDURE — 93005 ELECTROCARDIOGRAM TRACING: CPT

## 2022-07-20 PROCEDURE — 70450 CT HEAD/BRAIN W/O DYE: CPT | Mod: MA

## 2022-07-20 PROCEDURE — 80053 COMPREHEN METABOLIC PANEL: CPT

## 2022-07-20 PROCEDURE — 76376 3D RENDER W/INTRP POSTPROCES: CPT

## 2022-07-20 PROCEDURE — U0005: CPT

## 2022-07-20 PROCEDURE — 73610 X-RAY EXAM OF ANKLE: CPT

## 2022-07-20 PROCEDURE — 74176 CT ABD & PELVIS W/O CONTRAST: CPT | Mod: MA

## 2022-07-20 PROCEDURE — 80048 BASIC METABOLIC PNL TOTAL CA: CPT

## 2022-07-20 PROCEDURE — P9016: CPT

## 2022-07-20 PROCEDURE — 94640 AIRWAY INHALATION TREATMENT: CPT

## 2022-07-20 PROCEDURE — 84300 ASSAY OF URINE SODIUM: CPT

## 2022-07-20 PROCEDURE — U0003: CPT

## 2022-07-20 PROCEDURE — 85730 THROMBOPLASTIN TIME PARTIAL: CPT

## 2022-07-20 PROCEDURE — 99285 EMERGENCY DEPT VISIT HI MDM: CPT

## 2022-07-20 RX ORDER — WARFARIN SODIUM 2.5 MG/1
3 TABLET ORAL ONCE
Refills: 0 | Status: DISCONTINUED | OUTPATIENT
Start: 2022-07-20 | End: 2022-07-20

## 2022-07-20 RX ORDER — FUROSEMIDE 40 MG
1 TABLET ORAL
Qty: 0 | Refills: 0 | DISCHARGE
Start: 2022-07-20

## 2022-07-20 RX ORDER — POTASSIUM CHLORIDE 20 MEQ
40 PACKET (EA) ORAL ONCE
Refills: 0 | Status: COMPLETED | OUTPATIENT
Start: 2022-07-20 | End: 2022-07-20

## 2022-07-20 RX ORDER — ACETAMINOPHEN 500 MG
2 TABLET ORAL
Qty: 0 | Refills: 0 | DISCHARGE
Start: 2022-07-20

## 2022-07-20 RX ORDER — METOPROLOL TARTRATE 50 MG
1 TABLET ORAL
Qty: 0 | Refills: 0 | DISCHARGE
Start: 2022-07-20

## 2022-07-20 RX ORDER — WARFARIN SODIUM 2.5 MG/1
1 TABLET ORAL
Qty: 0 | Refills: 0 | DISCHARGE

## 2022-07-20 RX ORDER — LEVOTHYROXINE SODIUM 125 MCG
1 TABLET ORAL
Qty: 0 | Refills: 0 | DISCHARGE

## 2022-07-20 RX ORDER — LEVOTHYROXINE SODIUM 125 MCG
1 TABLET ORAL
Qty: 0 | Refills: 0 | DISCHARGE
Start: 2022-07-20

## 2022-07-20 RX ADMIN — Medication 3 MILLILITER(S): at 08:13

## 2022-07-20 RX ADMIN — Medication 650 MILLIGRAM(S): at 05:52

## 2022-07-20 RX ADMIN — Medication 40 MILLIEQUIVALENT(S): at 13:30

## 2022-07-20 RX ADMIN — CALAMINE AND ZINC OXIDE AND PHENOL 1 APPLICATION(S): 160; 10 LOTION TOPICAL at 12:37

## 2022-07-20 RX ADMIN — Medication 1 APPLICATION(S): at 17:40

## 2022-07-20 RX ADMIN — Medication 1 APPLICATION(S): at 06:58

## 2022-07-20 RX ADMIN — Medication 81 MILLIGRAM(S): at 12:36

## 2022-07-20 RX ADMIN — Medication 650 MILLIGRAM(S): at 06:45

## 2022-07-20 RX ADMIN — Medication 3 MILLILITER(S): at 15:37

## 2022-07-20 RX ADMIN — Medication 40 MILLIGRAM(S): at 17:40

## 2022-07-20 RX ADMIN — Medication 3 MILLILITER(S): at 00:23

## 2022-07-20 RX ADMIN — Medication 40 MILLIGRAM(S): at 05:52

## 2022-07-20 RX ADMIN — Medication 75 MICROGRAM(S): at 05:52

## 2022-07-20 NOTE — DISCHARGE NOTE NURSING/CASE MANAGEMENT/SOCIAL WORK - PATIENT PORTAL LINK FT
You can access the FollowMyHealth Patient Portal offered by [facility] by registering at the following website: [email or web address]. By joining [facility]’s FollowMyHealth portal, you will also be able to view your health information using other applications (apps) compatible with our system.

## 2022-07-20 NOTE — PROGRESS NOTE ADULT - SUBJECTIVE AND OBJECTIVE BOX
Patient is a 89y old  Female who presents with a chief complaint of Fall, with Anemia (19 Jul 2022 11:23)    HPI:  89 y/o F with a pmhx b/l LE edema, HTN, P Afib (on coumadin), HFref (EF35-40%) and hypothyroidism presents to the ED s/p fall. Patient was admitted to hospitals 6/17-6/30/22 for NSTEMI, hospital course c/b iatrogenic flash pulmonary edema, cath canceled due to vol overload, discharged to [facility]. She was doing well at [facility] until today. She went to bathroom for BM, had difficulty to clean herself. She was left unattended by [facility] staff in bathroom, she stood up to take few steps, lost balance, and fell on right side striking her right hip, right arm, right elbow, right side of neck. She denies any prodromal symptoms. No lightheadedness, chest pain, warm sensation. No dyspnea. Patient never lost consciousness. She was found to have Hg 6.9 at [facility] facility, sent to hospitals ED for further evaluation. She denies any significant pain right now. Only complains of right thigh twitching, swelling.  No fevers, chills, cp, dypsnea, abdominal pain. Admits lower ext swelling which is chronic for her.     In the ED  VS: T97.3 Hr78 /63 RR 16 SPO2 95% on 3L NC  Labs: WBC 4.43, HH 7.6/22.1, PLTS 225, Na 140, K 4.2, Cr 1.7, Gluc 112, Alk phos 326, AST//99  Chest Xray: lungs clear  Head CT: Stable exam. No acute intracranial hemorrhage, vasogenic edema, extra-axial collection or calvarial fracture.  Cervical spine CT: No acute cervical spine fracture or evidence of traumatic malalignment. Cervical spondylosis  CT A/P non con: Extensive diffuse soft tissue edema/anasarca. Small right, trace of bilateral pleural fluid. Small-to-moderate amount of simple free fluid around the liver and spleen. No definite intraperitoneal hemorrhage.No noncontrast evidence of acute injury to the chest, abdomen or pelvis.Concern for hematoma around the right hip soft tissues, partially visualized. No evidence of hip fracture.Mild intralobular septal thickening, may reflect pulmonary edema. Areas of atelectatic changes and possible airways impaction  EKG: ordered, pending  Given in ED: 1 unit prbc   (14 Jul 2022 04:06)    INTERVAL HPI:  7/14: Patient seen and examined at bedside. Is feeling okay. Has no acute complaints but is feeling slightly agitated. Denies sob, cp, n/v/d. Total 2 u pRBC total , IV Lasix   7/15 Pt seen and examined at bedside. Soft BP this AM, given 1 pRBC this AM, on Lasix 40 BID.   7/16: Patient seen and assessed at bedside. Complaining of cramping in her bilateral feet otherwise feeling better. Still feels pain on her right side but is controlled with Tylenol PRN. Denies sob on 3L NC, chest pain, nausea, vomiting, diarrhea. H/H improving s/p 3PRBC. B/L LE edema improving.  7/17: Patient seen and examined at bedside. Complaining of itching over her right sided hematoma and still experiencing cramping in her feet. Denies sob, chest pain, nausea, vomiting, diarrhea.  H/H improving s/p 3PRBC. B/L LE edema improving.  7/18: Patient seen and examined at bedside. Complained of itching over her right sided hematoma. She had pain in her right sided hematoma and back that was relieved with ice packs overnight. Denies sob, chest pain, nausea, vomitting, diarrhea. H/H continues to improve. B/L edema present. Start Coumadin from today , Low stable H/H   7/19 Patient seen and examined at bedside- she was receiving albuterol at the time of examination. Complained of pain and sensitivity over the right hematoma (alleviated with cold compress). Also complains of right foot pain- ordered foot x ray to look for any fractures. Both LE/ feet are swollen. Denies sob, nausea, vomitting, diarrhea. Hemoglobin 9.5 today (9.8 yesterday). Started coumadin yesterday - INR 2.02 this AM. Denies any BM- she hasn't been eating much bc she doesn't like the food. Chest imaging from the weekend shows some pleural effusions. IV lasix 20 mg given in the PM d/t bibasilar lung crackles.  7/20 Pt seen and examined at bedside. Complains of tightness and pain in both legs, but states right hip pain is relieved with tylenol. Bilateral LE swelling +. She states she still hasn't had a BM bc she hasn't been eating a lot since she doesn't like the food here. X ray reviewed- no acute  fractures. INR 2.15, Hb 9.4 this AM.       OVERNIGHT EVENTS:    Home Medications:  acetaminophen 325 mg oral tablet: 2 tab(s) orally every 6 hours, As needed, Mild Pain (1 - 3) (20 Jul 2022 07:10)  aluminum hydroxide-magnesium hydroxide 200 mg-200 mg/5 mL oral suspension: 30 milliliter(s) orally every 4 hours, As needed, Dyspepsia (20 Jul 2022 07:10)  aspirin 81 mg oral delayed release tablet: 1 tab(s) orally once a day (14 Jul 2022 04:17)  atorvastatin 20 mg oral tablet: 1 tab(s) orally once a day (14 Jul 2022 04:17)  fluticasone 50 mcg/inh nasal spray: 1 spray(s) nasal 2 times a day (14 Jul 2022 04:17)  furosemide 40 mg oral tablet: 1 tab(s) orally every 12 hours (20 Jul 2022 07:10)  levothyroxine 75 mcg (0.075 mg) oral tablet: 1 tab(s) orally once a day (20 Jul 2022 07:10)  metoprolol succinate 50 mg oral tablet, extended release: 1 tab(s) orally once a day (20 Jul 2022 07:10)  sodium chloride 0.65% nasal spray: 2 spray(s) nasal 4 times a day (14 Jul 2022 04:17)  warfarin 3 mg oral tablet: DO NOT Give Coumadin Today as INR 3.11, Check PT/INR on 7/1/22.Adjust Coumadin dose as per INR. start with Coumadin 2 mg   Keep INR 2-3    Pt&#x27;s HOME Coumadin schedule is as below  1 tab(s) orally once a day (M, Tu, Th, F, Sa Su) at Bed time   -Pt use to Take Coumadin 4 mg on every Wednesday (14 Jul 2022 04:17)      MEDICATIONS  (STANDING):  albuterol/ipratropium for Nebulization 3 milliLiter(s) Nebulizer every 8 hours  aspirin enteric coated 81 milliGRAM(s) Oral daily  BACItracin   Ointment 1 Application(s) Topical two times a day  calamine/zinc oxide Lotion 1 Application(s) Topical daily  furosemide    Tablet 40 milliGRAM(s) Oral every 12 hours  levothyroxine 75 MICROGram(s) Oral daily  metoprolol succinate ER 50 milliGRAM(s) Oral daily    MEDICATIONS  (PRN):  acetaminophen     Tablet .. 650 milliGRAM(s) Oral every 6 hours PRN Mild Pain (1 - 3)  aluminum hydroxide/magnesium hydroxide/simethicone Suspension 30 milliLiter(s) Oral every 4 hours PRN Dyspepsia  melatonin 3 milliGRAM(s) Oral at bedtime PRN Insomnia  ondansetron Injectable 4 milliGRAM(s) IV Push every 8 hours PRN Nausea and/or Vomiting  sodium chloride 0.65% Nasal 1 Spray(s) Both Nostrils five times a day PRN Nasal Congestion      Allergies    No Known Allergies    Intolerances        Social History:  Lived at home alone. Former smoker. Denies etoh and other rec drug use. Ambulates with walker. Daughter is HCP. DNR/DNI, came from [facility] now (14 Jul 2022 04:06)      REVIEW OF SYSTEMS:  CONSTITUTIONAL: No fever, No chills, No fatigue, No myalgia, right hip and back Body ache, No Weakness  EYES: No eye pain,  No visual disturbances, No discharge, NO Redness  ENMT:  No ear pain, No nose bleed, No vertigo; No sinus pain, NO throat pain, No Congestion  NECK: No pain, No stiffness  RESPIRATORY: No cough, NO wheezing, No  hemoptysis, NO  shortness of breath  CARDIOVASCULAR: No chest pain, palpitations  GASTROINTESTINAL: No abdominal pain, NO epigastric pain. No nausea, No vomiting; No diarrhea, No constipation. [] BM  GENITOURINARY: No dysuria, No frequency, No urgency, No hematuria, NO incontinence  NEUROLOGICAL: No headaches, No dizziness, No numbness, No tingling, No tremors, No weakness  EXT: No Swelling, No Pain, [x] LE Edema  SKIN:  [X ]  itching on right hematoma, burning, rashes, or lesions   MUSCULOSKELETAL: [x] ankle and foot pain ,No Jt swelling; No muscle pain, No back pain, No extremity pain.   PSYCHIATRIC: No depression,  No anxiety,  No mood swings ,No difficulty sleeping at night  PAIN SCALE: [ ] None  [X ] Other- pain on right hip hematoma and both feet pain and sensitivity   ROS Unable to obtain due to - [  ] Dementia  [  ] Lethargy [  ] Drowsy [  ] Sedated [  ] non verbal  REST OF REVIEW Of SYSTEM - [ X ] Normal    Vital Signs Last 24 Hrs  T(C): 36.6 (20 Jul 2022 05:27), Max: 36.8 (19 Jul 2022 20:02)  T(F): 97.8 (20 Jul 2022 05:27), Max: 98.3 (19 Jul 2022 20:02)  HR: 72 (20 Jul 2022 05:27) (68 - 75)  BP: 102/55 (20 Jul 2022 05:27) (102/55 - 112/65)  BP(mean): --  RR: 17 (20 Jul 2022 05:27) (17 - 19)  SpO2: 93% (20 Jul 2022 05:27) (92% - 94%)    Parameters below as of 20 Jul 2022 05:27  Patient On (Oxygen Delivery Method): nasal cannula      Finger Stick          PHYSICAL EXAM:  GENERAL:  [x  ] NAD , [x] well appearing, [  ] Agitated, [  ] Drowsy,  [  ] Lethargy, [  ] confused   HEAD:  [  x] Normal, [  ] Other  EYES:  [ x ] EOMI, [ x ] PERRLA, [ x ] conjunctiva and sclera clear normal, [  ] Other,  [  ] Pallor,[  ] Discharge  ENMT:  [x  ] Normal, [x ] dry  mucous membranes, [ x ] Good dentition, [x  ] No Thrush  NECK:  [ x ] Supple, [  x] + JVD, [x  ] Normal thyroid, [  ] Lymphadenopathy [  ] Other  CHEST/LUNG:  [ ] Clear to auscultation bilaterally, [ x ] Breath Sounds equal B/L / Decrease B/L  , [x  ] poor effort  [ x ] No rales, [ x ] No rhonchi  [ ] wheezing,   HEART:  [x ] Regular rate and rhythm, [  ] tachycardia, [  ] Bradycardia,  [  ] irregular  [x  ] + murmurs, No rubs, No gallops, [  ] PPM in place (Mfr:  ) [x] anasarca -IMPROVING  ABDOMEN:  [x  ] Soft, [ x ] Nontender, [ x] Nondistended, [ x ] No mass, [ x ] Bowel sounds present, [ x ] obese  NERVOUS SYSTEM:  [ x ] Alert & Oriented X3, [x  ] Nonfocal  [  ] Confusion  [  ] Encephalopathic [  ] Sedated [  ] Unable to assess, [  ] Dementia [  ] Other-  EXTREMITIES: [x  ] 2+ Peripheral Pulses, No clubbing, No cyanosis,  [ x ]  2+ edema B/L lower EXT. [ x ] PVD stasis skin changes B/L Lower EXT, erythema B/L Lower ext , small blister- Improved   [  ] wound  LYMPH: No lymphadenopathy noted  SKIN:  [x  ] large +R hematoma hip & Thigh , bruising over right elbow, right leg 2/2 fall, [  ] Pressure Ulcers, [ x ] ecchymosis, [  ] Skin Tears, [  ] Other    DIET: Diet, DASH/TLC:   Sodium & Cholesterol Restricted  1000mL Fluid Restriction ([id number]) (07-14-22 @ 08:48)      LABS:                        9.4    4.77  )-----------( 221      ( 20 Jul 2022 06:20 )             29.0     20 Jul 2022 06:20    x      |  x      |  33     ----------------------------<  87     x       |  32     |  1.20     Ca    8.1        20 Jul 2022 06:20    TPro  x      /  Alb  1.7    /  TBili  x      /  DBili  x      /  AST  51     /  ALT  42     /  AlkPhos  x      20 Jul 2022 06:20    PT/INR - ( 20 Jul 2022 06:20 )   PT: 25.2 sec;   INR: 2.14 ratio                                  Anemia Panel:      Thyroid Panel:  Thyroid Stimulating Hormone, Serum: 2.14 uIU/mL (07-14-22 @ 08:40)                RADIOLOGY & ADDITIONAL TESTS:      HEALTH ISSUES - PROBLEM Dx:  Anemia due to acute blood loss    Hematoma    Right foot pain    HFrEF (heart failure with reduced ejection fraction)    Fall    Afib    CAD (coronary artery disease)    Transaminitis    Lower extremity edema    Acute kidney injury superimposed on CKD    HTN (hypertension)    Hypothyroidism    Need for prophylactic measure            Consultant(s) Notes Reviewed:  [  ] YES     Care Discussed with [X] Consultants  [  ] Patient  [  ] Family [  ] HCP [  ]   [  ] Social Service  [  ] RN, [  ] Physical Therapy,[  ] Palliative care team  DVT PPX: [  ] Lovenox, [  ] S C Heparin, [  ] Coumadin, [  ] Xarelto, [  ] Eliquis, [  ] Pradaxa, [  ] IV Heparin drip, [  ] SCD [  ] Contraindication 2 to GI Bleed,[  ] Ambulation [  ] Contraindicated 2 to  bleed [  ] Contraindicated 2 to Brain Bleed  Advanced directive: [  ] None, [  ] DNR/DNI Patient is a 89y old  Female who presents with a chief complaint of Fall, with Anemia (19 Jul 2022 11:23)    HPI:  89 y/o F with a pmhx b/l LE edema, HTN, P Afib (on coumadin), HFref (EF35-40%) and hypothyroidism presents to the ED s/p fall. Patient was admitted to [facility] 6/17-6/30/22 for NSTEMI, hospital course c/b iatrogenic flash pulmonary edema, cath canceled due to vol overload, discharged to [facility]. She was doing well at [facility] until today. She went to bathroom for BM, had difficulty to clean herself. She was left unattended by [facility] staff in bathroom, she stood up to take few steps, lost balance, and fell on right side striking her right hip, right arm, right elbow, right side of neck. She denies any prodromal symptoms. No lightheadedness, chest pain, warm sensation. No dyspnea. Patient never lost consciousness. She was found to have Hg 6.9 at [facility] facility, sent to [facility] ED for further evaluation. She denies any significant pain right now. Only complains of right thigh twitching, swelling.  No fevers, chills, cp, dypsnea, abdominal pain. Admits lower ext swelling which is chronic for her.     In the ED  VS: T97.3 Hr78 /63 RR 16 SPO2 95% on 3L NC  Labs: WBC 4.43, HH 7.6/22.1, PLTS 225, Na 140, K 4.2, Cr 1.7, Gluc 112, Alk phos 326, AST//99  Chest Xray: lungs clear  Head CT: Stable exam. No acute intracranial hemorrhage, vasogenic edema, extra-axial collection or calvarial fracture.  Cervical spine CT: No acute cervical spine fracture or evidence of traumatic malalignment. Cervical spondylosis  CT A/P non con: Extensive diffuse soft tissue edema/anasarca. Small right, trace of bilateral pleural fluid. Small-to-moderate amount of simple free fluid around the liver and spleen. No definite intraperitoneal hemorrhage.No noncontrast evidence of acute injury to the chest, abdomen or pelvis.Concern for hematoma around the right hip soft tissues, partially visualized. No evidence of hip fracture.Mild intralobular septal thickening, may reflect pulmonary edema. Areas of atelectatic changes and possible airways impaction  EKG: ordered, pending  Given in ED: 1 unit prbc   (14 Jul 2022 04:06)    INTERVAL HPI:  7/14: Patient seen and examined at bedside. Is feeling okay. Has no acute complaints but is feeling slightly agitated. Denies sob, cp, n/v/d. Total 2 u pRBC total , IV Lasix   7/15 Pt seen and examined at bedside. Soft BP this AM, given 1 pRBC this AM, on Lasix 40 BID.   7/16: Patient seen and assessed at bedside. Complaining of cramping in her bilateral feet otherwise feeling better. Still feels pain on her right side but is controlled with Tylenol PRN. Denies sob on 3L NC, chest pain, nausea, vomiting, diarrhea. H/H improving s/p 3PRBC. B/L LE edema improving.  7/17: Patient seen and examined at bedside. Complaining of itching over her right sided hematoma and still experiencing cramping in her feet. Denies sob, chest pain, nausea, vomiting, diarrhea.  H/H improving s/p 3PRBC. B/L LE edema improving.  7/18: Patient seen and examined at bedside. Complained of itching over her right sided hematoma. She had pain in her right sided hematoma and back that was relieved with ice packs overnight. Denies sob, chest pain, nausea, vomitting, diarrhea. H/H continues to improve. B/L edema present. Start Coumadin from today , Low stable H/H   7/19 Patient seen and examined at bedside- she was receiving albuterol at the time of examination. Complained of pain and sensitivity over the right hematoma (alleviated with cold compress). Also complains of right foot pain- ordered foot x ray to look for any fractures. Both LE/ feet are swollen. Denies sob, nausea, vomitting, diarrhea. Hemoglobin 9.5 today (9.8 yesterday). Started coumadin yesterday - INR 2.02 this AM. Denies any BM- she hasn't been eating much bc she doesn't like the food. Chest imaging from the weekend shows some pleural effusions. IV lasix 20 mg given in the PM d/t bibasilar lung crackles.  7/20 Pt seen and examined at bedside. Complains of tightness and pain in both legs, but states right hip pain is relieved with tylenol. Bilateral LE swelling +. She states she still hasn't had a BM bc she hasn't been eating a lot since she doesn't like the food here. X ray reviewed- no acute  fractures. INR 2.15, Hb 9.4 this AM. Will be discharged to [facility] today.       OVERNIGHT EVENTS:    Home Medications:  acetaminophen 325 mg oral tablet: 2 tab(s) orally every 6 hours, As needed, Mild Pain (1 - 3) (20 Jul 2022 07:10)  aluminum hydroxide-magnesium hydroxide 200 mg-200 mg/5 mL oral suspension: 30 milliliter(s) orally every 4 hours, As needed, Dyspepsia (20 Jul 2022 07:10)  aspirin 81 mg oral delayed release tablet: 1 tab(s) orally once a day (14 Jul 2022 04:17)  atorvastatin 20 mg oral tablet: 1 tab(s) orally once a day (14 Jul 2022 04:17)  fluticasone 50 mcg/inh nasal spray: 1 spray(s) nasal 2 times a day (14 Jul 2022 04:17)  furosemide 40 mg oral tablet: 1 tab(s) orally every 12 hours (20 Jul 2022 07:10)  levothyroxine 75 mcg (0.075 mg) oral tablet: 1 tab(s) orally once a day (20 Jul 2022 07:10)  metoprolol succinate 50 mg oral tablet, extended release: 1 tab(s) orally once a day (20 Jul 2022 07:10)  sodium chloride 0.65% nasal spray: 2 spray(s) nasal 4 times a day (14 Jul 2022 04:17)  warfarin 3 mg oral tablet: DO NOT Give Coumadin Today as INR 3.11, Check PT/INR on 7/1/22.Adjust Coumadin dose as per INR. start with Coumadin 2 mg   Keep INR 2-3    Pt&#x27;s HOME Coumadin schedule is as below  1 tab(s) orally once a day (M, Tu, Th, F, Sa Su) at Bed time   -Pt use to Take Coumadin 4 mg on every Wednesday (14 Jul 2022 04:17)      MEDICATIONS  (STANDING):  albuterol/ipratropium for Nebulization 3 milliLiter(s) Nebulizer every 8 hours  aspirin enteric coated 81 milliGRAM(s) Oral daily  BACItracin   Ointment 1 Application(s) Topical two times a day  calamine/zinc oxide Lotion 1 Application(s) Topical daily  furosemide    Tablet 40 milliGRAM(s) Oral every 12 hours  levothyroxine 75 MICROGram(s) Oral daily  metoprolol succinate ER 50 milliGRAM(s) Oral daily    MEDICATIONS  (PRN):  acetaminophen     Tablet .. 650 milliGRAM(s) Oral every 6 hours PRN Mild Pain (1 - 3)  aluminum hydroxide/magnesium hydroxide/simethicone Suspension 30 milliLiter(s) Oral every 4 hours PRN Dyspepsia  melatonin 3 milliGRAM(s) Oral at bedtime PRN Insomnia  ondansetron Injectable 4 milliGRAM(s) IV Push every 8 hours PRN Nausea and/or Vomiting  sodium chloride 0.65% Nasal 1 Spray(s) Both Nostrils five times a day PRN Nasal Congestion      Allergies    No Known Allergies    Intolerances        Social History:  Lived at home alone. Former smoker. Denies etoh and other rec drug use. Ambulates with walker. Daughter is HCP. DNR/DNI, came from [facility] now (14 Jul 2022 04:06)      REVIEW OF SYSTEMS:  CONSTITUTIONAL: No fever, No chills, No fatigue, No myalgia, right hip and back Body ache, No Weakness  EYES: No eye pain,  No visual disturbances, No discharge, NO Redness  ENMT:  No ear pain, No nose bleed, No vertigo; No sinus pain, NO throat pain, No Congestion  NECK: No pain, No stiffness  RESPIRATORY: No cough, NO wheezing, No  hemoptysis, NO  shortness of breath  CARDIOVASCULAR: No chest pain, palpitations  GASTROINTESTINAL: No abdominal pain, NO epigastric pain. No nausea, No vomiting; No diarrhea, No constipation. [] BM  GENITOURINARY: No dysuria, No frequency, No urgency, No hematuria, NO incontinence  NEUROLOGICAL: No headaches, No dizziness, No numbness, No tingling, No tremors, No weakness  EXT: No Swelling, No Pain, [x] LE Edema  SKIN:  [X ]  itching on right hematoma, burning, rashes, or lesions   MUSCULOSKELETAL: [x] ankle and foot pain ,No Jt swelling; No muscle pain, No back pain, No extremity pain.   PSYCHIATRIC: No depression,  No anxiety,  No mood swings ,No difficulty sleeping at night  PAIN SCALE: [ ] None  [X ] Other- pain on right hip hematoma and both feet pain and sensitivity   ROS Unable to obtain due to - [  ] Dementia  [  ] Lethargy [  ] Drowsy [  ] Sedated [  ] non verbal  REST OF REVIEW Of SYSTEM - [ X ] Normal    Vital Signs Last 24 Hrs  T(C): 36.6 (20 Jul 2022 05:27), Max: 36.8 (19 Jul 2022 20:02)  T(F): 97.8 (20 Jul 2022 05:27), Max: 98.3 (19 Jul 2022 20:02)  HR: 72 (20 Jul 2022 05:27) (68 - 75)  BP: 102/55 (20 Jul 2022 05:27) (102/55 - 112/65)  BP(mean): --  RR: 17 (20 Jul 2022 05:27) (17 - 19)  SpO2: 93% (20 Jul 2022 05:27) (92% - 94%)    Parameters below as of 20 Jul 2022 05:27  Patient On (Oxygen Delivery Method): nasal cannula      Finger Stick          PHYSICAL EXAM:  GENERAL:  [x  ] NAD , [x] well appearing, [  ] Agitated, [  ] Drowsy,  [  ] Lethargy, [  ] confused   HEAD:  [  x] Normal, [  ] Other  EYES:  [ x ] EOMI, [ x ] PERRLA, [ x ] conjunctiva and sclera clear normal, [  ] Other,  [  ] Pallor,[  ] Discharge  ENMT:  [x  ] Normal, [x ] dry  mucous membranes, [ x ] Good dentition, [x  ] No Thrush  NECK:  [ x ] Supple, [  x] + JVD, [x  ] Normal thyroid, [  ] Lymphadenopathy [  ] Other  CHEST/LUNG:  [ ] Clear to auscultation bilaterally, [ x ] Breath Sounds equal B/L / Decrease B/L  , [x  ] poor effort  [ x ] No rales, [ x ] No rhonchi  [ ] wheezing,   HEART:  [x ] Regular rate and rhythm, [  ] tachycardia, [  ] Bradycardia,  [  ] irregular  [x  ] + murmurs, No rubs, No gallops, [  ] PPM in place (Mfr:  ) [x] anasarca -IMPROVING  ABDOMEN:  [x  ] Soft, [ x ] Nontender, [ x] Nondistended, [ x ] No mass, [ x ] Bowel sounds present, [ x ] obese  NERVOUS SYSTEM:  [ x ] Alert & Oriented X3, [x  ] Nonfocal  [  ] Confusion  [  ] Encephalopathic [  ] Sedated [  ] Unable to assess, [  ] Dementia [  ] Other-  EXTREMITIES: [x  ] 2+ Peripheral Pulses, No clubbing, No cyanosis,  [ x ]  2+ edema B/L lower EXT. [ x ] PVD stasis skin changes B/L Lower EXT, erythema B/L Lower ext , small blister- Improved   [  ] wound  LYMPH: No lymphadenopathy noted  SKIN:  [x  ] large +R hematoma hip & Thigh , bruising over right elbow, right leg 2/2 fall, [  ] Pressure Ulcers, [ x ] ecchymosis, [  ] Skin Tears, [  ] Other    DIET: Diet, DASH/TLC:   Sodium & Cholesterol Restricted  1000mL Fluid Restriction ([id number]) (07-14-22 @ 08:48)      LABS:                        9.4    4.77  )-----------( 221      ( 20 Jul 2022 06:20 )             29.0     20 Jul 2022 06:20    x      |  x      |  33     ----------------------------<  87     x       |  32     |  1.20     Ca    8.1        20 Jul 2022 06:20    TPro  x      /  Alb  1.7    /  TBili  x      /  DBili  x      /  AST  51     /  ALT  42     /  AlkPhos  x      20 Jul 2022 06:20    PT/INR - ( 20 Jul 2022 06:20 )   PT: 25.2 sec;   INR: 2.14 ratio                                  Anemia Panel:      Thyroid Panel:  Thyroid Stimulating Hormone, Serum: 2.14 uIU/mL (07-14-22 @ 08:40)                RADIOLOGY & ADDITIONAL TESTS:      HEALTH ISSUES - PROBLEM Dx:  Anemia due to acute blood loss    Hematoma    Right foot pain    HFrEF (heart failure with reduced ejection fraction)    Fall    Afib    CAD (coronary artery disease)    Transaminitis    Lower extremity edema    Acute kidney injury superimposed on CKD    HTN (hypertension)    Hypothyroidism    Need for prophylactic measure            Consultant(s) Notes Reviewed:  [  ] YES     Care Discussed with [X] Consultants  [  ] Patient  [  ] Family [  ] HCP [  ]   [  ] Social Service  [  ] RN, [  ] Physical Therapy,[  ] Palliative care team  DVT PPX: [  ] Lovenox, [  ] S C Heparin, [  ] Coumadin, [  ] Xarelto, [  ] Eliquis, [  ] Pradaxa, [  ] IV Heparin drip, [  ] SCD [  ] Contraindication 2 to GI Bleed,[  ] Ambulation [  ] Contraindicated 2 to  bleed [  ] Contraindicated 2 to Brain Bleed  Advanced directive: [  ] None, [  ] DNR/DNI Patient is a 89y old  Female who presents with a chief complaint of Fall, with Anemia (19 Jul 2022 11:23)    HPI:  89 y/o F with a pmhx b/l LE edema, HTN, P Afib (on coumadin), HFref (EF35-40%) and hypothyroidism presents to the ED s/p fall. Patient was admitted to [facility] 6/17-6/30/22 for NSTEMI, hospital course c/b iatrogenic flash pulmonary edema, cath canceled due to vol overload, discharged to [facility]. She was doing well at [facility] until today. She went to bathroom for BM, had difficulty to clean herself. She was left unattended by [facility] staff in bathroom, she stood up to take few steps, lost balance, and fell on right side striking her right hip, right arm, right elbow, right side of neck. She denies any prodromal symptoms. No lightheadedness, chest pain, warm sensation. No dyspnea. Patient never lost consciousness. She was found to have Hg 6.9 at [facility] facility, sent to [facility] ED for further evaluation. She denies any significant pain right now. Only complains of right thigh twitching, swelling.  No fevers, chills, cp, dypsnea, abdominal pain. Admits lower ext swelling which is chronic for her.     In the ED  VS: T97.3 Hr78 /63 RR 16 SPO2 95% on 3L NC  Labs: WBC 4.43, HH 7.6/22.1, PLTS 225, Na 140, K 4.2, Cr 1.7, Gluc 112, Alk phos 326, AST//99  Chest Xray: lungs clear  Head CT: Stable exam. No acute intracranial hemorrhage, vasogenic edema, extra-axial collection or calvarial fracture.  Cervical spine CT: No acute cervical spine fracture or evidence of traumatic malalignment. Cervical spondylosis  CT A/P non con: Extensive diffuse soft tissue edema/anasarca. Small right, trace of bilateral pleural fluid. Small-to-moderate amount of simple free fluid around the liver and spleen. No definite intraperitoneal hemorrhage.No noncontrast evidence of acute injury to the chest, abdomen or pelvis.Concern for hematoma around the right hip soft tissues, partially visualized. No evidence of hip fracture.Mild intralobular septal thickening, may reflect pulmonary edema. Areas of atelectatic changes and possible airways impaction  EKG: ordered, pending  Given in ED: 1 unit prbc   (14 Jul 2022 04:06)    INTERVAL HPI:  7/14: Patient seen and examined at bedside. Is feeling okay. Has no acute complaints but is feeling slightly agitated. Denies sob, cp, n/v/d. Total 2 u pRBC total , IV Lasix   7/15 Pt seen and examined at bedside. Soft BP this AM, given 1 pRBC this AM, on Lasix 40 BID.   7/16: Patient seen and assessed at bedside. Complaining of cramping in her bilateral feet otherwise feeling better. Still feels pain on her right side but is controlled with Tylenol PRN. Denies sob on 3L NC, chest pain, nausea, vomiting, diarrhea. H/H improving s/p 3PRBC. B/L LE edema improving.  7/17: Patient seen and examined at bedside. Complaining of itching over her right sided hematoma and still experiencing cramping in her feet. Denies sob, chest pain, nausea, vomiting, diarrhea.  H/H improving s/p 3PRBC. B/L LE edema improving.  7/18: Patient seen and examined at bedside. Complained of itching over her right sided hematoma. She had pain in her right sided hematoma and back that was relieved with ice packs overnight. Denies sob, chest pain, nausea, vomitting, diarrhea. H/H continues to improve. B/L edema present. Start Coumadin from today , Low stable H/H   7/19 Patient seen and examined at bedside- she was receiving albuterol at the time of examination. Complained of pain and sensitivity over the right hematoma (alleviated with cold compress). Also complains of right foot pain- ordered foot x ray to look for any fractures. Both LE/ feet are swollen. Denies sob, nausea, vomitting, diarrhea. Hemoglobin 9.5 today (9.8 yesterday). Started coumadin yesterday - INR 2.02 this AM. Denies any BM- she hasn't been eating much bc she doesn't like the food. Chest imaging from the weekend shows some pleural effusions. IV lasix 20 mg given in the PM d/t bibasilar lung crackles.  7/20 Pt seen and examined at bedside. Complains of tightness and pain in both legs, but states right hip pain is relieved with tylenol. Bilateral LE swelling +. She states she still hasn't had a BM bc she hasn't been eating a lot since she doesn't like the food here. X ray reviewed- no acute  fractures. INR 2.15, Hb 9.4 this AM. Will be discharged to [facility] today. COVID -NEG      OVERNIGHT EVENTS:    Home Medications:  acetaminophen 325 mg oral tablet: 2 tab(s) orally every 6 hours, As needed, Mild Pain (1 - 3) (20 Jul 2022 07:10)  aluminum hydroxide-magnesium hydroxide 200 mg-200 mg/5 mL oral suspension: 30 milliliter(s) orally every 4 hours, As needed, Dyspepsia (20 Jul 2022 07:10)  aspirin 81 mg oral delayed release tablet: 1 tab(s) orally once a day (14 Jul 2022 04:17)  atorvastatin 20 mg oral tablet: 1 tab(s) orally once a day (14 Jul 2022 04:17)  fluticasone 50 mcg/inh nasal spray: 1 spray(s) nasal 2 times a day (14 Jul 2022 04:17)  furosemide 40 mg oral tablet: 1 tab(s) orally every 12 hours (20 Jul 2022 07:10)  levothyroxine 75 mcg (0.075 mg) oral tablet: 1 tab(s) orally once a day (20 Jul 2022 07:10)  metoprolol succinate 50 mg oral tablet, extended release: 1 tab(s) orally once a day (20 Jul 2022 07:10)  sodium chloride 0.65% nasal spray: 2 spray(s) nasal 4 times a day (14 Jul 2022 04:17)  warfarin 3 mg oral tablet: DO NOT Give Coumadin Today as INR 3.11, Check PT/INR on 7/1/22.Adjust Coumadin dose as per INR. start with Coumadin 2 mg   Keep INR 2-3    Pt&#x27;s HOME Coumadin schedule is as below  1 tab(s) orally once a day (M, Tu, Th, F, Sa Grider) at Bed time   -Pt use to Take Coumadin 4 mg on every Wednesday (14 Jul 2022 04:17)      MEDICATIONS  (STANDING):  albuterol/ipratropium for Nebulization 3 milliLiter(s) Nebulizer every 8 hours  aspirin enteric coated 81 milliGRAM(s) Oral daily  BACItracin   Ointment 1 Application(s) Topical two times a day  calamine/zinc oxide Lotion 1 Application(s) Topical daily  furosemide    Tablet 40 milliGRAM(s) Oral every 12 hours  levothyroxine 75 MICROGram(s) Oral daily  metoprolol succinate ER 50 milliGRAM(s) Oral daily    MEDICATIONS  (PRN):  acetaminophen     Tablet .. 650 milliGRAM(s) Oral every 6 hours PRN Mild Pain (1 - 3)  aluminum hydroxide/magnesium hydroxide/simethicone Suspension 30 milliLiter(s) Oral every 4 hours PRN Dyspepsia  melatonin 3 milliGRAM(s) Oral at bedtime PRN Insomnia  ondansetron Injectable 4 milliGRAM(s) IV Push every 8 hours PRN Nausea and/or Vomiting  sodium chloride 0.65% Nasal 1 Spray(s) Both Nostrils five times a day PRN Nasal Congestion      Allergies    No Known Allergies    Intolerances        Social History:  Lived at home alone. Former smoker. Denies etoh and other rec drug use. Ambulates with walker. Daughter is HCP. DNR/DNI, came from [facility] now (14 Jul 2022 04:06)      REVIEW OF SYSTEMS: i am OK, tired   CONSTITUTIONAL: No fever, No chills, No fatigue, No myalgia, right hip and back Body ache, No Weakness  EYES: No eye pain,  No visual disturbances, No discharge, NO Redness  ENMT:  No ear pain, No nose bleed, No vertigo; No sinus pain, NO throat pain, No Congestion  NECK: No pain, No stiffness  RESPIRATORY: No cough, NO wheezing, No  hemoptysis, NO  shortness of breath  CARDIOVASCULAR: No chest pain, palpitations  GASTROINTESTINAL: No abdominal pain, NO epigastric pain. No nausea, No vomiting; No diarrhea, No constipation. [  ] BM  GENITOURINARY: No dysuria, No frequency, No urgency, No hematuria, NO incontinence  NEUROLOGICAL: No headaches, No dizziness, No numbness, No tingling, No tremors, No weakness  EXT: No Swelling, No Pain, [x] LE Edema  SKIN:  [X ]  itching on right hematoma, burning, rashes, or lesions   MUSCULOSKELETAL: [x] ankle and foot pain ,No Jt swelling; No muscle pain, No back pain, No extremity pain.   PSYCHIATRIC: No depression,  No anxiety,  No mood swings ,No difficulty sleeping at night  PAIN SCALE: [ ] None  [X ] Other- pain on right hip hematoma and both feet pain and sensitivity   ROS Unable to obtain due to - [  ] Dementia  [  ] Lethargy [  ] Drowsy [  ] Sedated [  ] non verbal  REST OF REVIEW Of SYSTEM - [ X ] Normal    Vital Signs Last 24 Hrs  T(C): 36.6 (20 Jul 2022 05:27), Max: 36.8 (19 Jul 2022 20:02)  T(F): 97.8 (20 Jul 2022 05:27), Max: 98.3 (19 Jul 2022 20:02)  HR: 72 (20 Jul 2022 05:27) (68 - 75)  BP: 102/55 (20 Jul 2022 05:27) (102/55 - 112/65)  BP(mean): --  RR: 17 (20 Jul 2022 05:27) (17 - 19)  SpO2: 93% (20 Jul 2022 05:27) (92% - 94%)    Parameters below as of 20 Jul 2022 05:27  Patient On (Oxygen Delivery Method): nasal cannula      Finger Stick          PHYSICAL EXAM:  GENERAL:  [x  ] NAD , [x] well appearing, [  ] Agitated, [  ] Drowsy,  [  ] Lethargy, [  ] confused   HEAD:  [  x] Normal, [  ] Other  EYES:  [ x ] EOMI, [ x ] PERRLA, [ x ] conjunctiva and sclera clear normal, [  ] Other,  [  ] Pallor,[  ] Discharge  ENMT:  [x  ] Normal, [x ] dry  mucous membranes, [ x ] Good dentition, [x  ] No Thrush  NECK:  [ x ] Supple, [  x] + JVD, [x  ] Normal thyroid, [  ] Lymphadenopathy [  ] Other  CHEST/LUNG:  [ ] Clear to auscultation bilaterally, [ x ] Breath Sounds equal B/L / Decrease B/L  , [x  ] poor effort  [ x ] No rales, [ x ] No rhonchi  [ ] wheezing,   HEART:  [x ] Regular rate and rhythm, [  ] tachycardia, [  ] Bradycardia,  [  ] irregular  [x  ] + murmurs, No rubs, No gallops, [  ] PPM in place (Mfr:  ) [x] anasarca -IMPROVING  ABDOMEN:  [x  ] Soft, [ x ] Nontender, [ x] Nondistended, [ x ] No mass, [ x ] Bowel sounds present, [ x ] obese  NERVOUS SYSTEM:  [ x ] Alert & Oriented X3, [x  ] Nonfocal  [  ] Confusion  [  ] Encephalopathic [  ] Sedated [  ] Unable to assess, [  ] Dementia [  ] Other-  EXTREMITIES: [x  ] 2+ Peripheral Pulses, No clubbing, No cyanosis,  [ x ]  2+ edema B/L lower EXT. [ x ] PVD stasis skin changes B/L Lower EXT, erythema B/L Lower ext , small blister- Improved   [  ] wound  LYMPH: No lymphadenopathy noted  SKIN:  [x  ] large +R hematoma hip & Thigh , bruising over right elbow, right leg, rt foot toes POA 2/2 fall, [  ] Pressure Ulcers, [ x ] ecchymosis, [  ] Skin Tears, [  ] Other    DIET: Diet, DASH/TLC:   Sodium & Cholesterol Restricted  1000mL Fluid Restriction ([id number]) (07-14-22 @ 08:48)      LABS:                        9.4    4.77  )-----------( 221      ( 20 Jul 2022 06:20 )             29.0     20 Jul 2022 06:20    x      |  x      |  33     ----------------------------<  87     x       |  32     |  1.20     Ca    8.1        20 Jul 2022 06:20    TPro  x      /  Alb  1.7    /  TBili  x      /  DBili  x      /  AST  51     /  ALT  42     /  AlkPhos  x      20 Jul 2022 06:20    PT/INR - ( 20 Jul 2022 06:20 )   PT: 25.2 sec;   INR: 2.14 ratio      Thyroid Panel:  Thyroid Stimulating Hormone, Serum: 2.14 uIU/mL (07-14-22 @ 08:40)    RADIOLOGY & ADDITIONAL TESTS:  < from: Xray Foot AP + Lateral + Oblique, Right (07.19.22 @ 15:46) >    INTERPRETATION:  History: Fall.    FINDINGS:    Frontal, lateraland oblique projection right ankle.  Frontal, lateral and oblique projection right foot.    Diffuse mild soft tissue swelling visualized lower leg and ankle.    Question old fracture deformity proximal phalanx right second digit.    Otherwise, no fractures or dislocation appreciated. Ankle mortise and   midfoot alignment are preserved.    Small plantar calcaneal spur.    Extensive vascular calcification noted.    IMPRESSION:    Question old fracture deformity proximal phalanx right second digit.    No acute fractures or dislocation appreciated.      < end of copied text >      HEALTH ISSUES - PROBLEM Dx:  Anemia due to acute blood loss    Hematoma    Right foot pain    HFrEF (heart failure with reduced ejection fraction)    Fall    Afib    CAD (coronary artery disease)    Transaminitis    Lower extremity edema    Acute kidney injury superimposed on CKD    HTN (hypertension)    Hypothyroidism    Need for prophylactic measure            Consultant(s) Notes Reviewed:  [  ] YES     Care Discussed with [X] Consultants  [  ] Patient  [  ] Family [  ] HCP [  ]   [  ] Social Service  [  ] RN, [  ] Physical Therapy,[  ] Palliative care team  DVT PPX: [  ] Lovenox, [  ] S C Heparin, [  ] Coumadin, [  ] Xarelto, [  ] Eliquis, [  ] Pradaxa, [  ] IV Heparin drip, [  ] SCD [  ] Contraindication 2 to GI Bleed,[  ] Ambulation [  ] Contraindicated 2 to  bleed [  ] Contraindicated 2 to Brain Bleed  Advanced directive: [  ] None, [  ] DNR/DNI

## 2022-07-20 NOTE — PROGRESS NOTE ADULT - ASSESSMENT
89 y/o F with pmhx PAF (on coumadin), HFrEF (35-40%), CKD, HTN, aortic stenosis, TR, chronic LE edema, hypothyroidism, with recent admission to [facility] for CAD in mid June, found to have NSTEMI with subsequent flash pulmonary edema, unable to go for cath due to volume overload, discharged to [facility], now presenting s/p mechanical unwitnessed fall at [facility]. Found to have R hip hematoma, Coumadin, asa, plavix being held due to hg 6.9 in setting of acute blood loss anemia.     CAD with recent NSTEMI (mid June), unable to undergo cath at that time  - EKG:  Afib with LAD, LBBB, no acute ischemic changes  - No anginal complaints to date  - Continue ASA.  No need to resume Plavix  - Continue to hold statin d/t transaminitis.  Resume when able    - TTE 6/2022 with EF 35-40% with mod AS, mod TR  - No significant evidence of volume on exam  - Continue Lasix   - Monitor volume status closely.  Maintain strict I/O's    - BP, HR controlled and stable   - Continue BB  - continue daily dose coumadin for goal INR 2-3,  INR 2.14 today, Risk vs benefits need to be addressed  - Monitor and replete lytes, keep K>4, Mg>2.  - Will continue to follow.    Misael Gee NP  Nurse Practitioner- Cardiology   Spectra #[phone]

## 2022-07-20 NOTE — PROGRESS NOTE ADULT - PROVIDER SPECIALTY LIST ADULT
Heme/Onc
Heme/Onc
Nephrology
Gastroenterology
Heme/Onc
Nephrology
Cardiology
Gastroenterology
Heme/Onc
Heme/Onc
Nephrology
Cardiology
Gastroenterology
Nephrology
Nephrology
Internal Medicine
Heme/Onc
Hospitalist
Internal Medicine
Hospitalist
Internal Medicine

## 2022-07-20 NOTE — PROGRESS NOTE ADULT - ATTENDING COMMENTS
89 y/o F with a pmhx b/l LE edema, HTN, P Afib (on coumadin), HFref (EF35-40%), hypothyroidism, recent admission for NSTEMI presents to the ED s/p mechanical fall at [facility] on coumadin. Found to have ABLA with right hematoma. Admit for pRBC transfusion.  Pt seen, examined, case & care plan d/w pt, residents at detail.  Consults: d/w   Hematology: Dr Lundy for Anemia. Transfuse 1 units today  cardiology-Dr Mccord  for recent MI, CHF-HOLD ASA, Plavix, Coumadin, LAsix 2x day  Nephrology- Dr MORENO for AKL/CKD 3- Continue Lasix   GI- DR Rhodes -for increase in LFT's -? Etiology  -D/W Dtr & Son in Low at Detail in room    AM labs   PO diet  PT Eval---> [facility]   Social service Eval. d/w   MOLST renewal for DNR/DNI .   Total care time is 45 minutes.
89 y/o F with a pmhx b/l LE edema, HTN, P Afib (on coumadin), HFref (EF35-40%), hypothyroidism, recent admission for NSTEMI presents to the ED s/p mechanical fall at [facility] on coumadin. Found to have ABLA with right hematoma. Admit for pRBC transfusion.  Pt seen, examined, case & care plan d/w pt, residents at detail.  Consults: d/w   Hematology: Dr Lundy for Anemia. D/W Dr Valadez -Can restart EC ASA today   cardiology-Dr Mccord  for recent MI, CHF-HOLD , Plavix, Coumadin, Restart ASA when you can   Nephrology- Dr MORENO for AKL/CKD 3- Continue Lasix 2x day   GI- DR Rhodes -for increase in LFT's -? Etiology CHF ??  -D/W Dtr at Detail in room    AM labs   PO diet  PT Eval---> [facility]   Social service Eval. d/w   MOLST renewal for DNR/DNI .   Total care time is 45 minutes.
89 y/o F with a pmhx b/l LE edema, HTN, P Afib (on coumadin), HFref (EF35-40%), hypothyroidism, recent admission for NSTEMI presents to the ED s/p mechanical fall at [facility] on coumadin. Found to have ABLA with right hematoma. Admit for pRBC transfusion.  Pt seen, examined, case & care plan d/w pt, residents at detail.  Consults: d/w   Hematology: Dr Lundy for Anemia. D/W Dr Valadez -Can restart EC ASA today , wait for 24- 48 hrs before starting Coumadin   cardiology-Dr Mccord  for recent MI, CHF-HOLD , Plavix, Coumadin, Restart ASA when you can   Nephrology- Dr MORENO for AKL/CKD 3- Continue Lasix 2x day   GI- DR Rhodes -for increase in LFT's -? Etiology CHF   -D/W Family at Detail in room for d/c plan for [facility]  PT follow up   Social service Eval. d/w   MOLST renewal for DNR/DNI .   - AM labs   -PO diet  Total care time is 45 minutes.
89 y/o F with a pmhx b/l LE edema, HTN, P Afib (on coumadin), HFref (EF35-40%), hypothyroidism, recent admission for NSTEMI presents to the ED s/p mechanical fall at [facility] on coumadin. Found to have ABLA with right hematoma. Admit for pRBC transfusion.  Pt seen, examined, case & care plan d/w pt, residents at detail.  Consults: d/w   Hematology: Dr Lundy for Anemia. D/W Dr Valadez -Can restart EC ASA today , wait for 24- 48 hrs before starting Coumadin   cardiology-Dr Mccord  for recent MI, CHF-HOLD , Plavix, Coumadin, Restart ASA when you can   Nephrology- Dr MORENO for AKL/CKD 3- Continue Lasix 2x day   GI- DR Rhodes -for increase in LFT's -? Etiology CHF   -D/W Family at Detail in room    AM labs   PO diet  PT Eval---> [facility]   Social service Eval. d/w   MOLST renewal for DNR/DNI .   Total care time is 45 minutes.
89 y/o F with a pmhx b/l LE edema, HTN, P Afib (on coumadin), HFref (EF35-40%), hypothyroidism, recent admission for NSTEMI presents to the ED s/p mechanical fall at [facility] on coumadin. Found to have ABLA with right hematoma. Admit for pRBC transfusion.  Pt seen, examined, case & care plan d/w pt, residents at detail.  Consults:   Hematology: Dr Lundy for Anemia. Transfuse 2 units today  cardiology-Dr Calvert for recent MI, CHF-HOLD ASA, Palvix, Coumadin  Nephrology- Dr MORENO for AKL/CKD 3- Continue IV Lasix   GI- DR Rhodes -for increase in LFT's -? Etiology  -D/W Dtr & Son in Low onphone.   AM labs   PO diet  PT Eval  Social service Eval.  MOLST renewal for DNR/DNI .   Total care time is 45 minutes.
89 y/o F with a pmhx b/l LE edema, HTN, P Afib (on coumadin), HFref (EF35-40%), hypothyroidism, recent admission for NSTEMI presents to the ED s/p mechanical fall at [facility] on coumadin. Found to have ABLA with right hematoma. Admit for pRBC transfusion.  Pt seen, examined, case & care plan d/w pt, residents at detail.  Consults: d/w   Hematology: Dr Lundy for Anemia. - EC ASA &,Coumadin stable H/H   cardiology-Dr Calvert d/w -Ok to give Extra Dose of Lasix 20 mg every other day at noon  with Lasix 40 mg 2x day   Nephrology- Dr MORENO for AKL/CKD 3-  cr stable Continue Lasix 2x day   -GI- DR Rhodes -for increase in LFT's -2/2  Etiology CHF   -D/W Family at Detail in room for d/c plan for [facility]  -PT follow up --> [facility] today   Social service Eval. d/w , pt has bed Available.  MOLST renewal for DNR/DNI .   D/W Dtr at detail, at bed side   D/C [facility] today   -PO diet  Total  d/c care time is 60 minutes.
89 y/o F with a pmhx b/l LE edema, HTN, P Afib (on coumadin), HFref (EF35-40%), hypothyroidism, recent admission for NSTEMI presents to the ED s/p mechanical fall at [facility] on coumadin. Found to have ABLA with right hematoma. Admit for pRBC transfusion.  Pt seen, examined, case & care plan d/w pt, residents at detail.  Consults: d/w   Hematology: Dr Lundy for Anemia. D/W Dr Valadez -Can restart EC ASA today ,Coumadin   cardiology-Dr Mccord  for recent MI, CHF -HOLD , Plavix,   Nephrology- Dr MORENO for AKL/CKD 3- Continue Lasix 2x day   -GI- DR Rhodes -for increase in LFT's -2/2  Etiology CHF   -D/W Family at Detail in room for d/c plan for [facility]  -PT follow up --> [facility]  Social service Eval. d/w , pt has bed Available.  MOLST renewal for DNR/DNI . D/W Dtr at detail, about D/C plan  - AM labs   -PO diet  Total care time is 45 minutes.

## 2022-07-20 NOTE — PROGRESS NOTE ADULT - PROBLEM SELECTOR PLAN 4
On last TTE showed EF 35-40%,-Chronic Systolic CHF  - chest imaging from the weekend shows pleural effusion and fluid overload,   - IV lasix given last night d/t bibasilar crackles heard in lungs  - Lasix 40 mg BID , 1 dose EXTR IV Lasix 20 mg x 1 today as d/w cardiology today  - On Toprol 50 xl  mg with holding parameters   - maintain net negative balance; strict I/Os, daily weight  - titrate off NC   - Cardio [facility] following  -D/W [facility] -Dr ALEXANDRU mckay about CXR with b/l pleural effusions -Unchanged from previous CXR  2/2 pt's age & CKD as well as soft BP will continue Lasix 40 mg 2x day on d/c On last TTE showed EF 35-40%,-Chronic Systolic CHF  - chest imaging from the weekend shows pleural effusion and fluid overload,   - IV lasix given last night d/t bibasilar crackles heard in lungs  - Lasix 40 mg BID , 1 dose EXTR IV Lasix 20 mg x 1 today as d/w cardiology today  - On Toprol 50 xl  mg with holding parameters   - maintain net negative balance; strict I/Os, daily weight  - titrate off NC   - Cardio [facility] group following  -D/W [facility] -Dr ALEXANDRU cano  about CXR with b/l pleural effusions -Unchanged from previous CXR  2/2 pt's age & CKD as well as soft BP will continue Lasix 40 mg 2x day & Extr 20 mg every other day at Noon  on d/c

## 2022-07-20 NOTE — PROGRESS NOTE ADULT - PROBLEM SELECTOR PLAN 6
chronic, stable - rate-controlled   - INR 2.14 this AM  - 3mg  coumadin for the past 2 days, continue to monitor INR   - Toprol  XL 50mg PO qd w/ hold parameters  - cardio Dr. charles group following chronic, stable - rate-controlled   - INR 2.14 this AM  - 3mg  coumadin for the past 2 days, continue to monitor INR  - continue with 3 mg coumadin Monday, Wednesday, Friday and 2 mg coumadin Tuesday, Thursday, Saturday, Sunday   - Toprol  XL 50mg PO qd w/ hold parameters  - cardio Dr. charles group following chronic, stable - rate-controlled   - INR 2.14 this AM  - 3mg  coumadin for the past 2 days, continue to monitor INR  - continue with 3 mg coumadin Monday, Wednesday, Friday and 2.5 mg coumadin Tuesday, Thursday, Saturday, Sunday   - Toprol  XL 50mg PO qd w/ hold parameters  - cardio Dr. charles group following

## 2022-07-20 NOTE — PROGRESS NOTE ADULT - ASSESSMENT
89 y/o F with a pmhx b/l LE edema, HTN, P Afib (on coumadin), HFref (EF35-40%), hypothyroidism, recent admission for NSTEMI presents to the ED s/p mechanical fall at [facility] on coumadin. Found to have ABLA with right hematoma.

## 2022-07-20 NOTE — PROGRESS NOTE ADULT - REASON FOR ADMISSION
Fall, with Anemia
Alana, JHONATHANA
Fall, with Anemia

## 2022-07-20 NOTE — PROGRESS NOTE ADULT - PROBLEM SELECTOR PLAN 9
chronic, swelling LE b/l on admit- chronic edema   - On Lasix 40 mg BID PO  - received IV lasix 20 mg last night chronic, swelling LE b/l on admit- chronic edema   - On Lasix 40 mg BID PO  - received IV lasix 20 mg last night  d/w Cardio Dr Calvert -Ok to giveLasix 20 mg at noon EXTRA every other day

## 2022-07-20 NOTE — PROGRESS NOTE ADULT - SUBJECTIVE AND OBJECTIVE BOX
[facility]  Dawson Dolan PA-C  [address] [place] [address]  [phone]      INTERVAL HPI/OVERNIGHT EVENTS:  Pt s/e  No reported GI events  LFTs noted    MEDICATIONS  (STANDING):  albuterol/ipratropium for Nebulization 3 milliLiter(s) Nebulizer every 8 hours  aspirin enteric coated 81 milliGRAM(s) Oral daily  BACItracin   Ointment 1 Application(s) Topical two times a day  calamine/zinc oxide Lotion 1 Application(s) Topical daily  furosemide    Tablet 40 milliGRAM(s) Oral every 12 hours  levothyroxine 75 MICROGram(s) Oral daily  metoprolol succinate ER 50 milliGRAM(s) Oral daily    MEDICATIONS  (PRN):  acetaminophen     Tablet .. 650 milliGRAM(s) Oral every 6 hours PRN Mild Pain (1 - 3)  aluminum hydroxide/magnesium hydroxide/simethicone Suspension 30 milliLiter(s) Oral every 4 hours PRN Dyspepsia  melatonin 3 milliGRAM(s) Oral at bedtime PRN Insomnia  ondansetron Injectable 4 milliGRAM(s) IV Push every 8 hours PRN Nausea and/or Vomiting  sodium chloride 0.65% Nasal 1 Spray(s) Both Nostrils five times a day PRN Nasal Congestion      Allergies  No Known Allergies    PHYSICAL EXAM:   Vital Signs:  Vital Signs Last 24 Hrs  T(C): 36.6 (2022 05:27), Max: 36.8 (2022 20:02)  T(F): 97.8 (2022 05:27), Max: 98.3 (2022 20:02)  HR: 74 (2022 09:10) (68 - 75)  BP: 98/57 (2022 09:10) (98/57 - 112/65)  BP(mean): --  RR: 17 (2022 05:27) (17 - 19)  SpO2: 98% (2022 08:28) (92% - 98%)    Parameters below as of 2022 08:28  Patient On (Oxygen Delivery Method): nasal cannula,2LPM      Daily     Daily Weight in k (2022 05:27)    GENERAL:  Appears stated age  ABDOMEN:  Soft, non-tender, non-distended  NEURO:  Alert    LABS:                        9.4    4.77  )-----------( 221      ( 2022 06:20 )             29.0     07-20    139  |  101  |  33<H>  ----------------------------<  87  3.6   |  32<H>  |  1.20    Ca    8.1<L>      2022 06:20  Phos  3.6     07-20  Mg     2.2     07-20    TPro  7.0  /  Alb  1.7<L>  /  TBili  2.8<H>  /  DBili  x   /  AST  51<H>  /  ALT  42  /  AlkPhos  271<H>  07-20    PT/INR - ( 2022 06:20 )   PT: 25.2 sec;   INR: 2.14 ratio

## 2022-07-20 NOTE — PROGRESS NOTE ADULT - ASSESSMENT
89 yo woman with multifactorial anemia due to chronic disease including renal disease complicated by a fall on 7/14/22 on coumadin with CT scan of right hip showing a hematoma.      INR mildly elevated 3.28.   Patient was also on aspirin and plavix due to recent NSTEMI    CAD with recent NSTEMI (mid June), unable to undergo cath at that time  - EKG:  Afib with LAD, LBBB, no acute ischemic changes  - No anginal complaints to date    On Coumadin for Afib    -restarted on ASA and coumadin  Plavix stopped per cardiology    -CBC stable, clinically no incr bleed or bruise    RECOMMEND  NSTEMI and Afib mgmt per cardiology: on Coumadin. ASA. Rate control.   -monitor serial CBC  Hgb stable. No additional recommendations    discussed w pt    Thank you for consulting us.   No additional recommendations at current time.   Will sign off on case for now.   Please call, or re-consult if needed.

## 2022-07-20 NOTE — PROGRESS NOTE ADULT - PROBLEM SELECTOR PLAN 8
Unclear etiology. had workup during last admission. presumably multifactorial due to CHF vs meds. Trending down.  - avoid hepatotoxins  - GI Dr. Lockwood following Unclear etiology. had workup during last admission. presumably multifactorial due to CHF vs meds. Trending down.  - avoid hepatotoxins  - GI Dr. Lockwood following d/w at detail, No concern for high LFT's 2/2 CHF as per GI  High Bili 2/2 hematoma   No further work up  HOLD Statin , repest LFT's as out pt

## 2022-07-20 NOTE — PROGRESS NOTE ADULT - PROBLEM SELECTOR PLAN 10
LUIS CARLOS on CKD III, likely prerenal secondary to active bleeding   - Creatinine baseline 1.2, this morning 1.4   - Avoid nephrotoxic medications   - Low salt diet w/ 1 lt fluid restriction   - Nephro Following  -Lasix 40 mg q 12 hrs PO LUIS CARLOS on CKD III, RESOLVED LUIS CARLOS  - Creatinine baseline 1.2, this morning 1.4   - Avoid nephrotoxic medications   - Low salt diet w/ 1 lt fluid restriction   - Nephro Following  -Lasix 40 mg q 12 hrs PO

## 2022-07-20 NOTE — PROGRESS NOTE ADULT - PROBLEM SELECTOR PLAN 11
BP wnl this AM (102/55)  - continue Toprol 50mg qD w/ hold parameters  - routine hemodynamic monitoring. BP wnl this AM (102/55)  - continue Toprol XL 50mg qD w/ hold parameters  -Lasix 40 mg 2x day   - routine hemodynamic monitoring.

## 2022-07-20 NOTE — PROGRESS NOTE ADULT - PROBLEM SELECTOR PLAN 2
Right hip hematoma s/p mechanical fall at [facility], CT right hip shows subcutaneous hematoma along the lateral aspect of the right hip, no acute displaced fracture or dislocation within the pelvis.  -- no need for coumadin reversal agents for now as d/w Dr Lundy -Hematology  - 3 u pRBC given in total   - cool compresses prn  - calamine lotion for itch  -HOLD Plavix, HOLD AC- Started ASA (7/16/22)  -Pt with traumatic Rt Thigh hematoma 2/2 Fall POA , pt also on Anticoagulants Right hip hematoma s/p mechanical fall at [facility] CT right hip shows subcutaneous hematoma along the lateral aspect of the right hip, no acute displaced fracture or dislocation within the pelvis.  -- no need for coumadin reversal agents for now as d/w Dr Lundy -Hematology  - 3 u pRBC given in total   - cool compresses prn  - calamine lotion for itch  -HOLD Plavix, HOLD AC- Started ASA (7/16/22)  -Pt with traumatic Rt Thigh hematoma 2/2 Fall POA , pt also on Anticoagulants  - will be transferred to [facility] today 7/20/22 for rehabilitation Right hip hematoma s/p mechanical fall at [facility], CT right hip shows subcutaneous hematoma along the lateral aspect of the right hip, no acute displaced fracture or dislocation within the pelvis.  -- no need for coumadin reversal agents for now as d/w Dr Lundy -Hematology  - 3 u pRBC given in total   - cool compresses prn  - calamine lotion for itch  - tylenol PRN for pain   -HOLD Plavix, HOLD AC- Started ASA (7/16/22)  -Pt with traumatic Rt Thigh hematoma 2/2 Fall POA , pt also on Anticoagulants  - will be transferred to [facility] today 7/20/22 for rehabilitation

## 2022-07-20 NOTE — PROGRESS NOTE ADULT - PROBLEM SELECTOR PLAN 3
s/p mechanical fall at [facility], CT right hip shows subcutaneous hematoma along lateral aspect of right hip   pt now endorses pain and swelling of right foot, suspects ankle fracture  foot Xray  Rt Foot & Ankle shows no acute fractures s/p mechanical fall at [facility], CT right hip shows subcutaneous hematoma along lateral aspect of right hip   pt now endorses pain and swelling of right foot, suspects ankle fracture  foot Xray  Rt Foot & Ankle shows no acute fractures  - continue tylenol PRN for pain

## 2022-07-20 NOTE — DISCHARGE NOTE NURSING/CASE MANAGEMENT/SOCIAL WORK - NSDCPEFALRISK_GEN_ALL_CORE
For information on Fall & Injury Prevention, visit: [email or web address] OR  [email or web address] OR  https://www.cdc.gov/steadi/patient.html

## 2022-07-20 NOTE — PROGRESS NOTE ADULT - SUBJECTIVE AND OBJECTIVE BOX
===[INTERVAL HX: ]===  Patient seen and examined;  Chart reviewed and events noted;     trying to move bowels.     No events reported by nursing     ===[HEALTH ISSUES]===      HEALTH ISSUES - PROBLEM Dx:  Fall    Anemia due to acute blood loss    Hematoma    Transaminitis    Afib    Lower extremity edema    Acute kidney injury superimposed on CKD    HTN (hypertension)    Hypothyroidism    HFrEF (heart failure with reduced ejection fraction)    Need for prophylactic measure    CAD (coronary artery disease)    Right foot pain      ===[MEDICATIONS]===  MEDICATIONS  (STANDING):  albuterol/ipratropium for Nebulization 3 milliLiter(s) Nebulizer every 8 hours  aspirin enteric coated 81 milliGRAM(s) Oral daily  BACItracin   Ointment 1 Application(s) Topical two times a day  calamine/zinc oxide Lotion 1 Application(s) Topical daily  furosemide    Tablet 40 milliGRAM(s) Oral every 12 hours  levothyroxine 75 MICROGram(s) Oral daily  metoprolol succinate ER 50 milliGRAM(s) Oral daily    MEDICATIONS  (PRN):  acetaminophen     Tablet .. 650 milliGRAM(s) Oral every 6 hours PRN Mild Pain (1 - 3)  aluminum hydroxide/magnesium hydroxide/simethicone Suspension 30 milliLiter(s) Oral every 4 hours PRN Dyspepsia  melatonin 3 milliGRAM(s) Oral at bedtime PRN Insomnia  ondansetron Injectable 4 milliGRAM(s) IV Push every 8 hours PRN Nausea and/or Vomiting  sodium chloride 0.65% Nasal 1 Spray(s) Both Nostrils five times a day PRN Nasal Congestion      ===[VITALS]===  Vital Signs Last 24 Hrs  T(C): 36.6 (2022 05:27), Max: 36.8 (2022 20:02)  T(F): 97.8 (2022 05:27), Max: 98.3 (2022 20:02)  HR: 68 (2022 08:28) (68 - 75)  BP: 102/55 (2022 05:27) (102/55 - 112/65)  BP(mean): --  RR: 17 (2022 05:27) (17 - 19)  SpO2: 98% (2022 08:28) (92% - 98%)    Parameters below as of 2022 08:28  Patient On (Oxygen Delivery Method): nasal cannula,2LPM      [WT/HT]  Daily     Daily Weight in k (2022 05:27)  [VENT]    ===[PHYSICAL EXAM]===  General: WN,  WD adult in NAD. irritable   HEENT:  anicteric sclera, pink conjunctivae,   Neck: supple, no masses.  CV: normal S1S2, no murmur, no rubs, no gallops.  Lungs: clear to auscultation, no wheezes, no rales, no rhonchi.  Abdomen: soft, non-tender, non-distended, no hepatosplenomegaly, normal BS, no guarding.  Ext: no clubbing, no cyanosis, +edema.  Skin: no rashes,  no petechiae, no venous stasis changes.  Neuro: alert and oriented X 2, no focal motor deficits.  LN: no SC ANDRES.        ===[LABS:]===                        9.4    4.77  )-----------( 221      ( 2022 06:20 )             29.0     07-20    139  |  101  |  33<H>  ----------------------------<  87  3.6   |  32<H>  |  1.20    Ca    8.1<L>      2022 06:20  Phos  3.6     07-20  Mg     2.2     07-20    TPro  7.0  /  Alb  1.7<L>  /  TBili  2.8<H>  /  DBili  x   /  AST  51<H>  /  ALT  42  /  AlkPhos  271<H>  07-20    PT/INR - ( 2022 06:20 )   PT: 25.2 sec;   INR: 2.14 ratio                     COVID-19 PCR: NotDetec (2022 07:00)  COVID-19 PCR: NotDetec (2022 02:44)  COVID-19 PCR: NotDetec (2022 12:30)  COVID-19 PCR: NotDetec (2022 10:34)  COVID-19 PCR: NotDetec (2022 08:48)              ===[RADIOLOGY STUDIES:]===

## 2022-07-20 NOTE — PROGRESS NOTE ADULT - SUBJECTIVE AND OBJECTIVE BOX
[facility] -- Reid Astorga, Noarh, Raheem, Flex Muniz Savella, Goodger  Office # [phone]    Follow Up:   S/P Fall, HFrEF, Afib    Subjective/Observations: Patient seen and examined, awake, alert, resting comfortably in bed, denies chest pain, dyspnea, palpitations or dizziness   c/o  b/l leg pain.  Tolerating O2 via NC     REVIEW OF SYSTEMS: All other review of systems is negative unless indicated above  PAST MEDICAL & SURGICAL HISTORY:  Hypothyroid      Hypertension      Lower extremity edema      Paroxysmal atrial fibrillation      NSTEMI (non-ST elevation myocardial infarction)  june 2022      Chronic systolic congestive heart failure      Stage 3 chronic kidney disease      Anemia of chronic disease      No significant past surgical history        MEDICATIONS  (STANDING):  albuterol/ipratropium for Nebulization 3 milliLiter(s) Nebulizer every 8 hours  aspirin enteric coated 81 milliGRAM(s) Oral daily  BACItracin   Ointment 1 Application(s) Topical two times a day  calamine/zinc oxide Lotion 1 Application(s) Topical daily  furosemide    Tablet 40 milliGRAM(s) Oral every 12 hours  levothyroxine 75 MICROGram(s) Oral daily  metoprolol succinate ER 50 milliGRAM(s) Oral daily    MEDICATIONS  (PRN):  acetaminophen     Tablet .. 650 milliGRAM(s) Oral every 6 hours PRN Mild Pain (1 - 3)  aluminum hydroxide/magnesium hydroxide/simethicone Suspension 30 milliLiter(s) Oral every 4 hours PRN Dyspepsia  melatonin 3 milliGRAM(s) Oral at bedtime PRN Insomnia  ondansetron Injectable 4 milliGRAM(s) IV Push every 8 hours PRN Nausea and/or Vomiting  sodium chloride 0.65% Nasal 1 Spray(s) Both Nostrils five times a day PRN Nasal Congestion    Allergies    No Known Allergies    Intolerances      Vital Signs Last 24 Hrs  T(C): 36.6 (20 Jul 2022 05:27), Max: 36.8 (19 Jul 2022 20:02)  T(F): 97.8 (20 Jul 2022 05:27), Max: 98.3 (19 Jul 2022 20:02)  HR: 74 (20 Jul 2022 09:10) (68 - 75)  BP: 98/57 (20 Jul 2022 09:10) (98/57 - 112/65)  BP(mean): --  RR: 17 (20 Jul 2022 05:27) (17 - 19)  SpO2: 98% (20 Jul 2022 08:28) (92% - 98%)    Parameters below as of 20 Jul 2022 08:28  Patient On (Oxygen Delivery Method): nasal cannula,2LPM      I&O's Summary      PHYSICAL EXAM:  Constitutional: NAD, awake and alert, obese  HEENT: Moist Mucous Membranes, Anicteric  Pulmonary: Non-labored, breath sounds are clear bilaterally, No wheezing, rales or rhonchi  Cardiovascular: irregular, S1 and S2, + murmurs, rubs, gallops or clicks  Gastrointestinal: Bowel Sounds present, soft, nontender.   Lymph: No peripheral edema. No lymphadenopathy.  Skin: No visible rashes or ulcers. R hip ecchymosis   Psych:  Mood & affect appropriate for situation        LABS: All Labs Reviewed:                        9.4    4.77  )-----------( 221      ( 20 Jul 2022 06:20 )             29.0                         9.5    4.99  )-----------( 219      ( 19 Jul 2022 06:35 )             28.6                         9.8    4.76  )-----------( 222      ( 18 Jul 2022 06:18 )             30.7     20 Jul 2022 06:20    139    |  101    |  33     ----------------------------<  87     3.6     |  32     |  1.20   19 Jul 2022 06:35    140    |  102    |  31     ----------------------------<  81     3.7     |  36     |  1.40   18 Jul 2022 06:18    140    |  103    |  34     ----------------------------<  85     3.6     |  33     |  1.30     Ca    8.1        20 Jul 2022 06:20  Ca    7.9        19 Jul 2022 06:35  Ca    8.0        18 Jul 2022 06:18  Phos  3.6       20 Jul 2022 06:20  Phos  3.5       19 Jul 2022 06:35  Phos  3.7       18 Jul 2022 06:18  Mg     2.2       20 Jul 2022 06:20  Mg     2.1       19 Jul 2022 06:35  Mg     2.3       18 Jul 2022 06:18    TPro  7.0    /  Alb  1.7    /  TBili  2.8    /  DBili  x      /  AST  51     /  ALT  42     /  AlkPhos  271    20 Jul 2022 06:20  TPro  x      /  Alb  x      /  TBili  2.8    /  DBili  1.8    /  AST  x      /  ALT  x      /  AlkPhos  x      19 Jul 2022 12:20  TPro  6.7    /  Alb  1.7    /  TBili  3.0    /  DBili  x      /  AST  60     /  ALT  52     /  AlkPhos  305    19 Jul 2022 06:35    PT/INR - ( 20 Jul 2022 06:20 )   PT: 25.2 sec;   INR: 2.14 ratio           Cholesterol, Serum: 144 mg/dL (06-18-22 @ 10:09)  HDL Cholesterol, Serum: 44 mg/dL (06-18-22 @ 10:09)  Triglycerides, Serum: 65 mg/dL (06-18-22 @ 10:09)      12 Lead ECG:   Ventricular Rate 68 BPM    QRS Duration 150 ms    Q-T Interval 490 ms    QTC Calculation(Bazett) 521 ms    R Axis -35 degrees    T Axis 206 degrees    Diagnosis Line Atrial fibrillation  Left axis deviation  Left bundle branch block  Confirmed by KEVIN MUNIZ ([id number]) on 7/14/2022 11:34:42 AM (07-14-22 @ 08:32)    < from: Xray Chest 1 View- PORTABLE-Routine (Xray Chest 1 View- PORTABLE-Routine .) (07.16.22 @ 10:37) >    ACC: [id number] EXAM:  XR CHEST PORTABLE ROUTINE 1V                          PROCEDURE DATE:  07/16/2022          INTERPRETATION:  Portable chest radiograph    CLINICAL INFORMATION: Dyspnea, shortness of breath.    TECHNIQUE:  Portable  AP chest radiograph.    COMPARISON: CT chest 7/14/2022 .    FINDINGS:  CATHETERS AND TUBES: None    PULMONARY: Moderate bilateral pleural effusions and/or basilar airspace   disease obscuring RIGHT diaphragm contours. Upper zones clear. No   pneumothorax.    HEART/VASCULAR: The  heart is enlarged in transverse diameter.   Atherosclerotic calcified intimal wall plaques within nondilated thoracic   aorta.     BONES: Visualized osseous structures are intact.    IMPRESSION:   No significant change..    --- End of Report ---            RUSSELL MONGE MD; Attending Radiologist  This document has been electronically signed. Jul 18 2022  3:49PM    < end of copied text >  < from: TTE Echo Complete w/o Contrast w/ Doppler (06.19.22 @ 10:00) >    ACC: [id number] EXAM:  ECHO TTE WO CON COMP W DOPP                          PROCEDURE DATE:  06/19/2022          INTERPRETATION:  INDICATION: Chest pain  Sonographer LK    Blood Pressure 147/84    Height 165.1 cm     Weight 72.6 kg       BSA 1.8   sqm    Dimensions:  LA 3.8       Normal Values: 2.0 - 4.0 cm  Ao 3.2        Normal Values: 2.0 - 3.8 cm  SEPTUM 1.7       Normal Values: 0.6 - 1.2 cm  PWT 1.2       Normal Values: 0.6 - 1.1 cm  LVIDd 4.8         Normal Values: 3.0 - 5.6 cm  LVIDs 3.2      Normal Values: 1.8 - 4.0 cm      OBSERVATIONS:  Mitral Valve: Mitral annular calcification with thickened leaflets, mild   MR.  Aortic Valve/Aorta: Calcified trileaflet aortic valve with decreased   opening. Peak transaortic valve gradient equals 20.4 mmHg with a mean   transaortic valve gradient 13.2 mmHg. By visual estimation there appears   to be mild to moderate aortic stenosis  Tricuspid Valve: Moderate TR.  Pulmonic Valve: Trace PI  Left Atrium: Enlarged  Right Atrium: Enlarged  Left Ventricle: Mild to moderate segmental left ventricular systolic   dysfunction, estimated LVEF of 35-40%. The apex, mid inferoseptal and mid   anterolateral walls appear severely hypokinetic  Right Ventricle: Right ventricular enlargement with grossly normal   function  Pericardium: no significant pericardial effusion.  Pulmonary/RV Pressure: estimated PA systolic pressure of 36 mmHg  IVC measures 1.47 cm          IMPRESSION:  Mild to moderate segmental left ventricular systolic dysfunction,   estimated LVEF of 35-40%. The apex, mid inferoseptal and mid   anterolateral walls appear severely hypokinetic  Right ventricular enlargement with grossly normal function  Biatrial enlargement  Calcified trileaflet aortic valve with mild to moderate aortic stenosis,   without AI.  Mild MR  Moderate TR.  No significant pericardial effusion.    --- End of Report ---            JEAN CARLOS HENRY MD; Attending Cardiologist  This document has been electronically signed. Jun 20 2022 12:56PM    < end of copied text >

## 2022-07-20 NOTE — PROGRESS NOTE ADULT - NS ATTEND AMEND GEN_ALL_CORE FT
Continue current treatment.  Dose Coumadin.  To follow melissa guy.
Appears compensated from HF POV. cont po lasix. cont asa. hold ac. monitor inr.  Further cardiac workup will depend on clinical course.
HF stable. cont lasix. hold ac and antiplatelets. Further cardiac workup will depend on clinical course.
No signs of significant ischemia or volume overload. cont current care. Further cardiac workup will depend on clinical course.
continues to be mildly overloaded, cont with po diuretics, as creatinine remains stable.  ac initially held because of hematoma, now restarrted
give extra lasix 20mg IV today to see if improves vol status. Further cardiac workup will depend on clinical course.

## 2022-08-24 RX ORDER — NIRMATRELVIR AND RITONAVIR 150-100 MG
2 KIT ORAL
Qty: 0 | Refills: 0 | DISCHARGE
Start: 2022-08-24 | End: 2022-08-28

## 2022-08-27 ENCOUNTER — INPATIENT (INPATIENT)
Facility: HOSPITAL | Age: 87
LOS: 2 days | Discharge: HOSPICE | DRG: 377 | End: 2022-08-30
Attending: INTERNAL MEDICINE | Admitting: STUDENT IN AN ORGANIZED HEALTH CARE EDUCATION/TRAINING PROGRAM
Payer: MEDICARE

## 2022-08-27 VITALS
HEIGHT: 65 IN | OXYGEN SATURATION: 100 % | DIASTOLIC BLOOD PRESSURE: 46 MMHG | HEART RATE: 63 BPM | RESPIRATION RATE: 18 BRPM | TEMPERATURE: 98 F | SYSTOLIC BLOOD PRESSURE: 108 MMHG

## 2022-08-27 LAB — OB PNL STL: POSITIVE

## 2022-08-27 PROCEDURE — 93010 ELECTROCARDIOGRAM REPORT: CPT

## 2022-08-27 PROCEDURE — 99285 EMERGENCY DEPT VISIT HI MDM: CPT | Mod: CS

## 2022-08-27 RX ORDER — SODIUM CHLORIDE 9 MG/ML
1000 INJECTION INTRAMUSCULAR; INTRAVENOUS; SUBCUTANEOUS ONCE
Refills: 0 | Status: COMPLETED | OUTPATIENT
Start: 2022-08-27 | End: 2022-08-27

## 2022-08-27 RX ORDER — PANTOPRAZOLE SODIUM 20 MG/1
40 TABLET, DELAYED RELEASE ORAL ONCE
Refills: 0 | Status: COMPLETED | OUTPATIENT
Start: 2022-08-27 | End: 2022-08-27

## 2022-08-27 NOTE — ED PROVIDER NOTE - NSICDXPASTMEDICALHX_GEN_ALL_CORE_FT
PAST MEDICAL HISTORY:  Anemia of chronic disease     Chronic systolic congestive heart failure     Hypertension     Hypothyroid     Lower extremity edema     NSTEMI (non-ST elevation myocardial infarction) june 2022    Paroxysmal atrial fibrillation     Stage 3 chronic kidney disease

## 2022-08-27 NOTE — ED ADULT TRIAGE NOTE - DOMESTIC TRAVEL HIGH RISK QUESTION
Left mess for pt to call back ans set up 6 mo rt in [facility] with MSA  
Unable to Assess

## 2022-08-27 NOTE — ED PROVIDER NOTE - SIGNIFICANT NEGATIVE FINDINGS
no headache, no chest pain, no SOB, no palpitations, no abdominal pain, no n/v/d, no fever no chills,  no urinary symptoms, no neuro changes.

## 2022-08-27 NOTE — ED PROVIDER NOTE - CLINICAL SUMMARY MEDICAL DECISION MAKING FREE TEXT BOX
acute GIB and anemia on coumadin for AFib. also COVID-19 positive.....   transfuse packed RBCs, Vitamin K 5mg  IVPB admit to telemetry

## 2022-08-27 NOTE — ED PROVIDER NOTE - OBJECTIVE STATEMENT
covid positive, saturation normal, on coumadin, melena 89 y/o female H/O A Fib, Covid-19  positive, came to the ED for dark stools, she is taking  coumadin for the AF, Hgb dropped from 9.8 to 6.5   no headache, no chest pain, no SOB, no palpitations, no abdominal pain, no n/v/d, no fever no chills,  no urinary symptoms, no neuro changes.

## 2022-08-27 NOTE — ED ADULT TRIAGE NOTE - CHIEF COMPLAINT QUOTE
per ems from [facility], called for ams and blood in stool, on coumadin. fs 156, 88% on room air. recent covid diagnosis

## 2022-08-27 NOTE — ED PROVIDER NOTE - CARE PLAN
1 Principal Discharge DX:	GIB (gastrointestinal bleeding)  Secondary Diagnosis:	Hypoprothrombinemia   Principal Discharge DX:	GIB (gastrointestinal bleeding)  Secondary Diagnosis:	Hypoprothrombinemia  Secondary Diagnosis:	2019 novel coronavirus disease (COVID-19)

## 2022-08-28 DIAGNOSIS — I25.10 ATHEROSCLEROTIC HEART DISEASE OF NATIVE CORONARY ARTERY WITHOUT ANGINA PECTORIS: ICD-10-CM

## 2022-08-28 DIAGNOSIS — N18.9 CHRONIC KIDNEY DISEASE, UNSPECIFIED: ICD-10-CM

## 2022-08-28 DIAGNOSIS — U07.1 COVID-19: ICD-10-CM

## 2022-08-28 DIAGNOSIS — I50.22 CHRONIC SYSTOLIC (CONGESTIVE) HEART FAILURE: ICD-10-CM

## 2022-08-28 DIAGNOSIS — R60.0 LOCALIZED EDEMA: ICD-10-CM

## 2022-08-28 DIAGNOSIS — Z29.9 ENCOUNTER FOR PROPHYLACTIC MEASURES, UNSPECIFIED: ICD-10-CM

## 2022-08-28 DIAGNOSIS — R79.1 ABNORMAL COAGULATION PROFILE: ICD-10-CM

## 2022-08-28 DIAGNOSIS — N18.30 CHRONIC KIDNEY DISEASE, STAGE 3 UNSPECIFIED: ICD-10-CM

## 2022-08-28 DIAGNOSIS — K92.2 GASTROINTESTINAL HEMORRHAGE, UNSPECIFIED: ICD-10-CM

## 2022-08-28 DIAGNOSIS — I21.4 NON-ST ELEVATION (NSTEMI) MYOCARDIAL INFARCTION: ICD-10-CM

## 2022-08-28 DIAGNOSIS — E03.9 HYPOTHYROIDISM, UNSPECIFIED: ICD-10-CM

## 2022-08-28 DIAGNOSIS — I48.0 PAROXYSMAL ATRIAL FIBRILLATION: ICD-10-CM

## 2022-08-28 DIAGNOSIS — I10 ESSENTIAL (PRIMARY) HYPERTENSION: ICD-10-CM

## 2022-08-28 PROBLEM — D63.8 ANEMIA IN OTHER CHRONIC DISEASES CLASSIFIED ELSEWHERE: Chronic | Status: ACTIVE | Noted: 2022-07-14

## 2022-08-28 LAB
ALBUMIN SERPL ELPH-MCNC: 1.9 G/DL — LOW (ref 3.3–5)
ALP SERPL-CCNC: 113 U/L — SIGNIFICANT CHANGE UP (ref 40–120)
ALT FLD-CCNC: 19 U/L — SIGNIFICANT CHANGE UP (ref 12–78)
ANION GAP SERPL CALC-SCNC: 3 MMOL/L — LOW (ref 5–17)
ANISOCYTOSIS BLD QL: SIGNIFICANT CHANGE UP
APTT BLD: 37.8 SEC — HIGH (ref 27.5–35.5)
APTT BLD: 49.1 SEC — HIGH (ref 27.5–35.5)
AST SERPL-CCNC: 48 U/L — HIGH (ref 15–37)
BASOPHILS # BLD AUTO: 0.02 K/UL — SIGNIFICANT CHANGE UP (ref 0–0.2)
BASOPHILS NFR BLD AUTO: 0.3 % — SIGNIFICANT CHANGE UP (ref 0–2)
BILIRUB SERPL-MCNC: 1.3 MG/DL — HIGH (ref 0.2–1.2)
BLD GP AB SCN SERPL QL: SIGNIFICANT CHANGE UP
BUN SERPL-MCNC: 56 MG/DL — HIGH (ref 7–23)
CALCIUM SERPL-MCNC: 8.1 MG/DL — LOW (ref 8.5–10.1)
CHLORIDE SERPL-SCNC: 106 MMOL/L — SIGNIFICANT CHANGE UP (ref 96–108)
CO2 SERPL-SCNC: 32 MMOL/L — HIGH (ref 22–31)
CREAT SERPL-MCNC: 1.8 MG/DL — HIGH (ref 0.5–1.3)
EGFR: 26 ML/MIN/1.73M2 — LOW
EOSINOPHIL # BLD AUTO: 0.02 K/UL — SIGNIFICANT CHANGE UP (ref 0–0.5)
EOSINOPHIL NFR BLD AUTO: 0.3 % — SIGNIFICANT CHANGE UP (ref 0–6)
GLUCOSE SERPL-MCNC: 109 MG/DL — HIGH (ref 70–99)
HCT VFR BLD CALC: 20.7 % — CRITICAL LOW (ref 34.5–45)
HCT VFR BLD CALC: 24.3 % — LOW (ref 34.5–45)
HGB BLD-MCNC: 6.5 G/DL — CRITICAL LOW (ref 11.5–15.5)
HGB BLD-MCNC: 7.6 G/DL — LOW (ref 11.5–15.5)
HYPOCHROMIA BLD QL: SLIGHT — SIGNIFICANT CHANGE UP
IMM GRANULOCYTES NFR BLD AUTO: 0.5 % — SIGNIFICANT CHANGE UP (ref 0–1.5)
INR BLD: 2.66 RATIO — HIGH (ref 0.88–1.16)
INR BLD: 7.11 RATIO — CRITICAL HIGH (ref 0.88–1.16)
LIDOCAIN IGE QN: 128 U/L — SIGNIFICANT CHANGE UP (ref 73–393)
LYMPHOCYTES # BLD AUTO: 1.15 K/UL — SIGNIFICANT CHANGE UP (ref 1–3.3)
LYMPHOCYTES # BLD AUTO: 19.4 % — SIGNIFICANT CHANGE UP (ref 13–44)
MACROCYTES BLD QL: SIGNIFICANT CHANGE UP
MANUAL SMEAR VERIFICATION: SIGNIFICANT CHANGE UP
MCHC RBC-ENTMCNC: 31.3 GM/DL — LOW (ref 32–36)
MCHC RBC-ENTMCNC: 31.4 GM/DL — LOW (ref 32–36)
MCHC RBC-ENTMCNC: 32.1 PG — SIGNIFICANT CHANGE UP (ref 27–34)
MCHC RBC-ENTMCNC: 32.3 PG — SIGNIFICANT CHANGE UP (ref 27–34)
MCV RBC AUTO: 102.5 FL — HIGH (ref 80–100)
MCV RBC AUTO: 103 FL — HIGH (ref 80–100)
MICROCYTES BLD QL: SLIGHT — SIGNIFICANT CHANGE UP
MONOCYTES # BLD AUTO: 0.72 K/UL — SIGNIFICANT CHANGE UP (ref 0–0.9)
MONOCYTES NFR BLD AUTO: 12.1 % — SIGNIFICANT CHANGE UP (ref 2–14)
NEUTROPHILS # BLD AUTO: 4 K/UL — SIGNIFICANT CHANGE UP (ref 1.8–7.4)
NEUTROPHILS NFR BLD AUTO: 67.4 % — SIGNIFICANT CHANGE UP (ref 43–77)
NRBC # BLD: 0 /100 WBCS — SIGNIFICANT CHANGE UP (ref 0–0)
NRBC # BLD: 0 /100 WBCS — SIGNIFICANT CHANGE UP (ref 0–0)
PLAT MORPH BLD: NORMAL — SIGNIFICANT CHANGE UP
PLATELET # BLD AUTO: 255 K/UL — SIGNIFICANT CHANGE UP (ref 150–400)
PLATELET # BLD AUTO: 259 K/UL — SIGNIFICANT CHANGE UP (ref 150–400)
POTASSIUM SERPL-MCNC: 5.3 MMOL/L — SIGNIFICANT CHANGE UP (ref 3.5–5.3)
POTASSIUM SERPL-SCNC: 5.3 MMOL/L — SIGNIFICANT CHANGE UP (ref 3.5–5.3)
PROT SERPL-MCNC: 7.1 G/DL — SIGNIFICANT CHANGE UP (ref 6–8.3)
PROTHROM AB SERPL-ACNC: 31.4 SEC — HIGH (ref 10.5–13.4)
PROTHROM AB SERPL-ACNC: 84.8 SEC — HIGH (ref 10.5–13.4)
RBC # BLD: 2.01 M/UL — LOW (ref 3.8–5.2)
RBC # BLD: 2.37 M/UL — LOW (ref 3.8–5.2)
RBC # FLD: 22.6 % — HIGH (ref 10.3–14.5)
RBC # FLD: 22.7 % — HIGH (ref 10.3–14.5)
RBC BLD AUTO: ABNORMAL
SARS-COV-2 RNA SPEC QL NAA+PROBE: DETECTED
SODIUM SERPL-SCNC: 141 MMOL/L — SIGNIFICANT CHANGE UP (ref 135–145)
WBC # BLD: 5.94 K/UL — SIGNIFICANT CHANGE UP (ref 3.8–10.5)
WBC # BLD: 6.66 K/UL — SIGNIFICANT CHANGE UP (ref 3.8–10.5)
WBC # FLD AUTO: 5.94 K/UL — SIGNIFICANT CHANGE UP (ref 3.8–10.5)
WBC # FLD AUTO: 6.66 K/UL — SIGNIFICANT CHANGE UP (ref 3.8–10.5)

## 2022-08-28 PROCEDURE — 99223 1ST HOSP IP/OBS HIGH 75: CPT

## 2022-08-28 PROCEDURE — 70450 CT HEAD/BRAIN W/O DYE: CPT | Mod: 26

## 2022-08-28 PROCEDURE — 99223 1ST HOSP IP/OBS HIGH 75: CPT | Mod: GC

## 2022-08-28 PROCEDURE — 71045 X-RAY EXAM CHEST 1 VIEW: CPT | Mod: 26

## 2022-08-28 RX ORDER — LEVOTHYROXINE SODIUM 125 MCG
75 TABLET ORAL DAILY
Refills: 0 | Status: DISCONTINUED | OUTPATIENT
Start: 2022-08-28 | End: 2022-08-28

## 2022-08-28 RX ORDER — POTASSIUM PHOSPHATE, MONOBASIC POTASSIUM PHOSPHATE, DIBASIC 236; 224 MG/ML; MG/ML
30 INJECTION, SOLUTION INTRAVENOUS ONCE
Refills: 0 | Status: DISCONTINUED | OUTPATIENT
Start: 2022-08-28 | End: 2022-08-28

## 2022-08-28 RX ORDER — METOPROLOL TARTRATE 50 MG
2.5 TABLET ORAL EVERY 6 HOURS
Refills: 0 | Status: DISCONTINUED | OUTPATIENT
Start: 2022-08-28 | End: 2022-08-28

## 2022-08-28 RX ORDER — ACETAMINOPHEN 500 MG
650 TABLET ORAL EVERY 6 HOURS
Refills: 0 | Status: DISCONTINUED | OUTPATIENT
Start: 2022-08-28 | End: 2022-08-28

## 2022-08-28 RX ORDER — REMDESIVIR 5 MG/ML
INJECTION INTRAVENOUS
Refills: 0 | Status: DISCONTINUED | OUTPATIENT
Start: 2022-08-28 | End: 2022-08-29

## 2022-08-28 RX ORDER — FUROSEMIDE 40 MG
1 TABLET ORAL
Qty: 0 | Refills: 0 | DISCHARGE

## 2022-08-28 RX ORDER — LEVOTHYROXINE SODIUM 125 MCG
37.5 TABLET ORAL AT BEDTIME
Refills: 0 | Status: DISCONTINUED | OUTPATIENT
Start: 2022-08-28 | End: 2022-08-29

## 2022-08-28 RX ORDER — WARFARIN SODIUM 2.5 MG/1
1 TABLET ORAL
Qty: 0 | Refills: 0 | DISCHARGE

## 2022-08-28 RX ORDER — FUROSEMIDE 40 MG
40 TABLET ORAL
Refills: 0 | Status: DISCONTINUED | OUTPATIENT
Start: 2022-08-28 | End: 2022-08-30

## 2022-08-28 RX ORDER — ASPIRIN/CALCIUM CARB/MAGNESIUM 324 MG
81 TABLET ORAL DAILY
Refills: 0 | Status: DISCONTINUED | OUTPATIENT
Start: 2022-08-28 | End: 2022-08-28

## 2022-08-28 RX ORDER — METOPROLOL TARTRATE 50 MG
2.5 TABLET ORAL EVERY 6 HOURS
Refills: 0 | Status: DISCONTINUED | OUTPATIENT
Start: 2022-08-28 | End: 2022-08-29

## 2022-08-28 RX ORDER — SODIUM CHLORIDE 0.65 %
2 AEROSOL, SPRAY (ML) NASAL
Refills: 0 | Status: DISCONTINUED | OUTPATIENT
Start: 2022-08-28 | End: 2022-08-30

## 2022-08-28 RX ORDER — METOPROLOL TARTRATE 50 MG
50 TABLET ORAL DAILY
Refills: 0 | Status: DISCONTINUED | OUTPATIENT
Start: 2022-08-28 | End: 2022-08-29

## 2022-08-28 RX ORDER — PHYTONADIONE (VIT K1) 5 MG
5 TABLET ORAL ONCE
Refills: 0 | Status: COMPLETED | OUTPATIENT
Start: 2022-08-28 | End: 2022-08-28

## 2022-08-28 RX ORDER — DEXAMETHASONE 0.5 MG/5ML
6 ELIXIR ORAL DAILY
Refills: 0 | Status: DISCONTINUED | OUTPATIENT
Start: 2022-08-28 | End: 2022-08-28

## 2022-08-28 RX ORDER — FUROSEMIDE 40 MG
40 TABLET ORAL EVERY 12 HOURS
Refills: 0 | Status: DISCONTINUED | OUTPATIENT
Start: 2022-08-28 | End: 2022-08-28

## 2022-08-28 RX ORDER — LANOLIN ALCOHOL/MO/W.PET/CERES
3 CREAM (GRAM) TOPICAL AT BEDTIME
Refills: 0 | Status: DISCONTINUED | OUTPATIENT
Start: 2022-08-28 | End: 2022-08-28

## 2022-08-28 RX ORDER — POTASSIUM CHLORIDE 20 MEQ
20 PACKET (EA) ORAL
Refills: 0 | Status: DISCONTINUED | OUTPATIENT
Start: 2022-08-28 | End: 2022-08-28

## 2022-08-28 RX ORDER — REMDESIVIR 5 MG/ML
100 INJECTION INTRAVENOUS EVERY 24 HOURS
Refills: 0 | Status: DISCONTINUED | OUTPATIENT
Start: 2022-08-29 | End: 2022-08-29

## 2022-08-28 RX ORDER — DEXAMETHASONE 0.5 MG/5ML
6 ELIXIR ORAL DAILY
Refills: 0 | Status: DISCONTINUED | OUTPATIENT
Start: 2022-08-28 | End: 2022-08-29

## 2022-08-28 RX ORDER — FLUTICASONE PROPIONATE 50 MCG
1 SPRAY, SUSPENSION NASAL
Refills: 0 | Status: DISCONTINUED | OUTPATIENT
Start: 2022-08-28 | End: 2022-08-30

## 2022-08-28 RX ORDER — FUROSEMIDE 40 MG
40 TABLET ORAL ONCE
Refills: 0 | Status: COMPLETED | OUTPATIENT
Start: 2022-08-28 | End: 2022-08-28

## 2022-08-28 RX ORDER — ONDANSETRON 8 MG/1
4 TABLET, FILM COATED ORAL EVERY 8 HOURS
Refills: 0 | Status: DISCONTINUED | OUTPATIENT
Start: 2022-08-28 | End: 2022-08-30

## 2022-08-28 RX ORDER — PANTOPRAZOLE SODIUM 20 MG/1
8 TABLET, DELAYED RELEASE ORAL
Qty: 80 | Refills: 0 | Status: DISCONTINUED | OUTPATIENT
Start: 2022-08-28 | End: 2022-08-29

## 2022-08-28 RX ORDER — REMDESIVIR 5 MG/ML
200 INJECTION INTRAVENOUS EVERY 24 HOURS
Refills: 0 | Status: COMPLETED | OUTPATIENT
Start: 2022-08-28 | End: 2022-08-28

## 2022-08-28 RX ADMIN — Medication 101 MILLIGRAM(S): at 01:45

## 2022-08-28 RX ADMIN — REMDESIVIR 500 MILLIGRAM(S): 5 INJECTION INTRAVENOUS at 15:21

## 2022-08-28 RX ADMIN — Medication 1 SPRAY(S): at 20:38

## 2022-08-28 RX ADMIN — Medication 2 SPRAY(S): at 23:19

## 2022-08-28 RX ADMIN — Medication 40 MILLIGRAM(S): at 21:28

## 2022-08-28 RX ADMIN — SODIUM CHLORIDE 1000 MILLILITER(S): 9 INJECTION INTRAMUSCULAR; INTRAVENOUS; SUBCUTANEOUS at 02:00

## 2022-08-28 RX ADMIN — PANTOPRAZOLE SODIUM 10 MG/HR: 20 TABLET, DELAYED RELEASE ORAL at 12:20

## 2022-08-28 RX ADMIN — SODIUM CHLORIDE 1000 MILLILITER(S): 9 INJECTION INTRAMUSCULAR; INTRAVENOUS; SUBCUTANEOUS at 00:34

## 2022-08-28 RX ADMIN — Medication 37.5 MICROGRAM(S): at 21:34

## 2022-08-28 RX ADMIN — Medication 2 SPRAY(S): at 06:11

## 2022-08-28 RX ADMIN — PANTOPRAZOLE SODIUM 40 MILLIGRAM(S): 20 TABLET, DELAYED RELEASE ORAL at 00:34

## 2022-08-28 RX ADMIN — Medication 40 MILLIGRAM(S): at 14:09

## 2022-08-28 RX ADMIN — Medication 2.5 MILLIGRAM(S): at 12:18

## 2022-08-28 RX ADMIN — Medication 6 MILLIGRAM(S): at 11:00

## 2022-08-28 RX ADMIN — Medication 2 SPRAY(S): at 20:39

## 2022-08-28 RX ADMIN — Medication 2 SPRAY(S): at 12:18

## 2022-08-28 RX ADMIN — Medication 1 SPRAY(S): at 06:12

## 2022-08-28 NOTE — ED ADULT NURSE NOTE - CHIEF COMPLAINT QUOTE
per ems from [facility], called for ams and blood in stool, on coumadin. fs 156, 88% on room air. recent covid diagnosis

## 2022-08-28 NOTE — PROGRESS NOTE ADULT - SUBJECTIVE AND OBJECTIVE BOX
Admitted for GIB. Consulted for LUIS CARLOS/CKD. Hold Lasix today. Full workup if worsens. Consult to follow in AM Admitted for GIB. COVID19. Consulted for LUIS CARLOS/CKD. We have no choice than to diuresis with pulmonary status. No indication for urgent dialysis. Consult to follow in AM

## 2022-08-28 NOTE — ED ADULT NURSE NOTE - OBJECTIVE STATEMENT
received pt from [facility].  as per EMS pt is covid + unknown when initially swabbed +.  sent for black stool.  dried blood noted to nares and around mouth.  thick secretions noted in mouth.  dry mucous membranes noted.

## 2022-08-28 NOTE — CONSULT NOTE ADULT - SUBJECTIVE AND OBJECTIVE BOX
Chief Complaint:  Patient is a 89y old  Female who presents with a chief complaint of dark stools (28 Aug 2022 03:00)    Hypothyroid    Hypertension    Lower extremity edema    Paroxysmal atrial fibrillation    NSTEMI (non-ST elevation myocardial infarction)    Chronic systolic congestive heart failure    Stage 3 chronic kidney disease    Anemia of chronic disease    No significant past surgical history       HPI:  89yo F with PMH B/L LE edema, HTN, CAD (NSTEMI on 6/2022), paroxysmal afib (on coumadin), HFrEF (EF 35-40%), stage 3 CKD and hypothyroidism presents to ED with dark stools. Patient brought in from [facility] and unable to provide history due to AMS. As per RN, stools described to be dark and tarry with bright red ring around the soiled region of her diaper. A&O x0, although able to follow some commands. Of note, Hgb dropped from 9.8 --> 6.5, HCP (daughter, Celestina Cheng) notified and consent obtained for transfusion. COVID+ as of 8/24/2022 and prescribed Paxlovid. Patient previously admitted to [facility] on 7/14-7/20/2022 for fall and MOLST documented from prior admission; however form was not obtained during this admission and has not yet been received from care facility.     Spoke to daughter via telephone conversation and informed her of patient status, daughter fully aware of situation. States patient is usually aaox3 however has been more altered since covid diagnosis.     in ED:  VS: /46, HR 63, 97.5F, RR 18, SpO2 100% on NRB  Labs significant for: Hgb 6.5, , PT 84.8, PTT 49.1, INR 7.11, BUN 56, Cr 1.80  FOBT+, COVID+  CXR: On wet read shows RLL infiltrate  Given: Pantoprazole, NaCl bolus x1, Vitamin K 5mg (28 Aug 2022 03:00)      No Known Allergies      dexAMETHasone  Injectable 6 milliGRAM(s) IV Push daily  fluticasone propionate 50 MICROgram(s)/spray Nasal Spray 1 Spray(s) Both Nostrils two times a day  furosemide   Injectable 40 milliGRAM(s) IV Push two times a day  levothyroxine Injectable 37.5 MICROGram(s) IV Push at bedtime  metoprolol succinate ER 50 milliGRAM(s) Oral daily  metoprolol tartrate Injectable 2.5 milliGRAM(s) IV Push every 6 hours  ondansetron Injectable 4 milliGRAM(s) IV Push every 8 hours PRN  sodium chloride 0.65% Nasal 2 Spray(s) Both Nostrils four times a day        FAMILY HISTORY:  No pertinent family history in first degree relatives          Review of Systems:    General:  No wt loss, fevers, chills, night sweats,fatigue,   Eyes:  Good vision, no reported pain  ENT:  No sore throat, pain, runny nose, dysphagia  CV:  No pain, palpitatioins, hypo/hypertension  Resp:  No dyspnea, cough, tachypnea, wheezing  :  No pain, bleeding, incontinence, nocturia  Muscle:  No pain, weakness  Neuro:  No weakness, tingling, memory problems  Psych:  No fatigue, insomnia, mood problems, depression  Endocrine:  No polyuria, polydypsia, cold/heat intolerance  Heme:  No petechiae, ecchymosis, easy bruisability  Skin:  No rash, tattoos, scars, edema    Relevant Family History:       Relevant Social History:       Physical Exam:    Vital Signs:  Vital Signs Last 24 Hrs  T(C): 36.7 (28 Aug 2022 07:29), Max: 36.7 (28 Aug 2022 07:29)  T(F): 98 (28 Aug 2022 07:29), Max: 98 (28 Aug 2022 07:29)  HR: 66 (28 Aug 2022 07:29) (63 - 68)  BP: 108/64 (28 Aug 2022 07:29) (104/43 - 111/55)  BP(mean): --  RR: 20 (28 Aug 2022 07:29) (18 - 22)  SpO2: 95% (28 Aug 2022 07:29) (95% - 100%)    Parameters below as of 28 Aug 2022 07:29  Patient On (Oxygen Delivery Method): mask, nonrebreather      Daily Height in cm: 165.1 (27 Aug 2022 22:54)    Daily     General:  Appears stated age, well-groomed, well-nourished, no distress  HEENT:  NC/AT,  conjunctivae clear and pink, no thyromegaly, nodules, adenopathy, no JVD  Chest:  Full & symmetric excursion, no increased effort, breath sounds clear  Cardiovascular:  Regular rhythm, S1, S2, no murmur/rub/S3/S4, no abdominal bruit, no edema  Abdomen:  Soft, non-tender, non-distended, normoactive bowel sounds,  no masses ,no hepatosplenomeagaly, no signs of chronic liver disease  Extremities:  no cyanosis,clubbing or edema  Skin:  No rash/erythema/ecchymoses/petechiae/wounds/abscess/warm/dry  Neuro/Psych:  Alert, oriented, no asterixis, no tremor, no encephalopathy    Laboratory:                            6.5    5.94  )-----------( 259      ( 27 Aug 2022 23:50 )             20.7     08-27    141  |  106  |  56<H>  ----------------------------<  109<H>  5.3   |  32<H>  |  1.80<H>    Ca    8.1<L>      27 Aug 2022 23:50    TPro  7.1  /  Alb  1.9<L>  /  TBili  1.3<H>  /  DBili  x   /  AST  48<H>  /  ALT  19  /  AlkPhos  113  08-27    LIVER FUNCTIONS - ( 27 Aug 2022 23:50 )  Alb: 1.9 g/dL / Pro: 7.1 g/dL / ALK PHOS: 113 U/L / ALT: 19 U/L / AST: 48 U/L / GGT: x           PT/INR - ( 27 Aug 2022 23:50 )   PT: 84.8 sec;   INR: 7.11 ratio         PTT - ( 27 Aug 2022 23:50 )  PTT:49.1 sec    Amylase Serum--      Lipase [id number]       Ammonia--    Imaging:

## 2022-08-28 NOTE — H&P ADULT - PROBLEM SELECTOR PROBLEM 6
Lower extremity edema Hypertension Paroxysmal atrial fibrillation Chronic systolic congestive heart failure

## 2022-08-28 NOTE — H&P ADULT - NSHPPHYSICALEXAM_GEN_ALL_CORE
T(C): 36.4 (08-27-22 @ 22:54), Max: 36.4 (08-27-22 @ 22:54)  HR: 63 (08-27-22 @ 22:54) (63 - 63)  BP: 108/46 (08-27-22 @ 22:54) (108/46 - 108/46)  RR: 18 (08-27-22 @ 22:54) (18 - 18)  SpO2: 100% (08-27-22 @ 22:54) (100% - 100%)    GENERAL: patient appears well, no acute distress, conversant  EYES: anicteric sclerae, no exudates  ENMT: oropharynx clear without erythema, no exudates, moist mucous membranes  LUNGS: clear to auscultation bilaterally, no rales or wheezing  HEART: S1/S2, regular rate and rhythm, murmur auscultated  GASTROINTESTINAL: abdomen is soft, nontender, nondistended, normoactive bowel sounds, no palpable masses  INTEGUMENT: good skin turgor, warm skin, appears well perfused  MUSCULOSKELETAL: no clubbing or cyanosis, no obvious deformity, B/L LE edema appreciated, 1+ pitting edema  NEUROLOGIC: awake, alert, oriented x0, good muscle tone in 4 extremities, no obvious sensory deficits  PSYCHIATRIC: mood is good, affect is congruent, linear and logical thought process  HEME/LYMPH: bruising on RUE T(C): 36.4 (08-27-22 @ 22:54), Max: 36.4 (08-27-22 @ 22:54)  HR: 63 (08-27-22 @ 22:54) (63 - 63)  BP: 108/46 (08-27-22 @ 22:54) (108/46 - 108/46)  RR: 18 (08-27-22 @ 22:54) (18 - 18)  SpO2: 100% (08-27-22 @ 22:54) (100% - 100%)    GENERAL: patient appears well, no acute distress, conversant  EYES: anicteric sclerae, no exudates  ENMT: oropharynx clear without erythema, no exudates, moist mucous membranes  LUNGS: clear to auscultation bilaterally, no rales or wheezing  HEART: S1/S2, regular rate and rhythm, murmur auscultated  GASTROINTESTINAL: abdomen is soft, nontender, nondistended, normoactive bowel sounds, no palpable masses  INTEGUMENT: good skin turgor, warm skin, appears well perfused  MUSCULOSKELETAL: B/L LE edema appreciated, no clubbing or cyanosis, no obvious deformity  NEUROLOGIC: awake, alert, oriented x0, good muscle tone in 4 extremities, no obvious sensory deficits  PSYCHIATRIC: mood is good, affect is congruent, linear and logical thought process  HEME/LYMPH: bruising on RUE T(C): 36.4 (08-27-22 @ 22:54), Max: 36.4 (08-27-22 @ 22:54)  HR: 63 (08-27-22 @ 22:54) (63 - 63)  BP: 108/46 (08-27-22 @ 22:54) (108/46 - 108/46)  RR: 18 (08-27-22 @ 22:54) (18 - 18)  SpO2: 100% (08-27-22 @ 22:54) (100% - 100%)    GENERAL: nad, answers questions  EYES: anicteric sclerae, no exudates  ENMT: oropharynx clear without erythema, no exudates, moist mucous membranes  LUNGS: clear to auscultation bilaterally, no rales or wheezing  HEART: S1/S2, regular rate and rhythm, murmur auscultated  GASTROINTESTINAL: abdomen is soft, nontender, nondistended, normoactive bowel sounds, no palpable masses  INTEGUMENT: good skin turgor, warm skin, appears well perfused  MUSCULOSKELETAL: B/L LE edema appreciated, no clubbing or cyanosis, no obvious deformity  NEUROLOGIC: awake, alert, oriented x1, good muscle tone in 4 extremities, no obvious sensory deficits  PSYCHIATRIC: mood is good, affect is congruent, linear and logical thought process  HEME/LYMPH: bruising on RUE

## 2022-08-28 NOTE — ED ADULT NURSE NOTE - NSIMPLEMENTINTERV_GEN_ALL_ED
Implemented All Fall with Harm Risk Interventions:  [place] to call system. Call bell, personal items and telephone within reach. Instruct patient to call for assistance. Room bathroom lighting operational. Non-slip footwear when patient is off stretcher. Physically safe environment: no spills, clutter or unnecessary equipment. Stretcher in lowest position, wheels locked, appropriate side rails in place. Provide visual cue, wrist band, yellow gown, etc. Monitor gait and stability. Monitor for mental status changes and reorient to person, place, and time. Review medications for side effects contributing to fall risk. Reinforce activity limits and safety measures with patient and family. Provide visual clues: red socks.

## 2022-08-28 NOTE — H&P ADULT - PROBLEM SELECTOR PLAN 8
- Continue home synthroid  - AM TSH On home Lasix 40mg q12h  - Continue home Lasix Chronic, /46 on admission  - Continue home Metoprolol with hold parameters  - Hemodynamic monitoring

## 2022-08-28 NOTE — H&P ADULT - HISTORY OF PRESENT ILLNESS
89yo F with PMH B/L LE edema, HTN, CAD (NSTEMI on 6/2022), paroxysmal afib (on coumadin), HFrEF (EF 35-40%), stage 3 CKD and hypothyroidism presents to ED with dark stools. Patient brought in from [facility] and unable to provide history due to AMS. As per RN, stools described to be dark and tarry with bright red ring around the soiled region of her diaper. A&O x0, although able to follow some commands. Of note, Hgb dropped from 9.8 --> 6.5, HCP (daughter, Celestina Cheng) notified and consent obtained for transfusion. COVID+ as of 8/24/2022 and prescribed Paxlovid. Patient previously admitted to [facility] on 7/14-7/20/2022 for fall and MOLST documented from prior admission; however form was not obtained during this admission and has not yet been received from care facility.     in ED:  VS: /46, HR 63, 97.5F, RR 18, SpO2 100% on NRB  Labs significant for: Hgb 6.5, , PT 84.8, PTT 49.1, INR 7.11, BUN 56, Cr 1.80  FOBT+, COVID+  CXR: On wet read shows RLL infiltrate  Given: Pantoprazole, NaCl bolus x1, Vitamin K 5mg 89yo F with PMH B/L LE edema, HTN, CAD (NSTEMI on 6/2022), paroxysmal afib (on coumadin), HFrEF (EF 35-40%), stage 3 CKD and hypothyroidism presents to ED with dark stools. Patient brought in from [facility] and unable to provide history due to AMS. As per RN, stools described to be dark and tarry with bright red ring around the soiled region of her diaper. A&O x0, although able to follow some commands. Of note, Hgb dropped from 9.8 --> 6.5, HCP (daughter, Celestina Cheng) notified and consent obtained for transfusion. COVID+ as of 8/24/2022 and prescribed Paxlovid. Patient previously admitted to [facility] on 7/14-7/20/2022 for fall and MOLST documented from prior admission; however form was not obtained during this admission and has not yet been received from care facility.     Spoke to daughter via telephone conversation and informed her of patient status, daughter fully aware of situation.    in ED:  VS: /46, HR 63, 97.5F, RR 18, SpO2 100% on NRB  Labs significant for: Hgb 6.5, , PT 84.8, PTT 49.1, INR 7.11, BUN 56, Cr 1.80  FOBT+, COVID+  CXR: On wet read shows RLL infiltrate  Given: Pantoprazole, NaCl bolus x1, Vitamin K 5mg 89yo F with PMH B/L LE edema, HTN, CAD (NSTEMI on 6/2022), paroxysmal afib (on coumadin), HFrEF (EF 35-40%), stage 3 CKD and hypothyroidism presents to ED with dark stools. Patient brought in from [facility] and unable to provide history due to AMS. As per RN, stools described to be dark and tarry with bright red ring around the soiled region of her diaper. A&O x0, although able to follow some commands. Of note, Hgb dropped from 9.8 --> 6.5, HCP (daughter, Celestina Cheng) notified and consent obtained for transfusion. COVID+ as of 8/24/2022 and prescribed Paxlovid. Patient previously admitted to [facility] on 7/14-7/20/2022 for fall and MOLST documented from prior admission; however form was not obtained during this admission and has not yet been received from care facility.     Spoke to daughter via telephone conversation and informed her of patient status, daughter fully aware of situation. States patient is usually aaox3 however has been more altered since covid diagnosis.     in ED:  VS: /46, HR 63, 97.5F, RR 18, SpO2 100% on NRB  Labs significant for: Hgb 6.5, , PT 84.8, PTT 49.1, INR 7.11, BUN 56, Cr 1.80  FOBT+, COVID+  CXR: On wet read shows RLL infiltrate  Given: Pantoprazole, NaCl bolus x1, Vitamin K 5mg

## 2022-08-28 NOTE — H&P ADULT - PROBLEM SELECTOR PLAN 4
TTE (6/19/2022) showed EF 35-40%, LV hypokinesis 2/2 NSTEMI. [place], mid inferoseptal and mid anterolateral walls severely hypokinetic. Right ventricular enlargement. Biatrial enlargement. Moderate AS. Moderate TR.  - Patient with clear lung exam but noticeable B/L LE edema (chronic)  - Cr 1.80, continue Lasix while monitoring Cr LUIS CARLOS/CKD stage 3, Cr 1.8, baseline appears to be 1-1.2  - Likely pre-renal due to acute blood loss  - Avoiding IVF due to HFrEF  - Lasix 40mg q12h Recent NSTEMI 6/2022  - Hold home ASA in setting of bleeding  - Continue Metoprolol 50mg QD with hold parameters  - Cardio consult ([facility])

## 2022-08-28 NOTE — H&P ADULT - PROBLEM SELECTOR PROBLEM 5
Paroxysmal atrial fibrillation Chronic systolic congestive heart failure Acute kidney injury superimposed on CKD

## 2022-08-28 NOTE — CONSULT NOTE ADULT - SUBJECTIVE AND OBJECTIVE BOX
[facility] - Enrike Maciel Patel, Savella  Office Number: [phone]    Initial Consult Note    CHIEF COMPLAINT: Patient is a 89y old  Female who presents with a chief complaint of dark stools (28 Aug 2022 08:44)      HPI:  89yo F with PMH B/L LE edema, HTN, CAD (NSTEMI on 6/2022), paroxysmal afib (on coumadin), HFrEF (EF 35-40%), stage 3 CKD and hypothyroidism presents to ED with dark stools. Patient brought in from [facility] and unable to provide history due to AMS. As per RN, stools described to be dark and tarry with bright red ring around the soiled region of her diaper. A&O x0, although able to follow some commands. Of note, Hgb dropped from 9.8 --> 6.5, HCP (daughter, Celestina Cheng) notified and consent obtained for transfusion. COVID+ as of 8/24/2022 and prescribed Paxlovid. Patient previously admitted to [facility] on 7/14-7/20/2022 for fall and MOLST documented from prior admission; however form was not obtained during this admission and has not yet been received from care facility.     Spoke to daughter via telephone conversation and informed her of patient status, daughter fully aware of situation. States patient is usually aaox3 however has been more altered since covid diagnosis.     in ED:  VS: /46, HR 63, 97.5F, RR 18, SpO2 100% on NRB  Labs significant for: Hgb 6.5, , PT 84.8, PTT 49.1, INR 7.11, BUN 56, Cr 1.80  FOBT+, COVID+  CXR: On wet read shows RLL infiltrate  Given: Pantoprazole, NaCl bolus x1, Vitamin K 5mg (28 Aug 2022 03:00)      PAST MEDICAL & SURGICAL HISTORY:  Hypothyroid      Hypertension      Lower extremity edema      Paroxysmal atrial fibrillation      NSTEMI (non-ST elevation myocardial infarction)  june 2022      Chronic systolic congestive heart failure      Stage 3 chronic kidney disease      Anemia of chronic disease      No significant past surgical history          SOCIAL HISTORY:  No tobacco, ethanol, or drug abuse.    FAMILY HISTORY:  No pertinent family history in first degree relatives      No family history of acute MI or sudden cardiac death.    MEDICATIONS  (STANDING):  dexAMETHasone  Injectable 6 milliGRAM(s) IV Push daily  fluticasone propionate 50 MICROgram(s)/spray Nasal Spray 1 Spray(s) Both Nostrils two times a day  furosemide   Injectable 40 milliGRAM(s) IV Push two times a day  levothyroxine Injectable 37.5 MICROGram(s) IV Push at bedtime  metoprolol succinate ER 50 milliGRAM(s) Oral daily  metoprolol tartrate Injectable 2.5 milliGRAM(s) IV Push every 6 hours  pantoprazole Infusion 8 mG/Hr (10 mL/Hr) IV Continuous <Continuous>  sodium chloride 0.65% Nasal 2 Spray(s) Both Nostrils four times a day    MEDICATIONS  (PRN):  ondansetron Injectable 4 milliGRAM(s) IV Push every 8 hours PRN Nausea and/or Vomiting      Allergies    No Known Allergies    Intolerances        REVIEW OF SYSTEMS:  Unable to assess    VITAL SIGNS:   Vital Signs Last 24 Hrs  T(C): 36.7 (28 Aug 2022 12:04), Max: 36.7 (28 Aug 2022 07:29)  T(F): 98 (28 Aug 2022 12:04), Max: 98 (28 Aug 2022 07:29)  HR: 66 (28 Aug 2022 12:04) (63 - 68)  BP: 112/60 (28 Aug 2022 12:04) (104/43 - 116/62)  BP(mean): --  RR: 18 (28 Aug 2022 12:04) (18 - 22)  SpO2: 98% (28 Aug 2022 12:04) (94% - 100%)    Parameters below as of 28 Aug 2022 12:04  Patient On (Oxygen Delivery Method): mask, nonrebreather        I&O's Summary      On Exam:    Constitutional: NAD, confused, lethargic  Lungs:  Non-labored, breath sounds are clear bilaterally, No wheezing, rales or rhonchi  Cardiovascular: irregular,  S1 and S2 positive.  + syst murmur at ru/lusb, no rubs, gallops or clicks  Gastrointestinal: Bowel Sounds present, soft, nontender.   Lymph: 2+ peripheral edema. No cervical lymphadenopathy.  Neurological: unable to assess  Skin: No rashes or ulcers   Psych:  unable to assess    LABS: All Labs Reviewed:                        6.5    5.94  )-----------( 259      ( 27 Aug 2022 23:50 )             20.7     27 Aug 2022 23:50    141    |  106    |  56     ----------------------------<  109    5.3     |  32     |  1.80     Ca    8.1        27 Aug 2022 23:50    TPro  7.1    /  Alb  1.9    /  TBili  1.3    /  DBili  x      /  AST  48     /  ALT  19     /  AlkPhos  113    27 Aug 2022 23:50    PT/INR - ( 27 Aug 2022 23:50 )   PT: 84.8 sec;   INR: 7.11 ratio         PTT - ( 27 Aug 2022 23:50 )  PTT:49.1 sec      Blood Culture:         RADIOLOGY:    EKG: not in chart

## 2022-08-28 NOTE — H&P ADULT - PROBLEM SELECTOR PLAN 2
Recent NSTEMI 6/2022  - Hold home ASA in setting of bleeding  - Continue Metoprolol 50mg QD  - Cardio consult ([facility]) COVID+ as of 8/24, also tested positive on admission. In ED, SpO2 100% on NRB.  - Isolation precautions  - Prescribed Paxlovid on 8/24 for 5 days, scheduled to end 8/28  - Maintain SpO2 >90%, provide oxygen support as necessary Patient on coumadin for paroxysmal afib, INR 7.1 on admission with ABLA 2/2 GI bleed. Given Vitamin K for reversal  - Hold coumadin in setting of bleeding  - AM INR  - Daily coags, trend INR Patient on coumadin for paroxysmal afib, INR 7.1 on admission with ABLA 2/2 GI bleed. Given Vitamin K for reversal  - Hold coumadin in setting of bleeding  - AMS on admission, CT head to rule out hemorrhage  - AM INR  - Daily coags, trend INR Patient on coumadin for paroxysmal afib, INR 7.1 on admission with ABLA 2/2 GI bleed. Given Vitamin K for reversal  - Hold coumadin in setting of bleeding  - AMS on admission, CT head to rule out hemorrhage  - fu repeat INR  - Daily coags, trend INR

## 2022-08-28 NOTE — H&P ADULT - PROBLEM SELECTOR PROBLEM 4
Chronic systolic congestive heart failure Acute kidney injury superimposed on CKD CAD (coronary artery disease)

## 2022-08-28 NOTE — H&P ADULT - PROBLEM SELECTOR PLAN 6
On home Lasix 40mg q12h  - Continue home Lasix Chronic, /46 on admission  - Continue home Metoprolol with hold parameters  - Hemodynamic monitoring - Hold Coumadin in setting of bleeding  - Metoprolol succinate 50mg QD w/ hold parameters TTE (6/19/2022) showed EF 35-40%, LV hypokinesis 2/2 NSTEMI. [place], mid inferoseptal and mid anterolateral walls severely hypokinetic. Right ventricular enlargement. Biatrial enlargement. Moderate AS. Moderate TR.  - Patient with clear lung exam but noticeable B/L LE edema (chronic)  - No signs of active volume overload on exam  - Cr 1.80, holding Lasix given LUIS CARLOS, restart in AM if Cr stable

## 2022-08-28 NOTE — CONSULT NOTE ADULT - SUBJECTIVE AND OBJECTIVE BOX
[facility], Division of Infectious Diseases  JESSIE Barth, MARIAM Villegas, JAZ Morales [facility]  [phone]    KENIA REGALADO  89y, Female  [id number]    HPI--  HPI:  89yo F with PMH B/L LE edema, HTN, CAD (NSTEMI on 6/2022), paroxysmal afib (on coumadin), HFrEF (EF 35-40%), stage 3 CKD and hypothyroidism presents to ED with dark stools. Patient brought in from [facility] and unable to provide history due to AMS. As per RN, stools described to be dark and tarry with bright red ring around the soiled region of her diaper. A&O x0, although able to follow some commands. Of note, Hgb dropped from 9.8 --> 6.5, HCP (daughter, Celestina Cheng) notified and consent obtained for transfusion. COVID+ as of 8/24/2022 and prescribed Paxlovid. Patient previously admitted to [facility] on 7/14-7/20/2022 for fall and MOLST documented from prior admission; however form was not obtained during this admission and has not yet been received from care facility.     Spoke to daughter via telephone conversation and informed her of patient status, daughter fully aware of situation. States patient is usually aaox3 however has been more altered since covid diagnosis.     in ED:  VS: /46, HR 63, 97.5F, RR 18, SpO2 100% on NRB  Labs significant for: Hgb 6.5, , PT 84.8, PTT 49.1, INR 7.11, BUN 56, Cr 1.80  FOBT+, COVID+  CXR: On wet read shows RLL infiltrate  Given: Pantoprazole, NaCl bolus x1, Vitamin K 5mg (28 Aug 2022 03:00)    Pt seen in the Ed  unable to provide hx    Called daughter/son-in-law  Reviewed hx as above  they are also covid positive              Active Medications--  dexAMETHasone  Injectable 6 milliGRAM(s) IV Push daily  fluticasone propionate 50 MICROgram(s)/spray Nasal Spray 1 Spray(s) Both Nostrils two times a day  furosemide   Injectable 40 milliGRAM(s) IV Push two times a day  levothyroxine Injectable 37.5 MICROGram(s) IV Push at bedtime  metoprolol succinate ER 50 milliGRAM(s) Oral daily  metoprolol tartrate Injectable 2.5 milliGRAM(s) IV Push every 6 hours  ondansetron Injectable 4 milliGRAM(s) IV Push every 8 hours PRN  pantoprazole Infusion 8 mG/Hr IV Continuous <Continuous>  sodium chloride 0.65% Nasal 2 Spray(s) Both Nostrils four times a day    Antimicrobials:     Immunologic:     ROS:  unable to obtain    Allergies: No Known Allergies    PMH -- Hypothyroid    Hypertension    Lower extremity edema    Paroxysmal atrial fibrillation    NSTEMI (non-ST elevation myocardial infarction)    Chronic systolic congestive heart failure    Stage 3 chronic kidney disease    Anemia of chronic disease      PSH -- No significant past surgical history      FH -- No pertinent family history in first degree relatives      Social History --  EtOH: denies   Tobacco: denies   Drug Use: denies     Travel/Environmental/Occupational History:    Physical Exam--  Vital Signs Last 24 Hrs  T(F): 98 (28 Aug 2022 12:04), Max: 98 (28 Aug 2022 07:29)  HR: 66 (28 Aug 2022 12:04) (63 - 68)  BP: 112/60 (28 Aug 2022 12:04) (104/43 - 116/62)  RR: 18 (28 Aug 2022 12:04) (18 - 22)  SpO2: 98% (28 Aug 2022 12:04) (94% - 100%)  General: nontoxic-appearing, no acute distress  HEENT: NC/AT, EOMI, anicteric  Lungs: decreased b/l breath sounds  Heart: Regular rate and rhythm. No murmur, rub or gallop.  Abdomen: Soft. Nondistended. Nontender.  Extremities: No cyanosis or clubbing. No edema.   Skin: Warm. Dry. Good turgor.       Laboratory & Imaging Data--  CBC:                       7.6    6.66  )-----------( 255      ( 28 Aug 2022 12:52 )             24.3     CMP: 08-27    141  |  106  |  56<H>  ----------------------------<  109<H>  5.3   |  32<H>  |  1.80<H>    Ca    8.1<L>      27 Aug 2022 23:50    TPro  7.1  /  Alb  1.9<L>  /  TBili  1.3<H>  /  DBili  x   /  AST  48<H>  /  ALT  19  /  AlkPhos  113  08-27    LIVER FUNCTIONS - ( 27 Aug 2022 23:50 )  Alb: 1.9 g/dL / Pro: 7.1 g/dL / ALK PHOS: 113 U/L / ALT: 19 U/L / AST: 48 U/L / GGT: x               Microbiology: reviewed      Radiology: reviewed    < from: CT Head No Cont (08.28.22 @ 04:50) >    ACC: [id number] EXAM:  CT BRAIN                          PROCEDURE DATE:  08/28/2022          INTERPRETATION:  CLINICAL INDICATION:  supratheraputic inr. Anemia.    TECHNIQUE: Noncontrast CT examination of the head was performed using   contiguous 5 mm CT images.    COMPARISON: 7/14/2022    FINDINGS:    Diagnostic accuracy is limited secondary to patient motion.    There is no evidence of mass or acute intracranial hemorrhage. Ventricles   and sulci are normal in size and configuration for the patient's stated   age. No midline shift or other significant mass effect is noted. There is   no CT evidence of acute territorial infarct. There are periventricular   white matter hypodensities that are nonspecific in nature but may reflect   chronic ischemic microvascular disease.    The visualized paranasal sinuses are clear. Bilateral mastoid effusions.   Orbits and orbital contents are unremarkable.    There is no depressed calvarial fracture.        IMPRESSION:    No evidence of acute intracranial hemorrhage, midline shift or CT   evidence of acute territorial infarct.    If the patient's symptoms persist, consider short interval follow-up head   CT or brain MRI if there are no MRI contraindications.    Bilateral mastoid effusions. Correlate clinically for mastoiditis.    --- End of Report ---            NURIA MENDIETA MD; Attending Radiologist  This document has been electronically signed. Aug 28 2022  6:19AM    < end of copied text >  < from: Xray Chest 1 View- PORTABLE-Urgent (08.28.22 @ 00:38) >    ACC: [id number] EXAM:  XR CHEST PORTABLE URGENT 1V                          PROCEDURE DATE:  08/28/2022          INTERPRETATION:  AP semierect chest on August 28, 2022 at 12:10 AM.   Patient has a GI bleed.    Elevated right hemidiaphragm again noted. Heart enlargement again seen.    On July 16 there were moderate congestive changes. Present film shows the   have largely resolved.    No free intraperitoneal air.    IMPRESSION: Prior CHF has largely resolved.    --- End of Report ---            RUSSELL MONTES MD; Attending Radiologist  This document has been electronically signed. Aug 28 2022  1:09PM    < end of copied text >   [facility], Division of Infectious Diseases  JESSIE Barth, MARIAM Villegas, JAZ Morales [facility]  [phone]    KENIA REGALADO  89y, Female  [id number]    HPI--  HPI:  89yo F with PMH B/L LE edema, HTN, CAD (NSTEMI on 6/2022), paroxysmal afib (on coumadin), HFrEF (EF 35-40%), stage 3 CKD and hypothyroidism presents to ED with dark stools. Patient brought in from [facility] and unable to provide history due to AMS. As per RN, stools described to be dark and tarry with bright red ring around the soiled region of her diaper. A&O x0, although able to follow some commands. Of note, Hgb dropped from 9.8 --> 6.5, HCP (daughter, Celestina Cheng) notified and consent obtained for transfusion. COVID+ as of 8/24/2022 and prescribed Paxlovid. Patient previously admitted to [facility] on 7/14-7/20/2022 for fall and MOLST documented from prior admission; however form was not obtained during this admission and has not yet been received from care facility.     Spoke to daughter via telephone conversation and informed her of patient status, daughter fully aware of situation. States patient is usually aaox3 however has been more altered since covid diagnosis.     in ED:  VS: /46, HR 63, 97.5F, RR 18, SpO2 100% on NRB  Labs significant for: Hgb 6.5, , PT 84.8, PTT 49.1, INR 7.11, BUN 56, Cr 1.80  FOBT+, COVID+  CXR: On wet read shows RLL infiltrate  Given: Pantoprazole, NaCl bolus x1, Vitamin K 5mg (28 Aug 2022 03:00)    Pt seen in the Ed  unable to provide hx    Called daughter/son-in-law  Reviewed hx as above  they are also covid positive    Active Medications--  dexAMETHasone  Injectable 6 milliGRAM(s) IV Push daily  fluticasone propionate 50 MICROgram(s)/spray Nasal Spray 1 Spray(s) Both Nostrils two times a day  furosemide   Injectable 40 milliGRAM(s) IV Push two times a day  levothyroxine Injectable 37.5 MICROGram(s) IV Push at bedtime  metoprolol succinate ER 50 milliGRAM(s) Oral daily  metoprolol tartrate Injectable 2.5 milliGRAM(s) IV Push every 6 hours  ondansetron Injectable 4 milliGRAM(s) IV Push every 8 hours PRN  pantoprazole Infusion 8 mG/Hr IV Continuous <Continuous>  sodium chloride 0.65% Nasal 2 Spray(s) Both Nostrils four times a day    Antimicrobials:     Immunologic:     ROS:  unable to obtain    Allergies: No Known Allergies    PMH -- Hypothyroid    Hypertension    Lower extremity edema    Paroxysmal atrial fibrillation    NSTEMI (non-ST elevation myocardial infarction)    Chronic systolic congestive heart failure    Stage 3 chronic kidney disease    Anemia of chronic disease      PSH -- No significant past surgical history      FH -- No pertinent family history in first degree relatives      Social History --  EtOH: denies   Tobacco: denies   Drug Use: denies     Travel/Environmental/Occupational History:    Physical Exam--  Vital Signs Last 24 Hrs  T(F): 98 (28 Aug 2022 12:04), Max: 98 (28 Aug 2022 07:29)  HR: 66 (28 Aug 2022 12:04) (63 - 68)  BP: 112/60 (28 Aug 2022 12:04) (104/43 - 116/62)  RR: 18 (28 Aug 2022 12:04) (18 - 22)  SpO2: 98% (28 Aug 2022 12:04) (94% - 100%)  General: elderly W, on NRB, lethargic  HEENT: NC/AT, EOMI, anicteric  Lungs: decreased b/l breath sounds  Heart: Regular rate and rhythm. No murmur, rub or gallop.  Abdomen: Soft. Nondistended. Nontender.  Extremities: No cyanosis or clubbing. No edema.   Skin: Warm. Dry. Good turgor.       Laboratory & Imaging Data--  CBC:                       7.6    6.66  )-----------( 255      ( 28 Aug 2022 12:52 )             24.3     CMP: 08-27    141  |  106  |  56<H>  ----------------------------<  109<H>  5.3   |  32<H>  |  1.80<H>    Ca    8.1<L>      27 Aug 2022 23:50    TPro  7.1  /  Alb  1.9<L>  /  TBili  1.3<H>  /  DBili  x   /  AST  48<H>  /  ALT  19  /  AlkPhos  113  08-27    LIVER FUNCTIONS - ( 27 Aug 2022 23:50 )  Alb: 1.9 g/dL / Pro: 7.1 g/dL / ALK PHOS: 113 U/L / ALT: 19 U/L / AST: 48 U/L / GGT: x               Microbiology: reviewed      Radiology: reviewed    < from: CT Head No Cont (08.28.22 @ 04:50) >    ACC: [id number] EXAM:  CT BRAIN                          PROCEDURE DATE:  08/28/2022          INTERPRETATION:  CLINICAL INDICATION:  supratheraputic inr. Anemia.    TECHNIQUE: Noncontrast CT examination of the head was performed using   contiguous 5 mm CT images.    COMPARISON: 7/14/2022    FINDINGS:    Diagnostic accuracy is limited secondary to patient motion.    There is no evidence of mass or acute intracranial hemorrhage. Ventricles   and sulci are normal in size and configuration for the patient's stated   age. No midline shift or other significant mass effect is noted. There is   no CT evidence of acute territorial infarct. There are periventricular   white matter hypodensities that are nonspecific in nature but may reflect   chronic ischemic microvascular disease.    The visualized paranasal sinuses are clear. Bilateral mastoid effusions.   Orbits and orbital contents are unremarkable.    There is no depressed calvarial fracture.        IMPRESSION:    No evidence of acute intracranial hemorrhage, midline shift or CT   evidence of acute territorial infarct.    If the patient's symptoms persist, consider short interval follow-up head   CT or brain MRI if there are no MRI contraindications.    Bilateral mastoid effusions. Correlate clinically for mastoiditis.    --- End of Report ---            NURIA MENDIETA MD; Attending Radiologist  This document has been electronically signed. Aug 28 2022  6:19AM    < end of copied text >  < from: Xray Chest 1 View- PORTABLE-Urgent (08.28.22 @ 00:38) >    ACC: [id number] EXAM:  XR CHEST PORTABLE URGENT 1V                          PROCEDURE DATE:  08/28/2022          INTERPRETATION:  AP semierect chest on August 28, 2022 at 12:10 AM.   Patient has a GI bleed.    Elevated right hemidiaphragm again noted. Heart enlargement again seen.    On July 16 there were moderate congestive changes. Present film shows the   have largely resolved.    No free intraperitoneal air.    IMPRESSION: Prior CHF has largely resolved.    --- End of Report ---            RUSSELL MONTES MD; Attending Radiologist  This document has been electronically signed. Aug 28 2022  1:09PM    < end of copied text >

## 2022-08-28 NOTE — H&P ADULT - PROBLEM SELECTOR PLAN 1
Patient presented with dark tarry stools. Hgb 6.5, INR 7.1. Given Vitamin K and pRBC x1 in ED.  - Admit to telemetry  - f/u repeat CBC, trend H/H, transfuse as necessary  - Hold coumadin in setting of bleeding  - Daily INR  - Heme/Onc consult  - GI consult (aLi) Patient presented with dark tarry stools. Hgb 6.5, INR 7.1. Given Vitamin K and pRBC x1 in ED.  - Admit to telemetry  - f/u repeat CBC, trend H/H, transfuse if Hgb <8  - Hold coumadin in setting of bleeding  - Daily coags  - GI consult (Lai) Patient presented with dark tarry stools. Hgb 6.5, INR 7.1. Given Vitamin K and pRBC x1 in ED.  - Admit to telemetry  - f/u repeat CBC, trend H/H, transfuse if Hgb <8  - Hold coumadin in setting of bleeding  - Daily coags  - GI consult () Patient presented with dark tarry stools. Hgb 6.5, INR 7.1. Given Vitamin K and pRBC x1 in ED.  - ABLA 2/2 GIB in the setting of supratheraputic inr  - Admit to telemetry  - f/u repeat CBC, trend H/H q6, transfuse if Hgb <8  - Hold coumadin  - Daily coags  - GI consult ()

## 2022-08-28 NOTE — H&P ADULT - PROBLEM SELECTOR PROBLEM 3
Acute kidney injury superimposed on CKD CAD (coronary artery disease) 2019 novel coronavirus disease (COVID-19)

## 2022-08-28 NOTE — H&P ADULT - ASSESSMENT
89yo F with PMH B/L LE edema, HTN, CAD, afib (on coumadin), HRrEF (EF 35-40%), and hypothyroidism presents to ED with dark stools, admitted for ABLA 2/2 GI bleed. 89yo F with PMH B/L LE edema, HTN, CAD, afib (on coumadin), HRrEF (EF 35-40%), and hypothyroidism presents to ED with dark stools, admitted for ABLA 2/2 GI bleed likely in setting of supratherapeutic INR.

## 2022-08-28 NOTE — H&P ADULT - PROBLEM SELECTOR PLAN 9
- Hold coumadin in setting of ABLA  - SCDs for DVT ppx  - Follow daily INR On home Lasix 40mg q12h  - Continue home Lasix - Continue home synthroid  - AM TSH

## 2022-08-28 NOTE — H&P ADULT - PROBLEM SELECTOR PLAN 3
LUIS CARLOS/CKD stage 3, Cr 1.8, baseline appears to be 1-1.2  - Likely pre-renal due to acute blood loss  - Avoiding IVF due to HFrEF  - Lasix 40mg q12h Recent NSTEMI 6/2022  - Hold home ASA in setting of bleeding  - Continue Metoprolol 50mg QD  - Cardio consult ([facility]) COVID+ as of 8/24, also tested positive on admission. In ED, SpO2 100% on NRB.  - Isolation precautions  - Prescribed Paxlovid on 8/24 for 5 days, scheduled to end 8/28  - Maintain SpO2 >90%, provide oxygen support as necessary  - ID consult (Ric) acute hypoxic respiratory failure in the setting of COVID 19   COVID+ as of 8/24, also tested positive on admission. In ED, SpO2 100% on NRB. Per EMS reports O2 88 on room air   - Isolation precautions  - Prescribed Paxlovid as outpatient on 8/24 for 5 days, scheduled to end 8/28  - will start decadron as patient requiring supplemental O2 on admission   - wean oxygen as tolerated, maintain sp02>90   - ID consult (Ric)

## 2022-08-28 NOTE — H&P ADULT - ATTENDING COMMENTS
89yo F with PMH B/L LE edema, HTN, CAD, afib (on coumadin), HRrEF (EF 35-40%), and hypothyroidism presents to ED with dark stools, admitted for ABLA 2/2 GI bleed likely in setting of supratherapeutic INR.    Agree with H&P as above. Edited personally where appropriate directly into the body of the note.

## 2022-08-28 NOTE — ED ADULT NURSE REASSESSMENT NOTE - NSIMPLEMENTINTERV_GEN_ALL_ED
Implemented All Fall with Harm Risk Interventions:  [place] to call system. Call bell, personal items and telephone within reach. Instruct patient to call for assistance. Room bathroom lighting operational. Non-slip footwear when patient is off stretcher. Physically safe environment: no spills, clutter or unnecessary equipment. Stretcher in lowest position, wheels locked, appropriate side rails in place. Provide visual cue, wrist band, yellow gown, etc. Monitor gait and stability. Monitor for mental status changes and reorient to person, place, and time. Review medications for side effects contributing to fall risk. Reinforce activity limits and safety measures with patient and family. Provide visual clues: red socks.

## 2022-08-28 NOTE — H&P ADULT - PROBLEM SELECTOR PLAN 5
- Hold coumadin in setting of bleeding  - Metoprolol succinate 50mg QD w/ hold parameters - Hold Coumadin in setting of bleeding  - Metoprolol succinate 50mg QD w/ hold parameters TTE (6/19/2022) showed EF 35-40%, LV hypokinesis 2/2 NSTEMI. [place], mid inferoseptal and mid anterolateral walls severely hypokinetic. Right ventricular enlargement. Biatrial enlargement. Moderate AS. Moderate TR.  - Patient with clear lung exam but noticeable B/L LE edema (chronic)  - Cr 1.80, continue Lasix while monitoring Cr LUIS CARLOS/CKD stage 3, Cr 1.8, baseline appears to be 1-1.2  - Likely pre-renal due to acute blood loss  - Avoiding additional IVF due to HFrEF  - Hold home Lasix, can restart in AM if Cr is stable

## 2022-08-28 NOTE — H&P ADULT - PROBLEM SELECTOR PLAN 10
- Hold coumadin in setting of ABLA  - SCDs for DVT ppx  - Follow daily INR On home Lasix 40mg q12h  - Hold home Lasix

## 2022-08-28 NOTE — H&P ADULT - PROBLEM SELECTOR PLAN 7
Chronic, /46 on admission  - Continue home Metoprolol with hold parameters  - Hemodynamic monitoring - Continue home synthroid  - AM TSH - Hold Coumadin in setting of bleeding  - Metoprolol succinate 50mg QD w/ hold parameters

## 2022-08-29 LAB
ALBUMIN SERPL ELPH-MCNC: 1.9 G/DL — LOW (ref 3.3–5)
ALP SERPL-CCNC: 104 U/L — SIGNIFICANT CHANGE UP (ref 40–120)
ALT FLD-CCNC: 21 U/L — SIGNIFICANT CHANGE UP (ref 12–78)
ANION GAP SERPL CALC-SCNC: 6 MMOL/L — SIGNIFICANT CHANGE UP (ref 5–17)
APTT BLD: 36.3 SEC — HIGH (ref 27.5–35.5)
AST SERPL-CCNC: 39 U/L — HIGH (ref 15–37)
BASOPHILS # BLD AUTO: 0 K/UL — SIGNIFICANT CHANGE UP (ref 0–0.2)
BASOPHILS NFR BLD AUTO: 0 % — SIGNIFICANT CHANGE UP (ref 0–2)
BILIRUB SERPL-MCNC: 1.1 MG/DL — SIGNIFICANT CHANGE UP (ref 0.2–1.2)
BUN SERPL-MCNC: 65 MG/DL — HIGH (ref 7–23)
CALCIUM SERPL-MCNC: 7.7 MG/DL — LOW (ref 8.5–10.1)
CHLORIDE SERPL-SCNC: 109 MMOL/L — HIGH (ref 96–108)
CO2 SERPL-SCNC: 29 MMOL/L — SIGNIFICANT CHANGE UP (ref 22–31)
CREAT SERPL-MCNC: 1.8 MG/DL — HIGH (ref 0.5–1.3)
EGFR: 26 ML/MIN/1.73M2 — LOW
EOSINOPHIL # BLD AUTO: 0 K/UL — SIGNIFICANT CHANGE UP (ref 0–0.5)
EOSINOPHIL NFR BLD AUTO: 0 % — SIGNIFICANT CHANGE UP (ref 0–6)
GLUCOSE SERPL-MCNC: 119 MG/DL — HIGH (ref 70–99)
HCT VFR BLD CALC: 25.8 % — LOW (ref 34.5–45)
HGB BLD-MCNC: 8.4 G/DL — LOW (ref 11.5–15.5)
IMM GRANULOCYTES NFR BLD AUTO: 0.8 % — SIGNIFICANT CHANGE UP (ref 0–1.5)
INR BLD: 1.6 RATIO — HIGH (ref 0.88–1.16)
LYMPHOCYTES # BLD AUTO: 0.47 K/UL — LOW (ref 1–3.3)
LYMPHOCYTES # BLD AUTO: 19.5 % — SIGNIFICANT CHANGE UP (ref 13–44)
MCHC RBC-ENTMCNC: 32.2 PG — SIGNIFICANT CHANGE UP (ref 27–34)
MCHC RBC-ENTMCNC: 32.6 GM/DL — SIGNIFICANT CHANGE UP (ref 32–36)
MCV RBC AUTO: 98.9 FL — SIGNIFICANT CHANGE UP (ref 80–100)
MONOCYTES # BLD AUTO: 0.13 K/UL — SIGNIFICANT CHANGE UP (ref 0–0.9)
MONOCYTES NFR BLD AUTO: 5.4 % — SIGNIFICANT CHANGE UP (ref 2–14)
NEUTROPHILS # BLD AUTO: 1.79 K/UL — LOW (ref 1.8–7.4)
NEUTROPHILS NFR BLD AUTO: 74.3 % — SIGNIFICANT CHANGE UP (ref 43–77)
NRBC # BLD: 0 /100 WBCS — SIGNIFICANT CHANGE UP (ref 0–0)
PLATELET # BLD AUTO: 208 K/UL — SIGNIFICANT CHANGE UP (ref 150–400)
POTASSIUM SERPL-MCNC: 4.3 MMOL/L — SIGNIFICANT CHANGE UP (ref 3.5–5.3)
POTASSIUM SERPL-SCNC: 4.3 MMOL/L — SIGNIFICANT CHANGE UP (ref 3.5–5.3)
PROT SERPL-MCNC: 6.7 G/DL — SIGNIFICANT CHANGE UP (ref 6–8.3)
PROTHROM AB SERPL-ACNC: 18.8 SEC — HIGH (ref 10.5–13.4)
RBC # BLD: 2.61 M/UL — LOW (ref 3.8–5.2)
RBC # FLD: 22.1 % — HIGH (ref 10.3–14.5)
SODIUM SERPL-SCNC: 144 MMOL/L — SIGNIFICANT CHANGE UP (ref 135–145)
WBC # BLD: 2.41 K/UL — LOW (ref 3.8–10.5)
WBC # FLD AUTO: 2.41 K/UL — LOW (ref 3.8–10.5)

## 2022-08-29 PROCEDURE — 99233 SBSQ HOSP IP/OBS HIGH 50: CPT

## 2022-08-29 PROCEDURE — 99233 SBSQ HOSP IP/OBS HIGH 50: CPT | Mod: 25

## 2022-08-29 PROCEDURE — 99497 ADVNCD CARE PLAN 30 MIN: CPT

## 2022-08-29 RX ORDER — MORPHINE SULFATE 50 MG/1
1 CAPSULE, EXTENDED RELEASE ORAL
Refills: 0 | Status: DISCONTINUED | OUTPATIENT
Start: 2022-08-29 | End: 2022-08-30

## 2022-08-29 RX ADMIN — PANTOPRAZOLE SODIUM 10 MG/HR: 20 TABLET, DELAYED RELEASE ORAL at 01:00

## 2022-08-29 RX ADMIN — Medication 6 MILLIGRAM(S): at 05:34

## 2022-08-29 RX ADMIN — Medication 40 MILLIGRAM(S): at 05:34

## 2022-08-29 RX ADMIN — Medication 2.5 MILLIGRAM(S): at 05:34

## 2022-08-29 RX ADMIN — Medication 2 SPRAY(S): at 05:35

## 2022-08-29 RX ADMIN — Medication 40 MILLIGRAM(S): at 14:02

## 2022-08-29 RX ADMIN — Medication 1 SPRAY(S): at 05:34

## 2022-08-29 RX ADMIN — MORPHINE SULFATE 1 MILLIGRAM(S): 50 CAPSULE, EXTENDED RELEASE ORAL at 12:16

## 2022-08-29 RX ADMIN — PANTOPRAZOLE SODIUM 10 MG/HR: 20 TABLET, DELAYED RELEASE ORAL at 11:16

## 2022-08-29 NOTE — PROGRESS NOTE ADULT - SUBJECTIVE AND OBJECTIVE BOX
[facility] DIVISION of INFECTIOUS DISEASE  Diony Larios MD PhD, Tricia Cornell MD, Delmy Villegas MD, Juan Alberto Mccord MD, Juan Vieyra MD  and providing coverage with Gabriella Bell MD  Providing Infectious Disease Consultations at [facility], [facility], [facility], [facility], [facility]'s      Office# [phone] to schedule follow up appointments  Answering Service for urgent calls or New Consults [phone]  Cell# to text for urgent issues Diony Larios [phone]     Infectious diseases progress note:    KENIA REGALADO is a 89y y. o. Female patient    COVID Patient    Allergies    No Known Allergies    Intolerances        ANTIBIOTICS/RELEVANT:  antimicrobials    immunologic:    OTHER:  fluticasone propionate 50 MICROgram(s)/spray Nasal Spray 1 Spray(s) Both Nostrils two times a day  furosemide   Injectable 40 milliGRAM(s) IV Push two times a day  morphine  - Injectable 1 milliGRAM(s) IV Push every 2 hours PRN  ondansetron Injectable 4 milliGRAM(s) IV Push every 8 hours PRN  sodium chloride 0.65% Nasal 2 Spray(s) Both Nostrils four times a day      Objective:  Vital Signs Last 24 Hrs  T(C): 36.7 (29 Aug 2022 10:27), Max: 36.7 (29 Aug 2022 10:27)  T(F): 98 (29 Aug 2022 10:27), Max: 98 (29 Aug 2022 10:27)  HR: 74 (29 Aug 2022 10:27) (59 - 75)  BP: 150/60 (29 Aug 2022 10:27) (98/49 - 150/60)  BP(mean): --  RR: 10 (29 Aug 2022 10:27) (10 - 19)  SpO2: 100% (29 Aug 2022 10:27) (95% - 100%)    Parameters below as of 29 Aug 2022 10:27  Patient On (Oxygen Delivery Method): room air        T(C): 36.7 (08-29-22 @ 10:27), Max: 36.7 (08-28-22 @ 07:29)  T(C): 36.7 (08-29-22 @ 10:27), Max: 36.7 (08-28-22 @ 07:29)  T(C): 36.7 (08-29-22 @ 10:27), Max: 36.7 (08-28-22 @ 07:29)    PHYSICAL EXAM: on NRB  HEENT: NC atraumatic  Neck: supple  Respiratory: no accessory muscle use, breathing comfortably  Cardiovascular: distant  Gastrointestinal: normal appearing, nondistended  Extremities: no clubbing, no cyanosis,        LABS:                          8.4    2.41  )-----------( 208      ( 29 Aug 2022 06:10 )             25.8       WBC  2.41 08-29 @ 06:10  6.66 08-28 @ 12:52  5.94 08-27 @ 23:50      08-29    144  |  109<H>  |  65<H>  ----------------------------<  119<H>  4.3   |  29  |  1.80<H>    Ca    7.7<L>      29 Aug 2022 06:10    TPro  6.7  /  Alb  1.9<L>  /  TBili  1.1  /  DBili  x   /  AST  39<H>  /  ALT  21  /  AlkPhos  104  08-29      Creatinine, Serum: 1.80 mg/dL (08-29-22 @ 06:10)  Creatinine, Serum: 1.80 mg/dL (08-27-22 @ 23:50)      PT/INR - ( 29 Aug 2022 06:10 )   PT: 18.8 sec;   INR: 1.60 ratio         PTT - ( 29 Aug 2022 06:10 )  PTT:36.3 sec          COVID RISK SCORE  Auto Neutrophil #: 1.79 K/uL (08-29-22 @ 06:10)  Auto Lymphocyte #: 0.47 K/uL (08-29-22 @ 06:10)  Auto Neutrophil #: 4.00 K/uL (08-27-22 @ 23:50)  Auto Lymphocyte #: 1.15 K/uL (08-27-22 @ 23:50)      Auto Eosinophil #: 0.00 K/uL (08-29-22 @ 06:10)  Auto Eosinophil #: 0.02 K/uL (08-27-22 @ 23:50)                        INR: 1.60 ratio (08-29-22 @ 06:10)  Activated Partial Thromboplastin Time: 36.3 sec (08-29-22 @ 06:10)  INR: 2.66 ratio (08-28-22 @ 12:52)  Activated Partial Thromboplastin Time: 37.8 sec (08-28-22 @ 12:52)  INR: 7.11 ratio (08-27-22 @ 23:50)  Activated Partial Thromboplastin Time: 49.1 sec (08-27-22 @ 23:50)          MICROBIOLOGY:              COVID-19 PCR: Detected (27 Aug 2022 23:50)  COVID-19 PCR: NotDetec (19 Jul 2022 07:00)  COVID-19 PCR: NotDetec (14 Jul 2022 02:44)  COVID-19 PCR: NotDetec (29 Jun 2022 12:30)  COVID-19 PCR: NotDetec (21 Jun 2022 10:34)  COVID-19 PCR: NotDetec (17 Jun 2022 08:48)      RADIOLOGY & ADDITIONAL STUDIES:

## 2022-08-29 NOTE — PATIENT PROFILE ADULT - FALL HARM RISK - HARM RISK INTERVENTIONS
Assistance with ambulation/Assistance OOB with selected safe patient handling equipment/Communicate Risk of Fall with Harm to all staff/Discuss with provider need for PT consult/Monitor gait and stability/Reinforce activity limits and safety measures with patient and family/Tailored Fall Risk Interventions/Visual Cue: Yellow wristband and red socks/Bed in lowest position, wheels locked, appropriate side rails in place/Call bell, personal items and telephone in reach/Instruct patient to call for assistance before getting out of bed or chair/Non-slip footwear when patient is out of bed/[place] to call system/Physically safe environment - no spills, clutter or unnecessary equipment/Purposeful Proactive Rounding/Room/bathroom lighting operational, light cord in reach

## 2022-08-29 NOTE — PROGRESS NOTE ADULT - SUBJECTIVE AND OBJECTIVE BOX
Patient seen and examined at bedside. She is awake and alert to person and place. She has no complaints except feeling thirsty. Denies any pain.     T(C): 36.7 (08-29-22 @ 10:27), Max: 36.7 (08-28-22 @ 12:04)  HR: 74 (08-29-22 @ 10:27) (59 - 75)  BP: 150/60 (08-29-22 @ 10:27) (98/49 - 150/60)  RR: 10 (08-29-22 @ 10:27) (10 - 19)  SpO2: 100% (08-29-22 @ 10:27) (95% - 100%)  Wt(kg): --    Physical Exam:   GENERAL: resting comfortably with non-rebreather  HEENT: head NC/AT; conjunctiva & sclera clear; hearing grossly intact, moist mucous membranes  NECK: supple, no JVD  RESPIRATORY: rhonchio b/l lung bases, no wheezing  CARDIOVASCULAR: S1&S2, RRR, no murmurs or gallops  ABDOMEN: soft, non-tender, non-distended, + Bowel sounds x4 quadrants, no guarding, rebound or rigidity  MUSCULOSKELETAL: b/l LE edema, no cyanosis noted  LYMPH: no cervical lymphadenopathy  VASCULAR: Radial pulses 2+ bilaterally, no varicose veins   SKIN: warm and dry, color normal  NEUROLOGIC: AA&O X2,moving all extremities  Psych: cannot assess

## 2022-08-29 NOTE — PROGRESS NOTE ADULT - SUBJECTIVE AND OBJECTIVE BOX
[facility]  Dawson Dolan PA-C  [address] [place] [address]  [phone]      INTERVAL HPI/OVERNIGHT EVENTS:  Events noted  GOC discussion noted    MEDICATIONS  (STANDING):  fluticasone propionate 50 MICROgram(s)/spray Nasal Spray 1 Spray(s) Both Nostrils two times a day  furosemide   Injectable 40 milliGRAM(s) IV Push two times a day  sodium chloride 0.65% Nasal 2 Spray(s) Both Nostrils four times a day    MEDICATIONS  (PRN):  morphine  - Injectable 1 milliGRAM(s) IV Push every 2 hours PRN SOB  ondansetron Injectable 4 milliGRAM(s) IV Push every 8 hours PRN Nausea and/or Vomiting      Allergies  No Known Allergies        PHYSICAL EXAM:   Vital Signs:  Vital Signs Last 24 Hrs  T(C): 36.7 (29 Aug 2022 10:27), Max: 36.7 (29 Aug 2022 10:27)  T(F): 98 (29 Aug 2022 10:27), Max: 98 (29 Aug 2022 10:27)  HR: 74 (29 Aug 2022 10:27) (59 - 75)  BP: 150/60 (29 Aug 2022 10:27) (98/49 - 150/60)  BP(mean): --  RR: 10 (29 Aug 2022 10:27) (10 - 19)  SpO2: 100% (29 Aug 2022 10:27) (95% - 100%)    Parameters below as of 29 Aug 2022 10:27  Patient On (Oxygen Delivery Method): room air      GENERAL:  Appears stated age  ABDOMEN:  Soft, non-tender, non-distended  NEURO:  Alert    LABS:                        8.4    2.41  )-----------( 208      ( 29 Aug 2022 06:10 )             25.8     08-29    144  |  109<H>  |  65<H>  ----------------------------<  119<H>  4.3   |  29  |  1.80<H>    Ca    7.7<L>      29 Aug 2022 06:10    TPro  6.7  /  Alb  1.9<L>  /  TBili  1.1  /  DBili  x   /  AST  39<H>  /  ALT  21  /  AlkPhos  104  08-29    PT/INR - ( 29 Aug 2022 06:10 )   PT: 18.8 sec;   INR: 1.60 ratio         PTT - ( 29 Aug 2022 06:10 )  PTT:36.3 sec

## 2022-08-29 NOTE — ED ADULT NURSE REASSESSMENT NOTE - NS ED NURSE REASSESS COMMENT FT1
Condition remains same. Only responsive to very painful stimuli. Very shallow breathing, Oxygen through non breather is still on flow. Dr. Villegas made aware of the situation. AS per Dr. Villegas family is aware. Respirations are down to 10.Oxygen saturation is 100%
Dr. Villegas evaluated the patient. Patient is comfort care now.
Received the patient in the ER. Patient is sleeping . monitoring continuing. pending for bed availability. Discuss the situation with Dr. Villegas, Previous nurse endorsed that patient is DNR. No documentation in the chart. DR. villegas will reach out to family and palliative care will see the patient.
S/w supervisor at [facility] to have MOLST faxed to PV.  no fax was ever received.  called [facility] multiple times unable to obtain a person on the phone.   pt h/h low, prbc ordered.  daughter called to obtain verbal telephone consent.  daughter states she has a copy of MOLST and pt is DNR DNI but the whole family has covid and they are quarantined.  INR elevated, vit k given IVPB.  pt has dried blood noted in her nares and mouth.  thick beige secretions noted.  unable to pull fluids with a straw, no gag reflex.  dysphagia screen failed.  resident notified.  1 of 1 unit prbc completed without incident.
at 1730H patient's vital sign rechecked for vital sign and no transfusion reaction noted.
called the daughter Homar made aware of the situation. Offered the daughter to visit the mother even though daughter is covid positive. Reassure the family.
patient is lethargic. Condition remains same. Provided the patient with a hospital bed.
received hand off report from rupesh ALONSO. patient noted asleep. on cardiac monitor. on 100% NRB. on Protonix drip, infusing well.
Assumed care of pt from previous RN, Pt awaiting admission bed, resting comfortably on stretcher on nonrebreather, will continue to monitor closely.

## 2022-08-29 NOTE — CONSULT NOTE ADULT - SUBJECTIVE AND OBJECTIVE BOX
Patient is a 89y old  Female who presents with a chief complaint of dark stools (29 Aug 2022 08:50)       HPI:  89yo F with PMH B/L LE edema, HTN, CAD (NSTEMI on 6/2022), paroxysmal afib (on coumadin), HFrEF (EF 35-40%), stage 3 CKD and hypothyroidism presents to ED with dark stools. Patient brought in from [facility] and unable to provide history due to AMS. As per RN, stools described to be dark and tarry with bright red ring around the soiled region of her diaper. A&O x0, although able to follow some commands. Of note, Hgb dropped from 9.8 --> 6.5, HCP (daughter, Celestina Cheng) notified and consent obtained for transfusion. COVID+ as of 8/24/2022 and prescribed Paxlovid. Patient previously admitted to [facility] on 7/14-7/20/2022 for fall and MOLST documented from prior admission; however form was not obtained during this admission and has not yet been received from care facility.     Spoke to daughter via telephone conversation and informed her of patient status, daughter fully aware of situation. States patient is usually aaox3 however has been more altered since covid diagnosis.     Renal consulted for LUIS CARLOS/CKD. On NRB and lethargic, unable to provide any other history     in ED:  VS: /46, HR 63, 97.5F, RR 18, SpO2 100% on NRB  Labs significant for: Hgb 6.5, , PT 84.8, PTT 49.1, INR 7.11, BUN 56, Cr 1.80  FOBT+, COVID+  CXR: On wet read shows RLL infiltrate  Given: Pantoprazole, NaCl bolus x1, Vitamin K 5mg (28 Aug 2022 03:00)       PAST MEDICAL & SURGICAL HISTORY:  Hypothyroid      Hypertension      Lower extremity edema      Paroxysmal atrial fibrillation      NSTEMI (non-ST elevation myocardial infarction)  june 2022      Chronic systolic congestive heart failure      Stage 3 chronic kidney disease      Anemia of chronic disease      No significant past surgical history           FAMILY HISTORY:  No pertinent family history in first degree relatives    [place]    Social History:Non smoker    MEDICATIONS  (STANDING):  dexAMETHasone  Injectable 6 milliGRAM(s) IV Push daily  fluticasone propionate 50 MICROgram(s)/spray Nasal Spray 1 Spray(s) Both Nostrils two times a day  furosemide   Injectable 40 milliGRAM(s) IV Push two times a day  levothyroxine Injectable 37.5 MICROGram(s) IV Push at bedtime  metoprolol succinate ER 50 milliGRAM(s) Oral daily  metoprolol tartrate Injectable 2.5 milliGRAM(s) IV Push every 6 hours  pantoprazole Infusion 8 mG/Hr (10 mL/Hr) IV Continuous <Continuous>  remdesivir  IVPB   IV Intermittent   remdesivir  IVPB 100 milliGRAM(s) IV Intermittent every 24 hours  sodium chloride 0.65% Nasal 2 Spray(s) Both Nostrils four times a day    MEDICATIONS  (PRN):  ondansetron Injectable 4 milliGRAM(s) IV Push every 8 hours PRN Nausea and/or Vomiting   Meds reviewed    Allergies    No Known Allergies    Intolerances         REVIEW OF SYSTEMS: NA    Vital Signs Last 24 Hrs  T(C): 36.7 (29 Aug 2022 10:27), Max: 36.7 (28 Aug 2022 12:04)  T(F): 98 (29 Aug 2022 10:27), Max: 98 (28 Aug 2022 12:04)  HR: 74 (29 Aug 2022 10:27) (59 - 75)  BP: 150/60 (29 Aug 2022 10:27) (98/49 - 150/60)  BP(mean): --  RR: 10 (29 Aug 2022 10:27) (10 - 19)  SpO2: 100% (29 Aug 2022 10:27) (95% - 100%)    Parameters below as of 29 Aug 2022 10:27  Patient On (Oxygen Delivery Method): room air        PHYSICAL EXAM:  General: elderly W, on NRB, lethargic  HEENT: NC/AT, EOMI, anicteric  Lungs: decreased b/l breath sounds  Heart: Regular rate and rhythm. No murmur, rub or gallop.  Abdomen: Soft. Nondistended. Nontender.  Extremities: No cyanosis or clubbing. No edema.   Skin: Warm. Dry. Good turgor.       LABS:                        8.4    2.41  )-----------( 208      ( 29 Aug 2022 06:10 )             25.8     08-29    144  |  109<H>  |  65<H>  ----------------------------<  119<H>  4.3   |  29  |  1.80<H>    Ca    7.7<L>      29 Aug 2022 06:10    TPro  6.7  /  Alb  1.9<L>  /  TBili  1.1  /  DBili  x   /  AST  39<H>  /  ALT  21  /  AlkPhos  104  08-29    PT/INR - ( 29 Aug 2022 06:10 )   PT: 18.8 sec;   INR: 1.60 ratio         PTT - ( 29 Aug 2022 06:10 )  PTT:36.3 sec            RADIOLOGY & ADDITIONAL TESTS:

## 2022-08-29 NOTE — PROGRESS NOTE ADULT - SUBJECTIVE AND OBJECTIVE BOX
[facility] -- Jacob Mathews, Flex Calvert Savella  Office # [phone]      Follow Up:  pafib, aortic stenosis, htn , ckd     Subjective/Observations:     Seen at bedside in no acute distress remains on non rebreather   unable to provide any meaningful information   REVIEW OF SYSTEMS: All other review of systems is negative unless indicated above    PAST MEDICAL & SURGICAL HISTORY:  Hypothyroid      Hypertension      Lower extremity edema      Paroxysmal atrial fibrillation      NSTEMI (non-ST elevation myocardial infarction)  2022      Chronic systolic congestive heart failure      Stage 3 chronic kidney disease      Anemia of chronic disease      No significant past surgical history          MEDICATIONS  (STANDING):  dexAMETHasone  Injectable 6 milliGRAM(s) IV Push daily  fluticasone propionate 50 MICROgram(s)/spray Nasal Spray 1 Spray(s) Both Nostrils two times a day  furosemide   Injectable 40 milliGRAM(s) IV Push two times a day  levothyroxine Injectable 37.5 MICROGram(s) IV Push at bedtime  metoprolol succinate ER 50 milliGRAM(s) Oral daily  metoprolol tartrate Injectable 2.5 milliGRAM(s) IV Push every 6 hours  pantoprazole Infusion 8 mG/Hr (10 mL/Hr) IV Continuous <Continuous>  remdesivir  IVPB   IV Intermittent   remdesivir  IVPB 100 milliGRAM(s) IV Intermittent every 24 hours  sodium chloride 0.65% Nasal 2 Spray(s) Both Nostrils four times a day    MEDICATIONS  (PRN):  ondansetron Injectable 4 milliGRAM(s) IV Push every 8 hours PRN Nausea and/or Vomiting      Allergies    No Known Allergies    Intolerances        Vital Signs Last 24 Hrs  T(C): 36.6 (29 Aug 2022 05:03), Max: 36.7 (28 Aug 2022 12:04)  T(F): 97.9 (29 Aug 2022 05:03), Max: 98 (28 Aug 2022 12:04)  HR: 74 (29 Aug 2022 07:28) (59 - 75)  BP: 125/69 (29 Aug 2022 07:28) (98/49 - 139/69)  BP(mean): --  RR: 18 (29 Aug 2022 07:28) (16 - 20)  SpO2: 100% (29 Aug 2022 07:28) (94% - 100%)    Parameters below as of 29 Aug 2022 07:28  Patient On (Oxygen Delivery Method): mask, nonrebreather        I&O's Summary        PHYSICAL EXAM:  TELE: Af  Constitutional: lethargic   HEENT: Moist Mucous Membranes, Anicteric  Pulmonary: Non-labored, breath sounds are dim  Cardiovascular: IRRegular, S1 and S2 nl, murmur   Gastrointestinal: Bowel Sounds present, soft, nontender.   Lymph: + peripheral edema.  Skin: No visible rashes or ulcers.      LABS: All Labs Reviewed:                        8.4    2.41  )-----------( 208      ( 29 Aug 2022 06:10 )             25.8                         7.6    6.66  )-----------( 255      ( 28 Aug 2022 12:52 )             24.3                         6.5    5.94  )-----------( 259      ( 27 Aug 2022 23:50 )             20.7     29 Aug 2022 06:10    144    |  109    |  65     ----------------------------<  119    4.3     |  29     |  1.80   27 Aug 2022 23:50    141    |  106    |  56     ----------------------------<  109    5.3     |  32     |  1.80     Ca    7.7        29 Aug 2022 06:10  Ca    8.1        27 Aug 2022 23:50    TPro  6.7    /  Alb  1.9    /  TBili  1.1    /  DBili  x      /  AST  39     /  ALT  21     /  AlkPhos  104    29 Aug 2022 06:10  TPro  7.1    /  Alb  1.9    /  TBili  1.3    /  DBili  x      /  AST  48     /  ALT  19     /  AlkPhos  113    27 Aug 2022 23:50    PT/INR - ( 29 Aug 2022 06:10 )   PT: 18.8 sec;   INR: 1.60 ratio         PTT - ( 29 Aug 2022 06:10 )  PTT:36.3 sec         EC Lead ECG:   Ventricular Rate 69 BPM    QRS Duration 142 ms    Q-T Interval 440 ms    QTC Calculation(Bazett) 471 ms    R Axis -32 degrees    T Axis 193 degrees    Diagnosis Line Atrial fibrillation with premature ventricular or aberrantly conducted complexes  Left bundle branch block  Abnormal ECG    Confirmed by margarito Blair ([id number]) on 2022 12:54:59 PM (22 @ 23:08)      ACC: [id number] EXAM:  ECHO TTE WO CON COMP W DOPP                          PROCEDURE DATE:  2022          INTERPRETATION:  INDICATION: Chest pain  Sonographer LK    Blood Pressure 147/84    Height 165.1 cm     Weight 72.6 kg       BSA 1.8   sqm    Dimensions:  LA 3.8       Normal Values: 2.0 - 4.0 cm  Ao 3.2        Normal Values: 2.0 - 3.8 cm  SEPTUM 1.7       Normal Values: 0.6 - 1.2 cm  PWT 1.2       Normal Values: 0.6 - 1.1 cm  LVIDd 4.8         Normal Values: 3.0 - 5.6 cm  LVIDs 3.2      Normal Values: 1.8 - 4.0 cm      OBSERVATIONS:  Mitral Valve: Mitral annular calcification with thickened leaflets, mild   MR.  Aortic Valve/Aorta: Calcified trileaflet aortic valve with decreased   opening. Peak transaortic valve gradient equals 20.4 mmHg with a mean   transaortic valve gradient 13.2 mmHg. By visual estimation there appears   to be mild to moderate aortic stenosis  Tricuspid Valve: Moderate TR.  Pulmonic Valve: Trace PI  Left Atrium: Enlarged  Right Atrium: Enlarged  Left Ventricle: Mild to moderate segmental left ventricular systolic   dysfunction, estimated LVEF of 35-40%. The apex, mid inferoseptal and mid   anterolateral walls appear severely hypokinetic  Right Ventricle: Right ventricular enlargement with grossly normal   function  Pericardium: no significant pericardial effusion.  Pulmonary/RV Pressure: estimated PA systolic pressure of 36 mmHg  IVC measures 1.47 cm          IMPRESSION:  Mild to moderate segmental left ventricular systolic dysfunction,   estimated LVEF of 35-40%. The apex, mid inferoseptal and mid   anterolateral walls appear severely hypokinetic  Right ventricular enlargement with grossly normal function  Biatrial enlargement  Calcified trileaflet aortic valve with mild to moderate aortic stenosis,   without AI.  Mild MR  Moderate TR.  No significant pericardial effusion.

## 2022-08-29 NOTE — PATIENT PROFILE ADULT - FUNCTIONAL ASSESSMENT - BASIC MOBILITY 6.
1-calculated by average/Not able to assess (calculate score using [facility] averaging method)

## 2022-08-30 ENCOUNTER — TRANSCRIPTION ENCOUNTER (OUTPATIENT)
Age: 87
End: 2022-08-30

## 2022-08-30 VITALS
OXYGEN SATURATION: 94 % | TEMPERATURE: 100 F | HEART RATE: 78 BPM | SYSTOLIC BLOOD PRESSURE: 134 MMHG | DIASTOLIC BLOOD PRESSURE: 78 MMHG | RESPIRATION RATE: 17 BRPM

## 2022-08-30 PROCEDURE — 83690 ASSAY OF LIPASE: CPT

## 2022-08-30 PROCEDURE — 99285 EMERGENCY DEPT VISIT HI MDM: CPT | Mod: 25

## 2022-08-30 PROCEDURE — 96374 THER/PROPH/DIAG INJ IV PUSH: CPT

## 2022-08-30 PROCEDURE — 70450 CT HEAD/BRAIN W/O DYE: CPT

## 2022-08-30 PROCEDURE — 86900 BLOOD TYPING SEROLOGIC ABO: CPT

## 2022-08-30 PROCEDURE — 85730 THROMBOPLASTIN TIME PARTIAL: CPT

## 2022-08-30 PROCEDURE — 85610 PROTHROMBIN TIME: CPT

## 2022-08-30 PROCEDURE — 82272 OCCULT BLD FECES 1-3 TESTS: CPT

## 2022-08-30 PROCEDURE — 85025 COMPLETE CBC W/AUTO DIFF WBC: CPT

## 2022-08-30 PROCEDURE — 36415 COLL VENOUS BLD VENIPUNCTURE: CPT

## 2022-08-30 PROCEDURE — 99239 HOSP IP/OBS DSCHRG MGMT >30: CPT

## 2022-08-30 PROCEDURE — 80053 COMPREHEN METABOLIC PANEL: CPT

## 2022-08-30 PROCEDURE — 99233 SBSQ HOSP IP/OBS HIGH 50: CPT

## 2022-08-30 PROCEDURE — 96361 HYDRATE IV INFUSION ADD-ON: CPT

## 2022-08-30 PROCEDURE — 99223 1ST HOSP IP/OBS HIGH 75: CPT

## 2022-08-30 PROCEDURE — 94640 AIRWAY INHALATION TREATMENT: CPT

## 2022-08-30 PROCEDURE — 93005 ELECTROCARDIOGRAM TRACING: CPT

## 2022-08-30 PROCEDURE — 71045 X-RAY EXAM CHEST 1 VIEW: CPT

## 2022-08-30 PROCEDURE — 85027 COMPLETE CBC AUTOMATED: CPT

## 2022-08-30 PROCEDURE — 86850 RBC ANTIBODY SCREEN: CPT

## 2022-08-30 PROCEDURE — P9016: CPT

## 2022-08-30 PROCEDURE — 87635 SARS-COV-2 COVID-19 AMP PRB: CPT

## 2022-08-30 PROCEDURE — 36430 TRANSFUSION BLD/BLD COMPNT: CPT

## 2022-08-30 PROCEDURE — 86901 BLOOD TYPING SEROLOGIC RH(D): CPT

## 2022-08-30 PROCEDURE — 86923 COMPATIBILITY TEST ELECTRIC: CPT

## 2022-08-30 RX ORDER — ROBINUL 0.2 MG/ML
0.2 INJECTION INTRAMUSCULAR; INTRAVENOUS EVERY 6 HOURS
Refills: 0 | Status: DISCONTINUED | OUTPATIENT
Start: 2022-08-30 | End: 2022-08-30

## 2022-08-30 RX ORDER — ROBINUL 0.2 MG/ML
0.2 INJECTION INTRAMUSCULAR; INTRAVENOUS
Qty: 5 | Refills: 0
Start: 2022-08-30

## 2022-08-30 RX ORDER — WARFARIN SODIUM 2.5 MG/1
1 TABLET ORAL
Qty: 0 | Refills: 0 | DISCHARGE

## 2022-08-30 RX ORDER — HYDROMORPHONE HYDROCHLORIDE 2 MG/ML
0.5 INJECTION INTRAMUSCULAR; INTRAVENOUS; SUBCUTANEOUS
Refills: 0 | Status: DISCONTINUED | OUTPATIENT
Start: 2022-08-30 | End: 2022-08-30

## 2022-08-30 RX ORDER — HYDROMORPHONE HYDROCHLORIDE 2 MG/ML
0.25 INJECTION INTRAMUSCULAR; INTRAVENOUS; SUBCUTANEOUS
Refills: 0 | Status: DISCONTINUED | OUTPATIENT
Start: 2022-08-30 | End: 2022-08-30

## 2022-08-30 RX ORDER — HYDROMORPHONE HYDROCHLORIDE 2 MG/ML
0.25 INJECTION INTRAMUSCULAR; INTRAVENOUS; SUBCUTANEOUS
Qty: 0 | Refills: 0 | DISCHARGE
Start: 2022-08-30

## 2022-08-30 RX ORDER — POTASSIUM CHLORIDE 20 MEQ
1 PACKET (EA) ORAL
Qty: 0 | Refills: 0 | DISCHARGE

## 2022-08-30 RX ORDER — CHOLECALCIFEROL (VITAMIN D3) 125 MCG
1 CAPSULE ORAL
Qty: 0 | Refills: 0 | DISCHARGE

## 2022-08-30 RX ADMIN — Medication 2 SPRAY(S): at 18:26

## 2022-08-30 RX ADMIN — Medication 40 MILLIGRAM(S): at 14:25

## 2022-08-30 RX ADMIN — Medication 2 SPRAY(S): at 05:39

## 2022-08-30 RX ADMIN — Medication 40 MILLIGRAM(S): at 05:38

## 2022-08-30 RX ADMIN — Medication 1 SPRAY(S): at 05:39

## 2022-08-30 RX ADMIN — Medication 1 SPRAY(S): at 18:26

## 2022-08-30 RX ADMIN — Medication 2 SPRAY(S): at 12:24

## 2022-08-30 NOTE — PROGRESS NOTE ADULT - PROBLEM SELECTOR PLAN 10
can continue lasix if patient becomes volume overloaded while inpatient for symptom mgmt
can continue lasix if patient becomes volume overloaded while inpatient for symptom mgmt

## 2022-08-30 NOTE — GOALS OF CARE CONVERSATION - ADVANCED CARE PLANNING - CONVERSATION DETAILS
Had an extensive goals of care conversation with patients daughter and son in law via phone.   Patient is DNR and DNI. Patients code status was discussed with family via phone. Goals of care were discussed with family including the importance of having an advance directive and Health Care agent. Goals of care discussed included need for possibe intubation and CPR if patient clinically deteriorates. family was informed about the role of a MOLST form.   18 minutes were spent discussing advanced care planning and this was separate from management of patients medical problems.
Family to discuss [facility]will follow
Writer spoke with pt daughter Celestina. Reviewed patient's medical and social history as well as events leading to patient's hospitalization. Writer discussed patient's current diagnosis COVID,HF,afib,CKD, advanced age, debility  medical condition and management, very poor  prognosis, and life expectancy. Inquired about patient's wishes regarding extent of medical care to be provided including escalation of medical care into the ICU and use of vasopressor support., artificial nutrition and hydration including use of feeding tubes and IVF, antibiotics, and further investigative studies such as blood draws and radiology Daughter  showed excellent insight nto medical condition. All questions answered. Writer recommended CMO explaining pt would have no diagnostic tests or blood work, no monitoring, no IV fluids only treatments aimed at her comfort including low dose of MS if needed  Daughter agrees to CMO

## 2022-08-30 NOTE — DISCHARGE NOTE NURSING/CASE MANAGEMENT/SOCIAL WORK - PATIENT PORTAL LINK FT
You can access the FollowMyHealth Patient Portal offered by [facility] by registering at the following website: [email or web address]. By joining [employer]’s FollowMyHealth portal, you will also be able to view your health information using other applications (apps) compatible with our system.

## 2022-08-30 NOTE — CONSULT NOTE ADULT - SUBJECTIVE AND OBJECTIVE BOX
HPI:  89yo F with PMH B/L LE edema, HTN, CAD (NSTEMI on 6/2022), paroxysmal afib (on coumadin), HFrEF (EF 35-40%), stage 3 CKD and hypothyroidism presents to ED with dark stools. Patient brought in from [facility] and unable to provide history due to AMS. As per RN, stools described to be dark and tarry with bright red ring around the soiled region of her diaper. A&O x0, although able to follow some commands. Of note, Hgb dropped from 9.8 --> 6.5, HCP (daughter, Celestina Cheng) notified and consent obtained for transfusion. COVID+ as of 8/24/2022 and prescribed Paxlovid. Patient previously admitted to [facility] on 7/14-7/20/2022 for fall and MOLST documented from prior admission; however form was not obtained during this admission and has not yet been received from care facility.     Spoke to daughter via telephone conversation and informed her of patient status, daughter fully aware of situation. States patient is usually aaox3 however has been more altered since covid diagnosis.     in ED:  VS: /46, HR 63, 97.5F, RR 18, SpO2 100% on NRB  Labs significant for: Hgb 6.5, , PT 84.8, PTT 49.1, INR 7.11, BUN 56, Cr 1.80  FOBT+, COVID+  CXR: On wet read shows RLL infiltrate  Given: Pantoprazole, NaCl bolus x1, Vitamin K 5mg (28 Aug 2022 03:00)    PERTINENT PM/SXH:   Hypothyroid    Hypertension    Lower extremity edema    Paroxysmal atrial fibrillation    NSTEMI (non-ST elevation myocardial infarction)    Chronic systolic congestive heart failure    Stage 3 chronic kidney disease    Anemia of chronic disease      No significant past surgical history      FAMILY HISTORY:  No pertinent family history in first degree relatives      ITEMS NOT CHECKED ARE NOT PRESENT    SOCIAL HISTORY:   Significant other/partner[ ]  Children[ x]  Oriental orthodox/Spirituality:  Substance hx:  [ ]   Tobacco hx:  [ ]   Alcohol hx: [ ]   (X) none  Home Opioid hx:  [ ] I-Stop Reference No:  Living Situation: [ ]Home  [x ]Long term care  [ ]Rehab [ ]Other    ADVANCE DIRECTIVES:    DNR  MOLST  [x ]  Living Will  [ ]   DECISION MAKER(s):  [ ] Health Care Proxy(s)  [ ] Surrogate(s)  [ ] Guardian           Name(s): Phone Number(s):    BASELINE (I)ADL(s) (prior to admission):  [place]: [ ]Total  [ ] Moderate [x ]Dependent    Allergies    No Known Allergies    Intolerances    MEDICATIONS  (STANDING):  fluticasone propionate 50 MICROgram(s)/spray Nasal Spray 1 Spray(s) Both Nostrils two times a day  furosemide   Injectable 40 milliGRAM(s) IV Push two times a day  sodium chloride 0.65% Nasal 2 Spray(s) Both Nostrils four times a day    MEDICATIONS  (PRN):  morphine  - Injectable 1 milliGRAM(s) IV Push every 2 hours PRN SOB  ondansetron Injectable 4 milliGRAM(s) IV Push every 8 hours PRN Nausea and/or Vomiting    PRESENT SYMPTOMS: [ x]Unable to obtain due to poor mentation   Source if other than patient:  [ ]Family   [ ]Team     Pain: [ ]yes [ ]no  QOL impact -   Location -                    Aggravating factors -  Quality -  Radiation -  Timing-  Severity (0-10 scale):  Minimal acceptable level (0-10 scale):     CPOT:    https://www.[facility].org/getattachment/hwq64e95-8q4g-5a7b-9d8r-2699k0817g3n/Critical-Care-Pain-Observation-Tool-(CPOT)      PAIN AD Score:     http://geriatrictoolkit.[facility]/cog/painad.pdf (press ctrl +  left click to view)    Dyspnea:                           [ ]Mild [ ]Moderate [ ]Severe  Anxiety:                             [ ]Mild [ ]Moderate [ ]Severe  Fatigue:                             [ ]Mild [ ]Moderate [ ]Severe  Nausea:                             [ ]Mild [ ]Moderate [ ]Severe  Loss of appetite:              [ ]Mild [ ]Moderate [ ]Severe  Constipation:                    [ ]Mild [ ]Moderate [ ]Severe    Other Symptoms:  [ ]All other review of systems negative     Palliative Performance Status Version 2:         %    http://npcrc.org/files/news/palliative_performance_scale_ppsv2.pdf  PHYSICAL EXAM:  Vital Signs Last 24 Hrs  T(C): 34.6 (30 Aug 2022 05:10), Max: 37.2 (29 Aug 2022 22:30)  T(F): 94.3 (30 Aug 2022 05:10), Max: 99 (29 Aug 2022 22:30)  HR: 77 (30 Aug 2022 05:10) (69 - 77)  BP: 113/66 (30 Aug 2022 05:10) (113/66 - 132/69)  BP(mean): --  RR: 17 (30 Aug 2022 05:10) (12 - 17)  SpO2: 100% (30 Aug 2022 05:10) (100% - 100%)    Parameters below as of 30 Aug 2022 05:10  Patient On (Oxygen Delivery Method): mask, nonrebreather     I&O's Summary    GENERAL: resting comfortably with non-rebreather  HEENT: head NC/AT; conjunctiva & sclera clear; hearing grossly intact, moist mucous membranes  NECK: supple, no JVD  RESPIRATORY: rhonchi b/l lung bases, no wheezing  CARDIOVASCULAR: S1&S2, RRR, no murmurs or gallops  ABDOMEN: soft, non-tender, non-distended, + Bowel sounds x4 quadrants, no guarding, rebound or rigidity  MUSCULOSKELETAL: b/l LE edema, no cyanosis noted  LYMPH: no cervical lymphadenopathy  VASCULAR: Radial pulses 2+ bilaterally, no varicose veins   SKIN: warm and dry, color normal  NEUROLOGIC: AA&O X2,moving all extremities  Psych: cannot assess    LABS:                        8.4    2.41  )-----------( 208      ( 29 Aug 2022 06:10 )             25.8   08-29    144  |  109<H>  |  65<H>  ----------------------------<  119<H>  4.3   |  29  |  1.80<H>    Ca    7.7<L>      29 Aug 2022 06:10    TPro  6.7  /  Alb  1.9<L>  /  TBili  1.1  /  DBili  x   /  AST  39<H>  /  ALT  21  /  AlkPhos  104  08-29  PT/INR - ( 29 Aug 2022 06:10 )   PT: 18.8 sec;   INR: 1.60 ratio         PTT - ( 29 Aug 2022 06:10 )  PTT:36.3 sec      RADIOLOGY & ADDITIONAL STUDIES: reviewed    PROTEIN CALORIE MALNUTRITION PRESENT: [ ]mild [ ]moderate [ ]severe [ ]underweight [ ]morbid obesity  https://www.andeal.org/vault/2440/web/files/ONC/Table_Clinical%20Characteristics%20to%20Document%20Malnutrition-White%20JV%20et%20al%202012.pdf    Height (cm): 165.1 (08-30-22 @ 08:54), 165.1 (07-13-22 @ 23:24), 165.1 (06-17-22 @ 06:50)  Weight (kg): 72.6 (06-17-22 @ 06:50), 70.3 (10-19-21 @ 16:07)  BMI (kg/m2): 26.6 (08-30-22 @ 08:54), 26.6 (07-13-22 @ 23:24), 26.6 (06-17-22 @ 06:50)    [ x]PPSV2 < or = to 30% [ ]significant weight loss  [ ]poor nutritional intake  [ ]anasarca      [ ]Artificial Nutrition      REFERRALS:   [ ]Chaplaincy  [ x]Hospice  [ ]Child Life  [ ]Social Work  [ ]Case management [ ]Holistic Therapy     Goals of Care Document:

## 2022-08-30 NOTE — DISCHARGE NOTE PROVIDER - NSDCCPCAREPLAN_GEN_ALL_CORE_FT
PRINCIPAL DISCHARGE DIAGNOSIS  Diagnosis: GIB (gastrointestinal bleeding)  Assessment and Plan of Treatment: You were placed on comfort measures. Feel better.      SECONDARY DISCHARGE DIAGNOSES  Diagnosis: Hypoprothrombinemia  Assessment and Plan of Treatment:     Diagnosis: 2019 novel coronavirus disease (COVID-19)  Assessment and Plan of Treatment:

## 2022-08-30 NOTE — PROGRESS NOTE ADULT - SUBJECTIVE AND OBJECTIVE BOX
[facility] DIVISION of INFECTIOUS DISEASE  Diony Larios MD PhD, Tricia Cornell MD, Delmy Villegas MD, Juan Alberto Mccord MD, Juan Vieyra MD  and providing coverage with Gabriella Bell MD  Providing Infectious Disease Consultations at [facility], [facility], [place], [facility], [facility]      Office# [phone] to schedule follow up appointments  Answering Service for urgent calls or New Consults [phone]  Cell# to text for urgent issues Diony Larios [phone]     Infectious diseases progress note:    KENIA REGALADO is a 89y y. o. Female patient    COVID Patient    Allergies    No Known Allergies    Intolerances        ANTIBIOTICS/RELEVANT:  antimicrobials    immunologic:    OTHER:  fluticasone propionate 50 MICROgram(s)/spray Nasal Spray 1 Spray(s) Both Nostrils two times a day  furosemide   Injectable 40 milliGRAM(s) IV Push two times a day  morphine  - Injectable 1 milliGRAM(s) IV Push every 2 hours PRN  ondansetron Injectable 4 milliGRAM(s) IV Push every 8 hours PRN  sodium chloride 0.65% Nasal 2 Spray(s) Both Nostrils four times a day      Objective:  Vital Signs Last 24 Hrs  T(C): 34.6 (30 Aug 2022 05:10), Max: 37.2 (29 Aug 2022 22:30)  T(F): 94.3 (30 Aug 2022 05:10), Max: 99 (29 Aug 2022 22:30)  HR: 77 (30 Aug 2022 05:10) (69 - 77)  BP: 113/66 (30 Aug 2022 05:10) (113/66 - 132/69)  BP(mean): --  RR: 17 (30 Aug 2022 05:10) (12 - 17)  SpO2: 100% (30 Aug 2022 05:10) (100% - 100%)    Parameters below as of 30 Aug 2022 05:10  Patient On (Oxygen Delivery Method): mask, nonrebreather        T(C): 34.6 (08-30-22 @ 05:10), Max: 37.2 (08-29-22 @ 22:30)  T(C): 34.6 (08-30-22 @ 05:10), Max: 37.2 (08-29-22 @ 22:30)  T(C): 34.6 (08-30-22 @ 05:10), Max: 37.2 (08-29-22 @ 22:30)    PHYSICAL EXAM: on NRB  HEENT: NC atraumatic  Neck: supple  Respiratory: no accessory muscle use, breathing comfortably  Cardiovascular: distant  Gastrointestinal: normal appearing, nondistended  Extremities: no clubbing, no cyanosis,        LABS:                          8.4    2.41  )-----------( 208      ( 29 Aug 2022 06:10 )             25.8       WBC  2.41 08-29 @ 06:10  6.66 08-28 @ 12:52  5.94 08-27 @ 23:50      08-29    144  |  109<H>  |  65<H>  ----------------------------<  119<H>  4.3   |  29  |  1.80<H>    Ca    7.7<L>      29 Aug 2022 06:10    TPro  6.7  /  Alb  1.9<L>  /  TBili  1.1  /  DBili  x   /  AST  39<H>  /  ALT  21  /  AlkPhos  104  08-29      Creatinine, Serum: 1.80 mg/dL (08-29-22 @ 06:10)  Creatinine, Serum: 1.80 mg/dL (08-27-22 @ 23:50)      PT/INR - ( 29 Aug 2022 06:10 )   PT: 18.8 sec;   INR: 1.60 ratio         PTT - ( 29 Aug 2022 06:10 )  PTT:36.3 sec          COVID RISK SCORE  Auto Neutrophil #: 1.79 K/uL (08-29-22 @ 06:10)  Auto Lymphocyte #: 0.47 K/uL (08-29-22 @ 06:10)  Auto Neutrophil #: 4.00 K/uL (08-27-22 @ 23:50)  Auto Lymphocyte #: 1.15 K/uL (08-27-22 @ 23:50)      Auto Eosinophil #: 0.00 K/uL (08-29-22 @ 06:10)  Auto Eosinophil #: 0.02 K/uL (08-27-22 @ 23:50)                        INR: 1.60 ratio (08-29-22 @ 06:10)  Activated Partial Thromboplastin Time: 36.3 sec (08-29-22 @ 06:10)  INR: 2.66 ratio (08-28-22 @ 12:52)  Activated Partial Thromboplastin Time: 37.8 sec (08-28-22 @ 12:52)  INR: 7.11 ratio (08-27-22 @ 23:50)  Activated Partial Thromboplastin Time: 49.1 sec (08-27-22 @ 23:50)          MICROBIOLOGY:              COVID-19 PCR: Detected (27 Aug 2022 23:50)  COVID-19 PCR: NotDetec (19 Jul 2022 07:00)  COVID-19 PCR: NotDetec (14 Jul 2022 02:44)  COVID-19 PCR: NotDetec (29 Jun 2022 12:30)  COVID-19 PCR: NotDetec (21 Jun 2022 10:34)  COVID-19 PCR: NotDetec (17 Jun 2022 08:48)      RADIOLOGY & ADDITIONAL STUDIES:

## 2022-08-30 NOTE — PROGRESS NOTE ADULT - PROVIDER SPECIALTY LIST ADULT
Cardiology
Gastroenterology
Hospitalist
Cardiology
Infectious Disease
Infectious Disease
Nephrology
Nephrology
Hospitalist

## 2022-08-30 NOTE — PROGRESS NOTE ADULT - SUBJECTIVE AND OBJECTIVE BOX
Patient seen and examined at bedside. She remains on oxygen via NRB.     T(C): 34.6 (08-30-22 @ 05:10), Max: 37.2 (08-29-22 @ 22:30)  HR: 77 (08-30-22 @ 05:10) (69 - 77)  BP: 113/66 (08-30-22 @ 05:10) (113/66 - 150/60)  RR: 17 (08-30-22 @ 05:10) (10 - 17)  SpO2: 100% (08-30-22 @ 05:10) (100% - 100%)  Wt(kg): --    Physical Exam:   GENERAL: resting comfortably with non-rebreather  HEENT: head NC/AT; conjunctiva & sclera clear; hearing grossly intact, moist mucous membranes  NECK: supple, no JVD  RESPIRATORY: rhonchi b/l lung bases, no wheezing  CARDIOVASCULAR: S1&S2, RRR, no murmurs or gallops  ABDOMEN: soft, non-tender, non-distended, + Bowel sounds x4 quadrants, no guarding, rebound or rigidity  MUSCULOSKELETAL: b/l LE edema, no cyanosis noted  LYMPH: no cervical lymphadenopathy  VASCULAR: Radial pulses 2+ bilaterally, no varicose veins   SKIN: warm and dry, color normal  NEUROLOGIC: AA&O X2,moving all extremities  Psych: cannot assess       Patient seen and examined at bedside. She remains on oxygen via NRB. She is A and O x2. No pain. History is limited due to dementia.     T(C): 34.6 (08-30-22 @ 05:10), Max: 37.2 (08-29-22 @ 22:30)  HR: 77 (08-30-22 @ 05:10) (69 - 77)  BP: 113/66 (08-30-22 @ 05:10) (113/66 - 150/60)  RR: 17 (08-30-22 @ 05:10) (10 - 17)  SpO2: 100% (08-30-22 @ 05:10) (100% - 100%)  Wt(kg): --    Physical Exam:   GENERAL: resting comfortably with non-rebreather  HEENT: head NC/AT; conjunctiva & sclera clear; hearing grossly intact, moist mucous membranes  NECK: supple, no JVD  RESPIRATORY: rhonchi b/l lung bases, no wheezing or rales  CARDIOVASCULAR: S1&S2, RRR, no murmurs or gallops  ABDOMEN: soft, non-tender, non-distended, + Bowel sounds x4 quadrants, no guarding, rebound or rigidity  MUSCULOSKELETAL: b/l LE edema, no cyanosis noted  LYMPH: no cervical lymphadenopathy  VASCULAR: Radial pulses 2+ bilaterally, no varicose veins   SKIN: warm and dry, color normal  NEUROLOGIC: AA&O X2,moving all extremities  Psych: cannot assess

## 2022-08-30 NOTE — PROGRESS NOTE ADULT - PROBLEM SELECTOR PLAN 3
Continue Oxygen  -no further benefit from continuing remdesivir
Continue Oxygen  -no further benefit from continuing remdesivir

## 2022-08-30 NOTE — GOALS OF CARE CONVERSATION - ADVANCED CARE PLANNING - NS PRO AD PATIENT TYPE
Medical Orders for Life-Sustaining Treatment (MOLST)
Health Care Proxy (HCP)/Medical Orders for Life-Sustaining Treatment (MOLST)
no

## 2022-08-30 NOTE — PROGRESS NOTE ADULT - SUBJECTIVE AND OBJECTIVE BOX
[facility] --  Raheem Mathews Pannella, Patel, Savella, Goodger: Office # [phone]    Follow Up:    pafib, AS, HTN,      Subjective/Observations:seen and examined, lethargic, unable to provide meaningful information at this time, not in apparent distress, tolerating NRB     REVIEW OF SYSTEMS: All review of systems is negative for eye, ENT, GI, , allergic, dermatologic, musculoskeletal and neurologic except as described above    PAST MEDICAL & SURGICAL HISTORY:  Hypothyroid      Hypertension      Lower extremity edema      Paroxysmal atrial fibrillation      NSTEMI (non-ST elevation myocardial infarction)  june 2022      Chronic systolic congestive heart failure      Stage 3 chronic kidney disease      Anemia of chronic disease      No significant past surgical history          MEDICATIONS  (STANDING):  fluticasone propionate 50 MICROgram(s)/spray Nasal Spray 1 Spray(s) Both Nostrils two times a day  furosemide   Injectable 40 milliGRAM(s) IV Push two times a day  sodium chloride 0.65% Nasal 2 Spray(s) Both Nostrils four times a day    MEDICATIONS  (PRN):  glycopyrrolate Injectable 0.2 milliGRAM(s) IV Push every 6 hours PRN congestion/secretions  HYDROmorphone  Injectable 0.5 milliGRAM(s) IV Push every 1 hour PRN Severe Pain (7 - 10)  HYDROmorphone  Injectable 0.25 milliGRAM(s) IV Push every 1 hour PRN Moderate Pain (4 - 6)  HYDROmorphone  Injectable 0.25 milliGRAM(s) IV Push every 1 hour PRN shortness of breath  ondansetron Injectable 4 milliGRAM(s) IV Push every 8 hours PRN Nausea and/or Vomiting    Allergies    No Known Allergies    Intolerances      Vital Signs Last 24 Hrs  T(C): 34.6 (30 Aug 2022 05:10), Max: 37.2 (29 Aug 2022 22:30)  T(F): 94.3 (30 Aug 2022 05:10), Max: 99 (29 Aug 2022 22:30)  HR: 77 (30 Aug 2022 05:10) (69 - 77)  BP: 113/66 (30 Aug 2022 05:10) (113/66 - 132/69)  BP(mean): --  RR: 17 (30 Aug 2022 05:10) (12 - 17)  SpO2: 100% (30 Aug 2022 05:10) (100% - 100%)    Parameters below as of 30 Aug 2022 05:10  Patient On (Oxygen Delivery Method): mask, nonrebreather      I&O's Summary        TELE: Not on telemetry   PHYSICAL EXAM:  Constitutional: NAD, awake and alert, well-developed  HEENT: Moist Mucous Membranes, Anicteric  Pulmonary: Non-labored, breath sounds diminished, No wheezing, rales or rhonchi  Cardiovascular: IRRR S1 and S2, + murmurs, rubs, gallops or clicks  Gastrointestinal: Bowel Sounds present, soft, nontender.   Lymph: + peripheral edema. No lymphadenopathy.  Skin: No visible rashes or ulcers.  Psych:  Mood & affect appropriate for situation    LABS: All Labs Reviewed:                        8.4    2.41  )-----------( 208      ( 29 Aug 2022 06:10 )             25.8                         7.6    6.66  )-----------( 255      ( 28 Aug 2022 12:52 )             24.3                         6.5    5.94  )-----------( 259      ( 27 Aug 2022 23:50 )             20.7     29 Aug 2022 06:10    144    |  109    |  65     ----------------------------<  119    4.3     |  29     |  1.80   27 Aug 2022 23:50    141    |  106    |  56     ----------------------------<  109    5.3     |  32     |  1.80     Ca    7.7        29 Aug 2022 06:10  Ca    8.1        27 Aug 2022 23:50    TPro  6.7    /  Alb  1.9    /  TBili  1.1    /  DBili  x      /  AST  39     /  ALT  21     /  AlkPhos  104    29 Aug 2022 06:10  TPro  7.1    /  Alb  1.9    /  TBili  1.3    /  DBili  x      /  AST  48     /  ALT  19     /  AlkPhos  113    27 Aug 2022 23:50    PT/INR - ( 29 Aug 2022 06:10 )   PT: 18.8 sec;   INR: 1.60 ratio         PTT - ( 29 Aug 2022 06:10 )  PTT:36.3 sec                12 Lead ECG:   Ventricular Rate 69 BPM    QRS Duration 142 ms    Q-T Interval 440 ms    QTC Calculation(Bazett) 471 ms    R Axis -32 degrees    T Axis 193 degrees    Diagnosis Line Atrial fibrillation with premature ventricular or aberrantly conducted complexes  Left bundle branch block  Abnormal ECG    Confirmed by russell Blair ([id number]) on 8/28/2022 12:54:59 PM (08-27-22 @ 23:08)    < from: Xray Chest 1 View- PORTABLE-Urgent (08.28.22 @ 00:38) >    ACC: [id number] EXAM:  XR CHEST PORTABLE URGENT 1V                          PROCEDURE DATE:  08/28/2022          INTERPRETATION:  AP semierect chest on August 28, 2022 at 12:10 AM.   Patient has a GI bleed.    Elevated right hemidiaphragm again noted. Heart enlargement again seen.    On July 16 there were moderate congestive changes. Present film shows the   have largely resolved.    No free intraperitoneal air.    IMPRESSION: Prior CHF has largely resolved.    --- End of Report ---            RUSSELL MONTES MD; Attending Radiologist  This document has been electronically signed. Aug 28 2022  1:09PM    < end of copied text >  < from: TTE Echo Complete w/o Contrast w/ Doppler (06.19.22 @ 10:00) >    ACC: [id number] EXAM:  ECHO TTE WO CON COMP W DOPP                          PROCEDURE DATE:  06/19/2022          INTERPRETATION:  INDICATION: Chest pain  Sonographer LK    Blood Pressure 147/84    Height 165.1 cm     Weight 72.6 kg       BSA 1.8   sqm    Dimensions:  LA 3.8       Normal Values: 2.0 - 4.0 cm  Ao 3.2        Normal Values: 2.0 - 3.8 cm  SEPTUM 1.7       Normal Values: 0.6 - 1.2 cm  PWT 1.2       Normal Values: 0.6 - 1.1 cm  LVIDd 4.8         Normal Values: 3.0 - 5.6 cm  LVIDs 3.2      Normal Values: 1.8 - 4.0 cm      OBSERVATIONS:  Mitral Valve: Mitral annular calcification with thickened leaflets, mild   MR.  Aortic Valve/Aorta: Calcified trileaflet aortic valve with decreased   opening. Peak transaortic valve gradient equals 20.4 mmHg with a mean   transaortic valve gradient 13.2 mmHg. By visual estimation there appears   to be mild to moderate aortic stenosis  Tricuspid Valve: Moderate TR.  Pulmonic Valve: Trace PI  Left Atrium: Enlarged  Right Atrium: Enlarged  Left Ventricle: Mild to moderate segmental left ventricular systolic   dysfunction, estimated LVEF of 35-40%. The apex, mid inferoseptal and mid   anterolateral walls appear severely hypokinetic  Right Ventricle: Right ventricular enlargement with grossly normal   function  Pericardium: no significant pericardial effusion.  Pulmonary/RV Pressure: estimated PA systolic pressure of 36 mmHg  IVC measures 1.47 cm          IMPRESSION:  Mild to moderate segmental left ventricular systolic dysfunction,   estimated LVEF of 35-40%. The apex, mid inferoseptal and mid   anterolateral walls appear severely hypokinetic  Right ventricular enlargement with grossly normal function  Biatrial enlargement  Calcified trileaflet aortic valve with mild to moderate aortic stenosis,   without AI.  Mild MR  Moderate TR.  No significant pericardial effusion.    --- End of Report ---            JEAN CARLOS HENRY MD; Attending Cardiologist  This document has been electronically signed. Jun 20 2022 12:56PM    < end of copied text >

## 2022-08-30 NOTE — PROGRESS NOTE ADULT - PROBLEM SELECTOR PLAN 1
Patient is now comfort care  -no further blood draws or vital signs  -discontinued protonix gtt  -Palliative care consulted for hospice evaluation Patient is now comfort care  -no further blood draws or vital signs  -discontinued protonix gtt  -Palliative care consulted for hospice evaluation- Updated patients daughter Christoph Cheng. Family is interested in inpatient hospice facility. Patient accepted to [facility].

## 2022-08-30 NOTE — DISCHARGE NOTE NURSING/CASE MANAGEMENT/SOCIAL WORK - NSDCPEFALRISK_GEN_ALL_CORE
For information on Fall & Injury Prevention, visit: [email or web address] OR  [email or web address] OR  https://www.cdc.gov/steadi/patient.html

## 2022-08-30 NOTE — PROGRESS NOTE ADULT - NS ATTEND AMEND GEN_ALL_CORE FT
worsening hypoxia, now on nrm  hypothermia  abla being managed conservatively  palliative care now involved and assessing for hospice  prognosis appears grim

## 2022-08-30 NOTE — PROGRESS NOTE ADULT - SUBJECTIVE AND OBJECTIVE BOX
Patient is a 89y old  Female who presents with a chief complaint of dark stools (29 Aug 2022 08:50)       HPI:  89yo F with PMH B/L LE edema, HTN, CAD (NSTEMI on 6/2022), paroxysmal afib (on coumadin), HFrEF (EF 35-40%), stage 3 CKD and hypothyroidism presents to ED with dark stools. Patient brought in from [facility] and unable to provide history due to AMS. As per RN, stools described to be dark and tarry with bright red ring around the soiled region of her diaper. A&O x0, although able to follow some commands. Of note, Hgb dropped from 9.8 --> 6.5, HCP (daughter, Celestina Cheng) notified and consent obtained for transfusion. COVID+ as of 8/24/2022 and prescribed Paxlovid. Patient previously admitted to [facility] on 7/14-7/20/2022 for fall and MOLST documented from prior admission; however form was not obtained during this admission and has not yet been received from care facility.     Spoke to daughter via telephone conversation and informed her of patient status, daughter fully aware of situation. States patient is usually aaox3 however has been more altered since covid diagnosis.     Renal consulted for LUIS CARLOS/CKD. On NRB and lethargic, unable to provide any other history     in ED:  VS: /46, HR 63, 97.5F, RR 18, SpO2 100% on NRB  Labs significant for: Hgb 6.5, , PT 84.8, PTT 49.1, INR 7.11, BUN 56, Cr 1.80  FOBT+, COVID+  CXR: On wet read shows RLL infiltrate  Given: Pantoprazole, NaCl bolus x1, Vitamin K 5mg (28 Aug 2022 03:00)       PAST MEDICAL & SURGICAL HISTORY:  Hypothyroid      Hypertension      Lower extremity edema      Paroxysmal atrial fibrillation      NSTEMI (non-ST elevation myocardial infarction)  june 2022      Chronic systolic congestive heart failure      Stage 3 chronic kidney disease      Anemia of chronic disease      No significant past surgical history           FAMILY HISTORY:  No pertinent family history in first degree relatives    [place]    Social History:Non smoker    MEDICATIONS  (STANDING):  dexAMETHasone  Injectable 6 milliGRAM(s) IV Push daily  fluticasone propionate 50 MICROgram(s)/spray Nasal Spray 1 Spray(s) Both Nostrils two times a day  furosemide   Injectable 40 milliGRAM(s) IV Push two times a day  levothyroxine Injectable 37.5 MICROGram(s) IV Push at bedtime  metoprolol succinate ER 50 milliGRAM(s) Oral daily  metoprolol tartrate Injectable 2.5 milliGRAM(s) IV Push every 6 hours  pantoprazole Infusion 8 mG/Hr (10 mL/Hr) IV Continuous <Continuous>  remdesivir  IVPB   IV Intermittent   remdesivir  IVPB 100 milliGRAM(s) IV Intermittent every 24 hours  sodium chloride 0.65% Nasal 2 Spray(s) Both Nostrils four times a day    MEDICATIONS  (PRN):  ondansetron Injectable 4 milliGRAM(s) IV Push every 8 hours PRN Nausea and/or Vomiting   Meds reviewed    Allergies    No Known Allergies    Intolerances         REVIEW OF SYSTEMS: NA    ICU Vital Signs Last 24 Hrs  T(C): 34.6 (30 Aug 2022 05:10), Max: 37.2 (29 Aug 2022 22:30)  T(F): 94.3 (30 Aug 2022 05:10), Max: 99 (29 Aug 2022 22:30)  HR: 77 (30 Aug 2022 05:10) (69 - 77)  BP: 113/66 (30 Aug 2022 05:10) (113/66 - 132/69)  BP(mean): --  ABP: --  ABP(mean): --  RR: 17 (30 Aug 2022 05:10) (12 - 17)  SpO2: 100% (30 Aug 2022 05:10) (100% - 100%)    O2 Parameters below as of 30 Aug 2022 05:10  Patient On (Oxygen Delivery Method): mask, nonrebreather            PHYSICAL EXAM:  General: elderly W, on NRB, lethargic  HEENT: NC/AT, EOMI, anicteric  Lungs: decreased b/l breath sounds  Heart: Regular rate and rhythm. No murmur, rub or gallop.  Abdomen: Soft. Nondistended. Nontender.  Extremities: No cyanosis or clubbing. No edema.   Skin: Warm. Dry. Good turgor.       LABS:                                        8.4    2.41  )-----------( 208      ( 29 Aug 2022 06:10 )             25.8     08-29    144  |  109<H>  |  65<H>  ----------------------------<  119<H>  4.3   |  29  |  1.80<H>    Ca    7.7<L>      29 Aug 2022 06:10    TPro  6.7  /  Alb  1.9<L>  /  TBili  1.1  /  DBili  x   /  AST  39<H>  /  ALT  21  /  AlkPhos  104  08-29    PT/INR - ( 29 Aug 2022 06:10 )   PT: 18.8 sec;   INR: 1.60 ratio         PTT - ( 29 Aug 2022 06:10 )  PTT:36.3 sec

## 2022-08-30 NOTE — PROGRESS NOTE ADULT - REASON FOR ADMISSION
dark stools

## 2022-08-30 NOTE — CONSULT NOTE ADULT - ASSESSMENT
89yo F with PMH B/L LE edema, HTN, CAD, afib (on coumadin), HRrEF (EF 35-40%), and hypothyroidism presents to ED with dark stools, admitted for ABLA 2/2 GI bleed likely in setting of supratherapeutic INR.    COVID-19  AHRF 2/2 COVID vs. Acute Anemia  - initial positive test on 8/24  - s/p paxlovid  - daughter/son-in-law also COVID+  - In ED, SpO2 100% on NRB. Per EMS reports O2 88 on room air   Plan:  would initiate remdesivir in this patient--d/w daughter/son-in-law and they agree  -Remdesivir x5 day course--monitor LFTs while on remdesivir  -c/w dexamethasone 6mg  daily x10 day course  -trend temps/WBC  -trend inflammatory biomarkers  -Continue with supportive care and supplemental O2 as needed  --wean oxygen as tolerated   --consider proning and tolerating lower oxygen saturation to avoid intubation  -Maintain aspiration precautions  -Maintain isolation per infection control policy    Acute GIB  Supretherapeutic INR  Acute Anemia  - pt p/w dark tarry stools.   - admission Hgb 6.5, INR 7.1. Given Vitamin K and pRBC x1 in ED.  - transfusion prn protocol  - management per primary team/GI    LUIS CARLOS on CKD  - Likely pre-renal due to acute blood loss  - renally dose medications  - avoid nephrotoxic agents    Infectious Diseases will continue to follow. Please call with any questions.   Delmy Villegas M.D.  [facility], Division of Infectious Diseases [phone]  
LUIS CARLOS/CKD  COVID19  Acute respiratory failure  GIB/Anemia      -CKDIII baseline Cr around 1.2  -LUIS CARLOS with ATN. Cannot r/o COVID induced direct injury   -Urine studies   -We have no choice than to continue diuresis due to pulmonary status. We will monitor renal function closely   -No indication for urgent dialysis. Will not be a candidate for dialysis also   -PRBC per primary team    Overall prognosis poor     Thank you 
UGIB  monitor cbc, transfuse prn, PPI drip.  correct coagulopathy  hold A/C and NSAIDs  will observe conservatively  may need egd if doesnt respond appropriately  
89 y/o F with pmhx PAF (on coumadin), HFrEF (35-40%), CKD, HTN, aortic stenosis, TR, chronic LE edema, hypothyroidism, with recent admission to [facility] for CAD in mid June, found to have NSTEMI with subsequent flash pulmonary edema, unable to go for cath due to south. More recently was admitted with fall and hip hematoma, now p/w dark stools, acute blood loss anemia, altered mental status and covid.    - CAD with recent NSTEMI (mid June), decided against cath at this time  - need to hold antiplatelets in the setting of gi bleed  - statin held because of transaminitis in past    - appears to be volume overloaded, with bilateral edema  - echo 6/22 with ef 35-40% with mod as and mod tr  - agree with iv lasix, though need to be cautious with south  - cont oxygen supplementation  - Monitor volume status closely.  Maintain strict I/O's    - in af with good heart rate control  - cont bb, which can be iv if she is unable to tolerate po  - need to hold coumadin in the setting of gi bleed and supratherapeutic inr, agree with reversal  - would transfuse to keep Hb >8  - gi fu    - supportive care for covid  - Monitor and replete lytes, keep K>4, Mg>2.  - Will continue to follow.
89 yo F with PMH B/L LE edema, HTN, CAD, afib (on coumadin), HRrEF (EF 35-40%), and hypothyroidism presents to ED with dark stools, admitted for ABLA 2/2 GI bleed likely in setting of supratherapeutic INR.    #Acute GI bleeding.   #Supratherapeutic INR.   - Patient is now comfort care  - no further blood draws or vital signs  - plan for hospice    #2019 novel coronavirus disease (COVID-19).   - Continue Oxygen  -no further benefit from continuing remdesivir.    #Acute kidney injury superimposed on CKD.   - no further mgmt.    #Chronic systolic congestive heart failure.   - can continue IV lasix if patient is in respiratory distress.    #Pain/SOB/congestion  - start dilaudid prn  - start glycopyrrolate prn  - NRBM    #ACP/GOC  - DNR/DNI, comfort care  - PPS: 20%  - Prognosis: hours-days  - SW to start inpatient hospice referral    Appreciate consult. Discussed with the primary team.    Emi Acosta MD, [facility]

## 2022-08-30 NOTE — DISCHARGE NOTE PROVIDER - HOSPITAL COURSE
HPI:  89yo F with PMH B/L LE edema, HTN, CAD (NSTEMI on 6/2022), paroxysmal afib (on coumadin), HFrEF (EF 35-40%), stage 3 CKD and hypothyroidism presents to ED with dark stools. Patient brought in from [facility] and unable to provide history due to AMS. As per RN, stools described to be dark and tarry with bright red ring around the soiled region of her diaper. A&O x0, although able to follow some commands. Of note, Hgb dropped from 9.8 --> 6.5, HCP (daughter, Celestina Cheng) notified and consent obtained for transfusion. COVID+ as of 8/24/2022 and prescribed Paxlovid. [facility] previously [facility] on 7/14[facility]7/20/2022 for fall and MOLST documented from prior admission; however form was not obtained during this admission and has not yet been received from care facility.     Spoke to daughter via telephone conversation and informed her of patient status, daughter fully aware of situation. States patient is usually aaox3 however has been more altered since covid diagnosis.     in ED:  VS: /46, HR 63, 97.5F, RR 18, SpO2 100% on NRB  Labs significant for: Hgb 6.5, , PT 84.8, PTT 49.1, INR 7.11, BUN 56, Cr 1.80  FOBT+, COVID+  CXR: On wet read shows RLL infiltrate  Given: Pantoprazole, NaCl bolus x1, Vitamin K 5mg (28 Aug 2022 03:00)      ---  HOSPITAL COURSE:   Patient was admitted to the hospital. She was noted to have anemia, her fecal occult blood testing was positive. She received 2 units of blood while admitted. She was also diagnosed with COVID-19 infection. She remained on oxygen. Family discussion was held. Patient was placed on comfort measures and family wanted patient to be transferred to inpatient hospice facility.     ---  CONSULTANTS:   Nephrology: Dr Lara  Palliative Care: Dr Acosta  Cardiology: Dr Maciel   Infectious Disease: Dr Larios    ---  TIME SPENT:  I, the attending physician, was physically present for the key portions of the evaluation and management (E/M) service provided. The total amount of time spent reviewing the hospital notes, laboratory values, imaging findings, assessing/counseling the patient, discussing with consultant physicians, social work, nursing staff was -- minutes

## 2022-08-30 NOTE — PROGRESS NOTE ADULT - ASSESSMENT
89 y/o F with pmhx PAF (on coumadin), HFrEF (35-40%), CKD, HTN, aortic stenosis, TR, chronic LE edema, hypothyroidism, with recent admission to [facility] for CAD in mid June, found to have NSTEMI with subsequent flash pulmonary edema, unable to go for cath due to south. More recently was admitted with fall and hip hematoma, now p/w dark stools, acute blood loss anemia, altered mental status and covid.      - CAD with recent NSTEMI (mid June), decided against cath at this time  - need to hold antiplatelets in the setting of gi bleed  - statin held because of transaminitis in past  - echo 6/22 with ef 35-40% with mod as and mod tr  - agree with iv lasix, though need to be cautious with south- Diuretics as per renal fu recs on lasix IV BID  - cont oxygen supplementation   - cont bb, which can be iv if she is unable to tolerate po  - need to hold coumadin in the setting of gi bleed and supratherapeutic inr,7.11- , INR today 1.6   - would transfuse to keep Hb >8 given CAD  - gi fu  -GOC ongoing   -covid as per primary / ID  - Monitor and replete lytes, keep K>4, Mg>2.  Tiffany Whitfield FNP-C  Cardiology NP  S[facility] [id number] [phone]  
89yo F with PMH B/L LE edema, HTN, CAD, afib (on coumadin), HRrEF (EF 35-40%), and hypothyroidism presents to ED with dark stools, admitted for ABLA 2/2 GI bleed likely in setting of supratherapeutic INR.    COVID-19  - initial positive test on 8/24  - s/p paxlovid  - daughter/son-in-law also COVID+    pt was started in remdesivir and steroids but family wishing only comfort care. Discussed with Dr Villegas and in this context reasonable to dc remdesivir and follow  -supportive comfort focused care    Thank you for consulting us and involving us in the management of this most interesting and challenging case.  Please call us at [phone] or text me directly on my cell#[phone] with any concerns or further questions.          
89 y/o F with pmhx PAF (on coumadin), HFrEF (35-40%), CKD, HTN, aortic stenosis, TR, chronic LE edema, hypothyroidism, with recent admission to [facility] for CAD in mid June, found to have NSTEMI with subsequent flash pulmonary edema, unable to go for cath due to south. More recently was admitted with fall and hip hematoma, now p/w dark stools, acute blood loss anemia, altered mental status and covid.    - CAD with recent NSTEMI (mid June), decided against cath at this time  - echo 6/22 with ef 35-40% with mod as and mod tr  - overloaded on exam  - BP and HR acceptable   - holding antiplatelets, AC, +supra INR and in the setting of GIB   - statin held because of transaminitis in past  - cont oxygen supplementation   - +covid: supportive management per primary     - pt made comfort measures only, will sign off, though will remain available    Melany Howe [facility] [place]  Nurse Practitioner - Cardiology   Spectra #[phone]/ [phone]  
89yo F with PMH B/L LE edema, HTN, CAD, afib (on coumadin), HRrEF (EF 35-40%), and hypothyroidism presents to ED with dark stools, admitted for ABLA 2/2 GI bleed likely in setting of supratherapeutic INR.    COVID-19  - initial positive test on 8/24  - s/p paxlovid  - daughter/son-in-law also COVID+    pt was started in remdesivir and steroids but family wishing only comfort care. Discussed with Dr Villegas and in this context reasonable to dc remdesivir and follow  -supportive comfort focused care    We will follow along in the care of this patient. Please call us at [phone] or text me directly on my cell# at [phone] with any concerns.        
LUIS CARLOS/CKD  COVID19  Acute respiratory failure  GIB/Anemia      -CKDIII baseline Cr around 1.2  -LUIS CARLOS with ATN. Cannot r/o COVID induced direct injury   -Urine studies pending  -We have no choice than to continue diuresis due to pulmonary status. We will monitor renal function closely   -No indication for urgent dialysis. Will not be a candidate for dialysis also   -PRBC per primary team  -Renal indices stabilized  -Palliative eval noted; family may want hospice  -Labs appeared to have stopped    Overall prognosis poor     Thank you 
UGIB    monitor cbc, transfuse prn, PPI drip.  correct coagulopathy  hold A/C and NSAIDs  GOC discussion noted, family decided upon CMO  will sign off, reconsult if needed    I reviewed the overnight course of events on the unit, re-confirming the patient history. I discussed the care with the patient and their family  Differential diagnosis and plan of care discussed with patient after the evaluation  35 minutes spent on total encounter of which more than fifty percent of the encounter was spent counseling and/or coordinating care by the attending physician.  Advanced care planning was discussed with patient and family.  Advanced care planning forms were reviewed and discussed.  Risks, benefits and alternatives of gastroenterologic procedures were discussed in detail and all questions were answered.  
89yo F with PMH B/L LE edema, HTN, CAD, afib (on coumadin), HRrEF (EF 35-40%), and hypothyroidism presents to ED with dark stools, admitted for ABLA 2/2 GI bleed likely in setting of supratherapeutic INR.
89yo F with PMH B/L LE edema, HTN, CAD, afib (on coumadin), HRrEF (EF 35-40%), and hypothyroidism presents to ED with dark stools, admitted for ABLA 2/2 GI bleed likely in setting of supratherapeutic INR.

## 2022-08-30 NOTE — DISCHARGE NOTE PROVIDER - NSDCMRMEDTOKEN_GEN_ALL_CORE_FT
acetaminophen 325 mg oral tablet: 2 tab(s) orally every 6 hours, As needed, Mild Pain (1 - 3)  aspirin 81 mg oral delayed release tablet: 1 tab(s) orally once a day  fluticasone 50 mcg/inh nasal spray: 1 spray(s) nasal 2 times a day  furosemide 40 mg oral tablet: 1 tab(s) orally once a day  levothyroxine 75 mcg (0.075 mg) oral tablet: 1 tab(s) orally once a day  metoprolol succinate 50 mg oral tablet, extended release: 1 tab(s) orally once a day  Paxlovid 150 mg-100 mg (150 mg-100 mg Dose) oral tablet: 2 tab(s) orally 2 times a day  potassium chloride 20 mEq oral tablet, extended release: 1 tab(s) orally 2 times a day  sodium chloride 0.65% nasal spray: 2 spray(s) nasal 4 times a day  Vitamin D3 25 mcg (1000 intl units) oral capsule: 1 cap(s) orally once a day  warfarin 1 mg oral tablet: 1 tab(s) orally once a day   acetaminophen 325 mg oral tablet: 2 tab(s) orally every 6 hours, As needed, Mild Pain (1 - 3)   acetaminophen 325 mg oral tablet: 2 tab(s) orally every 6 hours, As needed, Mild Pain (1 - 3)  glycopyrrolate 0.2 mg/mL injectable solution: 0.2 milligram(s) intravenously every 6 hours  as needed for excess secretions  HYDROmorphone: 0.25 milligram(s) injectable every 2 hours, As Needed for shortness of breath or pain

## 2022-10-25 NOTE — ED ADULT TRIAGE NOTE - RESPIRATORY RATE (BREATHS/MIN)
A1c < 7. Continue metformin and lifestyle modifications. Encouraged to control intake of sugar, bread, pasta, rice, potatoes, fruit, snack foods, desserts in diet. Discussed importance of daily foot check to ensure no lesion and if any wounds noted, RTC for evaluation ASAP. Instructed to refrain from walking with open toed shoes, especially outdoors and wear foot protection even in house. Encouraged to refrain from trimming toe nails short and if unable to easily trim without injury, we will schedule podiatry referral. Recommended daily BG checks for goal of <120 fasting and <180 PP. Instructed to check BG if s/s of hypoglycemia occur-shaking, mental fog, cold sweats-and if < 70, eat cracker with peanut butter and glass of milk and recheck BG in 1 hr. 16

## 2022-11-02 NOTE — PROGRESS NOTE ADULT - SUBJECTIVE AND OBJECTIVE BOX
Letter was sent to patient on 09/09/2022 in regards to follow-up care. Letter was sent back to the clinic due to unable to forward.    [facility] DIVISION of INFECTIOUS DISEASE  Diony Larios MD PhD, Tricia Cornell MD, Delmy Villegas MD, Juan Alberto Mccord MD, Juan Vieyra MD  and providing coverage with An Mckay MD and Gabriella Bell MD  Providing Infectious Disease Consultations at [facility], [facility], [facility]    Office# [phone] to schedule follow up appointments  Answering Service for urgent calls or New Consults [phone]  Cell# to text for urgent issues Diony Larios [phone]     infectious diseases progress note:    KENIA REGALADO is a 89y y. o. Female patient    No concerning overnight events    Allergies    No Known Allergies    Intolerances        ANTIBIOTICS/RELEVANT:  antimicrobials    immunologic:    OTHER:  furosemide   Injectable 20 milliGRAM(s) IV Push two times a day  gabapentin 100 milliGRAM(s) Oral three times a day  levothyroxine 50 MICROGram(s) Oral daily  metoprolol tartrate 50 milliGRAM(s) Oral daily  potassium chloride    Tablet ER 20 milliEquivalent(s) Oral daily      Objective:  Vital Signs Last 24 Hrs  T(C): 36.7 (13 Oct 2021 05:30), Max: 36.8 (12 Oct 2021 21:40)  T(F): 98 (13 Oct 2021 05:30), Max: 98.3 (12 Oct 2021 21:40)  HR: 72 (13 Oct 2021 05:30) (56 - 73)  BP: 118/64 (13 Oct 2021 05:30) (110/58 - 143/76)  BP(mean): --  RR: 17 (13 Oct 2021 05:30) (17 - 18)  SpO2: 93% (13 Oct 2021 05:30) (91% - 93%)    T(C): 36.7 (10-13-21 @ 05:30), Max: 37.1 (10-11-21 @ 14:45)  T(C): 36.7 (10-13-21 @ 05:30), Max: 37.1 (10-11-21 @ 14:45)  T(C): 36.7 (10-13-21 @ 05:30), Max: 37.1 (10-11-21 @ 14:45)    PHYSICAL EXAM:  HEENT: NC atraumatic  Neck: supple  Respiratory: no accessory muscle use, breathing comfortably  Cardiovascular: distant  Gastrointestinal: normal appearing, nondistended  Extremities: no clubbing, no cyanosis, bilateral symmetrical with sig erythema +edema        LABS:                          9.7    4.58  )-----------( 192      ( 13 Oct 2021 09:21 )             30.7       4.58 10-13 @ 09:21  4.09 10-12 @ 09:17  5.66 10-11 @ 16:40      10-13    139  |  105  |  17  ----------------------------<  79  3.7   |  31  |  1.20    Ca    7.8<L>      13 Oct 2021 09:21    TPro  6.6  /  Alb  2.2<L>  /  TBili  0.8  /  DBili  x   /  AST  47<H>  /  ALT  29  /  AlkPhos  77  10-13      Creatinine, Serum: 1.20 mg/dL (10-13-21 @ 09:21)  Creatinine, Serum: 1.10 mg/dL (10-12-21 @ 09:17)  Creatinine, Serum: 1.10 mg/dL (10-11-21 @ 16:40)      PT/INR - ( 13 Oct 2021 09:21 )   PT: 33.6 sec;   INR: 3.03 ratio         PTT - ( 13 Oct 2021 09:21 )  PTT:45.4 sec          INFLAMMATORY MARKERS  Auto Neutrophil #: 2.15 K/uL (10-13-21 @ 09:21)  Auto Lymphocyte #: 1.69 K/uL (10-13-21 @ 09:21)  Auto Neutrophil #: 3.39 K/uL (10-11-21 @ 16:40)  Auto Lymphocyte #: 1.55 K/uL (10-11-21 @ 16:40)    Lactate, Blood: 1.5 mmol/L (10-11-21 @ 16:40)    Auto Eosinophil #: 0.18 K/uL (10-13-21 @ 09:21)  Auto Eosinophil #: 0.08 K/uL (10-11-21 @ 16:40)        INR: 3.03 ratio (10-13-21 @ 09:21)  Activated Partial Thromboplastin Time: 45.4 sec (10-13-21 @ 09:21)  INR: 3.46 ratio (10-12-21 @ 09:17)  INR: 2.87 ratio (10-11-21 @ 16:40)  Activated Partial Thromboplastin Time: 48.0 sec (10-11-21 @ 16:40)          MICROBIOLOGY:              RADIOLOGY & ADDITIONAL STUDIES:

## 2023-01-01 NOTE — PROGRESS NOTE ADULT - PROBLEM SELECTOR PROBLEM 5
Afib Screen#: [id number]  Screen Date: N/A  Screen Comment: N/A    Screen#: [id number]  Screen Date: N/A  Screen Comment: N/A     Screen#: [id number]  Screen Date: 2023  Screen Comment: N/A    Screen#: [id number]  Screen Date: N/A  Screen Comment: N/A

## 2023-05-02 NOTE — CONSULT NOTE ADULT - ATTENDING COMMENTS
Chart reviewed    Patient seen and examined    Agree with plan as outlined above    89 y/o F with a pmhx b/l LE edema, HTN,P Afib on coumadin , hypothyroidism, pw midsternal chest discomfort atypical in nature and left arm discomfort since waking up this morning at  4am, describes it as "creepy feeling" assoc with left arm weakness, general weakness and not feeling well. Patient has no underlying coronary disease.     Recommendations:  - Admit to tele  - Repeat EKG  - Serial cardiac enzymes   - TTE  - Continue Coumadin, goal INR 2-3  - ASA 81 for underlying ACS  - Consider neuro eval   - Other cardiovascular workup will depend on clinical course.  - All other workup per primary team.  - Will continue to follow.
Alert and oriented to person, place and time

## 2023-05-09 NOTE — DISCHARGE NOTE NURSING/CASE MANAGEMENT/SOCIAL WORK - NSDCVIVACCINE_GEN_ALL_CORE_FT
No Vaccines Administered. Ear Star Wedge Flap Text: The defect edges were debeveled with a #15 blade scalpel.  Given the location of the defect and the proximity to free margins (helical rim) an ear star wedge flap was deemed most appropriate.  Using a sterile surgical marker, the appropriate flap was drawn incorporating the defect and placing the expected incisions between the helical rim and antihelix where possible.  The area thus outlined was incised through and through with a #15 scalpel blade.

## 2023-06-26 NOTE — ED ADULT TRIAGE NOTE - NS ED TRIAGE AVPU SCALE
Last refill 2/9/23  Quantity 90-? Out in May??  LOV 2/1/23  NOV 7/18/2023  Labs up to date. Verbal - The patient responds to verbal stimuli by opening their eyes when someone speaks to them. The patient is not fully oriented to time, place, or person.

## 2023-10-26 NOTE — PATIENT PROFILE ADULT - NSPROMEDSBROUGHTTOHOSP_GEN_A_NUR
Caio Renee (:  1977) is a 55 y.o. male,New patient, here for evaluation of the following chief complaint(s):  New Patient          Subjective   SUBJECTIVE/OBJECTIVE:  HPI  49-year-old male patient here to establish care. Patient states that he had hypertension all his life and was on telmisartan 20 mg after 2 years ago. He quit taking his medication 2 years ago blood pressure here is stable but patient said at home it is around 140-95. Patient needs his blood work done, he also is concerned about a small cyst on the right side of the face which has been there for a long time. Patient says it is not painful or is not growing bigger in size. Review of Systems   Constitutional: Negative. Negative for fatigue. HENT:  Negative for congestion, ear pain, rhinorrhea, sinus pressure and sore throat. Eyes: Negative. Negative for pain and itching. Respiratory:  Negative for cough, shortness of breath and wheezing. Cardiovascular:  Negative for chest pain and palpitations. Gastrointestinal:  Negative for abdominal pain. Genitourinary:  Negative for frequency and urgency. Musculoskeletal:  Negative for gait problem. Skin:  Negative for rash. Neurological:  Negative for dizziness and headaches. Psychiatric/Behavioral:  The patient is not nervous/anxious. Objective   Physical Exam  Constitutional:       Appearance: Normal appearance. HENT:      Head: Normocephalic and atraumatic. Right Ear: Tympanic membrane, ear canal and external ear normal.      Left Ear: Tympanic membrane, ear canal and external ear normal.      Nose: Nose normal. No congestion or rhinorrhea. Mouth/Throat:      Mouth: Mucous membranes are moist.      Pharynx: Oropharynx is clear. No oropharyngeal exudate or posterior oropharyngeal erythema. Eyes:      Extraocular Movements: Extraocular movements intact.       Conjunctiva/sclera: Conjunctivae normal.      Pupils: Pupils are equal, round, and no

## 2023-12-04 NOTE — PROGRESS NOTE ADULT - PROBLEM SELECTOR PLAN 1
Virtual Visit Details  Type of service:  Video Visit   Video Start Time: 3:32 PM  Video End Time:3:57 PM  Originating Location (pt. Location): Home    Distant Location (provider location):  On-site  Platform used for Video Visit: [facility]  Transplant Infectious Disease Clinic Note:  Virtual Follow up Patient     Patient:  Chip Fowler, Date of birth 1984, Medical record number [id number]  Date of Visit:  12/04/2023           Assessment and Recommendations:   Recommendations:  Continue and complete Vancomycin taper for C.diff  Continue Cefdinir 300mg PO daily for UTI prophylaxis  Given need for suppressive antibiotics and 2 episodes of C.diff with higher risk for recurrence with cefdinir compared to Nitrofurantoin, explained following options to patient:  A. Completing taper and stopping Vanco PO but switching Cefdinir to Nitrofurantoin  B. Continuing Cefdinir for UTI prophylaxis but keeping Vanco 125mg PO qday on as C.diff prophylais  C. Completing taper and stopping Vanco PO but continuing Cefdinir - any additional C.diff recurrences should prompt change in management  Patient prefers option C for now  Follow CMV VL - if <100, anticipate switching to once daily Valcyte for prophylaxis. Continue weekly VL checks for now. 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 Renal US patent) on Tacrolium (goal level 8-10) and Myfortic 540 mg BID. ID issues include    #Recurrent UTIs with Ecoli and E. Faecalis on suppression with Cefdinir:  The UTI is mostly with E faecalis since 1/2023 except infection in  4/2023 with E faecalis and E coli. Admitted on 7/4/23 for pyelonephritis UC + 10-50k E faecalis he was given Augmentin for 10 and started on prophy nitrofurantoin 100mg daily after. Latest E.coli infection 9/2023. Most infections related to sexual intercourse. Given latest E.coli UTI (on 9/24/23) broke through despite Nitrofurantoin prophylaxis, treated with cefdinir and switched to the same for prophylaxis after at 300mg daily dosing. 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 On 8/22/23 it was 2,300 (started to have a fever but was checked per protocol) started on valcyte on 8/23/23. 9/5/23 it was 4580 and down to undetectable VL on 9/25/23. Secondary ppx through 10/24/23. Low level recurrence on 11/20 - restarted Valcyte 900mg BID 11/20, latest . VL from 12/4 pending     #C.diff:  Reported diarrhea 10/10/23. C.diff testing triple positive (Toxin, GDH and PCR). Started Vancomycin 125mg PO q6h x 14 days. Recurrent diarrhea early November, tested positive 11/6/23. Restarted Vancomycin PO with plans for prolonged taper. Overall improved, but feels stool frequency has slightly increased in the last few days, still having predominantly formed Bms, taking metamucil    #low level EBV Viremia   7/6/23 CT chest Lymph nodes: Multiple sub centimeter para-aortic lymph nodes. At last check it was 8/22/23 <500     Prior issues:  1. Eosinophilia. The workup for eosinophilia and diarrhea was negative for fungal and parasitic infections that would account for the eosinophilia. Evaluated by hematology. The eosinophilia is likely reactive to foreign objects; the PD catheter was thought to be potentially the source as noted by hematology. The relatively increased tryptase level favors allergy (likely to PD catheter) to be the etiology of the  eosinophilia rather than infectious processes or malignancies.   2. Chronic diarrhea since he was started on PD in 7/2020. Workup has been negative.   3. Positive Salmonella serology. 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 The patient was counseled against keeping the snakes after transplantation as they are source of recurrent Salmonella infection.  4. E coli in urine in 8/2020.   5. Group B Strep in urine in 2/2020.   6. Urethral stenosis s/p reconstruction.      Other Infectious Disease issues include:  - QTc: 416 as of 3/23/23.   - PJP prophylaxis: bactrim.   - Serostatus: CMV D+/R- (s/p 6mo of valcye), EBV D+/R-, HSV1?/2?, VZV +  - Immunization status: This patient received the third dose of the COVID-19 vaccine on 1/26/2022. Otherwise due for the seasonal influenza vaccine and COVID-19 bivalent     Interval history :   Last seen by Dr Willis 9/27/23    1) CMV viremia: Last episode in 9/2023. VL was controlled until 11/20/23. Had stopped Valcyte 10/24/23.  on 11/20 - started Valcyte 900mg PO BID, repeat from 11/27 at 105    2) C.diff: Reported diarrhea 10/10/23. C.diff testing triple positive (Toxin, GDH and PCR). Started Vancomycin 125mg PO q6h x 14 days. Recurrent diarrhea early November, tested positive 11/6/23. Restarted Vancomycin PO with plans for prolonged taper. Doing better overall, but feels stool frequency has slightly increased in the last few days, still having predominantly formed Bms, taking metamucil    3) Recurrent UTI: Prescribed Cefdinir by Dr Willis (300mg/d) remains on the same. No UTIs since 9/2023    Transplants:  1/15/2023 (Kidney); Postoperative day:  323.  Coordinator Veronica Edgar    Review of Systems:  Remaining systems reviewed and negative    Immunization History   Administered Date(s)  Administered    COVID-19 Monovalent 18+ (Moderna) 01/25/2021, 02/22/2021, 01/26/2022    DTAP (<7y) 1984, 1984, 1984, 10/04/1985, 03/21/1989    Flu, Unspecified 11/12/1997, 03/11/2020, 10/06/2020, 10/27/2021    Hepatitis B, Peds 02/19/1996, 06/17/1996, 09/04/1996    Hib, Unspecified 04/02/1986    Historical DTP/aP 1984, 1984, 1984, 10/04/1985, 03/21/1989    Influenza (intradermal) 03/11/2020    Influenza Vaccine >6 months,quad, PF 11/06/2016    MMR 07/03/1985, 02/19/1996    Mantoux Tuberculin Skin Test 03/11/2020    Pneumo Conj 13-V (2010&after) 11/24/2020    Pneumococcal 23 valent 03/11/2020    Pneumococcal, Unspecified 03/11/2020, 04/22/2021    Poliovirus, inactivated (IPV) 1984, 1984, 1984, 03/21/1989    TD,PF 7+ (Tenivac) 06/17/1996    TDAP Vaccine (Adacel) 04/28/2020    Td (Adult), Adsorbed 06/17/1996       No outpatient medications have been marked as taking for the 12/4/23 encounter (Appointment) with Brennan Davenport MD.       No Known Allergies           Physical Exam:   There were no vitals taken for this visit.  Wt Readings from Last 4 Encounters:   11/29/23 73.3 kg (161 lb 8 oz)   11/14/23 71.9 kg (158 lb 8 oz)   09/24/23 69.4 kg (153 lb)   08/23/23 68.9 kg (152 lb)       Exam:  GENERAL: well-developed, well-nourished, alert, oriented, in no acute distress over video.  HEAD: Head is normocephalic, atraumatic   EYES: Eyes have anicteric sclerae.  NEUROLOGIC: Grossly nonfocal.         Laboratory Data:     Absolute CD4   Date Value Ref Range Status   12/03/2020 1,325 441 - 2,156 cells/uL Final       Inflammatory Markers    Recent Labs   Lab Test 01/20/23  0746   G6PD 15.5       Immune Globulin Studies     Recent Labs   Lab Test 11/27/23  1512 08/28/23  1453 12/03/20  1647   IGG 1,187 1,004 1,152   IGM  --   --  64   IGE  --   --  18   IGA  --   --  268       Metabolic Studies    Recent Labs   Lab Test 11/27/23  1512 11/20/23  1454 11/06/23  1446  08/28/23  1453 08/22/23  1448 07/31/23  1512 07/24/23  1519 07/06/23  1807 07/06/23  0933 07/05/23  1304 07/04/23 1944    136 136   < > 137   < > 139   < >  --    < > 133*   POTASSIUM 5.0 4.2 4.1   < > 4.2   < > 4.4   < >  --    < > 4.0   CHLORIDE 105 102 103   < > 105   < > 104   < >  --    < > 102   CO2 24 27 25   < > 23   < > 24   < >  --    < > 17*   ANIONGAP 8 7 8   < > 9   < > 11   < >  --    < > 14   BUN 24.5* 19.2 19.3   < > 20.6*   < > 23.4*   < >  --    < > 26.1*   CR 1.58* 1.58* 1.74*   < > 1.50*   < > 1.65*   < >  --    < > 1.86*   GFRESTIMATED 57* 57* 51*   < > 60*   < > 54*   < >  --    < > 47*   GLC 91 83 86   < > 72   < > 68*   < >  --    < > 139*   VISHNU 9.3 8.9 9.0   < > 9.1   < > 9.7   < >  --    < > 9.5   PHOS  --   --   --   --  2.6  --   --    < >  --    < >  --    MAG  --   --   --   --  1.5*  --   --    < >  --    < >  --    URIC  --   --   --   --   --   --  5.5  --   --   --   --    LACT  --   --   --   --   --   --   --   --  0.9   < > 1.5   CKT  --   --   --   --   --   --   --   --   --   --  88    < > = values in this interval not displayed.       Hepatic Studies    Recent Labs   Lab Test 07/24/23  1519 07/04/23 1944 06/05/23  1527 06/01/23  1518   BILITOTAL 0.3 0.4 0.3 0.3   DBIL <0.20  --   --  <0.20   ALKPHOS 59 53 55 52   PROTTOTAL 7.2 7.4 6.9 6.9   ALBUMIN 4.3 4.2 4.0 4.0   AST 29 27 24 28   ALT 20 14 22 30   LDH  --   --   --  367*     Hematology Studies   Recent Labs   Lab Test 11/27/23  1512 11/20/23  1454 11/06/23  1446 10/23/23  1445 10/16/23  1505 10/09/23  1440   WBC 4.8 4.4 7.5 3.4* 3.2* 3.0*   ANEU  --   --   --   --  1.2* 1.6   ANEUTAUTO  --  1.9 4.9 1.1*  --   --    ALYM  --   --   --   --  1.1 0.8   ALYMPAUTO  --  1.5 1.1 1.1  --   --    TY  --   --   --   --  0.4 0.3   AMONOAUTO  --  0.5 0.7 0.5  --   --    AEOS  --   --   --   --  0.4 0.2   AEOSAUTO  --  0.5 0.7 0.6  --   --    ABSBASO  --  0.0 0.1 0.1  --   --    HGB 11.7* 11.9* 11.6* 12.1* 11.6* 11.7*   HCT  34.7* 35.5* 34.2* 35.0* 34.7* 35.4*    235 199 210 214 309       Clotting Studies    Recent Labs   Lab Test 07/04/23  1944 01/15/23  0540 08/09/21  1124 08/18/20  1307   INR 1.29* 0.90 0.96 1.08   PTT  --  28  --  29     Urine Studies     Recent Labs   Lab Test 09/24/23  1133 07/04/23  2015 06/12/23  1514 05/04/23  1535 04/15/23  0918   URINEPH 7.0 6.0 6.0 6.0 6.5   NITRITE Negative Negative Negative Negative Negative   LEUKEST Small* Negative Negative Large* Moderate*   WBCU 10-25* 11* 7* 71* *       Medication levels    Recent Labs   Lab Test 11/27/23  1512 10/23/23  1445 10/16/23  1505 07/10/23  1452 07/06/23  0547   VANCOMYCIN  --   --   --   --  16.7   TACROL 9.1   < > 6.4   < > 10.3   MPACID  --   --  <0.25*   < >  --    MPAG  --   --  12.2*   < >  --     < > = values in this interval not displayed.       Microbiology:  Fungal testing  Recent Labs   Lab Test 12/03/20  1647 10/08/20  1300   AM3 <0.10  --    HIFUNG  --  <1:8   COFUNG  --  <1:2   FUNBL  --  0.3     Last Culture results   Culture   Date Value Ref Range Status   09/24/2023 10,000-50,000 CFU/mL Escherichia coli (A)  Final   09/24/2023 <10,000 CFU/mL Urogenital cooper  Final   07/04/2023 10,000-50,000 CFU/mL Enterococcus faecalis (A)  Final   07/04/2023 <10,000 CFU/mL Urogenital cooper  Final   07/04/2023 No Growth  Final   07/04/2023 No Growth  Final   05/04/2023 No Growth  Final   05/04/2023 No Growth  Final   05/04/2023 >100,000 CFU/mL Escherichia coli (A)  Final   04/15/2023 50,000-100,000 CFU/mL Escherichia coli (A)  Final   04/15/2023 10,000-50,000 CFU/mL Enterococcus faecalis (A)  Final   03/20/2023 No Growth  Final   03/20/2023 No Growth  Final   03/19/2023 50,000-100,000 CFU/mL Enterococcus faecalis (A)  Final   03/19/2023 <10,000 CFU/mL Urogenital cooper  Final   01/23/2023 50,000-100,000 CFU/mL Enterococcus faecalis (A)  Final     Comment:     Susceptibilities done on previous cultures   01/19/2023 >100,000 CFU/mL Enterococcus  faecalis (A)  Final     Culture Micro   Date Value Ref Range Status   12/03/2020 No growth  Final   08/14/2020 >100,000 colonies/mL  Escherichia coli   (A)  Final         Last checks of Clostridioides difficile testing  Recent Labs   Lab Test 11/06/23  1852 10/10/23  1600 07/05/23  1318 05/04/23 2010   CDBPCT Positive* Positive* Negative Negative   CDIFFGDH Positive* Positive*  --   --    CDIFFTOX Positive* Positive*  --   --        Quantiferon testing   Recent Labs   Lab Test 11/20/23  1454 11/06/23  1446 09/15/20  1347 08/18/20  1307   TBRST  --   --   --  Negative   LYMPH 34 14   < > 23.9    < > = values in this interval not displayed.       Infection Studies to assess Diarrhea  Recent Labs   Lab Test 07/05/23  1318 05/04/23 2010 03/22/23  2257 03/20/23  0853 12/09/20  2000 10/08/20  1339 10/08/20  1338   EPSTX1 Not Detected Not Detected  --  Not Detected   < > Not Detected  --    EPSTX2 Not Detected Not Detected  --  Not Detected   < > Not Detected  --    ADENOVIRUSAG  --   --   --   --   --   --  Negative   MSPORT  --   --   --   --   --   --  No Microsporidia found  Chromotrope stain reveals no Microsporidia spores in fecal specimen. Consider other causes   for symptoms.  Divya Lugo M.D., Medical Director  10/9/20     CRYSPT  --   --   --   --   --  No oocysts of Cryptosporidium species, Cyclospora cayetanensis, or Cystoisospora   (Isospora) nancy found    A modified acid fast stain reveals no oocysts of Cryptosporidium, Cyclospora, or   Cystoisospora (Isospora). Consider other causes for symptoms  Divya Lugo M.D., Medical Director  10/9/20    --    CRYPTR  --   --   --   --   --  Negative  --    POPRT  --   --  Negative  --    < > Routine parasitology exam negative  Cryptosporidium, Cyclospora, and Microsporidia are not readily detected by this method. A   single negative specimen does not rule out parasitic infection.    --    EPCAMP Not Detected Not Detected  --  Not Detected   < > Not  Detected  --    EPSALM Not Detected Not Detected  --  Not Detected   < > Not Detected  --    EPSHGL Not Detected Not Detected  --  Not Detected   < > Not Detected  --    EPVIB Not Detected Not Detected  --  Not Detected   < > Not Detected  --    EPROTA Not Detected Not Detected  --  Not Detected   < > Not Detected  --    EPNORO Not Detected Not Detected  --  Not Detected   < > Not Detected  --    EPYER Not Detected Not Detected  --  Not Detected   < > Not Detected  --     < > = values in this interval not displayed.       Virology:  Coronavirus-19 testing    Recent Labs   Lab Test 07/04/23  1952 03/19/23  1919 01/15/23  0538 03/21/22  1911 10/30/21  0816 09/08/21  1400 08/07/21  1057 05/26/21  1134 12/10/20  0959 12/03/20  1647   CD19  --   --   --   --   --   --   --   --   --  17   ACD19  --   --   --   --   --   --   --   --   --  532   [id number] Negative Negative Negative Negative  --   --    < >  --  Not Detected  --    [id number]  --   --   --   --   --   --   --   --  Nasopharyngeal  --    COVIDPCREXT  --   --   --   --  Not Detected Not Detected  --  Not Detected  --   --     < > = values in this interval not displayed.       Respiratory virus (non-coronavirus-19) testing    No lab results found.    CMV viral loads    CMV DNA IU/mL   Date Value Ref Range Status   10/23/2023 Not Detected Not Detected IU/mL Final   10/16/2023 <35 (A) Not Detected IU/mL Final     Comment:     CMV DNA detected, less than 35 IU/mL   10/09/2023 <35 (A) Not Detected IU/mL Final     Comment:     CMV DNA detected, less than 35 IU/mL   10/02/2023 Not Detected Not Detected IU/mL Final   09/25/2023 Not Detected Not Detected IU/mL Final   07/17/2023 Not Detected Not Detected IU/mL Final   07/05/2023 Not Detected Not Detected IU/mL Final   06/15/2023 Not Detected Not Detected IU/mL Final   05/25/2023 Not Detected Not Detected IU/mL Final   05/04/2023 Not Detected Not Detected IU/mL Final   03/20/2023 Not Detected Not Detected IU/mL  Final   03/06/2023 Not Detected Not Detected IU/mL Final   01/15/2023 Not Detected Not Detected IU/mL Final     CMV DNA IU/mL, Instrument   Date Value Ref Range Status   11/27/2023 105 (H) <1 IU/mL Final   11/20/2023 648 (H) <1 IU/mL Final   09/18/2023 61 (H) <1 IU/mL Final   09/11/2023 468 (H) <1 IU/mL Final   09/05/2023 4,580 (H) <1 IU/mL Final   08/22/2023 2,340 (H) <1 IU/mL Final     CMV log   Date Value Ref Range Status   11/27/2023 2.0  Final   11/20/2023 2.8  Final   10/16/2023 <1.5  Final   10/09/2023 <1.5  Final   09/18/2023 1.8  Final   09/11/2023 2.7  Final   09/05/2023 3.7  Final   08/22/2023 3.4  Final       EBV DNA Copies/mL   Date Value Ref Range Status   09/25/2023 Not Detected Not Detected copies/mL Final   08/22/2023 <500 (A) Not Detected copies/mL Final     Comment:     EBV DNA Detected below the reportable range of 500 copies/mL   07/04/2023 <500 (A) Not Detected copies/mL Final     Comment:     EBV DNA Detected below the reportable range of 500 copies/mL   05/04/2023 Not Detected Not Detected copies/mL Final   03/20/2023 Not Detected Not Detected copies/mL Final       BK viral loads   Recent Labs   Lab Test 11/20/23  1454 10/23/23  1445 09/25/23  1541 08/28/23  1453 08/22/23  1448 07/24/23  1509 07/05/23  1203 06/26/23  1507 05/25/23  1552   BKRES Not Detected Not Detected Not Detected Not Detected Not Detected Not Detected Not Detected Not Detected Not Detected       Hepatitis B Testing     Recent Labs   Lab Test 01/15/23  0540 08/18/20  1307   AUSAB 134.22 212.08*   HBCAB Nonreactive Nonreactive   HEPBANG Nonreactive Nonreactive     HCV Ab Negative 1/15/23  CMV IgG Negative 1/15/23  VZV IgG Positive 8/18/20  EBV Capsid IgG Positive 1/15/23      Imaging:    CT C/A/P with contrast 7/6/23:  IMPRESSION: Postoperative changes of kidney transplant. There is mild nonspecific stranding around transplant kidney with a small amount of  fluid in the pelvis and areas of patchy hypoenhancement within the  transplant kidney. These likely represents chronic/benign findings,  however in the appropriate clinical context findings may represent mild infection.   -2/2 Traumatic Rt Thigh Hematoma s/p fall at [facility], Acute & Chronic Anemia   -s/p 3 u pRBC given   - 81 mg ASA restarted 7/16, discussed with cardiology -STOP  Plavix   - restarted warfarin 3 mg, OK with hematology (INR 2.14 today) as d/w Hematology   - continue daily INR  - type and screen, blood consent in chart  - Trend CBC and signs or symptoms of active bleeding -2/2 Traumatic Rt Thigh Hematoma s/p fall at [facility], Acute & Chronic Anemia   -s/p 3 u pRBC given   - 81 mg ASA restarted 7/16, discussed with cardiology -STOP  Plavix   - restarted warfarin 3 mg, OK with hematology (INR 2.14 today) as d/w Hematology   - continue with warfarin 3 mg Monday, Wednesday, Friday; 2 mg Tuesday, Thursday, Saturday, Sunday   - continue daily INR- should maintain between 2-3   - type and screen, blood consent in chart  - Trend CBC and signs or symptoms of active bleeding -2/2 Traumatic Rt Thigh Hematoma s/p fall at [facility], Acute & Chronic Anemia   -s/p 3 u pRBC given   - 81 mg ASA restarted 7/16, discussed with cardiology -STOP  Plavix   - restarted warfarin 3 mg, OK with hematology (INR 2.14 today) as d/w Hematology   - continue with warfarin 3 mg Monday, Wednesday, Friday; 2 mg Tuesday, Thursday, Saturday, Sunday   - continue daily INR- should maintain between 2-3   - type and screen, blood consent in chart  - CBC  at [facility]

## 2024-02-23 NOTE — PROGRESS NOTE ADULT - PROBLEM SELECTOR PLAN 4
Pt notified via [facility] D/W Floor RN Manager of floor & Director of Nursing   - Repeat COVID PCR negative  - s/p MAB infusion x 1 given on 10/14/21  - On Isolation Likely 2/2 Ac Illness , Daily lab draws & possible fluid overload   Stable H/H , Hemodynamically stable   CBC daily,

## 2024-03-05 NOTE — DISCHARGE NOTE NURSING/CASE MANAGEMENT/SOCIAL WORK - NSDPLANG ASIS_GEN_ALL_CORE
Department of Anesthesiology  Postprocedure Note    Patient: Nova Boykin  MRN: [id number]  YOB: 1949  Date of evaluation: 3/5/2024    Procedure Summary       Date: 03/05/24 Room / Location: [place] [address] / [facility]    Anesthesia Start: 0832 Anesthesia Stop: 0908    Procedure: COLONOSCOPY POLYPECTOMY SNARE/COLD BIOPSY Diagnosis:       Constipation, unspecified constipation type      Altered bowel habits      Abdominal discomfort      (Constipation, unspecified constipation type [K59.00])      (Altered bowel habits [R19.4])      (Abdominal discomfort [R10.9])    Surgeons: Bronson Dent MD Responsible Provider: Mike Llanos MD    Anesthesia Type: MAC ASA Status: 3            Anesthesia Type: No value filed.    Reagan Phase I: Reagan Score: 10    Reagan Phase II: Reagan Score: 10    Anesthesia Post Evaluation    Patient location during evaluation: floor  Patient participation: complete - patient participated  Level of consciousness: awake and alert  Pain score: 0  Airway patency: patent  Nausea & Vomiting: no nausea and no vomiting  Cardiovascular status: hemodynamically stable  Respiratory status: room air  Hydration status: euvolemic  Pain management: adequate    No notable events documented.  
No

## 2024-07-12 NOTE — PROGRESS NOTE ADULT - PROBLEM SELECTOR PROBLEM 4
Problem: Skin/Tissue Integrity  Goal: Absence of new skin breakdown  Description: 1.  Monitor for areas of redness and/or skin breakdown  2.  Assess vascular access sites hourly  3.  Every 4-6 hours minimum:  Change oxygen saturation probe site  4.  Every 4-6 hours:  If on nasal continuous positive airway pressure, respiratory therapy assess nares and determine need for appliance change or resting period.  Outcome: Progressing     Problem: Safety - Adult  Goal: Free from fall injury  7/11/2024 2142 by Hue Martinez, RN  Outcome: Progressing  7/11/2024 0903 by Loli Mckeon RN  Outcome: Progressing     Problem: ABCDS Injury Assessment  Goal: Absence of physical injury  Outcome: Progressing     Problem: Discharge Planning  Goal: Discharge to home or other facility with appropriate resources  Outcome: Progressing     Problem: Pain  Goal: Verbalizes/displays adequate comfort level or baseline comfort level  Outcome: Progressing      Arm weakness

## 2025-03-02 NOTE — PROGRESS NOTE ADULT - SUBJECTIVE AND OBJECTIVE BOX
Pulmonary, Critical Care and Sleep Consult Note     Date / Time of Admission:  2/24/2025  5:18 AM  Admitting Diagnosis: Pain [R52]      Chief Complaint:     Shortness of breath with pleurisy on the right side  Better  CT no PE    Allergies: ALLERGIES:  Penicillin g     Medication List:     Current Facility-Administered Medications   Medication Dose Route Frequency Provider Last Rate Last Admin    pantoprazole (PROTONIX) EC tablet 40 mg  40 mg Oral QAM AC Darby Francois APNP   40 mg at 03/02/25 0959    sodium chloride 0.9% infusion   Intravenous Continuous Kale Caban MD 65 mL/hr at 03/02/25 1035 New Bag at 03/02/25 1035    insulin glargine (LANTUS) injection 10 Units  10 Units Subcutaneous Nightly Darby Francois APNP   10 Units at 03/01/25 2033    ipratropium-albuterol (DUONEB) 0.5-2.5 (3) MG/3ML nebulizer solution 3 mL  3 mL Nebulization Q6H Resp PRN Gale Bach MD        ipratropium-albuterol (DUONEB) 0.5-2.5 (3) MG/3ML nebulizer solution 3 mL  3 mL Nebulization TID Resp Gale Bach MD   3 mL at 03/02/25 0720    enoxaparin (LOVENOX) injection 40 mg  40 mg Subcutaneous Daily Marino Box MD   40 mg at 03/02/25 0959    allopurinol (ZYLOPRIM) tablet 100 mg  100 mg Oral Daily Darby Francois APNP   100 mg at 03/02/25 0959    diphenhydrAMINE (BENADRYL) injection 25 mg  25 mg Intravenous Q6H PRN Darby Francois APNP        ceFAZolin (ANCEF) 2,000 mg in sterile water (preservative free) IV syringe  2,000 mg Intravenous 3 times per day Neelam Seay MD   2,000 mg at 03/02/25 0547    calcium carbonate (TUMS) chewable tablet 500 mg  500 mg Oral Q4H PRN Marino Box MD   500 mg at 02/26/25 2242    aspirin chewable 81 mg  81 mg Oral Daily Alise Mota MD   81 mg at 03/02/25 0959    clonazePAM (KlonoPIN) tablet 0.25 mg  0.25 mg Oral BID PRN Alise Mota MD        gabapentin (NEURONTIN) capsule 300 mg  300 mg Oral Nightly Alise Mota MD   300 mg at 03/01/25 2032     atorvastatin (LIPITOR) tablet 80 mg  80 mg Oral QHS Alise Mota MD   80 mg at 03/01/25 2033    dextrose 50 % injection 25 g  25 g Intravenous PRN Alise Mota MD        dextrose 50 % injection 12.5 g  12.5 g Intravenous PRN Alise Mota MD        glucagon (GLUCAGEN) injection 1 mg  1 mg Intramuscular PRN Alise Mota MD        dextrose (GLUTOSE) 40 % gel 15 g  15 g Oral PRN Alise Mota MD        dextrose (GLUTOSE) 40 % gel 30 g  30 g Oral PRN Alise Mota MD        insulin lispro (ADMELOG,HumaLOG) - Correction Dose   Subcutaneous TID WC Darby Francois APNP   4 Units at 03/02/25 0959    ondansetron (ZOFRAN) injection 4 mg  4 mg Intravenous Q6H PRN Alise Mota MD   4 mg at 02/27/25 0001    metoCLOPramide (REGLAN) injection 5 mg  5 mg Intravenous Q6H PRN Alise Mota MD   5 mg at 02/27/25 0403    acetaminophen (TYLENOL) tablet 650 mg  650 mg Oral Q6H PRN Darby Francois APNP   650 mg at 02/26/25 0703    HYDROcodone-acetaminophen (NORCO) 5-325 MG per tablet 1 tablet  1 tablet Oral Q4H PRN Darby Francois APNP   1 tablet at 03/02/25 1105          Vitals & Flow Sheet:      Visit Vitals  /66 (BP Location: LUE - Left upper extremity)   Pulse 82   Temp 98.1 °F (36.7 °C) (Oral)   Resp 16   Ht 5' 5.98\" (1.676 m)   Wt 97 kg (213 lb 13.5 oz)   SpO2 93%   BMI 34.53 kg/m²       I&O:   Intake/Output Summary (Last 24 hours) at 3/2/2025 1118  Last data filed at 3/2/2025 0600  Gross per 24 hour   Intake --   Output 1450 ml   Net -1450 ml                Physical Examinations:     Patient is awake and alert    Head and neck exam: Pupils reactive, normal oral mucosa, neck is supple with no JVD.    Lungs: Clear to air entry bilaterally    Cardiac exam: Regular rate and rhythm S1 and S2 normal. No murmur.    Abdominal exam: Soft nontender nondistended normoactive bowel sounds no organomegaly.    Extremities: no edema no clubbing or cyanosis palpable pulsatile lower extremities.     Skin Exam normal        Laboratory Data:     Recent Results (from the past 24 hour(s))   TRANSTHORACIC ECHO (TTE) COMPLETE W/ W/O IMAGING AGENT    Collection Time: 03/01/25  2:00 PM   Result Value Ref Range    AV Stenosis Severity Text Absent     Ejection Fraction 58%     AV VTI (Previously displayed as SERA) 1.19     MV Peak E Velocity 0.72     MV Peak A Velocity 215     E Wave Decelaration Time [id number]     MV E Wave Jevon/E Tissue Jevon Med 10.1     MV E Tissue Jevon Med 10.7     TV Estimated Right Arterial Pressure 14.5     KARTHIKEYAN LVOT Peak Gradient 0.7     LV end diastolic posterior wall thickness 2D 4.4     Left Ventricular Internal Dimension in Diastole 3.2     Left Internal Dimenson in Systole 0.8    GLUCOSE, BEDSIDE - POINT OF CARE    Collection Time: 03/01/25  5:09 PM    Specimen: Blood   Result Value Ref Range    GLUCOSE, BEDSIDE - POINT OF CARE 202 (H) 70 - 99 mg/dL   Osmolality, Urine    Collection Time: 03/01/25  6:09 PM    Specimen: Urine clean catch   Result Value Ref Range    Osmolality, Urine 428 50 - 1,200 mOsm/kg   Sodium, Urine    Collection Time: 03/01/25  6:09 PM    Specimen: Urine clean catch   Result Value Ref Range    Sodium, Urine 68 mmol/L   GLUCOSE, BEDSIDE - POINT OF CARE    Collection Time: 03/01/25  7:52 PM    Specimen: Blood   Result Value Ref Range    GLUCOSE, BEDSIDE - POINT OF CARE 366 (H) 70 - 99 mg/dL   GLUCOSE, BEDSIDE - POINT OF CARE    Collection Time: 03/02/25  5:29 AM    Specimen: Blood   Result Value Ref Range    GLUCOSE, BEDSIDE - POINT OF CARE 223 (H) 70 - 99 mg/dL   Basic Metabolic Panel    Collection Time: 03/02/25  6:26 AM    Specimen: Blood, Venous   Result Value Ref Range    Fasting Status      Sodium 135 135 - 145 mmol/L    Potassium 4.2 3.4 - 5.1 mmol/L    Chloride 101 97 - 110 mmol/L    Carbon Dioxide 27 21 - 32 mmol/L    Anion Gap 11 7 - 19 mmol/L    Glucose 221 (H) 70 - 99 mg/dL    BUN 40 (H) 6 - 20 mg/dL    Creatinine 1.32 (H) 0.51 - 0.95 mg/dL    Glomerular Filtration Rate 42 (L)  >=60    BUN/Cr 30 (H) 7 - 25    Calcium 8.8 8.4 - 10.2 mg/dL   CBC No Differential    Collection Time: 03/02/25  6:26 AM    Specimen: Blood, Venous   Result Value Ref Range    WBC 5.7 4.2 - 11.0 K/mcL    RBC 2.66 (L) 4.00 - 5.20 mil/mcL    HGB 7.7 (L) 12.0 - 15.5 g/dL    HCT 24.0 (L) 36.0 - 46.5 %    MCV 90.2 78.0 - 100.0 fl    MCH 28.9 26.0 - 34.0 pg    MCHC 32.1 32.0 - 36.5 g/dL     140 - 450 K/mcL    RDW-CV 17.2 (H) 11.0 - 15.0 %    RDW-SD 55.7 (H) 39.0 - 50.0 fL    NRBC 0 <=0 /100 WBC   GLUCOSE, BEDSIDE - POINT OF CARE    Collection Time: 03/02/25 10:50 AM    Specimen: Blood   Result Value Ref Range    GLUCOSE, BEDSIDE - POINT OF CARE 248 (H) 70 - 99 mg/dL       Imaging:     CTA CHEST PULMONARY EMBOLISM   Final Result   1.  No pulmonary embolus.   2.  Large hiatal hernia with compressive atelectasis of the left lung    base.   3.  Small left pleural effusion.  Trace right pleural effusion.            Electronically signed by Garth Campos MD on 03 02 25 at 00:30      CT ABDOMEN PELVIS WO CONTRAST   Final Result   1.  Large hiatal hernia.   2.  No acute findings in the abdomen or pelvis.            Electronically signed by Garth Campos MD on 03 02 25 at 01:20      XR CHEST AP OR PA   Final Result   There is a degree of hyperinflation   Large hiatal hernia   Poor definition of the left base   Consider follow-up supplemental CT scan of the chest for more detailed   assessment of the left lung base      Electronically Signed by: KWAME OCHOA MD    Signed on: 2/28/2025 5:47 PM    Workstation ID: [id number]            Assessment:     Chest discomfort with shortness of breath  Described as pleurisy- ? Early infection. No VTE noted  Lifelong non-smoker  No significant hypoxia or tachycardia  The patient was on anticoagulation in the past and currently on DVT prophylaxis  Venous thromboembolic disease is less likely- CT PE ruled PE out  The patient has hiatal hernia that does not explain her symptoms    S/p  Patient is a 89y old  Female who presents with a chief complaint of CP w/ elevated trops (19 Jun 2022 09:28)       HPI:  Patient is a 89 y/o F with a pmhx b/l LE edema, HTN, P Afib (on coumadin), and hypothyroidism presenting with sudden onset mid-sternal CP. Pt describes pain as a "prickly feeling", rated 4/10, constant in nature since 4am on 6/17, resolved in the ED. Pain was associated with L arm weakness, now resolved. Pt states the pain woke her up, and she was scared to walk and was unable to use her L arm for support to get out of bed. Pt was also nauseous at this time. No associated diaphoresis, palpitations, chest pressure. No amaurosis fugax, facial droop, garbled speech, hemiparesis, LOC, falls reported. No vomiting, fevers, chills, cough, sore throat, SOB, abd pain, constipation, dysuria. Pt had one loose BM this AM at home, denied hematochezia or melena.   Patient has not seen nephrologist in the past.  Denies any N/V/Urinary complaints.         PAST MEDICAL & SURGICAL HISTORY:  Hypothyroid      Hypertension      Lower extremity edema      Paroxysmal atrial fibrillation           FAMILY HISTORY:  NC    Social History:Non smoker    MEDICATIONS  (STANDING):  acetylcysteine  Oral Solution 600 milliGRAM(s) Oral every 12 hours  aspirin enteric coated 81 milliGRAM(s) Oral daily  atorvastatin 80 milliGRAM(s) Oral at bedtime  clopidogrel Tablet 75 milliGRAM(s) Oral daily  levothyroxine 75 MICROGram(s) Oral daily  metoprolol succinate ER 50 milliGRAM(s) Oral daily  pantoprazole  Injectable 40 milliGRAM(s) IV Push daily  potassium chloride   Powder 40 milliEquivalent(s) Oral once  sodium chloride 0.9%. 1000 milliLiter(s) (50 mL/Hr) IV Continuous <Continuous>    MEDICATIONS  (PRN):   Meds reviewed    Allergies    No Known Allergies    Intolerances         REVIEW OF SYSTEMS:    Review of Systems:   Constitutional: Denies fatigue  HEENT: Denies headaches and dizziness  Respiratory: denies SOB, cough, or wheezing  Cardiovascular: denies CP, palpitations  Gastrointestinal: Denies nausea, denies vomiting, diarrhea, constipation, abdominal pain, or bloody stools  Genitourinary: denies painful urination, increased frequency, urgency, or bloody urine  Skin: denies rashes or itching  Musculoskeletal: denies muscle aches, joint swelling  Neurologic: Denies generalized weakness, denies loss of sensation, numbness, or tingling      ICU Vital Signs Last 24 Hrs  T(C): 36.8 (20 Jun 2022 11:30), Max: 36.8 (19 Jun 2022 20:21)  T(F): 98.2 (20 Jun 2022 11:30), Max: 98.3 (19 Jun 2022 20:21)  HR: 77 (20 Jun 2022 11:30) (62 - 77)  BP: 108/64 (20 Jun 2022 11:30) (105/61 - 123/66)  BP(mean): --  ABP: --  ABP(mean): --  RR: 16 (20 Jun 2022 11:30) (16 - 18)  SpO2: 97% (20 Jun 2022 11:30) (96% - 97%)      PHYSICAL EXAM:    GENERAL: NAD  HEAD:  Atraumatic, Normocephalic  EYES: EOMI, conjunctiva and sclera clear  ENMT: No Drainage from nares, No drainage from ears  NECK: Supple, neck  veins full  NERVOUS SYSTEM:  Awake and Alert  CHEST/LUNG: mildly Decreased BS R No rales, rhonchi, wheezing, or rubs  HEART: Regular rate and rhythm; No murmurs, rubs, or gallops  ABDOMEN: Soft, Nontender, Nondistended; Bowel sounds present  EXTREMITIES:  + Edema  SKIN: No rashes No obvious ecchymosis      LABS:                                   10.5   6.09  )-----------( 180      ( 20 Jun 2022 09:53 )             32.3     06-20    138  |  102  |  25<H>  ----------------------------<  115<H>  4.2   |  31  |  1.50<H>    Ca    7.6<L>      20 Jun 2022 09:53  Phos  2.2     06-20  Mg     2.2     06-20    TPro  6.4  /  Alb  2.4<L>  /  TBili  1.0  /  DBili  x   /  AST  57<H>  /  ALT  22  /  AlkPhos  88  06-20    PT/INR - ( 20 Jun 2022 09:53 )   PT: 21.8 sec;   INR: 1.85 ratio         PTT - ( 20 Jun 2022 09:53 )  PTT:37.8 sec       right hip replacement January 21, 2025 done at [facility]  Infection of the wound  S/p I&D during this hospitalization at [facility]  Diagnosed with obstructive sleep apnea in the past  Not on CPAP machine    Dyslipidemia, diabetes    Coronary artery disease with history of stents in the past  History of atrial fibrillation s/p ablation  The patient was recently on Xarelto    Chemotherapy    Medical Decision Making     Incentive spirometry  Increase activity  Follow-up kidney function  Incentive spirometry  Will use bronchodilators as needed  Continue DVT prophylaxis  Continue antibiotics for hip infection      I personally managed this critically ill patient for 33 minutes.  This time is exclusive of any teaching or billed procedures .       Gale Bach MD, [facility]  Pulmonary, Critical care and Sleep medicine  3/2/2025 11:18 AM

## 2025-06-11 NOTE — DIETITIAN INITIAL EVALUATION ADULT - ORAL INTAKE PTA/DIET HISTORY
patient seen with family at bedside. patient from [facility] . LENNIE thin liquids per transfer papers  food preferences noted  lunch tray at bedside ate soup and egg from salad   refusing supplements eating well 
Myself
